# Patient Record
Sex: FEMALE | Race: WHITE | Employment: OTHER | URBAN - METROPOLITAN AREA
[De-identification: names, ages, dates, MRNs, and addresses within clinical notes are randomized per-mention and may not be internally consistent; named-entity substitution may affect disease eponyms.]

---

## 2017-01-25 ENCOUNTER — OFFICE VISIT (OUTPATIENT)
Dept: NEUROLOGY | Age: 82
End: 2017-01-25

## 2017-01-25 DIAGNOSIS — E11.42 DIABETIC PERIPHERAL NEUROPATHY ASSOCIATED WITH TYPE 2 DIABETES MELLITUS (HCC): ICD-10-CM

## 2017-01-25 DIAGNOSIS — M54.16 LUMBAR BACK PAIN WITH RADICULOPATHY AFFECTING LEFT LOWER EXTREMITY: Primary | ICD-10-CM

## 2017-01-25 DIAGNOSIS — M54.16 LUMBAR BACK PAIN WITH RADICULOPATHY AFFECTING RIGHT LOWER EXTREMITY: ICD-10-CM

## 2017-01-25 DIAGNOSIS — R55 SYNCOPE AND COLLAPSE: ICD-10-CM

## 2017-01-25 DIAGNOSIS — M48.062 SPINAL STENOSIS OF LUMBAR REGION WITH NEUROGENIC CLAUDICATION: ICD-10-CM

## 2017-01-25 DIAGNOSIS — G60.8 IDIOPATHIC SMALL AND LARGE FIBER SENSORY NEUROPATHY: ICD-10-CM

## 2017-01-25 DIAGNOSIS — M96.1 LUMBAR POST-LAMINECTOMY SYNDROME: ICD-10-CM

## 2017-01-25 NOTE — LETTER
1/25/2017 12:11 PM 
 
Patient:  Richar Starr YOB: 1935 Date of Visit: 1/25/2017 Dear No Recipients: Thank you for referring Ms. Antonio Mcgrath to me for evaluation/treatment. Below are the relevant portions of my assessment and plan of care. EMG dictated in procedure note If you have questions, please do not hesitate to call me. I look forward to following Ms. Teri Johnston along with you. Sincerely, Calvin Siddiqui MD

## 2017-01-25 NOTE — PROCEDURES
[address] [facility] at [address] [facility] [address] [facility], [address] [facility]  Tel [phone]     Fax [phone]  Electrodiagnostic Study Report  Test Date:  2017    Patient: Juarez Barba : 1935 Physician: Lc Gutierrez MD   Sex: Female  < Ref Phys: [id number]     Clinical Indication: Patient is a 81-year-old female status post lumbar laminectomy with recurrent bilateral leg and hip pain, worse on the left side, for EMG study to rule out recurrent radiculopathy, or other causes of her back pain. Impression: This study shows electrophysiologic evidence of a chronic L5 motor radiculopathy in the left lower extremity, without acute denervation changes seen in fairly good recruitment and interference patterns still seen, most consistent with a chronic L5 motor radiculopathy. No clear evidence of denervation potential seen in either the right or left lower extremity to indicate acute lumbar radiculopathy. The borderline low amplitudes of the compound muscle action potentials and compound sensory action potentials seen in several muscles, could be normal for her age, we will could represent changes from previous radiculopathy, or less likely in view of her normal conductions be associated with a small fiber chronic axonal neuropathy, again which is much less likely in view of her normal sensory and motor conduction velocities. Clinical correlation recommended, and correlation with imaging modalities would be of value in following this patient if clinically indicated. EMG & NCV Findings:  Evaluation of the left Fibular motor, the right Fibular motor, the right tibial motor, and the right sural sensory nerves showed reduced amplitude (L1.8, R1.8, R2.8, R2.7 µV). All remaining nerves (as indicated in the following tables) were within normal limits.   Left vs. Right side comparison data for the tibial motor nerve indicates abnormal L-R amplitude difference (56.3 %). All remaining left vs. right side differences were within normal limits.         Nerve Conduction Studies  Anti Sensory Summary Table     Stim Site NR Peak (ms) P-T Amp (µV) Site1 Site2 Delta-P (ms) Dist (cm) Doc (m/s)   Left Sural Anti Sensory (Lat Mall)  30.9°C   Calf    3.3 8.4 Calf Lat Mall 3.3 14.0 42   Site 2    3.5 2.0        Site 3    3.4 4.5        Right Sural Anti Sensory (Lat Mall)  30.9°C   Calf    3.9 2.7 Calf Lat Mall 3.9 14.0 36   Site 2    3.7 4.5        Site 3    3.2 8.3        Site 4    3.3 4.4          Motor Summary Table     Stim Site NR Onset (ms) O-P Amp (mV) Site1 Site2 Delta-0 (ms) Dist (cm) Doc (m/s)   Left Fibular Motor (Ext Dig Brev)  30.3°C   Ankle    3.2 1.8 B Fib Ankle 8.4 32.0 38   B Fib    11.6 1.6 Poplt B Fib 1.8 10.0 56   Poplt    13.4 1.4            2.9 1.6        Right Fibular Motor (Ext Dig Brev)   Ankle    3.6 1.8 B Fib Ankle 7.3 32.0 44   B Fib    10.9 1.3 Poplt B Fib 2.0 10.0 50   Poplt    12.9 1.1        Left Tibial Motor (Abd Del Valle Brev)  30°C   Ankle    3.8 6.4 Knee Ankle 8.9 38.0 43   Knee    12.7 4.6        Right Tibial Motor (Abd Del Valle Brev)   Ankle    4.0 2.8 Knee Ankle 9.4 38.0 40   Knee    13.4 1.2          EMG     Side Muscle Nerve Root Ins Act Fibs Psw Amp Dur Poly Recrt Comment   Left AntTibialis Dp Br Fibular L4-5 Nml Nml Nml Incr >12ms 1+ Nml    Left Gastroc Tibial S1-2 Nml Nml Nml Nml Nml 0 Nml    Left VastusLat Femoral L2-4 Nml Nml Nml Nml Nml 0 Nml    Left Fibularis Long Sup Br Fibular L5-S1 Nml Nml Nml Incr >12ms 1+ Nml    Left Lumbo Parasp Low Rami L5-S1 Nml Nml Nml        Left BicepsFemS Sciatic L5-S1 Nml Nml Nml Nml Nml 0 Nml    Right AntTibialis Dp Br Fibular L4-5 Nml Nml Nml Nml Nml 0 Nml    Right Gastroc Tibial S1-2 Nml Nml Nml Nml Nml 0 Nml    Right VastusLat Femoral L2-4 Nml Nml Nml Nml Nml 0 Nml    Right BicepsFemS Sciatic L5-S1 Nml Nml Nml Nml Nml 0 Nml    Right Lumbo Parasp Low Rami L5-S1 Nml Nml Nml        Right Fibularis Long Sup Br Hood L5-S1 Nml Nml Nml Nml Nml 0 Nml        Waveforms:                    __________________  Mara Bell M.D.

## 2017-02-10 ENCOUNTER — HOSPITAL ENCOUNTER (OUTPATIENT)
Dept: CT IMAGING | Age: 82
Discharge: HOME | End: 2017-02-10
Attending: ORTHOPAEDIC SURGERY
Payer: MEDICARE

## 2017-02-10 ENCOUNTER — HOSPITAL ENCOUNTER (OUTPATIENT)
Dept: GENERAL RADIOLOGY | Age: 82
Discharge: HOME | End: 2017-02-10
Attending: ORTHOPAEDIC SURGERY
Payer: MEDICARE

## 2017-02-10 VITALS
OXYGEN SATURATION: 99 % | SYSTOLIC BLOOD PRESSURE: 99 MMHG | HEART RATE: 66 BPM | DIASTOLIC BLOOD PRESSURE: 55 MMHG | RESPIRATION RATE: 16 BRPM | TEMPERATURE: 98.1 F

## 2017-02-10 DIAGNOSIS — M48.061 LUMBAR STENOSIS WITHOUT NEUROGENIC CLAUDICATION: ICD-10-CM

## 2017-02-10 DIAGNOSIS — M43.16 SPONDYLOLISTHESIS OF LUMBAR REGION: ICD-10-CM

## 2017-02-10 DIAGNOSIS — M54.30 SCIATICA: ICD-10-CM

## 2017-02-10 DIAGNOSIS — M54.50 LOW BACK PAIN: ICD-10-CM

## 2017-02-10 PROCEDURE — 62304 MYELOGRAPHY LUMBAR INJECTION: CPT

## 2017-02-10 PROCEDURE — 72132 CT LUMBAR SPINE W/DYE: CPT

## 2017-02-10 PROCEDURE — 74011636320 HC RX REV CODE- 636/320: Performed by: RADIOLOGY

## 2017-02-10 PROCEDURE — 74011000250 HC RX REV CODE- 250: Performed by: RADIOLOGY

## 2017-02-10 RX ADMIN — IOHEXOL 20 ML: 180 INJECTION INTRAVENOUS at 08:40

## 2017-02-10 RX ADMIN — SODIUM BICARBONATE 2 ML: 0.2 INJECTION, SOLUTION INTRAVENOUS at 08:34

## 2017-02-10 NOTE — IP AVS SNAPSHOT
Current Discharge Medication List  
  
ASK your doctor about these medications Dose & Instructions Dispensing Information Comments Morning Noon Evening Bedtime  
 anastrozole 1 mg tablet Commonly known as:  ARIMIDEX Your next dose is: Today, Tomorrow Other:  ____________ Dose:  1 mg Take 1 Tab by mouth daily. Quantity:  90 Tab Refills:  2  
     
   
   
   
  
 aspirin 325 mg tablet Commonly known as:  ASPIRIN Your next dose is: Today, Tomorrow Other:  ____________ Dose:  325 mg Take 325 mg by mouth daily as needed for Pain. Refills:  0  
     
   
   
   
  
 ergocalciferol 50,000 unit capsule Commonly known as:  ERGOCALCIFEROL Your next dose is: Today, Tomorrow Other:  ____________  
   
   
 take 1 capsule by mouth every week Quantity:  12 Cap Refills:  3 GENTEAL OP Your next dose is: Today, Tomorrow Other:  ____________ Apply  to eye as needed. Refills:  0 GENTEAL TEARS OP Your next dose is: Today, Tomorrow Other:  ____________ Apply  to eye daily. Refills:  0 MAALOX PO Your next dose is: Today, Tomorrow Other:  ____________ Take  by mouth as needed. Refills:  0 PRESERVISION PO Your next dose is: Today, Tomorrow Other:  ____________ Take  by mouth daily. Refills:  0  
     
   
   
   
  
 RESTASIS OP Your next dose is: Today, Tomorrow Other:  ____________ Apply  to eye two (2) times a day. Refills:  0

## 2017-02-10 NOTE — ROUTINE PROCESS
0945- Pt in post myelogram.  Request for CD post procedure. Discharge instructions reviewed with pt. Pt verbalized understanding. 1010- CD with pt. Pt verbalized understanding. Discharged to home via W/C.

## 2017-02-10 NOTE — DISCHARGE INSTRUCTIONS
[facility]  Special Procedures/Radiology Department    Radiologist: Jose J Velasquez    Date:  2/10/16      Myelogram Discharge Instructions    Restrict your activity for the next 48 hours. You may get up and sit in the chair or get up and go to the bathroom with slow movements. You must keep your head above your chest until 8 a.m. tomorrow. Rest as much as possible tonight and tomorrow. Resume your previous diet and follow the medication reconciliation form. Drink plenty of water. Avoid products with caffeine. You may take Tylenol, as directed on the label, for pain or discomfort. Avoid ibuprofen (Advil, Motrin) and aspirin as they may cause bleeding. Avoid lifting anything heavier than 5 pounds. Avoid excessive bending and lifting as this may increase the chance of having a headache. Be sure to follow up with your physician. Take your CD disk with you. If you have severe pain, or if you have any questions or concerns, please call [phone] and ask to speak to the nurse on-call.         Patient Signature:  ______________________________________________

## 2017-02-10 NOTE — IP AVS SNAPSHOT
[address] [facility] 
[phone] Patient: Dulce Rodriguez MRN: [id number] DPJ:[id number] You are allergic to the following Allergen Reactions Hydrocodone Nausea Only Vertigo Gabapentin Diarrhea Nausea Only Other (comments)  
 weakness Oxycodone Nausea and Vomiting Vertigo Recent Documentation OB Status Smoking Status Hysterectomy Former Smoker Emergency Contacts Name Discharge Info Relation Home Work Mobile [facility] DISCHARGE CAREGIVER [3] Spouse [3] [phone] About your hospitalization You were admitted on:  February 10, 2017 You last received care in the:  [facility] RAD [facility] You were discharged on:  February 10, 2017 Unit phone number:  [phone] Why you were hospitalized Your primary diagnosis was:  Not on File Providers Seen During Your Hospitalizations Provider Role Specialty Primary office phone Jaylan Barajas MD Attending Provider Orthopedic Surgery [phone] Your Primary Care Physician (PCP) Primary Care Physician Office Phone Office Fax [facility] [phone] Follow-up Information None Current Discharge Medication List  
  
ASK your doctor about these medications Dose & Instructions Dispensing Information Comments Morning Noon Evening Bedtime  
 anastrozole 1 mg tablet Commonly known as:  ARIMIDEX Your next dose is: Today, Tomorrow Other:  _________ Dose:  1 mg Take 1 Tab by mouth daily. Quantity:  90 Tab Refills:  2  
     
   
   
   
  
 aspirin 325 mg tablet Commonly known as:  ASPIRIN Your next dose is: Today, Tomorrow Other:  _________ Dose:  325 mg Take 325 mg by mouth daily as needed for Pain. Refills:  0 ergocalciferol 50,000 unit capsule Commonly known as:  ERGOCALCIFEROL Your next dose is: Today, Tomorrow Other:  _________  
   
   
 take 1 capsule by mouth every week Quantity:  12 Cap Refills:  3 GENTEAL OP Your next dose is: Today, Tomorrow Other:  _________ Apply  to eye as needed. Refills:  0 GENTEAL TEARS OP Your next dose is: Today, Tomorrow Other:  _________ Apply  to eye daily. Refills:  0 MAALOX PO Your next dose is: Today, Tomorrow Other:  _________ Take  by mouth as needed. Refills:  0 PRESERVISION PO Your next dose is: Today, Tomorrow Other:  _________ Take  by mouth daily. Refills:  0  
     
   
   
   
  
 RESTASIS OP Your next dose is: Today, Tomorrow Other:  _________ Apply  to eye two (2) times a day. Refills:  0 Discharge Instructions [facility] Special Procedures/Radiology Department Radiologist: Sherrill Lopez Date:  2/10/16 Myelogram Discharge Instructions Restrict your activity for the next 48 hours. You may get up and sit in the chair or get up and go to the bathroom with slow movements. You must keep your head above your chest until 8 a.m. tomorrow. Rest as much as possible tonight and tomorrow. Resume your previous diet and follow the medication reconciliation form. Drink plenty of water. Avoid products with caffeine. You may take Tylenol, as directed on the label, for pain or discomfort. Avoid ibuprofen (Advil, Motrin) and aspirin as they may cause bleeding. Avoid lifting anything heavier than 5 pounds. Avoid excessive bending and lifting as this may increase the chance of having a headache. Be sure to follow up with your physician. Take your CD disk with you. If you have severe pain, or if you have any questions or concerns, please call [phone] and ask to speak to the nurse on-call. Patient Signature:  ______________________________________________ Discharge Orders None Introducing [facility]! Dear Penny Guy: Thank you for requesting a [facility] account. Our records indicate that you have previously registered for a [facility] account but its currently inactive. Please call our [facility] support line at [phone]. Additional Information If you have questions, please visit the Frequently Asked Questions section of the [facility] website at [email or web address] Remember, MyChart is NOT to be used for urgent needs. For medical emergencies, dial 911. Now available from your iPhone and Android! General Information Please provide this summary of care documentation to your next provider. Patient Signature:  ____________________________________________________________ Date:  ____________________________________________________________  
  
[facility] Provider Signature:  ____________________________________________________________ Date:  ____________________________________________________________

## 2017-05-07 ENCOUNTER — HOSPITAL ENCOUNTER (OUTPATIENT)
Dept: MRI IMAGING | Age: 82
Discharge: HOME | End: 2017-05-07
Attending: ORTHOPAEDIC SURGERY
Payer: MEDICARE

## 2017-05-07 DIAGNOSIS — M25.552 LEFT HIP PAIN: ICD-10-CM

## 2017-05-07 DIAGNOSIS — M43.16 SPONDYLOLISTHESIS OF LUMBAR REGION: ICD-10-CM

## 2017-05-07 DIAGNOSIS — M46.1 SACROILIITIS, NOT ELSEWHERE CLASSIFIED (HCC): ICD-10-CM

## 2017-05-07 DIAGNOSIS — M54.30 SCIATICA: ICD-10-CM

## 2017-05-07 PROCEDURE — 73721 MRI JNT OF LWR EXTRE W/O DYE: CPT

## 2017-05-10 DIAGNOSIS — E55.9 VITAMIN D DEFICIENCY: ICD-10-CM

## 2017-05-10 RX ORDER — ERGOCALCIFEROL 1.25 MG/1
CAPSULE ORAL
Qty: 12 CAP | Refills: 3 | Status: SHIPPED | OUTPATIENT
Start: 2017-05-10 | End: 2018-08-08 | Stop reason: SDUPTHER

## 2017-08-25 ENCOUNTER — OFFICE VISIT (OUTPATIENT)
Dept: SURGERY | Age: 82
End: 2017-08-25

## 2017-08-25 VITALS
SYSTOLIC BLOOD PRESSURE: 140 MMHG | HEIGHT: 65 IN | HEART RATE: 81 BPM | DIASTOLIC BLOOD PRESSURE: 61 MMHG | WEIGHT: 188 LBS | BODY MASS INDEX: 31.32 KG/M2

## 2017-08-25 DIAGNOSIS — Z98.890 S/P LUMPECTOMY, LEFT BREAST: ICD-10-CM

## 2017-08-25 DIAGNOSIS — M94.0 COSTOCHONDRITIS: ICD-10-CM

## 2017-08-25 DIAGNOSIS — C50.912 MALIGNANT NEOPLASM OF LEFT FEMALE BREAST, UNSPECIFIED SITE OF BREAST: Primary | ICD-10-CM

## 2017-08-25 NOTE — PATIENT INSTRUCTIONS
Breast Pain: Care Instructions  Your Care Instructions  Breast tenderness and pain may come and go with your monthly periods (cyclic), or it may not follow any pattern (noncyclic). Breast pain is rarely caused by a serious health problem. You may need tests to find the cause. Follow-up care is a key part of your treatment and safety. Be sure to make and go to all appointments, and call your doctor if you are having problems. Its also a good idea to know your test results and keep a list of the medicines you take. How can you care for yourself at home? · If your doctor gave you medicine, take it exactly as prescribed. Call your doctor if you think you are having a problem with your medicine. · Take an over-the-counter pain medicine, such as acetaminophen (Tylenol), ibuprofen (Advil, Motrin), or naproxen (Aleve), to relieve pain and swelling. Read and follow all instructions on the label. · Do not take two or more pain medicines at the same time unless the doctor told you to. Many pain medicines have acetaminophen, which is Tylenol. Too much acetaminophen (Tylenol) can be harmful. · Wear a supportive bra, such as a sports bra or a jog bra. · Cut down on the amount of fat in your diet. If you need help planning healthy meals, see a dietitian. · Get at least 30 minutes of exercise on most days of the week. Walking is a good choice. You also may want to do other activities, such as running, swimming, cycling, or playing tennis or team sports. · Keep a healthy sleep pattern. Go to bed at the same time every night, and get up at the same time every day. When should you call for help? Call your doctor now or seek immediate medical care if:  · You have new changes in a breast, such as:  ¨ A lump or thickening in your breast or armpit. ¨ A change in the breast's size or shape. ¨ Skin changes, such as dimples or puckers. ¨ Nipple discharge.   ¨ A change in the color or feel of the skin of your breast or the darker area around the nipple (areola). ¨ A change in the shape of the nipple (it may look like it's being pulled into the breast). · You have symptoms of a breast infection, such as:  ¨ Increased pain, swelling, redness, or warmth around a breast.  ¨ Red streaks extending from the breast.  ¨ Pus draining from a breast.  ¨ A fever. Watch closely for changes in your health, and be sure to contact your doctor if:  · Your breast pain does not get better after 1 week. · You have a lump or thickening in your breast or armpit. Where can you learn more? Go to http://sumit-arpit.info/. Enter S516 in the search box to learn more about \"Breast Pain: Care Instructions. \"  Current as of: March 20, 2017  Content Version: 11.3  © 5317-1046 [employer]. Care instructions adapted under license by [employer] (which disclaims liability or warranty for this information). If you have questions about a medical condition or this instruction, always ask your healthcare professional. [address] any warranty or liability for your use of this information.

## 2017-08-25 NOTE — PROGRESS NOTES
HISTORY OF PRESENT ILLNESS  Ina Quintanilla is a 80 y.o. female. HPI ESTABLISHED patient here for LEFT breast pain x 1-2 weeks. Describes pain as dull and achy. She is s/p LEFT breast lumpectomy 11/2013. Completed radiation therapy. Currently taking Arimidex.      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 Review of Systems   All other systems reviewed and are negative. Physical Exam   Pulmonary/Chest: Right breast exhibits no inverted nipple, no mass, no nipple discharge, no skin change and no tenderness. Left breast exhibits skin change (skin thickening from xrt) and tenderness (tender to palpation along rib along inframammary fold. ). Left breast exhibits no inverted nipple, no mass and no nipple discharge. Breasts are symmetrical.       Lymphadenopathy:     She has no cervical adenopathy. She has no axillary adenopathy. Nursing note and vitals reviewed. ASSESSMENT and PLAN    ICD-10-CM ICD-9-CM    1. Malignant neoplasm of left female breast, unspecified site of breast (HCC) C50.912 174.9    2. S/P lumpectomy, left breast Z90.12 V45.89    3. Costochondritis M94.0 733.6      79 yo female s/p LEFT lumpectomy 11/2013 for T1a N0 Mx (Stage IA) infiltrating ductal carcinoma , Tumor size 1 cm, LN -ve, grade 2, ki 67 17%, %, IA 90%, Her 2 unamplified     - costochondritis- aleve, heat on rib. If no improvement in 2 weeks will do early mammo/us.

## 2017-08-29 RX ORDER — ANASTROZOLE 1 MG/1
TABLET ORAL
Qty: 90 TAB | Refills: 2 | Status: SHIPPED | OUTPATIENT
Start: 2017-08-29 | End: 2018-08-22 | Stop reason: SDUPTHER

## 2017-10-30 ENCOUNTER — TELEPHONE (OUTPATIENT)
Dept: SURGERY | Age: 82
End: 2017-10-30

## 2017-10-30 DIAGNOSIS — Z85.3 HISTORY OF LEFT BREAST CANCER: Primary | ICD-10-CM

## 2017-10-30 NOTE — TELEPHONE ENCOUNTER
Returned Hello message to patient. She is requesting mammogram order. Placed order for 3D bilateral dx darcie. Patient is going to call central scheduling to get this scheduled at [address]

## 2017-11-22 ENCOUNTER — HOSPITAL ENCOUNTER (OUTPATIENT)
Dept: MAMMOGRAPHY | Age: 82
Discharge: HOME | End: 2017-11-22
Attending: SURGERY
Payer: MEDICARE

## 2017-11-22 DIAGNOSIS — Z85.3 HISTORY OF LEFT BREAST CANCER: ICD-10-CM

## 2017-11-22 PROCEDURE — 77062 BREAST TOMOSYNTHESIS BI: CPT

## 2017-12-18 ENCOUNTER — OFFICE VISIT (OUTPATIENT)
Dept: INTERNAL MEDICINE CLINIC | Age: 82
End: 2017-12-18

## 2017-12-18 VITALS
HEIGHT: 65 IN | WEIGHT: 189.6 LBS | SYSTOLIC BLOOD PRESSURE: 128 MMHG | TEMPERATURE: 98.9 F | BODY MASS INDEX: 31.59 KG/M2 | DIASTOLIC BLOOD PRESSURE: 80 MMHG

## 2017-12-18 DIAGNOSIS — J20.9 ACUTE BRONCHITIS, UNSPECIFIED ORGANISM: Primary | ICD-10-CM

## 2017-12-18 RX ORDER — CEFUROXIME AXETIL 250 MG/1
250 TABLET ORAL 2 TIMES DAILY
Qty: 20 TAB | Refills: 0 | Status: SHIPPED | OUTPATIENT
Start: 2017-12-18 | End: 2018-03-01

## 2017-12-18 NOTE — PROGRESS NOTES
This note will not be viewable in [address] Angelina Bell is a [de-identified] y.o. female and presents with Cough  . Subjective:  Mrs. Taj Yost presents to the office today with complaints of an upper respiratory infection which is been ongoing over the last week. She has had mild rhinorrhea and sore throat and is now developed a congested cough. Cough has been productive of purulent phlegm. She denies high fever or chills. She has had no wheezing shortness of breath or pleuritic pain. She has been taking an over-the-counter cough expectorant without relief.     Past Medical History:   Diagnosis Date    Arthritis     Breast cancer ([facility] [address] 2013    Lt Breast    Bunion of right foot 8/20/2015    Cancer ([facility] [address]     radiation---no chemo    Chronic pain     back--uses tens unit    GERD (gastroesophageal reflux disease)     Ill-defined condition     blood transfusion hx    Ill-defined condition     diverticulitis    Muscle ache     Neurological disorder     Other ill-defined conditions(799.89)     rosacea    PUD (peptic ulcer disease)     Radiation therapy complication 7301    Lt breast-No Chemo    Trouble in sleeping      Past Surgical History:   Procedure Laterality Date    BREAST SURGERY PROCEDURE UNLISTED  11/21/13    Left breast lumpectomy with sentinel lymph node biopsy    HX APPENDECTOMY      HX BREAST LUMPECTOMY  11/21/2013    LEFT BREAST LUMPECTOMY W/LEFT BREAST SENTINEL NODE BIOPSY,AND NEEDLE LOCALIZATION performed by Vicky Truong MD at [facility] MAIN OR    HX CATARACT REMOVAL      bilateral    HX HYSTERECTOMY      ovarian cyst    HX MOHS PROCEDURES      right    HX ORTHOPAEDIC      back surgery    HX OTHER SURGICAL      colonoscopy    HX TONSILLECTOMY      TOTAL KNEE ARTHROPLASTY      left    TOTAL KNEE ARTHROPLASTY      right    US GUIDED CORE BREAST BIOPSY Left 2013    Breast Ca     Allergies   Allergen Reactions    Hydrocodone Nausea Only and Vertigo    Gabapentin Diarrhea, Nausea Only and Other (comments)     weakness    Oxycodone Nausea and Vomiting and Vertigo     Current Outpatient Prescriptions   Medication Sig Dispense Refill    cefUROXime (CEFTIN) 250 mg tablet Take 1 Tab by mouth two (2) times a day. 20 Tab 0    anastrozole (ARIMIDEX) 1 mg tablet take 1 tablet by mouth once daily 90 Tab 2    VITAMIN D2 50,000 unit capsule take 1 capsule by mouth every week 12 Cap 3    CYCLOSPORINE (RESTASIS OP) Apply  to eye two (2) times a day.  HYPROMELLOSE (GENTEAL OP) Apply  to eye as needed. Social History     Social History    Marital status:      Spouse name: N/A    Number of children: N/A    Years of education: N/A     Social History Main Topics    Smoking status: Former Smoker     Packs/day: 0.50     Years: 50.00     Quit date: 2/13/2012    Smokeless tobacco: Never Used    Alcohol use 1.2 oz/week     2 Glasses of wine per week      Comment: occasionally 3-4 off and on a week    Drug use: No    Sexual activity: Not Asked     Other Topics Concern    None     Social History Narrative     Family History   Problem Relation Age of Onset    Cancer Mother      bladder    Cancer Father     Breast Cancer Paternal Grandmother        Review of Systems  Constitutional: negative for fevers, chills, anorexia and weight loss  Eyes:   negative for visual disturbance and irritation  ENT:   Positive for some sinus congestion and post nasal drainage.    Respiratory:  Positive for cough and chest congestion without wheezing  CV:   negative for chest pain, palpitations, lower extremity edema  GI:   negative for nausea, vomiting, diarrhea, abdominal pain,melena  Integumentary: negative for rash and pruritus  Neurological:  negative for headaches, dizziness, vertigo, memory problems and gait       Objective:  Visit Vitals    /80 (BP 1 Location: Right arm, BP Patient Position: Sitting)    Temp 98.9 °F (37.2 °C) (Oral)    Ht 5' 5\" (1.651 m)    Wt 189 lb 9.6 oz (86 kg)    BMI 31.55 kg/m2 Body mass index is 31.55 kg/(m^2). Physical Exam:   General appearance - alert, ill appearing, and in no distress  Mental status - alert, oriented to person, place, and time  EYE-HOWARD, EOMI, conjuctiva clear. No lid swelling or purulent drainage  ENT- TM's clear without A/F level. Pharynx slightly erythematous with drainage noted  Nose - normal and patent, no erythema,  Neck - supple, no significant adenopathy   Chest - Coarse upper airway rhonchi present without wheezing   Heart - normal rate, regular rhythm, normal S1, S2, no murmurs, rubs, clicks or gallops   Skin-No rash appreciated  Neuro -alert, oriented, normal speech, no focal findings. Assessment/Plan:  Diagnoses and all orders for this visit:    Acute bronchitis, unspecified organism  -     cefUROXime (CEFTIN) 250 mg tablet; Take 1 Tab by mouth two (2) times a day., Normal, Disp-20 Tab, R-0        Other Instructions:  Mucinex as directed    Increase po fluids    Body mass index is 31.55. Patient education and a printed diet education sheet is given to the patient    Follow-up Disposition:  Return if symptoms worsen or fail to improve. I have reviewed with the patient details of the assessment and plan and all questions were answered. Relevent patient education was performed. An After Visit Summary was printed and given to the patient.     Alexx Bueno MD

## 2017-12-18 NOTE — PATIENT INSTRUCTIONS

## 2017-12-18 NOTE — MR AVS SNAPSHOT
Visit Information Date & Time Provider Department Dept. Phone Encounter #  
 12/18/2017 10:50 AM Radha Nunez Anju Nascimento [id number] Follow-up Instructions Return if symptoms worsen or fail to improve. Follow-up and Disposition History Your Appointments 3/1/2018  8:30 AM  
Follow Up with RENATO Arriazavenelson 22 ([facility]) Appt Note: 6 month follow up / cp $0 ct 8-25-17  
 [address] [facility] [address] [address]  
[address] Upcoming Health Maintenance Date Due  
 LIPID PANEL Q1 1935 FOOT EXAM Q1 9/22/1945 MICROALBUMIN Q1 9/22/1945 EYE EXAM RETINAL OR DILATED Q1 9/22/1947 ZOSTER VACCINE AGE 60> 7/22/1997 GLAUCOMA SCREENING Q2Y 9/22/2002 MEDICARE YEARLY EXAM 9/22/2002 Pneumococcal 65+ High/Highest Risk (2 of 2 - PPSV23) 1/1/2013 HEMOGLOBIN A1C Q6M 2/15/2017 DTaP/Tdap/Td series (2 - Td) 11/13/2026 Allergies as of 12/18/2017  Review Complete On: 12/18/2017 By: Radha Nunez MD  
  
 Severity Noted Reaction Type Reactions Hydrocodone Medium 10/28/2011   Intolerance Nausea Only, Vertigo Gabapentin  10/16/2015    Diarrhea, Nausea Only, Other (comments)  
 weakness Oxycodone  10/21/2011    Nausea and Vomiting, Vertigo Current Immunizations  Reviewed on 8/28/2015 Name Date Influenza Vaccine 9/14/2017, 1/1/2013 Pneumococcal Polysaccharide (PPSV-23) 1/1/2008 Tdap 11/13/2016 12:09 PM  
  
 Not reviewed this visit You Were Diagnosed With   
  
 Codes Comments Acute bronchitis, unspecified organism    -  Primary ICD-10-CM: J20.9 ICD-9-CM: 466.0 Vitals BP Temp Height(growth percentile) Weight(growth percentile) BMI OB Status  128/80 (BP 1 Location: Right arm, BP Patient Position: Sitting) 98.9 °F (37.2 °C) (Oral) 5' 5\" (1.651 m) 189 lb 9.6 oz (86 kg) 31.55 kg/m2 Hysterectomy Smoking Status Former Smoker BMI and BSA Data Body Mass Index Body Surface Area 31.55 kg/m 2 1.99 m 2 Preferred Pharmacy Pharmacy Name Phone [id number] [address] [place] [address] [phone] Your Updated Medication List  
  
   
This list is accurate as of: 12/18/17 11:30 AM.  Always use your most recent med list.  
  
  
  
  
 anastrozole 1 mg tablet Commonly known as:  ARIMIDEX  
take 1 tablet by mouth once daily  
  
 cefUROXime 250 mg tablet Commonly known as:  CEFTIN Take 1 Tab by mouth two (2) times a day. GENTEAL OP Apply  to eye as needed. RESTASIS OP Apply  to eye two (2) times a day. VITAMIN D2 50,000 unit capsule Generic drug:  ergocalciferol  
take 1 capsule by mouth every week Prescriptions Sent to Pharmacy Refills  
 cefUROXime (CEFTIN) 250 mg tablet 0 Sig: Take 1 Tab by mouth two (2) times a day. Class: Normal  
 Pharmacy: [id number] [address] [place] [address] #: [phone] Route: Oral  
  
Follow-up Instructions Return if symptoms worsen or fail to improve. Patient Instructions Learning About Cutting Calories How do calories affect your weight? Food gives your body energy. Energy from the food you eat is measured in calories. This energy keeps your heart beating, your brain active, and your muscles working. Your body needs a certain number of calories each day. After your body uses the calories it needs, it stores extra calories as fat. To lose weight safely, you have to eat fewer calories while eating in a healthy way. How many calories do you need each day? The more active you are, the more calories you need.  When you are less active, you need fewer calories. How many calories you need each day also depends on several things, including your age and whether you are male or female. Here are some general guidelines for adults: 
· Less active women and older adults need 1,600 to 2,000 calories each day. · Active women and less active men need 2,000 to 2,400 calories each day. · Active men need 2,400 to 3,000 calories each day. How can you cut calories and eat healthy meals? Whole grains, vegetables and fruits, and dried beans are good lower-calorie foods. They give you lots of nutrients and fiber. And they fill you up. Sweets, energy drinks, and soda pop are high in calories. They give you few nutrients and no fiber. Try to limit soda pop, fruit juice, and energy drinks. Drink water instead. Some fats can be part of a healthy diet. But cutting back on fats from highly processed foods like fast foods and many snack foods is a good way to lower the calories in your diet. Also, use smaller amounts of fats like butter, margarine, salad dressing, and mayonnaise. Add fresh garlic, lemon, or herbs to your meals to add flavor without adding fat. Meats and dairy products can be a big source of hidden fats. Try to choose lean or low-fat versions of these products. Fat-free cookies, candies, chips, and frozen treats can still be high in sugar and calories. Some fat-free foods have more calories than regular ones. Eat fat-free treats in moderation, as you would other foods. If your favorite foods are high in fat, salt, sugar, or calories, limit how often you eat them. Eat smaller servings, or look for healthy substitutes. Fill up on fruits, vegetables, and whole grains. Eating at home · Use meat as a side dish instead of as the main part of your meal. 
· Try main dishes that use whole wheat pasta, brown rice, dried beans, or vegetables. · Find ways to cook with little or no fat, such as broiling, steaming, or grilling. · Use cooking spray instead of oil. If you use oil, use a monounsaturated oil, such as canola or olive oil. · Trim fat from meats before you cook them. · Drain off fat after you brown the meat or while you roast it. · Chill soups and stews after you cook them. Then skim the fat off the top after it hardens. Eating out · Order foods that are broiled or poached rather than fried or breaded. · Cut back on the amount of butter or margarine that you use on bread. · Order sauces, gravies, and salad dressings on the side, and use only a little. · When you order pasta, choose tomato sauce rather than cream sauce. · Ask for salsa with your baked potato instead of sour cream, butter, cheese, or marcial. · Order meals in a small size instead of upgrading to a large. · Share an entree, or take part of your food home to eat as another meal. 
· Share appetizers and desserts. Where can you learn more? Go to http://sumit-arpit.info/. Enter 99 550902 in the search box to learn more about \"Learning About Cutting Calories. \" Current as of: May 12, 2017 Content Version: 11.4 © 4639-2256 Healthwise, [facility]. Care instructions adapted under license by [employer] (which disclaims liability or warranty for this information). If you have questions about a medical condition or this instruction, always ask your healthcare professional. [place] [address] any warranty or liability for your use of this information. Patient Instructions History Introducing [facility] & HE[facility] SERVICES! Dear Kailash Patten: Thank you for requesting a Blot[employer] account. Our records indicate that you have previously registered for a Blottr account but its currently inactive. Please call our Blottr support line at [phone]. Additional Information If you have questions, please visit the Frequently Asked Questions section of the Blottr website at [email or web address] Remember, [employer]hart is NOT to be used for urgent needs. For medical emergencies, dial 911. Now available from your iPhone and Android! Please provide this summary of care documentation to your next provider. Your primary care clinician is listed as JARROD Grey. If you have any questions after today's visit, please call [phone].

## 2017-12-19 ENCOUNTER — TELEPHONE (OUTPATIENT)
Dept: INTERNAL MEDICINE CLINIC | Age: 82
End: 2017-12-19

## 2018-03-01 ENCOUNTER — OFFICE VISIT (OUTPATIENT)
Dept: SURGERY | Age: 83
End: 2018-03-01

## 2018-03-01 VITALS
HEIGHT: 65 IN | DIASTOLIC BLOOD PRESSURE: 63 MMHG | WEIGHT: 191 LBS | HEART RATE: 78 BPM | SYSTOLIC BLOOD PRESSURE: 125 MMHG | BODY MASS INDEX: 31.82 KG/M2

## 2018-03-01 DIAGNOSIS — N60.11 FIBROCYSTIC BREAST CHANGES OF BOTH BREASTS: Primary | ICD-10-CM

## 2018-03-01 DIAGNOSIS — Z85.3 HISTORY OF BREAST CANCER IN FEMALE: ICD-10-CM

## 2018-03-01 DIAGNOSIS — Z98.890 S/P LUMPECTOMY, LEFT BREAST: ICD-10-CM

## 2018-03-01 DIAGNOSIS — N60.12 FIBROCYSTIC BREAST CHANGES OF BOTH BREASTS: Primary | ICD-10-CM

## 2018-03-01 RX ORDER — BISMUTH SUBSALICYLATE 262 MG
1 TABLET,CHEWABLE ORAL
COMMUNITY
End: 2020-01-30 | Stop reason: ALTCHOICE

## 2018-03-01 RX ORDER — ASPIRIN 325 MG
325 TABLET ORAL DAILY
COMMUNITY
End: 2018-10-03

## 2018-03-01 NOTE — PATIENT INSTRUCTIONS
Breast Self-Exam: Care Instructions  Your Care Instructions    A breast self-exam is when you check your breasts for lumps or changes. This regular exam helps you learn how your breasts normally look and feel. Most breast problems or changes are not because of cancer. Breast self-exam is not a substitute for a mammogram. Having regular breast exams by your doctor and regular mammograms improve your chances of finding any problems with your breasts. Some women set a time each month to do a step-by-step breast self-exam. Other women like a less formal system. They might look at their breasts as they brush their teeth, or feel their breasts once in a while in the shower. If you notice a change in your breast, tell your doctor. Follow-up care is a key part of your treatment and safety. Be sure to make and go to all appointments, and call your doctor if you are having problems. It's also a good idea to know your test results and keep a list of the medicines you take. How do you do a breast self-exam?  · The best time to examine your breasts is usually one week after your menstrual period begins. Your breasts should not be tender then. If you do not have periods, you might do your exam on a day of the month that is easy to remember. · To examine your breasts:  ¨ Remove all your clothes above the waist and lie down. When you are lying down, your breast tissue spreads evenly over your chest wall, which makes it easier to feel all your breast tissue. ¨ Use the pads-not the fingertips-of the 3 middle fingers of your left hand to check your right breast. Move your fingers slowly in small coin-sized circles that overlap. ¨ Use three levels of pressure to feel of all your breast tissue. Use light pressure to feel the tissue close to the skin surface. Use medium pressure to feel a little deeper. Use firm pressure to feel your tissue close to your breastbone and ribs.  Use each pressure level to feel your breast tissue HPI:    Patient ID: Beckie Powers is a 34year old female.     HPI  Seen by Dr Reema Rowan in [place]    Can have good several days and bad days    Started when lifting her daughter  , felt a pop in the L upper chest area    More in the clavicle area    No issues pr before moving on to the next spot. ¨ Check your entire breast, moving up and down as if following a strip from the collarbone to the bra line, and from the armpit to the ribs. Repeat until you have covered the entire breast.  ¨ Repeat this procedure for your left breast, using the pads of the 3 middle fingers of your right hand. · To examine your breasts while in the shower:  ¨ Place one arm over your head and lightly soap your breast on that side. ¨ Using the pads of your fingers, gently move your hand over your breast (in the strip pattern described above), feeling carefully for any lumps or changes. ¨ Repeat for the other breast.  · Have your doctor inspect anything you notice to see if you need further testing. Where can you learn more? Go to http://sumit-arpit.info/. Enter P148 in the search box to learn more about \"Breast Self-Exam: Care Instructions. \"  Current as of: May 12, 2017  Content Version: 11.4  © 9324-5917 Healthwise, Incorporated. Care instructions adapted under license by [employer] (which disclaims liability or warranty for this information). If you have questions about a medical condition or this instruction, always ask your healthcare professional. [place] [address] any warranty or liability for your use of this information.

## 2018-03-01 NOTE — MR AVS SNAPSHOT
[address] [place] 
[phone] Patient: Sixto Truong MRN: [id number] WXY:[id number] Visit Information Date & Time Provider Department Dept. Phone Encounter #  
 3/1/2018  8:30 AM Long Russo  [address] [id number] Your Appointments 3/5/2018  9:00 AM  
ESTABLISHED PATIENT with Magdalena Chowdhury MD  
[address]) Appt Note: f/u  
 [address] [address]  
[address] [place] [address] [address] Natan Tavera Upcoming Health Maintenance Date Due  
 LIPID PANEL Q1 1935 FOOT EXAM Q1 9/22/1945 MICROALBUMIN Q1 9[birth year]22/1945 EYE EXAM RETINAL OR DILATED Q1 9/22/1947 ZOSTER VACCINE AGE 60> 7/22/1997 GLAUCOMA SCREENING Q2Y 9/22/2002 MEDICARE YEARLY EXAM 9/22/2002 Pneumococcal 65+ High/Highest Risk (2 of 2 - PCV13) 1/1/2009 HEMOGLOBIN A1C Q6M 2/15/2017 DTaP/Tdap/Td series (2 - Td) 11/13/2026 Allergies as of 3/1/2018  Review Complete On: 3/1/2018 By: Axel Palafox RN Severity Noted Reaction Type Reactions Hydrocodone Medium 10/28/2011   Intolerance Nausea Only, Vertigo Gabapentin  10/16/2015    Diarrhea, Nausea Only, Other (comments)  
 weakness Oxycodone  10/21/2011    Nausea and Vomiting, Vertigo Current Immunizations  Reviewed on 8/28/2015 Name Date Influenza Vaccine 9/14/2017, 1/1/2013 Pneumococcal Polysaccharide (PPSV-23) 1/1/2008 Tdap 11/13/2016 12:09 PM  
  
 Not reviewed this visit Vitals BP Pulse Height(growth percentile) Weight(growth percentile) BMI OB Status 125/63 78 5' 5\" (1.651 m) 191 lb (86.6 kg) 31.78 kg/m2 Hysterectomy Smoking Status Former Smoker BMI and BSA Data Body Mass Index Body Surface Area 31.78 kg/m 2 1.99 m 2 Preferred Pharmacy Pharmacy Name Phone [id number] [address] [phone] Your Updated Medication List  
  
   
This list is accurate as of 3/1/18  8:44 AM.  Always use your most recent med list.  
  
  
  
  
 anastrozole 1 mg tablet Commonly known as:  ARIMIDEX  
take 1 tablet by mouth once daily  
  
 aspirin 325 mg tablet Commonly known as:  ASPIRIN Take 325 mg by mouth daily. multivitamin tablet Commonly known as:  ONE A DAY Take 1 Tab by mouth daily. VITAMIN D2 50,000 unit capsule Generic drug:  ergocalciferol  
take 1 capsule by mouth every week Patient Instructions Breast Self-Exam: Care Instructions Your Care Instructions A breast self-exam is when you check your breasts for lumps or changes. This regular exam helps you learn how your breasts normally look and feel. Most breast problems or changes are not because of cancer. Breast self-exam is not a substitute for a mammogram. Having regular breast exams by your doctor and regular mammograms improve your chances of finding any problems with your breasts. Some women set a time each month to do a step-by-step breast self-exam. Other women like a less formal system. They might look at their breasts as they brush their teeth, or feel their breasts once in a while in the shower. If you notice a change in your breast, tell your doctor. Follow-up care is a key part of your treatment and safety. Be sure to make and go to all appointments, and call your doctor if you are having problems. It's also a good idea to know your test results and keep a list of the medicines you take. How do you do a breast self-exam? 
· The best time to examine your breasts is usually one week after your menstrual period begins. Your breasts should not be tender then.  If you do not have periods, you might do your exam on a day of the month that is easy to remember. · To examine your breasts: ¨ Remove all your clothes above the waist and lie down. When you are lying down, your breast tissue spreads evenly over your chest wall, which makes it easier to feel all your breast tissue. ¨ Use the pads-not the fingertips-of the 3 middle fingers of your left hand to check your right breast. Move your fingers slowly in small coin-sized circles that overlap. ¨ Use three levels of pressure to feel of all your breast tissue. Use light pressure to feel the tissue close to the skin surface. Use medium pressure to feel a little deeper. Use firm pressure to feel your tissue close to your breastbone and ribs. Use each pressure level to feel your breast tissue before moving on to the next spot. ¨ Check your entire breast, moving up and down as if following a strip from the collarbone to the bra line, and from the armpit to the ribs. Repeat until you have covered the entire breast. 
¨ Repeat this procedure for your left breast, using the pads of the 3 middle fingers of your right hand. · To examine your breasts while in the shower: 
¨ Place one arm over your head and lightly soap your breast on that side. ¨ Using the pads of your fingers, gently move your hand over your breast (in the strip pattern described above), feeling carefully for any lumps or changes. ¨ Repeat for the other breast. 
· Have your doctor inspect anything you notice to see if you need further testing. Where can you learn more? Go to http://sumit-arpit.info/. Enter P148 in the search box to learn more about \"Breast Self-Exam: Care Instructions. \" Current as of: May 12, 2017 Content Version: 11.4 © 4767-7596 [employer]. Care instructions adapted under license by [employer] (which disclaims liability or warranty for this information).  If you have questions about a medical condition or this instruction, always ask your healthcare professional. [address] any warranty or liability for your use of this information. Introducing [facility] SERVICES! Dear Neno Alberts: Thank you for requesting a [employer] account. Our records indicate that you have previously registered for a [employer] account but its currently inactive. Please call our [employer] support line at [phone]. Additional Information If you have questions, please visit the Frequently Asked Questions section of the [employer] website at [email or web address] Remember, [employer] is NOT to be used for urgent needs. For medical emergencies, dial 911. Now available from your iPhone and Android! Please provide this summary of care documentation to your next provider. Your primary care clinician is listed as JARROD Grey. If you have any questions after today's visit, please call [phone].

## 2018-03-01 NOTE — PROGRESS NOTES
HISTORY OF PRESENT ILLNESS  Delmar Rios is a [de-identified] y.o. female. HPI   ESTABLISHED patient here today for follow up LEFT breast cancer. Denies any breast problems at this time. Currently taking Arimidex and tolerating well. 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 Followed by Dr. Melvin Casas  Taking Arimidex. Followed by Dr. Nehal Anaya    No FH of breast or ovarian cancer. Recent imaging-  3D bilateral diagnostic mammogram on 11/22/17- BIRADS 2  IMPRESSION:  1. Lumpectomy changes in the left breast. No mammographic evidence for  malignancy in the right breast.  2. Annual bilateral diagnostic mammography is recommended. 3. The patient has been notified of these results and recommendations.    4. BI-RADS Category 2, benign    ROS    Physical Exam    ASSESSMENT and PLAN  {ASSESSMENT/PLAN:02809}

## 2018-03-01 NOTE — PROGRESS NOTES
HISTORY OF PRESENT ILLNESS  Royal Andrews is a [de-identified] y.o. female. Breast Cancer     ESTABLISHED patient here today for follow up LEFT breast cancer. Denies any breast problems at this time. Currently taking Arimidex and tolerating well. 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 Followed by Dr. Drew Dominguez  Taking Arimidex. Followed by Dr. Bree Blanton    OB History     Obstetric Comments    Menarche:  17. LMP: 1978. # of Children:  3. Age at Delivery of First Child:  27.   Hysterectomy/oophorectomy:  YES/YES. Breast Bx:  no.  Hx of Breast Feeding:  no. BCP:  no. Hormone therapy:  no.        Past Surgical History:   Procedure Laterality Date    BREAST SURGERY PROCEDURE UNLISTED  11/21/13    Left breast lumpectomy with sentinel lymph node biopsy    HX APPENDECTOMY      HX BREAST LUMPECTOMY  11/21/2013    LEFT BREAST LUMPECTOMY W/LEFT BREAST SENTINEL NODE BIOPSY,AND NEEDLE LOCALIZATION performed [facility] Delvis [facility] at [facility] MAIN OR    HX CATARACT REMOVAL      bilateral    HX HYSTERECTOMY      ovarian cyst    HX MOHS PROCEDURES      right    HX ORTHOPAEDIC      back surgery    HX OTHER SURGICAL      colonoscopy    HX TONSILLECTOMY      TOTAL KNEE ARTHROPLASTY      left    TOTAL KNEE ARTHROPLASTY      right    US GUIDED CORE BREAST BIOPSY Left 2013    Breast Ca     No FH of breast or ovarian cancer. Recent imaging-  3D bilateral diagnostic mammogram on 11/22/17- BIRADS 2  IMPRESSION:  1. Lumpectomy changes in the left breast. No mammographic evidence for  malignancy in the right breast.  2. Annual bilateral diagnostic mammography is recommended. 3. The patient has been notified of these results and recommendations.    4. BI-RADS Category 2, benign    ROS    Physical Exam   Constitutional: She appears well-developed and well-nourished. Pulmonary/Chest: Right breast exhibits no inverted nipple, no mass, no nipple discharge, no skin change and no tenderness. Left breast exhibits tenderness. Left breast exhibits no inverted nipple, no mass, no nipple discharge and no skin change. Musculoskeletal: Normal range of motion. UE x 2  arthritis and first rib fixation  costochondritis has resolved   Lymphadenopathy:     She has no cervical adenopathy. She has no axillary adenopathy. Right: No supraclavicular adenopathy present. Left: No supraclavicular adenopathy present. Skin: Skin is warm, dry and intact. Chest and breasts examined   Psychiatric: She has a normal mood and affect. Her speech is normal and behavior is normal.     Visit Vitals    /63    Pulse 78    Ht 5' 5\" (1.651 m)    Wt 191 lb (86.6 kg)    BMI 31.78 kg/m2     ASSESSMENT and PLAN  Encounter Diagnoses   Name Primary?  Fibrocystic breast changes of both breasts Yes    History of breast cancer in female     S/P lumpectomy, left breast      Normal exam and imaging with no evidence of local recurrence. She will plan to have a BDmammogram 3D in 11/2018 and RTC here in 1 year or sooner PRN. She can likely follow-up PRN after her next visit as she will be 5 years out from diagnosis/treatment. She continues to see Dr. Briseida Rosa. She is comfortable with this plan. All questions answered and she stated understanding.

## 2018-03-05 ENCOUNTER — OFFICE VISIT (OUTPATIENT)
Dept: ONCOLOGY | Age: 83
End: 2018-03-05

## 2018-03-05 VITALS
TEMPERATURE: 97.8 F | HEIGHT: 65 IN | BODY MASS INDEX: 31.79 KG/M2 | WEIGHT: 190.8 LBS | OXYGEN SATURATION: 98 % | DIASTOLIC BLOOD PRESSURE: 77 MMHG | SYSTOLIC BLOOD PRESSURE: 122 MMHG | RESPIRATION RATE: 18 BRPM | HEART RATE: 78 BPM

## 2018-03-05 DIAGNOSIS — C50.912 MALIGNANT NEOPLASM OF LEFT BREAST IN FEMALE, ESTROGEN RECEPTOR POSITIVE, UNSPECIFIED SITE OF BREAST (HCC): Primary | ICD-10-CM

## 2018-03-05 DIAGNOSIS — Z17.0 MALIGNANT NEOPLASM OF LEFT BREAST IN FEMALE, ESTROGEN RECEPTOR POSITIVE, UNSPECIFIED SITE OF BREAST (HCC): Primary | ICD-10-CM

## 2018-03-05 DIAGNOSIS — M53.3 SACROILIAC JOINT PAIN: ICD-10-CM

## 2018-03-05 DIAGNOSIS — E55.9 VITAMIN D DEFICIENCY: ICD-10-CM

## 2018-03-05 NOTE — PROGRESS NOTES
Angely Menendez is a [de-identified] y.o. female here today for left side breast cancer f/u. Completed rads tx to breast. S/P left breast lumpectomy + SLN; 11/2013. Last mammo; 11/2017; WNL. Patient on Arimidex. Patient noted ambulating with a cane. VS stable. Patient states she had b/l leg pain. Patient states she has numbness and tingling in her fingers. Patient also states she has swelling in her ankles and feet at times; none noted today. Patient has fatigue. Patient denies falls.     Visit Vitals    /77 (BP 1 Location: Left arm, BP Patient Position: Sitting)    Pulse 78    Temp 97.8 °F (36.6 °C) (Oral)    Resp 18    Ht 5' 5\" (1.651 m)    Wt 190 lb 12.8 oz (86.5 kg)    SpO2 98%    BMI 31.75 kg/m2

## 2018-03-05 NOTE — PROGRESS NOTES
Follow-up Note        Patient: Sixto Truong MRN: [id number]  SSN: [id number]    YOB: 1935  Age: [de-identified] y.o. Sex: female          Diagnosis:        1. Left breast carcinoma:   T1a N0 Mx (Stage IA) infiltrating ductal carcinoma , Tumor size 1 cm, LN -ve, grade 2, ki 67 17%, %, NH 90%, Her 2 unamplified. Treatment:        1. On Arimidex  2. Completed radiation treatment to the breast   3. S/P left breast lumpectomy and sentinel LN excision on 11/21/2013      Subjective:      Doretha Leon is a 70 y.o.  female with stage I invasive ductal carcinoma. She underwent a left breast lumpectomy and sentinel LN excision on 11/21/2013. Ms. Eliezer Carranza then received radiation to the left breast. She has been taking adjuvant Arimidex and denies any major side effects. She notes some chronic numbness in the bottoms of both feet but overall feels well.       Review of Systems:     Constitutional: negative   Eyes: negative   Ears, Nose, Mouth, Throat, and Face: negative   Respiratory: negative   Cardiovascular: negative   Gastrointestinal: negative   Integument/Breast: negative  Hematologic/Lymphatic: negative   Musculoskeletal:negative   Neurological: numbness bottoms of both feet        Past Medical History:   Diagnosis Date    Arthritis     Breast cancer ([facility] [address] 2013    Lt Breast    Bunion of right foot 8/20/2015    Cancer ([facility] [address]     radiation---no chemo    Chronic pain     back--uses tens unit    GERD (gastroesophageal reflux disease)     Ill-defined condition     blood transfusion hx    Ill-defined condition     diverticulitis    Muscle ache     Neurological disorder     Other ill-defined conditions(799.89)     rosacea    PUD (peptic ulcer disease)     Radiation therapy complication 3218    Lt breast-No Chemo    Trouble in sleeping      Past Surgical History:   Procedure Laterality Date    BREAST SURGERY PROCEDURE UNLISTED  11/21/13    Left breast lumpectomy with sentinel lymph node biopsy    HX APPENDECTOMY      HX BREAST LUMPECTOMY  11/21/2013    LEFT BREAST LUMPECTOMY W/LEFT BREAST SENTINEL NODE BIOPSY,AND NEEDLE LOCALIZATION performed by Susie Olivarez MD at [facility] MAIN OR    HX CATARACT REMOVAL      bilateral    HX HYSTERECTOMY      ovarian cyst    HX MOHS PROCEDURES      right    HX ORTHOPAEDIC      back surgery    HX OTHER SURGICAL      colonoscopy    HX TONSILLECTOMY      TOTAL KNEE ARTHROPLASTY      left    TOTAL KNEE ARTHROPLASTY      right    US GUIDED CORE BREAST BIOPSY Left 2013    Breast Ca      Family History   Problem Relation Age of Onset    Cancer Mother      bladder    Cancer Father     Breast Cancer Paternal Grandmother      Social History   Substance Use Topics    Smoking status: Former Smoker     Packs/day: 0.50     Years: 50.00     Quit date: 2/13/2012    Smokeless tobacco: Never Used    Alcohol use 1.2 oz/week     2 Glasses of wine per week      Comment: occasionally 3-4 off and on a week      Prior to Admission medications    Medication Sig Start Date End Date Taking? Authorizing Provider   multivitamin (ONE A DAY) tablet Take 1 Tab by mouth daily. Yes Historical Provider   aspirin (ASPIRIN) 325 mg tablet Take 325 mg by mouth daily.    Yes Historical Provider   anastrozole (ARIMIDEX) 1 mg tablet take 1 tablet by mouth once daily 8/29/17  Yes Alan Conti NP   VITAMIN D2 50,000 unit capsule take 1 capsule by mouth every week 5/10/17  Yes Fredrick Lutz NP              Allergies   Allergen Reactions    Hydrocodone Nausea Only and Vertigo    Gabapentin Diarrhea, Nausea Only and Other (comments)     weakness    Oxycodone Nausea and Vomiting and Vertigo           Objective:     Vitals:    03/05/18 0912   BP: 122/77   Pulse: 78   Resp: 18   Temp: 97.8 °F (36.6 °C)   TempSrc: Oral   SpO2: 98%   Weight: 190 lb 12.8 oz (86.5 kg)   Height: 5' 5\" (1.651 m)            Physical Exam:    GENERAL: alert, cooperative   EYE: conjunctivae/corneas clear   LYMPHATIC: Cervical, supraclavicular, and axillary nodes normal.   THROAT & NECK: normal and no erythema or exudates noted. LUNG: clear to auscultation bilaterally   HEART: regular rate and rhythm   ABDOMEN: soft, non-tender   EXTREMITIES: extremities normal   SKIN: Normal.   NEUROLOGIC: negative         Assessment:      1. Left breast carcinoma:     T1a N0 Mx (Stage IA) infiltrating ductal carcinoma , Tumor size 1 cm, LN -ve, grade 2, ki 67 17%, %, HI 90%, Her 2 unamplified. Dx: 10/28/2013    > S/P left breast lumpectomy and sentinel LN excision on 11/21/2013  > Completed radiation to the breast  > On Arimidex. > Tolerating therapy well.   > In remission    Mammogram (11/2017): normal  DEXA (10/2013) - normal    Symptom management form reviewed with patient. 2. Vitamin D deficiency    > On Vitamin D 50,000 units weekly  > Recheck Vit D      3. Sacroilitis     Managed by Dr. Justin Jewell. Plan:        > Continue Arimidex - stop in January 2019  > Repeat DEXA scan at Dr. Renata Mon office  > Recheck Vitamin D  > Follow-up in 1 year        Signed by: Teresita Durant MD                     March 5, 2018          CC. Hawa Lopez MD  CC.  Osito Rodríguez MD

## 2018-03-09 ENCOUNTER — HOSPITAL ENCOUNTER (OUTPATIENT)
Dept: LAB | Age: 83
Discharge: HOME | End: 2018-03-09
Payer: MEDICARE

## 2018-03-09 PROCEDURE — 82306 VITAMIN D 25 HYDROXY: CPT

## 2018-03-09 PROCEDURE — 36415 COLL VENOUS BLD VENIPUNCTURE: CPT

## 2018-03-10 LAB — 25(OH)D3+25(OH)D2 SERPL-MCNC: 34.9 NG/ML (ref 30–100)

## 2018-04-20 ENCOUNTER — HOSPITAL ENCOUNTER (OUTPATIENT)
Dept: MRI IMAGING | Age: 83
Discharge: HOME | End: 2018-04-20
Attending: ORTHOPAEDIC SURGERY
Payer: MEDICARE

## 2018-04-20 DIAGNOSIS — M54.2 CERVICALGIA: ICD-10-CM

## 2018-04-20 PROCEDURE — 72141 MRI NECK SPINE W/O DYE: CPT

## 2018-06-29 PROBLEM — K21.9 GERD WITHOUT ESOPHAGITIS: Status: ACTIVE | Noted: 2018-06-29

## 2018-06-29 PROBLEM — R73.03 PREDIABETES: Status: ACTIVE | Noted: 2018-06-29

## 2018-06-29 PROBLEM — L71.9 ROSACEA: Status: ACTIVE | Noted: 2018-06-29

## 2018-06-29 PROBLEM — M81.0 OSTEOPOROSIS: Status: ACTIVE | Noted: 2018-06-29

## 2018-06-29 PROBLEM — K57.92 DIVERTICULITIS: Status: ACTIVE | Noted: 2018-06-29

## 2018-06-29 PROBLEM — M79.7 FIBROMYALGIA: Status: ACTIVE | Noted: 2018-06-29

## 2018-06-29 PROBLEM — H04.129 DRY EYE SYNDROME: Status: ACTIVE | Noted: 2018-06-29

## 2018-06-29 PROBLEM — G62.9 PERIPHERAL NEUROPATHY: Status: ACTIVE | Noted: 2018-06-29

## 2018-06-29 PROBLEM — E78.00 HYPERCHOLESTEROLEMIA: Status: ACTIVE | Noted: 2018-06-29

## 2018-07-02 ENCOUNTER — OFFICE VISIT (OUTPATIENT)
Dept: INTERNAL MEDICINE CLINIC | Age: 83
End: 2018-07-02

## 2018-07-02 VITALS
HEART RATE: 87 BPM | HEIGHT: 65 IN | OXYGEN SATURATION: 99 % | DIASTOLIC BLOOD PRESSURE: 74 MMHG | SYSTOLIC BLOOD PRESSURE: 124 MMHG | WEIGHT: 190.6 LBS | BODY MASS INDEX: 31.75 KG/M2

## 2018-07-02 DIAGNOSIS — N39.0 URINARY TRACT INFECTION WITHOUT HEMATURIA, SITE UNSPECIFIED: ICD-10-CM

## 2018-07-02 DIAGNOSIS — K21.9 GERD WITHOUT ESOPHAGITIS: Chronic | ICD-10-CM

## 2018-07-02 DIAGNOSIS — M96.1 LUMBAR POST-LAMINECTOMY SYNDROME: Chronic | ICD-10-CM

## 2018-07-02 DIAGNOSIS — Z78.0 POSTMENOPAUSAL: ICD-10-CM

## 2018-07-02 DIAGNOSIS — M48.062 SPINAL STENOSIS OF LUMBAR REGION WITH NEUROGENIC CLAUDICATION: Chronic | ICD-10-CM

## 2018-07-02 DIAGNOSIS — R73.03 PREDIABETES: Chronic | ICD-10-CM

## 2018-07-02 DIAGNOSIS — C50.912 MALIGNANT NEOPLASM OF LEFT BREAST IN FEMALE, ESTROGEN RECEPTOR POSITIVE, UNSPECIFIED SITE OF BREAST (HCC): Chronic | ICD-10-CM

## 2018-07-02 DIAGNOSIS — Z00.00 INITIAL MEDICARE ANNUAL WELLNESS VISIT: Primary | ICD-10-CM

## 2018-07-02 DIAGNOSIS — Z17.0 MALIGNANT NEOPLASM OF LEFT BREAST IN FEMALE, ESTROGEN RECEPTOR POSITIVE, UNSPECIFIED SITE OF BREAST (HCC): Chronic | ICD-10-CM

## 2018-07-02 DIAGNOSIS — E55.9 VITAMIN D DEFICIENCY: Chronic | ICD-10-CM

## 2018-07-02 DIAGNOSIS — E78.00 HYPERCHOLESTEROLEMIA: Chronic | ICD-10-CM

## 2018-07-02 PROBLEM — G62.9 PERIPHERAL NEUROPATHY: Chronic | Status: ACTIVE | Noted: 2018-06-29

## 2018-07-02 LAB
BACTERIA UA POCT, BACTPOCT: ABNORMAL
BILIRUB UR QL STRIP: NEGATIVE
CASTS UA POCT: ABNORMAL
CLUE CELLS, CLUEPOCT: NEGATIVE
CRYSTALS UA POCT, CRYSPOCT: NEGATIVE
EPITHELIAL CELLS POCT: ABNORMAL
GLUCOSE UR-MCNC: NEGATIVE MG/DL
GRAN# POC: 9.6 K/UL (ref 2–7.8)
GRAN% POC: 67.3 % (ref 37–92)
HCT VFR BLD CALC: 30.1 % (ref 37–51)
HGB BLD-MCNC: 10.2 G/DL (ref 12–18)
KETONES P FAST UR STRIP-MCNC: NEGATIVE MG/DL
LY# POC: 2.6 K/UL (ref 0.6–4.1)
LY% POC: 25.8 % (ref 10–58.5)
MCH RBC QN: 34.6 PG (ref 26–32)
MCHC RBC-ENTMCNC: 34 G/DL (ref 30–36)
MCV RBC: 102 FL (ref 80–97)
MID #, POC: 0.9 K/UL (ref 0–1.8)
MID% POC: 6.9 % (ref 0.1–24)
MUCUS UA POCT, MUCPOCT: ABNORMAL
PH UR STRIP: 6 [PH] (ref 5–7)
PLATELET # BLD: 217 K/UL (ref 140–440)
PROT UR QL STRIP: ABNORMAL
RBC # BLD: 2.96 M/UL (ref 4.2–6.3)
RBC UA POCT, RBCPOCT: ABNORMAL
SP GR UR STRIP: 1.01 (ref 1.01–1.02)
TRICH UA POCT, TRICHPOC: NEGATIVE
UA UROBILINOGEN AMB POC: NORMAL (ref 0.2–1)
URINALYSIS CLARITY POC: CLEAR
URINALYSIS COLOR POC: YELLOW
URINE BLOOD POC: NEGATIVE
URINE CULT COMMENT, POCT: ABNORMAL
URINE LEUKOCYTES POC: ABNORMAL
URINE NITRITES POC: NEGATIVE
WBC # BLD: 14.1 K/UL (ref 4.1–10.9)
WBC UA POCT, WBCPOCT: ABNORMAL
YEAST UA POCT, YEASTPOC: NEGATIVE

## 2018-07-02 NOTE — PROGRESS NOTES
Chief Complaint   Patient presents with    Annual Wellness Visit       Depression Risk Factor Screening:     PHQ over the last two weeks 7/2/2018   Little interest or pleasure in doing things Not at all   Feeling down, depressed or hopeless Not at all   Total Score PHQ 2 0       Functional Ability and Level of Safety:     Activities of Daily Living  ADL Assessment 7/2/2018   Feeding yourself No Help Needed   Getting from bed to chair No Help Needed   Getting dressed No Help Needed   Bathing or showering No Help Needed   Walk across the room (includes cane/walker) No Help Needed   Using the telphone No Help Needed   Taking your medications No Help Needed   Preparing meals No Help Needed   Managing money (expenses/bills) No Help Needed   Moderately strenuous housework (laundry) No Help Needed   Shopping for personal items (toiletries/medicines) No Help Needed   Shopping for groceries No Help Needed   Driving No Help Needed   Climbing a flight of stairs No Help Needed   Getting to places beyond walking distances No Help Needed       Fall Risk  Fall Risk Assessment, last 12 mths 7/2/2018   Able to walk? Yes   Fall in past 12 months? No   Fall with injury? -   Number of falls in past 12 months -   Fall Risk Score -       Abuse Screen  Abuse Screening Questionnaire 7/2/2018   Do you ever feel afraid of your partner? N   Are you in a relationship with someone who physically or mentally threatens you? N   Is it safe for you to go home?  Y         Patient Care Team   Patient Care Team:  Angel Luis Rothman MD as PCP - General (Internal Medicine)  Malick Crowder MD as Physician (Hematology and Oncology)  Sherrill Tovar MD (Breast Surgery)  Saige Mccollum MD (Orthopedic Surgery)

## 2018-07-02 NOTE — PATIENT INSTRUCTIONS
The best way to stay healthy is to live a healthy lifestyle. A healthy lifestyle includes regular exercise, eating a well-balanced diet, keeping a healthy weight and not smoking. Regular physical exams and screening tests are another important way to take care of yourself. Preventive exams provided by health care providers can find health problems early when treatment works best and can keep you from getting certain diseases or illnesses. Preventive services include exams, lab tests, screenings, shots, monitoring and information to help you take care of your own health. All people over 65 should have a pneumonia shot. Pneumonia shots are usually only needed once in a lifetime unless your doctor decides differently. In addition to your physical exam, some screening tests are recommended:    All people over 65 should have a yearly flu shot. People over 65 are at medium to high risk for Hepatitis B. Three shots are needed for complete protection. Bone mass measurement (dexa scan) is recommended every two years. Diabetes Mellitus screening is recommended every year. Glaucoma is an eye disease caused by high pressure in the eye. An eye exam is recommended every year. Cardiovascular screening tests that check your cholesterol and other blood fat (lipid) levels are recommended every five years. Colorectal Cancer screening tests help to find pre-cancerous polyps (growths in the colon) so they can be removed before they turn into cancer. Tests ordered for screening depend on your personal and family history risk factors. Prostate Cancer Screening (annually up to age 76)    Screening for breast cancer is recommended yearly with a Mammogram.    Screening for cervical and vaginal cancer is recommended with a pelvic and Pap test every two years.  However if you have had an abnormal pap in the past  three years or at high risk for cervical or vaginal cancer Medicare will cover a pap test and a pelvic exam every year. Here is a list of your current Health Maintenance items with a due date:  Health Maintenance Due   Topic Date Due    Shingles Vaccine  07/22/1997    Glaucoma Screening   09/22/2002    Bone Mineral Density   09/22/2002    Annual Well Visit  03/14/2018          Learning About Cutting Calories  How do calories affect your weight? Food gives your body energy. Energy from the food you eat is measured in calories. This energy keeps your heart beating, your brain active, and your muscles working. Your body needs a certain number of calories each day. After your body uses the calories it needs, it stores extra calories as fat. To lose weight safely, you have to eat fewer calories while eating in a healthy way. How many calories do you need each day? The more active you are, the more calories you need. When you are less active, you need fewer calories. How many calories you need each day also depends on several things, including your age and whether you are male or female. Here are some general guidelines for adults:  · Less active women and older adults need 1,600 to 2,000 calories each day. · Active women and less active men need 2,000 to 2,400 calories each day. · Active men need 2,400 to 3,000 calories each day. How can you cut calories and eat healthy meals? Whole grains, vegetables and fruits, and dried beans are good lower-calorie foods. They give you lots of nutrients and fiber. And they fill you up. Sweets, energy drinks, and soda pop are high in calories. They give you few nutrients and no fiber. Try to limit soda pop, fruit juice, and energy drinks. Drink water instead. Some fats can be part of a healthy diet. But cutting back on fats from highly processed foods like fast foods and many snack foods is a good way to lower the calories in your diet. Also, use smaller amounts of fats like butter, margarine, salad dressing, and mayonnaise.  Add fresh garlic, lemon, or herbs to your meals to add flavor without adding fat. Meats and dairy products can be a big source of hidden fats. Try to choose lean or low-fat versions of these products. Fat-free cookies, candies, chips, and frozen treats can still be high in sugar and calories. Some fat-free foods have more calories than regular ones. Eat fat-free treats in moderation, as you would other foods. If your favorite foods are high in fat, salt, sugar, or calories, limit how often you eat them. Eat smaller servings, or look for healthy substitutes. Fill up on fruits, vegetables, and whole grains. Eating at home  · Use meat as a side dish instead of as the main part of your meal.  · Try main dishes that use whole wheat pasta, brown rice, dried beans, or vegetables. · Find ways to cook with little or no fat, such as broiling, steaming, or grilling. · Use cooking spray instead of oil. If you use oil, use a monounsaturated oil, such as canola or olive oil. · Trim fat from meats before you cook them. · Drain off fat after you brown the meat or while you roast it. · Chill soups and stews after you cook them. Then skim the fat off the top after it hardens. Eating out  · Order foods that are broiled or poached rather than fried or breaded. · Cut back on the amount of butter or margarine that you use on bread. · Order sauces, gravies, and salad dressings on the side, and use only a little. · When you order pasta, choose tomato sauce rather than cream sauce. · Ask for salsa with your baked potato instead of sour cream, butter, cheese, or marcial. · Order meals in a small size instead of upgrading to a large. · Share an entree, or take part of your food home to eat as another meal.  · Share appetizers and desserts. Where can you learn more? Go to http://sumit-arpit.info/. Enter 99 [id number] in the search box to learn more about \"Learning About Cutting Calories. \"  Current as of:  May 12, 2017  Content Version: 11.4  © 4345-0048 [employer]. Care instructions adapted under license by [employer] (which disclaims liability or warranty for this information). If you have questions about a medical condition or this instruction, always ask your healthcare professional. [address] any warranty or liability for your use of this information. Advance Directives: Care Instructions  Your Care Instructions  An advance directive is a legal way to state your wishes at the end of your life. It tells your family and your doctor what to do if you can no longer say what you want. There are two main types of advance directives. You can change them any time that your wishes change. · A living will tells your family and your doctor your wishes about life support and other treatment. · A durable power of  for health care lets you name a person to make treatment decisions for you when you can't speak for yourself. This person is called a health care agent. If you do not have an advance directive, decisions about your medical care may be made by a doctor or a  who doesn't know you. It may help to think of an advance directive as a gift to the people who care for you. If you have one, they won't have to make tough decisions by themselves. Follow-up care is a key part of your treatment and safety. Be sure to make and go to all appointments, and call your doctor if you are having problems. It's also a good idea to know your test results and keep a list of the medicines you take. How can you care for yourself at home? · Discuss your wishes with your loved ones and your doctor. This way, there are no surprises. · Many states have a unique form. Or you might use a universal form that has been approved by many states. This kind of form can sometimes be completed and stored online. Your electronic copy will then be available wherever you have a connection to the Internet.  In most cases, doctors will respect your wishes even if you have a form from a different state. · You don't need a  to do an advance directive. But you may want to get legal advice. · Think about these questions when you prepare an advance directive:  ¨ Who do you want to make decisions about your medical care if you are not able to? Many people choose a family member or close friend. ¨ Do you know enough about life support methods that might be used? If not, talk to your doctor so you understand. ¨ What are you most afraid of that might happen? You might be afraid of having pain, losing your independence, or being kept alive by machines. ¨ Where would you prefer to die? Choices include your home, a hospital, or a nursing home. ¨ Would you like to have information about hospice care to support you and your family? ¨ Do you want to donate organs when you die? ¨ Do you want certain Hinduism practices performed before you die? If so, put your wishes in the advance directive. · Read your advance directive every year, and make changes as needed. When should you call for help? Be sure to contact your doctor if you have any questions. Where can you learn more? Go to http://sumit-arpit.info/. Enter R264 in the search box to learn more about \"Advance Directives: Care Instructions. \"  Current as of: September 24, 2016  Content Version: 11.4  © 0225-5305 [employer]. Care instructions adapted under license by [employer] (which disclaims liability or warranty for this information). If you have questions about a medical condition or this instruction, always ask your healthcare professional. [place] [address] any warranty or liability for your use of this information.

## 2018-07-02 NOTE — MR AVS SNAPSHOT
[facility] 
 
 
 [address] [address] [phone] Patient: Stephane Ingram MRN: [id number] [id number] Visit Information Date & Time Provider Department Dept. Phone Encounter #  
 7/2/2018  9:00 AM Lbiby Casas MD [facility] [id number] Follow-up Instructions Return in about 6 months (around 1/2/2019). Your Appointments 1/7/2019  9:00 AM  
FOLLOW UP 10 with MD Jayro Marcum 26 [facility] Appt Note: 6 mo fu  
 [address] [id number] [address] [address]  
  
    
 3/8/2019  9:30 AM  
ESTABLISHED PATIENT with Darek Saini MD  
[address] [facility]cology at [facility]) Appt Note: heme 1 yr f/u  
 [address] [facility] [address] [id number]  
[address] [facility] [address] Upcoming Health Maintenance Date Due ZOSTER VACCINE AGE 60> 7/22/1997 GLAUCOMA SCREENING Q2Y 9/22/2002 Bone Densitometry (Dexa) Screening 9/22/2002 MEDICARE YEARLY EXAM 3/14/2018 Influenza Age 5 to Adult 8/1/2018 DTaP/Tdap/Td series (2 - Td) 11/13/2026 Allergies as of 7/2/2018  Review Complete On: 7/2/2018 By: Libby Casas MD  
  
 Severity Noted Reaction Type Reactions Hydrocodone Medium 10/28/2011   Intolerance Nausea Only, Vertigo Gabapentin  10/16/2015    Diarrhea, Nausea Only, Other (comments)  
 weakness Oxycodone  10/21/2011    Nausea and Vomiting, Vertigo Current Immunizations  Reviewed on 8/28/2015 Name Date Influenza Vaccine 9/14/2017, 1/1/2013 Pneumococcal Conjugate (PCV-13) 9/19/2015 Pneumococcal Polysaccharide (PPSV-23) 11/17/2008 Tdap 11/13/2016 12:09 PM  
  
 Not reviewed this visit You Were Diagnosed With   
  
 Codes Comments Initial Medicare annual wellness visit    -  Primary ICD-10-CM: Z00.00 ICD-9-CM: V70.0 Hypercholesterolemia     ICD-10-CM: E78.00 ICD-9-CM: 272.0 Prediabetes     ICD-10-CM: R73.03 
ICD-9-CM: 790.29 Vitamin D deficiency     ICD-10-CM: E55.9 ICD-9-CM: 268.9 GERD without esophagitis     ICD-10-CM: K21.9 ICD-9-CM: 530.81 Malignant neoplasm of left breast in female, estrogen receptor positive, unspecified site of breast ([facility] [address]     ICD-10-CM: C50.912, Z17.0 ICD-9-CM: 174.9, V86.0 Spinal stenosis of lumbar region with neurogenic claudication     ICD-10-CM: M48.062 
ICD-9-CM: 724.03 Lumbar post-laminectomy syndrome     ICD-10-CM: M96.1 ICD-9-CM: 722.83 Postmenopausal     ICD-10-CM: Z78.0 ICD-9-CM: V49.81 Vitals BP Pulse Height(growth percentile) Weight(growth percentile) SpO2 BMI  
 124/74 87 5' 5\" (1.651 m) 190 lb 9.6 oz (86.5 kg) 99% 31.72 kg/m2 OB Status Smoking Status Hysterectomy Former Smoker Vitals History BMI and BSA Data Body Mass Index Body Surface Area 31.72 kg/m 2 1.99 m 2 Preferred Pharmacy Pharmacy Name Phone [phone] [phone] Your Updated Medication List  
  
   
This list is accurate as of 7/2/18  9:23 AM.  Always use your most recent med list.  
  
  
  
  
 anastrozole 1 mg tablet Commonly known as:  ARIMIDEX  
take 1 tablet by mouth once daily  
  
 aspirin 325 mg tablet Commonly known as:  ASPIRIN Take 325 mg by mouth daily. multivitamin tablet Commonly known as:  ONE A DAY Take 1 Tab by mouth daily. PRESERVISION AREDS PO Take  by mouth. VITAMIN D2 50,000 unit capsule Generic drug:  ergocalciferol  
take 1 capsule by mouth every week We Performed the Following AMB POC COMPLETE CBC,AUTOMATED ENTER R8449497 CPT(R)] AMB POC URINALYSIS DIP STICK AUTO W/ MICRO  [18559 CPT(R)] COLLECTION VENOUS BLOOD,VENIPUNCTURE [id number] CPT(R)] HEMOGLOBIN A1C W/O EAG [11746 CPT(R)] LIPID PANEL [63518 CPT(R)] METABOLIC PANEL, COMPREHENSIVE [52534 CPT(R)] TSH 3RD GENERATION [74983 CPT(R)] VITAMIN D, 25 HYDROXY [id number] CPT(R)] Follow-up Instructions Return in about 6 months (around 1/2/2019). To-Do List   
 07/02/2018 Imaging:  DEXA BONE DENSITY STUDY AXIAL Patient Instructions The best way to stay healthy is to live a healthy lifestyle. A healthy lifestyle includes regular exercise, eating a well-balanced diet, keeping a healthy weight and not smoking. Regular physical exams and screening tests are another important way to take care of yourself. Preventive exams provided by health care providers can find health problems early when treatment works best and can keep you from getting certain diseases or illnesses. Preventive services include exams, lab tests, screenings, shots, monitoring and information to help you take care of your own health. All people over 65 should have a pneumonia shot. Pneumonia shots are usually only needed once in a lifetime unless your doctor decides differently. In addition to your physical exam, some screening tests are recommended: 
 
All people over 65 should have a yearly flu shot. People over 65 are at medium to high risk for Hepatitis B. Three shots are needed for complete protection. Bone mass measurement (dexa scan) is recommended every two years. Diabetes Mellitus screening is recommended every year. Glaucoma is an eye disease caused by high pressure in the eye. An eye exam is recommended every year. Cardiovascular screening tests that check your cholesterol and other blood fat (lipid) levels are recommended every five years.   
 
Colorectal Cancer screening tests help to find pre-cancerous polyps (growths in the colon) so they can be removed before they turn into cancer. Tests ordered for screening depend on your personal and family history risk factors. Prostate Cancer Screening (annually up to age 76) Screening for breast cancer is recommended yearly with a Mammogram. 
 
Screening for cervical and vaginal cancer is recommended with a pelvic and Pap test every two years. However if you have had an abnormal pap in the past  three years or at high risk for cervical or vaginal cancer Medicare will cover a pap test and a pelvic exam every year. Here is a list of your current Health Maintenance items with a due date: 
Health Maintenance Due Topic Date Due  Shingles Vaccine  07/22/1997  Glaucoma Screening   09/22/2002  Bone Mineral Density   09/22/2002 [facility] Annual Well Visit  03/14/2018 Learning About Cutting Calories How do calories affect your weight? Food gives your body energy. Energy from the food you eat is measured in calories. This energy keeps your heart beating, your brain active, and your muscles working. Your body needs a certain number of calories each day. After your body uses the calories it needs, it stores extra calories as fat. To lose weight safely, you have to eat fewer calories while eating in a healthy way. How many calories do you need each day? The more active you are, the more calories you need. When you are less active, you need fewer calories. How many calories you need each day also depends on several things, including your age and whether you are male or female. Here are some general guidelines for adults: 
· Less active women and older adults need 1,600 to 2,000 calories each day. · Active women and less active men need 2,000 to 2,400 calories each day. · Active men need 2,400 to 3,000 calories each day. How can you cut calories and eat healthy meals?  
Whole grains, vegetables and fruits, and dried beans are good lower-calorie foods. They give you lots of nutrients and fiber. And they fill you up. Sweets, energy drinks, and soda pop are high in calories. They give you few nutrients and no fiber. Try to limit soda pop, fruit juice, and energy drinks. Drink water instead. Some fats can be part of a healthy diet. But cutting back on fats from highly processed foods like fast foods and many snack foods is a good way to lower the calories in your diet. Also, use smaller amounts of fats like butter, margarine, salad dressing, and mayonnaise. Add fresh garlic, lemon, or herbs to your meals to add flavor without adding fat. Meats and dairy products can be a big source of hidden fats. Try to choose lean or low-fat versions of these products. Fat-free cookies, candies, chips, and frozen treats can still be high in sugar and calories. Some fat-free foods have more calories than regular ones. Eat fat-free treats in moderation, as you would other foods. If your favorite foods are high in fat, salt, sugar, or calories, limit how often you eat them. Eat smaller servings, or look for healthy substitutes. Fill up on fruits, vegetables, and whole grains. Eating at home · Use meat as a side dish instead of as the main part of your meal. 
· Try main dishes that use whole wheat pasta, brown rice, dried beans, or vegetables. · Find ways to cook with little or no fat, such as broiling, steaming, or grilling. · Use cooking spray instead of oil. If you use oil, use a monounsaturated oil, such as canola or olive oil. · Trim fat from meats before you cook them. · Drain off fat after you brown the meat or while you roast it. · Chill soups and stews after you cook them. Then skim the fat off the top after it hardens. Eating out · Order foods that are broiled or poached rather than fried or breaded. · Cut back on the amount of butter or margarine that you use on bread. · Order sauces, gravies, and salad dressings on the side, and use only a little. · When you order pasta, choose tomato sauce rather than cream sauce. · Ask for salsa with your baked potato instead of sour cream, butter, cheese, or marcial. · Order meals in a small size instead of upgrading to a large. · Share an entree, or take part of your food home to eat as another meal. 
· Share appetizers and desserts. Where can you learn more? Go to http://sumit-arpit.info/. Enter 99 [id number] in the search box to learn more about \"Learning About Cutting Calories. \" Current as of: May 12, 2017 Content Version: 11.4 © 3885-3738 [employer]. Care instructions adapted under license by [employer] (which disclaims liability or warranty for this information). If you have questions about a medical condition or this instruction, always ask your healthcare professional. [place] [address] any warranty or liability for your use of this information. Advance Directives: Care Instructions Your Care Instructions An advance directive is a legal way to state your wishes at the end of your life. It tells your family and your doctor what to do if you can no longer say what you want. There are two main types of advance directives. You can change them any time that your wishes change. · A living will tells your family and your doctor your wishes about life support and other treatment. · A durable power of  for health care lets you name a person to make treatment decisions for you when you can't speak for yourself. This person is called a health care agent. If you do not have an advance directive, decisions about your medical care may be made by a doctor or a  who doesn't know you. It may help to think of an advance directive as a gift to the people who care for you. If you have one, they won't have to make tough decisions by themselves. Follow-up care is a key part of your treatment and safety. Be sure to make and go to all appointments, and call your doctor if you are having problems. It's also a good idea to know your test results and keep a list of the medicines you take. How can you care for yourself at home? · Discuss your wishes with your loved ones and your doctor. This way, there are no surprises. · Many states have a unique form. Or you might use a universal form that has been approved by many states. This kind of form can sometimes be completed and stored online. Your electronic copy will then be available wherever you have a connection to the Internet. In most cases, doctors will respect your wishes even if you have a form from a different state. · You don't need a  to do an advance directive. But you may want to get legal advice. · Think about these questions when you prepare an advance directive: ¨ Who do you want to make decisions about your medical care if you are not able to? Many people choose a family member or close friend. ¨ Do you know enough about life support methods that might be used? If not, talk to your doctor so you understand. ¨ What are you most afraid of that might happen? You might be afraid of having pain, losing your independence, or being kept alive by machines. ¨ Where would you prefer to die? Choices include your home, a hospital, or a nursing home. ¨ Would you like to have information about hospice care to support you and your family? ¨ Do you want to donate organs when you die? ¨ Do you want certain Restorationist practices performed before you die? If so, put your wishes in the advance directive. · Read your advance directive every year, and make changes as needed. When should you call for help? Be sure to contact your doctor if you have any questions. Where can you learn more? Go to http://sumit-arpit.info/. Enter R264 in the search box to learn more about \"Advance Directives: Care Instructions. \" Current as of: September 24, 2016 Content Version: 11.4 © 2942-5122 [employer]. Care instructions adapted under license by [employer] (which disclaims liability or warranty for this information). If you have questions about a medical condition or this instruction, always ask your healthcare professional. [address] any warranty or liability for your use of this information. Introducing [facility] & HEALTH SERVICES! Dear Mathieu Sosa: Thank you for requesting a [employer] account. Our records indicate that you already have an active [employer] account. You can access your account anytime at [email or web address] Did you know that you can access your hospital and ER discharge instructions at any time in [employer]? You can also review all of your test results from your hospital stay or ER visit. Additional Information If you have questions, please visit the Frequently Asked Questions section of the [employer] website at [email or web address] Remember, [employer] is NOT to be used for urgent needs. For medical emergencies, dial 911. Now available from your iPhone and Android! Please provide this summary of care documentation to your next provider. Your primary care clinician is listed as JARROD Grey. If you have any questions after today's visit, please call [phone].

## 2018-07-02 NOTE — PROGRESS NOTES
This note will not be viewable in [address] Izzy Mendoza is a [de-identified] y.o. female who presents for an Initial Medicare Annual Wellness Exam (AWV) and follow up of chronic medical conditions. I have reviewed the patient's medical history in detail and updated the computerized patient record. History     Subjective:  Mrs. Bess Escobar presents to the office today for Medicare wellness exam and follow-up of multiple medical problems. She states that she has never had a Medicare wellness examination done in the past.    The patient has hypercholesterolemia. She currently is on no medication for this. She has no history of coronary artery disease and denies exertional chest pains or claudication. She is on vitamin D supplementation due to vitamin D deficiency. She takes his as directed and is had no GI upset. She is due for follow-up levels. Acid reflux is been well-controlled without PPI therapy. She denies dysphasia, early satiety or unexplained weight loss. She has a history of glucose intolerance/prediabetes. She is controlling this with diet. Patient denies polyuria, polydipsia or blurred vision. She has breast cancer and remains on Arimidex therapy. She has had no reoccurrence of her disease. She is followed by Dr. Ayana Pereyra. The patient has spinal stenosis of the lumbar region and is had a history of neurogenic claudication and peripheral neuropathy related to this. She does have chronic back pain related to post laminectomy syndrome. She does use a cane for ambulation. She denies any bowel or bladder incontinence. She has had no falls.     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    Lumbar post-laminectomy syndrome M96.1    Spinal stenosis of lumbar region with neurogenic claudication M48.062    Syncope R55    Stenosis of both internal carotid arteries I65.23    Syncope and collapse R55    S/P lumpectomy, left breast Z98.890    Costochondritis M94.0    Diverticulitis K57.92    Dry eye syndrome H04.129    Fibromyalgia M79.7    GERD without esophagitis K21.9    Hypercholesterolemia E78.00    Osteoporosis M81.0    Peripheral neuropathy G62.9    Prediabetes R73.03    Rosacea L71.9       Patient Care Team:  Susie Hardy MD as PCP - General (Internal Medicine)  Ori Fuentes MD as Physician (Hematology and Oncology)  Evgeny Westbrook MD (Breast Surgery)  Romel Edge MD (Orthopedic Surgery)    Past Medical History:   Diagnosis Date    Arthritis     Bunion of right foot 8/20/2015    Chronic pain     back--uses tens unit    Diverticulitis 6/29/2018    Recurrent    Dry eye syndrome 6/29/2018    Fibromyalgia 6/29/2018    GERD without esophagitis 6/29/2018    Hypercholesterolemia 6/29/2018    Ill-defined condition     blood transfusion hx    Osteoporosis 6/29/2018    Peripheral neuropathy 6/29/2018    Prediabetes 6/29/2018    Radiation therapy complication 9256    Lt breast-No Chemo    Rosacea 6/29/2018    Trouble in sleeping      Past Surgical History:   Procedure Laterality Date    BREAST SURGERY PROCEDURE UNLISTED  11/21/13    Left breast lumpectomy with sentinel lymph node biopsy    HX APPENDECTOMY      HX BREAST LUMPECTOMY  11/21/2013    LEFT BREAST LUMPECTOMY W/LEFT BREAST SENTINEL NODE BIOPSY,AND NEEDLE LOCALIZATION performed [facility]ip [facility]aufman [facility] at [facility] OR    HX CATARACT REMOVAL      bilateral    HX HYSTERECTOMY      ovarian cyst    HX MOHS PROCEDURES      right    HX ORTHOPAEDIC      back surgery    HX OTHER SURGICAL      colonoscopy    HX TONSILLECTOMY      TOTAL KNEE ARTHROPLASTY      left    TOTAL KNEE ARTHROPLASTY      right    US GUIDED CORE BREAST BIOPSY Left 2013    Breast Ca     Allergies   Allergen Reactions    Hydrocodone Nausea Only and Vertigo    Gabapentin Diarrhea, Nausea Only and Other (comments)     weakness    Oxycodone Nausea and Vomiting and Vertigo     Current Outpatient Prescriptions   Medication Sig Dispense Refill    vit A/vit C/vit E/zinc/copper (PRESERVISION AREDS PO) Take  by mouth.  multivitamin (ONE A DAY) tablet Take 1 Tab by mouth daily.  aspirin (ASPIRIN) 325 mg tablet Take 325 mg by mouth daily.       anastrozole (ARIMIDEX) 1 mg tablet take 1 tablet by mouth once daily 90 Tab 2    VITAMIN D2 50,000 unit capsule take 1 capsule by mouth every week 12 Cap 3     Social History     Social History    Marital status:      Spouse name: N/A    Number of children: N/A    Years of education: N/A     Social History Main Topics    Smoking status: Former Smoker     Packs/day: 0.50     Years: 50.00     Quit date: 2/13/2012    Smokeless tobacco: Never Used    Alcohol use 1.2 oz/week     2 Glasses of wine per week      Comment: occasionally 3-4 off and on a week    Drug use: No    Sexual activity: Not Asked     Other Topics Concern    None     Social History Narrative     Family History   Problem Relation Age of Onset    Cancer Mother      bladder    Cancer Father     Breast Cancer Paternal Grandmother      Health Maintenance   Topic Date Due    ZOSTER VACCINE AGE 60>  07/22/1997    GLAUCOMA SCREENING Q2Y  09/22/2002    Bone Densitometry (Dexa) Screening  09/22/2002    MEDICARE YEARLY EXAM  03/14/2018    Influenza Age 9 to Adult  08/01/2018    DTaP/Tdap/Td series (2 - Td) 11/13/2026    Pneumococcal 65+ High/Highest Risk  Completed          Review of Systems  Constitutional: negative for fevers, chills, anorexia and weight loss  Eyes:   negative for visual disturbance and irritation  ENT:   negative for tinnitus,sore throat,nasal congestion,ear pain,hoarseness  Respiratory:  negative for cough, hemoptysis, dyspnea,wheezing  CV:   negative for chest pain, palpitations, lower extremity edema  GI:   negative for nausea, vomiting, diarrhea, abdominal pain,melena  Endo:               negative for polyuria,polydipsia,polyphagia,heat intolerance  Genitourinary: negative for frequency, dysuria and hematuria  Integumentary: negative for rash and pruritus  Hematologic:  negative for easy bruising and gum/nose bleeding  Musculoskel: negative for myalgias, arthralgias, back pain, muscle weakness, joint pain  Neurological:  negative for headaches, dizziness, vertigo, memory problems and gait   Behavl/Psych: negative for feelings of anxiety, depression, mood changes  ROS otherwise negative      Depression Risk Factor Screening:     PHQ over the last two weeks 7/2/2018   Little interest or pleasure in doing things Not at all   Feeling down, depressed or hopeless Not at all   Total Score PHQ 2 0       Alcohol Risk Factor Screening: You do not drink alcohol or very rarely. Functional Ability and Level of Safety:   Hearing Loss  The patient wears hearing aids. Activities of Daily Living  ADL Assessment 7/2/2018   Feeding yourself No Help Needed   Getting from bed to chair No Help Needed   Getting dressed No Help Needed   Bathing or showering No Help Needed   Walk across the room (includes cane/walker) No Help Needed   Using the telphone No Help Needed   Taking your medications No Help Needed   Preparing meals No Help Needed   Managing money (expenses/bills) No Help Needed   Moderately strenuous housework (laundry) No Help Needed   Shopping for personal items (toiletries/medicines) No Help Needed   Shopping for groceries No Help Needed   Driving No Help Needed   Climbing a flight of stairs No Help Needed   Getting to places beyond walking distances No Help Needed       Fall Risk  Fall Risk Assessment, last 12 mths 7/2/2018   Able to walk? Yes   Fall in past 12 months?  No   Fall with injury? -   Number of falls in past 12 months -   Fall Risk Score -       Abuse Screen  Abuse Screening Questionnaire 7/2/2018   Do you ever feel afraid of your partner? N   Are you in a relationship with someone who physically or mentally threatens you? N   Is it safe for you to go home? Y       Cognitive Screening   Evaluation of Cognitive Function:  Has your family/caregiver stated any concerns about your memory: no    Physical Exam     Visit Vitals    /74    Pulse 87    Ht 5' 5\" (1.651 m)    Wt 190 lb 9.6 oz (86.5 kg)    SpO2 99%    BMI 31.72 kg/m2     Body mass index is 31.72 kg/(m^2). General appearance - alert, well appearing, and in no distress  Mental status - alert, oriented to person, place, and time  EYE-HOWARD, EOMI,conjunctiva normal bilaterally, lids normal  ENT-ENT exam normal, no neck nodes or sinus tenderness  Nose - normal and patent, no erythema,  Or discharge   Mouth - mucous membranes moist, pharynx normal without lesions  Neck - supple, no significant adenopathy or bruit  Chest - clear to auscultation, no wheezes, rales or rhonchi. Heart - normal rate, regular rhythm, normal S1, S2, no murmurs, rubs, clicks or gallops   Abdomen - soft, nontender, nondistended, no masses or organomegaly  Lymph- no adenopathy palpable  Ext-peripheral pulses normal, no pedal edema, no clubbing or cyanosis  Skin-Warm and dry. no hyperpigmentation, vitiligo, or suspicious lesions  Neuro -alert, oriented, normal speech, no focal findings or movement disorder noted    Assessment/Plan   [place] education and counseling provided:  Age appropriate evidence-based preventive care recommendations based on today's review and evaluation; including relevant cancer screening guidelines, and vaccination recommendations. An After Visit Summary was printed and given to the patient which information about these guidelines, and a personalized schedule for health maintenance items.  Whe appropriate and with patient agreement, orders noted below were placed to complete missing health maintenance items. Additional Plan for follow up chronic medical conditions includes:  Diagnoses and all orders for this visit:    Initial Medicare annual wellness visit    Hypercholesterolemia  -     AMB POC COMPLETE CBC,AUTOMATED ENTER  -     COLLECTION VENOUS BLOOD,VENIPUNCTURE  -     METABOLIC PANEL, COMPREHENSIVE  -     LIPID PANEL  -     TSH 3RD GENERATION    Prediabetes  -     AMB POC URINALYSIS DIP STICK AUTO W/ MICRO   -     HEMOGLOBIN A1C W/O EAG    Vitamin D deficiency  -     VITAMIN D, 25 HYDROXY    GERD without esophagitis    Malignant neoplasm of left breast in female, estrogen receptor positive, unspecified site of breast ([facility] [address]    Spinal stenosis of lumbar region with neurogenic claudication    Lumbar post-laminectomy syndrome          Other instructions: The patient's medications are reviewed and reconciled. No change in her current medical regimen is made. Body mass index is 31.72 and dietary counseling along with printed patient education is given    Advanced care planning discussion occurred today. Bone density testing will be scheduled. She is due for shingles vaccination and will obtain this at her pharmacist.    She had a glaucoma screening done by her eye specialist 1 week ago and will ask for a copy of this note to be forwarded to us. Await results of multiple labs. Follow-up 6 month    Follow-up Disposition: Not on File    I have reviewed with the patient details of the assessment and plan and all questions were answered. Relevent patient education was performed. The most recent lab findings were reviewed with the patient.     Sandoval Bynum MD

## 2018-07-03 LAB
25(OH)D3+25(OH)D2 SERPL-MCNC: 31.7 NG/ML (ref 30–100)
ALBUMIN SERPL-MCNC: 4.6 G/DL (ref 3.5–4.7)
ALBUMIN/GLOB SERPL: 1.6 {RATIO} (ref 1.2–2.2)
ALP SERPL-CCNC: 79 IU/L (ref 39–117)
ALT SERPL-CCNC: 19 IU/L (ref 0–32)
AST SERPL-CCNC: 21 IU/L (ref 0–40)
BILIRUB SERPL-MCNC: 0.4 MG/DL (ref 0–1.2)
BUN SERPL-MCNC: 11 MG/DL (ref 8–27)
BUN/CREAT SERPL: 18 (ref 12–28)
CALCIUM SERPL-MCNC: 8.8 MG/DL (ref 8.7–10.3)
CHLORIDE SERPL-SCNC: 106 MMOL/L (ref 96–106)
CHOLEST SERPL-MCNC: 152 MG/DL (ref 100–199)
CO2 SERPL-SCNC: 20 MMOL/L (ref 20–29)
CREAT SERPL-MCNC: 0.6 MG/DL (ref 0.57–1)
GLOBULIN SER CALC-MCNC: 2.8 G/DL (ref 1.5–4.5)
GLUCOSE SERPL-MCNC: 98 MG/DL (ref 65–99)
HBA1C MFR BLD: 5.3 % (ref 4.8–5.6)
HDLC SERPL-MCNC: 43 MG/DL
LDLC SERPL CALC-MCNC: 85 MG/DL (ref 0–99)
POTASSIUM SERPL-SCNC: 4 MMOL/L (ref 3.5–5.2)
PROT SERPL-MCNC: 7.4 G/DL (ref 6–8.5)
SODIUM SERPL-SCNC: 144 MMOL/L (ref 134–144)
TRIGL SERPL-MCNC: 121 MG/DL (ref 0–149)
TSH SERPL DL<=0.005 MIU/L-ACNC: 2.32 UIU/ML (ref 0.45–4.5)
VLDLC SERPL CALC-MCNC: 24 MG/DL (ref 5–40)

## 2018-07-04 LAB — BACTERIA UR CULT: NORMAL

## 2018-08-08 DIAGNOSIS — E55.9 VITAMIN D DEFICIENCY: ICD-10-CM

## 2018-08-08 RX ORDER — ERGOCALCIFEROL 1.25 MG/1
50000 CAPSULE ORAL
Qty: 12 CAP | Refills: 3 | Status: SHIPPED | OUTPATIENT
Start: 2018-08-08 | End: 2019-10-29 | Stop reason: DRUGHIGH

## 2018-08-08 NOTE — TELEPHONE ENCOUNTER
PER SOPHIE from Dr. Shankar Wilson ERGOCALCIFEROL 50,000IU ONE CAP BY MOUTH MONTHLY, QUANTITY 12 REFILL 3.

## 2018-08-24 RX ORDER — ANASTROZOLE 1 MG/1
TABLET ORAL
Qty: 90 TAB | Refills: 2 | Status: SHIPPED | OUTPATIENT
Start: 2018-08-24 | End: 2019-01-07 | Stop reason: ALTCHOICE

## 2018-09-24 ENCOUNTER — OFFICE VISIT (OUTPATIENT)
Dept: INTERNAL MEDICINE CLINIC | Age: 83
End: 2018-09-24

## 2018-09-24 VITALS
WEIGHT: 190 LBS | SYSTOLIC BLOOD PRESSURE: 122 MMHG | TEMPERATURE: 98 F | BODY MASS INDEX: 31.65 KG/M2 | HEIGHT: 65 IN | DIASTOLIC BLOOD PRESSURE: 80 MMHG

## 2018-09-24 DIAGNOSIS — J20.9 ACUTE BRONCHITIS, UNSPECIFIED ORGANISM: Primary | ICD-10-CM

## 2018-09-24 RX ORDER — CEFUROXIME AXETIL 500 MG/1
500 TABLET ORAL 2 TIMES DAILY
Qty: 14 TAB | Refills: 0 | Status: SHIPPED | OUTPATIENT
Start: 2018-09-24 | End: 2018-10-03

## 2018-09-24 RX ORDER — HYDROCODONE POLISTIREX AND CHLORPHENIRAMINE POLISTIREX 10; 8 MG/5ML; MG/5ML
5 SUSPENSION, EXTENDED RELEASE ORAL
Qty: 60 ML | Refills: 0 | Status: SHIPPED | OUTPATIENT
Start: 2018-09-24 | End: 2018-11-01 | Stop reason: ALTCHOICE

## 2018-09-24 NOTE — PROGRESS NOTES
This note will not be viewable in [address] Juanita Grullon is a 82 y.o. female and presents with Cough Williamsry Carrington Subjective: 
Mrs. Dayana Pimentel presents to the office today with complaints of an upper respiratory infection ongoing for greater than a week with the development of a deeply congested cough. The cough has been productive of yellowish green phlegm in the last 2-3 days and associated with feverishness without chills. She denies wheezing, shortness of breath or pleuritic pain. She has been taking over-the-counter medication without relief. Past Medical History:  
Diagnosis Date  Arthritis  Bunion of right foot 8/20/2015  Chronic pain   
 back--uses tens unit  Diverticulitis 6/29/2018 Recurrent  Dry eye syndrome 6/29/2018  Fibromyalgia 6/29/2018  GERD without esophagitis 6/29/2018  Hypercholesterolemia 6/29/2018  Ill-defined condition   
 blood transfusion hx  Osteoporosis 6/29/2018  Peripheral neuropathy 6/29/2018  Prediabetes 6/29/2018  Radiation therapy complication 9836 Lt breast-No Chemo  Rosacea 6/29/2018  Trouble in sleeping Past Surgical History:  
Procedure Laterality Date  BREAST SURGERY PROCEDURE UNLISTED  11/21/13 Left breast lumpectomy with sentinel lymph node biopsy  HX APPENDECTOMY  HX BREAST LUMPECTOMY  11/21/2013 LEFT BREAST LUMPECTOMY W/LEFT BREAST SENTINEL NODE BIOPSY,AND NEEDLE LOCALIZATION performed by Sarabjit Mckinney MD at [facility] MAIN OR  
 HX CATARACT REMOVAL    
 bilateral  
 HX HYSTERECTOMY    
 ovarian cyst  
 HX MOHS PROCEDURES    
 right  HX ORTHOPAEDIC    
 back surgery  HX OTHER SURGICAL    
 colonoscopy  HX TONSILLECTOMY  TOTAL KNEE ARTHROPLASTY    
 left  TOTAL KNEE ARTHROPLASTY    
 right [facility] BREAST BIOPSY Left 2013 Breast Ca Allergies Allergen Reactions  Hydrocodone Nausea Only and Vertigo  Gabapentin Diarrhea, Nausea Only and Other (comments) weakness  Oxycodone Nausea and Vomiting and Vertigo Current Outpatient Prescriptions Medication Sig Dispense Refill  cefUROXime (CEFTIN) 500 mg tablet Take 1 Tab by mouth two (2) times a day for 7 days. 14 Tab 0  chlorpheniramine-HYDROcodone (TUSSIONEX) 10-8 mg/5 mL suspension Take 5 mL by mouth every twelve (12) hours as needed for Cough. Max Daily Amount: 10 mL. 60 mL 0  
 anastrozole (ARIMIDEX) 1 mg tablet take 1 tablet by mouth once daily 90 Tab 2  
 ergocalciferol (VITAMIN D2) 50,000 unit capsule Take 1 Cap by mouth every seven (7) days. 12 Cap 3  
 vit A/vit C/vit E/zinc/copper (PRESERVISION AREDS PO) Take  by mouth.  multivitamin (ONE A DAY) tablet Take 1 Tab by mouth daily.  aspirin (ASPIRIN) 325 mg tablet Take 325 mg by mouth daily. Social History Social History  Marital status:  Spouse name: N/A  
 Number of children: N/A  
 Years of education: N/A Social History Main Topics  Smoking status: Former Smoker Packs/day: 0.50 Years: 50.00 Quit date: 2/13/2012  Smokeless tobacco: Never Used  Alcohol use 1.2 oz/week 2 Glasses of wine per week Comment: occasionally 3-4 off and on a week  Drug use: No  
 Sexual activity: Not Asked Other Topics Concern  None Social History Narrative Family History Problem Relation Age of Onset  Cancer Mother   
  bladder  Cancer Father  Breast Cancer Paternal Grandmother Review of Systems Constitutional: negative for fevers, chills, anorexia and weight loss Eyes:   negative for visual disturbance and irritation ENT:   Positive for some sinus congestion and post nasal drainage. Respiratory:  Positive for cough and chest congestion without wheezing CV:   negative for chest pain, palpitations, lower extremity edema GI:   negative for nausea, vomiting, diarrhea, abdominal pain,melena Integumentary: negative for rash and pruritus Neurological:  negative for headaches, dizziness, vertigo, memory problems and gait Objective: 
Visit Vitals  /80 (BP 1 Location: Left arm, BP Patient Position: Sitting)  Temp 98 °F (36.7 °C) (Oral)  Ht 5' 5\" (1.651 m)  Wt 190 lb (86.2 kg)  BMI 31.62 kg/m2 Body mass index is 31.62 kg/(m^2). Physical Exam:  
General appearance - alert, ill appearing, and in no distress Mental status - alert, oriented to person, place, and time EYE-HOWARD, EOMI, conjuctiva clear. No lid swelling or purulent drainage ENT- TM's clear without A/F level. Pharynx slightly erythematous with drainage noted Nose - normal and patent, no erythema, Neck - supple, no significant adenopathy Chest - Coarse upper airway rhonchi present without wheezing Heart - normal rate, regular rhythm, normal S1, S2, no murmurs, rubs, clicks or gallops Skin-No rash appreciated Neuro -alert, oriented, normal speech, no focal findings. Assessment/Plan: 
Diagnoses and all orders for this visit: 
 
Acute bronchitis, unspecified organism 
-     cefUROXime (CEFTIN) 500 mg tablet; Take 1 Tab by mouth two (2) times a day for 7 days. , Normal, Disp-14 Tab, R-0 
-     chlorpheniramine-HYDROcodone (TUSSIONEX) 10-8 mg/5 mL suspension; Take 5 mL by mouth every twelve (12) hours as needed for Cough. Max Daily Amount: 10 mL., Print, Disp-60 mL, R-0 Other Instructions: Mucinex as directed Increase po fluids Follow-up Disposition: 
Return if symptoms worsen or fail to improve. I have reviewed with the patient details of the assessment and plan and all questions were answered. Relevent patient education was performed. An After Visit Summary was printed and given to the patient.  
 
Prema Paula MD

## 2018-09-24 NOTE — MR AVS SNAPSHOT
[address] [facility] 
 
 
 [address] [id number] [phone] Patient: Richar Starr MRN: [id number] Visit Information Date & Time Provider Department Dept. Phone Encounter #  
 9/24/2018  2:00 PM Fran Avery, Xenia Esparza [id number] Your Appointments 1/7/2019  9:00 AM  
FOLLOW UP 10 with MD Jayro Brady 26 ([address]) Appt Note: 6 mo fu  
 [address] [phone] [address] [address]  
  
    
 3/8/2019  9:30 AM  
ESTABLISHED PATIENT with Erik Childers MD  
[address] [facility]) Appt Note: heme 1 yr f/u  
 [address] [address]  
[phone]  
  
   
 [address]  
  
    
 7/8/2019  9:00 AM  
Complete Physical with MD Jayro Brady 26 ([address]) Appt Note: annual wellness exam  
 [address] [phone] [address] [facility] [address] Upcoming Health Maintenance Date Due Shingrix Vaccine Age 50> (1 of 2) 9/22/1987 MEDICARE YEARLY EXAM 7/3/2019 GLAUCOMA SCREENING Q2Y 2/15/2020 DTaP/Tdap/Td series (2 - Td) 11/13/2026 Allergies as of 9/24/2018  Review Complete On: 9/24/2018 By: Fran Avery MD  
  
 Severity Noted Reaction Type Reactions Hydrocodone Medium 10/28/2011   Intolerance Nausea Only, Vertigo Gabapentin  10/16/2015    Diarrhea, Nausea Only, Other (comments)  
 weakness Oxycodone  10/21/2011    Nausea and Vomiting, Vertigo Current Immunizations  Reviewed on 8/28/2015 Name Date Influenza High Dose Vaccine PF 9/13/2018 12:00 AM  
 Influenza Vaccine 9/14/2017, 1/1/2013 Pneumococcal Conjugate (PCV-13) 9/19/2015 Pneumococcal Polysaccharide (PPSV-23) 11/17/2008 Tdap 11/13/2016 12:09 PM  
 Zoster Recombinant 8/6/2018 12:00 AM  
  
 Not reviewed this visit You Were Diagnosed With   
  
 Codes Comments Acute bronchitis, unspecified organism    -  Primary ICD-10-CM: J20.9 ICD-9-CM: 466.0 Vitals BP Temp Height(growth percentile) Weight(growth percentile) BMI OB Status 122/80 (BP 1 Location: Left arm, BP Patient Position: Sitting) 98 °F (36.7 °C) (Oral) 5' 5\" (1.651 m) 190 lb (86.2 kg) 31.62 kg/m2 Hysterectomy Smoking Status Former Smoker BMI and BSA Data Body Mass Index Body Surface Area  
 31.62 kg/m 2 1.99 m 2 Preferred Pharmacy Pharmacy Name Phone [id number] [address] [place] [address] [phone] Your Updated Medication List  
  
   
This list is accurate as of 9/24/18  2:34 PM.  Always use your most recent med list.  
  
  
  
  
 anastrozole 1 mg tablet Commonly known as:  ARIMIDEX  
take 1 tablet by mouth once daily  
  
 aspirin 325 mg tablet Commonly known as:  ASPIRIN Take 325 mg by mouth daily. cefUROXime 500 mg tablet Commonly known as:  CEFTIN Take 1 Tab by mouth two (2) times a day for 7 days. chlorpheniramine-HYDROcodone 10-8 mg/5 mL suspension Commonly known as:  Rangel Martinoeiffer Take 5 mL by mouth every twelve (12) hours as needed for Cough. Max Daily Amount: 10 mL. ergocalciferol 50,000 unit capsule Commonly known as:  VITAMIN D2 Take 1 Cap by mouth every seven (7) days. multivitamin tablet Commonly known as:  ONE A DAY Take 1 Tab by mouth daily. PRESERVISION AREDS PO Take  by mouth. Prescriptions Printed  Refills  
 chlorpheniramine-HYDROcodone (TUSSIONEX) 10-8 mg/5 mL suspension 0  
 Sig: Take 5 mL by mouth every twelve (12) hours as needed for Cough. Max Daily Amount: 10 mL. Class: Print Route: Oral  
  
Prescriptions Sent to Pharmacy Refills  
 cefUROXime (CEFTIN) 500 mg tablet 0 Sig: Take 1 Tab by mouth two (2) times a day for 7 days. Class: Normal  
 Pharmacy: [id number] [address] #: [phone] Route: Oral  
  
Introducing [facility] SERVICES! Dear Zulma Hartman: Thank you for requesting a [employer] account. Our records indicate that you already have an active [employer] account. You can access your account anytime at [email or web address] Did you know that you can access your hospital and ER discharge instructions at any time in [employer]? You can also review all of your test results from your hospital stay or ER visit. Additional Information If you have questions, please visit the Frequently Asked Questions section of the [employer] website at [email or web address] Remember, [employer]Pump is NOT to be used for urgent needs. For medical emergencies, dial 911. Now available from your iPhone and Android! Please provide this summary of care documentation to your next provider. Your primary care clinician is listed as JARROD Grey. If you have any questions after today's visit, please call [phone].

## 2018-09-24 NOTE — PATIENT INSTRUCTIONS
Bronchitis: Care Instructions Your Care Instructions Bronchitis is inflammation of the bronchial tubes, which carry air to the lungs. The tubes swell and produce mucus, or phlegm. The mucus and inflamed bronchial tubes make you cough. You may have trouble breathing. Most cases of bronchitis are caused by viruses like those that cause colds. Antibiotics usually do not help and they may be harmful. Bronchitis usually develops rapidly and lasts about 2 to 3 weeks in otherwise healthy people. Follow-up care is a key part of your treatment and safety. Be sure to make and go to all appointments, and call your doctor if you are having problems. It's also a good idea to know your test results and keep a list of the medicines you take. How can you care for yourself at home? · Take all medicines exactly as prescribed. Call your doctor if you think you are having a problem with your medicine. · Get some extra rest. 
· Take an over-the-counter pain medicine, such as acetaminophen (Tylenol), ibuprofen (Advil, Motrin), or naproxen (Aleve) to reduce fever and relieve body aches. Read and follow all instructions on the label. · Do not take two or more pain medicines at the same time unless the doctor told you to. Many pain medicines have acetaminophen, which is Tylenol. Too much acetaminophen (Tylenol) can be harmful. · Take an over-the-counter cough medicine that contains dextromethorphan to help quiet a dry, hacking cough so that you can sleep. Avoid cough medicines that have more than one active ingredient. Read and follow all instructions on the label. · Breathe moist air from a humidifier, hot shower, or sink filled with hot water. The heat and moisture will thin mucus so you can cough it out. · Do not smoke. Smoking can make bronchitis worse. If you need help quitting, talk to your doctor about stop-smoking programs and medicines. These can increase your chances of quitting for good. When should you call for help? Call 911 anytime you think you may need emergency care. For example, call if: 
  · You have severe trouble breathing.  
 Call your doctor now or seek immediate medical care if: 
  · You have new or worse trouble breathing.  
  · You cough up dark brown or bloody mucus (sputum).  
  · You have a new or higher fever.  
  · You have a new rash.  
 Watch closely for changes in your health, and be sure to contact your doctor if: 
  · You cough more deeply or more often, especially if you notice more mucus or a change in the color of your mucus.  
  · You are not getting better as expected. Where can you learn more? Go to http://sumit-arpit.info/. Enter H333 in the search box to learn more about \"Bronchitis: Care Instructions. \" Current as of: December 6, 2017 Content Version: 11.7 © 5347-2736 [employer], [employer]. Care instructions adapted under license by [employer] (which disclaims liability or warranty for this information). If you have questions about a medical condition or this instruction, always ask your healthcare professional. [address] any warranty or liability for your use of this information.

## 2018-09-24 NOTE — PROGRESS NOTES
Sarah Rodriguez is a 82 y.o. female presenting for Cough Iliana Skyler 1. Have you been to the ER, urgent care clinic since your last visit? Hospitalized since your last visit? No 
 
2. Have you seen or consulted any other health care providers outside of the [facility] since your last visit? Include any pap smears or colon screening. No 
 
Fall Risk Assessment, last 12 mths 7/2/2018 Able to walk? Yes Fall in past 12 months? No  
Fall with injury? -  
Number of falls in past 12 months - Fall Risk Score -  
 
 
 
Abuse Screening Questionnaire 7/2/2018 Do you ever feel afraid of your partner? Arthur Greene Are you in a relationship with someone who physically or mentally threatens you? Arthur Greene Is it safe for you to go home? Y  
 
 
PHQ over the last two weeks 7/2/2018 Little interest or pleasure in doing things Not at all Feeling down, depressed, irritable, or hopeless Not at all Total Score PHQ 2 0 There are no discontinued medications.

## 2018-09-27 ENCOUNTER — TELEPHONE (OUTPATIENT)
Dept: INTERNAL MEDICINE CLINIC | Age: 83
End: 2018-09-27

## 2018-09-27 NOTE — TELEPHONE ENCOUNTER
Patient notified and Rx phoned in to [facility] per Dr Goldy Ramirez

## 2018-09-27 NOTE — TELEPHONE ENCOUNTER
Patient is calling, she states that she was seen Monday and diagnosed with Bronchitis. She was given Tussionex and Ceftin and has found no relief from either, the mucus is so bad that she has to sleep sitting up. She is requesting a call back on what she can do for relief.     Best call back # for patient: [phone]

## 2018-09-27 NOTE — TELEPHONE ENCOUNTER
Please find out if the patient is running a fever, wheezing or if any of the sputum that she is able to cough up is purulent

## 2018-09-29 ENCOUNTER — APPOINTMENT (OUTPATIENT)
Dept: GENERAL RADIOLOGY | Age: 83
DRG: 189 | End: 2018-09-29
Attending: EMERGENCY MEDICINE
Payer: MEDICARE

## 2018-09-29 ENCOUNTER — HOSPITAL ENCOUNTER (INPATIENT)
Age: 83
LOS: 4 days | Discharge: HOME | DRG: 189 | End: 2018-10-03
Attending: EMERGENCY MEDICINE | Admitting: INTERNAL MEDICINE
Payer: MEDICARE

## 2018-09-29 ENCOUNTER — APPOINTMENT (OUTPATIENT)
Dept: CT IMAGING | Age: 83
DRG: 189 | End: 2018-09-29
Attending: INTERNAL MEDICINE
Payer: MEDICARE

## 2018-09-29 DIAGNOSIS — J20.8 ACUTE BRONCHITIS DUE TO OTHER SPECIFIED ORGANISMS: ICD-10-CM

## 2018-09-29 DIAGNOSIS — C50.912 MALIGNANT NEOPLASM OF LEFT BREAST IN FEMALE, ESTROGEN RECEPTOR POSITIVE, UNSPECIFIED SITE OF BREAST (HCC): ICD-10-CM

## 2018-09-29 DIAGNOSIS — Z17.0 MALIGNANT NEOPLASM OF LEFT BREAST IN FEMALE, ESTROGEN RECEPTOR POSITIVE, UNSPECIFIED SITE OF BREAST (HCC): ICD-10-CM

## 2018-09-29 DIAGNOSIS — J96.00 ACUTE RESPIRATORY FAILURE, UNSPECIFIED WHETHER WITH HYPOXIA OR HYPERCAPNIA (HCC): ICD-10-CM

## 2018-09-29 DIAGNOSIS — J18.9 COMMUNITY ACQUIRED PNEUMONIA, UNSPECIFIED LATERALITY: Primary | ICD-10-CM

## 2018-09-29 LAB
ALBUMIN SERPL-MCNC: 4 G/DL (ref 3.5–5)
ALBUMIN/GLOB SERPL: 1 {RATIO} (ref 1.1–2.2)
ALP SERPL-CCNC: 67 U/L (ref 45–117)
ALT SERPL-CCNC: 25 U/L (ref 12–78)
ANION GAP SERPL CALC-SCNC: 6 MMOL/L (ref 5–15)
AST SERPL-CCNC: 18 U/L (ref 15–37)
BASOPHILS # BLD: 0 K/UL (ref 0–0.1)
BASOPHILS NFR BLD: 0 % (ref 0–1)
BILIRUB SERPL-MCNC: 0.5 MG/DL (ref 0.2–1)
BNP SERPL-MCNC: 319 PG/ML (ref 0–450)
BUN SERPL-MCNC: 16 MG/DL (ref 6–20)
BUN/CREAT SERPL: 27 (ref 12–20)
CALCIUM SERPL-MCNC: 8.7 MG/DL (ref 8.5–10.1)
CHLORIDE SERPL-SCNC: 109 MMOL/L (ref 97–108)
CO2 SERPL-SCNC: 26 MMOL/L (ref 21–32)
CREAT SERPL-MCNC: 0.6 MG/DL (ref 0.55–1.02)
DIFFERENTIAL METHOD BLD: ABNORMAL
EOSINOPHIL # BLD: 0 K/UL (ref 0–0.4)
EOSINOPHIL NFR BLD: 0 % (ref 0–7)
ERYTHROCYTE [DISTWIDTH] IN BLOOD BY AUTOMATED COUNT: 16 % (ref 11.5–14.5)
GLOBULIN SER CALC-MCNC: 4 G/DL (ref 2–4)
GLUCOSE SERPL-MCNC: 106 MG/DL (ref 65–100)
HCT VFR BLD AUTO: 30.9 % (ref 35–47)
HGB BLD-MCNC: 9.8 G/DL (ref 11.5–16)
IMM GRANULOCYTES # BLD: 0 K/UL (ref 0–0.04)
IMM GRANULOCYTES NFR BLD AUTO: 0 % (ref 0–0.5)
LACTATE SERPL-SCNC: 1.4 MMOL/L (ref 0.4–2)
LYMPHOCYTES # BLD: 3.7 K/UL (ref 0.8–3.5)
LYMPHOCYTES NFR BLD: 20 % (ref 12–49)
MCH RBC QN AUTO: 33.9 PG (ref 26–34)
MCHC RBC AUTO-ENTMCNC: 31.7 G/DL (ref 30–36.5)
MCV RBC AUTO: 106.9 FL (ref 80–99)
METAMYELOCYTES NFR BLD MANUAL: 7 %
MONOCYTES # BLD: 1.7 K/UL (ref 0–1)
MONOCYTES NFR BLD: 9 % (ref 5–13)
MYELOCYTES NFR BLD MANUAL: 4 %
NEUTS BAND NFR BLD MANUAL: 4 %
NEUTS SEG # BLD: 11 K/UL (ref 1.8–8)
NEUTS SEG NFR BLD: 56 % (ref 32–75)
NRBC # BLD: 0.02 K/UL (ref 0–0.01)
NRBC BLD-RTO: 0.1 PER 100 WBC
PLATELET # BLD AUTO: 142 K/UL (ref 150–400)
PLATELET COMMENTS,PCOM: ABNORMAL
PMV BLD AUTO: 10.4 FL (ref 8.9–12.9)
POTASSIUM SERPL-SCNC: 4.1 MMOL/L (ref 3.5–5.1)
PROT SERPL-MCNC: 8 G/DL (ref 6.4–8.2)
RBC # BLD AUTO: 2.89 M/UL (ref 3.8–5.2)
RBC MORPH BLD: ABNORMAL
SODIUM SERPL-SCNC: 141 MMOL/L (ref 136–145)
TROPONIN I SERPL-MCNC: <0.05 NG/ML
WBC # BLD AUTO: 18.4 K/UL (ref 3.6–11)
WBC MORPH BLD: ABNORMAL

## 2018-09-29 PROCEDURE — 74011250637 HC RX REV CODE- 250/637: Performed by: INTERNAL MEDICINE

## 2018-09-29 PROCEDURE — 83605 ASSAY OF LACTIC ACID: CPT | Performed by: EMERGENCY MEDICINE

## 2018-09-29 PROCEDURE — 77030029684 HC NEB SM VOL KT MONA -A

## 2018-09-29 PROCEDURE — 94640 AIRWAY INHALATION TREATMENT: CPT

## 2018-09-29 PROCEDURE — 65660000000 HC RM CCU STEPDOWN

## 2018-09-29 PROCEDURE — 80053 COMPREHEN METABOLIC PANEL: CPT | Performed by: EMERGENCY MEDICINE

## 2018-09-29 PROCEDURE — 93005 ELECTROCARDIOGRAM TRACING: CPT

## 2018-09-29 PROCEDURE — 96375 TX/PRO/DX INJ NEW DRUG ADDON: CPT

## 2018-09-29 PROCEDURE — 87040 BLOOD CULTURE FOR BACTERIA: CPT | Performed by: EMERGENCY MEDICINE

## 2018-09-29 PROCEDURE — 36415 COLL VENOUS BLD VENIPUNCTURE: CPT | Performed by: EMERGENCY MEDICINE

## 2018-09-29 PROCEDURE — 74011250636 HC RX REV CODE- 250/636: Performed by: EMERGENCY MEDICINE

## 2018-09-29 PROCEDURE — 74011000250 HC RX REV CODE- 250: Performed by: INTERNAL MEDICINE

## 2018-09-29 PROCEDURE — 96365 THER/PROPH/DIAG IV INF INIT: CPT

## 2018-09-29 PROCEDURE — 83880 ASSAY OF NATRIURETIC PEPTIDE: CPT | Performed by: EMERGENCY MEDICINE

## 2018-09-29 PROCEDURE — 84484 ASSAY OF TROPONIN QUANT: CPT | Performed by: EMERGENCY MEDICINE

## 2018-09-29 PROCEDURE — 74011636637 HC RX REV CODE- 636/637: Performed by: INTERNAL MEDICINE

## 2018-09-29 PROCEDURE — 85025 COMPLETE CBC W/AUTO DIFF WBC: CPT | Performed by: EMERGENCY MEDICINE

## 2018-09-29 PROCEDURE — 99284 EMERGENCY DEPT VISIT MOD MDM: CPT

## 2018-09-29 PROCEDURE — 74011000250 HC RX REV CODE- 250: Performed by: EMERGENCY MEDICINE

## 2018-09-29 PROCEDURE — 71250 CT THORAX DX C-: CPT

## 2018-09-29 PROCEDURE — 71046 X-RAY EXAM CHEST 2 VIEWS: CPT

## 2018-09-29 RX ORDER — ALBUTEROL SULFATE 0.83 MG/ML
2.5 SOLUTION RESPIRATORY (INHALATION)
Status: DISCONTINUED | OUTPATIENT
Start: 2018-09-29 | End: 2018-09-29

## 2018-09-29 RX ORDER — ACETAMINOPHEN 325 MG/1
650 TABLET ORAL
Status: DISCONTINUED | OUTPATIENT
Start: 2018-09-29 | End: 2018-10-03 | Stop reason: HOSPADM

## 2018-09-29 RX ORDER — GUAIFENESIN 600 MG/1
600 TABLET, EXTENDED RELEASE ORAL 2 TIMES DAILY
Status: DISCONTINUED | OUTPATIENT
Start: 2018-09-29 | End: 2018-10-03 | Stop reason: HOSPADM

## 2018-09-29 RX ORDER — AZITHROMYCIN 250 MG/1
250 TABLET, FILM COATED ORAL
Status: DISCONTINUED | OUTPATIENT
Start: 2018-09-30 | End: 2018-10-03 | Stop reason: HOSPADM

## 2018-09-29 RX ORDER — ENOXAPARIN SODIUM 100 MG/ML
40 INJECTION SUBCUTANEOUS DAILY
Status: DISCONTINUED | OUTPATIENT
Start: 2018-09-30 | End: 2018-10-03 | Stop reason: HOSPADM

## 2018-09-29 RX ORDER — IPRATROPIUM BROMIDE AND ALBUTEROL SULFATE 2.5; .5 MG/3ML; MG/3ML
3 SOLUTION RESPIRATORY (INHALATION) ONCE
Status: COMPLETED | OUTPATIENT
Start: 2018-09-29 | End: 2018-09-29

## 2018-09-29 RX ORDER — LEVOFLOXACIN 500 MG/1
500 TABLET, FILM COATED ORAL
Status: DISCONTINUED | OUTPATIENT
Start: 2018-09-29 | End: 2018-09-29

## 2018-09-29 RX ORDER — CODEINE PHOSPHATE AND GUAIFENESIN 10; 100 MG/5ML; MG/5ML
5 SOLUTION ORAL
Status: DISCONTINUED | OUTPATIENT
Start: 2018-09-29 | End: 2018-10-03 | Stop reason: HOSPADM

## 2018-09-29 RX ORDER — THERA TABS 400 MCG
1 TAB ORAL DAILY
Status: DISCONTINUED | OUTPATIENT
Start: 2018-09-30 | End: 2018-10-03 | Stop reason: HOSPADM

## 2018-09-29 RX ORDER — PREDNISONE 20 MG/1
40 TABLET ORAL
Status: COMPLETED | OUTPATIENT
Start: 2018-09-29 | End: 2018-10-03

## 2018-09-29 RX ORDER — SODIUM CHLORIDE 0.9 % (FLUSH) 0.9 %
5-10 SYRINGE (ML) INJECTION AS NEEDED
Status: DISCONTINUED | OUTPATIENT
Start: 2018-09-29 | End: 2018-10-03 | Stop reason: HOSPADM

## 2018-09-29 RX ORDER — IPRATROPIUM BROMIDE AND ALBUTEROL SULFATE 2.5; .5 MG/3ML; MG/3ML
3 SOLUTION RESPIRATORY (INHALATION)
Status: DISCONTINUED | OUTPATIENT
Start: 2018-09-29 | End: 2018-09-29

## 2018-09-29 RX ORDER — LEVALBUTEROL INHALATION SOLUTION 0.63 MG/3ML
0.63 SOLUTION RESPIRATORY (INHALATION)
Status: DISCONTINUED | OUTPATIENT
Start: 2018-09-29 | End: 2018-10-03 | Stop reason: HOSPADM

## 2018-09-29 RX ORDER — ALBUTEROL SULFATE 90 UG/1
2 AEROSOL, METERED RESPIRATORY (INHALATION)
COMMUNITY
End: 2018-11-01 | Stop reason: ALTCHOICE

## 2018-09-29 RX ORDER — ANASTROZOLE 1 MG/1
1 TABLET ORAL DAILY
Status: DISCONTINUED | OUTPATIENT
Start: 2018-09-30 | End: 2018-10-03 | Stop reason: HOSPADM

## 2018-09-29 RX ORDER — ASPIRIN 325 MG
325 TABLET ORAL DAILY
Status: DISCONTINUED | OUTPATIENT
Start: 2018-09-30 | End: 2018-10-03 | Stop reason: HOSPADM

## 2018-09-29 RX ORDER — BISACODYL 5 MG
5 TABLET, DELAYED RELEASE (ENTERIC COATED) ORAL DAILY PRN
Status: DISCONTINUED | OUTPATIENT
Start: 2018-09-29 | End: 2018-10-03 | Stop reason: HOSPADM

## 2018-09-29 RX ORDER — ONDANSETRON 2 MG/ML
4 INJECTION INTRAMUSCULAR; INTRAVENOUS
Status: DISCONTINUED | OUTPATIENT
Start: 2018-09-29 | End: 2018-10-03 | Stop reason: HOSPADM

## 2018-09-29 RX ORDER — ACETAMINOPHEN 325 MG/1
650 TABLET ORAL
COMMUNITY
End: 2019-12-31

## 2018-09-29 RX ORDER — SODIUM CHLORIDE 0.9 % (FLUSH) 0.9 %
5-10 SYRINGE (ML) INJECTION EVERY 8 HOURS
Status: DISCONTINUED | OUTPATIENT
Start: 2018-09-29 | End: 2018-10-03 | Stop reason: HOSPADM

## 2018-09-29 RX ADMIN — LEVALBUTEROL HYDROCHLORIDE 0.63 MG: 0.63 SOLUTION RESPIRATORY (INHALATION) at 21:09

## 2018-09-29 RX ADMIN — IPRATROPIUM BROMIDE AND ALBUTEROL SULFATE 3 ML: .5; 3 SOLUTION RESPIRATORY (INHALATION) at 12:20

## 2018-09-29 RX ADMIN — METHYLPREDNISOLONE SODIUM SUCCINATE 125 MG: 125 INJECTION, POWDER, FOR SOLUTION INTRAMUSCULAR; INTRAVENOUS at 13:17

## 2018-09-29 RX ADMIN — PREDNISONE 40 MG: 20 TABLET ORAL at 17:10

## 2018-09-29 RX ADMIN — GUAIFENESIN 600 MG: 600 TABLET, EXTENDED RELEASE ORAL at 17:10

## 2018-09-29 RX ADMIN — IPRATROPIUM BROMIDE AND ALBUTEROL SULFATE 3 ML: .5; 3 SOLUTION RESPIRATORY (INHALATION) at 17:14

## 2018-09-29 RX ADMIN — AZITHROMYCIN 500 MG: 500 INJECTION, POWDER, LYOPHILIZED, FOR SOLUTION INTRAVENOUS at 14:57

## 2018-09-29 RX ADMIN — Medication 10 ML: at 17:10

## 2018-09-29 RX ADMIN — Medication 10 ML: at 22:00

## 2018-09-29 NOTE — PROGRESS NOTES
TRANSFER - IN REPORT: 
 
Verbal report received from [address], RN (name) on Wes Kapadia  being received from ED (unit) for routine progression of care Report consisted of patients Situation, Background, Assessment and  
Recommendations(SBAR). Information from the following report(s) SBAR was reviewed with the receiving nurse. Opportunity for questions and clarification was provided. Bedside shift change report given to Diana Frank RN (oncoming nurse). Report included the following information SBAR. SHIFT SUMMARY: 
 
 
 
 
 
[address] NURSING NOTE Admission Date 9/29/2018 Admission Diagnosis Acute respiratory failure ([address] Consults IP CONSULT TO PRIMARY CARE PROVIDER Cardiac Monitoring [x] Yes [] No  
  
Purposeful Hourly Rounding [x] Yes   
Chio Score Total Score: 2 Chio score 3 or > [x] Bed Alarm [] Avasys [] 1:1 sitter [] Patient refused (Signed refusal form in chart) Nikolas Score Nikolas Score: 22 Nikolas score 14 or < [] PMT consult [] Wound Care consult  
 []  Specialty bed  [] Nutrition consult Influenza Vaccine Received Flu Vaccine for Current Season (usually Sept-March): Yes Oxygen needs? [x] Room air Oxygen @  []1L    []2L    []3L   []4L    []5L   []6L via NC Chronic home O2 use? [] Yes [] No 
Perform O2 challenge test and document in progress note using smartphrase (.Homeoxygen) Last bowel movement Last Bowel Movement Date: 09/29/18 Urinary Catheter LDAs Peripheral IV 09/29/18 Right Antecubital (Active) Site Assessment Clean, dry, & intact 9/29/2018  4:53 PM  
Phlebitis Assessment 0 9/29/2018  4:53 PM  
Infiltration Assessment 0 9/29/2018  4:53 PM  
Dressing Status Clean, dry, & intact 9/29/2018  4:53 PM  
Dressing Type Transparent;Tape 9/29/2018  4:53 PM  
Hub Color/Line Status Blue; Infusing 9/29/2018  4:53 PM  
                  
  
Readmission Risk Assessment Tool Score Low Risk 12 Total Score 2 . Living with Significant Other. Assisted Living. LTAC. SNF. or  
Rehab  
 5 Pt. Coverage (Medicare=5 , Medicaid, or Self-Pay=4) 5 Charlson Comorbidity Score (Age + Comorbid Conditions) Criteria that do not apply:  
 Has Seen PCP in Last 6 Months (Yes=3, No=0) Patient Length of Stay (>5 days = 3) IP Visits Last 12 Months (1-3=4, 4=9, >4=11) Expected Length of Stay - - - Actual Length of Stay 0

## 2018-09-29 NOTE — ED NOTES
Ambulated pt in hallway, pt 02 dropped to low 80's, once stopped 02 up to 94% however pt feels lightheaded. MD made aware.

## 2018-09-29 NOTE — IP AVS SNAPSHOT
[address] 
[phone] Patient: Ilsa Ocasio MRN: [id number] About your hospitalization You were admitted on:  2018 You last received care in the:  [facility] CARDIOPULMONARY CARE You were discharged on:  October 3, 2018 Why you were hospitalized Your primary diagnosis was:  Not on File Your diagnoses also included:  Acute Respiratory Failure (Hcc), Acute Bronchitis Due To Other Specified Organisms Follow-up Information Follow up With Details Comments Contact Info Sagar Fuentes MD Go on 10/4/2018 For hospital follow up appointment at 10:30AM  [address] [facility] [address] 
[phone] Your Scheduled Appointments  10:30 AM EDT TRANSITIONAL CARE MANAGEMENT with Sagar Fuentes MD  
[place] [address] ([facility]) [address] [address] [phone] Discharge Orders None A check isis indicates which time of day the medication should be taken. My Medications START taking these medications Instructions Each Dose to Equal  
 Morning Noon Evening Bedtime  
 azithromycin 250 mg tablet Commonly known as:  Reyna Pitch Your next dose is:  today Take 1 Tab by mouth daily for 3 days. 250 mg  
    
   
  
   
   
  
 benzonatate 200 mg capsule Commonly known as:  TESSALON Your next dose is:  today Take 1 Cap by mouth three (3) times daily for 10 days. 200 mg  
    
   
  
   
   
  
 predniSONE 10 mg tablet Commonly known as:  Jacob Zavaleta Your next dose is:  tomorrow Take 3 tabs daily for 3 days, 2 tabs daily for 3 days, then 1 tab daily for 3 days CONTINUE taking these medications  Instructions Each Dose to Equal  
 Morning Noon Evening Bedtime  
 anastrozole 1 mg tablet Commonly known as:  ARIMIDEX Your next dose is:  tomorrow  
   
 take 1 tablet by mouth once daily  
     
  
   
   
   
  
 chlorpheniramine-HYDROcodone 10-8 mg/5 mL suspension Commonly known as:  Olga Cory Take 5 mL by mouth every twelve (12) hours as needed for Cough. Max Daily Amount: 10 mL. 5 mL  
    
   
   
   
  
 ergocalciferol 50,000 unit capsule Commonly known as:  VITAMIN D2 Take 1 Cap by mouth every seven (7) days. [address] Units  
    
   
   
   
  
 multivitamin tablet Commonly known as:  ONE A DAY Your next dose is:  Tomorrow Take 1 Tab by mouth daily. 1 Tab PRESERVISION AREDS PO Take  by mouth. TYLENOL 325 mg tablet Generic drug:  acetaminophen Take 325 mg by mouth every four (4) hours as needed for Pain. 325 mg VENTOLIN HFA 90 mcg/actuation inhaler Generic drug:  albuterol Take 2 Puffs by inhalation every four (4) hours as needed for Wheezing. 2 Puff STOP taking these medications   
 aspirin 325 mg tablet Commonly known as:  ASPIRIN  
   
  
 cefUROXime 500 mg tablet Commonly known as:  CEFTIN Where to Get Your Medications These medications were sent to [address] [place] [address], [address] [facility] [place], [address] [place] [id number] Phone:  [phone]  
  azithromycin 250 mg tablet  
 benzonatate 200 mg capsule  
 predniSONE 10 mg tablet Opioid Education Prescription Opioids: What You Need to Know: 
 
Prescription opioids can be used to help relieve moderate-to-severe pain and are often prescribed following a surgery or injury, or for certain health conditions. These medications can be an important part of treatment but also come with serious risks.   Opioids are strong pain medicines. Examples include hydrocodone, oxycodone, fentanyl, and morphine. Heroin is an example of an illegal opioid. It is important to work with your health care provider to make sure you are getting the safest, most effective care. WHAT ARE THE RISKS AND SIDE EFFECTS OF OPIOID USE? Prescription opioids carry serious risks of addiction and overdose, especially with prolonged use. An opioid overdose, often marked by slow breathing, can cause sudden death. The use of prescription opioids can have a number of side effects as well, even when taken as directed. · Tolerance-meaning you might need to take more of a medication for the same pain relief · Physical dependence-meaning you have symptoms of withdrawal when the medication is stopped. Withdrawal symptoms can include nausea, sweating, chills, diarrhea, stomach cramps, and muscle aches. Withdrawal can last up to several weeks, depending on which drug you took and how long you took it. · Increased sensitivity to pain · Constipation · Nausea, vomiting, and dry mouth · Sleepiness and dizziness · Confusion · Depression · Low levels of testosterone that can result in lower sex drive, energy, and strength · Itching and sweating RISKS ARE GREATER WITH:      
· History of drug misuse, substance use disorder, or overdose · Mental health conditions (such as depression or anxiety) · Sleep apnea · Older age (72 years or older) · Pregnancy Avoid alcohol while taking prescription opioids. Also, unless specifically advised by your health care provider, medications to avoid include: · Benzodiazepines (such as Xanax or Valium) · Muscle relaxants (such as Soma or Flexeril) · Hypnotics (such as Ambien or Lunesta) · Other prescription opioids KNOW YOUR OPTIONS Talk to your health care provider about ways to manage your pain that don't involve prescription opioids.   Some of these options may actually work better and have fewer risks and side effects. Consult your physician before adding or stopping any medications, treatments, or physical activity. Options may include: 
· Pain relievers such as acetaminophen, ibuprofen, and naproxen · Some medications that are also used for depression or seizures · Physical therapy and exercise · Counseling to help patients learn how to cope better with triggers of pain and stress. · Application of heat or cold compress · Massage therapy · Relaxation techniques Be Informed Make sure you know the name of your medication, how much and how often to take it, and its potential risks & side effects. IF YOU ARE PRESCRIBED OPIOIDS FOR PAIN: 
· Never take opioids in greater amounts or more often than prescribed. Remember the goal is not to be pain-free but to manage your pain at a tolerable level. · Follow up with your primary care provider to: · Work together to create a plan on how to manage your pain. · Talk about ways to help manage your pain that don't involve prescription opioids. · Talk about any and all concerns and side effects. · Help prevent misuse and abuse. · Never sell or share prescription opioids · Help prevent misuse and abuse. · Store prescription opioids in a secure place and out of reach of others (this may include visitors, children, friends, and family). · Safely dispose of unused/unwanted prescription opioids: Find your community drug take-back program or your pharmacy mail-back program, or flush them down the toilet, following guidance from the Food and Drug Administration (www.fda.gov/Drugs/ResourcesForYou). · Visit www.cdc.gov/drugoverdose to learn about the risks of opioid abuse and overdose. · If you believe you may be struggling with addiction, tell your health care provider and ask for guidance or call [address] [place], [place] [address]BMdr at [phone]. Discharge Instructions [address] 
[phone] Patient Discharge Instructions Audrey Huggins / [id number] : 1935 Admitted 2018 Discharged: 10/3/2018 Take Home Medications · It is important that you take the medication exactly as they are prescribed. · Keep your medication in the bottles provided by the pharmacist and keep a list of the medication names, dosages, and times to be taken in your wallet. · Do not take other medications without consulting your doctor. What to do at [facility] Recommended diet: Regular Diet, Recommended activity: Activity as tolerated, Follow-up with Dr Jarod Guerra in 1 week Information obtained by : 
I understand that if any problems occur once I am at home I am to contact my physician. I understand and acknowledge receipt of the instructions indicated above. Physician's or R.N.'s Signature                                                                  Date/Time Patient or Representative Signature                                                          Date/Time 
 
ACO Transitions of Care Introducing [employer] [address] offers a voluntary care coordination program to provide high quality service and care to [facility] fee-for-service beneficiaries. Lucrecia Kennedy was designed to help you enhance your health and well-being through the following services: ? Transitions of Care  support for individuals who are transitioning from one care setting to another (example: Hospital to home). ? Chronic and Complex Care Coordination  support for individuals and caregivers of those with serious or chronic illnesses or with more than one chronic (ongoing) condition and those who take a number of different medications. If you meet specific medical criteria, a [address] [place] [address] Rd may call you directly to coordinate your care with your primary care physician and your other care providers. For questions about the [facility] programs, please, contact your physicians office. For general questions or additional information about Accountable Care Organizations: 
Please visit www.medicare.gov/acos. html or call 1-800-MEDICARE (6-689.875.9386) TTY users should call 8-638.656.1892. RaisedDigital Announcement We are excited to announce that we are making your provider's discharge notes available to you in RaisedDigital. You will see these notes when they are completed and signed by the physician that discharged you from your recent hospital stay. If you have any questions or concerns about any information you see in RaisedDigital, please call the Health Information Department where you were seen or reach out to your Primary Care Provider for more information about your plan of care. Introducing [facility] HE[facility] SERVICES! Dear Renee Cobb: Thank you for requesting a RaisedDigital account. Our records indicate that you already have an active RaisedDigital account. You can access your account anytime at [email or web address] Did you know that you can access your hospital and ER discharge instructions at any time in RaisedDigital? You can also review all of your test results from your hospital stay or ER visit. Additional Information If you have questions, please visit the Frequently Asked Questions section of the RaisedDigital website at [email or web address] Remember, RaisedDigital is NOT to be used for urgent needs. For medical emergencies, dial 911. Now available from your iPhone and Android! Introducing Jay Browne As a [facility] patient, I wanted to make you aware of our electronic visit tool called Jay Browne. Transpond allows you to connect within minutes with a medical provider 24 hours a day, seven days a week via a mobile device or tablet or logging into a secure website from your computer. You can access Jay Browne from anywhere in the [place]. A virtual visit might be right for you when you have a simple condition and feel like you just dont want to get out of bed, or cant get away from work for an appointment, when your regular [facility] provider is not available (evenings, weekends or holidays), or when youre out of town and need minor care. Electronic visits cost only $49 and if the Transpond provider determines a prescription is needed to treat your condition, one can be electronically transmitted to a nearby pharmacy*. Please take a moment to enroll today if you have not already done so. The enrollment process is free and takes just a few minutes. To enroll, please download the Transpond kiel to your tablet or phone, or visit [email or web address] to enroll on your computer. And, as an [address] [place] [address] patient with a [employer] account, the results of your visits will be scanned into your electronic medical record and your primary care provider will be able to view the scanned results. We urge you to continue to see your regular [facility] provider for your ongoing medical care. And while your primary care provider may not be the one available when you seek a Jay Browne virtual visit, the peace of mind you get from getting a real diagnosis real time can be priceless. For more information on Jay Browne, view our Frequently Asked Questions (FAQs) at [email or web address]. Sincerely, 
 
Preston Lipscomb MD 
Chief Medical Officer [address] *:  certain medications cannot be prescribed via Jay Browne Unresulted Labs-Please follow up with your PCP about these lab tests Order Current Status CULTURE, BLOOD, PAIRED Preliminary result Providers Seen During Your Hospitalization Provider Specialty Primary office phone Jaylene Crain MD Emergency Medicine [phone] Cinthya Hunt MD Internal Medicine [phone] Your Primary Care Physician (PCP) Primary Care Physician Office Phone Office Fax Shirin Frank [phone] You are allergic to the following Allergen Reactions Hydrocodone Nausea Only Vertigo Gabapentin Diarrhea Nausea Only Other (comments)  
 weakness Oxycodone Nausea and Vomiting Vertigo Recent Documentation Weight BMI OB Status Smoking Status 84.8 kg 31.11 kg/m2 Hysterectomy Former Smoker Emergency Contacts Name Discharge Info Relation Home Work Mobile [employer] [place] DISCHARGE CAREGIVER [3] Spouse [3] [phone] Patient Belongings The following personal items are in your possession at time of discharge: 
  Dental Appliances: None  Visual Aid: Glasses   Hearing Aids/Status: At home  Home Medications: Kept at bedside   Jewelry: Ring, Necklace  Clothing: Footwear, Pants, Shirt, Undergarments    Other Valuables: Merry [place] Please provide this summary of care documentation to your next provider. Signatures-by signing, you are acknowledging that this After Visit Summary has been reviewed with you and you have received a copy. Patient Signature:  ____________________________________________________________ Date:  ____________________________________________________________  
  
Fani Ledbetter Provider Signature:  ____________________________________________________________ Date:  ____________________________________________________________

## 2018-09-29 NOTE — H&P
Hospitalist Admission Note NAME: Andrew Fu :  1935 MRN:  [id number] Date/Time:  2018 3:11 PM 
 
Patient PCP: Rocio Tinoco MD 
______________________________________________________________________ Given the patient's current clinical presentation, I have a high level of concern for decompensation if discharged from the emergency department. Complex decision making was performed, which includes reviewing the patient's available past medical records, laboratory results, and x-ray films. My assessment of this patient's clinical condition and my plan of care is as follows. Assessment / Plan: 
Acute hypoxic respiratory failuire and SIRS due to acute bronchitis/possible early RML PNA, present on admission: per ER nurse note \"ambulated pt in [facility], pt 02 dropped to low 80's, once stopped 02 up to 94% however pt feels lightheaded. \"  Pt treated with - CXR with minimal right middle lobe atelectasis - will get CT chest with smoking history (none in our record) - blood cultures sent, will follow 
- emperic rocephin, azithromycin for now 
- add prednisone and mucinex 
- duonebs scheduled today, may be able to change to prn tomorrow Breast cancer: followed by Dr. Renetta Mcqueen 
- con't home arimidex Lumbar spinal stenosis with peripheral neuropathy Code Status: Full Surrogate Decision Maker:  Kate Mathias [phone] DVT Prophylaxis: lovenox Baseline: independent Subjective: CHIEF COMPLAINT:  Shortness of breath, dizziness, cough HISTORY OF PRESENT ILLNESS:    
Serena Rosario is a 82 y.o.  female who presents with above. Pt in her usual state of health until this week. She developed a severe cough with mucus production and L sided pleuritic chest pain. She saw her PCP on Thursday and was started on cefuroxime and tussionex.   She felt nauseated with the tussionex and did not feel she was improving, so she called back her PCP to request another appointment. However, she decided to come back to the ER instead today. She says that she feels much better since arrival here. She denies fevers at home. Cough has been less productive today. She says that her L sided chest pain as improved. No nausea. No stool changes. No new edema or focal weakness. We were asked to admit for work up and evaluation of the above problems. Past Medical History:  
Diagnosis Date  Arthritis  Bunion of right foot 8/20/2015  Chronic pain   
 back--uses tens unit  Diverticulitis 6/29/2018 Recurrent  Dry eye syndrome 6/29/2018  Fibromyalgia 6/29/2018  GERD without esophagitis 6/29/2018  Hypercholesterolemia 6/29/2018  Ill-defined condition   
 blood transfusion hx  Osteoporosis 6/29/2018  Peripheral neuropathy 6/29/2018  Prediabetes 6/29/2018  Radiation therapy complication 6527 Lt breast-No Chemo  Rosacea 6/29/2018  Trouble in sleeping Past Surgical History:  
Procedure Laterality Date  BREAST SURGERY PROCEDURE UNLISTED  11/21/13 Left breast lumpectomy with sentinel lymph node biopsy  HX APPENDECTOMY  HX BREAST LUMPECTOMY  11/21/2013 LEFT BREAST LUMPECTOMY W/LEFT BREAST SENTINEL NODE BIOPSY,AND NEEDLE LOCALIZATION performed by Vernon Hauser MD at [facility] MA[facility]  
 HX CATARACT REMOVAL    
 bilateral  
 HX HYSTERECTOMY    
 ovarian cyst  
 HX MOHS PROCEDURES    
 right  HX ORTHOPAEDIC    
 back surgery  HX OTHER SURGICAL    
 colonoscopy  HX TONSILLECTOMY  TOTAL KNEE ARTHROPLASTY    
 left  TOTAL KNEE ARTHROPLASTY    
 right [address] [facility] BREAST BIOPSY Left 2013 Breast Ca Social History Substance Use Topics  Smoking status: Former Smoker Packs/day: 0.50 Years: 50.00 Quit date: 2/13/2012  Smokeless tobacco: Never Used  Alcohol use 1.2 oz/week 2 Glasses of wine per week Comment: occasionally 3-4 off and on a week Family History Problem Relation Age of Onset  Cancer Mother   
  bladder  Cancer Father  Breast Cancer Paternal Grandmother Allergies Allergen Reactions  Hydrocodone Nausea Only and Vertigo  Gabapentin Diarrhea, Nausea Only and Other (comments)  
  weakness  Oxycodone Nausea and Vomiting and Vertigo Prior to Admission medications Medication Sig Start Date End Date Taking? Authorizing Provider  
acetaminophen (TYLENOL) 325 mg tablet Take 325 mg by mouth every four (4) hours as needed for Pain. Yes Libby Berry MD  
albuterol (VENTOLIN HFA) 90 mcg/actuation inhaler Take 2 Puffs by inhalation every four (4) hours as needed for Wheezing. Yes Libby Berry MD  
cefUROXime (CEFTIN) 500 mg tablet Take 1 Tab by mouth two (2) times a day for 7 days. 9/24/18 10/1/18 Yes Ashley Jorge MD  
anastrozole (ARIMIDEX) 1 mg tablet take 1 tablet by mouth once daily 8/24/18  Yes Analy Garner NP  
ergocalciferol (VITAMIN D2) 50,000 unit capsule Take 1 Cap by mouth every seven (7) days. 8/8/18  Yes Leatha Magana NP  
vit A/vit C/vit E/zinc/copper (PRESERVISION AREDS PO) Take  by mouth. Yes Historical Provider  
multivitamin (ONE A DAY) tablet Take 1 Tab by mouth daily. Yes Historical Provider  
aspirin (ASPIRIN) 325 mg tablet Take 325 mg by mouth daily. Yes Historical Provider  
chlorpheniramine-HYDROcodone (TUSSIONEX) 10-8 mg/5 mL suspension Take 5 mL by mouth every twelve (12) hours as needed for Cough. Max Daily Amount: 10 mL. 9/24/18   Ashley Jorge MD  
 
 
REVIEW OF SYSTEMS:    
I am not able to complete the review of systems because: The patient is intubated and sedated The patient has altered mental status due to his acute medical problems The patient has baseline aphasia from prior stroke(s) The patient has baseline dementia and is not reliable historian The patient is in acute medical distress and unable to provide information Total of 12 systems reviewed as follows:   
   POSITIVE= underlined text  Negative = text not underlined General:  fever, chills, sweats, generalized weakness, weight loss/gain,  
   loss of appetite Eyes:    blurred vision, eye pain, loss of vision, double vision ENT:    rhinorrhea, pharyngitis Respiratory:   cough, sputum production, SOB, AL, wheezing, pleuritic pain  
Cardiology:   chest pain, palpitations, orthopnea, PND, edema, syncope Gastrointestinal:  abdominal pain, N/V, diarrhea, dysphagia, constipation, bleeding Genitourinary:  frequency, urgency, dysuria, hematuria, incontinence Muskuloskeletal :  arthralgia, myalgia, back pain Hematology:  easy bruising, nose or gum bleeding, lymphadenopathy Dermatological: rash, ulceration, pruritis, color change / jaundice Endocrine:   hot flashes or polydipsia Neurological:  headache, dizziness, confusion, focal weakness, paresthesia, Speech difficulties, memory loss, gait difficulty Psychological: Feelings of anxiety, depression, agitation Objective: VITALS:   
Visit Vitals  /42  Pulse 96  Temp 98.2 °F (36.8 °C)  Resp 20  Wt 84.8 kg (186 lb 15.2 oz)  SpO2 95%  BMI 31.11 kg/m2 PHYSICAL EXAM: 
 
General:    Obese, alert, cooperative, no distress, appears stated age. HEENT: Atraumatic, anicteric sclerae, pink conjunctivae No oral ulcers, mucosa moist, throat clear, dentition fair Neck:  Supple, symmetrical,  thyroid: non tender Lungs:   Mildly course bilaterally. No wheezing. Good air movement. No rales. Chest wall:  No tenderness  No Accessory muscle use. Heart:   Regular rhythm,  No  murmur   No edema Abdomen:   Soft, non-tender. Not distended. Bowel sounds normal 
Extremities: No cyanosis. No clubbing,   
  Skin turgor normal, Capillary refill normal, Radial dial pulse 2+ Skin:     Not pale. Not Jaundiced  No rashes Psych:  Good insight. Not depressed. Not anxious or agitated. Neurologic: EOMs intact. No facial asymmetry. No aphasia or slurred speech. Symmetrical strength, Sensation grossly intact. Alert and oriented X 4.  
 
_______________________________________________________________________ Care Plan discussed with: 
  Comments Patient x Family  x  at bedside RN x Care Manager Consultant:     
_______________________________________________________________________ Expected  Disposition:  
Home with Family x HH/PT/OT/RN   
SNF/LTC   
ESTEE   
________________________________________________________________________ TOTAL TIME:  50 Minutes Critical Care Provided     Minutes non procedure based Comments  
 x Reviewed previous records  
>50% of visit spent in counseling and coordination of care x Discussion with patient and/or family and questions answered 
  
 
________________________________________________________________________ Signed: Julissa Kothari MD 
 
Procedures: see electronic medical records for all procedures/Xrays and details which were not copied into this note but were reviewed prior to creation of Plan. LAB DATA REVIEWED:   
Recent Results (from the past 24 hour(s)) EKG, 12 LEAD, INITIAL Collection Time: 09/29/18 11:30 AM  
Result Value Ref Range Ventricular Rate 80 BPM  
 Atrial Rate 80 BPM  
 P-R Interval 216 ms  
 QRS Duration 84 ms Q-T Interval 390 ms QTC Calculation (Bezet) 449 ms Calculated P Axis 1 degrees Calculated R Axis -16 degrees Calculated T Axis 57 degrees Diagnosis Sinus rhythm with 1st degree AV block with premature atrial complexes Moderate voltage criteria for LVH, may be normal variant When compared with ECG of 13-NOV-2016 11:38, 
premature atrial complexes are now present CBC WITH AUTOMATED DIFF  Collection Time: 09/29/18 12:22 PM  
 Result Value Ref Range WBC 18.4 (H) 3.6 - 11.0 K/uL  
 RBC 2.89 (L) 3.80 - 5.20 M/uL HGB 9.8 (L) 11.5 - 16.0 g/dL HCT 30.9 (L) 35.0 - 47.0 % .9 (H) 80.0 - 99.0 FL  
 MCH 33.9 26.0 - 34.0 PG  
 MCHC 31.7 30.0 - 36.5 g/dL  
 RDW 16.0 (H) 11.5 - 14.5 % PLATELET 323 (L) 590 - 400 K/uL MPV 10.4 8.9 - 12.9 FL  
 NRBC 0.1 (H) 0  WBC ABSOLUTE NRBC 0.02 (H) 0.00 - 0.01 K/uL NEUTROPHILS 56 32 - 75 % BAND NEUTROPHILS 4 % LYMPHOCYTES 20 12 - 49 % MONOCYTES 9 5 - 13 % EOSINOPHILS 0 0 - 7 % BASOPHILS 0 0 - 1 % METAMYELOCYTES 7 % MYELOCYTES 4 % IMMATURE GRANULOCYTES 0 0.0 - 0.5 % ABS. NEUTROPHILS 11.0 (H) 1.8 - 8.0 K/UL  
 ABS. LYMPHOCYTES 3.7 (H) 0.8 - 3.5 K/UL  
 ABS. MONOCYTES 1.7 (H) 0.0 - 1.0 K/UL  
 ABS. EOSINOPHILS 0.0 0.0 - 0.4 K/UL  
 ABS. BASOPHILS 0.0 0.0 - 0.1 K/UL  
 ABS. IMM. GRANS. 0.0 0.00 - 0.04 K/UL  
 DF MANUAL PLATELET COMMENTS VACUOLATED POLYS    
 RBC COMMENTS ANISOCYTOSIS 
1+ WBC COMMENTS TOXIC GRANULATION    
METABOLIC PANEL, COMPREHENSIVE Collection Time: 09/29/18 12:22 PM  
Result Value Ref Range Sodium 141 136 - 145 mmol/L Potassium 4.1 3.5 - 5.1 mmol/L Chloride 109 (H) 97 - 108 mmol/L  
 CO2 26 21 - 32 mmol/L Anion gap 6 5 - 15 mmol/L Glucose 106 (H) 65 - 100 mg/dL BUN 16 6 - 20 MG/DL Creatinine 0.60 0.55 - 1.02 MG/DL  
 BUN/Creatinine ratio 27 (H) 12 - 20 GFR est AA >60 >60 ml/min/1.73m2 GFR est non-AA >60 >60 ml/min/1.73m2 Calcium 8.7 8.5 - 10.1 MG/DL Bilirubin, total 0.5 0.2 - 1.0 MG/DL  
 ALT (SGPT) 25 12 - 78 U/L  
 AST (SGOT) 18 15 - 37 U/L Alk. phosphatase 67 45 - 117 U/L Protein, total 8.0 6.4 - 8.2 g/dL Albumin 4.0 3.5 - 5.0 g/dL Globulin 4.0 2.0 - 4.0 g/dL A-G Ratio 1.0 (L) 1.1 - 2.2 NT-PRO BNP Collection Time: 09/29/18 12:22 PM  
Result Value Ref Range NT pro- 0 - 450 PG/ML  
TROPONIN I  Collection Time: 09/29/18 12:22 PM  
 Result Value Ref Range Troponin-I, Qt. <0.05 <0.05 ng/mL

## 2018-09-29 NOTE — ED PROVIDER NOTES
EMERGENCY DEPARTMENT HISTORY AND PHYSICAL EXAM 
 
 
Date: 9/29/2018 Patient Name: Timmy Hercules History of Presenting Illness Chief Complaint Patient presents with  Cough  
  diagnosed with bronchitis by PCP 1 week ago. s/s continued History Provided By: Patient HPI: Timmy Hercules, 82 y.o. female, presents ambulatory to the ED with cc of worsening productive coughing with associated wheezing for one week. Pt states that she had seen her PCP who diagnosed her with bronchitis and gave her an albuterol inhaler, Cefuroxime, and Hydrocodone with no alleviation. She has no hx of heart or lung problems and no abdominal surgeries. She does smoke cigarettes occasionally. Pt denies any vomiting, chills, SOB or CP. There are no other complaints, changes, or physical findings at this time. PCP: Ton Guevara MD 
 
Current Outpatient Prescriptions Medication Sig Dispense Refill  acetaminophen (TYLENOL) 325 mg tablet Take 325 mg by mouth every four (4) hours as needed for Pain.  albuterol (VENTOLIN HFA) 90 mcg/actuation inhaler Take 2 Puffs by inhalation every four (4) hours as needed for Wheezing.  cefUROXime (CEFTIN) 500 mg tablet Take 1 Tab by mouth two (2) times a day for 7 days. 14 Tab 0  
 anastrozole (ARIMIDEX) 1 mg tablet take 1 tablet by mouth once daily 90 Tab 2  
 ergocalciferol (VITAMIN D2) 50,000 unit capsule Take 1 Cap by mouth every seven (7) days. 12 Cap 3  
 vit A/vit C/vit E/zinc/copper (PRESERVISION AREDS PO) Take  by mouth.  multivitamin (ONE A DAY) tablet Take 1 Tab by mouth daily.  aspirin (ASPIRIN) 325 mg tablet Take 325 mg by mouth daily.  chlorpheniramine-HYDROcodone (TUSSIONEX) 10-8 mg/5 mL suspension Take 5 mL by mouth every twelve (12) hours as needed for Cough. Max Daily Amount: 10 mL. 60 mL 0 Past History Past Medical History: 
Past Medical History:  
Diagnosis Date  Arthritis  Bunion of right foot 8/20/2015  Chronic pain   
 back--uses tens unit  Diverticulitis 6/29/2018 Recurrent  Dry eye syndrome 6/29/2018  Fibromyalgia 6/29/2018  GERD without esophagitis 6/29/2018  Hypercholesterolemia 6/29/2018  Ill-defined condition   
 blood transfusion hx  Osteoporosis 6/29/2018  Peripheral neuropathy 6/29/2018  Prediabetes 6/29/2018  Radiation therapy complication 6195 Lt breast-No Chemo  Rosacea 6/29/2018  Trouble in sleeping Past Surgical History: 
Past Surgical History:  
Procedure Laterality Date  BREAST SURGERY PROCEDURE UNLISTED  11/21/13 Left breast lumpectomy with sentinel lymph node biopsy  HX APPENDECTOMY  HX BREAST LUMPECTOMY  11/21/2013 LEFT BREAST LUMPECTOMY W/LEFT BREAST SENTINEL NODE BIOPSY,AND NEEDLE LOCALIZATION performed by Hipolito Alberts MD at [facility] MAIN OR  
 HX CATARACT REMOVAL    
 bilateral  
 HX HYSTERECTOMY    
 ovarian cyst  
 HX MOHS PROCEDURES    
 right  HX ORTHOPAEDIC    
 back surgery  HX OTHER SURGICAL    
 colonoscopy  HX TONSILLECTOMY  TOTAL KNEE ARTHROPLASTY    
 left  TOTAL KNEE ARTHROPLASTY    
 right [address] [facility] BREAST BIOPSY Left 2013 Breast Ca Family History: 
Family History Problem Relation Age of Onset  Cancer Mother   
  bladder  Cancer Father  Breast Cancer Paternal Grandmother Social History: 
Social History Substance Use Topics  Smoking status: Former Smoker Packs/day: 0.50 Years: 50.00 Quit date: 2/13/2012  Smokeless tobacco: Never Used  Alcohol use 1.2 oz/week 2 Glasses of wine per week Comment: occasionally 3-4 off and on a week Allergies: Allergies Allergen Reactions  Hydrocodone Nausea Only and Vertigo  Gabapentin Diarrhea, Nausea Only and Other (comments)  
  weakness  Oxycodone Nausea and Vomiting and Vertigo Review of Systems Review of Systems Constitutional: Negative. Negative for chills and fever. HENT: Negative. Negative for congestion and rhinorrhea. Respiratory: Positive for cough and wheezing. Negative for chest tightness. Cardiovascular: Negative. Negative for chest pain and palpitations. Gastrointestinal: Negative. Negative for abdominal pain, constipation, nausea and vomiting. Endocrine: Negative. Genitourinary: Negative. Negative for decreased urine volume, flank pain, hematuria and pelvic pain. Musculoskeletal: Negative. Negative for back pain and neck pain. Skin: Negative. Negative for color change, pallor and rash. Neurological: Negative. Negative for dizziness, seizures, weakness, numbness and headaches. Hematological: Negative. Negative for adenopathy. Psychiatric/Behavioral: Negative. All other systems reviewed and are negative. Physical Exam  
Physical Exam  
Constitutional: She is oriented to person, place, and time. She appears well-developed and well-nourished. No distress. HENT:  
Head: Normocephalic and atraumatic. Mouth/Throat: No oropharyngeal exudate. Eyes: Conjunctivae are normal. Pupils are equal, round, and reactive to light. Right eye exhibits no discharge. Left eye exhibits no discharge. No scleral icterus. Neck: Normal range of motion. Neck supple. No JVD present. Cardiovascular: Normal rate, regular rhythm, normal heart sounds and intact distal pulses. Exam reveals no gallop and no friction rub. No murmur heard. Pulmonary/Chest: Effort normal. No stridor. No respiratory distress. She has wheezes. She has rhonchi. She has no rales. She exhibits no tenderness. Abdominal: Soft. Bowel sounds are normal. She exhibits no distension and no mass. There is no tenderness. There is no rebound and no guarding. Neurological: She is alert and oriented to person, place, and time. She displays normal reflexes. No cranial nerve deficit.  She exhibits normal muscle tone. Coordination normal.  
Skin: Skin is warm. No rash noted. She is not diaphoretic. No pallor. Vitals reviewed. Diagnostic Study Results Labs - Recent Results (from the past 12 hour(s)) EKG, 12 LEAD, INITIAL Collection Time: 09/29/18 11:30 AM  
Result Value Ref Range Ventricular Rate 80 BPM  
 Atrial Rate 80 BPM  
 P-R Interval 216 ms  
 QRS Duration 84 ms Q-T Interval 390 ms QTC Calculation (Bezet) 449 ms Calculated P Axis 1 degrees Calculated R Axis -16 degrees Calculated T Axis 57 degrees Diagnosis Sinus rhythm with 1st degree AV block with premature atrial complexes Moderate voltage criteria for LVH, may be normal variant When compared with ECG of 13-NOV-2016 11:38, 
premature atrial complexes are now present CBC WITH AUTOMATED DIFF Collection Time: 09/29/18 12:22 PM  
Result Value Ref Range WBC 18.4 (H) 3.6 - 11.0 K/uL  
 RBC 2.89 (L) 3.80 - 5.20 M/uL HGB 9.8 (L) 11.5 - 16.0 g/dL HCT 30.9 (L) 35.0 - 47.0 % .9 (H) 80.0 - 99.0 FL  
 MCH 33.9 26.0 - 34.0 PG  
 MCHC 31.7 30.0 - 36.5 g/dL  
 RDW 16.0 (H) 11.5 - 14.5 % PLATELET 378 (L) 799 - 400 K/uL MPV 10.4 8.9 - 12.9 FL  
 NRBC 0.1 (H) 0  WBC ABSOLUTE NRBC 0.02 (H) 0.00 - 0.01 K/uL NEUTROPHILS 56 32 - 75 % BAND NEUTROPHILS 4 % LYMPHOCYTES 20 12 - 49 % MONOCYTES 9 5 - 13 % EOSINOPHILS 0 0 - 7 % BASOPHILS 0 0 - 1 % METAMYELOCYTES 7 % MYELOCYTES 4 % IMMATURE GRANULOCYTES 0 0.0 - 0.5 % ABS. NEUTROPHILS 11.0 (H) 1.8 - 8.0 K/UL  
 ABS. LYMPHOCYTES 3.7 (H) 0.8 - 3.5 K/UL  
 ABS. MONOCYTES 1.7 (H) 0.0 - 1.0 K/UL  
 ABS. EOSINOPHILS 0.0 0.0 - 0.4 K/UL  
 ABS. BASOPHILS 0.0 0.0 - 0.1 K/UL  
 ABS. IMM. GRANS. 0.0 0.00 - 0.04 K/UL  
 DF MANUAL PLATELET COMMENTS VACUOLATED POLYS    
 RBC COMMENTS ANISOCYTOSIS 
1+ WBC COMMENTS TOXIC GRANULATION    
METABOLIC PANEL, COMPREHENSIVE  Collection Time: 09/29/18 12:22 PM  
 Result Value Ref Range Sodium 141 136 - 145 mmol/L Potassium 4.1 3.5 - 5.1 mmol/L Chloride 109 (H) 97 - 108 mmol/L  
 CO2 26 21 - 32 mmol/L Anion gap 6 5 - 15 mmol/L Glucose 106 (H) 65 - 100 mg/dL BUN 16 6 - 20 MG/DL Creatinine 0.60 0.55 - 1.02 MG/DL  
 BUN/Creatinine ratio 27 (H) 12 - 20 GFR est AA >60 >60 ml/min/1.73m2 GFR est non-AA >60 >60 ml/min/1.73m2 Calcium 8.7 8.5 - 10.1 MG/DL Bilirubin, total 0.5 0.2 - 1.0 MG/DL  
 ALT (SGPT) 25 12 - 78 U/L  
 AST (SGOT) 18 15 - 37 U/L Alk. phosphatase 67 45 - 117 U/L Protein, total 8.0 6.4 - 8.2 g/dL Albumin 4.0 3.5 - 5.0 g/dL Globulin 4.0 2.0 - 4.0 g/dL A-G Ratio 1.0 (L) 1.1 - 2.2 NT-PRO BNP Collection Time: 09/29/18 12:22 PM  
Result Value Ref Range NT pro- 0 - 450 PG/ML  
TROPONIN I Collection Time: 09/29/18 12:22 PM  
Result Value Ref Range Troponin-I, Qt. <0.05 <0.05 ng/mL Radiologic Studies - CXR Results  (Last 48 hours) 09/29/18 1322  XR CHEST PA LAT Final result Impression:  IMPRESSION:   
Minimal right middle lobe atelectasis. Narrative:  PA AND LATERAL CHEST RADIOGRAPHS: 9/29/2018 1:22 PM  
   
INDICATION: Cough, bronchitis, ongoing signs and symptoms for a week. COMPARISON: 8/15/2016, 10/14/2015. TECHNIQUE: Frontal and lateral radiographs of the chest.  
   
FINDINGS:  
There is subsegmental atelectasis in the right middle lobe, best seen on the  
lateral radiograph. The lungs are otherwise clear. The central airways are  
patent. The heart size is normal. No pneumothorax or pleural effusion. Medical Decision Making I am the first provider for this patient. I reviewed the vital signs, available nursing notes, past medical history, past surgical history, family history and social history. Vital Signs-Reviewed the patient's vital signs. Patient Vitals for the past 12 hrs: 
 Temp Pulse Resp BP SpO2 09/29/18 1436 - - - - 95 % 09/29/18 1430 - - - 115/42 91 %  
09/29/18 1420 - - - - 96 %  
09/29/18 1400 - - - 104/58 95 % 09/29/18 1349 - - - - 99 % 09/29/18 1345 - - - (!) 111/37 97 % 09/29/18 1340 - - - 110/41 99 % 09/29/18 1115 98.2 °F (36.8 °C) 96 20 140/59 93 % Pulse Oximetry Analysis - 93% on room air Cardiac Monitor:  
Rate: 96 bpm 
Rhythm: Normal Sinus Rhythm 140/59 EKG interpretation: (Preliminary) 11:30 Rhythm: sinus rhythm with 1st degree block and PAC's; and regular . Rate (approx.): 80; Axis: normal; VT interval: normal; QRS interval: normal ; ST/T wave: normal; Other findings: moderate criteria for left ventricular hypertrophy. Written by Karo Moreau, ED Scribe, as dictated by Rush Calvin MD. Records Reviewed: Nursing Notes and Old Medical Records Provider Notes (Medical Decision Making): DDx: bronchitis, PNA, CHF, PE Impression plan: Pt presents with persistant productive cough despite being on abx for one week. Denies any associated fever, chills or CP. Review of blood work and XR reveals elevated WBC and possible PNA. Pt does not appear clinically toxic and would prefer to go home. Will ambulate pt to see if respiratory stressed and if not will treat outpatient for community acquired PNA. ED Course:  
Initial assessment performed. The patients presenting problems have been discussed, and they are in agreement with the care plan formulated and outlined with them. I have encouraged them to ask questions as they arise throughout their visit. 2:39 PM 
Per RN, pt ambulated, became dizzy, SOB, and oxygen sats dropped to low 80s. Will page hospitalist. 
 
CONSULT NOTE:  
2:55 PM 
Rush Calvin MD spoke with Fang Dolan MD  
Specialty: Hospitalist 
Discussed pt's hx, disposition, and available diagnostic and imaging results. Reviewed care plans. Consultant will evaluate pt for admission. Written by Delisa Marte ED Scribe, as dictated by Rima Robb MD. Disposition: 
ADMIT NOTE: 
2:56 PM 
Patient is being admitted to the hospital by Dr. Dunia Landa. The results of their tests and reasons for their admission have been discussed with them and/or available family. They convey agreement and understanding for the need to be admitted and for their admission diagnosis. Consultation has been made with the inpatient physician specialist for hospitalization. PLAN: 
Admit to Hospitalist 
 
Diagnosis Clinical Impression: 1. Community acquired pneumonia, unspecified laterality Attestations: This note is prepared by Delisa Marte, acting as Scribe for Rima Robb MD. Rima Robb MD: The scribe's documentation has been prepared under my direction and personally reviewed by me in its entirety. I confirm that the note above accurately reflects all work, treatment, procedures, and medical decision making performed by me.

## 2018-09-30 PROBLEM — J20.8 ACUTE BRONCHITIS DUE TO OTHER SPECIFIED ORGANISMS: Status: ACTIVE | Noted: 2018-09-30

## 2018-09-30 LAB
ANION GAP SERPL CALC-SCNC: 9 MMOL/L (ref 5–15)
ATRIAL RATE: 80 BPM
BUN SERPL-MCNC: 17 MG/DL (ref 6–20)
BUN/CREAT SERPL: 23 (ref 12–20)
CALCIUM SERPL-MCNC: 8.6 MG/DL (ref 8.5–10.1)
CALCULATED P AXIS, ECG09: 1 DEGREES
CALCULATED R AXIS, ECG10: -16 DEGREES
CALCULATED T AXIS, ECG11: 57 DEGREES
CHLORIDE SERPL-SCNC: 108 MMOL/L (ref 97–108)
CO2 SERPL-SCNC: 24 MMOL/L (ref 21–32)
CREAT SERPL-MCNC: 0.75 MG/DL (ref 0.55–1.02)
DIAGNOSIS, 93000: NORMAL
ERYTHROCYTE [DISTWIDTH] IN BLOOD BY AUTOMATED COUNT: 16.2 % (ref 11.5–14.5)
GLUCOSE SERPL-MCNC: 148 MG/DL (ref 65–100)
HCT VFR BLD AUTO: 28.9 % (ref 35–47)
HGB BLD-MCNC: 9.2 G/DL (ref 11.5–16)
MAGNESIUM SERPL-MCNC: 1.9 MG/DL (ref 1.6–2.4)
MCH RBC QN AUTO: 34.5 PG (ref 26–34)
MCHC RBC AUTO-ENTMCNC: 31.8 G/DL (ref 30–36.5)
MCV RBC AUTO: 108.2 FL (ref 80–99)
NRBC # BLD: 0 K/UL (ref 0–0.01)
NRBC BLD-RTO: 0 PER 100 WBC
P-R INTERVAL, ECG05: 216 MS
PLATELET # BLD AUTO: 172 K/UL (ref 150–400)
PMV BLD AUTO: 10 FL (ref 8.9–12.9)
POTASSIUM SERPL-SCNC: 3.9 MMOL/L (ref 3.5–5.1)
Q-T INTERVAL, ECG07: 390 MS
QRS DURATION, ECG06: 84 MS
QTC CALCULATION (BEZET), ECG08: 449 MS
RBC # BLD AUTO: 2.67 M/UL (ref 3.8–5.2)
SODIUM SERPL-SCNC: 141 MMOL/L (ref 136–145)
VENTRICULAR RATE, ECG03: 80 BPM
WBC # BLD AUTO: 27.7 K/UL (ref 3.6–11)

## 2018-09-30 PROCEDURE — 74011250637 HC RX REV CODE- 250/637: Performed by: INTERNAL MEDICINE

## 2018-09-30 PROCEDURE — 94640 AIRWAY INHALATION TREATMENT: CPT

## 2018-09-30 PROCEDURE — 80048 BASIC METABOLIC PNL TOTAL CA: CPT | Performed by: INTERNAL MEDICINE

## 2018-09-30 PROCEDURE — 85027 COMPLETE CBC AUTOMATED: CPT | Performed by: INTERNAL MEDICINE

## 2018-09-30 PROCEDURE — 74011000258 HC RX REV CODE- 258: Performed by: INTERNAL MEDICINE

## 2018-09-30 PROCEDURE — 65660000000 HC RM CCU STEPDOWN

## 2018-09-30 PROCEDURE — 74011250636 HC RX REV CODE- 250/636: Performed by: INTERNAL MEDICINE

## 2018-09-30 PROCEDURE — 74011000250 HC RX REV CODE- 250: Performed by: INTERNAL MEDICINE

## 2018-09-30 PROCEDURE — 83735 ASSAY OF MAGNESIUM: CPT | Performed by: INTERNAL MEDICINE

## 2018-09-30 PROCEDURE — 36415 COLL VENOUS BLD VENIPUNCTURE: CPT | Performed by: INTERNAL MEDICINE

## 2018-09-30 PROCEDURE — 74011636637 HC RX REV CODE- 636/637: Performed by: INTERNAL MEDICINE

## 2018-09-30 RX ADMIN — Medication 10 ML: at 09:03

## 2018-09-30 RX ADMIN — ANASTROZOLE 1 MG: 1 TABLET, COATED ORAL at 09:47

## 2018-09-30 RX ADMIN — Medication 1 LOZENGE: at 19:14

## 2018-09-30 RX ADMIN — Medication 10 ML: at 21:00

## 2018-09-30 RX ADMIN — Medication 1 LOZENGE: at 16:18

## 2018-09-30 RX ADMIN — Medication 10 ML: at 14:10

## 2018-09-30 RX ADMIN — ENOXAPARIN SODIUM 40 MG: 40 INJECTION, SOLUTION INTRAVENOUS; SUBCUTANEOUS at 09:03

## 2018-09-30 RX ADMIN — AZITHROMYCIN 250 MG: 250 TABLET, FILM COATED ORAL at 11:03

## 2018-09-30 RX ADMIN — Medication 1 LOZENGE: at 07:45

## 2018-09-30 RX ADMIN — LEVALBUTEROL HYDROCHLORIDE 0.63 MG: 0.63 SOLUTION RESPIRATORY (INHALATION) at 21:04

## 2018-09-30 RX ADMIN — LEVALBUTEROL HYDROCHLORIDE 0.63 MG: 0.63 SOLUTION RESPIRATORY (INHALATION) at 00:52

## 2018-09-30 RX ADMIN — GUAIFENESIN AND CODEINE PHOSPHATE 5 ML: 100; 10 SOLUTION ORAL at 04:23

## 2018-09-30 RX ADMIN — THERA TABS 1 TABLET: TAB at 09:02

## 2018-09-30 RX ADMIN — ASPIRIN 325 MG: 325 TABLET ORAL at 09:02

## 2018-09-30 RX ADMIN — MULTIPLE VITAMINS W/ MINERALS TAB 1 TABLET: TAB at 09:47

## 2018-09-30 RX ADMIN — Medication 1 LOZENGE: at 10:57

## 2018-09-30 RX ADMIN — GUAIFENESIN 600 MG: 600 TABLET, EXTENDED RELEASE ORAL at 17:24

## 2018-09-30 RX ADMIN — CEFTRIAXONE 1 G: 1 INJECTION, POWDER, FOR SOLUTION INTRAMUSCULAR; INTRAVENOUS at 16:22

## 2018-09-30 RX ADMIN — GUAIFENESIN 600 MG: 600 TABLET, EXTENDED RELEASE ORAL at 09:02

## 2018-09-30 RX ADMIN — LEVALBUTEROL HYDROCHLORIDE 0.63 MG: 0.63 SOLUTION RESPIRATORY (INHALATION) at 07:27

## 2018-09-30 RX ADMIN — Medication 1 LOZENGE: at 22:50

## 2018-09-30 RX ADMIN — LEVALBUTEROL HYDROCHLORIDE 0.63 MG: 0.63 SOLUTION RESPIRATORY (INHALATION) at 11:30

## 2018-09-30 RX ADMIN — PREDNISONE 40 MG: 20 TABLET ORAL at 09:03

## 2018-09-30 RX ADMIN — Medication 1 LOZENGE: at 14:09

## 2018-09-30 RX ADMIN — GUAIFENESIN AND CODEINE PHOSPHATE 5 ML: 100; 10 SOLUTION ORAL at 21:08

## 2018-09-30 RX ADMIN — GUAIFENESIN AND CODEINE PHOSPHATE 5 ML: 100; 10 SOLUTION ORAL at 11:04

## 2018-09-30 RX ADMIN — LEVALBUTEROL HYDROCHLORIDE 0.63 MG: 0.63 SOLUTION RESPIRATORY (INHALATION) at 15:03

## 2018-09-30 NOTE — PROGRESS NOTES
ADULT PROTOCOL: JET AEROSOL ASSESSMENT Patient  Damon Serrato     82 y.o.   female     9/30/2018  7:48 AM 
 
Breath Sounds Pre Procedure: Right Breath Sounds: Clear, Diminished Left Breath Sounds: Clear, Diminished Breath Sounds Post Procedure: Right Breath Sounds: Clear, Diminished Left Breath Sounds: Clear, Diminished Breathing pattern: Pre procedure Breathing Pattern: Regular Post procedure Breathing Pattern: Regular Heart Rate: Pre procedure Pulse: 103 Post procedure Pulse: 103 Resp Rate: Pre procedure Respirations: 20 
         Post procedure Respirations: 22 
 
   
 
Cough: Pre procedure Cough: Congested, Non-productive Post procedure Cough: Congested, Non-productive Oxygen:Room [employer] Changed: NO SpO2: Pre procedure SpO2: 98 % Post procedure SpO2: 96 % Nebulizer Therapy: Current medications Aerosolized Medications: Xopenex Changed: NO 
 
 
Problem List:  
Patient Active Problem List  
Diagnosis Code  [facility] C50.912  Vitamin D deficiency E55.9  Lumbar back pain with radiculopathy affecting left lower extremity M54.17  
 Lumbar back pain with radiculopathy affecting right lower extremity M54.17  
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  
 S/P lumpectomy, left breast Z98.890  
 Costochondritis M94.0  Diverticulitis K57.92  
 Dry eye syndrome H04.129  
 Fibromyalgia M79.7  GERD without esophagitis K21.9  Hypercholesterolemia E78.00  
 Osteoporosis M81.0  Peripheral neuropathy G62.9  Prediabetes R73.03  
 Rosacea L71.9  Acute respiratory failure (HCC) J96.00  
 
 
 Respiratory Therapist: Olga Menendez, RT

## 2018-09-30 NOTE — PROGRESS NOTES
[address] 
[phone] Medical Progress Note NAME: Mauricio Minaya :  1935 MRM:  [id number] Date/Time: 2018  9:44 AM 
 
 
Subjective:  
 
Paroxysmal cough persists. Oxygenation adequate. Cough not productive. Is SOB especially with cough. Has significant leukocytosis probably contributed to by steroids. CXR raises question RML pneumonia and will repeat in am.  
  
Past Medical History reviewed and unchanged from Admission History and Physical and/or prior progress note/electronic medical record Medications reviewed. ROS:   
 
General: negative for fever, chills, sweats, weakness Ear Nose and Throat: negative for rhinorrhea, pharyngitis, otalgia, tinnitus, speech or swallowing difficulties Respiratory:  positive for paroxysmal cough Cardiology:  negative for chest pain, palpitations, orthopnea, PND, edema, syncope Gastrointestinal: negative for abdominal pain, N/V, dysphagia, change in bowel habits, bleeding Genitourinary: negative for frequency, urgency, dysuria, hematuria, incontinence Muskuloskeletal : negative for arthralgia, myalgia Neurological: negative for headache, dizziness, confusion, focal weakness, paresthesia, memory loss, gait disturbance Objective:  
 
Last 24hrs VS reviewed since prior progress note. Most recent are: 
 
Visit Vitals  /59 (BP 1 Location: Right arm, BP Patient Position: Sitting)  Pulse 98  Temp 97.9 °F (36.6 °C)  Resp 23  Wt 186 lb 15.2 oz (84.8 kg)  SpO2 96%  BMI 31.11 kg/m2 SpO2 Readings from Last 6 Encounters:  
18 96% 18 99% 18 98% 02/10/17 99% 16 96% 16 100% Intake/Output Summary (Last 24 hours) at 18 3259 Last data filed at 18 5014 Gross per 24 hour Intake              480 ml Output                0 ml Net              480 ml Physical Exam: General appearance: alert, cooperative, no distress, appears stated age Eyes: negative Neck: supple, symmetrical, trachea midline, no adenopathy, thyroid: not enlarged, symmetric, no tenderness/mass/nodules, no carotid bruit and no JVD Lungs: scattered wheezes and rhonchi Heart: regular rate and rhythm, S1, S2 normal, no murmur, click, rub or gallop Abdomen: soft, non-tender. Bowel sounds normal. No masses,  no organomegaly Extremities: extremities normal, atraumatic, no cyanosis or edema Skin: Skin color, texture, turgor normal. No rashes or lesions Neurologic: Grossly normal 
 
Data Review: 
 
Lab: 
 
BMP:  
Lab Results Component Value Date/Time  09/30/2018 03:17 AM  
 K 3.9 09/30/2018 03:17 AM  
  09/30/2018 03:17 AM  
 CO2 24 09/30/2018 03:17 AM  
 AGAP 9 09/30/2018 03:17 AM  
  (H) 09/30/2018 03:17 AM  
 BUN 17 09/30/2018 03:17 AM  
 CREA 0.75 09/30/2018 03:17 AM  
 GFRAA >60 09/30/2018 03:17 AM  
 GFRNA >60 09/30/2018 03:17 AM  
 
CBC:  
Lab Results Component Value Date/Time WBC 27.7 (H) 09/30/2018 03:17 AM  
 HGB 9.2 (L) 09/30/2018 03:17 AM  
 HCT 28.9 (L) 09/30/2018 03:17 AM  
  09/30/2018 03:17 AM  
 
All labs reviewed in past 24 hours Other pertinent lab: none Imaging: 
 
Reviewed. CXR Assessment / Plan: Active Problems: 
  Acute respiratory failure ([address] (9/29/2018) Acute bronchitis due to other specified organisms (9/30/2018) 1. Continue abx, steroids, nebs, cough med 2. Add Mucinex 3. Repeat CXR in am 
  
 
 
 
 
Care Plan discussed with: Patient Discussed:  Care Plan Prophylaxis:  Lovenox Disposition:  Home w/Family 
___________________________________________________ Attending Physician: Catia Javier MD

## 2018-09-30 NOTE — PROGRESS NOTES
Problem: Falls - Risk of 
Goal: *Absence of Falls Document Selin Pacheco Fall Risk and appropriate interventions in the flowsheet. Outcome: Progressing Towards Goal 
Fall Risk Interventions: 
Mobility Interventions: Bed/chair exit alarm Mentation Interventions: Adequate sleep, hydration, pain control, Eyeglasses and hearing aids Medication Interventions: Evaluate medications/consider consulting pharmacy Problem: Breathing Pattern - Ineffective Goal: *Absence of hypoxia Outcome: Progressing Towards Goal 
Pt continues to have dyspnea with exertion but hasn't required supplemental O2 overnight. Pt received Guaifenisen with Codeine for cough and has been receiving nebulizer treatments; with switch to Xopenex, HR better controlled.

## 2018-09-30 NOTE — PROGRESS NOTES
Bedside shift change report given to Monica Oreilly RN (oncoming nurse) by Sandra Calix RN (offgoing nurse). Report included the following information SBAR. Bedside shift change report given to Janna Philip RN (oncoming nurse). Report included the following information SBAR. SHIFT SUMMARY: 
 
 
 
 
 
[address] NURSING NOTE Admission Date 9/29/2018 Admission Diagnosis Acute respiratory failure ([address] Consults IP CONSULT TO PRIMARY CARE PROVIDER Cardiac Monitoring [x] Yes [] No  
  
Purposeful Hourly Rounding [x] Yes   
Chio Score Total Score: 2 Chio score 3 or > [x] Bed Alarm [] Avasys [] 1:1 sitter [] Patient refused (Signed refusal form in chart) Nikolas Score Nikolas Score: 21 Nikolas score 14 or < [] PMT consult [] Wound Care consult  
 []  Specialty bed  [] Nutrition consult Influenza Vaccine Received Flu Vaccine for Current Season (usually Sept-March): Yes Oxygen needs? [x] Room air Oxygen @  []1L    []2L    []3L   []4L    []5L   []6L via NC Chronic home O2 use? [] Yes [] No 
Perform O2 challenge test and document in progress note using smartphrase (.Homeoxygen) Last bowel movement Last Bowel Movement Date: 09/29/18 Urinary Catheter LDAs Peripheral IV 09/29/18 Right Antecubital (Active) Site Assessment Clean, dry, & intact 9/30/2018  2:18 PM  
Phlebitis Assessment 0 9/30/2018  2:18 PM  
Infiltration Assessment 0 9/30/2018  2:18 PM  
Dressing Status Clean, dry, & intact 9/30/2018  2:18 PM  
Dressing Type Transparent;Tape 9/30/2018  2:18 PM  
Hub Color/Line Status Pink;Flushed 9/30/2018  2:18 PM  
                  
  
Readmission Risk Assessment Tool Score Low Risk 12 Total Score 2 . Living with Significant Other. Assisted Living. LTAC. SNF. or  
Rehab  
 5 Pt. Coverage (Medicare=5 , Medicaid, or Self-Pay=4) 5 Charlson Comorbidity Score (Age + Comorbid Conditions) Criteria that do not apply: Has Seen PCP in Last 6 Months (Yes=3, No=0) Patient Length of Stay (>5 days = 3) IP Visits Last 12 Months (1-3=4, 4=9, >4=11) Expected Length of Stay - - - Actual Length of Stay 1

## 2018-10-01 PROCEDURE — 65660000000 HC RM CCU STEPDOWN

## 2018-10-01 PROCEDURE — 94640 AIRWAY INHALATION TREATMENT: CPT

## 2018-10-01 PROCEDURE — 74011250637 HC RX REV CODE- 250/637: Performed by: INTERNAL MEDICINE

## 2018-10-01 PROCEDURE — 74011000250 HC RX REV CODE- 250: Performed by: INTERNAL MEDICINE

## 2018-10-01 PROCEDURE — 74011000258 HC RX REV CODE- 258: Performed by: INTERNAL MEDICINE

## 2018-10-01 PROCEDURE — 74011636637 HC RX REV CODE- 636/637: Performed by: INTERNAL MEDICINE

## 2018-10-01 PROCEDURE — 94760 N-INVAS EAR/PLS OXIMETRY 1: CPT

## 2018-10-01 PROCEDURE — 74011250636 HC RX REV CODE- 250/636: Performed by: INTERNAL MEDICINE

## 2018-10-01 RX ORDER — BENZONATATE 100 MG/1
100 CAPSULE ORAL
Status: DISCONTINUED | OUTPATIENT
Start: 2018-10-01 | End: 2018-10-02

## 2018-10-01 RX ADMIN — ANASTROZOLE 1 MG: 1 TABLET, COATED ORAL at 09:35

## 2018-10-01 RX ADMIN — CEFTRIAXONE 1 G: 1 INJECTION, POWDER, FOR SOLUTION INTRAMUSCULAR; INTRAVENOUS at 15:27

## 2018-10-01 RX ADMIN — GUAIFENESIN 600 MG: 600 TABLET, EXTENDED RELEASE ORAL at 17:04

## 2018-10-01 RX ADMIN — Medication 10 ML: at 21:05

## 2018-10-01 RX ADMIN — MULTIPLE VITAMINS W/ MINERALS TAB 1 TABLET: TAB at 09:35

## 2018-10-01 RX ADMIN — GUAIFENESIN AND CODEINE PHOSPHATE 5 ML: 100; 10 SOLUTION ORAL at 07:39

## 2018-10-01 RX ADMIN — LEVALBUTEROL HYDROCHLORIDE 0.63 MG: 0.63 SOLUTION RESPIRATORY (INHALATION) at 21:39

## 2018-10-01 RX ADMIN — Medication 1 LOZENGE: at 04:38

## 2018-10-01 RX ADMIN — ASPIRIN 325 MG: 325 TABLET ORAL at 08:51

## 2018-10-01 RX ADMIN — PREDNISONE 40 MG: 20 TABLET ORAL at 08:51

## 2018-10-01 RX ADMIN — Medication 10 ML: at 15:20

## 2018-10-01 RX ADMIN — BENZONATATE 100 MG: 100 CAPSULE ORAL at 10:20

## 2018-10-01 RX ADMIN — AZITHROMYCIN 250 MG: 250 TABLET, FILM COATED ORAL at 11:11

## 2018-10-01 RX ADMIN — LEVALBUTEROL HYDROCHLORIDE 0.63 MG: 0.63 SOLUTION RESPIRATORY (INHALATION) at 11:21

## 2018-10-01 RX ADMIN — LEVALBUTEROL HYDROCHLORIDE 0.63 MG: 0.63 SOLUTION RESPIRATORY (INHALATION) at 16:10

## 2018-10-01 RX ADMIN — Medication 1 LOZENGE: at 15:23

## 2018-10-01 RX ADMIN — Medication 10 ML: at 05:18

## 2018-10-01 RX ADMIN — GUAIFENESIN 600 MG: 600 TABLET, EXTENDED RELEASE ORAL at 08:51

## 2018-10-01 RX ADMIN — Medication 1 LOZENGE: at 10:17

## 2018-10-01 RX ADMIN — LEVALBUTEROL HYDROCHLORIDE 0.63 MG: 0.63 SOLUTION RESPIRATORY (INHALATION) at 07:47

## 2018-10-01 RX ADMIN — Medication 1 LOZENGE: at 21:05

## 2018-10-01 RX ADMIN — THERA TABS 1 TABLET: TAB at 08:51

## 2018-10-01 RX ADMIN — ENOXAPARIN SODIUM 40 MG: 40 INJECTION, SOLUTION INTRAVENOUS; SUBCUTANEOUS at 08:52

## 2018-10-01 NOTE — PROGRESS NOTES
0700: Bedside shift change report given to Lianne Robertson, RN and Selvin Amin RN (oncoming nurse) by Ric Palma RN (offgoing nurse).  Report included the following information SBAR, Kardex, ED Summary, Procedure Summary, Intake/Output and MAR. 
 
0815: Telephone orders received From Dr. Olga Dominguez for NIKITA Arechiga and PT/ OT

## 2018-10-01 NOTE — PROGRESS NOTES
Bedside and Verbal shift change report given to Kemar Humphries RN (oncoming nurse). Report included the following information SBAR. SHIFT SUMMARY: 
 
 
[address] NURSING NOTE Admission Date 9/29/2018 Admission Diagnosis Acute respiratory failure ([address] Consults IP CONSULT TO PRIMARY CARE PROVIDER Cardiac Monitoring [x] Yes [] No  
  
Purposeful Hourly Rounding [x] Yes   
Chio Score Total Score: 2 Chio score 3 or > [x] Bed Alarm [] Avasys [] 1:1 sitter [] Patient refused (Signed refusal form in chart) Nikolas Score Nikolas Score: 21 Nikolas score 14 or < [] PMT consult [] Wound Care consult  
 []  Specialty bed  [] Nutrition consult Influenza Vaccine Received Flu Vaccine for Current Season (usually Sept-March): Yes Oxygen needs? [x] Room air Oxygen @  []1L    []2L    []3L   []4L    []5L   []6L via NC Chronic home O2 use? [] Yes [x] No 
Perform O2 challenge test and document in progress note using [email or web address] (.Homeoxygen) Last bowel movement Last Bowel Movement Date: 09/29/18 Urinary Catheter LDAs Peripheral IV 09/29/18 Right Antecubital (Active) Site Assessment Clean, dry, & intact 10/1/2018  3:53 AM  
Phlebitis Assessment 0 10/1/2018  3:53 AM  
Infiltration Assessment 0 10/1/2018  3:53 AM  
Dressing Status Clean, dry, & intact 10/1/2018  3:53 AM  
Dressing Type Transparent;Tape 10/1/2018  3:53 AM  
Hub Color/Line Status Pink;Flushed 10/1/2018  3:53 AM  
                  
  
Readmission Risk Assessment Tool Score Low Risk 12 Total Score 2 . Living with Significant Other. Assisted Living. LTAC. SNF. or  
Rehab  
 5 Pt. Coverage (Medicare=5 , Medicaid, or Self-Pay=4) 5 Charlson Comorbidity Score (Age + Comorbid Conditions) Criteria that do not apply:  
 Has Seen PCP in Last 6 Months (Yes=3, No=0) Patient Length of Stay (>5 days = 3) IP Visits Last 12 Months (1-3=4, 4=9, >4=11) Expected Length of Stay - - - Actual Length of Stay 2

## 2018-10-01 NOTE — PROGRESS NOTES
ADULT PROTOCOL: JET AEROSOL  REASSESSMENT Patient  Jenna Powers     82 y.o.   female     10/1/2018  11:04 AM 
 
Breath Sounds Pre Procedure: Right Breath Sounds: Lower, Middle, Crackles Left Breath Sounds: Clear Breath Sounds Post Procedure: Right Breath Sounds: Diminished, Lower, Middle, Crackles Left Breath Sounds: Clear, Diminished Breathing pattern: Pre procedure Breathing Pattern: Regular Post procedure Breathing Pattern: Regular Heart Rate: Pre procedure Pulse: 70 Post procedure Pulse: 78 Resp Rate: Pre procedure Respirations: 16 Post procedure Respirations: 16 
 
     
 
Cough: Pre procedure Cough: Moist, Non-productive Post procedure Cough: Moist, Non-productive Oxygen: O2 Device: Room air   room air Changed: NO SpO2: Pre procedure SpO2: 96 %   without oxygen Post procedure SpO2: 96 %  without oxygen Nebulizer Therapy: Current medications Aerosolized Medications: Xopenex Changed: NO Smoking History: former smoker Problem List:  
Patient Active Problem List  
Diagnosis Code  [facility] C50.912  Vitamin D deficiency E55.9  Lumbar back pain with radiculopathy affecting left lower extremity M54.16  
 Lumbar back pain with radiculopathy affecting right lower extremity M54.16  
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  
 S/P lumpectomy, left breast Z98.890  
 Costochondritis M94.0  Diverticulitis K57.92  
 Dry eye syndrome H04.129  
 Fibromyalgia M79.7  GERD without esophagitis K21.9  Hypercholesterolemia E78.00  
 Osteoporosis M81.0  Peripheral neuropathy G62.9  Prediabetes R73.03  
 Rosacea L71.9  Acute respiratory failure (HCC) J96.00  
 Acute bronchitis due to other specified organisms J20.8 Respiratory Therapist: Zachery Betancur RT

## 2018-10-01 NOTE — PROGRESS NOTES
Bedside shift change report given to Joseph Combs RN (oncoming nurse). Report included the following information SBAR. SHIFT SUMMARY: 
 
 
 
 
 
[address] NURSING NOTE Admission Date 9/29/2018 Admission Diagnosis Acute respiratory failure ([address] Consults IP CONSULT TO PRIMARY CARE PROVIDER Cardiac Monitoring [x] Yes [] No  
  
Purposeful Hourly Rounding [x] Yes   
Chio Score Total Score: 2 Chio score 3 or > [x] Bed Alarm [] Avasys [] 1:1 sitter [] Patient refused (Signed refusal form in chart) Nikolas Score Nikolas Score: 21 Nikolas score 14 or < [] PMT consult [] Wound Care consult  
 []  Specialty bed  [] Nutrition consult Influenza Vaccine Received Flu Vaccine for Current Season (usually Sept-March): Yes Oxygen needs? [x] Room air Oxygen @  []1L    []2L    []3L   []4L    []5L   []6L via NC Chronic home O2 use? [] Yes [] No 
Perform O2 challenge test and document in progress note using [employer] (.Homeoxygen) Last bowel movement Last Bowel Movement Date: 09/29/18 Urinary Catheter LDAs Peripheral IV 09/29/18 Right Antecubital (Active) Site Assessment Clean, dry, & intact 10/1/2018  7:50 AM  
Phlebitis Assessment 0 10/1/2018  7:50 AM  
Infiltration Assessment 0 10/1/2018  7:50 AM  
Dressing Status Clean, dry, & intact 10/1/2018  7:50 AM  
Dressing Type Transparent;Tape 10/1/2018  7:50 AM  
Hub Color/Line Status Pink;Flushed 10/1/2018  7:50 AM  
                  
  
Readmission Risk Assessment Tool Score Low Risk 12 Total Score 2 . Living with Significant Other. Assisted Living. LTAC. SNF. or  
Rehab  
 5 Pt. Coverage (Medicare=5 , Medicaid, or Self-Pay=4) 5 Charlson Comorbidity Score (Age + Comorbid Conditions) Criteria that do not apply:  
 Has Seen PCP in Last 6 Months (Yes=3, No=0) Patient Length of Stay (>5 days = 3) IP Visits Last 12 Months (1-3=4, 4=9, >4=11) Expected Length of Stay 3d 16h Actual Length of Stay 2

## 2018-10-01 NOTE — PROGRESS NOTES
[address] [place] [address] 
[phone] Medical Progress Note NAME: Marleny Garcia :  1935 MRM:  [id number] Date/Time: 10/1/2018 6:04 AM 
 
 
Subjective:  
 
Paroxysmal cough persists. Oxygenation adequate. Cough not productive. Is SOB especially with cough. Has significant leukocytosis probably contributed to by steroids. Chest xray and CT scan shows atelectasis. Past Medical History reviewed and unchanged from Admission History and Physical and/or prior progress note/electronic medical record Medications reviewed. ROS:   
 
General: negative for fever, chills, sweats, weakness Ear Nose and Throat: negative for rhinorrhea, pharyngitis, otalgia, tinnitus, speech or swallowing difficulties Respiratory:  positive for paroxysmal cough Cardiology:  negative for chest pain, palpitations, orthopnea, PND, edema, syncope Gastrointestinal: negative for abdominal pain, N/V, dysphagia, change in bowel habits, bleeding Genitourinary: negative for frequency, urgency, dysuria, hematuria, incontinence Muskuloskeletal : negative for arthralgia, myalgia Neurological: negative for headache, dizziness, confusion, focal weakness, paresthesia, memory loss, gait disturbance Objective:  
 
Last 24hrs VS reviewed since prior progress note. Most recent are: 
 
Visit Vitals  /47 (BP 1 Location: Right arm, BP Patient Position: At rest)  Pulse 81  Temp 98.4 °F (36.9 °C)  Resp 18  Wt 186 lb 15.2 oz (84.8 kg)  SpO2 94%  BMI 31.11 kg/m2 SpO2 Readings from Last 6 Encounters:  
10/01/18 94% 18 99% 18 98% 02/10/17 99% 16 96% 16 100% Intake/Output Summary (Last 24 hours) at 10/01/18 2644 Last data filed at 10/01/18 7620 Gross per 24 hour Intake             1080 ml Output                0 ml Net             1080 ml Physical Exam: General appearance: alert, cooperative, no distress, appears stated age Eyes: negative Neck: supple, symmetrical, trachea midline, no adenopathy, thyroid: not enlarged, symmetric, no tenderness/mass/nodules, no carotid bruit and no JVD Lungs: scattered wheezes and rhonchi Heart: regular rate and rhythm, S1, S2 normal, no murmur, click, rub or gallop Abdomen: soft, non-tender. Bowel sounds normal. No masses,  no organomegaly Extremities: extremities normal, atraumatic, no cyanosis or edema Skin: Skin color, texture, turgor normal. No rashes or lesions Neurologic: Grossly normal 
 
Data Review: 
 
Lab: 
 
BMP:  
No results found for: NA, K, CL, CO2, AGAP, GLU, BUN, CREA, GFRAA, GFRNA 
CBC:  
No results found for: WBC, HGB, HGBEXT, HCT, HCTEXT, PLT, PLTEXT, HGBEXT, HCTEXT, PLTEXT All labs reviewed in past 24 hours Other pertinent lab: none Imaging: 
 
Reviewed. CXR - Minimal right middle lobe atelectasis CT chest - TRACHEA/BRONCHI: The airway is occluded at the level of the cricoid cartilage 
and vocal cords. Otherwise patent. ESOPHAGUS: No wall thickening or dilatation. HEART: Normal in size without pericardial effusion. Coronary artery 
calcifications noted. PLEURA: No effusion or pneumothorax. LUNGS: Minimal linear atelectasis right middle lobe. Otherwise clear. Assessment / Plan: Active Problems: 
  Acute respiratory failure ([address] (9/29/2018) Acute bronchitis due to other specified organisms (9/30/2018) 1. Continue abx, steroids, nebs, cough med 2. Add Mucinex 3. Repeat CXR today 4. Follow labs Care Plan discussed with: Patient Discussed:  Care Plan Prophylaxis:  Lovenox Disposition:  Home w/Family 
___________________________________________________ Attending Physician: Ximena Hayward MD

## 2018-10-02 ENCOUNTER — APPOINTMENT (OUTPATIENT)
Dept: GENERAL RADIOLOGY | Age: 83
DRG: 189 | End: 2018-10-02
Attending: INTERNAL MEDICINE
Payer: MEDICARE

## 2018-10-02 LAB
ANION GAP SERPL CALC-SCNC: 5 MMOL/L (ref 5–15)
BUN SERPL-MCNC: 18 MG/DL (ref 6–20)
BUN/CREAT SERPL: 30 (ref 12–20)
CALCIUM SERPL-MCNC: 8.7 MG/DL (ref 8.5–10.1)
CHLORIDE SERPL-SCNC: 108 MMOL/L (ref 97–108)
CO2 SERPL-SCNC: 26 MMOL/L (ref 21–32)
CREAT SERPL-MCNC: 0.6 MG/DL (ref 0.55–1.02)
ERYTHROCYTE [DISTWIDTH] IN BLOOD BY AUTOMATED COUNT: 15.8 % (ref 11.5–14.5)
GLUCOSE SERPL-MCNC: 100 MG/DL (ref 65–100)
HCT VFR BLD AUTO: 29.3 % (ref 35–47)
HGB BLD-MCNC: 8.9 G/DL (ref 11.5–16)
MCH RBC QN AUTO: 33.7 PG (ref 26–34)
MCHC RBC AUTO-ENTMCNC: 30.4 G/DL (ref 30–36.5)
MCV RBC AUTO: 111 FL (ref 80–99)
NRBC # BLD: 0.03 K/UL (ref 0–0.01)
NRBC BLD-RTO: 0.1 PER 100 WBC
PLATELET # BLD AUTO: 148 K/UL (ref 150–400)
PMV BLD AUTO: 10.1 FL (ref 8.9–12.9)
POTASSIUM SERPL-SCNC: 4.2 MMOL/L (ref 3.5–5.1)
RBC # BLD AUTO: 2.64 M/UL (ref 3.8–5.2)
SODIUM SERPL-SCNC: 139 MMOL/L (ref 136–145)
WBC # BLD AUTO: 23 K/UL (ref 3.6–11)

## 2018-10-02 PROCEDURE — 36415 COLL VENOUS BLD VENIPUNCTURE: CPT | Performed by: INTERNAL MEDICINE

## 2018-10-02 PROCEDURE — 80048 BASIC METABOLIC PNL TOTAL CA: CPT | Performed by: INTERNAL MEDICINE

## 2018-10-02 PROCEDURE — 74011000258 HC RX REV CODE- 258: Performed by: INTERNAL MEDICINE

## 2018-10-02 PROCEDURE — 85027 COMPLETE CBC AUTOMATED: CPT | Performed by: INTERNAL MEDICINE

## 2018-10-02 PROCEDURE — 97161 PT EVAL LOW COMPLEX 20 MIN: CPT

## 2018-10-02 PROCEDURE — 74011250636 HC RX REV CODE- 250/636: Performed by: INTERNAL MEDICINE

## 2018-10-02 PROCEDURE — 74011250637 HC RX REV CODE- 250/637: Performed by: INTERNAL MEDICINE

## 2018-10-02 PROCEDURE — 94760 N-INVAS EAR/PLS OXIMETRY 1: CPT

## 2018-10-02 PROCEDURE — G8979 MOBILITY GOAL STATUS: HCPCS

## 2018-10-02 PROCEDURE — 74011000250 HC RX REV CODE- 250: Performed by: INTERNAL MEDICINE

## 2018-10-02 PROCEDURE — G8980 MOBILITY D/C STATUS: HCPCS

## 2018-10-02 PROCEDURE — 94640 AIRWAY INHALATION TREATMENT: CPT

## 2018-10-02 PROCEDURE — 71046 X-RAY EXAM CHEST 2 VIEWS: CPT

## 2018-10-02 PROCEDURE — 97165 OT EVAL LOW COMPLEX 30 MIN: CPT | Performed by: OCCUPATIONAL THERAPIST

## 2018-10-02 PROCEDURE — 97530 THERAPEUTIC ACTIVITIES: CPT | Performed by: OCCUPATIONAL THERAPIST

## 2018-10-02 PROCEDURE — G8978 MOBILITY CURRENT STATUS: HCPCS

## 2018-10-02 PROCEDURE — 97116 GAIT TRAINING THERAPY: CPT

## 2018-10-02 PROCEDURE — 74011636637 HC RX REV CODE- 636/637: Performed by: INTERNAL MEDICINE

## 2018-10-02 PROCEDURE — 65660000000 HC RM CCU STEPDOWN

## 2018-10-02 RX ORDER — BENZONATATE 100 MG/1
200 CAPSULE ORAL 3 TIMES DAILY
Status: DISCONTINUED | OUTPATIENT
Start: 2018-10-02 | End: 2018-10-03 | Stop reason: HOSPADM

## 2018-10-02 RX ADMIN — LEVALBUTEROL HYDROCHLORIDE 0.63 MG: 0.63 SOLUTION RESPIRATORY (INHALATION) at 19:13

## 2018-10-02 RX ADMIN — Medication 1 LOZENGE: at 06:08

## 2018-10-02 RX ADMIN — Medication 1 LOZENGE: at 13:52

## 2018-10-02 RX ADMIN — LEVALBUTEROL HYDROCHLORIDE 0.63 MG: 0.63 SOLUTION RESPIRATORY (INHALATION) at 03:36

## 2018-10-02 RX ADMIN — THERA TABS 1 TABLET: TAB at 08:50

## 2018-10-02 RX ADMIN — MULTIPLE VITAMINS W/ MINERALS TAB 1 TABLET: TAB at 08:49

## 2018-10-02 RX ADMIN — BENZONATATE 200 MG: 100 CAPSULE ORAL at 08:50

## 2018-10-02 RX ADMIN — GUAIFENESIN 600 MG: 600 TABLET, EXTENDED RELEASE ORAL at 08:50

## 2018-10-02 RX ADMIN — ASPIRIN 325 MG: 325 TABLET ORAL at 08:51

## 2018-10-02 RX ADMIN — ANASTROZOLE 1 MG: 1 TABLET, COATED ORAL at 08:52

## 2018-10-02 RX ADMIN — Medication 1 LOZENGE: at 18:16

## 2018-10-02 RX ADMIN — CEFTRIAXONE 1 G: 1 INJECTION, POWDER, FOR SOLUTION INTRAMUSCULAR; INTRAVENOUS at 16:18

## 2018-10-02 RX ADMIN — LEVALBUTEROL HYDROCHLORIDE 0.63 MG: 0.63 SOLUTION RESPIRATORY (INHALATION) at 09:03

## 2018-10-02 RX ADMIN — BENZONATATE 200 MG: 100 CAPSULE ORAL at 16:18

## 2018-10-02 RX ADMIN — Medication 10 ML: at 21:12

## 2018-10-02 RX ADMIN — ENOXAPARIN SODIUM 40 MG: 40 INJECTION, SOLUTION INTRAVENOUS; SUBCUTANEOUS at 08:51

## 2018-10-02 RX ADMIN — GUAIFENESIN 600 MG: 600 TABLET, EXTENDED RELEASE ORAL at 17:27

## 2018-10-02 RX ADMIN — PREDNISONE 40 MG: 20 TABLET ORAL at 08:50

## 2018-10-02 RX ADMIN — Medication 1 LOZENGE: at 00:35

## 2018-10-02 RX ADMIN — Medication 10 ML: at 06:05

## 2018-10-02 RX ADMIN — GUAIFENESIN AND CODEINE PHOSPHATE 5 ML: 100; 10 SOLUTION ORAL at 00:12

## 2018-10-02 RX ADMIN — BENZONATATE 200 MG: 100 CAPSULE ORAL at 21:12

## 2018-10-02 RX ADMIN — LEVALBUTEROL HYDROCHLORIDE 0.63 MG: 0.63 SOLUTION RESPIRATORY (INHALATION) at 16:19

## 2018-10-02 RX ADMIN — AZITHROMYCIN 250 MG: 250 TABLET, FILM COATED ORAL at 11:30

## 2018-10-02 RX ADMIN — LEVALBUTEROL HYDROCHLORIDE 0.63 MG: 0.63 SOLUTION RESPIRATORY (INHALATION) at 12:32

## 2018-10-02 RX ADMIN — Medication 10 ML: at 13:53

## 2018-10-02 NOTE — PROGRESS NOTES
ADULT PROTOCOL: JET AEROSOL  REASSESSMENT Patient  Ray Alegre     82 y.o.   female     10/2/2018  11:44 AM 
 
Breath Sounds Pre Procedure: Right Breath Sounds: Diminished Left Breath Sounds: Diminished Breath Sounds Post Procedure: Right Breath Sounds: Diminished Left Breath Sounds: Diminished Breathing pattern: Pre procedure Breathing Pattern: Regular Post procedure Breathing Pattern: Regular Heart Rate: Pre procedure Pulse: 85 
         Post procedure Pulse: 82 Resp Rate: Pre procedure Respirations: 16 Post procedure Respirations: 16 
 
     
 
Cough: Pre procedure Cough: Congested, Productive Post procedure Cough: Non-productive Sputum: Pre procedure Sputum amount: Small Sputum color/odor: Clear, Yellow Sputum consistency: Thick Oxygen: O2 Device: Room air   room air Changed: NO SpO2: Pre procedure SpO2: 96 %   without oxygen Post procedure SpO2: 96 %  without oxygen Nebulizer Therapy: Current medications Aerosolized Medications: Xopenex Changed: NO Smoking History: former smoker Problem List:  
Patient Active Problem List  
Diagnosis Code  [facility] C50.912  Vitamin D deficiency E55.9  Lumbar back pain with radiculopathy affecting left lower extremity M54.16  
 Lumbar back pain with radiculopathy affecting right lower extremity M54.16  
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  
 S/P lumpectomy, left breast Z98.890  
 Costochondritis M94.0  Diverticulitis K57.92  
 Dry eye syndrome H04.129  
 Fibromyalgia M79.7  GERD without esophagitis K21.9  Hypercholesterolemia E78.00  
 Osteoporosis M81.0  Peripheral neuropathy G62.9  Prediabetes R73.03  
 Rosacea L71.9  Acute respiratory failure (HCC) J96.00  
 Acute bronchitis due to other specified organisms J20.8 Respiratory Therapist: Zenon Au, RT

## 2018-10-02 NOTE — PROGRESS NOTES
1900:  
Bedside shift change report given to Joseph Combs RN (oncoming nurse). Report included the following information SBAR, Kardex and ED Summary. SHIFT SUMMARY: 
 
 
 
 
 
[address] NURSING NOTE Admission Date 9/29/2018 Admission Diagnosis Acute respiratory failure ([address] Consults IP CONSULT TO PRIMARY CARE PROVIDER Cardiac Monitoring [x] Yes [] No  
  
Purposeful Hourly Rounding [x] Yes   
Chio Score Total Score: 2 Chio score 3 or > [x] Bed Alarm [] Avasys [] 1:1 sitter [] Patient refused (Signed refusal form in chart) Nikolas Score Nikolas Score: 21 Nikolas score 14 or < [] PMT consult [] Wound Care consult  
 []  Specialty bed  [] Nutrition consult Influenza Vaccine Received Flu Vaccine for Current Season (usually Sept-March): Yes Oxygen needs? [] Room air Oxygen @  []1L    [x]2L    []3L   []4L    []5L   []6L via NC Chronic home O2 use? [] Yes [x] No 
Perform O2 challenge test and document in progress note using smartphrase (.Homeoxygen) Last bowel movement Last Bowel Movement Date: 09/29/18 Urinary Catheter LDAs Peripheral IV 09/29/18 Right Antecubital (Active) Site Assessment Clean, dry, & intact 10/2/2018  2:00 PM  
Phlebitis Assessment 0 10/2/2018  2:00 PM  
Infiltration Assessment 0 10/2/2018  2:00 PM  
Dressing Status Clean, dry, & intact 10/2/2018  2:00 PM  
Dressing Type Transparent 10/2/2018  2:00 PM  
Hub Color/Line Status Pink;Flushed 10/2/2018  2:00 PM  
Action Taken Open ports on tubing capped 10/2/2018  2:00 PM  
                  
  
Readmission Risk Assessment Tool Score Low Risk 12 Total Score 2 . Living with Significant Other. Assisted Living. LTAC. SNF. or  
Rehab  
 5 Pt. Coverage (Medicare=5 , Medicaid, or Self-Pay=4) 5 Charlson Comorbidity Score (Age + Comorbid Conditions) Criteria that do not apply:  
 Has Seen PCP in Last 6 Months (Yes=3, No=0) Patient Length of Stay (>5 days = 3) IP Visits Last 12 Months (1-3=4, 4=9, >4=11) Expected Length of Stay 3d 16h Actual Length of Stay 3

## 2018-10-02 NOTE — PROGRESS NOTES
0700: Bedside shift change report given to [place], [address] [place] [address] (oncoming nurse) by Luz Marina Subramanian RN (offgoing nurse). Report included the following information SBAR and Kardex. 1442: Patient going RAMO to x-ray.

## 2018-10-02 NOTE — PROGRESS NOTES
Reason for Admission:   Acute respiratory Failure RRAT Score:    12 Plan for utilizing home health:      Not indicated. Does not have qualifying dx for [facility] Likelihood of Readmission:    Low to moderate Transition of Care Plan:        Follow up appointments with PCP 
 
CM met with patient to discuss discharge planning. Pt name and  confirmed as pt identifiers. Demographics confirmed. Patient lives in a single story home with . 3 steps entry. ADL's/IADL's - patient independent to include steps and driving prior to [facility] -  \"I have it all\". Patient uses a cane for community settings. Preferred RX - [address] [facility]

## 2018-10-02 NOTE — PROGRESS NOTES
Eval complete and note to follow. Recommend return to home without services. O2 sat on room air with activity was 92%.

## 2018-10-02 NOTE — PROGRESS NOTES
Bedside and Verbal shift change report given to Angela James RN (oncoming nurse). Report included the following information SBAR. SHIFT SUMMARY: 
 
 
 
 
 
[address] NURSING NOTE Admission Date 9/29/2018 Admission Diagnosis Acute respiratory failure ([address] Consults IP CONSULT TO PRIMARY CARE PROVIDER Cardiac Monitoring [x] Yes [] No  
  
Purposeful Hourly Rounding [x] Yes   
Chio Score Total Score: 2 Chio score 3 or > [x] Bed Alarm [] Avasys [] 1:1 sitter [] Patient refused (Signed refusal form in chart) Nikolas Score Nikolas Score: 21 Nikolas score 14 or < [] PMT consult [] Wound Care consult  
 []  Specialty bed  [] Nutrition consult Influenza Vaccine Received Flu Vaccine for Current Season (usually Sept-March): Yes Oxygen needs? [x] Room air Oxygen @  []1L    []2L    []3L   []4L    []5L   []6L via NC Chronic home O2 use? [] Yes [x] No 
Perform O2 challenge test and document in progress note using smartiClinicale (.Homeoxygen) Last bowel movement Last Bowel Movement Date: 09/29/18 Urinary Catheter LDAs Peripheral IV 09/29/18 Right Antecubital (Active) Site Assessment Clean, dry, & intact 10/2/2018  3:14 AM  
Phlebitis Assessment 0 10/2/2018  3:14 AM  
Infiltration Assessment 0 10/2/2018  3:14 AM  
Dressing Status Clean, dry, & intact 10/2/2018  3:14 AM  
Dressing Type Transparent;Tape 10/2/2018  3:14 AM  
Hub Color/Line Status Pink;Flushed 10/2/2018  3:14 AM  
                  
  
Readmission Risk Assessment Tool Score Low Risk 12 Total Score 2 . Living with Significant Other. Assisted Living. LTAC. SNF. or  
Rehab  
 5 Pt. Coverage (Medicare=5 , Medicaid, or Self-Pay=4) 5 Charlson Comorbidity Score (Age + Comorbid Conditions) Criteria that do not apply:  
 Has Seen PCP in Last 6 Months (Yes=3, No=0) Patient Length of Stay (>5 days = 3) IP Visits Last 12 Months (1-3=4, 4=9, >4=11) Expected Length of Stay 3d 16h Actual Length of Stay 3

## 2018-10-02 NOTE — PROGRESS NOTES
physical Therapy EVALUATION/DISCHARGE Patient: [facility] (21 y.o. female) Date: 10/2/2018 Primary Diagnosis: Acute respiratory failure ([address] Precautions:    (no BP on sticks in left UE) ASSESSMENT : 
Based on the objective data described below, the patient presents with good strength, good functional mobility, and steady gait with [facility] following admission for acute respiratory failure. PTA patient lives with her . She is independent with all aspects of functional mobility and ADLs, using a SPC for community ambulation. Patient denies any falls and only stumbles. Currently, patient received resting in bed, agreeable to PT. Patient received on RA with O2 sats stable throughout PT session. Patient is independent with all mobility. Patient ambulated 150 feet with mod I and SPC. Patient with safe and steady gait with use of SPC and feels at her baseline. Patient left sitting EOB at the conclusion of PT evaluation. Encouraged patient to be up in the chair for all meals and ambulate with nursing staff to maintain strength during hospital stay. Patient is at her baseline and PT services are not indicated. Patient is cleared for up ad telma mobility in the room with [facility] and de-activated bed alarm as patient is at her baseline. RN aware and in agreement. Pulse Oximetry Assessment 98% at rest on room air 93% while ambulating on room air Skilled physical therapy is not indicated at this time. PLAN : 
Discharge Recommendations: None Further Equipment Recommendations for Discharge: none SUBJECTIVE:  
Patient stated Ghanshyam Ghotra have me on lock down with this bed alarm.  OBJECTIVE DATA SUMMARY:  
HISTORY:   
Past Medical History:  
Diagnosis Date  Arthritis  Bunion of right foot 8/20/2015  Chronic pain   
 back--uses tens unit  Diverticulitis 6/29/2018 Recurrent  Dry eye syndrome 6/29/2018  Fibromyalgia 6/29/2018  GERD without esophagitis 6/29/2018  Hypercholesterolemia 6/29/2018  Ill-defined condition   
 blood transfusion hx  Osteoporosis 6/29/2018  Peripheral neuropathy 6/29/2018  Prediabetes 6/29/2018  Radiation therapy complication [address] breast-No Chemo  Rosacea 6/29/2018  Trouble in sleeping Past Surgical History:  
Procedure Laterality Date  BREAST SURGERY PROCEDURE UNLISTED  11/21/13 Left breast lumpectomy with sentinel lymph node biopsy  HX APPENDECTOMY  HX BREAST LUMPECTOMY  11/21/2013 LEFT BREAST LUMPECTOMY W/LEFT BREAST SENTINEL NODE BIOPSY,AND NEEDLE LOCALIZATION performed by Serafin Alvarado MD at [facility] MAIN OR  
 HX CATARACT REMOVAL    
 bilateral  
 HX HYSTERECTOMY    
 ovarian cyst  
 HX MOHS PROCEDURES    
 right  HX ORTHOPAEDIC    
 back surgery  HX OTHER SURGICAL    
 colonoscopy  HX TONSILLECTOMY  TOTAL KNEE ARTHROPLASTY    
 left  TOTAL KNEE ARTHROPLASTY    
 right [facility] [facility] BREAST BIOPSY Left 2013 Breast Ca Prior Level of Function/Home Situation: patient lives with her . She is independent with all aspects of functional mobility and ADLs, using a SPC for community ambulation. Patient denies any falls and only stumbles. Personal factors and/or comorbidities impacting plan of care: SOB Home Situation Home Environment: Private residence # Steps to Enter: 3 Rails to Enter: Yes Hand Rails : Bilateral 
One/Two Story Residence: One story (1 step into den with rail) Living Alone: No 
Support Systems: Spouse/Significant Other/Partner Patient Expects to be Discharged to[de-identified] Private residence Current DME Used/Available at Home: [employer], straight, Walker, rolling, Shower chair, Commode, bedside, Grab bars Tub or Shower Type: Shower EXAMINATION/PRESENTATION/DECISION MAKING:  
Critical Behavior: 
Neurologic State: Alert Orientation Level: Oriented X4 Cognition: Appropriate decision making, Appropriate for age attention/concentration, Appropriate safety awareness Safety/Judgement: Awareness of environment, Fall prevention, Home safety Hearing: Auditory Auditory Impairment: Hard of hearing, bilateral, Hearing aid(s) (hearing aids at home) Hearing Aids/Status: At home Skin:   
Edema:  
Range Of Motion: 
AROM: Generally decreased, functional 
  
  
  
PROM: Within functional limits Strength:   
Strength: Within functional limits Tone & Sensation:  
Tone: Normal 
  
  
  
  
  
  
  
  
   
Coordination: 
Coordination: Within functional limits Vision:  
Tracking: Able to track stimulus in all quadrants w/o difficulty Corrective Lenses: Glasses Functional Mobility: 
Bed Mobility: 
Rolling: Independent Supine to Sit: Independent Sit to Supine: Independent Scooting: Independent Transfers: 
Sit to Stand: Independent Stand to Sit: Independent Bed to Chair: Modified independent Balance:  
Sitting: Intact Standing: Intact; With support Ambulation/Gait Training: 
Distance (ft): 150 Feet (ft) Assistive Device: Gait belt;Cane, straight Ambulation - Level of Assistance: Modified independent Gait Description (WDL): Exceptions to [facility] Base of Support: Widened Speed/Emmanuelle: Pace decreased (<100 feet/min) Step Length: Right shortened;Left shortened Stairs: Therapeutic Exercises:  
 
 
Functional Measure: 
Barthel Index: 
 
Bathin Bladder: 10 Bowels: 10 
Groomin Dressing: 10 Feeding: 10 Mobility: 10 Stairs: 5 Toilet Use: 10 Transfer (Bed to Chair and Back): 10 Total: 85 Barthel and G-code impairment scale: 
Percentage of impairment CH 
0% CI 
1-19% CJ 
20-39% CK 
40-59% CL 
60-79% CM 
80-99% CN 
100% Barthel Score 0-100 100 99-80 79-60 59-40 20-39 1-19 
 0 Barthel Score 0-20 20 17-19 13-16 9-12 5-8 1-4 0 The Barthel ADL Index: Guidelines 1. The index should be used as a record of what a patient does, not as a record of what a patient could do. 2. The main aim is to establish degree of independence from any help, physical or verbal, however minor and for whatever reason. 3. The need for supervision renders the patient not independent. 4. A patient's performance should be established using the best available evidence. Asking the patient, friends/relatives and nurses are the usual sources, but direct observation and common sense are also important. However direct testing is not needed. 5. Usually the patient's performance over the preceding 24-48 hours is important, but occasionally longer periods will be relevant. 6. Middle categories imply that the patient supplies over 50 per cent of the effort. 7. Use of aids to be independent is allowed. Denisse Montaño., Barthel, DShawneeW. ([id number]). Functional evaluation: the Barthel Index. [address] [facility] (14)2. Marisol Persaud maxim KELLY Louie, Tiffany Quan., Halle Elder., [place], [address] [place] [address] (1999). Measuring the change indisability after inpatient rehabilitation; comparison of the responsiveness of the Barthel Index and Functional [place] Measure. Journal of Neurology, Neurosurgery, and Psychiatry, 66(4), 946-288. Teddy Gallardo, N.J.A, Jey Rosario,  W.J.M, & Emelia Jennings, M.A. (2004.) Assessment of post-stroke quality of life in cost-effectiveness studies: The usefulness of the Barthel Index and the EuroQoL-5D. [facility], 13, 163-18 G codes: In compliance with CMSs Claims Based Outcome Reporting, the following G-code set was chosen for this patient based on their primary functional limitation being treated: The outcome measure chosen to determine the severity of the functional limitation was the Barthel with a score of 85/100 which was correlated with the impairment scale. ? Mobility - Walking and Moving Around:  
  - CURRENT STATUS: CI - 1%-19% impaired, limited or restricted  - GOAL STATUS: CI - 1%-19% impaired, limited or restricted  - D/C STATUS:  CI - 1%-19% impaired, limited or restricted Physical Therapy Evaluation Charge Determination History Examination Presentation Decision-Making MEDIUM  Complexity : 1-2 comorbidities / personal factors will impact the outcome/ POC  MEDIUM Complexity : 3 Standardized tests and measures addressing body structure, function, activity limitation and / or participation in recreation  LOW Complexity : Stable, uncomplicated  Other outcome measures Barthel   LOW Based on the above components, the patient evaluation is determined to be of the following complexity level: LOW Pain: 
Pain Scale 1: Numeric (0 - 10) Pain Intensity 1: 0 Activity Tolerance:  
Good, at baseline. Please refer to the flowsheet for vital signs taken during this treatment. After treatment:  
[]   Patient left in no apparent distress sitting up in chair 
[x]   Patient left in no apparent distress in bed 
[x]   Call bell left within reach [x]   Nursing notified 
[]   Caregiver present 
[]   Bed alarm activated COMMUNICATION/EDUCATION:  
Communication/Collaboration: 
[x]   Fall prevention education was provided and the patient/caregiver indicated understanding. [x]   Patient/family have participated as able and agree with findings and recommendations. []   Patient is unable to participate in plan of care at this time. Findings and recommendations were discussed with: Registered Nurse and  Thank you for this referral. 
Linda Vasquez, PT, DPT Time Calculation: 20 mins

## 2018-10-02 NOTE — PROGRESS NOTES
Occupational Therapy EVALUATION/discharge Patient: Mercedes Rodriguez (36 y.o. female) Date: 10/2/2018 Primary Diagnosis: Acute respiratory failure ([address] Precautions:    (no BP on sticks in left UE) ASSESSMENT:  
Based on the objective data described below, the patient presents with overall modified independent mobility with SPC (baseline) and performing ADLS a modified independent to independent level. Pt reports feeling at baseline. She was inquiring about returning to exercising in the pool. Pt was asked to follow up with her doctor as a heated pool may exacerbate her SOB. She stated she will follow up with doctor. Pt was able to obtain object from floor in standing with increased time with supervision. O2 sat on room air was 92% with activity. Further skilled acute occupational therapy is not indicated at this time. Discharge Recommendations: home without services Further Equipment Recommendations for Discharge: none SUBJECTIVE:  
Patient stated I feel fine. I don't need you.  OBJECTIVE DATA SUMMARY:  
HISTORY:  
Past Medical History:  
Diagnosis Date  Arthritis  Bunion of right foot 8/20/2015  Chronic pain   
 back--uses tens unit  Diverticulitis 6/29/2018 Recurrent  Dry eye syndrome 6/29/2018  Fibromyalgia 6/29/2018  GERD without esophagitis 6/29/2018  Hypercholesterolemia 6/29/2018  Ill-defined condition   
 blood transfusion hx  Osteoporosis 6/29/2018  Peripheral neuropathy 6/29/2018  Prediabetes 6/29/2018  Radiation therapy complication [address] breast-No Chemo  Rosacea 6/29/2018  Trouble in sleeping Past Surgical History:  
Procedure Laterality Date  BREAST SURGERY PROCEDURE UNLISTED  11/21/13 Left breast lumpectomy with sentinel lymph node biopsy  HX APPENDECTOMY  HX BREAST LUMPECTOMY  11/21/2013  LEFT BREAST LUMPECTOMY W/LEFT BREAST SENTINEL NODE BIOPSY,AND NEEDLE LOCALIZATION performed by Saad Gómez MD at [facility] MAIN OR  
 HX CATARACT REMOVAL    
 bilateral  
 HX HYSTERECTOMY    
 ovarian cyst  
 HX MOHS PROCEDURES    
 right  HX ORTHOPAEDIC    
 back surgery  HX OTHER SURGICAL    
 colonoscopy  HX TONSILLECTOMY  TOTAL KNEE ARTHROPLASTY    
 left  TOTAL KNEE ARTHROPLASTY    
 right [address] [facility] [address] BREAST BIOPSY Left 2013 Breast Ca Prior Level of Function/Environment/Context: ambulated with SPC (outside of home) or furniture walks in home; going to exercise in heated pool; performed all ADLs and IADLs without assist; has a reacher and occasionally uses it to obtain objects from floor Expanded or extensive additional review of patient history:  
 
Home Situation Home Environment: Private residence # Steps to Enter: 3 Rails to Enter: Yes Hand Rails : Bilateral 
One/Two Story Residence: One story (1 step into den with rail) Living Alone: No 
Support Systems: Spouse/Significant Other/Partner Patient Expects to be Discharged to[de-identified] Private residence Current DME Used/Available at Home: Collie Kale, straight, Walker, rolling, Shower chair, Commode, bedside, Grab bars Tub or Shower Type: Shower Hand dominance: Right EXAMINATION OF PERFORMANCE DEFICITS: 
Cognitive/Behavioral Status: 
Neurologic State: Alert Orientation Level: Oriented X4 Cognition: Appropriate decision making; Appropriate for age attention/concentration; Appropriate safety awareness Perception: Appears intact Perseveration: No perseveration noted Safety/Judgement: Awareness of environment; Fall prevention;Home safety Hearing: Auditory Auditory Impairment: Hard of hearing, bilateral, Hearing aid(s) (hearing aids at home) Hearing Aids/Status: At home Vision/Perceptual:   
Tracking: Able to track stimulus in all quadrants w/o difficulty Corrective Lenses: Glasses Range of Motion: 
 
AROM: Generally decreased, functional 
 PROM: Within functional limits Strength: 
 
Strength: Within functional limits Coordination: 
Coordination: Within functional limits Fine Motor Skills-Upper: Left Intact; Right Intact Gross Motor Skills-Upper: Left Intact; Right Intact Tone & Sensation: 
 
Tone: Normal 
  
  
  
  
  
  
  
 
Balance: 
Sitting: Intact Standing: Intact; With support Functional Mobility and Transfers for ADLs: 
Bed Mobility: 
Rolling: Independent Supine to Sit: Independent Sit to Supine: Independent Scooting: Independent Transfers: 
Sit to Stand: Independent Stand to Sit: Independent Bed to Chair: Modified independent Bathroom Mobility: Modified independent Toilet Transfer : Modified independent ADL Assessment: 
Feeding: Independent Oral Facial Hygiene/Grooming: Independent Bathing: Modified independent Upper Body Dressing: Independent Lower Body Dressing: Independent Toileting: Independent ADL Intervention and task modifications: 
  
See assessment Cognitive Retraining Safety/Judgement: Awareness of environment; Fall prevention;Home safety Functional Measure: 
Barthel Index: 
 
Bathin Bladder: 10 Bowels: 10 
Groomin Dressing: 10 Feeding: 10 Mobility: 10 Stairs: 5 Toilet Use: 10 Transfer (Bed to Chair and Back): 10 Total: 85 Barthel and G-code impairment scale: 
Percentage of impairment CH 
0% CI 
1-19% CJ 
20-39% CK 
40-59% CL 
60-79% CM 
80-99% CN 
100% Barthel Score 0-100 100 99-80 79-60 59-40 20-39 1-19 
 0 Barthel Score 0-20 20 17-19 13-16 9-12 5-8 1-4 0 The Barthel ADL Index: Guidelines 1. The index should be used as a record of what a patient does, not as a record of what a patient could do. 2. The main aim is to establish degree of independence from any help, physical or verbal, however minor and for whatever reason. 3. The need for supervision renders the patient not independent. 4. A patient's performance should be established using the best available evidence. Asking the patient, friends/relatives and nurses are the usual sources, but direct observation and common sense are also important. However direct testing is not needed. 5. Usually the patient's performance over the preceding 24-48 hours is important, but occasionally longer periods will be relevant. 6. Middle categories imply that the patient supplies over 50 per cent of the effort. 7. Use of aids to be independent is allowed. Ludy Mock., Barthel, DShawneeW. ([id number]). Functional evaluation: the Barthel Index. [address] [facility] (14)2. Fiona Du maxim JAYME LouieF, Vi Ellis., Angela Bello., [place], [address] [place] [address] Ave (1999). Measuring the change indisability after inpatient rehabilitation; comparison of the responsiveness of the Barthel Index and Functional [place] Measure. Journal of Neurology, Neurosurgery, and Psychiatry, 66(4), 791-613. Kyle Coto, N.J.A, MANDY Garcia.SADAF, & Russel Brizuela, M.A. (2004.) Assessment of post-stroke quality of life in cost-effectiveness studies: The usefulness of the Barthel Index and the EuroQoL-5D. [facility], 13, 487-41 G codes: In compliance with CMSs Claims Based Outcome Reporting, the following G-code set was chosen for this patient based on their primary functional limitation being treated: The outcome measure chosen to determine the severity of the functional limitation was the barthe with a score of 85/100 which was correlated with the impairment scale. ? Self Care:  
  - CURRENT STATUS: CI - 1%-19% impaired, limited or restricted  - GOAL STATUS: CI - 1%-19% impaired, limited or restricted  - D/C STATUS:  CI - 1%-19% impaired, limited or restricted Occupational Therapy Evaluation Charge Determination History Examination Decision-Making LOW Complexity : Brief history review  LOW Complexity : 1-3 performance deficits relating to physical, cognitive , or psychosocial skils that result in activity limitations and / or participation restrictions  MEDIUM Complexity : Patient may present with comorbidities that affect occupational performnce. Miniml to moderate modification of tasks or assistance (eg, physical or verbal ) with assesment(s) is necessary to enable patient to complete evaluation Based on the above components, the patient evaluation is determined to be of the following complexity level: LOW Pain: 
Pain Scale 1: Numeric (0 - 10) Pain Intensity 1: 0 Activity Tolerance:  
 
Please refer to the flowsheet for vital signs taken during this treatment. After treatment:  
[]  Patient left in no apparent distress sitting up in chair 
[x]  Patient left in no apparent distress in bed 
[x]  Call bell left within reach [x]  Nursing notified 
[]  Caregiver present [x]  Bed alarm activated COMMUNICATION/EDUCATION:  
Communication/Collaboration: 
[x]      Home safety education was provided and the patient/caregiver indicated understanding. [x]      Patient have participated as able and agree with findings and recommendations. []      Patient is unable to participate in plan of care at this time. Findings and recommendations were discussed with: Physical Therapist, Registered Nurse and patient Demetria Aguilar OTR/ALPA

## 2018-10-02 NOTE — PROGRESS NOTES
[address] 
[phone] Medical Progress Note NAME: Sudhir Ferris :  1935 [facility]:  [id number] Date/Time: 10/2/2018 5:52 AM 
 
 
Subjective:  
 
Paroxysmal cough persists but improving. Especially worse with talking. Oxygenation adequate. Cough not productive. Has significant leukocytosis probably contributed to by steroids but less today. Chest xray and CT scan shows atelectasis. Past Medical History reviewed and unchanged from Admission History and Physical and/or prior progress note/electronic medical record Medications reviewed. ROS:   
 
General: negative for fever, chills, sweats, weakness Ear Nose and Throat: negative for rhinorrhea, pharyngitis, otalgia, tinnitus, speech or swallowing difficulties Respiratory:  positive for less paroxysmal coughing. Worse with talking Cardiology:  negative for chest pain, palpitations, orthopnea, PND, edema, syncope Gastrointestinal: negative for abdominal pain, N/V, dysphagia, change in bowel habits, bleeding Genitourinary: negative for frequency, urgency, dysuria, hematuria, incontinence Muskuloskeletal : negative for arthralgia, myalgia Neurological: negative for headache, dizziness, confusion, focal weakness, paresthesia, memory loss, gait disturbance Objective:  
 
Last 24hrs VS reviewed since prior progress note. Most recent are: 
 
Visit Vitals  /46 (BP 1 Location: Right arm, BP Patient Position: Lying left side; Head of bed elevated (Comment degrees))  Pulse 67  Temp 98 °F (36.7 °C)  Resp 18  Wt 186 lb 15.2 oz (84.8 kg)  SpO2 96%  BMI 31.11 kg/m2 SpO2 Readings from Last 6 Encounters:  
10/02/18 96% 18 99% 18 98% 02/10/17 99% 16 96% 16 100% Intake/Output Summary (Last 24 hours) at 10/02/18 1130 Last data filed at 10/02/18 8153 Gross per 24 hour Intake              770 ml  
 Output              700 ml Net               70 ml Physical Exam: 
 
General appearance: alert, cooperative, no distress, appears stated age Eyes: negative Neck: supple, symmetrical, trachea midline, no adenopathy, thyroid: not enlarged, symmetric, no tenderness/mass/nodules, no carotid bruit and no JVD Lungs: Clearer today with few rhonchi in left posterior base Heart: regular rate and rhythm, S1, S2 normal, no murmur, click, rub or gallop Abdomen: soft, non-tender. Bowel sounds normal. No masses,  no organomegaly Extremities: extremities normal, atraumatic, no cyanosis or edema Skin: Skin color, texture, turgor normal. No rashes or lesions Neurologic: Grossly normal 
 
Data Review: 
 
Lab: 
 
BMP:  
Lab Results Component Value Date/Time  10/02/2018 12:13 AM  
 K 4.2 10/02/2018 12:13 AM  
  10/02/2018 12:13 AM  
 CO2 26 10/02/2018 12:13 AM  
 AGAP 5 10/02/2018 12:13 AM  
  10/02/2018 12:13 AM  
 BUN 18 10/02/2018 12:13 AM  
 CREA 0.60 10/02/2018 12:13 AM  
 GFRAA >60 10/02/2018 12:13 AM  
 GFRNA >60 10/02/2018 12:13 AM  
 
CBC:  
Lab Results Component Value Date/Time WBC 23.0 (H) 10/02/2018 01:01 AM  
 HGB 8.9 (L) 10/02/2018 01:01 AM  
 HCT 29.3 (L) 10/02/2018 01:01 AM  
  (L) 10/02/2018 01:01 AM  
 
All labs reviewed in past 24 hours Other pertinent lab: none Imaging: 
 
Reviewed. CXR - Minimal right middle lobe atelectasis CT chest - TRACHEA/BRONCHI: The airway is occluded at the level of the cricoid cartilage 
and vocal cords. Otherwise patent. ESOPHAGUS: No wall thickening or dilatation. HEART: Normal in size without pericardial effusion. Coronary artery 
calcifications noted. PLEURA: No effusion or pneumothorax. LUNGS: Minimal linear atelectasis right middle lobe. Otherwise clear. Assessment / Plan: Active Problems: 
  Acute respiratory failure ([address] (9/29/2018) Acute bronchitis due to other specified organisms (9/30/2018) 1. Continue abx, nebs, cough med (tessalon, codeine) 2. Continue prednisone 40 mg daily 3. Continue  Mucinex 4. Recheck chest xray 5. Follow labs 6. ? Home Wednesday if improving Care Plan discussed with: Patient Discussed:  Care Plan Prophylaxis:  Lovenox Disposition:  Home w/Family 
___________________________________________________ Attending Physician: Ion Valverde MD

## 2018-10-03 ENCOUNTER — DOCUMENTATION ONLY (OUTPATIENT)
Dept: CASE MANAGEMENT | Age: 83
End: 2018-10-03

## 2018-10-03 VITALS
DIASTOLIC BLOOD PRESSURE: 67 MMHG | RESPIRATION RATE: 16 BRPM | HEART RATE: 75 BPM | WEIGHT: 186.95 LBS | SYSTOLIC BLOOD PRESSURE: 153 MMHG | TEMPERATURE: 98.3 F | OXYGEN SATURATION: 93 % | BODY MASS INDEX: 31.11 KG/M2

## 2018-10-03 LAB
ANION GAP SERPL CALC-SCNC: 7 MMOL/L (ref 5–15)
BUN SERPL-MCNC: 18 MG/DL (ref 6–20)
BUN/CREAT SERPL: 32 (ref 12–20)
CALCIUM SERPL-MCNC: 9 MG/DL (ref 8.5–10.1)
CHLORIDE SERPL-SCNC: 108 MMOL/L (ref 97–108)
CO2 SERPL-SCNC: 26 MMOL/L (ref 21–32)
CREAT SERPL-MCNC: 0.57 MG/DL (ref 0.55–1.02)
ERYTHROCYTE [DISTWIDTH] IN BLOOD BY AUTOMATED COUNT: 15.8 % (ref 11.5–14.5)
GLUCOSE SERPL-MCNC: 91 MG/DL (ref 65–100)
HCT VFR BLD AUTO: 33.6 % (ref 35–47)
HGB BLD-MCNC: 10.4 G/DL (ref 11.5–16)
MCH RBC QN AUTO: 33.4 PG (ref 26–34)
MCHC RBC AUTO-ENTMCNC: 31 G/DL (ref 30–36.5)
MCV RBC AUTO: 108 FL (ref 80–99)
NRBC # BLD: 0.03 K/UL (ref 0–0.01)
NRBC BLD-RTO: 0.1 PER 100 WBC
PLATELET # BLD AUTO: 174 K/UL (ref 150–400)
PMV BLD AUTO: 9.9 FL (ref 8.9–12.9)
POTASSIUM SERPL-SCNC: 4 MMOL/L (ref 3.5–5.1)
RBC # BLD AUTO: 3.11 M/UL (ref 3.8–5.2)
SODIUM SERPL-SCNC: 141 MMOL/L (ref 136–145)
WBC # BLD AUTO: 22.9 K/UL (ref 3.6–11)

## 2018-10-03 PROCEDURE — 74011250637 HC RX REV CODE- 250/637: Performed by: INTERNAL MEDICINE

## 2018-10-03 PROCEDURE — 36415 COLL VENOUS BLD VENIPUNCTURE: CPT | Performed by: INTERNAL MEDICINE

## 2018-10-03 PROCEDURE — 85027 COMPLETE CBC AUTOMATED: CPT | Performed by: INTERNAL MEDICINE

## 2018-10-03 PROCEDURE — 94640 AIRWAY INHALATION TREATMENT: CPT

## 2018-10-03 PROCEDURE — 74011000250 HC RX REV CODE- 250: Performed by: INTERNAL MEDICINE

## 2018-10-03 PROCEDURE — 74011636637 HC RX REV CODE- 636/637: Performed by: INTERNAL MEDICINE

## 2018-10-03 PROCEDURE — 80048 BASIC METABOLIC PNL TOTAL CA: CPT | Performed by: INTERNAL MEDICINE

## 2018-10-03 RX ORDER — BENZONATATE 200 MG/1
200 CAPSULE ORAL 3 TIMES DAILY
Qty: 30 CAP | Refills: 0 | Status: SHIPPED | OUTPATIENT
Start: 2018-10-03 | End: 2018-10-13

## 2018-10-03 RX ORDER — PREDNISONE 10 MG/1
TABLET ORAL
Qty: 18 TAB | Refills: 0 | Status: SHIPPED | OUTPATIENT
Start: 2018-10-03 | End: 2018-11-01 | Stop reason: ALTCHOICE

## 2018-10-03 RX ORDER — AZITHROMYCIN 250 MG/1
250 TABLET, FILM COATED ORAL DAILY
Qty: 3 TAB | Refills: 0 | Status: SHIPPED | OUTPATIENT
Start: 2018-10-03 | End: 2018-10-06

## 2018-10-03 RX ADMIN — LEVALBUTEROL HYDROCHLORIDE 0.63 MG: 0.63 SOLUTION RESPIRATORY (INHALATION) at 07:53

## 2018-10-03 RX ADMIN — PREDNISONE 40 MG: 20 TABLET ORAL at 08:37

## 2018-10-03 RX ADMIN — Medication 10 ML: at 05:49

## 2018-10-03 RX ADMIN — GUAIFENESIN 600 MG: 600 TABLET, EXTENDED RELEASE ORAL at 08:37

## 2018-10-03 RX ADMIN — ANASTROZOLE 1 MG: 1 TABLET, COATED ORAL at 08:39

## 2018-10-03 RX ADMIN — LEVALBUTEROL HYDROCHLORIDE 0.63 MG: 0.63 SOLUTION RESPIRATORY (INHALATION) at 05:16

## 2018-10-03 RX ADMIN — ASPIRIN 325 MG: 325 TABLET ORAL at 08:38

## 2018-10-03 RX ADMIN — MULTIPLE VITAMINS W/ MINERALS TAB 1 TABLET: TAB at 08:38

## 2018-10-03 RX ADMIN — BENZONATATE 200 MG: 100 CAPSULE ORAL at 08:38

## 2018-10-03 RX ADMIN — THERA TABS 1 TABLET: TAB at 08:38

## 2018-10-03 NOTE — DISCHARGE INSTRUCTIONS
[address]  [address]  [phone]      Patient Discharge Instructions    Megan Murphy / [id number] : 1935    Admitted 2018 Discharged: 10/3/2018     Take Home Medications        · It is important that you take the medication exactly as they are prescribed. · Keep your medication in the bottles provided by the pharmacist and keep a list of the medication names, dosages, and times to be taken in your wallet. · Do not take other medications without consulting your doctor. What to do at Home    Recommended diet: Regular Diet,     Recommended activity: Activity as tolerated,     Follow-up with Dr Littlejohn Come in 1 week        Information obtained by :  I understand that if any problems occur once I am at home I am to contact my physician. I understand and acknowledge receipt of the instructions indicated above.                                                                                                                                            Physician's or R.N.'s Signature                                                                  Date/Time                                                                                                                                              Patient or Representative Signature                                                          Date/Time

## 2018-10-03 NOTE — PROGRESS NOTES
Bedside and Verbal shift change report given to Kymberly Dickinson RN (oncoming nurse). Report included the following information SBAR, Intake/Output, MAR and Recent Results. SHIFT SUMMARY: 
 
 
[address] NURSING NOTE Admission Date 9/29/2018 Admission Diagnosis Acute respiratory failure ([address] Consults IP CONSULT TO PRIMARY CARE PROVIDER Cardiac Monitoring [x] Yes [] No  
  
Purposeful Hourly Rounding [x] Yes   
Chio Score Total Score: 2 Chio score 3 or > [] Bed Alarm [] Avasys [] 1:1 sitter [] Patient refused (Signed refusal form in chart) Nikolas Score Nikolas Score: 23 Nikolas score 14 or < [] PMT consult [] Wound Care consult  
 []  Specialty bed  [] Nutrition consult Influenza Vaccine Received Flu Vaccine for Current Season (usually Sept-March): Yes Oxygen needs? [x] Room air Oxygen @  []1L    []2L    []3L   []4L    []5L   []6L via NC Chronic home O2 use? [] Yes [x] No 
Perform O2 challenge test and document in progress note using smartphrase (.Homeoxygen) Last bowel movement Last Bowel Movement Date: 10/02/18 Urinary Catheter LDAs Peripheral IV 09/29/18 Right Antecubital (Active) Site Assessment Clean, dry, & intact 10/3/2018  5:43 AM  
Phlebitis Assessment 0 10/3/2018  5:43 AM  
Infiltration Assessment 0 10/3/2018  5:43 AM  
Dressing Status Clean, dry, & intact 10/3/2018  5:43 AM  
Dressing Type Transparent 10/3/2018  5:43 AM  
Hub Color/Line Status Pink;Flushed 10/3/2018  5:43 AM  
Action Taken Open ports on tubing capped 10/2/2018  2:00 PM  
                  
  
Readmission Risk Assessment Tool Score Low Risk 12 Total Score 2 . Living with Significant Other. Assisted Living. LTAC. SNF. or  
Rehab  
 5 Pt. Coverage (Medicare=5 , Medicaid, or Self-Pay=4) 5 Charlson Comorbidity Score (Age + Comorbid Conditions) Criteria that do not apply:  
 Has Seen PCP in Last 6 Months (Yes=3, No=0) Patient Length of Stay (>5 days = 3) IP Visits Last 12 Months (1-3=4, 4=9, >4=11) Expected Length of Stay 3d 16h Actual Length of Stay 4

## 2018-10-03 NOTE — PROGRESS NOTES
[address] 
[phone] Medical Progress Note NAME: Riana Monroy :  1935 MRM:  [id number] Date/Time: 10/3/2018 5:55 AM 
 
 
Subjective:  
 
Paroxysmal cough improving. Less cough with talking. Oxygenation adequate. Cough not productive. Has significant leukocytosis probably contributed to by steroids but less today. Chest xray and CT scan shows atelectasis. FU chest xray yesterday normal. 
 
 
  
Past Medical History reviewed and unchanged from Admission History and Physical and/or prior progress note/electronic medical record Medications reviewed. ROS:   
 
General: negative for fever, chills, sweats, weakness Ear Nose and Throat: negative for rhinorrhea, pharyngitis, otalgia, tinnitus, speech or swallowing difficulties Respiratory:  positive for less paroxysmal coughing. Less cough with talking Cardiology:  negative for chest pain, palpitations, orthopnea, PND, edema, syncope Gastrointestinal: negative for abdominal pain, N/V, dysphagia, change in bowel habits, bleeding Genitourinary: negative for frequency, urgency, dysuria, hematuria, incontinence Muskuloskeletal : negative for arthralgia, myalgia Neurological: negative for headache, dizziness, confusion, focal weakness, paresthesia, memory loss, gait disturbance Objective:  
 
Last 24hrs VS reviewed since prior progress note. Most recent are: 
 
Visit Vitals  /59 (BP 1 Location: Right arm, BP Patient Position: At rest;Lying left side)  Pulse 69  Temp 98 °F (36.7 °C)  Resp 16  Wt 186 lb 15.2 oz (84.8 kg)  SpO2 96%  BMI 31.11 kg/m2 SpO2 Readings from Last 6 Encounters:  
10/03/18 96% 18 99% 18 98% 02/10/17 99% 16 96% 16 100% Intake/Output Summary (Last 24 hours) at 10/03/18 0520 Last data filed at 10/02/18 6703 Gross per 24 hour Intake              460 ml  
 Output                0 ml Net              460 ml Physical Exam: 
 
General appearance: alert, cooperative, no distress, appears stated age Eyes: negative Neck: supple, symmetrical, trachea midline, no adenopathy, thyroid: not enlarged, symmetric, no tenderness/mass/nodules, no carotid bruit and no JVD Lungs: Clearer today without wheezing Heart: regular rate and rhythm, S1, S2 normal, no murmur, click, rub or gallop Abdomen: soft, non-tender. Bowel sounds normal. No masses,  no organomegaly Extremities: extremities normal, atraumatic, no cyanosis or edema Skin: Skin color, texture, turgor normal. No rashes or lesions Neurologic: Grossly normal 
 
Data Review: 
 
Lab: 
 
BMP:  
Lab Results Component Value Date/Time  10/03/2018 03:31 AM  
 K 4.0 10/03/2018 03:31 AM  
  10/03/2018 03:31 AM  
 CO2 26 10/03/2018 03:31 AM  
 AGAP 7 10/03/2018 03:31 AM  
 GLU 91 10/03/2018 03:31 AM  
 BUN 18 10/03/2018 03:31 AM  
 CREA 0.57 10/03/2018 03:31 AM  
 GFRAA >60 10/03/2018 03:31 AM  
 GFRNA >60 10/03/2018 03:31 AM  
 
CBC:  
Lab Results Component Value Date/Time WBC 22.9 (H) 10/03/2018 03:31 AM  
 HGB 10.4 (L) 10/03/2018 03:31 AM  
 HCT 33.6 (L) 10/03/2018 03:31 AM  
  10/03/2018 03:31 AM  
 
All labs reviewed in past 24 hours Other pertinent lab: none Imaging: 
 
Reviewed. CXR - Minimal right middle lobe atelectasis CT chest - TRACHEA/BRONCHI: The airway is occluded at the level of the cricoid cartilage 
and vocal cords. Otherwise patent. ESOPHAGUS: No wall thickening or dilatation. HEART: Normal in size without pericardial effusion. Coronary artery 
calcifications noted. PLEURA: No effusion or pneumothorax. LUNGS: Minimal linear atelectasis right middle lobe. Otherwise clear. Assessment / Plan: Active Problems: 
  Acute respiratory failure ([address] (9/29/2018) Acute bronchitis due to other specified organisms (9/30/2018) 1. Continue abx, nebs, cough med (tessalon, codeine) 2. Taper prednisone 3. Continue  Mucinex 4. Home today Care Plan discussed with: Patient Discussed:  Care Plan Prophylaxis:  Lovenox Disposition:  Home w/Family 
___________________________________________________ Attending Physician: Danita Jackman MD

## 2018-10-03 NOTE — PROGRESS NOTES
0700: Bedside shift change report given to Wilber Parrish, [facility] (oncoming nurse) by Alma Delia Singh RN (offgoing nurse). Report included the following information SBAR and Kardex. 2287: I have reviewed discharge instructions with the patient and spouse. The patient and spouse verbalized understanding. Discharge medications reviewed with patient and appropriate educational materials and side effects teaching were provided. Prescriptions sent to the pharmacy. Patient discharged by POV home with spouse. Has a f/u appointment made with PCP tomorrow. Volunteer request made to escort patient to vehicle via wheelchair. PIV and cardiac monitor removed.

## 2018-10-03 NOTE — DISCHARGE SUMMARY
[address]  [phone]    Hospital Discharge Summary        Patient ID:  Tomi Portillo  [id number]  9 y.o.  1935    Admit date: 9/29/2018  Discharge date and time: 10/03/2018    Admission Diagnoses: Acute respiratory failure [facility])     Discharge Diagnoses: Active Problems:    Acute respiratory failure (HCC) (9/29/2018)      Acute bronchitis due to other specified organisms (9/30/2018)         Past Medical History:   Diagnosis Date    Arthritis     Bunion of right foot 8/20/2015    Chronic pain     back--uses tens unit    Diverticulitis 6/29/2018    Recurrent    Dry eye syndrome 6/29/2018    Fibromyalgia 6/29/2018    GERD without esophagitis 6/29/2018    Hypercholesterolemia 6/29/2018    Ill-defined condition     blood transfusion hx    Osteoporosis 6/29/2018    Peripheral neuropathy 6/29/2018    Prediabetes 6/29/2018    Radiation therapy complication 5037    Lt breast-No Chemo    Rosacea 6/29/2018    Trouble in sleeping          PCP: Jayde Sosa MD    Consults: None      History of Present Illness: Per hospitalist -   [place] is a 82 y.o.  female who presents with above. Pt in her usual state of health until this week. She developed a severe cough with mucus production and L sided pleuritic chest pain. She saw her PCP on Thursday and was started on cefuroxime and tussionex. She felt nauseated with the tussionex and did not feel she was improving, so she called back her PCP to request another appointment. However, she decided to come back to the ER instead today. She says that she feels much better since arrival here. She denies fevers at home. Cough has been less productive today. She says that her L sided chest pain as improved. No nausea. No stool changes. No new edema or focal weakness.     We were asked to admit for work up and evaluation of the above problems.     Admission Physical Exam: Per hospitalist -   VITALS:         Visit Vitals    /42    Pulse 96    Temp 98.2 °F (36.8 °C)    Resp 20    Wt 84.8 kg (186 lb 15.2 oz)    SpO2 95%    BMI 31.11 kg/m2         PHYSICAL EXAM:     General:                    Obese, alert, cooperative, no distress, appears stated age. HEENT:                     Atraumatic, anicteric sclerae, pink conjunctivae                                              No oral ulcers, mucosa moist, throat clear, dentition fair  Neck:                                   Supple, symmetrical,  thyroid: non tender  Lungs:                       Mildly course bilaterally. No wheezing. Good air movement. No rales. Chest wall:                No tenderness  No Accessory muscle use. Heart:                                  Regular rhythm,  No  murmur   No edema  Abdomen:                  Soft, non-tender. Not distended. Bowel sounds normal  Extremities:               No cyanosis. No clubbing,                                                Skin turgor normal, Capillary refill normal, Radial dial pulse 2+  Skin:                                    Not pale. Not Jaundiced  No rashes   Psych:                                 Good insight. Not depressed. Not anxious or agitated. Neurologic:                EOMs intact. No facial asymmetry. No aphasia or slurred speech. Symmetrical strength, Sensation grossly intact. Alert and oriented X 4.          Admission Labs:  CBC: 9/29/2018: HCT 30.9 %* (Ref range: 35.0 - 47.0 %); HGB 9.8 g/dL* (Ref range: 11.5 - 16.0 g/dL); PLATELET 727 K/uL* (Ref range: 150 - 400 K/uL); RBC 2.89 M/uL* (Ref range: 3.80 - 5.20 M/uL); WBC 18.4 K/uL* (Ref range: 3.6 - 11.0 K/uL)  9/30/2018: HCT 28.9 %* (Ref range: 35.0 - 47.0 %);  HGB 9.2 g/dL* (Ref range: 11.5 - 16.0 g/dL); PLATELET 033 K/uL (Ref range: 150 - 400 K/uL); RBC 2.67 M/uL* (Ref range: 3.80 - 5.20 M/uL); WBC 27.7 K/uL* (Ref range: 3.6 - 11.0 K/uL)   BMP: 9/29/2018: BUN 16 MG/DL (Ref range: 6 - 20 MG/DL); Calcium 8.7 MG/DL (Ref range: 8.5 - 10.1 MG/DL); Chloride 109 mmol/L* (Ref range: 97 - 108 mmol/L); CO2 26 mmol/L (Ref range: 21 - 32 mmol/L); Creatinine 0.60 MG/DL (Ref range: 0.55 - 1.02 MG/DL); Glucose 106 mg/dL* (Ref range: 65 - 100 mg/dL); Potassium 4.1 mmol/L (Ref range: 3.5 - 5.1 mmol/L); Sodium 141 mmol/L (Ref range: 136 - 145 mmol/L)  9/30/2018: BUN 17 MG/DL (Ref range: 6 - 20 MG/DL); Calcium 8.6 MG/DL (Ref range: 8.5 - 10.1 MG/DL); Chloride 108 mmol/L (Ref range: 97 - 108 mmol/L); CO2 24 mmol/L (Ref range: 21 - 32 mmol/L); Creatinine 0.75 MG/DL (Ref range: 0.55 - 1.02 MG/DL); Glucose 148 mg/dL* (Ref range: 65 - 100 mg/dL); Potassium 3.9 mmol/L (Ref range: 3.5 - 5.1 mmol/L); Sodium 141 mmol/L (Ref range: 136 - 145 mmol/L)  Coagulation: No results found for requested labs within first 48 hours of the last admission day. Cardiac markers: No results found for requested labs within first 48 hours of the last admission day. ABG: No results found for requested labs within first 48 hours of the last admission day. Liver: 9/29/2018: Albumin 4.0 g/dL (Ref range: 3.5 - 5.0 g/dL); Alk. phosphatase 67 U/L (Ref range: 45 - 117 U/L); AST (SGOT) 18 U/L (Ref range: 15 - 37 U/L);  Protein, total 8.0 g/dL (Ref range: 6.4 - 8.2 g/dL)    Discharge Labs:  Recent Results (from the past 24 hour(s))   CBC W/O DIFF    Collection Time: 10/03/18  3:31 AM   Result Value Ref Range    WBC 22.9 (H) 3.6 - 11.0 K/uL    RBC 3.11 (L) 3.80 - 5.20 M/uL    HGB 10.4 (L) 11.5 - 16.0 g/dL    HCT 33.6 (L) 35.0 - 47.0 %    .0 (H) 80.0 - 99.0 FL    MCH 33.4 26.0 - 34.0 PG    MCHC 31.0 30.0 - 36.5 g/dL    RDW 15.8 (H) 11.5 - 14.5 %    PLATELET 310 648 - 130 K/uL    MPV 9.9 8.9 - 12.9 FL    NRBC 0.1 (H) 0  WBC    ABSOLUTE NRBC 0.03 (H) 0.00 - 9.67 K/uL   METABOLIC PANEL, BASIC    Collection Time: 10/03/18  3:31 AM   Result Value Ref Range    Sodium 141 136 - 145 mmol/L    Potassium 4.0 3.5 - 5.1 mmol/L    Chloride 108 97 - 108 mmol/L    CO2 26 21 - 32 mmol/L    Anion gap 7 5 - 15 mmol/L    Glucose 91 65 - 100 mg/dL    BUN 18 6 - 20 MG/DL    Creatinine 0.57 0.55 - 1.02 MG/DL    BUN/Creatinine ratio 32 (H) 12 - 20      GFR est AA >60 >60 ml/min/1.73m2    GFR est non-AA >60 >60 ml/min/1.73m2    Calcium 9.0 8.5 - 10.1 MG/DL       Significant Diagnostic Studies:   CXR - Minimal right middle lobe atelectasis     CT chest - TRACHEA/BRONCHI: The airway is occluded at the level of the cricoid cartilage  and vocal cords. Otherwise patent. ESOPHAGUS: No wall thickening or dilatation. HEART: Normal in size without pericardial effusion. Coronary artery  calcifications noted. PLEURA: No effusion or pneumothorax. LUNGS: Minimal linear atelectasis right middle lobe. Otherwise clear.       Hospital Course: She was admitted, cultured, started on steroids, rocephin/azithromycin, jet neb treatments and cough suppression. She gradually improved with this therapy after several days. Chest xray remained normal and ran no fever. Seen by PT and felt to be at baseline functionality. After paroxysms of cough improved and she was less SOB, she was discharged home for continued outpatient treatment. Disposition: home    Activity: as tolerated     Diet: regular    Follow up appointment: Dr Miesha Wolf in 1 week     Patient Instructions:   Current Discharge Medication List      START taking these medications    Details   azithromycin (ZITHROMAX) 250 mg tablet Take 1 Tab by mouth daily for 3 days. Qty: 3 Tab, Refills: 0      benzonatate (TESSALON) 200 mg capsule Take 1 Cap by mouth three (3) times daily for 10 days.   Qty: 30 Cap, Refills: 0      predniSONE (DELTASONE) 10 mg tablet Take 3 tabs daily for 3 days, 2 tabs daily for 3 days, then 1 tab daily for 3 days  Qty: 18 Tab, Refills: 0         CONTINUE these medications which have NOT CHANGED    Details   acetaminophen (TYLENOL) 325 mg tablet Take 325 mg by mouth every four (4) hours as needed for Pain.      albuterol (VENTOLIN HFA) 90 mcg/actuation inhaler Take 2 Puffs by inhalation every four (4) hours as needed for Wheezing. anastrozole (ARIMIDEX) 1 mg tablet take 1 tablet by mouth once daily  Qty: 90 Tab, Refills: 2      ergocalciferol (VITAMIN D2) 50,000 unit capsule Take 1 Cap by mouth every seven (7) days. Qty: 12 Cap, Refills: 3    Associated Diagnoses: Vitamin D deficiency      vit A/vit C/vit E/zinc/copper (PRESERVISION AREDS PO) Take  by mouth.      multivitamin (ONE A DAY) tablet Take 1 Tab by mouth daily. chlorpheniramine-HYDROcodone (TUSSIONEX) 10-8 mg/5 mL suspension Take 5 mL by mouth every twelve (12) hours as needed for Cough. Max Daily Amount: 10 mL.   Qty: 60 mL, Refills: 0    Associated Diagnoses: Acute bronchitis, unspecified organism         STOP taking these medications       cefUROXime (CEFTIN) 500 mg tablet Comments:   Reason for Stopping:         aspirin (ASPIRIN) 325 mg tablet Comments:   Reason for Stopping:               Wound Care: None needed    Signed:  Danita Jackman MD  10/3/2018  6:24 AM

## 2018-10-03 NOTE — PROGRESS NOTES
The objective of todays visit was to review the transitional care plan with the patient. We discussed the importance of transitional care as it relates to reducing the likelihood of readmission. We reviewed the goals for the first 30 days following hospital discharge. The patient and I discussed wrap around services including nurse navigation, care coordination, home health, transitional care appointments with their primary care provider and specialist team and the role of dispatch health. Patient education focused on readmission zones as described as  The Red Zone: High risk for readmission, days 1-21  The Yellow Zone: Moderate risk for readmission, days 22-29  The Green Zone: Lower risk for readmission, days 30 and after    Medication reconciliation was performed. In addition, the patient was instructed on worrisome symptoms for worsening of their medical conditions and sepsis. Learning was assessed using teach back in the form of role playing. The patient identified appropriate wrap around services during this interaction. A written individual care plan was reviewed with the patient as well today. The patient expressed the following specific concerns regarding their discharge  None  Pt states does not have COPD- but that her diagnosis is bronchitis. Aware needs to schedule follow up with PCP.

## 2018-10-03 NOTE — ACP (ADVANCE CARE PLANNING)
Pt has existing ACP on file. Dated 2004, but pt states still has correct information. Encouraged pt to review periodically and update as needed as her proxy choice may change or her health care wishes may change as her medical condition changes.  16 minutes spent in discussion

## 2018-10-03 NOTE — PROGRESS NOTES
8:56AM 
CM left VM at Dr. Mendoza Moment office in attempt to schedule PCP f/u appt, requesting they contact pt to schedule. Pt to d/c home with family. No additional needs. Pt ready for d/c from CM perspective. CM available for any additional needs. Joe Sawyer MSW Care Manager

## 2018-10-04 ENCOUNTER — PATIENT OUTREACH (OUTPATIENT)
Dept: INTERNAL MEDICINE CLINIC | Age: 83
End: 2018-10-04

## 2018-10-04 ENCOUNTER — OFFICE VISIT (OUTPATIENT)
Dept: INTERNAL MEDICINE CLINIC | Age: 83
End: 2018-10-04

## 2018-10-04 VITALS
HEIGHT: 65 IN | OXYGEN SATURATION: 97 % | SYSTOLIC BLOOD PRESSURE: 148 MMHG | DIASTOLIC BLOOD PRESSURE: 62 MMHG | WEIGHT: 184 LBS | BODY MASS INDEX: 30.66 KG/M2 | HEART RATE: 90 BPM

## 2018-10-04 DIAGNOSIS — J96.00 ACUTE RESPIRATORY FAILURE, UNSPECIFIED WHETHER WITH HYPOXIA OR HYPERCAPNIA (HCC): Primary | ICD-10-CM

## 2018-10-04 DIAGNOSIS — J20.8 ACUTE BRONCHITIS DUE TO OTHER SPECIFIED ORGANISMS: ICD-10-CM

## 2018-10-04 LAB
BACTERIA SPEC CULT: NORMAL
SERVICE CMNT-IMP: NORMAL

## 2018-10-04 NOTE — PATIENT INSTRUCTIONS
Bronchitis: Care Instructions Your Care Instructions Bronchitis is inflammation of the bronchial tubes, which carry air to the lungs. The tubes swell and produce mucus, or phlegm. The mucus and inflamed bronchial tubes make you cough. You may have trouble breathing. Most cases of bronchitis are caused by viruses like those that cause colds. Antibiotics usually do not help and they may be harmful. Bronchitis usually develops rapidly and lasts about 2 to 3 weeks in otherwise healthy people. Follow-up care is a key part of your treatment and safety. Be sure to make and go to all appointments, and call your doctor if you are having problems. It's also a good idea to know your test results and keep a list of the medicines you take. How can you care for yourself at home? · Take all medicines exactly as prescribed. Call your doctor if you think you are having a problem with your medicine. · Get some extra rest. 
· Take an over-the-counter pain medicine, such as acetaminophen (Tylenol), ibuprofen (Advil, Motrin), or naproxen (Aleve) to reduce fever and relieve body aches. Read and follow all instructions on the label. · Do not take two or more pain medicines at the same time unless the doctor told you to. Many pain medicines have acetaminophen, which is Tylenol. Too much acetaminophen (Tylenol) can be harmful. · Take an over-the-counter cough medicine that contains dextromethorphan to help quiet a dry, hacking cough so that you can sleep. Avoid cough medicines that have more than one active ingredient. Read and follow all instructions on the label. · Breathe moist air from a humidifier, hot shower, or sink filled with hot water. The heat and moisture will thin mucus so you can cough it out. · Do not smoke. Smoking can make bronchitis worse. If you need help quitting, talk to your doctor about stop-smoking programs and medicines. These can increase your chances of quitting for good. When should you call for help? Call 911 anytime you think you may need emergency care. For example, call if: 
  · You have severe trouble breathing.  
 Call your doctor now or seek immediate medical care if: 
  · You have new or worse trouble breathing.  
  · You cough up dark brown or bloody mucus (sputum).  
  · You have a new or higher fever.  
  · You have a new rash.  
 Watch closely for changes in your health, and be sure to contact your doctor if: 
  · You cough more deeply or more often, especially if you notice more mucus or a change in the color of your mucus.  
  · You are not getting better as expected. Where can you learn more? Go to http://sumit-arpit.info/. Enter H333 in the search box to learn more about \"Bronchitis: Care Instructions. \" Current as of: December 6, 2017 Content Version: 11.8 © 4670-1583 [employer], [employer]. Care instructions adapted under license by [employer] (which disclaims liability or warranty for this information). If you have questions about a medical condition or this instruction, always ask your healthcare professional. [address] any warranty or liability for your use of this information.

## 2018-10-04 NOTE — MR AVS SNAPSHOT
[facility] 
 
 
 [place] [address] [address] [phone] Patient: Lorin Bynum MRN: [id number] CIE:[id number] Visit Information Date & Time Provider Department Dept. Phone Encounter #  
 10/4/2018 10:30 AM Haydee Arciniega [phone] [id number] Follow-up Instructions Return for As previously scheduled. Your Appointments 1/7/2019  9:00 AM  
FOLLOW UP 10 with MD Haydee Arciniega ([facility]) Appt Note: 6 mo fu  
 [place] [address] [address] [address] [facility] [id number]  
  
    
 3/8/2019  9:30 AM  
ESTABLISHED PATIENT with Jeffery Patel MD  
[address] [facility]) Appt Note: heme 1 yr f/u  
 [address] [place] [address] [address] [address]  
[phone]  
  
   
 [address]  
  
    
 7/8/2019  9:00 AM  
Complete Physical with MD Haydee Arciniega ([facility]) Appt Note: annual wellness exam  
 [place] [address] [address] [address] [facility] [id number] Upcoming Health Maintenance Date Due Shingrix Vaccine Age 50> (2 of 2) 10/1/2018 MEDICARE YEARLY EXAM 7/3/2019 GLAUCOMA SCREENING Q2Y 2/15/2020 DTaP/Tdap/Td series (2 - Td) 11/13/2026 Allergies as of 10/4/2018  Review Complete On: 10/4/2018 By: Shanon Panda MD  
  
 Severity Noted Reaction Type Reactions Hydrocodone Medium 10/28/2011   Intolerance Nausea Only, Vertigo Gabapentin  10/16/2015    Diarrhea, Nausea Only, Other (comments)  
 weakness Oxycodone  10/21/2011    Nausea and Vomiting, Vertigo Current Immunizations  Reviewed on 8/28/2015 Name Date Influenza High Dose Vaccine PF 9/13/2018 12:00 AM  
 Influenza Vaccine 9/14/2017, 1/1/2013 Pneumococcal Conjugate (PCV-13) 9/19/2015 Pneumococcal Polysaccharide (PPSV-23) 11/17/2008 Tdap 11/13/2016 12:09 PM  
 Zoster Recombinant 8/6/2018 12:00 AM  
  
 Not reviewed this visit You Were Diagnosed With   
  
 Codes Comments Acute respiratory failure, unspecified whether with hypoxia or hypercapnia ([facility]    -  Primary ICD-10-CM: J96.00 
ICD-9-CM: 518.81 Acute bronchitis due to other specified organisms     ICD-10-CM: J20.8 ICD-9-CM: 466.0 Vitals BP Pulse Height(growth percentile) Weight(growth percentile) SpO2 BMI  
 148/62 (BP 1 Location: Right arm, BP Patient Position: Sitting) 90 5' 5\" (1.651 m) 184 lb (83.5 kg) 97% 30.62 kg/m2 OB Status Smoking Status Hysterectomy Former Smoker BMI and BSA Data Body Mass Index Body Surface Area  
 30.62 kg/m 2 1.96 m 2 Preferred Pharmacy Pharmacy Name Phone [id number] [address] [place] [address] [phone] Your Updated Medication List  
  
   
This list is accurate as of 10/4/18 11:04 AM.  Always use your most recent med list.  
  
  
  
  
 anastrozole 1 mg tablet Commonly known as:  ARIMIDEX  
take 1 tablet by mouth once daily  
  
 azithromycin 250 mg tablet Commonly known as:  Sergio Priscilay Take 1 Tab by mouth daily for 3 days. benzonatate 200 mg capsule Commonly known as:  TESSALON Take 1 Cap by mouth three (3) times daily for 10 days. chlorpheniramine-HYDROcodone 10-8 mg/5 mL suspension Commonly known as:  Derotha Hove Take 5 mL by mouth every twelve (12) hours as needed for Cough. Max Daily Amount: 10 mL. ergocalciferol 50,000 unit capsule Commonly known as:  VITAMIN D2 Take 1 Cap by mouth every seven (7) days. multivitamin tablet Commonly known as:  ONE A DAY Take 1 Tab by mouth daily. predniSONE 10 mg tablet Commonly known as:  Dianne Eureka Take 3 tabs daily for 3 days, 2 tabs daily for 3 days, then 1 tab daily for 3 days PRESERVISION AREDS PO Take  by mouth. TYLENOL 325 mg tablet Generic drug:  acetaminophen Take 325 mg by mouth every four (4) hours as needed for Pain. VENTOLIN HFA 90 mcg/actuation inhaler Generic drug:  albuterol Take 2 Puffs by inhalation every four (4) hours as needed for Wheezing. Follow-up Instructions Return for As previously scheduled. Patient Instructions Bronchitis: Care Instructions Your Care Instructions Bronchitis is inflammation of the bronchial tubes, which carry air to the lungs. The tubes swell and produce mucus, or phlegm. The mucus and inflamed bronchial tubes make you cough. You may have trouble breathing. Most cases of bronchitis are caused by viruses like those that cause colds. Antibiotics usually do not help and they may be harmful. Bronchitis usually develops rapidly and lasts about 2 to 3 weeks in otherwise healthy people. Follow-up care is a key part of your treatment and safety. Be sure to make and go to all appointments, and call your doctor if you are having problems. It's also a good idea to know your test results and keep a list of the medicines you take. How can you care for yourself at home? · Take all medicines exactly as prescribed. Call your doctor if you think you are having a problem with your medicine. · Get some extra rest. 
· Take an over-the-counter pain medicine, such as acetaminophen (Tylenol), ibuprofen (Advil, Motrin), or naproxen (Aleve) to reduce fever and relieve body aches. Read and follow all instructions on the label. · Do not take two or more pain medicines at the same time unless the doctor told you to.  Many pain medicines have acetaminophen, which is Tylenol. Too much acetaminophen (Tylenol) can be harmful. · Take an over-the-counter cough medicine that contains dextromethorphan to help quiet a dry, hacking cough so that you can sleep. Avoid cough medicines that have more than one active ingredient. Read and follow all instructions on the label. · Breathe moist air from a humidifier, hot shower, or sink filled with hot water. The heat and moisture will thin mucus so you can cough it out. · Do not smoke. Smoking can make bronchitis worse. If you need help quitting, talk to your doctor about stop-smoking programs and medicines. These can increase your chances of quitting for good. When should you call for help? Call 911 anytime you think you may need emergency care. For example, call if: 
  · You have severe trouble breathing.  
 Call your doctor now or seek immediate medical care if: 
  · You have new or worse trouble breathing.  
  · You cough up dark brown or bloody mucus (sputum).  
  · You have a new or higher fever.  
  · You have a new rash.  
 Watch closely for changes in your health, and be sure to contact your doctor if: 
  · You cough more deeply or more often, especially if you notice more mucus or a change in the color of your mucus.  
  · You are not getting better as expected. Where can you learn more? Go to http://sumit-arpit.info/. Enter H333 in the search box to learn more about \"Bronchitis: Care Instructions. \" Current as of: December 6, 2017 Content Version: 11.8 © 6260-7860 Healthwise, [employer]. Care instructions adapted under license by [employer] (which disclaims liability or warranty for this information). If you have questions about a medical condition or this instruction, always ask your healthcare professional. [place] [address] any warranty or liability for your use of this information. Introducing [facility] & HEALTH SERVICES! Dear Elfego Dark: Thank you for requesting a [employer] account. Our records indicate that you already have an active [employer] account. You can access your account anytime at [email or web address] Did you know that you can access your hospital and ER discharge instructions at any time in [employer]? You can also review all of your test results from your hospital stay or ER visit. Additional Information If you have questions, please visit the Frequently Asked Questions section of the [employer] website at [email or web address] Remember, [employer] is NOT to be used for urgent needs. For medical emergencies, dial 911. Now available from your iPhone and Android! Please provide this summary of care documentation to your next provider. Your primary care clinician is listed as JARROD Grey. If you have any questions after today's visit, please call [phone].

## 2018-10-04 NOTE — PROGRESS NOTES
Hospital Discharge Follow-Up Date/Time:  10/4/2018 11:03 AM 
 
Patient was admitted to [facility] on 18 and discharged on 10/3/18 for acute respiratory failure/acute bronchitis. The physician discharge summary was available at the time of outreach. Patient was contacted within two business days of discharge. Top Challenges reviewed with the provider Method of communication with provider :face to face Inpatient RRAT score: 12 Was this a readmission? no  
Patient stated reason for the readmission: n/a Nurse Navigator (NN) contacted the patient by telephone to perform post hospital discharge assessment. Verified name and  with patient as identifiers. Provided introduction to self, and explanation of the Nurse Navigator role. Reviewed discharge instructions and red flags with patient who verbalized understanding. Patient given an opportunity to ask questions and does not have any further questions or concerns at this time. The patient agrees to contact the PCP office for questions related to their healthcare. NN provided contact information for future reference. Disease Specific:   N/A Summary of patient's top problems: 
1. S/P hospital stay for acute respiratory failure/bronchits - now resolving. Home Health orders at discharge: none [address]: n/a Date of initial visit: n/a Durable Medical Equipment ordered/company: n/a Durable Medical Equipment received: n/a Barriers to care? none Advance Care Planning:  
Does patient have an Advance Directive:  reviewed and current Medication(s):  
New Medications at Discharge: zithromax, tessalon, prednisone Changed Medications at Discharge: n/a Discontinued Medications at Discharge: asa, ceftin Medication reconciliation was performed with patient, who verbalizes understanding of administration of home medications.   There were no barriers to obtaining medications identified at this time. Referral to Pharm D needed: no  
 
Current Outpatient Prescriptions Medication Sig  
 azithromycin (ZITHROMAX) 250 mg tablet Take 1 Tab by mouth daily for 3 days.  benzonatate (TESSALON) 200 mg capsule Take 1 Cap by mouth three (3) times daily for 10 days.  predniSONE (DELTASONE) 10 mg tablet Take 3 tabs daily for 3 days, 2 tabs daily for 3 days, then 1 tab daily for 3 days  acetaminophen (TYLENOL) 325 mg tablet Take 325 mg by mouth every four (4) hours as needed for Pain.  albuterol (VENTOLIN HFA) 90 mcg/actuation inhaler Take 2 Puffs by inhalation every four (4) hours as needed for Wheezing.  chlorpheniramine-HYDROcodone (TUSSIONEX) 10-8 mg/5 mL suspension Take 5 mL by mouth every twelve (12) hours as needed for Cough. Max Daily Amount: 10 mL.  anastrozole (ARIMIDEX) 1 mg tablet take 1 tablet by mouth once daily  ergocalciferol (VITAMIN D2) 50,000 unit capsule Take 1 Cap by mouth every seven (7) days.  vit A/vit C/vit E/zinc/copper (PRESERVISION AREDS PO) Take  by mouth.  multivitamin (ONE A DAY) tablet Take 1 Tab by mouth daily. No current facility-administered medications for this visit. There are no discontinued medications. [facility] follow up appointment(s): Future Appointments Date Time Provider Anju Yessi 1/7/2019 9:00 AM Dwana Habermann, MD [address] [place] [address], [phone]  
3/8/2019 9:30 AM Melvin Ortega MD [facility].   
7/8/2019 9:00 AM Dwana Habermann, MD [address] [place] [address], [phone] Non-[facility] follow up appointment(s): n/a [facility]:  n/a  
 
 
Goals  Understands red flags post discharge. 10/4/18-NN met with patient at her [facility] visit with PCP today. Patient reports cough much better, and taking medications as prescribed.   Reviewed signs and symptoms for patient to be alert to for exacerbation of bronchitis and possible infection in the future, such as increased congestion, fever, malaise, increased cough. Patient instructed to contact MD if any symptoms recur. Patient voiced understanding and will contact MD immediately should they recur.   NN will check with patient in 2 weeks to see how she is progressing. / vs

## 2018-10-04 NOTE — PROGRESS NOTES
Jonathon De Jesus is a 82 y.o. female presenting for Transitions Of Care [place] 1. Have you been to the ER, urgent care clinic since your last visit? Hospitalized since your last visit? Yes When: 10/3/2018 Where: [facility] Reason for visit: respiratory failure 2. Have you seen or consulted any other health care providers outside of the [address] [place] [address] since your last visit? Include any pap smears or colon screening. No 
 
Fall Risk Assessment, last 12 mths 7/2/2018 Able to walk? Yes Fall in past 12 months? No  
Fall with injury? -  
Number of falls in past 12 months - Fall Risk Score -  
 
 
 
Abuse Screening Questionnaire 7/2/2018 Do you ever feel afraid of your partner? Jessie Hodges Are you in a relationship with someone who physically or mentally threatens you? Jessie Hodges Is it safe for you to go home? Y  
 
 
PHQ over the last two weeks 7/2/2018 Little interest or pleasure in doing things Not at all Feeling down, depressed, irritable, or hopeless Not at all Total Score PHQ 2 0 There are no discontinued medications.

## 2018-10-04 NOTE — PROGRESS NOTES
This note will not be viewable in [address] Sia Azar is a 82 y.o. female and presents with Transitions Of Care Melinda Copper Subjective: 
Mrs. Chetan Khanna returns to our office today and transition of care subsequent to a hospitalization at [facility] from September 29th until October 3rd when she was hospitalized with acute respiratory failure and acute bronchitis with paroxysmal coughing. The patient had failed outpatient management and subsequently was admitted and was started on IV antibiotics, steroids and jet nebulizer treatments along with cough suppressants. Her paroxysms of cough gradually improved. A chest x-ray and CT scan on admission revealed minimal atelectasis of her right middle lobe which was clear on a follow-up chest x-ray prior to discharge. The patient was sent home on p.o. azithromycin, tapering prednisone and Tessalon Perles. The patient reports that she is doing remarkably well and she has had less paroxysms  of cough since she has been home. She denies any shortness of breath or sputum production and is had no fever. Past Medical History:  
Diagnosis Date  Arthritis  Bunion of right foot 8/20/2015  Chronic pain   
 back--uses tens unit  Diverticulitis 6/29/2018 Recurrent  Dry eye syndrome 6/29/2018  Fibromyalgia 6/29/2018  GERD without esophagitis 6/29/2018  Hypercholesterolemia 6/29/2018  Ill-defined condition   
 blood transfusion hx  Osteoporosis 6/29/2018  Peripheral neuropathy 6/29/2018  Prediabetes 6/29/2018  Radiation therapy complication 2[address] breast-No Chemo  Rosacea 6/29/2018  Trouble in sleeping Past Surgical History:  
Procedure Laterality Date  BREAST SURGERY PROCEDURE UNLISTED  11/21/13 Left breast lumpectomy with sentinel lymph node biopsy  HX APPENDECTOMY  HX BREAST LUMPECTOMY  11/21/2013  LEFT BREAST LUMPECTOMY W/LEFT BREAST SENTINEL NODE BIOPSY,AND NEEDLE LOCALIZATION performed by Serafin Alvarado MD at [facility] MAIN OR  
 HX CATARACT REMOVAL    
 bilateral  
 HX HYSTERECTOMY    
 ovarian cyst  
 HX MOHS PROCEDURES    
 right  HX ORTHOPAEDIC    
 back surgery  HX OTHER SURGICAL    
 colonoscopy  HX TONSILLECTOMY  TOTAL KNEE ARTHROPLASTY    
 left  TOTAL KNEE ARTHROPLASTY    
 right [address] BREAST BIOPSY Left 2013 Breast Ca Allergies Allergen Reactions  Hydrocodone Nausea Only and Vertigo  Gabapentin Diarrhea, Nausea Only and Other (comments)  
  weakness  Oxycodone Nausea and Vomiting and Vertigo Current Outpatient Prescriptions Medication Sig Dispense Refill  azithromycin (ZITHROMAX) 250 mg tablet Take 1 Tab by mouth daily for 3 days. 3 Tab 0  
 benzonatate (TESSALON) 200 mg capsule Take 1 Cap by mouth three (3) times daily for 10 days. 30 Cap 0  predniSONE (DELTASONE) 10 mg tablet Take 3 tabs daily for 3 days, 2 tabs daily for 3 days, then 1 tab daily for 3 days 18 Tab 0  
 acetaminophen (TYLENOL) 325 mg tablet Take 325 mg by mouth every four (4) hours as needed for Pain.  albuterol (VENTOLIN HFA) 90 mcg/actuation inhaler Take 2 Puffs by inhalation every four (4) hours as needed for Wheezing.  chlorpheniramine-HYDROcodone (TUSSIONEX) 10-8 mg/5 mL suspension Take 5 mL by mouth every twelve (12) hours as needed for Cough. Max Daily Amount: 10 mL. 60 mL 0  
 anastrozole (ARIMIDEX) 1 mg tablet take 1 tablet by mouth once daily 90 Tab 2  
 ergocalciferol (VITAMIN D2) 50,000 unit capsule Take 1 Cap by mouth every seven (7) days. 12 Cap 3  
 vit A/vit C/vit E/zinc/copper (PRESERVISION AREDS PO) Take  by mouth.  multivitamin (ONE A DAY) tablet Take 1 Tab by mouth daily. Social History Social History  Marital status:  Spouse name: N/A  
 Number of children: N/A  
 Years of education: N/A Social History Main Topics  Smoking status: Former Smoker Packs/day: 0.50 Years: 50.00 Quit date: 2/13/2012  Smokeless tobacco: Never Used  Alcohol use 1.2 oz/week 2 Glasses of wine per week Comment: occasionally 3-4 off and on a week  Drug use: No  
 Sexual activity: Not Asked Other Topics Concern  None Social History Narrative Family History Problem Relation Age of Onset  Cancer Mother   
  bladder  Cancer Father  Breast Cancer Paternal Grandmother Health Maintenance Topic Date Due  Shingrix Vaccine Age 50> (2 of 2) 10/01/2018  MEDICARE YEARLY EXAM  07/03/2019  GLAUCOMA SCREENING Q2Y  02/15/2020  
 DTaP/Tdap/Td series (2 - Td) 11/13/2026  Bone Densitometry (Dexa) Screening  Completed  Pneumococcal 65+ High/Highest Risk  Completed  Influenza Age 5 to Adult  Completed Review of Systems Constitutional: negative for fevers, chills, anorexia and weight loss Eyes:   negative for visual disturbance and irritation ENT:   negative for tinnitus,sore throat,nasal congestion,ear pain,hoarseness Respiratory:  Positive for occasional cough but negative for shortness of breath or wheezing CV:   negative for chest pain, palpitations, lower extremity edema GI:   negative for nausea, vomiting, diarrhea, abdominal pain,melena Endo:               negative for polyuria,polydipsia,polyphagia,heat intolerance Genitourinary: negative for frequency, dysuria and hematuria Integumentary: negative for rash and pruritus Hematologic:  negative for easy bruising and gum/nose bleeding Musculoskel: negative for myalgias, arthralgias, back pain, muscle weakness, joint pain Neurological:  negative for headaches, dizziness, vertigo, memory problems and gait Behavl/Psych: negative for feelings of anxiety, depression, mood changes ROS otherwise negative Objective: 
Visit Vitals  /62 (BP 1 Location: Right arm, BP Patient Position: Sitting)  Pulse 90  
 Ht 5' 5\" (1.651 m)  Wt 184 lb (83.5 kg)  SpO2 97%  BMI 30.62 kg/m2 Body mass index is 30.62 kg/(m^2). Physical Exam:  
General appearance - alert, well appearing, and in no distress Mental status - alert, oriented to person, place, and time EYE-HOWARD, EOMI,conjunctiva normal bilaterally, lids normal 
ENT-ENT exam normal, no neck nodes or sinus tenderness Nose - normal and patent, no erythema,  Or discharge Mouth - mucous membranes moist, pharynx normal without lesions Neck - supple, no significant adenopathy or bruit Chest - clear to auscultation, no wheezes, rales or rhonchi. Heart - normal rate, regular rhythm, normal S1, S2, no murmurs, rubs, clicks or gallops Abdomen - soft, nontender, nondistended, no masses or organomegaly Lymph- no adenopathy palpable Ext-peripheral pulses normal, no pedal edema, no clubbing or cyanosis Skin-Warm and dry. no hyperpigmentation, vitiligo, or suspicious lesions Neuro -alert, oriented, normal speech, no focal findings or movement disorder noted Assessment/Plan: 
Diagnoses and all orders for this visit: 
 
Acute respiratory failure, unspecified whether with hypoxia or hypercapnia ([address] Acute bronchitis due to other specified organisms Other instructions: The patient's medications are reviewed and reconciled. No change in her current medical regimen is made. The patient is continuing to improve and she will continue on her current medical regimen until completed. I have asked her to slowly increase her activity level over the next 2 weeks. She does swim for exercise and she will stay out of the pool until that time. Follow-up here otherwise as previously scheduled Follow-up Disposition: 
Return for As previously scheduled. I have reviewed with the patient details of the assessment and plan and all questions were answered. Relevent patient education was performed. The most recent lab findings were reviewed with the patient. An After Visit Summary was printed and given to the patient.  
 
Mariposa Barroso MD

## 2018-10-18 ENCOUNTER — PATIENT OUTREACH (OUTPATIENT)
Dept: INTERNAL MEDICINE CLINIC | Age: 83
End: 2018-10-18

## 2018-10-18 NOTE — PROGRESS NOTES
Goals  Understands red flags post discharge. 10/18/18-NN spoke with patient. She states her cough has gone and she has finished her prednisone. She has had no fever or shortness of breath. She does report some dizziness and neuropathy and blames that on coming off of Prednisone. Advised patient to stay well hydrated, and rest, but to call back if symptoms do not resolve so that she can come in for evaluation. Will f/u with patient next week. / vs 
 
10/4/18-NN met with patient at her [facility] visit with PCP today. Patient reports cough much better, and taking medications as prescribed. Reviewed signs and symptoms for patient to be alert to for exacerbation of bronchitis and possible infection in the future, such as increased congestion, fever, malaise, increased cough. Patient instructed to contact MD if any symptoms recur. Patient voiced understanding and will contact MD immediately should they recur.   NN will check with patient in 2 weeks to see how she is progressing. / vs

## 2018-10-24 ENCOUNTER — PATIENT OUTREACH (OUTPATIENT)
Dept: INTERNAL MEDICINE CLINIC | Age: 83
End: 2018-10-24

## 2018-10-24 NOTE — PROGRESS NOTES
Goals  Understands red flags post discharge. 10/24/18-Message left for patient to call me back to check on her progress. / vs 
 
 
10/18/18-NN spoke with patient. She states her cough has gone and she has finished her prednisone. She has had no fever or shortness of breath. She does report some dizziness and neuropathy and blames that on coming off of Prednisone. Advised patient to stay well hydrated, and rest, but to call back if symptoms do not resolve so that she can come in for evaluation. Will f/u with patient next week. / vs 
 
10/4/18-NN met with patient at her [facility] visit with PCP today. Patient reports cough much better, and taking medications as prescribed. Reviewed signs and symptoms for patient to be alert to for exacerbation of bronchitis and possible infection in the future, such as increased congestion, fever, malaise, increased cough. Patient instructed to contact MD if any symptoms recur. Patient voiced understanding and will contact MD immediately should they recur.   NN will check with patient in 2 weeks to see how she is progressing. / vs

## 2018-10-29 ENCOUNTER — PATIENT OUTREACH (OUTPATIENT)
Dept: INTERNAL MEDICINE CLINIC | Age: 83
End: 2018-10-29

## 2018-10-29 NOTE — PROGRESS NOTES
Goals  Understands red flags post discharge. 10/29/18-NN spoke with patient. She feels her bronchitis has resolved, and she is having no difficulty at this time with sob. She states she is having trouble with neuropathy in her legs and would like to see PCP about this. Appointment given to patient for 11/1/18/  Will f/u with patient next week / vs 
 
10/24/18-Message left for patient to call me back to check on her progress. / vs 
 
 
10/18/18-NN spoke with patient. She states her cough has gone and she has finished her prednisone. She has had no fever or shortness of breath. She does report some dizziness and neuropathy and blames that on coming off of Prednisone. Advised patient to stay well hydrated, and rest, but to call back if symptoms do not resolve so that she can come in for evaluation. Will f/u with patient next week. / vs 
 
10/4/18-NN met with patient at her [facility] visit with PCP today. Patient reports cough much better, and taking medications as prescribed. Reviewed signs and symptoms for patient to be alert to for exacerbation of bronchitis and possible infection in the future, such as increased congestion, fever, malaise, increased cough. Patient instructed to contact MD if any symptoms recur. Patient voiced understanding and will contact MD immediately should they recur.   NN will check with patient in 2 weeks to see how she is progressing. / vs

## 2018-11-01 ENCOUNTER — OFFICE VISIT (OUTPATIENT)
Dept: INTERNAL MEDICINE CLINIC | Age: 83
End: 2018-11-01

## 2018-11-01 VITALS
BODY MASS INDEX: 31.49 KG/M2 | TEMPERATURE: 98 F | HEIGHT: 65 IN | HEART RATE: 90 BPM | DIASTOLIC BLOOD PRESSURE: 82 MMHG | OXYGEN SATURATION: 99 % | WEIGHT: 189 LBS | SYSTOLIC BLOOD PRESSURE: 136 MMHG

## 2018-11-01 DIAGNOSIS — G60.9 IDIOPATHIC PERIPHERAL NEUROPATHY: Primary | Chronic | ICD-10-CM

## 2018-11-01 RX ORDER — PREGABALIN 50 MG/1
50 CAPSULE ORAL DAILY
Qty: 30 CAP | Refills: 0 | Status: SHIPPED | OUTPATIENT
Start: 2018-11-01 | End: 2018-12-03 | Stop reason: SDUPTHER

## 2018-11-01 NOTE — PROGRESS NOTES
Megan Murphy presents with Chief Complaint Patient presents with  Foot Pain  
  bilateral numbness & tingling Patient here with complaint of bilateral lower extremity (mainly feet) numbness & tingling for several months that has gottern worse. 1. Have you been to the ER, urgent care clinic since your last visit? Hospitalized since your last visit? No 
 
2. Have you seen or consulted any other health care providers outside of the [address] [place] [address] since your last visit? Include any pap smears or colon screening.  No

## 2018-11-01 NOTE — PATIENT INSTRUCTIONS
Neuropathic Pain: Care Instructions Your Care Instructions Neuropathic pain is caused by pressure on or damage to your nerves. It's often simply called nerve pain. Some people feel this type of pain all the time. For others, it comes and goes. Diabetes, shingles, or an injury can cause nerve pain. Many people say the pain feels sharp, burning, or stabbing. But some people feel it as a dull ache. In some cases, it makes your skin very sensitive. So touch, pressure, and other sensations that did not hurt before may now cause pain. It's important to know that this kind of pain is real and can affect your quality of life. It's also important to know that treatment can help. Treatment includes pain medicines, exercise, and physical therapy. Medicines can help reduce the number of pain signals that travel over the nerves. This can make the painful areas less sensitive. It can also help you sleep better and improve your mood. But medicines are only one part of successful treatment. Most people do best with more than one kind of treatment. Your doctor may recommend that you try cognitive-behavioral therapy and stress management. Or, if needed, you may decide to try to quit smoking, lower your blood pressure, or better control blood sugar. These kinds of healthy changes can also make a difference. If you feel that your treatment is not working, talk to your doctor. And be sure to tell your doctor if you think you might be depressed or anxious. These are common problems that can also be treated. Follow-up care is a key part of your treatment and safety. Be sure to make and go to all appointments, and call your doctor if you are having problems. It's also a good idea to know your test results and keep a list of the medicines you take. How can you care for yourself at home? · Be safe with medicines. Read and follow all instructions on the label. ? If the doctor gave you a prescription medicine for pain, take it as prescribed. ? If you are not taking a prescription pain medicine, ask your doctor if you can take an over-the-counter medicine. · Save hard tasks for days when you have less pain. Follow a hard task with an easy task. And remember to take breaks. · Relax, and reduce stress. You may want to try deep breathing or meditation. These can help. · Keep moving. Gentle, daily exercise can help reduce pain. Your doctor or physical therapist can tell you what type of exercise is best for you. This may include walking, swimming, and stationary biking. It may also include stretches and range-of-motion exercises. · Try heat, cold packs, and massage. · Get enough sleep. Constant pain can make you more tired. If the pain makes it hard to sleep, talk with your doctor. · Think positively. Your thoughts can affect your pain. Do fun things to distract yourself from the pain. See a movie, read a book, listen to music, or spend time with a friend. · Keep a pain diary. Try to write down how strong your pain is and what it feels like. Also try to notice and write down how your moods, thoughts, sleep, activities, and medicine affect your pain. These notes can help you and your doctor find the best ways to treat your pain. Reducing constipation caused by pain medicine Pain medicines often cause constipation. To reduce constipation: 
· Include fruits, vegetables, beans, and whole grains in your diet each day. These foods are high in fiber. · Drink plenty of fluids, enough so that your urine is light yellow or clear like water. If you have kidney, heart, or liver disease and have to limit fluids, talk with your doctor before you increase the amount of fluids you drink. · Get some exercise every day. Build up slowly to 30 to 60 minutes a day on 5 or more days of the week.  
· Take a fiber supplement, such as Citrucel or Metamucil, every day if needed. Read and follow all instructions on the label. · Schedule time each day for a bowel movement. Having a daily routine may help. Take your time and do not strain when having a bowel movement. · Ask your doctor about a laxative. The goal is to have one easy bowel movement every 1 to 2 days. Do not let constipation go untreated for more than 3 days. When should you call for help? Call your doctor now or seek immediate medical care if: 
  · You feel sad, anxious, or hopeless for more than a few days. This could mean you are depressed. Depression is common in people who have a lot of pain. But it can be treated.  
  · You have trouble with bowel movements, such as: 
? No bowel movement in 3 days. ? Blood in the anal area, in your stool, or on the toilet paper. ? Diarrhea for more than 24 hours.  
 Watch closely for changes in your health, and be sure to contact your doctor if: 
  · Your pain is getting worse.  
  · You can't sleep because of pain.  
  · You are very worried or anxious about your pain.  
  · You have trouble taking your pain medicine.  
  · You have any concerns about your pain medicine or its side effects.  
  · You have vomiting or cramps for more than 2 hours. Where can you learn more? Go to http://sumit-arpit.info/. Enter V906 in the search box to learn more about \"Neuropathic Pain: Care Instructions. \" Current as of: June 4, 2018 Content Version: 11.8 © 9829-3544 [employer]. Care instructions adapted under license by [employer] (which disclaims liability or warranty for this information). If you have questions about a medical condition or this instruction, always ask your healthcare professional. [place] [address] any warranty or liability for your use of this information.

## 2018-11-01 NOTE — PROGRESS NOTES
This note will not be viewable in [address] Subjective:  
 
Mrs. Antonio Gandhi presents to the office today with complaints of pain and discomfort in her feet related to a known peripheral neuropathy. Patient has been seen and evaluated by Dr. Ilya Oakley in the past.  In addition to a peripheral neuropathy she also has lumbar spinal stenosis for which she has had neurogenic claudication. The patient does take a multivitamin during the course of the day. She previously had been treated with gabapentin but did not tolerate the medication. She notes that the neuropathy is affecting her balance and her ability to walk. She believes that it has increased over time. It does not involve her hands. The patient does have a history of prediabetes however has never had a full-blown diabetes. Past Medical History:  
Diagnosis Date  Arthritis  Bunion of right foot 8/20/2015  Chronic pain   
 back--uses tens unit  Diverticulitis 6/29/2018 Recurrent  Dry eye syndrome 6/29/2018  Fibromyalgia 6/29/2018  GERD without esophagitis 6/29/2018  Hypercholesterolemia 6/29/2018  Ill-defined condition   
 blood transfusion hx  Osteoporosis 6/29/2018  Peripheral neuropathy 6/29/2018  Prediabetes 6/29/2018  Radiation therapy complication 0632 Lt breast-No Chemo  Rosacea 6/29/2018  Trouble in sleeping Past Surgical History:  
Procedure Laterality Date  BREAST SURGERY PROCEDURE UNLISTED  11/21/13 Left breast lumpectomy with sentinel lymph node biopsy  HX APPENDECTOMY  HX CATARACT REMOVAL    
 bilateral  
 HX HYSTERECTOMY    
 ovarian cyst  
 HX MOHS PROCEDURES    
 right  HX ORTHOPAEDIC    
 back surgery  HX OTHER SURGICAL    
 colonoscopy  HX TONSILLECTOMY  TOTAL KNEE ARTHROPLASTY    
 left  TOTAL KNEE ARTHROPLASTY    
 right [facility] BREAST BIOPSY Left 2013 Breast Ca Allergies Allergen Reactions  Hydrocodone Nausea Only and Vertigo  Gabapentin Diarrhea, Nausea Only and Other (comments)  
  weakness  Oxycodone Nausea and Vomiting and Vertigo Current Outpatient Medications Medication Sig Dispense Refill  pregabalin (LYRICA) 50 mg capsule Take 1 Cap by mouth daily. Max Daily Amount: 50 mg. 30 Cap 0  
 acetaminophen (TYLENOL) 325 mg tablet Take 325 mg by mouth every four (4) hours as needed for Pain.  anastrozole (ARIMIDEX) 1 mg tablet take 1 tablet by mouth once daily 90 Tab 2  
 ergocalciferol (VITAMIN D2) 50,000 unit capsule Take 1 Cap by mouth every seven (7) days. 12 Cap 3  
 vit A/vit C/vit E/zinc/copper (PRESERVISION AREDS PO) Take  by mouth.  multivitamin (ONE A DAY) tablet Take 1 Tab by mouth daily. Social History Socioeconomic History  Marital status:  Spouse name: Not on file  Number of children: Not on file  Years of education: Not on file  Highest education level: Not on file Social Needs  Financial resource strain: Not on file  Food insecurity - worry: Not on file  Food insecurity - inability: Not on file  Transportation needs - medical: Not on file  Transportation needs - non-medical: Not on file Occupational History  Not on file Tobacco Use  Smoking status: Former Smoker Packs/day: 0.50 Years: 50.00 Pack years: 25.00 Last attempt to quit: 2012 Years since quittin.7  Smokeless tobacco: Never Used Substance and Sexual Activity  Alcohol use: Yes Alcohol/week: 1.2 oz Types: 2 Glasses of wine per week Comment: occasionally 3-4 off and on a week  Drug use: No  
 Sexual activity: Not on file Other Topics Concern  Not on file Social History Narrative  Not on file Family History Problem Relation Age of Onset  Cancer Mother   
     bladder  Cancer Father  Breast Cancer Paternal Grandmother Review of Systems: GEN: no weight loss, weight gain, fatigue or night sweats CV: no PND, orthopnea, or palpitations Resp: no dyspnea on exertion, no cough Abd: no nausea, vomiting or diarrhea EXT: denies edema, claudication. Complains of pain and paresthesias of her feet and ankles. Endocrine: no hair loss, excessive thirst or polyuria Neurological ROS: no TIA or stroke symptoms ROS otherwise negative Objective:  
 
Visit Vitals /82 Pulse 90 Temp 98 °F (36.7 °C) (Oral) Ht 5' 5\" (1.651 m) Wt 189 lb (85.7 kg) SpO2 99% BMI 31.45 kg/m² Body mass index is 31.45 kg/m². General:   alert, cooperative and no distress Eyes: conjunctivae/sclerae clear. PERRL, EOM's intact Mouth:  No oral lesions, no pharyngeal erythema, no exudates Neck: Trachea midline, no thyromegaly, no bruits Heart: S1 and S2 normal,no murmurs noted Lungs: Clear to auscultation bilaterally, no increased work of breathing Abdomen: Soft, nontender. Normal bowel sounds Extremities: No edema or cyanosis Neuro: ..alert, oriented x3,speech normal in context and clarity, cranial nerves II-XII intact,motor strength: full proximally and distally,gait: normal 
reflexes: full and symmetric Diabetic foot exam:  
 
Left Foot: 
 Visual Exam: normal  
 Pulse DP: 2+ (normal) Filament test: reduced sensation Vibratory sensation: absent Right Foot: 
 Visual Exam: normal  
 Pulse DP: 2+ (normal) Filament test: reduced sensation Vibratory sensation: absent Physical exam otherwise negative Assessment/Plan:  
 
Diagnoses and all orders for this visit: 
 
Idiopathic peripheral neuropathy 
-     VITAMIN B12 
-     pregabalin (LYRICA) 50 mg capsule; Take 1 Cap by mouth daily. Max Daily Amount: 50 mg., Print, Disp-30 Cap, R-0 Other instructions: The patient's medications are reviewed and reconciled. No change in her current medical regimen is made. A vitamin B12 level will be obtained as she is concerned as there was a family history of this. We will start on low-dose Lyrica at 50 mg nightly and move this dose up monthly if there is no improvement and she tolerates the medication. Follow-up here pending response to the above Follow-up Disposition: 
Return for As previously scheduled.  
 
Tiffanie Brown MD

## 2018-11-02 ENCOUNTER — TELEPHONE (OUTPATIENT)
Dept: INTERNAL MEDICINE CLINIC | Age: 83
End: 2018-11-02

## 2018-11-02 LAB — VIT B12 SERPL-MCNC: 1394 PG/ML (ref 232–1245)

## 2018-11-02 NOTE — TELEPHONE ENCOUNTER
Patient is calling in regards to a missed call from [facility] for her labs. She is requesting a call back as soon as possible.     Best call back # for patient: [phone]

## 2018-11-06 ENCOUNTER — PATIENT OUTREACH (OUTPATIENT)
Dept: INTERNAL MEDICINE CLINIC | Age: 83
End: 2018-11-06

## 2018-11-06 NOTE — PROGRESS NOTES
Patient has graduated from the Transitions of Care Coordination  program on 11/6/18 Vinh Dela Cruz Patient's symptoms are stable at this time. Patient/family has the ability to self-manage. Care management goals have been completed at this time. No further nurse navigator follow up scheduled. Goals Addressed This Visit's Progress  COMPLETED: Understands red flags post discharge. On track 10/29/18-NN spoke with patient. She feels her bronchitis has resolved, and she is having no difficulty at this time with sob. She states she is having trouble with neuropathy in her legs and would like to see PCP about this. Appointment given to patient for 11/1/18/  Will f/u with patient next week / vs 
 
10/24/18-Message left for patient to call me back to check on her progress. / vs 
 
 
10/18/18-NN spoke with patient. She states her cough has gone and she has finished her prednisone. She has had no fever or shortness of breath. She does report some dizziness and neuropathy and blames that on coming off of Prednisone. Advised patient to stay well hydrated, and rest, but to call back if symptoms do not resolve so that she can come in for evaluation. Will f/u with patient next week. / vs 
 
10/4/18-NN met with patient at her [facility] visit with PCP today. Patient reports cough much better, and taking medications as prescribed. Reviewed signs and symptoms for patient to be alert to for exacerbation of bronchitis and possible infection in the future, such as increased congestion, fever, malaise, increased cough. Patient instructed to contact MD if any symptoms recur. Patient voiced understanding and will contact MD immediately should they recur. NN will check with patient in 2 weeks to see how she is progressing. / vs 
  
  
 
 
Pt has nurse navigator's contact information for any further questions, concerns, or needs. Patients upcoming visits:   
Future Appointments Date Time Provider Anju Dickson 11/29/2018  8:30 AM [facility] KURT 2 \[facility]\""[facility]  
1/7/2019  9:00 AM Meghan Jesus MD [facility] DUNIA SCHED  
3/8/2019  9:30 AM [facility]  
7/8/2019  9:00 AM Sahil Vásquez MD [address] [place] [address],[facility], [id number]

## 2018-11-29 ENCOUNTER — HOSPITAL ENCOUNTER (OUTPATIENT)
Dept: MAMMOGRAPHY | Age: 83
Discharge: HOME | End: 2018-11-29
Attending: NURSE PRACTITIONER
Payer: MEDICARE

## 2018-11-29 DIAGNOSIS — Z98.890 S/P LUMPECTOMY, LEFT BREAST: ICD-10-CM

## 2018-11-29 DIAGNOSIS — N60.11 FIBROCYSTIC BREAST CHANGES OF BOTH BREASTS: ICD-10-CM

## 2018-11-29 DIAGNOSIS — Z85.3 HISTORY OF BREAST CANCER IN FEMALE: ICD-10-CM

## 2018-11-29 DIAGNOSIS — N60.12 FIBROCYSTIC BREAST CHANGES OF BOTH BREASTS: ICD-10-CM

## 2018-11-29 PROCEDURE — 77062 BREAST TOMOSYNTHESIS BI: CPT

## 2018-12-03 ENCOUNTER — TELEPHONE (OUTPATIENT)
Dept: ONCOLOGY | Age: 83
End: 2018-12-03

## 2018-12-03 ENCOUNTER — TELEPHONE (OUTPATIENT)
Dept: INTERNAL MEDICINE CLINIC | Age: 83
End: 2018-12-03

## 2018-12-03 ENCOUNTER — TELEPHONE (OUTPATIENT)
Dept: SURGERY | Age: 83
End: 2018-12-03

## 2018-12-03 DIAGNOSIS — G60.9 IDIOPATHIC PERIPHERAL NEUROPATHY: Chronic | ICD-10-CM

## 2018-12-03 RX ORDER — PREGABALIN 75 MG/1
75 CAPSULE ORAL 2 TIMES DAILY
Qty: 60 CAP | Refills: 0 | Status: SHIPPED | OUTPATIENT
Start: 2018-12-03 | End: 2019-01-07 | Stop reason: ALTCHOICE

## 2018-12-03 NOTE — TELEPHONE ENCOUNTER
Patient wondering if she can discontinue Arimidex since she has taken it for 5 years. Per Giovanni Curry NP, defer to med onc Dr. Bowman . Patient understands and will call his office.

## 2018-12-03 NOTE — TELEPHONE ENCOUNTER
Patient called requesting a return call regarding medication anastrozole (ARIMIDEX) 1 mg tablet. Patient recently had her 5 year mammo and it came back clear. Patient and Dr. Ajit Mccarthy would like to know if she can stop the medication or continue taking it. Please return call to discuss [phone].   selvin

## 2018-12-03 NOTE — TELEPHONE ENCOUNTER
Patient states she would be willing to try this dose    Requested Prescriptions     Pending Prescriptions Disp Refills    pregabalin (LYRICA) 75 mg capsule 60 Cap 0     Sig: Take 1 Cap by mouth two (2) times a day. Max Daily Amount: 150 mg.

## 2018-12-03 NOTE — TELEPHONE ENCOUNTER
She has taken a very small dose of Lyrica and we probably need to titrate up the medication dosage.   Would increase to 75 mg twice daily if she would like

## 2018-12-03 NOTE — TELEPHONE ENCOUNTER
Patient is calling, she states that since taking the medication prescribed for her Neuropathy (Lyrica) it feels it has gotten worse. She would like to know since she is due for a refill if she should refill the medication to see if it gets better, taper off of the medication, or prescribe something else to help.     Best call back # for patient: [phone]  Pharmacy on file verified

## 2018-12-26 ENCOUNTER — OFFICE VISIT (OUTPATIENT)
Dept: INTERNAL MEDICINE CLINIC | Age: 83
End: 2018-12-26

## 2018-12-26 VITALS
BODY MASS INDEX: 32.15 KG/M2 | TEMPERATURE: 98.6 F | DIASTOLIC BLOOD PRESSURE: 86 MMHG | OXYGEN SATURATION: 95 % | HEIGHT: 65 IN | WEIGHT: 193 LBS | HEART RATE: 93 BPM | SYSTOLIC BLOOD PRESSURE: 138 MMHG | RESPIRATION RATE: 16 BRPM

## 2018-12-26 DIAGNOSIS — J20.9 ACUTE BRONCHITIS, UNSPECIFIED ORGANISM: Primary | ICD-10-CM

## 2018-12-26 RX ORDER — AZITHROMYCIN 250 MG/1
TABLET, FILM COATED ORAL
Qty: 6 TAB | Refills: 0 | Status: SHIPPED | OUTPATIENT
Start: 2018-12-26 | End: 2019-01-07 | Stop reason: ALTCHOICE

## 2018-12-26 NOTE — PROGRESS NOTES
Subjective:  Ms. Michel Nagel is a pleasant 82year old lady, patient of Dr. Michel Nagel, who comes in today for evaluation of a five day history of what started out as some nasal congestion and now appears to have settled in her chest.  She has had a productive cough of yellowish sputum. She denies shortness of breath, wheezing or hemoptysis. Denies chills or fever. She had some leftover Ceftin 250 mg from her  and she started to take it last Friday, although she tells me she does not think it has been helpful. She has been using some Mucinex. She denies any nausea or vomiting.     Past Medical History:   Diagnosis Date    Arthritis     Bunion of right foot 8/20/2015    Chronic pain     back--uses tens unit    Diverticulitis 6/29/2018    Recurrent    Dry eye syndrome 6/29/2018    Fibromyalgia 6/29/2018    GERD without esophagitis 6/29/2018    Hypercholesterolemia 6/29/2018    Ill-defined condition     blood transfusion hx    Osteoporosis 6/29/2018    Peripheral neuropathy 6/29/2018    Prediabetes 6/29/2018    Radiation therapy complication 4588    Lt breast-No Chemo    Rosacea 6/29/2018    Trouble in sleeping      Past Surgical History:   Procedure Laterality Date    BREAST SURGERY PROCEDURE UNLISTED  11/21/13    Left breast lumpectomy with sentinel lymph node biopsy    HX APPENDECTOMY      HX BREAST LUMPECTOMY  11/21/2013    LEFT BREAST LUMPECTOMY W/LEFT BREAST SENTINEL NODE BIOPSY,AND NEEDLE LOCALIZATION performed by Claudine Kowalski MD at [facility] MAIN OR    HX CATARACT REMOVAL      bilateral    HX HYSTERECTOMY      ovarian cyst    HX MOHS PROCEDURES      right    HX ORTHOPAEDIC      back surgery    HX OTHER SURGICAL      colonoscopy    HX TONSILLECTOMY      TOTAL KNEE ARTHROPLASTY      left    TOTAL KNEE ARTHROPLASTY      right    US GUIDED CORE BREAST BIOPSY Left 2013    Breast Ca       Current Outpatient Medications on File Prior to Visit   Medication Sig Dispense Refill    pregabalin (LYRICA) 75 mg capsule Take 1 Cap by mouth two (2) times a day. Max Daily Amount: 150 mg. 60 Cap 0    acetaminophen (TYLENOL) 325 mg tablet Take 325 mg by mouth every four (4) hours as needed for Pain.  ergocalciferol (VITAMIN D2) 50,000 unit capsule Take 1 Cap by mouth every seven (7) days. 12 Cap 3    vit A/vit C/vit E/zinc/copper (PRESERVISION AREDS PO) Take  by mouth.  multivitamin (ONE A DAY) tablet Take 1 Tab by mouth daily.  anastrozole (ARIMIDEX) 1 mg tablet take 1 tablet by mouth once daily 90 Tab 2     No current facility-administered medications on file prior to visit. Allergies   Allergen Reactions    Hydrocodone Nausea Only and Vertigo    Gabapentin Diarrhea, Nausea Only and Other (comments)     weakness    Oxycodone Nausea and Vomiting and Vertigo   Physical Examination:  GENERAL:  Pleasant, elderly lady in no acute distress. She is alert and oriented. VITALS:  BP: 138/86. P: 93.  R: 16.  T: 98.6. O2 sat: 95%. HEENT:  Normocephalic, atraumatic. TMs normal.  Mouth mucosa pink. Tongue midline. Pharynx minimally injected without presence of exudates. No sinus tenderness. NECK:  Supple without adenopathy. CHEST:  Lungs clear to auscultation, no rales or wheezes. Good chest excursion. CV:  Heart regular rhythm without murmur. EXTREMITIES:  No edema or calf tenderness. Distal pulses were present. Impression:  1. Acute bronchitis. Plan:  1. It was opted to start her on a Z-Saw.  2. She may continue with the Mucinex as needed. 3. She is to increase fluids and rest.  4. She may use Tylenol alternating with ibuprofen as needed for temperature or aches. 5. She certainly will contact us should she fail to improve or if her condition worsens.

## 2018-12-26 NOTE — PATIENT INSTRUCTIONS
Bronchitis: Care Instructions  Your Care Instructions    Bronchitis is inflammation of the bronchial tubes, which carry air to the lungs. The tubes swell and produce mucus, or phlegm. The mucus and inflamed bronchial tubes make you cough. You may have trouble breathing. Most cases of bronchitis are caused by viruses like those that cause colds. Antibiotics usually do not help and they may be harmful. Bronchitis usually develops rapidly and lasts about 2 to 3 weeks in otherwise healthy people. Follow-up care is a key part of your treatment and safety. Be sure to make and go to all appointments, and call your doctor if you are having problems. It's also a good idea to know your test results and keep a list of the medicines you take. How can you care for yourself at home? · Take all medicines exactly as prescribed. Call your doctor if you think you are having a problem with your medicine. · Get some extra rest.  · Take an over-the-counter pain medicine, such as acetaminophen (Tylenol), ibuprofen (Advil, Motrin), or naproxen (Aleve) to reduce fever and relieve body aches. Read and follow all instructions on the label. · Do not take two or more pain medicines at the same time unless the doctor told you to. Many pain medicines have acetaminophen, which is Tylenol. Too much acetaminophen (Tylenol) can be harmful. · Take an over-the-counter cough medicine that contains dextromethorphan to help quiet a dry, hacking cough so that you can sleep. Avoid cough medicines that have more than one active ingredient. Read and follow all instructions on the label. · Breathe moist air from a humidifier, hot shower, or sink filled with hot water. The heat and moisture will thin mucus so you can cough it out. · Do not smoke. Smoking can make bronchitis worse. If you need help quitting, talk to your doctor about stop-smoking programs and medicines. These can increase your chances of quitting for good.   When should you call for help? Call 911 anytime you think you may need emergency care. For example, call if:    · You have severe trouble breathing.    Call your doctor now or seek immediate medical care if:    · You have new or worse trouble breathing.     · You cough up dark brown or bloody mucus (sputum).     · You have a new or higher fever.     · You have a new rash.    Watch closely for changes in your health, and be sure to contact your doctor if:    · You cough more deeply or more often, especially if you notice more mucus or a change in the color of your mucus.     · You are not getting better as expected. Where can you learn more? Go to http://sumit-arpit.info/. Enter H333 in the search box to learn more about \"Bronchitis: Care Instructions. \"  Current as of: December 6, 2017  Content Version: 11.8  © 9974-1093 [employer]. Care instructions adapted under license by [employer] (which disclaims liability or warranty for this information). If you have questions about a medical condition or this instruction, always ask your healthcare professional. [address] any warranty or liability for your use of this information.

## 2018-12-26 NOTE — PROGRESS NOTES
Identified pt with two pt identifiers(name and ). Reviewed record in preparation for visit and have obtained necessary documentation. Chief Complaint   Patient presents with    Nasal Congestion     cough (Bronchitis)      Visit Vitals  /86 (BP 1 Location: Left arm, BP Patient Position: Sitting)   Pulse 93   Temp 98.6 °F (37 °C) (Oral)   Resp 16   Ht 5' 5\" (1.651 m)   Wt 193 lb (87.5 kg)   SpO2 95%   BMI 32.12 kg/m²       Health Maintenance Due   Topic    Shingrix Vaccine Age 49> (2 of 2)       Coordination of Care Questionnaire:  :   1) Have you been to an emergency room, urgent care, or hospitalized since your last visit?   no   If yes, where when, and reason for visit? 2. Have seen or consulted any other health care provider since your last visit? NO  If yes, where when, and reason for visit? 3) Do you have an Advanced Directive/ Living Will in place? YES  If yes, do we have a copy on file YES  If no, would you like information NO    Patient is accompanied by self I have received verbal consent from Wes Kapadia to discuss any/all medical information while they are present in the room.

## 2019-01-07 ENCOUNTER — OFFICE VISIT (OUTPATIENT)
Dept: INTERNAL MEDICINE CLINIC | Age: 84
End: 2019-01-07

## 2019-01-07 VITALS
SYSTOLIC BLOOD PRESSURE: 140 MMHG | HEIGHT: 65 IN | WEIGHT: 190.8 LBS | BODY MASS INDEX: 31.79 KG/M2 | OXYGEN SATURATION: 99 % | HEART RATE: 83 BPM | DIASTOLIC BLOOD PRESSURE: 78 MMHG

## 2019-01-07 DIAGNOSIS — M48.062 SPINAL STENOSIS OF LUMBAR REGION WITH NEUROGENIC CLAUDICATION: Chronic | ICD-10-CM

## 2019-01-07 DIAGNOSIS — K21.9 GERD WITHOUT ESOPHAGITIS: Chronic | ICD-10-CM

## 2019-01-07 DIAGNOSIS — E78.00 HYPERCHOLESTEROLEMIA: Chronic | ICD-10-CM

## 2019-01-07 DIAGNOSIS — M96.1 LUMBAR POST-LAMINECTOMY SYNDROME: Chronic | ICD-10-CM

## 2019-01-07 DIAGNOSIS — N39.0 URINARY TRACT INFECTION WITHOUT HEMATURIA, SITE UNSPECIFIED: ICD-10-CM

## 2019-01-07 DIAGNOSIS — R73.03 PREDIABETES: Primary | Chronic | ICD-10-CM

## 2019-01-07 LAB
GRAN# POC: 8.6 K/UL (ref 2–7.8)
GRAN% POC: 64.7 % (ref 37–92)
HCT VFR BLD CALC: 32 % (ref 37–51)
HGB BLD-MCNC: 10.7 G/DL (ref 12–18)
LY# POC: 3.4 K/UL (ref 0.6–4.1)
LY% POC: 26.2 % (ref 10–58.5)
MCH RBC QN: 34.6 PG (ref 26–32)
MCHC RBC-ENTMCNC: 33.4 G/DL (ref 30–36)
MCV RBC: 104 FL (ref 80–97)
MID #, POC: 1.1 K/UL (ref 0–1.8)
MID% POC: 9.1 % (ref 0.1–24)
PLATELET # BLD: 154 K/UL (ref 140–440)
RBC # BLD: 3.09 M/UL (ref 4.2–6.3)
WBC # BLD: 13.1 K/UL (ref 4.1–10.9)

## 2019-01-07 NOTE — PATIENT INSTRUCTIONS

## 2019-01-07 NOTE — PROGRESS NOTES
This note will not be viewable in [address] Otra Son is a 82 y.o. female and presents with Follow-up (6 mo fu) and Abdominal Pain Ismael Kingsley Subjective: 
Mrs. Chente Richards returns to the office today in general medical follow-up. The patient has a history of prediabetes for which we have been following her blood sugars. The patient checks her blood sugars occasionally with average values between 90 and 95. She does have a peripheral neuropathy and has been on Lyrica but had to get off of it as she felt that she was having side effects from it and tapered herself off of it with the last dose around December 28. She was noting some lower extremity edema which she thought was related to the Lyrica as well as some other issues such as abdominal pain. She has not noted any improvement in any of her symptoms since she got off of the medication except that she has been having more back pain related to previously known spinal stenosis with neurogenic claudication and a history of lumbar postlaminectomy syndrome. She has hypercholesterolemia currently not on any medication. She has no history of coronary artery disease and denies any exertional chest pains or claudication. Past Medical History:  
Diagnosis Date  Arthritis  Bunion of right foot 8/20/2015  Chronic pain   
 back--uses tens unit  Diverticulitis 6/29/2018 Recurrent  Dry eye syndrome 6/29/2018  Fibromyalgia 6/29/2018  GERD without esophagitis 6/29/2018  Hypercholesterolemia 6/29/2018  Ill-defined condition   
 blood transfusion hx  Osteoporosis 6/29/2018  Peripheral neuropathy 6/29/2018  Prediabetes 6/29/2018  Radiation therapy complication [address] breast-No Chemo  Rosacea 6/29/2018  Trouble in sleeping Past Surgical History:  
Procedure Laterality Date  BREAST SURGERY PROCEDURE UNLISTED  11/21/13 Left breast lumpectomy with sentinel lymph node biopsy  HX APPENDECTOMY  HX BREAST LUMPECTOMY  2013 LEFT BREAST LUMPECTOMY W/LEFT BREAST SENTINEL NODE BIOPSY,AND NEEDLE LOCALIZATION performed by Kelechi Bowser MD at \[facility]\"" MAIN OR  
 HX CATARACT REMOVAL    
 bilateral  
 HX HYSTERECTOMY    
 ovarian cyst  
 HX MOHS PROCEDURES    
 right  HX ORTHOPAEDIC    
 back surgery  HX OTHER SURGICAL    
 colonoscopy  HX TONSILLECTOMY  TOTAL KNEE ARTHROPLASTY    
 left  TOTAL KNEE ARTHROPLASTY    
 right [facility] BREAST BIOPSY Left 2013 Breast Ca Allergies Allergen Reactions  Hydrocodone Nausea Only and Vertigo  Gabapentin Diarrhea, Nausea Only and Other (comments)  
  weakness  Oxycodone Nausea and Vomiting and Vertigo Current Outpatient Medications Medication Sig Dispense Refill  acetaminophen (TYLENOL) 325 mg tablet Take 325 mg by mouth every four (4) hours as needed for Pain.  ergocalciferol (VITAMIN D2) 50,000 unit capsule Take 1 Cap by mouth every seven (7) days. 12 Cap 3  
 vit A/vit C/vit E/zinc/copper (PRESERVISION AREDS PO) Take  by mouth.  multivitamin (ONE A DAY) tablet Take 1 Tab by mouth daily. Social History Socioeconomic History  Marital status:  Spouse name: Not on file  Number of children: Not on file  Years of education: Not on file  Highest education level: Not on file Tobacco Use  Smoking status: Former Smoker Packs/day: 0.50 Years: 50.00 Pack years: 25.00 Last attempt to quit: 2012 Years since quittin.9  Smokeless tobacco: Never Used Substance and Sexual Activity  Alcohol use: Yes Alcohol/week: 1.2 oz Types: 2 Glasses of wine per week Comment: occasionally 3-4 off and on a week  Drug use: No  
 
Family History Problem Relation Age of Onset  Cancer Mother   
     bladder  Cancer Father  Breast Cancer Paternal Grandmother Health Maintenance Topic Date Due  
  Shingrix Vaccine Age 50> (2 of 2) 10/01/2018  MEDICARE YEARLY EXAM  07/03/2019  GLAUCOMA SCREENING Q2Y  02/15/2020  
 DTaP/Tdap/Td series (2 - Td) 11/13/2026  Bone Densitometry (Dexa) Screening  Completed  Pneumococcal 65+ Low/Medium Risk  Completed  Influenza Age 5 to Adult  Completed Review of Systems Constitutional: negative for fevers, chills, anorexia and weight loss Eyes:   negative for visual disturbance and irritation ENT:   negative for tinnitus,sore throat,nasal congestion,ear pain,hoarseness Respiratory:  negative for cough, hemoptysis, dyspnea,wheezing CV:   negative for chest pain, palpitations, lower extremity edema GI:   negative for nausea, vomiting, diarrhea, abdominal pain,melena Endo:               negative for polyuria,polydipsia,polyphagia,heat intolerance Genitourinary: negative for frequency, dysuria and hematuria Integumentary: negative for rash and pruritus Hematologic:  negative for easy bruising and gum/nose bleeding Musculoskel: Complains of back pain Neurological:  Complains of paresthesias of feet Behavl/Psych: negative for feelings of anxiety, depression, mood changes ROS otherwise negative Objective: 
Visit Vitals /78 (BP 1 Location: Left arm, BP Patient Position: Sitting) Pulse 83 Ht 5' 5\" (1.651 m) Wt 190 lb 12.8 oz (86.5 kg) SpO2 99% BMI 31.75 kg/m² Body mass index is 31.75 kg/m². Physical Exam:  
General appearance - alert, well appearing, and in no distress Mental status - alert, oriented to person, place, and time EYE-HOWARD, EOMI,conjunctiva normal bilaterally, lids normal 
ENT-ENT exam normal, no neck nodes or sinus tenderness Nose - normal and patent, no erythema,  Or discharge Mouth - mucous membranes moist, pharynx normal without lesions Neck - supple, no significant adenopathy or bruit Chest - clear to auscultation, no wheezes, rales or rhonchi. Heart - normal rate, regular rhythm, normal S1, S2, no murmurs, rubs, clicks or gallops Abdomen - soft, nontender, nondistended, no masses or organomegaly Lymph- no adenopathy palpable Ext-peripheral pulses normal,  no clubbing or cyanosis. There is 1+ edema present Skin-Warm and dry. no hyperpigmentation, vitiligo, or suspicious lesions Neuro -alert, oriented, normal speech, no focal findings or movement disorder noted Assessment/Plan: 
Diagnoses and all orders for this visit: 
 
Prediabetes 
-     HEMOGLOBIN A1C W/O EAG 
-     CBC WITH AUTOMATED DIFF 
-     METABOLIC PANEL, COMPREHENSIVE 
-     URINALYSIS W/ RFLX MICROSCOPIC Hypercholesterolemia -     LIPID PANEL 
-     TSH 3RD GENERATION 
 
GERD without esophagitis Spinal stenosis of lumbar region with neurogenic claudication Lumbar post-laminectomy syndrome Other instructions: The patient's medications are reviewed and reconciled. No change in her current medical regimen is made. We will continue her off of the Lyrica due to perceived side effects related to the medication. Body mass index is 31.75 and dietary counseling along with printed patient education is given Recheck labs. Of note her white blood cell count has been trending up and she may need hematology evaluation of this. Follow-up otherwise in 6 months Follow-up Disposition: 
Return in about 6 months (around 7/7/2019). I have reviewed with the patient details of the assessment and plan and all questions were answered. Relevent patient education was performed. The most recent lab findings were reviewed with the patient. An After Visit Summary was printed and given to the patient.  
 
Rekha Bazzi MD

## 2019-01-07 NOTE — PROGRESS NOTES
Francia Kehr is a 82 y.o. female presenting for Follow-up (6 mo fu) and Abdominal Pain Michaelyn Daily 1. Have you been to the ER, urgent care clinic since your last visit? Hospitalized since your last visit? No 
 
2. Have you seen or consulted any other health care providers outside of the [address] [place] [address] since your last visit? Include any pap smears or colon screening. No 
 
Fall Risk Assessment, last 12 mths 1/7/2019 Able to walk? Yes Fall in past 12 months? No  
Fall with injury? -  
Number of falls in past 12 months - Fall Risk Score -  
 
 
 
Abuse Screening Questionnaire 1/7/2019 Do you ever feel afraid of your partner? Dualberto Valdez Are you in a relationship with someone who physically or mentally threatens you? Wero Valdez Is it safe for you to go home? Y  
 
 
PHQ over the last two weeks 1/7/2019 Little interest or pleasure in doing things Not at all Feeling down, depressed, irritable, or hopeless Not at all Total Score PHQ 2 0 There are no discontinued medications.

## 2019-01-08 LAB
ALBUMIN SERPL-MCNC: 4.7 G/DL (ref 3.5–4.7)
ALBUMIN/GLOB SERPL: 1.6 {RATIO} (ref 1.2–2.2)
ALP SERPL-CCNC: 68 IU/L (ref 39–117)
ALT SERPL-CCNC: 21 IU/L (ref 0–32)
AST SERPL-CCNC: 18 IU/L (ref 0–40)
BACTERIA UA POCT, BACTPOCT: ABNORMAL
BILIRUB SERPL-MCNC: 0.7 MG/DL (ref 0–1.2)
BILIRUB UR QL STRIP: NEGATIVE
BUN SERPL-MCNC: 11 MG/DL (ref 8–27)
BUN/CREAT SERPL: 18 (ref 12–28)
CALCIUM SERPL-MCNC: 8.8 MG/DL (ref 8.7–10.3)
CASTS UA POCT: ABNORMAL
CHLORIDE SERPL-SCNC: 105 MMOL/L (ref 96–106)
CHOLEST SERPL-MCNC: 163 MG/DL (ref 100–199)
CLUE CELLS, CLUEPOCT: NEGATIVE
CO2 SERPL-SCNC: 22 MMOL/L (ref 20–29)
CREAT SERPL-MCNC: 0.61 MG/DL (ref 0.57–1)
CRYSTALS UA POCT, CRYSPOCT: NEGATIVE
EPITHELIAL CELLS POCT: ABNORMAL
GLOBULIN SER CALC-MCNC: 3 G/DL (ref 1.5–4.5)
GLUCOSE SERPL-MCNC: 100 MG/DL (ref 65–99)
GLUCOSE UR-MCNC: NEGATIVE MG/DL
HBA1C MFR BLD: 4.9 % (ref 4.8–5.6)
HDLC SERPL-MCNC: 43 MG/DL
KETONES P FAST UR STRIP-MCNC: NEGATIVE MG/DL
LDLC SERPL CALC-MCNC: 90 MG/DL (ref 0–99)
MUCUS UA POCT, MUCPOCT: ABNORMAL
PH UR STRIP: 6 [PH] (ref 5–7)
POTASSIUM SERPL-SCNC: 4.3 MMOL/L (ref 3.5–5.2)
PROT SERPL-MCNC: 7.7 G/DL (ref 6–8.5)
PROT UR QL STRIP: ABNORMAL
RBC UA POCT, RBCPOCT: ABNORMAL
SODIUM SERPL-SCNC: 145 MMOL/L (ref 134–144)
SP GR UR STRIP: 1.02 (ref 1.01–1.02)
TRICH UA POCT, TRICHPOC: NEGATIVE
TRIGL SERPL-MCNC: 148 MG/DL (ref 0–149)
TSH SERPL DL<=0.005 MIU/L-ACNC: 2.72 UIU/ML (ref 0.45–4.5)
UA UROBILINOGEN AMB POC: NORMAL (ref 0.2–1)
URINALYSIS CLARITY POC: CLEAR
URINALYSIS COLOR POC: ABNORMAL
URINE BLOOD POC: NEGATIVE
URINE CULT COMMENT, POCT: ABNORMAL
URINE LEUKOCYTES POC: ABNORMAL
URINE NITRITES POC: NEGATIVE
VLDLC SERPL CALC-MCNC: 30 MG/DL (ref 5–40)
WBC UA POCT, WBCPOCT: ABNORMAL
YEAST UA POCT, YEASTPOC: NEGATIVE

## 2019-01-10 LAB — BACTERIA UR CULT: NORMAL

## 2019-01-10 NOTE — PROGRESS NOTES
Labs were stable except white blood cell count is still slightly elevated and she is slightly anemic. Would like her to return for iron, iron binding capacity, ferritin, vitamin B12, LDH level.   If these labs are normal, we will need to arrange for her to see a hematologist

## 2019-01-15 ENCOUNTER — LAB ONLY (OUTPATIENT)
Dept: INTERNAL MEDICINE CLINIC | Age: 84
End: 2019-01-15

## 2019-01-15 DIAGNOSIS — D64.9 ANEMIA, UNSPECIFIED TYPE: Primary | ICD-10-CM

## 2019-01-15 DIAGNOSIS — D72.829 LEUKOCYTOSIS, UNSPECIFIED TYPE: ICD-10-CM

## 2019-01-16 ENCOUNTER — TELEPHONE (OUTPATIENT)
Dept: INTERNAL MEDICINE CLINIC | Age: 84
End: 2019-01-16

## 2019-01-16 DIAGNOSIS — D64.9 ANEMIA, UNSPECIFIED TYPE: Primary | ICD-10-CM

## 2019-01-16 DIAGNOSIS — D72.829 LEUKOCYTOSIS, UNSPECIFIED TYPE: ICD-10-CM

## 2019-01-16 LAB
FERRITIN SERPL-MCNC: 37 NG/ML (ref 15–150)
IRON SATN MFR SERPL: 12 % (ref 15–55)
IRON SERPL-MCNC: 55 UG/DL (ref 27–139)
LDH SERPL-CCNC: 243 IU/L (ref 119–226)
TIBC SERPL-MCNC: 449 UG/DL (ref 250–450)
UIBC SERPL-MCNC: 394 UG/DL (ref 118–369)
VIT B12 SERPL-MCNC: 1121 PG/ML (ref 232–1245)

## 2019-01-16 NOTE — PROGRESS NOTES
Iron and B12 studies were normal.  Would recommend a hematology consultation regarding her anemia and slightly elevated white blood cell count

## 2019-01-16 NOTE — TELEPHONE ENCOUNTER
----- Message from Margaret Armstrong MD sent at 1/16/2019  7:58 AM EST -----  Iron and B12 studies were normal.  Would recommend a hematology consultation regarding her anemia and slightly elevated white blood cell count

## 2019-01-21 ENCOUNTER — OFFICE VISIT (OUTPATIENT)
Dept: ONCOLOGY | Age: 84
End: 2019-01-21

## 2019-01-21 VITALS
WEIGHT: 186.8 LBS | HEART RATE: 84 BPM | SYSTOLIC BLOOD PRESSURE: 116 MMHG | HEIGHT: 65 IN | DIASTOLIC BLOOD PRESSURE: 61 MMHG | RESPIRATION RATE: 18 BRPM | TEMPERATURE: 98 F | BODY MASS INDEX: 31.12 KG/M2 | OXYGEN SATURATION: 98 %

## 2019-01-21 DIAGNOSIS — Z85.3 HISTORY OF BREAST CANCER: ICD-10-CM

## 2019-01-21 DIAGNOSIS — D53.9 ANEMIA, MACROCYTIC: ICD-10-CM

## 2019-01-21 DIAGNOSIS — D72.829 LEUKOCYTOSIS, UNSPECIFIED TYPE: ICD-10-CM

## 2019-01-21 DIAGNOSIS — D61.82: Primary | ICD-10-CM

## 2019-01-21 NOTE — PROGRESS NOTES
Lata Mansfield is a [de-identified] y.o. female here today for left side breast cancer f/u and new elevated WBC diagnosis. Completed rads tx to breast. S/P left breast lumpectomy + SLN; 11/2013. Last mammo; 11/2017; WNL. Patient stopped taking Arimidex 12/2018. VS stable. Patient reports joints pain. Good appetite. Patient denies N/V/D and constipation. Patient reports numbness and tingling in her ankles and feet. Patient denies mouth ulcers. Patient denies cough. Patient denies SOB. Visit Vitals /61 (BP 1 Location: Right arm, BP Patient Position: Sitting) Pulse 84 Temp 98 °F (36.7 °C) (Oral) Resp 18 Ht 5' 5\" (1.651 m) Wt 186 lb 12.8 oz (84.7 kg) SpO2 98% BMI 31.09 kg/m² 1. Have you been to the ER, urgent care clinic since your last visit? Hospitalized since your last visit? yes- ER for Bronchitis 2. Have you seen or consulted any other health care providers outside of the [facility] since your last visit? Include any pap smears or colon screening. Yes- Eye doctor and Dentist  
 
Health Maintenance Review: Patient reminded of \"due or due soon\" health maintenance. I have asked the patient to contact his/her primary care provider (PCP) for follow-up on his/her health maintenance.

## 2019-01-21 NOTE — H&P (VIEW-ONLY)
[address] [facility] [place] 
at [facility], [facility] [address] [facility] [address], [address] 
[phone] Follow-up Note Patient: Martin Son MRN: [id number]  SSN: [id number] YOB: 1935  Age: 82 y.o. Sex: female Diagnosis: 1. Left breast carcinoma:  
T1a N0 Mx (Stage IA) infiltrating ductal carcinoma , Tumor size 1 cm, LN -ve, grade 2, ki 67 17%, %, [place] 90%, Her 2 unamplified. Treatment: 1. Discontinued Arimidex 1/2019 after 5 years of treatment 2. Completed radiation treatment to the breast  
3. S/P left breast lumpectomy and sentinel LN excision on 11/21/2013 Subjective:   
 
Karissa Zuniga is a 70 y.o.  female with stage I invasive ductal carcinoma. She underwent a left breast lumpectomy and sentinel LN excision on 11/21/2013. Ms. Liseth Argueta then received radiation to the left breast. She stopped arimidex this month after 5 years. She reports joint pain and chronic numbness and tingling in her feet. She comes in for anemia and leukocytosis. Review of Systems:  
 
Constitutional: negative Eyes: negative Ears, Nose, Mouth, Throat, and Face: negative Respiratory: negative Cardiovascular: negative Gastrointestinal: negative Integument/Breast: negative Hematologic/Lymphatic: negative Musculoskeletal: joint pain Neurological: numbness bottoms of both feet Past Medical History:  
Diagnosis Date  Arthritis  Bunion of right foot 8/20/2015  Chronic pain   
 back--uses tens unit  Diverticulitis 6/29/2018 Recurrent  Dry eye syndrome 6/29/2018  Fibromyalgia 6/29/2018  GERD without esophagitis 6/29/2018  Hypercholesterolemia 6/29/2018  Ill-defined condition   
 blood transfusion hx  Osteoporosis 6/29/2018  Peripheral neuropathy 6/29/2018  Prediabetes 6/29/2018  Radiation therapy complication 1406 Lt breast-No Chemo  Rosacea 6/29/2018  Trouble in sleeping Past Surgical History:  
Procedure Laterality Date  BREAST SURGERY PROCEDURE UNLISTED  13 Left breast lumpectomy with sentinel lymph node biopsy  HX APPENDECTOMY  HX BREAST LUMPECTOMY  2013 LEFT BREAST LUMPECTOMY W/LEFT BREAST SENTINEL NODE BIOPSY,AND NEEDLE LOCALIZATION performed by Joshua Marie MD at [facility] MAIN OR  
 HX CATARACT REMOVAL    
 bilateral  
 HX HYSTERECTOMY    
 ovarian cyst  
 HX MOHS PROCEDURES    
 right  HX ORTHOPAEDIC    
 back surgery  HX OTHER SURGICAL    
 colonoscopy  HX TONSILLECTOMY  TOTAL KNEE ARTHROPLASTY    
 left  TOTAL KNEE ARTHROPLASTY    
 right [address] [facility] BREAST BIOPSY Left 2013 Breast Ca Family History Problem Relation Age of Onset  Cancer Mother   
     bladder  Cancer Father  Breast Cancer Paternal Grandmother Social History Tobacco Use  Smoking status: Former Smoker Packs/day: 0.50 Years: 50.00 Pack years: 25.00 Last attempt to quit: 2012 Years since quittin.9  Smokeless tobacco: Never Used Substance Use Topics  Alcohol use: Yes Alcohol/week: 1.2 oz Types: 2 Glasses of wine per week Comment: occasionally 3-4 off and on a week Prior to Admission medications Medication Sig Start Date End Date Taking? Authorizing Provider  
acetaminophen (TYLENOL) 325 mg tablet Take 325 mg by mouth every four (4) hours as needed for Pain. Yes Other, MD Libby  
ergocalciferol (VITAMIN D2) 50,000 unit capsule Take 1 Cap by mouth every seven (7) days. 18  Yes Marcia Irene NP  
vit A/vit C/vit E/zinc/copper (PRESERVISION AREDS PO) Take  by mouth. Yes Provider, Historical  
multivitamin (ONE A DAY) tablet Take 1 Tab by mouth daily. Yes Provider, Historical  
  
 
 
 
 
Allergies Allergen Reactions  Hydrocodone Nausea Only and Vertigo  Gabapentin Diarrhea, Nausea Only and Other (comments)  
  weakness  Oxycodone Nausea and Vomiting and Vertigo Objective:  
 
Vitals:  
 01/21/19 0933 BP: 116/61 Pulse: 84 Resp: 18 Temp: 98 °F (36.7 °C) TempSrc: Oral  
SpO2: 98% Weight: 186 lb 12.8 oz (84.7 kg) Height: 5' 5\" (1.651 m) Physical Exam: 
 
GENERAL: alert, cooperative EYE: conjunctivae/corneas clear LYMPHATIC: Cervical, supraclavicular, and axillary nodes normal.  
THROAT & NECK: normal and no erythema or exudates noted. LUNG: clear to auscultation bilaterally HEART: regular rate and rhythm ABDOMEN: soft, non-tender EXTREMITIES: extremities normal  
SKIN: Normal.  
NEUROLOGIC: negative Lab Results Component Value Date/Time WBC 22.9 (H) 10/03/2018 03:31 AM  
 HGB (POC) 10.7 (A) 01/07/2019 09:52 AM  
 HGB 10.4 (L) 10/03/2018 03:31 AM  
 HCT (POC) 32.0 (A) 01/07/2019 09:52 AM  
 HCT 33.6 (L) 10/03/2018 03:31 AM  
 PLATELET 121 97/59/1470 03:31 AM  
 .0 (H) 10/03/2018 03:31 AM  
 
 
Lab Results Component Value Date/Time Iron 55 01/15/2019 11:48 AM  
 TIBC 449 01/15/2019 11:48 AM  
 Iron % saturation 12 (L) 01/15/2019 11:48 AM  
 Ferritin 37 01/15/2019 11:48 AM  
 
 
 
 
Assessment: 1. 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 I will schedule it in 2 weeks 2. Left breast carcinoma:  
 
T1a N0 Mx (Stage IA) infiltrating ductal carcinoma , Tumor size 1 cm, LN -ve, grade 2, ki 67 17%, %, [place] 90%, Her 2 unamplified. Dx: 10/28/2013 
 
> S/P left breast lumpectomy and sentinel LN excision on 11/21/2013 
> Completed radiation to the breast 
> Discontinued Arimidex 1/2019 after 5 years 
> In remission Mammogram (11/2017): normal 
DEXA (10/2013) - normal 
 
Symptom management form reviewed with patient. 3. Vitamin D deficiency 
 
> Vitamin D 50,000 units weekly 4. Iron Deficiency anemia from chronic GI losses Ferritin - 37 
IV iron infusion in the [facility] I discussed with her various ways to replace/supplement iron which includes giving oral iron preparation such as iron sulfate or similar products or utilizing intravenous access to administer the total dose of iron.  The patient was given the option to choose from various oral and intravenous iron preparation and after a prolonged discussion we have agreed to proceed with IV Injectafer to be administered as two short infusions one week apart.    
 
I counseled the patient regarding the side effects of IV injectafer which includes rare instances of anaphylactic reactions, infusion reactions etc.  After weighing the benefit and risks, the patient agreed to proceed with the treatment. 5. Osteopenia 
 
> Management per Dr. Jens Mnadujano Plan:   
 
 
> Bone marrow biopsy  
> IV iron in the future 
> Follow up in 1 month Signed by: Thompson Duron MD 
                   January 21, 2019 
 
 
CC. Lindsay De Jesus MD 
 
 
 
Bone Marrow Aspiration and Biopsy: Before Your Procedure What is bone marrow aspiration and biopsy? Bone marrow aspiration is a procedure that takes out a small amount of bone marrow fluid through a needle. Bone marrow biopsy uses a needle to take out a small amount of bone with the marrow inside it. These samples are then checked under a microscope. The hip bone is the most commonly used area for these procedures. Aspiration and biopsy are often done to find a blood problem or an infection. They also may be used to find out if a cancer has spread to the bone marrow. You may get medicine to help you relax before the procedure. The doctor will inject numbing medicine in the skin over your bone. He or she will put a needle through your skin and into your bone to reach the bone marrow. You may feel pressure or some dull pain during the procedure. After the doctor takes the sample, he or she will remove the needle. The doctor may need to take more than one sample. This can come from the same spot or from a different area on your body. When the procedure is done, the doctor or a nurse will put pressure on the area to stop any bleeding. Follow-up care is a key part of your treatment and safety. Be sure to make and go to all appointments, and call your doctor if you are having problems. It's also a good idea to know your test results and keep a list of the medicines you take. What happens on the day of the procedure? At the hospital or doctor's office · A doctor or nurse will give you medicine to numb the area where the needle will go. You may feel pain and hear a crunching sound when the needle enters your bone. This usually lasts only a few seconds. But you may have some discomfort during the procedure. · The procedure will take about 20 to 30 minutes. · You will have a bandage over the area where the doctor put the needle. Going home · You may need someone to drive you home. · You will be given more specific instructions about recovering from your procedure, including activity and when you may return to work. · Your doctor will call you with the results of your test. 
When should you call your doctor? · You have questions or concerns. · You dont understand how to prepare for your procedure. · You become ill before the procedure (such as fever, flu, or a cold). · You need to reschedule or have changed your mind about having the procedure. Where can you learn more? Go to trgt.us.be Enter S356 in the search box to learn more about \"Bone Marrow Aspiration and Biopsy: Before Your Procedure. \"  
© 6570-5421 Healthwise, Incorporated. Care instructions adapted under license by [employer] (which disclaims liability or warranty for this information).  This care instruction is for use with your licensed healthcare professional. If you have questions about a medical condition or this instruction, always ask your healthcare professional. [place] [address] any warranty or liability for your use of this information. Content Version: 4.1.191877; Last Revised: October 11, 2010

## 2019-01-21 NOTE — PROGRESS NOTES
[address] [facility] [place] 
at [place] [address], [facility], [address] 
[phone] Follow-up Note Patient: Lizbeth Tran MRN: [id number]  SSN: [id number] YOB: 1935  Age: 82 y.o. Sex: female Diagnosis: 1. Left breast carcinoma:  
T1a N0 Mx (Stage IA) infiltrating ductal carcinoma , Tumor size 1 cm, LN -ve, grade 2, ki 67 17%, %, [place] 90%, Her 2 unamplified. Treatment: 1. Discontinued Arimidex 1/2019 after 5 years of treatment 2. Completed radiation treatment to the breast  
3. S/P left breast lumpectomy and sentinel LN excision on 11/21/2013 Subjective:   
 
Venkata Waggoner is a 70 y.o.  female with stage I invasive ductal carcinoma. She underwent a left breast lumpectomy and sentinel LN excision on 11/21/2013. Ms. Jan Vance then received radiation to the left breast. She stopped arimidex this month after 5 years. She reports joint pain and chronic numbness and tingling in her feet. She comes in for anemia and leukocytosis. Review of Systems:  
 
Constitutional: negative Eyes: negative Ears, Nose, Mouth, Throat, and Face: negative Respiratory: negative Cardiovascular: negative Gastrointestinal: negative Integument/Breast: negative Hematologic/Lymphatic: negative Musculoskeletal: joint pain Neurological: numbness bottoms of both feet Past Medical History:  
Diagnosis Date  Arthritis  Bunion of right foot 8/20/2015  Chronic pain   
 back--uses tens unit  Diverticulitis 6/29/2018 Recurrent  Dry eye syndrome 6/29/2018  Fibromyalgia 6/29/2018  GERD without esophagitis 6/29/2018  Hypercholesterolemia 6/29/2018  Ill-defined condition   
 blood transfusion hx  Osteoporosis 6/29/2018  Peripheral neuropathy 6/29/2018  Prediabetes 6/29/2018  Radiation therapy complication 5276 Lt breast-No Chemo  Rosacea 6/29/2018  Trouble in sleeping Past Surgical History:  
Procedure Laterality Date  BREAST SURGERY PROCEDURE UNLISTED  13 Left breast lumpectomy with sentinel lymph node biopsy  HX APPENDECTOMY  HX BREAST LUMPECTOMY  2013 LEFT BREAST LUMPECTOMY W/LEFT BREAST SENTINEL NODE BIOPSY,AND NEEDLE LOCALIZATION performed by Litzy Hensley MD at [facility] MAIN OR  
 HX CATARACT REMOVAL    
 bilateral  
 HX HYSTERECTOMY    
 ovarian cyst  
 HX MOHS PROCEDURES    
 right  HX ORTHOPAEDIC    
 back surgery  HX OTHER SURGICAL    
 colonoscopy  HX TONSILLECTOMY  TOTAL KNEE ARTHROPLASTY    
 left  TOTAL KNEE ARTHROPLASTY    
 right [address] [facility] BREAST BIOPSY Left 2013 Breast Ca Family History Problem Relation Age of Onset  Cancer Mother   
     bladder  Cancer Father  Breast Cancer Paternal Grandmother Social History Tobacco Use  Smoking status: Former Smoker Packs/day: 0.50 Years: 50.00 Pack years: 25.00 Last attempt to quit: 2012 Years since quittin.9  Smokeless tobacco: Never Used Substance Use Topics  Alcohol use: Yes Alcohol/week: 1.2 oz Types: 2 Glasses of wine per week Comment: occasionally 3-4 off and on a week Prior to Admission medications Medication Sig Start Date End Date Taking? Authorizing Provider  
acetaminophen (TYLENOL) 325 mg tablet Take 325 mg by mouth every four (4) hours as needed for Pain. Yes Other, MD Libby  
ergocalciferol (VITAMIN D2) 50,000 unit capsule Take 1 Cap by mouth every seven (7) days. 18  Yes Dominic Irene NP  
vit A/vit C/vit E/zinc/copper (PRESERVISION AREDS PO) Take  by mouth. Yes Provider, Historical  
multivitamin (ONE A DAY) tablet Take 1 Tab by mouth daily. Yes Provider, Historical  
  
 
 
 
 
Allergies Allergen Reactions  Hydrocodone Nausea Only and Vertigo  Gabapentin Diarrhea, Nausea Only and Other (comments)  
  weakness  Oxycodone Nausea and Vomiting and Vertigo Objective:  
 
Vitals:  
 01/21/19 0933 BP: 116/61 Pulse: 84 Resp: 18 Temp: 98 °F (36.7 °C) TempSrc: Oral  
SpO2: 98% Weight: 186 lb 12.8 oz (84.7 kg) Height: 5' 5\" (1.651 m) Physical Exam: 
 
GENERAL: alert, cooperative EYE: conjunctivae/corneas clear LYMPHATIC: Cervical, supraclavicular, and axillary nodes normal.  
THROAT & NECK: normal and no erythema or exudates noted. LUNG: clear to auscultation bilaterally HEART: regular rate and rhythm ABDOMEN: soft, non-tender EXTREMITIES: extremities normal  
SKIN: Normal.  
NEUROLOGIC: negative Lab Results Component Value Date/Time WBC 22.9 (H) 10/03/2018 03:31 AM  
 HGB (POC) 10.7 (A) 01/07/2019 09:52 AM  
 HGB 10.4 (L) 10/03/2018 03:31 AM  
 HCT (POC) 32.0 (A) 01/07/2019 09:52 AM  
 HCT 33.6 (L) 10/03/2018 03:31 AM  
 PLATELET 464 08/87/0181 03:31 AM  
 .0 (H) 10/03/2018 03:31 AM  
 
 
Lab Results Component Value Date/Time Iron 55 01/15/2019 11:48 AM  
 TIBC 449 01/15/2019 11:48 AM  
 Iron % saturation 12 (L) 01/15/2019 11:48 AM  
 Ferritin 37 01/15/2019 11:48 AM  
 
 
 
 
Assessment: 1. 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 I will schedule it in 2 weeks 2. Left breast carcinoma:  
 
T1a N0 Mx (Stage IA) infiltrating ductal carcinoma , Tumor size 1 cm, LN -ve, grade 2, ki 67 17%, %, [place] 90%, Her 2 unamplified. Dx: 10/28/2013 
 
> S/P left breast lumpectomy and sentinel LN excision on 11/21/2013 
> Completed radiation to the breast 
> Discontinued Arimidex 1/2019 after 5 years 
> In remission Mammogram (11/2017): normal 
DEXA (10/2013) - normal 
 
Symptom management form reviewed with patient. 3. Vitamin D deficiency 
 
> Vitamin D 50,000 units weekly 4. Iron Deficiency anemia from chronic GI losses Ferritin - 37 
IV iron infusion in the [facility] I discussed with her various ways to replace/supplement iron which includes giving oral iron preparation such as iron sulfate or similar products or utilizing intravenous access to administer the total dose of iron.  The patient was given the option to choose from various oral and intravenous iron preparation and after a prolonged discussion we have agreed to proceed with IV Injectafer to be administered as two short infusions one week apart.    
 
I counseled the patient regarding the side effects of IV injectafer which includes rare instances of anaphylactic reactions, infusion reactions etc.  After weighing the benefit and risks, the patient agreed to proceed with the treatment. 5. Osteopenia 
 
> Management per Dr. Galina Quiros Plan:   
 
 
> Bone marrow biopsy  
> IV iron in the future 
> Follow up in 1 month Signed by: Manohar Cole MD 
                   January 21, 2019 
 
 
CC. Michael Nielsen MD 
 
 
 
Bone Marrow Aspiration and Biopsy: Before Your Procedure What is bone marrow aspiration and biopsy? Bone marrow aspiration is a procedure that takes out a small amount of bone marrow fluid through a needle. Bone marrow biopsy uses a needle to take out a small amount of bone with the marrow inside it. These samples are then checked under a microscope. The hip bone is the most commonly used area for these procedures. Aspiration and biopsy are often done to find a blood problem or an infection. They also may be used to find out if a cancer has spread to the bone marrow. You may get medicine to help you relax before the procedure. The doctor will inject numbing medicine in the skin over your bone. He or she will put a needle through your skin and into your bone to reach the bone marrow. You may feel pressure or some dull pain during the procedure. After the doctor takes the sample, he or she will remove the needle. The doctor may need to take more than one sample. This can come from the same spot or from a different area on your body. When the procedure is done, the doctor or a nurse will put pressure on the area to stop any bleeding. Follow-up care is a key part of your treatment and safety. Be sure to make and go to all appointments, and call your doctor if you are having problems. It's also a good idea to know your test results and keep a list of the medicines you take. What happens on the day of the procedure? At the hospital or doctor's office · A doctor or nurse will give you medicine to numb the area where the needle will go. You may feel pain and hear a crunching sound when the needle enters your bone. This usually lasts only a few seconds. But you may have some discomfort during the procedure. · The procedure will take about 20 to 30 minutes. · You will have a bandage over the area where the doctor put the needle. Going home · You may need someone to drive you home. · You will be given more specific instructions about recovering from your procedure, including activity and when you may return to work. · Your doctor will call you with the results of your test. 
When should you call your doctor? · You have questions or concerns. · You dont understand how to prepare for your procedure. · You become ill before the procedure (such as fever, flu, or a cold). · You need to reschedule or have changed your mind about having the procedure. Where can you learn more? Go to Zavedenia.com.be Enter E243 in the search box to learn more about \"Bone Marrow Aspiration and Biopsy: Before Your Procedure. \"  
© 4864-5970 Healthwise, Incorporated. Care instructions adapted under license by [employer] (which disclaims liability or warranty for this information).  This care instruction is for use with your licensed healthcare professional. If you have questions about a medical condition or this instruction, always ask your healthcare professional. [place] [address] any warranty or liability for your use of this information. Content Version: 9.4.779459; Last Revised: October 11, 2010

## 2019-01-24 NOTE — PERIOP NOTES
[facility] Ambulatory Surgery Unit Pre-operative Instructions Surgery/Procedure Date  1/29            Tentative Arrival Time 2212 1. On the day of your surgery/procedure, please report to the Ambulatory Surgery Unit Registration Desk and sign in at your designated time. The Ambulatory Surgery Unit is located in [facility] on the [facility] side of the [facility] across from the [address] [place] [address]. Please have all of your health insurance cards and a photo ID. 2. You must have someone with you to drive you home, as you should not drive a car for 24 hours following anesthesia. Please make arrangements for a responsible adult friend or family member to stay with you for at least the first 24 hours after your surgery. 3. Do not have anything to eat or drink (including water, gum, mints, coffee, juice) after 11:59 PM  1/28. This may not apply to medications prescribed by your physician. (Please note below the special instructions with medications to take the morning of surgery, if applicable.) 4. We recommend you do not drink any alcoholic beverages for 24 hours before and after your surgery. 5. Contact your surgeons office for instructions on the following medications: non-steroidal anti-inflammatory drugs (i.e. Advil, Aleve), vitamins, and supplements. (Some surgeons will want you to stop these medications prior to surgery and others may allow you to take them) **If you are currently taking Plavix, Coumadin, Aspirin and/or other blood-thinning agents, contact your surgeon for instructions. ** Your surgeon will partner with the physician prescribing these medications to determine if it is safe to stop or if you need to continue taking. Please do not stop taking these medications without instructions from your surgeon.  
 
6. In an effort to help prevent surgical site infection, we ask that you shower with an anti-bacterial soap (i.e. Dial/Safeguard, or the soap provided to you at your preadmission testing appointment) for 3 days prior to and on the morning of surgery, using a fresh towel after each shower. (Please begin this process with fresh bed linens.) Do not apply any lotions, powders, or deodorants after the shower on the day of your procedure. If applicable, please do not shave the operative site for 48 hours prior to surgery. 7. Wear comfortable clothes. Wear glasses instead of contacts. Do not bring any jewelry or money (other than copays or fees as instructed). Do not wear make-up, particularly mascara, the morning of your surgery. Do not wear nail polish, particularly if you are having foot /hand surgery. Wear your hair loose or down, no ponytails, buns, hallie pins or clips. All body piercings must be removed. 8. You should understand that if you do not follow these instructions your surgery may be cancelled. If your physical condition changes (i.e. fever, cold or flu) please contact your surgeon as soon as possible. 9. It is important that you be on time. If a situation occurs where you may be late, or if you have any questions or problems, please call [phone]. 
 
10. Your surgery time may be subject to change. You will receive a phone call the day prior to surgery to confirm your arrival time. Special Instructions: Take all medications and inhalers, as prescribed, on the morning of surgery with a sip of water EXCEPT: none I understand a pre-operative phone call will be made to verify my surgery time. In the event that I am not available, I give permission for a message to be left on my answering service and/or with another person? yes Reviewed by phone with pt, verbalized understanding. 
 
 ___________________      ___________________      ________________ 
(Signature of Patient)          (Witness)                   (Date and Time)

## 2019-01-28 ENCOUNTER — ANESTHESIA EVENT (OUTPATIENT)
Dept: SURGERY | Age: 84
End: 2019-01-28
Payer: MEDICARE

## 2019-01-29 ENCOUNTER — HOSPITAL ENCOUNTER (OUTPATIENT)
Age: 84
Setting detail: OUTPATIENT SURGERY
Discharge: HOME | End: 2019-01-29
Attending: INTERNAL MEDICINE | Admitting: INTERNAL MEDICINE
Payer: MEDICARE

## 2019-01-29 ENCOUNTER — ANESTHESIA (OUTPATIENT)
Dept: SURGERY | Age: 84
End: 2019-01-29
Payer: MEDICARE

## 2019-01-29 VITALS
SYSTOLIC BLOOD PRESSURE: 115 MMHG | DIASTOLIC BLOOD PRESSURE: 50 MMHG | BODY MASS INDEX: 31.65 KG/M2 | HEIGHT: 65 IN | HEART RATE: 64 BPM | WEIGHT: 190 LBS | RESPIRATION RATE: 21 BRPM | TEMPERATURE: 97.7 F | OXYGEN SATURATION: 96 %

## 2019-01-29 DIAGNOSIS — D69.6 THROMBOCYTOPENIA (HCC): ICD-10-CM

## 2019-01-29 DIAGNOSIS — D64.9 ANEMIA, UNSPECIFIED TYPE: ICD-10-CM

## 2019-01-29 LAB
BASOPHILS # BLD: 0 K/UL (ref 0–0.1)
BASOPHILS NFR BLD: 0 % (ref 0–1)
DIFFERENTIAL METHOD BLD: ABNORMAL
EOSINOPHIL # BLD: 0.4 K/UL (ref 0–0.4)
EOSINOPHIL NFR BLD: 2 % (ref 0–7)
ERYTHROCYTE [DISTWIDTH] IN BLOOD BY AUTOMATED COUNT: 15 % (ref 11.5–14.5)
HCT VFR BLD AUTO: 34.7 % (ref 35–47)
HGB BLD-MCNC: 11.1 G/DL (ref 11.5–16)
IMM GRANULOCYTES # BLD AUTO: 0 K/UL (ref 0–0.04)
IMM GRANULOCYTES NFR BLD AUTO: 0 % (ref 0–0.5)
LYMPHOCYTES # BLD: 5.5 K/UL (ref 0.8–3.5)
LYMPHOCYTES NFR BLD: 30 % (ref 12–49)
MCH RBC QN AUTO: 35.1 PG (ref 26–34)
MCHC RBC AUTO-ENTMCNC: 32 G/DL (ref 30–36.5)
MCV RBC AUTO: 109.8 FL (ref 80–99)
MONOCYTES # BLD: 2 K/UL (ref 0–1)
MONOCYTES NFR BLD: 11 % (ref 5–13)
NEUTS SEG # BLD: 10.4 K/UL (ref 1.8–8)
NEUTS SEG NFR BLD: 57 % (ref 32–75)
NRBC # BLD: 0 K/UL (ref 0–0.01)
NRBC BLD-RTO: 0 PER 100 WBC
PLATELET # BLD AUTO: 149 K/UL (ref 150–400)
PMV BLD AUTO: 10 FL (ref 8.9–12.9)
RBC # BLD AUTO: 3.16 M/UL (ref 3.8–5.2)
RBC MORPH BLD: ABNORMAL
WBC # BLD AUTO: 18.3 K/UL (ref 3.6–11)

## 2019-01-29 PROCEDURE — 88185 FLOWCYTOMETRY/TC ADD-ON: CPT

## 2019-01-29 PROCEDURE — 74011250636 HC RX REV CODE- 250/636: Performed by: INTERNAL MEDICINE

## 2019-01-29 PROCEDURE — 74011250636 HC RX REV CODE- 250/636

## 2019-01-29 PROCEDURE — 88184 FLOWCYTOMETRY/ TC 1 MARKER: CPT

## 2019-01-29 PROCEDURE — 77030028872 HC BN BIOP NDL ON CNTRL TY TELE -C: Performed by: INTERNAL MEDICINE

## 2019-01-29 PROCEDURE — 88342 IMHCHEM/IMCYTCHM 1ST ANTB: CPT

## 2019-01-29 PROCEDURE — 85025 COMPLETE CBC W/AUTO DIFF WBC: CPT

## 2019-01-29 PROCEDURE — 88374 M/PHMTRC ALYS ISHQUANT/SEMIQ: CPT

## 2019-01-29 PROCEDURE — 77030039266 HC ADH SKN EXOFIN S2SG -A: Performed by: INTERNAL MEDICINE

## 2019-01-29 PROCEDURE — 88280 CHROMOSOME KARYOTYPE STUDY: CPT

## 2019-01-29 PROCEDURE — 76030000002 HC AMB SURG OR TIME FIRST 0.: Performed by: INTERNAL MEDICINE

## 2019-01-29 PROCEDURE — 88311 DECALCIFY TISSUE: CPT

## 2019-01-29 PROCEDURE — 76210000050 HC AMBSU PH II REC 0.5 TO 1 HR: Performed by: INTERNAL MEDICINE

## 2019-01-29 PROCEDURE — 88341 IMHCHEM/IMCYTCHM EA ADD ANTB: CPT

## 2019-01-29 PROCEDURE — 76210000040 HC AMBSU PH I REC FIRST 0.5 HR: Performed by: INTERNAL MEDICINE

## 2019-01-29 PROCEDURE — 77030020255 HC SOL INJ LR 1000ML BG: Performed by: INTERNAL MEDICINE

## 2019-01-29 PROCEDURE — 88237 TISSUE CULTURE BONE MARROW: CPT

## 2019-01-29 PROCEDURE — 88313 SPECIAL STAINS GROUP 2: CPT

## 2019-01-29 PROCEDURE — 77030003666 HC NDL SPINAL BD -A: Performed by: INTERNAL MEDICINE

## 2019-01-29 PROCEDURE — 76060000073 HC AMB SURG ANES FIRST 0.5 HR: Performed by: INTERNAL MEDICINE

## 2019-01-29 PROCEDURE — 88305 TISSUE EXAM BY PATHOLOGIST: CPT

## 2019-01-29 PROCEDURE — 36415 COLL VENOUS BLD VENIPUNCTURE: CPT

## 2019-01-29 PROCEDURE — 77030021352 HC CBL LD SYS DISP COVD -B: Performed by: INTERNAL MEDICINE

## 2019-01-29 PROCEDURE — 88264 CHROMOSOME ANALYSIS 20-25: CPT

## 2019-01-29 PROCEDURE — 77030003445 HC NDL BIOP BN BD -B: Performed by: INTERNAL MEDICINE

## 2019-01-29 PROCEDURE — 74011250636 HC RX REV CODE- 250/636: Performed by: ANESTHESIOLOGY

## 2019-01-29 RX ORDER — HYDROMORPHONE HYDROCHLORIDE 1 MG/ML
.2-.5 INJECTION, SOLUTION INTRAMUSCULAR; INTRAVENOUS; SUBCUTANEOUS ONCE
Status: DISCONTINUED | OUTPATIENT
Start: 2019-01-29 | End: 2019-01-29 | Stop reason: HOSPADM

## 2019-01-29 RX ORDER — SODIUM CHLORIDE 0.9 % (FLUSH) 0.9 %
5-40 SYRINGE (ML) INJECTION EVERY 8 HOURS
Status: DISCONTINUED | OUTPATIENT
Start: 2019-01-29 | End: 2019-01-29 | Stop reason: HOSPADM

## 2019-01-29 RX ORDER — MIDAZOLAM HYDROCHLORIDE 1 MG/ML
5 INJECTION, SOLUTION INTRAMUSCULAR; INTRAVENOUS AS NEEDED
Status: DISCONTINUED | OUTPATIENT
Start: 2019-01-29 | End: 2019-01-29 | Stop reason: HOSPADM

## 2019-01-29 RX ORDER — FENTANYL CITRATE 50 UG/ML
25 INJECTION, SOLUTION INTRAMUSCULAR; INTRAVENOUS
Status: DISCONTINUED | OUTPATIENT
Start: 2019-01-29 | End: 2019-01-29 | Stop reason: HOSPADM

## 2019-01-29 RX ORDER — PROPOFOL 10 MG/ML
INJECTION, EMULSION INTRAVENOUS AS NEEDED
Status: DISCONTINUED | OUTPATIENT
Start: 2019-01-29 | End: 2019-01-29 | Stop reason: HOSPADM

## 2019-01-29 RX ORDER — LIDOCAINE HYDROCHLORIDE 10 MG/ML
0.1 INJECTION, SOLUTION EPIDURAL; INFILTRATION; INTRACAUDAL; PERINEURAL AS NEEDED
Status: DISCONTINUED | OUTPATIENT
Start: 2019-01-29 | End: 2019-01-29 | Stop reason: HOSPADM

## 2019-01-29 RX ORDER — OXYCODONE AND ACETAMINOPHEN 5; 325 MG/1; MG/1
1 TABLET ORAL
Status: DISCONTINUED | OUTPATIENT
Start: 2019-01-29 | End: 2019-01-29 | Stop reason: HOSPADM

## 2019-01-29 RX ORDER — LIDOCAINE HYDROCHLORIDE 20 MG/ML
INJECTION, SOLUTION EPIDURAL; INFILTRATION; INTRACAUDAL; PERINEURAL AS NEEDED
Status: DISCONTINUED | OUTPATIENT
Start: 2019-01-29 | End: 2019-01-29 | Stop reason: HOSPADM

## 2019-01-29 RX ORDER — SODIUM CHLORIDE 0.9 % (FLUSH) 0.9 %
5-40 SYRINGE (ML) INJECTION AS NEEDED
Status: DISCONTINUED | OUTPATIENT
Start: 2019-01-29 | End: 2019-01-29 | Stop reason: HOSPADM

## 2019-01-29 RX ORDER — MORPHINE SULFATE 10 MG/ML
2 INJECTION, SOLUTION INTRAMUSCULAR; INTRAVENOUS
Status: DISCONTINUED | OUTPATIENT
Start: 2019-01-29 | End: 2019-01-29 | Stop reason: HOSPADM

## 2019-01-29 RX ORDER — FENTANYL CITRATE 50 UG/ML
50 INJECTION, SOLUTION INTRAMUSCULAR; INTRAVENOUS AS NEEDED
Status: DISCONTINUED | OUTPATIENT
Start: 2019-01-29 | End: 2019-01-29 | Stop reason: HOSPADM

## 2019-01-29 RX ORDER — SODIUM CHLORIDE, SODIUM LACTATE, POTASSIUM CHLORIDE, CALCIUM CHLORIDE 600; 310; 30; 20 MG/100ML; MG/100ML; MG/100ML; MG/100ML
25 INJECTION, SOLUTION INTRAVENOUS CONTINUOUS
Status: DISCONTINUED | OUTPATIENT
Start: 2019-01-29 | End: 2019-01-29 | Stop reason: HOSPADM

## 2019-01-29 RX ORDER — DIPHENHYDRAMINE HYDROCHLORIDE 50 MG/ML
12.5 INJECTION, SOLUTION INTRAMUSCULAR; INTRAVENOUS AS NEEDED
Status: DISCONTINUED | OUTPATIENT
Start: 2019-01-29 | End: 2019-01-29 | Stop reason: HOSPADM

## 2019-01-29 RX ORDER — ACETAMINOPHEN 500 MG
500 TABLET ORAL
Status: DISCONTINUED | OUTPATIENT
Start: 2019-01-29 | End: 2019-01-29 | Stop reason: HOSPADM

## 2019-01-29 RX ORDER — LIDOCAINE HYDROCHLORIDE 10 MG/ML
INJECTION, SOLUTION EPIDURAL; INFILTRATION; INTRACAUDAL; PERINEURAL AS NEEDED
Status: DISCONTINUED | OUTPATIENT
Start: 2019-01-29 | End: 2019-01-29 | Stop reason: HOSPADM

## 2019-01-29 RX ORDER — MIDAZOLAM HYDROCHLORIDE 1 MG/ML
INJECTION, SOLUTION INTRAMUSCULAR; INTRAVENOUS AS NEEDED
Status: DISCONTINUED | OUTPATIENT
Start: 2019-01-29 | End: 2019-01-29 | Stop reason: HOSPADM

## 2019-01-29 RX ADMIN — PROPOFOL 20 MG: 10 INJECTION, EMULSION INTRAVENOUS at 09:18

## 2019-01-29 RX ADMIN — LIDOCAINE HYDROCHLORIDE 40 MG: 20 INJECTION, SOLUTION EPIDURAL; INFILTRATION; INTRACAUDAL; PERINEURAL at 09:12

## 2019-01-29 RX ADMIN — PROPOFOL 20 MG: 10 INJECTION, EMULSION INTRAVENOUS at 09:14

## 2019-01-29 RX ADMIN — MIDAZOLAM HYDROCHLORIDE 2 MG: 1 INJECTION, SOLUTION INTRAMUSCULAR; INTRAVENOUS at 09:05

## 2019-01-29 RX ADMIN — SODIUM CHLORIDE, SODIUM LACTATE, POTASSIUM CHLORIDE, AND CALCIUM CHLORIDE 25 ML/HR: 600; 310; 30; 20 INJECTION, SOLUTION INTRAVENOUS at 08:22

## 2019-01-29 RX ADMIN — PROPOFOL 20 MG: 10 INJECTION, EMULSION INTRAVENOUS at 09:16

## 2019-01-29 RX ADMIN — PROPOFOL 20 MG: 10 INJECTION, EMULSION INTRAVENOUS at 09:12

## 2019-01-29 NOTE — PERIOP NOTES
8583: pt tolerating PO fluids without c/o N/V or difficulty swallowing. 3140: pt spouse/[place] at bedside, updated about pt condition. Pt is A&Ox4, resp even/unlabored, VSS, pt denies pain at this time, tolerating PO fluids. 1006: Reviewed pt DC instructions with pt and spouse/[place], both verbalized understanding with no further questions at this time. 1010: Patient meets discharge criteria and agrees she is ready to go home, [place]/spouse  also agrees. 1021: pt dressed in own clothes with minimal asst from nurse, pt eyeglasses and cane returned to pt, pt transported via [facility] by nurse to [facility] to be DC to home with spouse.

## 2019-01-29 NOTE — ANESTHESIA POSTPROCEDURE EVALUATION
Procedure(s): BONE MARROW BIOPSY AND ASPIRATION. Anesthesia Post Evaluation Patient location during evaluation: PACU Note status: Adequate. Level of consciousness: responsive to verbal stimuli and sleepy but conscious Pain management: satisfactory to patient Airway patency: patent Anesthetic complications: no 
Cardiovascular status: acceptable Respiratory status: acceptable Hydration status: acceptable Comments: +Post-Anesthesia Evaluation and Assessment Patient: Lora Santiago MRN: [id number]  SSN: [id number] YOB: 1935  Age: 82 y.o. Sex: female Cardiovascular Function/Vital Signs /53   Pulse 68   Temp 36.7 °C (98 °F)   Resp 18   Ht 5' 5\" (1.651 m)   Wt 86.2 kg (190 lb)   SpO2 95%   BMI 31.62 kg/m² Patient is status post Procedure(s): BONE MARROW BIOPSY AND ASPIRATION. Nausea/Vomiting: Controlled. Postoperative hydration reviewed and adequate. Pain: 
Pain Scale 1: Numeric (0 - 10) (01/29/19 0935) Pain Intensity 1: 0 (01/29/19 0935) Managed. Neurological Status:  
Neuro (WDL): Within Defined Limits (01/29/19 0935) At baseline. Mental Status and Level of Consciousness: Arousable. Pulmonary Status:  
O2 Device: Room air (01/29/19 0935) Adequate oxygenation and airway patent. Complications related to anesthesia: None Post-anesthesia assessment completed. No concerns. Signed By: Pio Santoro MD  
 1/29/2019 Post anesthesia nausea and vomiting:  controlled Visit Vitals /53 Pulse 68 Temp 36.7 °C (98 °F) Resp 18 Ht 5' 5\" (1.651 m) Wt 86.2 kg (190 lb) SpO2 95% BMI 31.62 kg/m²

## 2019-01-29 NOTE — ANESTHESIA PREPROCEDURE EVALUATION
Anesthetic History No history of anesthetic complications Review of Systems / Medical History Patient summary reviewed, nursing notes reviewed and pertinent labs reviewed Pulmonary Within defined limits Comments: Former smoker - Quit 2012 - 25 pack years H/O Acute Respiratory Failure Neuro/Psych Comments: Chronic Back Pain/Spinal Stenosis with Radiculopathy s/p lumbar surgery Peripheral Neuropathy Cardiovascular Hyperlipidemia Exercise tolerance: >4 METS 
  
GI/Hepatic/Renal 
  
GERD (occasional only): well controlled PUD Comments: H/O Diverticulosis/Diverticulitis Endo/Other Obesity, arthritis, cancer (left breast) and anemia Comments: H/O Left Breast Cancer s/p Lumpectomy + XRT Prediabetes Other Findings Comments: Leukocytosis Erythroblastosis Fibromyalgia Rosacea Osteoporosis Physical Exam 
 
Airway Mallampati: II 
TM Distance: 4 - 6 cm Neck ROM: normal range of motion Mouth opening: Normal 
 
 Cardiovascular Rhythm: regular Rate: normal 
 
 
Pertinent negatives: No murmur Dental 
 
Dentition: Caps/crowns and Implants Comments: Across upper front Poor lower dentition with multiple fillings Pulmonary Breath sounds clear to auscultation Abdominal 
GI exam deferred Other Findings Anesthetic Plan ASA: 3 Anesthesia type: MAC and total IV anesthesia Monitoring Plan: BIS Induction: Intravenous Anesthetic plan and risks discussed with: Patient MAC

## 2019-01-29 NOTE — PERIOP NOTES
Ute Sean 1935 
[id number] Situation: 
Verbal report given from: Kit [employer] and [employer] Procedure: Procedure(s): BONE MARROW BIOPSY AND ASPIRATION Background: 
 
Preoperative diagnosis: LEUKOCYTOSIS, ANEMIA, ERTHROBLASTOSIS Postoperative diagnosis: LEUKOCYTOSIS, ANEMIA, ERTHROBLASTOSIS :  Dr. Duke Dover Assistant(s): Circ-1: Ac Lazo RN Scrub Tech-1: Ny Youssef Specimens: * No specimens in log * Assessment: 
Intra-procedure medications Anesthesia gave intra-procedure sedation and medications, see anesthesia flow sheet Intravenous fluids: Brigid Dirk Vital signs stable Recommendation: 
 
Permission to share finding with [place]/spouse : yes

## 2019-01-29 NOTE — DISCHARGE INSTRUCTIONS
Bone Marrow Aspiration and Biopsy: What to Expect at Home    Your Recovery    The biopsy site may feel sore for several days. Walking, taking pain medicine, and putting ice packs on the biopsy site can help. You will probably be able to return to work and your usual activities the day after the procedure. Your doctor or nurse will call you with the results of your test.  This care sheet gives you a general idea about how long it will take for you to recover. But each person recovers at a different pace. Follow the steps below to get better as quickly as possible. How can you care for yourself at home? Activity  · Rest when you feel tired. Getting enough sleep will help you recover. · You may drive when you are no longer taking pain pills and can quickly move your foot from the gas pedal to the brake. You must also be able to sit comfortably for a long period of time, even if you do not plan to go far. You might get caught in traffic. · Most people are able to return to work the day after the procedure. Medicines  · Take pain medicines exactly as directed. ¨ If the doctor gave you a prescription medicine for pain, take it as prescribed. ¨ If you are not taking a prescription pain medicine, take an over-the-counter medicine such as acetaminophen (Tylenol), ibuprofen (Advil, Motrin), or naproxen (Aleve). Read and follow all instructions on the label. ¨ Do not take two or more pain medicines at the same time unless the doctor told you to. Many pain medicines have acetaminophen, which is Tylenol. Too much acetaminophen (Tylenol) can be harmful. · If you think your pain medicine is making you sick to your stomach:  ¨ Take your medicine after meals (unless your doctor has told you not to). ¨ Ask your doctor for a different pain medicine. · If your doctor prescribed antibiotics, take them as directed. Do not stop taking them just because you feel better.  You need to take the full course of antibiotics. Ice  · Put ice or a cold pack on the biopsy site for 10 to 20 minutes at a time. Put a thin cloth between the ice and your skin. Follow-up care is a key part of your treatment and safety. Be sure to make and go to all appointments, and call your doctor if you are having problems. Its also a good idea to know your test results and keep a list of the medicines you take. When should you call for help? Call 911 anytime you think you may need emergency care. For example, call if:  · You passed out (lost consciousness). Call your doctor now or seek immediate medical care if:  · You have signs of infection, such as:  ¨ Increased pain, swelling, warmth, or redness. ¨ Red streaks leading from the biopsy site. ¨ Pus draining from the biopsy site. ¨ Swollen lymph nodes in your neck, armpits, or groin. ¨ A fever. Watch closely for any changes in your health, and be sure to contact your doctor if:  · You are not getting better as expected. Where can you learn more? Go to [employer]  Enter E148 in the search box to learn more about \"Bone Marrow Aspiration and Biopsy: What to Expect at Home. \"   © 9891-7234 Healthwise, [employer]. Care instructions adapted under license by [employer] (which disclaims liability or warranty for this information). This care instruction is for use with your licensed healthcare professional. If you have questions about a medical condition or this instruction, always ask your healthcare professional. [place] [address] any warranty or liability for your use of this information. Content Version: 0.3.037672; Last Revised: October 11, 2010        Dermabond Instructions    How to Care for Your Wound after Its Treated with DERMABOND* topical skin Adhesive  DERMABOND* Topical skin adhesive (2-octyl cyanoacrylate) is a sterile, liquid skin adhesive that holds wound edges together.   The film will usually remain in place for 5 to 10 days, then naturally fall off your skin. The following will answer some of your questions and provide instructions for proper care for your wound while it is healing:  CHECK WOUND APPEARANCE   Some swelling, redness, and pain are common with all wounds and normally will go away as the wound heals. If swelling, redness, or pain increases or if the wound feels warm to the tough, contact a doctor. Also contact a doctor if the wound edges reopen or separate. REPLACE BANDAGES   If your wound is bandaged, keep the bandage dry.  Replace the dressing daily until the adhesive film has fallen off or if the bandage should become wet, unless otherwise instructed by your physician.  When changing the dressing, do not place tape directly over the DERMABOND* adhesive film, because removing the tape later may also remove the film. AVOID TOPICAL MEDICATIONS   Do not apply liquid or ointment medications or any other product to your wound while the DERMABOND* adhesive film is in place. These may loosen the film before your wound is healed. KEEP WOUND DRY AND PROTECTED   You may occasionally and briefly wet your wound in the shower or bath. Do not soak or scrub your wound, do not swim, and avoid periods of heavy perspiration until the DERMABOND* adhesive has naturally fallen off. After showering or bathing, gently blot your wound dry with a soft towel. If a protective dressing is being used, apply a fresh, dry bandage, being sure to keep the tape off the DERMABOND* adhesive film.  Apply a clean, dry bandage over the wound if necessary to protect it.  Protect your wound from injury until the skin has had sufficient time to heal.   Do not scratch, rub, or pick at the DERMABOND* adhesive film. This may loosen the film before your wound is healed.  Protect the wound from prolonged exposure to sunlight or tanning lamps while the film is in place.   If you have any questions or concerns about this product, please consult your doctor. *[employer]           TO PREVENT AN INFECTION      1. [address] YOUR HANDS     To prevent infection, good handwashing is the most important thing you or your caregiver can do.  Wash your hands with soap and water or use the hand  we gave you before you touch any wounds. 2. SHOWER     Use the antibacterial soap we gave you when you take a shower.  Shower with this soap until your wounds are healed.  To reach all areas of your body, you may need someone to help you.  Dont forget to clean your belly button with every shower. 3.  USE CLEAN SHEETS     Use freshly cleaned sheets on your bed after surgery.  To keep the surgery site clean, do not allow pets to sleep with you while your wound is still healing. 4. STOP SMOKING     Stop smoking, or at least cut back on smoking     Smoking slows your healing. 5.  CONTROL YOUR BLOOD SUGAR     High blood sugars slow wound healing.  If you are diabetic, control your blood sugar levels before and after your surgery. DO NOT TAKE TYLENOL/ACETAMINOPHEN WITH PERCOCET, LORTAB, [address] TAKE NARCOTIC PAIN MEDICATIONS WITH FOOD     Narcotics tend to be constipating, we suggest taking a stool softener such as Colace or Miralax (follow package instructions). DO NOT DRIVE WHILE TAKING NARCOTIC PAIN MEDICATIONS. DO NOT TAKE SLEEPING MEDICATIONS OR ANTIANXIETY MEDICATIONS WHILE TAKING NARCOTIC PAIN MEDICATIONS,  ESPECIALLY THE NIGHT OF ANESTHESIA! CPAP PATIENTS BE SURE TO WEAR MACHINE WHENEVER NAPPING OR SLEEPING! DISCHARGE SUMMARY from Nurse    The following personal items collected during your admission are returned to you:   Dental Appliance: Dental Appliances: (upper implants)  Vision: Visual Aid: Glasses  Hearing Aid:    Jewelry: Jewelry: None  Clothing: Clothing: With patient  Other Valuables:  Other Valuables: Cane  Valuables sent to safe:        PATIENT INSTRUCTIONS:    After General Anesthesia or Intravenous Sedation, for 24 hours or while taking prescription Narcotics:        Someone should be with you for the next 24 hours. For your own safety, a responsible adult must drive you home. · Limit your activities  · Recommended activity: Rest today, up with assistance today. Do not climb stairs or shower unattended for the next 24 hours. · Please start with a soft bland diet and advance as tolerated (no nausea) to regular diet. · If you have a sore throat you should try the following: fluids, warm salt water gargles, or throat lozenges. If it does not improve after several days please follow up with your primary physician. · Do not drive and operate hazardous machinery  · Do not make important personal or business decisions  · Do  not drink alcoholic beverages  · If you have not urinated within 8 hours after discharge, please contact your surgeon on call. Report the following to your surgeon:  · Excessive pain, swelling, redness or odor of or around the surgical area  · Temperature over 100.5  · Nausea and vomiting lasting longer than 4 hours or if unable to take medications  · Any signs of decreased circulation or nerve impairment to extremity: change in color, persistent  numbness, tingling, coldness or increase pain      · You will receive a Post Operative Call from one of the Recovery Room Nurses on the day after your surgery to check on you. It is very important for us to know how you are recovering after your surgery. If you have an issue or need to speak with someone, please call your surgeon, do not wait for the post operative call. · You may receive an e-mail or letter in the mail from [employer] regarding your experience with us in the Ambulatory Surgery Unit. Your feedback is valuable to us and we appreciate your participation in the survey.        · If the above instructions are not adequate or you are having problems after your surgery, call the physician at their office number. · We wish you a speedy recovery ? What to do at Home:      *  Please give a list of your current medications to your Primary Care Provider. *  Please update this list whenever your medications are discontinued, doses are      changed, or new medications (including over-the-counter products) are added. *  Please carry medication information at all times in case of emergency situations. These are general instructions for a healthy lifestyle:    No smoking/ No tobacco products/ Avoid exposure to second hand smoke    Surgeon General's Warning:  Quitting smoking now greatly reduces serious risk to your health. Obesity, smoking, and sedentary lifestyle greatly increases your risk for illness    A healthy diet, regular physical exercise & weight monitoring are important for maintaining a healthy lifestyle    You may be retaining fluid if you have a history of heart failure or if you experience any of the following symptoms:  Weight gain of 3 pounds or more overnight or 5 pounds in a week, increased swelling in our hands or feet or shortness of breath while lying flat in bed. Please call your doctor as soon as you notice any of these symptoms; do not wait until your next office visit. Recognize signs and symptoms of STROKE:    B - Balance  E - Eyes    F-  Face looks uneven  A-  Arms unable to move or move even  S-  Speech slurred or non-existent  T-  Time-call 911 as soon as signs and symptoms begin-DO NOT go       Back to bed or wait to see if you get better-TIME IS BRAIN. If you have not received your influenza and/or pneumococcal vaccine, please follow up with your primary care physician. The discharge information has been reviewed with the patient and caregiver. The patient and caregiver verbalized understanding.

## 2019-01-29 NOTE — INTERVAL H&P NOTE
H&P Update: 
Joaquín Michele was seen and examined. History and physical has been reviewed. The patient has been examined.  There have been no significant clinical changes since the completion of the originally dated History and Physical. 
 
Signed By: Haydee Molina MD   
 January 29, 2019 9:05 AM

## 2019-01-29 NOTE — OP NOTES
[address], [address] [place] [address] Ronald Hercules, [facility]  [phone]      Bone Marrow Biopsy and Aspiration Procedure Note      Physician: Patrick Bella MD      Pre-procedure diagnosis: Anemia, thrombocytopenia  Post procedure diagnosis:  Anemia, thrombocytopenia  Procedure start time: 9:15 AM  Procedure end time: 9:25 AM  Total blood loss: 20 cc    Bone marrow biopsy and aspiration procedure and potential risks including bleeding, infection and pain were reviewed with the patient. Informed consent obtained. Patient assisted to prone position. left iliac crest prepped with Betadine and sterile drapes applied. left hip and iliac crest area infiltrated with 1 % Xylocaine to achieve adequate anesthesia. Bone marrow aspiration and biopsy needle was inserted into left iliac crest with the help of On Control device and bone marrow aspirated for slides/clot specimen and special tests. The needle was then withdrawn and bone Marrow biopsy needle was subsequently reinserted at a different but adjacent site on the left iliac crest with the help of the On Control device and then core biopsy was collected without difficulty. Two cores were obtained with two different sticks on the iliac crest.  At the end of the procedure, pressure was held for about 5 minutes and area procedure site was cleaned and dressed. The procedure was tolerated well. No active bleeding or hematoma formation. No complications.     Specimens sent for: routine histopathologic stains and sectioning, flow cytometry, cytogenetics, molecular analysis and fluorescence in situ hybridization (FISH) assay      Signed By: Patrick Bella MD

## 2019-01-30 NOTE — PERIOP NOTES
1/30/[id number]-Tuyn making post op calls, pt. Stated when she took her sweatshirt off last night after being discharged she realized her iv was left in. She stated she took it out and held pressure. Pt. Stated the catheter tip was intact. Denies redness, soreness or swelling. Instructed pt. To apply warm compress if becomes red or swollen and to follow up w/pcp.

## 2019-02-11 ENCOUNTER — OFFICE VISIT (OUTPATIENT)
Dept: INTERNAL MEDICINE CLINIC | Age: 84
End: 2019-02-11

## 2019-02-11 VITALS
WEIGHT: 192.6 LBS | RESPIRATION RATE: 24 BRPM | OXYGEN SATURATION: 98 % | SYSTOLIC BLOOD PRESSURE: 140 MMHG | HEIGHT: 65 IN | BODY MASS INDEX: 32.09 KG/M2 | TEMPERATURE: 97.6 F | HEART RATE: 81 BPM | DIASTOLIC BLOOD PRESSURE: 78 MMHG

## 2019-02-11 DIAGNOSIS — S67.10XA CRUSHING INJURY OF FINGER, INITIAL ENCOUNTER: Primary | ICD-10-CM

## 2019-02-11 NOTE — PROGRESS NOTES
This note will not be viewable in [address] Subjective:  
 
Mrs. Coleman Hubbard presents to the office today with a crush injury to the tip of the right fourth finger which occurred 3 days ago. Her  was closing a door and she grabbed the door near the door frame. She was pulling her finger out as it was closing but still got it caught. She notes that the tip of her finger is black and blue. The nail has not become displaced. She notes of no severe pain or throbbing in the finger unless it is touched. Past Medical History:  
Diagnosis Date  Arthritis  Bunion of right foot 8/20/2015  Chronic pain   
 back--uses tens unit  Diverticulitis 6/29/2018 Recurrent  Dry eye syndrome 6/29/2018  Fibromyalgia 6/29/2018  GERD (gastroesophageal reflux disease)  History of left breast cancer 2013  
 lumpectomy radiation  Hypercholesterolemia 6/29/2018  Ill-defined condition   
 blood transfusion hx  Osteoporosis 6/29/2018  Peripheral neuropathy 6/29/2018  Prediabetes 6/29/2018  PUD (peptic ulcer disease)  Radiation therapy complication [address] breast-No Chemo  Rosacea 6/29/2018  Trouble in sleeping Past Surgical History:  
Procedure Laterality Date  HX APPENDECTOMY  HX BREAST LUMPECTOMY  11/21/2013 LEFT BREAST LUMPECTOMY W/LEFT BREAST SENTINEL NODE BIOPSY,AND NEEDLE LOCALIZATION performed by Meliza Estrella MD at [facility] MAIN OR  
 HX CATARACT REMOVAL    
 bilateral  
 HX HYSTERECTOMY    
 ovarian cyst  
 HX MOHS PROCEDURES    
 right  HX ORTHOPAEDIC    
 back surgery x2  HX OTHER SURGICAL    
 colonoscopy  HX TONSILLECTOMY  TOTAL KNEE ARTHROPLASTY    
 left  TOTAL KNEE ARTHROPLASTY    
 right [facility] BREAST BIOPSY Left 2013 Breast Ca Allergies Allergen Reactions  Hydrocodone Nausea Only and Vertigo  Gabapentin Diarrhea, Nausea Only and Other (comments)  
  weakness  Lyrica [Pregabalin] Nausea Only  Oxycodone Nausea and Vomiting and Vertigo Current Outpatient Medications Medication Sig Dispense Refill  inulin (FIBER GUMMIES PO) Take 1 Tab by mouth daily.  acetaminophen (TYLENOL) 325 mg tablet Take 650 mg by mouth every four (4) hours as needed for Pain.  ergocalciferol (VITAMIN D2) 50,000 unit capsule Take 1 Cap by mouth every seven (7) days. 12 Cap 3  
 vit A/vit C/vit E/zinc/copper (PRESERVISION AREDS PO) Take 1 Tab by mouth daily.  multivitamin (ONE A DAY) tablet Take 1 Tab by mouth daily. Social History Socioeconomic History  Marital status:  Spouse name: Not on file  Number of children: Not on file  Years of education: Not on file  Highest education level: Not on file Tobacco Use  Smoking status: Former Smoker Packs/day: 0.50 Years: 50.00 Pack years: 25.00 Last attempt to quit: 2012 Years since quittin.0  Smokeless tobacco: Never Used Substance and Sexual Activity  Alcohol use: Yes Alcohol/week: 1.2 oz Types: 2 Glasses of wine per week  Drug use: No  
 
Family History Problem Relation Age of Onset  Cancer Mother   
     bladder  Cancer Father  Breast Cancer Paternal Grandmother Review of Systems: 
GEN: no weight loss, weight gain, fatigue or night sweats CV: no PND, orthopnea, or palpitations Resp: no dyspnea on exertion, no cough Abd: no nausea, vomiting or diarrhea EXT: Positive for right fourth finger pain Endocrine: no hair loss, excessive thirst or polyuria Neurological ROS: no TIA or stroke symptoms ROS otherwise negative Objective:  
 
Visit Vitals /78 (BP 1 Location: Right arm, BP Patient Position: Sitting) Pulse 81 Temp 97.6 °F (36.4 °C) (Oral) Resp 24 Ht 5' 5\" (1.651 m) Wt 192 lb 9.6 oz (87.4 kg) SpO2 98% BMI 32.05 kg/m² Body mass index is 32.05 kg/m². General:   alert, cooperative and no distress Extremities:  Examination of the right fourth finger reveals the distal phalanx to be bruised. She is able to flex the PIP and DIP joints. There is no angular deformity. No other fingers are involved. Neuro: ..alert, oriented x3,speech normal in context and clarity, cranial nerves II-XII intact,motor strength: full proximally and distally,gait: normal 
reflexes: full and symmetric Physical exam otherwise negative Assessment/Plan:  
 
Diagnoses and all orders for this visit: 
 
Crushing injury of finger, initial encounter -     XR 4TH FINGER RT MIN 2 V; Future Other instructions:  
X-ray of the right fourth finger is performed and no fracture is seen. She will place a protective covering over the finger to avoid any further contact and use Tylenol for pain. Follow-up if there is any other problems Follow-up Disposition: 
Return if symptoms worsen or fail to improve.  
 
Camilo Ta MD

## 2019-02-11 NOTE — PROGRESS NOTES
Richa Estrada is a 82 y.o. female presenting for Finger Pain [place] 1. Have you been to the ER, urgent care clinic since your last visit? Hospitalized since your last visit? No 
 
2. Have you seen or consulted any other health care providers outside of the [address] [place] [address] since your last visit? Include any pap smears or colon screening. Yes When: 2 weeks ago Where: Dr Olga Grimaldo Reason for visit: hematologist. 
 
Fall Risk Assessment, last 12 mths 1/21/2019 Able to walk? Yes Fall in past 12 months? No  
Fall with injury? -  
Number of falls in past 12 months - Fall Risk Score -  
 
 
 
Abuse Screening Questionnaire 1/7/2019 Do you ever feel afraid of your partner? Camacho Nance Are you in a relationship with someone who physically or mentally threatens you? Camacho Nance Is it safe for you to go home? Y  
 
 
PHQ over the last two weeks 1/21/2019 Little interest or pleasure in doing things Not at all Feeling down, depressed, irritable, or hopeless Not at all Total Score PHQ 2 0 There are no discontinued medications.

## 2019-02-15 PROBLEM — D50.9 IRON DEFICIENCY ANEMIA: Status: ACTIVE | Noted: 2019-02-15

## 2019-02-15 RX ORDER — ALBUTEROL SULFATE 0.83 MG/ML
2.5 SOLUTION RESPIRATORY (INHALATION) AS NEEDED
Status: CANCELLED
Start: 2019-03-04

## 2019-02-15 RX ORDER — ONDANSETRON 2 MG/ML
8 INJECTION INTRAMUSCULAR; INTRAVENOUS AS NEEDED
Status: CANCELLED | OUTPATIENT
Start: 2019-03-04

## 2019-02-15 RX ORDER — ACETAMINOPHEN 325 MG/1
650 TABLET ORAL AS NEEDED
Status: CANCELLED
Start: 2019-03-04

## 2019-02-15 RX ORDER — DIPHENHYDRAMINE HYDROCHLORIDE 50 MG/ML
50 INJECTION, SOLUTION INTRAMUSCULAR; INTRAVENOUS AS NEEDED
Status: CANCELLED
Start: 2019-03-04

## 2019-02-15 RX ORDER — ALBUTEROL SULFATE 0.83 MG/ML
2.5 SOLUTION RESPIRATORY (INHALATION) AS NEEDED
Status: CANCELLED
Start: 2019-02-25

## 2019-02-15 RX ORDER — HEPARIN 100 UNIT/ML
300-500 SYRINGE INTRAVENOUS AS NEEDED
Status: CANCELLED
Start: 2019-03-04

## 2019-02-15 RX ORDER — HYDROCORTISONE SODIUM SUCCINATE 100 MG/2ML
100 INJECTION, POWDER, FOR SOLUTION INTRAMUSCULAR; INTRAVENOUS AS NEEDED
Status: CANCELLED | OUTPATIENT
Start: 2019-03-04

## 2019-02-15 RX ORDER — ONDANSETRON 2 MG/ML
8 INJECTION INTRAMUSCULAR; INTRAVENOUS AS NEEDED
Status: CANCELLED | OUTPATIENT
Start: 2019-02-25

## 2019-02-15 RX ORDER — HYDROCORTISONE SODIUM SUCCINATE 100 MG/2ML
100 INJECTION, POWDER, FOR SOLUTION INTRAMUSCULAR; INTRAVENOUS AS NEEDED
Status: CANCELLED | OUTPATIENT
Start: 2019-02-25

## 2019-02-15 RX ORDER — SODIUM CHLORIDE 0.9 % (FLUSH) 0.9 %
10 SYRINGE (ML) INJECTION AS NEEDED
Status: CANCELLED
Start: 2019-03-04

## 2019-02-15 RX ORDER — SODIUM CHLORIDE 9 MG/ML
10 INJECTION INTRAMUSCULAR; INTRAVENOUS; SUBCUTANEOUS AS NEEDED
Status: CANCELLED | OUTPATIENT
Start: 2019-02-25

## 2019-02-15 RX ORDER — SODIUM CHLORIDE 9 MG/ML
10 INJECTION INTRAMUSCULAR; INTRAVENOUS; SUBCUTANEOUS AS NEEDED
Status: CANCELLED | OUTPATIENT
Start: 2019-03-04

## 2019-02-15 RX ORDER — SODIUM CHLORIDE 0.9 % (FLUSH) 0.9 %
10 SYRINGE (ML) INJECTION AS NEEDED
Status: CANCELLED
Start: 2019-02-25

## 2019-02-15 RX ORDER — EPINEPHRINE 1 MG/ML
0.3 INJECTION, SOLUTION, CONCENTRATE INTRAVENOUS AS NEEDED
Status: CANCELLED | OUTPATIENT
Start: 2019-03-04

## 2019-02-15 RX ORDER — DIPHENHYDRAMINE HYDROCHLORIDE 50 MG/ML
50 INJECTION, SOLUTION INTRAMUSCULAR; INTRAVENOUS AS NEEDED
Status: CANCELLED
Start: 2019-02-25

## 2019-02-15 RX ORDER — ACETAMINOPHEN 325 MG/1
650 TABLET ORAL AS NEEDED
Status: CANCELLED
Start: 2019-02-25

## 2019-02-15 RX ORDER — EPINEPHRINE 1 MG/ML
0.3 INJECTION, SOLUTION, CONCENTRATE INTRAVENOUS AS NEEDED
Status: CANCELLED | OUTPATIENT
Start: 2019-02-25

## 2019-02-15 RX ORDER — HEPARIN 100 UNIT/ML
300-500 SYRINGE INTRAVENOUS AS NEEDED
Status: CANCELLED
Start: 2019-02-25

## 2019-02-18 ENCOUNTER — OFFICE VISIT (OUTPATIENT)
Dept: ONCOLOGY | Age: 84
End: 2019-02-18

## 2019-02-18 VITALS
HEIGHT: 65 IN | TEMPERATURE: 98.4 F | OXYGEN SATURATION: 98 % | HEART RATE: 82 BPM | RESPIRATION RATE: 18 BRPM | SYSTOLIC BLOOD PRESSURE: 123 MMHG | BODY MASS INDEX: 31.37 KG/M2 | DIASTOLIC BLOOD PRESSURE: 72 MMHG | WEIGHT: 188.3 LBS

## 2019-02-18 DIAGNOSIS — D46.9 MDS (MYELODYSPLASTIC SYNDROME) (HCC): Primary | ICD-10-CM

## 2019-02-18 DIAGNOSIS — Z85.3 HISTORY OF BREAST CANCER: ICD-10-CM

## 2019-02-18 NOTE — PROGRESS NOTES
Coleen Costello a 82 y.o. female here today for left side breast cancer f/u and new elevated WBC diagnosis. Completed rads tx to breast. S/P left breast lumpectomy + SLN; 11/2013. Last mammo; 11/2017; WNL.  Patient reports she stopped taking Arimidex 12/2018 and stopped taking ASA. VS stable. Patient denies pain. Good appetite. Patient denies N/V/D and constipation. Patient denies numbness and tingling. Patient denies mouth ulcers. Patient denies cough. Patient denies SOB. Patient denies falls. Visit Vitals  /72 (BP 1 Location: Left arm, BP Patient Position: Sitting)   Pulse 82   Temp 98.4 °F (36.9 °C) (Oral)   Resp 18   Ht 5' 5\" (1.651 m)   Wt 188 lb 4.8 oz (85.4 kg)   SpO2 98%   BMI 31.33 kg/m²       Pain Scale: 0 - No pain/10  Pain Location:     1. Have you been to the ER, urgent care clinic since your last visit? Hospitalized since your last visit? No    2. Have you seen or consulted any other health care providers outside of the [facility] since your last visit? Include any pap smears or colon screening. No     Health Maintenance Review: Patient reminded of \"due or due soon\" health maintenance. I have asked the patient to contact his/her primary care provider (PCP) for follow-up on his/her health maintenance.

## 2019-02-18 NOTE — PROGRESS NOTES
[address] [facility]  at [address] [place] [address], [address] [facility]  [phone]      Follow-up Note        Patient: Leslie Davis MRN: [id number]  SSN: [id number]    YOB: 1935  Age: 82 y.o. Sex: female          Diagnosis:      1. Myelodysplastic Syndrome   IPSS-R Low risk    Del(7q) and trisomy 8    2. Left breast carcinoma:   T1a N0 Mx (Stage IA) infiltrating ductal carcinoma , Tumor size 1 cm, LN -ve, grade 2, ki 67 17%, %, AR 90%, Her 2 unamplified. Treatment:      1. Discontinued Arimidex 1/2019 after 5 years of treatment  2. Completed radiation treatment to the breast   3. S/P left breast lumpectomy and sentinel LN excision on 11/21/2013    Subjective:      Ondina Huston is a 70 y.o.  female with stage I invasive ductal carcinoma. She underwent a left breast lumpectomy and sentinel LN excision on 11/21/2013. Ms. Jenna Jackson then received radiation to the left breast. She stopped arimidex this month after 5 years. She reports joint pain and chronic numbness and tingling in her feet. Ms. Jenna Jackson returns today to discuss her recent bone marrow biopsy results. She has some fatigue.       Review of Systems:     Constitutional: negative   Eyes: negative   Ears, Nose, Mouth, Throat, and Face: negative   Respiratory: negative   Cardiovascular: negative   Gastrointestinal: negative   Integument/Breast: negative  Hematologic/Lymphatic: negative   Musculoskeletal: joint pain  Neurological: numbness bottoms of both feet      Past Medical History:   Diagnosis Date    Arthritis     Bunion of right foot 8/20/2015    Chronic pain     back--uses tens unit    Diverticulitis 6/29/2018    Recurrent    Dry eye syndrome 6/29/2018    Fibromyalgia 6/29/2018    GERD (gastroesophageal reflux disease)     History of left breast cancer 2013    lumpectomy radiation    Hypercholesterolemia 6/29/2018    Ill-defined condition blood transfusion hx    Osteoporosis 2018    Peripheral neuropathy 2018    Prediabetes 2018    PUD (peptic ulcer disease)     Radiation therapy complication 8616    Lt breast-No Chemo    Rosacea 2018    Trouble in sleeping      Past Surgical History:   Procedure Laterality Date    HX APPENDECTOMY      HX BREAST LUMPECTOMY  2013    LEFT BREAST LUMPECTOMY W/LEFT BREAST SENTINEL NODE BIOPSY,AND NEEDLE LOCALIZATION performed by Jyothi Vega MD at [facility] MAIN OR    HX CATARACT REMOVAL      bilateral    HX HYSTERECTOMY      ovarian cyst    HX MOHS PROCEDURES      right    HX ORTHOPAEDIC      back surgery x2    HX OTHER SURGICAL      colonoscopy    HX TONSILLECTOMY      TOTAL KNEE ARTHROPLASTY      left    TOTAL KNEE ARTHROPLASTY      right    US GUIDED CORE BREAST BIOPSY Left     Breast Ca      Family History   Problem Relation Age of Onset    Cancer Mother         bladder    Cancer Father     Breast Cancer Paternal Grandmother      Social History     Tobacco Use    Smoking status: Former Smoker     Packs/day: 0.50     Years: 50.00     Pack years: 25.00     Last attempt to quit: 2012     Years since quittin.0    Smokeless tobacco: Never Used   Substance Use Topics    Alcohol use: Yes     Alcohol/week: 1.2 oz     Types: 2 Glasses of wine per week      Prior to Admission medications    Medication Sig Start Date End Date Taking? Authorizing Provider   inulin (FIBER GUMMIES PO) Take 1 Tab by mouth daily. Yes Provider, Historical   acetaminophen (TYLENOL) 325 mg tablet Take 650 mg by mouth every four (4) hours as needed for Pain. Yes Other, MD Libby   ergocalciferol (VITAMIN D2) 50,000 unit capsule Take 1 Cap by mouth every seven (7) days. 18  Yes Timmy Irene NP   vit A/vit C/vit E/zinc/copper (PRESERVISION AREDS PO) Take 1 Tab by mouth daily. Yes Provider, Historical   multivitamin (ONE A DAY) tablet Take 1 Tab by mouth daily.    Yes Provider, Historical              Allergies   Allergen Reactions    Hydrocodone Nausea Only and Vertigo    Gabapentin Diarrhea, Nausea Only and Other (comments)     weakness    Lyrica [Pregabalin] Nausea Only    Oxycodone Nausea and Vomiting and Vertigo           Objective:     Vitals:    02/18/19 0902   BP: 123/72   Pulse: 82   Resp: 18   Temp: 98.4 °F (36.9 °C)   TempSrc: Oral   SpO2: 98%   Weight: 188 lb 4.8 oz (85.4 kg)   Height: 5' 5\" (1.651 m)            Physical Exam:    GENERAL: alert, cooperative   EYE: conjunctivae/corneas clear   LYMPHATIC: Cervical, supraclavicular, and axillary nodes normal.   THROAT & NECK: normal and no erythema or exudates noted. LUNG: clear to auscultation bilaterally   HEART: regular rate and rhythm   ABDOMEN: soft, non-tender   EXTREMITIES: extremities normal   SKIN: Normal.   NEUROLOGIC: negative       Lab Results   Component Value Date/Time    WBC 18.3 (H) 01/29/2019 08:12 AM    HGB (POC) 10.7 (A) 01/07/2019 09:52 AM    HGB 11.1 (L) 01/29/2019 08:12 AM    HCT (POC) 32.0 (A) 01/07/2019 09:52 AM    HCT 34.7 (L) 01/29/2019 08:12 AM    PLATELET 102 (L) 67/64/2981 08:12 AM    .8 (H) 01/29/2019 08:12 AM       Lab Results   Component Value Date/Time    Iron 55 01/15/2019 11:48 AM    TIBC 449 01/15/2019 11:48 AM    Iron % saturation 12 (L) 01/15/2019 11:48 AM    Ferritin 37 01/15/2019 11:48 AM           Assessment:      1. Myelodysplastic Syndrome   IPSS-R Low risk      Del(7q) and trisomy 8    Blood counts are acceptable as of now  Recommend observation    I spent 65 minute with the patient in a face-to-face encounter. I explained her the stage of the disease, pathophysiology of the disease and the treatment approaches. I answered all her questions. More than 50% of the time was utilized in education, counseling and co-ordination of care. Implication of the diagnosis - development of cytopenias and evolution of AML    I discussed the triggers and reasons for treatment.    When Hgb goes below 10 and or platelet count goes below 100,000, I will treat her with 5'- Azacytidine. ESAs would not be a good choice because all 3 blood counts are abnormal.       2. Left breast carcinoma:     T1a N0 Mx (Stage IA) infiltrating ductal carcinoma , Tumor size 1 cm, LN -ve, grade 2, ki 67 17%, %, IL 90%, Her 2 unamplified. Dx: 10/28/2013    > S/P left breast lumpectomy and sentinel LN excision on 11/21/2013  > Completed radiation to the breast  > Discontinued Arimidex 1/2019 after 5 years  > In remission    Mammogram (11/2017): normal  DEXA (10/2013) - normal    Symptom management form reviewed with patient. 3. Vitamin D deficiency    > Vitamin D 50,000 units weekly      4. Iron Deficiency anemia from chronic GI losses    Ferritin - 37  IV iron infusion in the [facility] - currently not scheduled    I discussed with her various ways to replace/supplement iron which includes giving oral iron preparation such as iron sulfate or similar products or utilizing intravenous access to administer the total dose of iron.  The patient was given the option to choose from various oral and intravenous iron preparation and after a prolonged discussion we have agreed to proceed with IV Injectafer to be administered as two short infusions one week apart.       I counseled the patient regarding the side effects of IV injectafer which includes rare instances of anaphylactic reactions, infusion reactions etc.  After weighing the benefit and risks, the patient agreed to proceed with the treatment. 5. Osteopenia    > Management per Dr. Duncan Walnut:        > IV iron ordered in [facility]  > Continue observation  > repeat CBC in [facility]  > Follow up in 2 month        Signed by: Juana Judge MD                     February 18, 2019          CC.  Tricia Humphrey MD

## 2019-02-25 ENCOUNTER — HOSPITAL ENCOUNTER (OUTPATIENT)
Dept: INFUSION THERAPY | Age: 84
Discharge: HOME | End: 2019-02-25
Payer: MEDICARE

## 2019-02-25 VITALS
RESPIRATION RATE: 18 BRPM | SYSTOLIC BLOOD PRESSURE: 107 MMHG | TEMPERATURE: 97.5 F | DIASTOLIC BLOOD PRESSURE: 60 MMHG | HEART RATE: 72 BPM

## 2019-02-25 DIAGNOSIS — D50.8 OTHER IRON DEFICIENCY ANEMIA: Primary | ICD-10-CM

## 2019-02-25 LAB
FERRITIN SERPL-MCNC: 31 NG/ML (ref 8–252)
HCT VFR BLD AUTO: 36.2 % (ref 35–47)
HGB BLD-MCNC: 11 G/DL (ref 11.5–16)
IRON SATN MFR SERPL: 14 % (ref 20–50)
IRON SERPL-MCNC: 66 UG/DL (ref 35–150)
TIBC SERPL-MCNC: 469 UG/DL (ref 250–450)

## 2019-02-25 PROCEDURE — 96374 THER/PROPH/DIAG INJ IV PUSH: CPT

## 2019-02-25 PROCEDURE — 74011250636 HC RX REV CODE- 250/636: Performed by: NURSE PRACTITIONER

## 2019-02-25 PROCEDURE — 83540 ASSAY OF IRON: CPT

## 2019-02-25 PROCEDURE — 84466 ASSAY OF TRANSFERRIN: CPT

## 2019-02-25 PROCEDURE — 85018 HEMOGLOBIN: CPT

## 2019-02-25 PROCEDURE — 36415 COLL VENOUS BLD VENIPUNCTURE: CPT

## 2019-02-25 PROCEDURE — 74011000258 HC RX REV CODE- 258: Performed by: NURSE PRACTITIONER

## 2019-02-25 PROCEDURE — 82728 ASSAY OF FERRITIN: CPT

## 2019-02-25 PROCEDURE — 96361 HYDRATE IV INFUSION ADD-ON: CPT

## 2019-02-25 PROCEDURE — 96360 HYDRATION IV INFUSION INIT: CPT

## 2019-02-25 PROCEDURE — 96365 THER/PROPH/DIAG IV INF INIT: CPT

## 2019-02-25 RX ORDER — SODIUM CHLORIDE 0.9 % (FLUSH) 0.9 %
10 SYRINGE (ML) INJECTION AS NEEDED
Status: ACTIVE | OUTPATIENT
Start: 2019-02-25 | End: 2019-02-25

## 2019-02-25 RX ORDER — HEPARIN 100 UNIT/ML
300-500 SYRINGE INTRAVENOUS AS NEEDED
Status: ACTIVE | OUTPATIENT
Start: 2019-02-25 | End: 2019-02-25

## 2019-02-25 RX ORDER — SODIUM CHLORIDE 9 MG/ML
10 INJECTION INTRAMUSCULAR; INTRAVENOUS; SUBCUTANEOUS AS NEEDED
Status: ACTIVE | OUTPATIENT
Start: 2019-02-25 | End: 2019-02-25

## 2019-02-25 RX ADMIN — SODIUM CHLORIDE 500 ML: 900 INJECTION, SOLUTION INTRAVENOUS at 11:26

## 2019-02-25 RX ADMIN — FERUMOXYTOL 510 MG: 510 INJECTION INTRAVENOUS at 12:03

## 2019-02-27 LAB — TRANSFERRIN SERPL-MCNC: 361 MG/DL (ref 200–370)

## 2019-03-04 ENCOUNTER — HOSPITAL ENCOUNTER (OUTPATIENT)
Dept: INFUSION THERAPY | Age: 84
Discharge: HOME | End: 2019-03-04
Payer: MEDICARE

## 2019-03-04 VITALS
RESPIRATION RATE: 18 BRPM | SYSTOLIC BLOOD PRESSURE: 138 MMHG | HEART RATE: 77 BPM | TEMPERATURE: 98.1 F | DIASTOLIC BLOOD PRESSURE: 70 MMHG | OXYGEN SATURATION: 97 %

## 2019-03-04 DIAGNOSIS — D50.8 OTHER IRON DEFICIENCY ANEMIA: Primary | ICD-10-CM

## 2019-03-04 PROCEDURE — 96374 THER/PROPH/DIAG INJ IV PUSH: CPT

## 2019-03-04 PROCEDURE — 74011250636 HC RX REV CODE- 250/636: Performed by: NURSE PRACTITIONER

## 2019-03-04 PROCEDURE — 74011000258 HC RX REV CODE- 258: Performed by: NURSE PRACTITIONER

## 2019-03-04 RX ORDER — HEPARIN 100 UNIT/ML
300-500 SYRINGE INTRAVENOUS AS NEEDED
Status: ACTIVE | OUTPATIENT
Start: 2019-03-04 | End: 2019-03-05

## 2019-03-04 RX ORDER — SODIUM CHLORIDE 9 MG/ML
10 INJECTION INTRAMUSCULAR; INTRAVENOUS; SUBCUTANEOUS AS NEEDED
Status: ACTIVE | OUTPATIENT
Start: 2019-03-04 | End: 2019-03-05

## 2019-03-04 RX ORDER — SODIUM CHLORIDE 0.9 % (FLUSH) 0.9 %
10 SYRINGE (ML) INJECTION AS NEEDED
Status: ACTIVE | OUTPATIENT
Start: 2019-03-04 | End: 2019-03-05

## 2019-03-04 RX ADMIN — Medication 10 ML: at 14:55

## 2019-03-04 RX ADMIN — FERUMOXYTOL 510 MG: 510 INJECTION INTRAVENOUS at 14:40

## 2019-03-04 NOTE — PROGRESS NOTES
Pt arrived to [facility] ambulatory in no acute distress at 1400 for Feraheme 2/2.  Assessment unremarkable. IV established in R hand without issue and positive blood return noted. Patient Vitals for the past 12 hrs:   Temp Pulse Resp BP SpO2   03/04/19 1533 -- 77 18 138/70 --   03/04/19 1401 98.1 °F (36.7 °C) 87 18 141/70 97 %     The following medications administered:  Feraheme 510mg IV over 15 minutes    Pt tolerated treatment well. Pt observed for 30 minutes post infusion. Discharge instructions reviewed. IV flushed per policy and removed, 2x2 and coban placed.  Pt discharged ambulatory in no acute distress at 1535, accompanied by self. No further appointments.

## 2019-04-03 ENCOUNTER — HOSPITAL ENCOUNTER (EMERGENCY)
Age: 84
Discharge: HOME | End: 2019-04-03
Attending: EMERGENCY MEDICINE
Payer: MEDICARE

## 2019-04-03 ENCOUNTER — APPOINTMENT (OUTPATIENT)
Dept: CT IMAGING | Age: 84
End: 2019-04-03
Attending: EMERGENCY MEDICINE
Payer: MEDICARE

## 2019-04-03 VITALS
HEART RATE: 69 BPM | SYSTOLIC BLOOD PRESSURE: 171 MMHG | OXYGEN SATURATION: 93 % | RESPIRATION RATE: 16 BRPM | DIASTOLIC BLOOD PRESSURE: 88 MMHG | TEMPERATURE: 98.4 F | HEIGHT: 65 IN | WEIGHT: 185 LBS | BODY MASS INDEX: 30.82 KG/M2

## 2019-04-03 DIAGNOSIS — Z23 NEED FOR TETANUS BOOSTER: ICD-10-CM

## 2019-04-03 DIAGNOSIS — S00.11XA: Primary | ICD-10-CM

## 2019-04-03 DIAGNOSIS — S01.81XA LACERATION OF FOREHEAD, INITIAL ENCOUNTER: ICD-10-CM

## 2019-04-03 DIAGNOSIS — S09.90XA CLOSED HEAD INJURY, INITIAL ENCOUNTER: ICD-10-CM

## 2019-04-03 DIAGNOSIS — S02.85XA CLOSED FRACTURE OF RIGHT ORBIT, INITIAL ENCOUNTER (HCC): ICD-10-CM

## 2019-04-03 PROCEDURE — 90715 TDAP VACCINE 7 YRS/> IM: CPT | Performed by: EMERGENCY MEDICINE

## 2019-04-03 PROCEDURE — 77030018836 HC SOL IRR NACL ICUM -A

## 2019-04-03 PROCEDURE — 90471 IMMUNIZATION ADMIN: CPT

## 2019-04-03 PROCEDURE — 74011250637 HC RX REV CODE- 250/637: Performed by: EMERGENCY MEDICINE

## 2019-04-03 PROCEDURE — 70486 CT MAXILLOFACIAL W/O DYE: CPT

## 2019-04-03 PROCEDURE — 74011000250 HC RX REV CODE- 250: Performed by: EMERGENCY MEDICINE

## 2019-04-03 PROCEDURE — 77030018846 HC SOL IRR STRL H20 ICUM -A

## 2019-04-03 PROCEDURE — 74011250636 HC RX REV CODE- 250/636: Performed by: EMERGENCY MEDICINE

## 2019-04-03 PROCEDURE — 74011250637 HC RX REV CODE- 250/637: Performed by: PHYSICIAN ASSISTANT

## 2019-04-03 PROCEDURE — 75810000293 HC SIMP/SUPERF WND  RPR

## 2019-04-03 PROCEDURE — 77030031132 HC SUT NYL COVD -A

## 2019-04-03 PROCEDURE — 74011000250 HC RX REV CODE- 250: Performed by: PHYSICIAN ASSISTANT

## 2019-04-03 PROCEDURE — 99284 EMERGENCY DEPT VISIT MOD MDM: CPT

## 2019-04-03 PROCEDURE — 70450 CT HEAD/BRAIN W/O DYE: CPT

## 2019-04-03 RX ORDER — AMOXICILLIN AND CLAVULANATE POTASSIUM 875; 125 MG/1; MG/1
1 TABLET, FILM COATED ORAL 2 TIMES DAILY
Qty: 14 TAB | Refills: 0 | Status: SHIPPED | OUTPATIENT
Start: 2019-04-03 | End: 2019-04-24 | Stop reason: ALTCHOICE

## 2019-04-03 RX ORDER — LIDOCAINE HYDROCHLORIDE AND EPINEPHRINE 20; 10 MG/ML; UG/ML
1.5 INJECTION, SOLUTION INFILTRATION; PERINEURAL
Status: DISCONTINUED | OUTPATIENT
Start: 2019-04-03 | End: 2019-04-03

## 2019-04-03 RX ORDER — ONDANSETRON 4 MG/1
4 TABLET, ORALLY DISINTEGRATING ORAL
Status: COMPLETED | OUTPATIENT
Start: 2019-04-03 | End: 2019-04-03

## 2019-04-03 RX ORDER — TRAMADOL HYDROCHLORIDE 50 MG/1
50 TABLET ORAL
Qty: 20 TAB | Refills: 0 | Status: SHIPPED | OUTPATIENT
Start: 2019-04-03 | End: 2019-04-10

## 2019-04-03 RX ORDER — TRAMADOL HYDROCHLORIDE 50 MG/1
50 TABLET ORAL
Status: DISCONTINUED | OUTPATIENT
Start: 2019-04-03 | End: 2019-04-04 | Stop reason: HOSPADM

## 2019-04-03 RX ORDER — ONDANSETRON 4 MG/1
4 TABLET, ORALLY DISINTEGRATING ORAL
Qty: 10 TAB | Refills: 0 | Status: SHIPPED | OUTPATIENT
Start: 2019-04-03 | End: 2019-06-24 | Stop reason: ALTCHOICE

## 2019-04-03 RX ORDER — TRAMADOL HYDROCHLORIDE 50 MG/1
50 TABLET ORAL
Status: COMPLETED | OUTPATIENT
Start: 2019-04-03 | End: 2019-04-03

## 2019-04-03 RX ORDER — TRAMADOL HYDROCHLORIDE 50 MG/1
50 TABLET ORAL
Status: DISCONTINUED | OUTPATIENT
Start: 2019-04-03 | End: 2019-04-03

## 2019-04-03 RX ORDER — AMOXICILLIN AND CLAVULANATE POTASSIUM 875; 125 MG/1; MG/1
1 TABLET, FILM COATED ORAL
Status: COMPLETED | OUTPATIENT
Start: 2019-04-03 | End: 2019-04-03

## 2019-04-03 RX ADMIN — BACITRACIN ZINC, NEOMYCIN SULFATE, POLYMYXIN B SULFATE 1 PACKET: 3.5; 5000; 4 OINTMENT TOPICAL at 19:42

## 2019-04-03 RX ADMIN — ONDANSETRON 4 MG: 4 TABLET, ORALLY DISINTEGRATING ORAL at 20:56

## 2019-04-03 RX ADMIN — AMOXICILLIN AND CLAVULANATE POTASSIUM 1 TABLET: 875; 125 TABLET, FILM COATED ORAL at 20:25

## 2019-04-03 RX ADMIN — TRAMADOL HYDROCHLORIDE 50 MG: 50 TABLET, FILM COATED ORAL at 20:25

## 2019-04-03 RX ADMIN — LIDOCAINE HYDROCHLORIDE 15 MG: 10; .005 INJECTION, SOLUTION EPIDURAL; INFILTRATION; INTRACAUDAL; PERINEURAL at 20:57

## 2019-04-03 RX ADMIN — TRAMADOL HYDROCHLORIDE 50 MG: 50 TABLET ORAL at 19:41

## 2019-04-03 RX ADMIN — TETANUS TOXOID, REDUCED DIPHTHERIA TOXOID AND ACELLULAR PERTUSSIS VACCINE, ADSORBED 0.5 ML: 5; 2.5; 8; 8; 2.5 SUSPENSION INTRAMUSCULAR at 19:40

## 2019-04-03 NOTE — ED PROVIDER NOTES
EMERGENCY DEPARTMENT HISTORY AND PHYSICAL EXAM 
     
 
Date: 4/3/2019 Patient Name: Jade Dunn History of Presenting Illness Chief Complaint Patient presents with  Fall  
  pt was walking to her car, tripped and fell. Pt his head on driveway. Denies LOC, no blood thinners, no loose teeth, no neck pain.  Epistaxis  
  pt has bleeding in nose and above R eye. R eye orbit is swollen and blue, bleeding controlled. Pt reported coughing up blood clot. History Provided By: Patient and Patient's  HPI: Jade Dunn is a 82 y.o. female, pmhx peripheral neuropathy, who presents to the ED c/o laceration to her head and swelling around her eye status post trip and fall just prior to arrival.  Patient was on her way to [facility] tripped on a board as she was going to get in the car. She denies chest pain, dizziness. Fall was witnessed by her . There was no LOC. She specifically denies any recent fevers, chills, nausea, vomiting, diarrhea, abd pain, CP, urinary sxs, changes in BM. PCP: Ajay Dunbar MD  
ENT: Dr. Lara Finn MD 
 
There are no other complaints, changes, or physical findings at this time. Current Facility-Administered Medications Medication Dose Route Frequency Provider Last Rate Last Dose  traMADol (ULTRAM) tablet 50 mg  50 mg Oral NOW Rosaura Sagastume DO      
 diphPertuss(AC),Tet Vac-PF (BOOSTRIX) suspension 0.5 mL  0.5 mL IntraMUSCular ONCE Taj Sher DO      
 neomycin-bacitracnZn-polymyxnB (NEOSPORIN) ointment 1 Packet  1 Packet Topical NOW Ximena Srinivasan, PA      
 lidocaine-EPINEPHrine (PF) (XYLOCAINE) 1 %-1:200,000 injection 15 mg  1.5 mL IntraDERMal NOW Rosaura Sagastume DO      
 
Current Outpatient Medications Medication Sig Dispense Refill  inulin (FIBER GUMMIES PO) Take 1 Tab by mouth daily.  acetaminophen (TYLENOL) 325 mg tablet Take 650 mg by mouth every four (4) hours as needed for Pain.  ergocalciferol (VITAMIN D2) 50,000 unit capsule Take 1 Cap by mouth every seven (7) days. 12 Cap 3  
 vit A/vit C/vit E/zinc/copper (PRESERVISION AREDS PO) Take 1 Tab by mouth daily.  multivitamin (ONE A DAY) tablet Take 1 Tab by mouth daily. Past History Past Medical History: 
Past Medical History:  
Diagnosis Date  Arthritis  Bunion of right foot 2015  Chronic pain   
 back--uses tens unit  Diverticulitis 2018 Recurrent  Dry eye syndrome 2018  Fibromyalgia 2018  GERD (gastroesophageal reflux disease)  History of left breast cancer 2013  
 lumpectomy radiation  Hypercholesterolemia 2018  Ill-defined condition   
 blood transfusion hx  Osteoporosis 2018  Peripheral neuropathy 2018  Prediabetes 2018  PUD (peptic ulcer disease)  Radiation therapy complication 7535 Lt breast-No Chemo  Rosacea 2018  Trouble in sleeping Past Surgical History: 
Past Surgical History:  
Procedure Laterality Date  HX APPENDECTOMY  HX BREAST LUMPECTOMY  2013 LEFT BREAST LUMPECTOMY W/LEFT BREAST SENTINEL NODE BIOPSY,AND NEEDLE LOCALIZATION performed by Damien Drew MD at [facility] MAIN OR  
 HX CATARACT REMOVAL    
 bilateral  
 HX HYSTERECTOMY    
 ovarian cyst  
 HX MOHS PROCEDURES    
 right  HX ORTHOPAEDIC    
 back surgery x2  HX OTHER SURGICAL    
 colonoscopy  HX TONSILLECTOMY  TOTAL KNEE ARTHROPLASTY    
 left  TOTAL KNEE ARTHROPLASTY    
 right [address] [facility] BREAST BIOPSY Left 2013 Breast Ca Family History: 
Family History Problem Relation Age of Onset  Cancer Mother   
     bladder  Cancer Father  Breast Cancer Paternal Grandmother Social History: 
Social History Tobacco Use  Smoking status: Former Smoker Packs/day: 0.50 Years: 50.00 Pack years: 25.00 Last attempt to quit: 2012 Years since quittin.1  Smokeless tobacco: Never Used Substance Use Topics  Alcohol use: Yes Alcohol/week: 1.2 oz Types: 2 Glasses of wine per week  Drug use: No  
 
 
Allergies: Allergies Allergen Reactions  Hydrocodone Nausea Only and Vertigo  Gabapentin Diarrhea, Nausea Only and Other (comments)  
  weakness  Lyrica [Pregabalin] Nausea Only  Oxycodone Nausea and Vomiting and Vertigo Review of Systems Review of Systems Constitutional: Negative for activity change, appetite change, fatigue and fever. HENT: Negative for congestion, dental problem, ear pain, rhinorrhea and sinus pressure. Eyes: Negative for photophobia, pain, discharge, redness, itching and visual disturbance. Respiratory: Negative for cough, chest tightness, shortness of breath, wheezing and stridor. Cardiovascular: Negative for chest pain and leg swelling. Gastrointestinal: Negative for abdominal distention, abdominal pain and blood in stool. Genitourinary: Negative for difficulty urinating, dysuria, flank pain, frequency, vaginal bleeding, vaginal discharge and vaginal pain. Musculoskeletal: Negative for arthralgias, back pain, gait problem, joint swelling and neck pain. Skin: Positive for wound. Negative for rash. Neurological: Negative for dizziness, syncope, weakness, numbness and headaches. Psychiatric/Behavioral: Negative for behavioral problems. The patient is not nervous/anxious. Physical Exam  
Physical Exam  
Constitutional: She is oriented to person, place, and time. Vital signs are normal. She appears well-developed and well-nourished. She is cooperative. No distress. HENT:  
Head: Normocephalic. Head is with contusion and with laceration. Right Ear: External ear normal.  
Left Ear: External ear normal.  
Nose: No nasal septal hematoma. Right sinus exhibits maxillary sinus tenderness and frontal sinus tenderness. Mouth/Throat: Oropharynx is clear and moist. No oropharyngeal exudate. Large hematoma to the right forehead upper eyelid and lower eyelid. 7 mm laceration to the forehead. Superficial linear abrasion abrasion to right forehead. Dried blood bilateral nostrils no active bleeding. Tenderness to palpation of the right face Eyes: Pupils are equal, round, and reactive to light. Conjunctivae and EOM are normal. Right eye exhibits no discharge. Left eye exhibits no discharge. No scleral icterus. Neck: Normal range of motion. Neck supple. No tracheal deviation present. Cardiovascular: Normal rate, regular rhythm, normal heart sounds and intact distal pulses. Exam reveals no gallop and no friction rub. No murmur heard. Pulmonary/Chest: Effort normal and breath sounds normal. No respiratory distress. She has no wheezes. She has no rales. She exhibits no tenderness. Abdominal: Soft. Bowel sounds are normal. She exhibits no distension and no mass. There is no tenderness. There is no rebound and no guarding. Musculoskeletal: She exhibits no edema or tenderness. Right knee: Normal.  
     Left knee: Normal.  
     Right ankle: Normal.  
     Left ankle: Normal.  
     Cervical back: Normal.  
     Right hand: Normal.  
     Left hand: Normal.  
     Right foot: Normal.  
     Left foot: Normal.  
Lymphadenopathy:  
  She has no cervical adenopathy. Neurological: She is alert and oriented to person, place, and time. No cranial nerve deficit. GCS eye subscore is 4. GCS verbal subscore is 5. GCS motor subscore is 6. Skin: Skin is warm and dry. Bruising, ecchymosis and laceration noted. No rash noted. No erythema. Psychiatric: She has a normal mood and affect. Her behavior is normal.  
Nursing note and vitals reviewed. Diagnostic Study Results Labs - No results found for this or any previous visit (from the past 12 hour(s)). Radiologic Studies -  
CT HEAD WO CONT    (Results Pending) CT MAXILLOFACIAL WO CONT    (Results Pending) CT Results  (Last 48 hours) None CXR Results  (Last 48 hours) None Medical Decision Making I am the first provider for this patient. I reviewed the vital signs, available nursing notes, past medical history, past surgical history, family history and social history. Vital Signs-Reviewed the patient's vital signs. Patient Vitals for the past 12 hrs: 
 Temp Pulse Resp BP SpO2  
04/03/19 1801 98.4 °F (36.9 °C) 93 20 151/80 99 % Records Reviewed: Nursing Notes Provider Notes (Medical Decision Making): DDx: Intracranial bleed, fracture, sprain, strain, traumatic black eye, abrasion ED Course:  
Initial assessment performed. The patients presenting problems have been discussed, and they are in agreement with the care plan formulated and outlined with them. I have encouraged them to ask questions as they arise throughout their visit. PROGRESS NOTE: 
ED Course as of Apr 03 2009 Wed Apr 03, 2019 2003 Discussed CT scan results with Dr. Ewa Montenegro. Commands I did reevaluate the patient in consult at least consult with [place] [facility] to ensure follow-up. [TC] ED Course User Index Dionicia Dakins, Binnie Greener., PA Procedure Note - Laceration Repair: 
9:00 PM 
Procedure by Oumar Alexis PA-C. Complexity: simple 0.7cm linear laceration to face  was irrigated copiously with NS under jet lavage, prepped with Chlorprep and draped in a sterile fashion. The area was anesthetized with 1 mLs of  Lidocaine 2% with epinephrine via local infiltration. The wound was explored with the following results: Foreign bodies (bits of gravel) found and removed. Also removed foreign bodies from nearby abrasion. The wound was repaired with One layer suture closure: Skin Layer:  2 sutures placed, stitch type:simple interrupted, suture: 6-0 nylon. .  The wound was closed with good hemostasis and approximation. Sterile dressing applied. Estimated blood loss: 5mL The procedure took 1-15 minutes, and pt tolerated well. Pt noted to be feeling better , ready for discharge. Updated pt and/or family on available findings. Will follow up as instructed. All questions have been answered, pt voiced understanding and agreement with plan. Narcotics were prescribed, pt was advised not to drive or operate heavy machinery. Abx were prescribed, pt advised that diarrhea and rash are possible side effects of the medications. Specific return precautions provided as well as instructions to return to the ED should sx worsen at any time. Vital signs stable for discharge. SAKSHI Valverde Disposition: 
 
DISCHARGE NOTE: 
9:15 PM 
The patient's results have been reviewed with patient and her . They verbally convey their understanding and agreement of the patient's signs, symptoms, diagnosis, treatment, and prognosis. They additionally agree to follow up as recommended in the discharge instructions or to return to the Emergency Room should the patient's condition change prior to their follow-up appointment. The patient verbally agrees with the care-plan and all of their questions have been answered. The discharge instructions have also been provided to the them along with educational information regarding the patient's diagnosis and a list of reasons why the patient would want to return to the ER prior to their follow-up appointment should their condition change. SAKSHI Valverde PLAN: 
1. Current Discharge Medication List  
  
START taking these medications Details  
traMADol (ULTRAM) 50 mg tablet Take 1 Tab by mouth every six (6) hours as needed for Pain for up to 7 days. Max Daily Amount: 200 mg. Qty: 20 Tab, Refills: 0 Associated Diagnoses: Closed fracture of right orbit, initial encounter ([facility] [address] Traumatic black eye of right side, initial encounter amoxicillin-clavulanate (AUGMENTIN) 875-125 mg per tablet Take 1 Tab by mouth two (2) times a day. Qty: 14 Tab, Refills: 0  
  
ondansetron (ZOFRAN ODT) 4 mg disintegrating tablet Take 1 Tab by mouth every eight (8) hours as needed for Nausea. Qty: 10 Tab, Refills: 0  
  
  
 
2. Follow-up Information Follow up With Specialties Details Why Contact Info Santiago Kirkland MD Otolaryngology   [address] [facility] 
[phone] Souleymane Duff MD Otolaryngology  If Dr. Bouchra Ray cannot see you [address] [place] [address] 34 33 96 [address] 
[phone] [facility] EMERGENCY DEPT Emergency Medicine  If symptoms worsen [address] [place] [address] [address] [facility] 
[phone] Return to ED if worse Diagnosis Clinical Impression: 1. Traumatic black eye of right side, initial encounter 2. Closed head injury, initial encounter 3. Closed fracture of right orbit, initial encounter ([facility] [address] 4. Need for tetanus booster 5. Laceration of forehead, initial encounter This note will not be viewable in [address]

## 2019-04-04 NOTE — DISCHARGE INSTRUCTIONS
Patient Education        Black Eye: Care Instructions  Your Care Instructions    A black eye is bruising and swelling around the eye or the eyelids. The swelling from your black eye may get worse over the next couple of days. After that, the swelling should steadily improve until it is gone. The bruise around your eye will change colors as it heals. The skin may turn from black and blue to green, yellow, and brown before it returns to its normal color. It may take 1 to 3 weeks to return to normal.  Follow-up care is a key part of your treatment and safety. Be sure to make and go to all appointments, and call your doctor if you are having problems. It's also a good idea to know your test results and keep a list of the medicines you take. How can you care for yourself at home? · Put ice or a cold pack on the area for 10 to 20 minutes at a time. Put a thin cloth between the ice pack and your skin. · If your doctor prescribed antibiotics, take them as directed. Do not stop taking them just because you feel better. You need to take the full course of antibiotics. · Take pain medicines exactly as directed. ? If the doctor gave you a prescription medicine for pain, take it as prescribed. ? If you are not taking a prescription pain medicine, ask your doctor if you can take an over-the-counter medicine. When should you call for help? Call your doctor now or seek immediate medical care if:    · You have any new changes in vision, such as double vision or blurring.     · You have new or increased pain in or around your eye.    Watch closely for changes in your health, and be sure to contact your doctor if:    · You do not get better as expected. Where can you learn more? Go to http://sumit-arpit.info/. Enter O548 in the search box to learn more about \"Black Eye: Care Instructions. \"  Current as of: September 23, 2018  Content Version: 11.9  © 9269-7423 [employer], [facility].  Care instructions adapted under license by [employer] (which disclaims liability or warranty for this information). If you have questions about a medical condition or this instruction, always ask your healthcare professional. [place] [address] any warranty or liability for your use of this information. Patient Education        DTaP (Diphtheria, Tetanus, Pertussis) Vaccine: What You Need to Know  Why get vaccinated? DTaP vaccine can help protect your child from diphtheria, tetanus, and pertussis. DIPHTHERIA (D) can cause breathing problems, paralysis, and heart failure. Before vaccines, diphtheria killed tens of thousands of children every year in the [place]. TETANUS (T) causes painful tightening of the muscles. It can cause \"locking\" of the jaw so you cannot open your mouth or swallow. About 1 person out of 5 who get tetanus dies. PERTUSSIS (aP), also known as Whooping Cough, causes coughing spells so bad that it is hard for infants and children to eat, drink, or breathe. It can cause pneumonia, seizures, brain damage, or death. Most children who are vaccinated with DTaP will be protected throughout childhood. Many more children would get these diseases if we stopped vaccinating. DTaP vaccine  Children should usually get 5 doses of DTaP vaccine, one dose at each of the following ages:  · 2 months  · 4 months  · 6 months  · 15-18 months  · 4-6 years  DTaP may be given at the same time as other vaccines. Also, sometimes a child can receive DTaP together with one or more other vaccines in a single shot. Some children should not get DTaP vaccine or should wait. DTaP is only for children younger than 9years old. DTaP vaccine is not appropriate for everyone - a small number of children should receive a different vaccine that contains only diphtheria and tetanus instead of DTaP.   Tell your health care provider if your child:  · Has had an allergic reaction after a previous dose of DTaP, or has any severe, life-threatening allergies. · Has had a coma or long repeated seizures within 7 days after a dose of DTaP. · Has seizures or another nervous system problem. · Has had a condition called Guillain-Barré Syndrome (GBS). · Has had severe pain or swelling after a previous dose of DTaP or DT vaccine. In some cases, your health care provider may decide to postpone your child's DTaP vaccination to a future visit. Children with minor illnesses, such as a cold, may be vaccinated. Children who are moderately or severely ill should usually wait until they recover before getting DTaP vaccine. Your health care provider can give you more information. Risks of a vaccine reaction  · Redness, soreness, swelling, and tenderness where the shot is given are common after DTaP. · Fever, fussiness, tiredness, poor appetite, and vomiting sometimes happen 1 to 3 days after DTaP vaccination. · More serious reactions, such as seizures, non-stop crying for 3 hours or more, or high fever (over 105°F) after DTaP vaccination happen much less often. Rarely, the vaccine is followed by swelling of the entire arm or leg, especially in older children when they receive their fourth or fifth dose. · Long-term seizures, coma, lowered consciousness, or permanent brain damage happen extremely rarely after DTaP vaccination. As with any medicine, there is a very remote chance of a vaccine causing a severe allergic reaction, other serious injury, or death. What if there is a serious problem? An allergic reaction could occur after the child leaves the clinic. If you see signs of a severe allergic reaction (hives, swelling of the face and throat, difficulty breathing, a fast heartbeat, dizziness, or weakness), call 9-1-1 and get the child to the nearest hospital.  For other signs that concern you, call your child's health care provider.   Serious reactions should be reported to the Vaccine Adverse Event Reporting System ([employer]). Your doctor will usually file this report, or you can do it yourself. Visit www.Mode[employer]. hhs.gov or call 6-946.987.7163. VAERS is only for reporting reactions, it does not give medical advice. The National Vaccine Injury Compensation Program  The National Vaccine Injury Compensation Program is a federal program that was created to compensate people who may have been injured by certain vaccines. Visit www.hrsa.gov/vaccinecompensation or call 7-738.339.3408 to learn about the program and about filing a claim. There is a time limit to file a claim for compensation. How can I learn more? · Ask your health care provider. · Call your local or state health department. · Contact the Centers for Disease Control and Prevention (CDC):  ? Call 8-415.771.3985 (9-818-EXP-INFO) or  ? Visit CDC's website at www.cdc.gov/vaccines  Vaccine Information Statement  DTaP (Diphtheria, Tetanus, Pertussis) Vaccine  (8/24/2018)  [address] 114BL-57  Department of Health and Human Services  Centers for Disease Control and Prevention  Many Vaccine Information Statements are available in [place] and other languages. See www.immunize.org/vis. Muchas hojas de información sobre vacunas están disponibles en español y en otros idiomas. Visite www.immunize.org/vis. Care instructions adapted under license by [employer] (which disclaims liability or warranty for this information). If you have questions about a medical condition or this instruction, always ask your healthcare professional. [place] [address] any warranty or liability for your use of this information. Patient Education        Facial Fracture: Care Instructions  Your Care Instructions  You have broken (fractured) one or more bones in your face. Swelling and bruising from the injury are likely to get worse over the first couple of days. After that, the swelling should steadily improve until it is gone.  If you have bruises on your face, they may change as they heal. The skin may turn from black and blue to green to yellow or brown before it returns to its normal color. It may take several weeks for your injury to heal.  It is very important that you get follow-up care as directed so that the injury heals properly and does not lead to problems. The kind of care and treatment you will need depends on the specific type of break (or breaks) you have. You heal best when you take good care of yourself. Eat a variety of healthy foods, and don't smoke. Follow-up care is a key part of your treatment and safety. Be sure to make and go to all appointments, and call your doctor if you are having problems. It's also a good idea to know your test results and keep a list of the medicines you take. How can you care for yourself at home? · Put ice or a cold pack on your injury for 10 to 20 minutes at a time. Try to do this every 1 to 2 hours for the next 3 days (when you are awake) or until the swelling goes down. Put a thin cloth between the ice pack and your skin. · Go to all follow-up appointments with your doctor. Your doctor will determine whether you need further treatment, including surgery. · Take your medicines exactly as prescribed. Call your doctor if you think you are having a problem with your medicine. You will get more details on the specific medicines your doctor prescribes. · If your doctor prescribed antibiotics, take them exactly as directed. Do not stop taking them just because you feel better. You need to take the full course of antibiotics. · Be safe with medicines. Read and follow all instructions on the label. ? If the doctor gave you a prescription medicine for pain, take it as prescribed. ? If you are not taking a prescription pain medicine, ask your doctor if you can take an over-the-counter medicine. · Keep your head elevated when you sleep. · Eat soft food to decrease jaw pain. · Do not blow your nose.  Dab it with a tissue if you need to. When should you call for help? Call 911 anytime you think you may need emergency care. For example, call if:    · You have a seizure.     · You passed out (lost consciousness).     · You have tingling, weakness, or numbness on one side of your body.    Call your doctor now or seek immediate medical care if:    · You have a severe headache.     · You develop double vision.     · You have a fever and stiff neck.     · Clear, watery fluid drains from your nose.     · You feel dizzy or lightheaded.     · You have new eye pain or changes in your vision, such as blurring.     · You have new ear pain, ringing in your ears, or trouble hearing.     · You are confused, irritable, or not acting normally.     · You have a hard time standing, walking, or talking.     · You have new mouth or tooth pain, or you have trouble chewing.     · You have increasing pain even after you have taken your pain medicine.    Watch closely for changes in your health, and be sure to contact your doctor if:    · You develop a cough, cold, or sinus infection.     · The symptoms from your injury are not steadily improving. Where can you learn more? Go to http://sumit-arpit.info/. Enter 0664 [id number] [id number] in the search box to learn more about \"Facial Fracture: Care Instructions. \"  Current as of: Juany 3, 2018  Content Version: 11.9  © 2684-6085 [employer]. Care instructions adapted under license by [employer] (which disclaims liability or warranty for this information). If you have questions about a medical condition or this instruction, always ask your healthcare professional. [place] [address] any warranty or liability for your use of this information. Patient Education        Learning About a Closed Head Injury  What is a closed head injury? A closed head injury happens when your head gets hit hard.  The strong force of the blow causes your brain to shake in your skull. This movement can cause the brain to bruise, swell, or tear. Sometimes nerves or blood vessels also get damaged. This can cause bleeding in or around the brain. A concussion is a type of closed head injury. What are the symptoms? If you have a mild concussion, you may have a mild headache or feel \"not quite right. \" These symptoms are common. They usually go away over a few days to 4 weeks. But sometimes after a concussion, you feel like you can't function as well as before the injury. And you have new symptoms. This is called postconcussive syndrome. You may:  · Find it harder to solve problems, think, concentrate, or remember. · Have headaches. · Have changes in your sleep patterns, such as not being able to sleep or sleeping all the time. · Have changes in your personality. · Not be interested in your usual activities. · Feel angry or anxious without a clear reason. · Lose your sense of taste or smell. · Be dizzy, lightheaded, or unsteady. It may be hard to stand or walk. How is a closed head injury treated? Any person who may have a concussion needs to see a doctor. Some people have to stay in the hospital to be watched. Others can go home safely. If you go home, follow your doctor's instructions. He or she will tell you if you need someone to watch you closely for the next 24 hours or longer. Rest is the best treatment. Get plenty of sleep at night. And try to rest during the day. · Avoid activities that are physically or mentally demanding. These include housework, exercise, and schoolwork. And don't play video games, send text messages, or use the computer. You may need to change your school or work schedule to be able to avoid these activities. · Ask your doctor when it's okay to drive, ride a bike, or operate machinery. · Take an over-the-counter pain medicine, such as acetaminophen (Tylenol), ibuprofen (Advil, Motrin), or naproxen (Aleve). Be safe with medicines.  Read and follow all instructions on the label. · Check with your doctor before you use any other medicines for pain. · Do not drink alcohol or use illegal drugs. They can slow recovery. They can also increase your risk of getting a second head injury. Follow-up care is a key part of your treatment and safety. Be sure to make and go to all appointments, and call your doctor if you are having problems. It's also a good idea to know your test results and keep a list of the medicines you take. Where can you learn more? Go to http://sumit-arpit.info/. Enter E235 in the search box to learn more about \"Learning About a Closed Head Injury. \"  Current as of: Juany 3, 2018  Content Version: 11.9  © 3691-7063 [employer]. Care instructions adapted under license by [employer] (which disclaims liability or warranty for this information). If you have questions about a medical condition or this instruction, always ask your healthcare professional. [place] [address] any warranty or liability for your use of this information.

## 2019-04-24 ENCOUNTER — OFFICE VISIT (OUTPATIENT)
Dept: ONCOLOGY | Age: 84
End: 2019-04-24

## 2019-04-24 VITALS
WEIGHT: 187 LBS | HEART RATE: 79 BPM | BODY MASS INDEX: 31.16 KG/M2 | HEIGHT: 65 IN | OXYGEN SATURATION: 94 % | RESPIRATION RATE: 18 BRPM | TEMPERATURE: 98.2 F | SYSTOLIC BLOOD PRESSURE: 126 MMHG | DIASTOLIC BLOOD PRESSURE: 74 MMHG

## 2019-04-24 DIAGNOSIS — D69.6 THROMBOCYTOPENIA (HCC): ICD-10-CM

## 2019-04-24 DIAGNOSIS — E55.9 VITAMIN D DEFICIENCY: Chronic | ICD-10-CM

## 2019-04-24 DIAGNOSIS — D46.9 MDS (MYELODYSPLASTIC SYNDROME) (HCC): Primary | ICD-10-CM

## 2019-04-24 DIAGNOSIS — D50.8 OTHER IRON DEFICIENCY ANEMIA: ICD-10-CM

## 2019-04-24 DIAGNOSIS — Z85.3 HISTORY OF BREAST CANCER: ICD-10-CM

## 2019-04-24 NOTE — PROGRESS NOTES
Identified pt with two pt identifiers(name and ). Reviewed record in preparation for visit and have obtained necessary documentation. Chief Complaint   Patient presents with    Breast Cancer      Visit Vitals  /74 (BP 1 Location: Left arm, BP Patient Position: Sitting)   Pulse 79   Temp 98.2 °F (36.8 °C) (Oral)   Resp 18   Ht 5' 5\" (1.651 m)   Wt 187 lb (84.8 kg)   SpO2 94%   BMI 31.12 kg/m²       There are no preventive care reminders to display for this patient. Coordination of Care Questionnaire:  :   1) Have you been to an emergency room, urgent care, or hospitalized since your last visit? If yes, where when, and reason for visit? no       2. Have seen or consulted any other health care provider since your last visit? If yes, where when, and reason for visit? NO      3) Do you have an Advanced Directive/ Living Will in place? YES  If yes, do we have a copy on file YES  If no, would you like information YES    Patient is accompanied by  I have received verbal consent from Erica Arauz to discuss any/all medical information while they are present in the room.

## 2019-04-24 NOTE — PROGRESS NOTES
[address] [facility]  at [facility] [address], [address] [facility]  [phone]      Follow-up Note        Patient: Maxx Joe MRN: [id number]  SSN: [id number]    YOB: 1935  Age: 82 y.o. Sex: female          Diagnosis:      1. Myelodysplastic Syndrome   IPSS-R Low risk    Del(7q) and trisomy 8    2. Left breast carcinoma:   T1a N0 Mx (Stage IA) infiltrating ductal carcinoma , Tumor size 1 cm, LN -ve, grade 2, ki 67 17%, %, AK 90%, Her 2 unamplified. Treatment:      1. Discontinued Arimidex 1/2019 after 5 years of treatment  2. Completed radiation treatment to the breast   3. S/P left breast lumpectomy and sentinel LN excision on 11/21/2013    Subjective:      Jamal Miller is a 70 y.o.  female with stage I invasive ductal carcinoma. She underwent a left breast lumpectomy and sentinel LN excision on 11/21/2013. Ms. Beatriz Moss then received radiation to the left breast. She stopped arimidex this month after 5 years. She reports joint pain and chronic numbness and tingling in her feet. I am now seeing her for low grade MDS. She is asymptomatic and laboratory studies reveal acceptable blood counts. Ms. Beatriz Moss fell on 4/3 and sustained an orbital fracture with an laceration. She had a fracture repair by Dr. Kwan Shukla. She still has a bruise to her right orbital area.        Review of Systems:     Constitutional: negative   Eyes: negative   Ears, Nose, Mouth, Throat, and Face: negative   Respiratory: negative   Cardiovascular: negative   Gastrointestinal: negative   Integument/Breast: negative  Hematologic/Lymphatic: negative   Musculoskeletal: joint pain  Neurological: numbness bottoms of both feet      Past Medical History:   Diagnosis Date    Arthritis     Bunion of right foot 8/20/2015    Chronic pain     back--uses tens unit    Diverticulitis 6/29/2018    Recurrent    Dry eye syndrome 6/29/2018    Fibromyalgia 2018    GERD (gastroesophageal reflux disease)     History of left breast cancer     lumpectomy radiation    Hypercholesterolemia 2018    Ill-defined condition     blood transfusion hx    Osteoporosis 2018    Peripheral neuropathy 2018    Prediabetes 2018    PUD (peptic ulcer disease)     Radiation therapy complication 6275    Lt breast-No Chemo    Rosacea 2018    Trouble in sleeping      Past Surgical History:   Procedure Laterality Date    HX APPENDECTOMY      HX BREAST LUMPECTOMY  2013    LEFT BREAST LUMPECTOMY W/LEFT BREAST SENTINEL NODE BIOPSY,AND NEEDLE LOCALIZATION performed by Jeannine Rene MD at [facility] MAIN OR    HX CATARACT REMOVAL      bilateral    HX HYSTERECTOMY      ovarian cyst    HX MOHS PROCEDURES      right    HX ORTHOPAEDIC      back surgery x2    HX OTHER SURGICAL      colonoscopy    HX TONSILLECTOMY      TOTAL KNEE ARTHROPLASTY      left    TOTAL KNEE ARTHROPLASTY      right    US GUIDED CORE BREAST BIOPSY Left 2013    Breast Ca      Family History   Problem Relation Age of Onset    Cancer Mother         bladder    Cancer Father     Breast Cancer Paternal Grandmother      Social History     Tobacco Use    Smoking status: Former Smoker     Packs/day: 0.50     Years: 50.00     Pack years: 25.00     Last attempt to quit: 2012     Years since quittin.1    Smokeless tobacco: Never Used   Substance Use Topics    Alcohol use: Yes     Alcohol/week: 1.2 oz     Types: 2 Glasses of wine per week      Prior to Admission medications    Medication Sig Start Date End Date Taking? Authorizing Provider   ondansetron (ZOFRAN ODT) 4 mg disintegrating tablet Take 1 Tab by mouth every eight (8) hours as needed for Nausea. 4/3/19  Yes Mehran Tineo., PA   inulin (FIBER GUMMIES PO) Take 1 Tab by mouth daily.    Yes Provider, Historical   acetaminophen (TYLENOL) 325 mg tablet Take 650 mg by mouth every four (4) hours as needed for Pain. Yes Other, MD Libby   ergocalciferol (VITAMIN D2) 50,000 unit capsule Take 1 Cap by mouth every seven (7) days. 8/8/18  Yes Gino Irene NP   vit A/vit C/vit E/zinc/copper (PRESERVISION AREDS PO) Take 1 Tab by mouth daily. Yes Provider, Historical   multivitamin (ONE A DAY) tablet Take 1 Tab by mouth daily. Yes Provider, Historical   amoxicillin-clavulanate (AUGMENTIN) 875-125 mg per tablet Take 1 Tab by mouth two (2) times a day. 4/3/19   Andrew Brooks, PA            Allergies   Allergen Reactions    Hydrocodone Nausea Only and Vertigo    Gabapentin Diarrhea, Nausea Only and Other (comments)     weakness    Lyrica [Pregabalin] Nausea Only    Oxycodone Nausea and Vomiting and Vertigo         Objective:     Vitals:    04/24/19 0915   BP: 126/74   Pulse: 79   Resp: 18   Temp: 98.2 °F (36.8 °C)   TempSrc: Oral   SpO2: 94%   Weight: 187 lb (84.8 kg)   Height: 5' 5\" (1.651 m)      Pain Scale: 0 - No pain/10      Physical Exam:    GENERAL: alert, cooperative   EYE: inferior rectus weakness with upward gaze, bruise around the eye  LYMPHATIC: Cervical, supraclavicular, and axillary nodes normal.   THROAT & NECK: normal and no erythema or exudates noted. LUNG: clear to auscultation bilaterally   HEART: regular rate and rhythm   ABDOMEN: soft, non-tender   EXTREMITIES: extremities normal   SKIN: Normal.   NEUROLOGIC: negative       Lab Results   Component Value Date/Time    WBC 18.3 (H) 01/29/2019 08:12 AM    HGB (POC) 10.7 (A) 01/07/2019 09:52 AM    HGB 11.0 (L) 02/25/2019 11:18 AM    HCT (POC) 32.0 (A) 01/07/2019 09:52 AM    HCT 36.2 02/25/2019 11:18 AM    PLATELET 673 (L) 10/51/8169 08:12 AM    .8 (H) 01/29/2019 08:12 AM       Lab Results   Component Value Date/Time    Iron 66 02/25/2019 11:18 AM    TIBC 469 (H) 02/25/2019 11:18 AM    Iron % saturation 14 (L) 02/25/2019 11:18 AM    Ferritin 31 02/25/2019 11:18 AM           Assessment:      1.  Myelodysplastic Syndrome   IPSS-R Low risk Del(7q) and trisomy 8    Blood counts are acceptable as of now  Recommend observation     Implication of the diagnosis - development of cytopenias and evolution of AML    I discussed the triggers and reasons for treatment. When Hgb goes below 10 and or platelet count goes below 100,000, I will treat her with 5'- Azacytidine. ESAs would not be a good choice because all 3 blood counts are abnormal.       2. Left breast carcinoma:     T1a N0 Mx (Stage IA) infiltrating ductal carcinoma , Tumor size 1 cm, LN -ve, grade 2, ki 67 17%, %, ND 90%, Her 2 unamplified. Dx: 10/28/2013    > S/P left breast lumpectomy and sentinel LN excision on 11/21/2013  > Completed radiation to the breast  > Discontinued Arimidex 1/2019 after 5 years  > In remission    Mammogram (11/2017): normal  DEXA (10/2013) - normal    Symptom management form reviewed with patient. 3. Vitamin D deficiency    > Vitamin D 50,000 units weekly      4. Iron Deficiency anemia from chronic GI losses    Ferritin - 37  IV iron infusion in the [facility] - currently not scheduled      5. Osteopenia    > Management per Dr. Surinder Ferreira:        > Continue observation  > repeat labs  > Follow up in 3 months        Signed by: Erik Childers MD                     April 24, 2019        CC.  Teri Johnston MD

## 2019-04-29 ENCOUNTER — HOSPITAL ENCOUNTER (OUTPATIENT)
Dept: LAB | Age: 84
Discharge: HOME | End: 2019-04-29
Payer: MEDICARE

## 2019-04-29 PROCEDURE — 83550 IRON BINDING TEST: CPT

## 2019-04-29 PROCEDURE — 82306 VITAMIN D 25 HYDROXY: CPT

## 2019-04-29 PROCEDURE — 85025 COMPLETE CBC W/AUTO DIFF WBC: CPT

## 2019-04-29 PROCEDURE — 82728 ASSAY OF FERRITIN: CPT

## 2019-04-29 PROCEDURE — 36415 COLL VENOUS BLD VENIPUNCTURE: CPT

## 2019-04-30 LAB
25(OH)D3+25(OH)D2 SERPL-MCNC: 28 NG/ML (ref 30–100)
BASOPHILS # BLD AUTO: 0.2 X10E3/UL (ref 0–0.2)
BASOPHILS NFR BLD AUTO: 1 %
EOSINOPHIL # BLD AUTO: 0.6 X10E3/UL (ref 0–0.4)
EOSINOPHIL NFR BLD AUTO: 4 %
ERYTHROCYTE [DISTWIDTH] IN BLOOD BY AUTOMATED COUNT: 17.2 % (ref 12.3–15.4)
FERRITIN SERPL-MCNC: 170 NG/ML (ref 15–150)
HCT VFR BLD AUTO: 35.1 % (ref 34–46.6)
HGB BLD-MCNC: 11.3 G/DL (ref 11.1–15.9)
IMMATURE CELLS, 115398: ABNORMAL
IRON SATN MFR SERPL: 19 % (ref 15–55)
IRON SERPL-MCNC: 66 UG/DL (ref 27–139)
LYMPHOCYTES # BLD AUTO: 3.3 X10E3/UL (ref 0.7–3.1)
LYMPHOCYTES NFR BLD AUTO: 21 %
MCH RBC QN AUTO: 35.3 PG (ref 26.6–33)
MCHC RBC AUTO-ENTMCNC: 32.2 G/DL (ref 31.5–35.7)
MCV RBC AUTO: 110 FL (ref 79–97)
METAMYELOCYTES NFR BLD: 6 % (ref 0–0)
MONOCYTES # BLD AUTO: 3.3 X10E3/UL (ref 0.1–0.9)
MONOCYTES NFR BLD AUTO: 21 %
MORPHOLOGY BLD-IMP: ABNORMAL
MYELOCYTES NFR BLD: 2 % (ref 0–0)
NEUTROPHILS # BLD AUTO: 7 X10E3/UL (ref 1.4–7)
NEUTROPHILS NFR BLD AUTO: 45 %
PLATELET # BLD AUTO: 134 X10E3/UL (ref 150–379)
RBC # BLD AUTO: 3.2 X10E6/UL (ref 3.77–5.28)
TIBC SERPL-MCNC: 354 UG/DL (ref 250–450)
UIBC SERPL-MCNC: 288 UG/DL (ref 118–369)
WBC # BLD AUTO: 15.6 X10E3/UL (ref 3.4–10.8)

## 2019-06-24 ENCOUNTER — OFFICE VISIT (OUTPATIENT)
Dept: INTERNAL MEDICINE CLINIC | Age: 84
End: 2019-06-24

## 2019-06-24 VITALS
DIASTOLIC BLOOD PRESSURE: 72 MMHG | SYSTOLIC BLOOD PRESSURE: 122 MMHG | HEART RATE: 84 BPM | HEIGHT: 65 IN | BODY MASS INDEX: 31.72 KG/M2 | OXYGEN SATURATION: 98 % | RESPIRATION RATE: 20 BRPM | TEMPERATURE: 97.6 F | WEIGHT: 190.4 LBS

## 2019-06-24 DIAGNOSIS — S90.211A CONTUSION OF RIGHT GREAT TOE WITH DAMAGE TO NAIL, INITIAL ENCOUNTER: Primary | ICD-10-CM

## 2019-06-24 NOTE — PATIENT INSTRUCTIONS
Subungual Hematoma: Care Instructions Your Care Instructions A subungual hematoma is blood under a fingernail or toenail. It's caused by hitting the nail with an object such as a hammer. Or it can happen if you pinch it in a door or drawer. The hematoma can cause throbbing pain in the hurt finger or toe. Your doctor may have relieved the pain by making a small hole in the nail. This lets the blood drain out. You may have had a shot to prevent a tetanus infection. Follow-up care is a key part of your treatment and safety. Be sure to make and go to all appointments, and call your doctor if you are having problems. It's also a good idea to know your test results and keep a list of the medicines you take. How can you care for yourself at home? If your doctor told you how to care for your wound, follow your doctor's instructions. If you did not get instructions, follow this general advice: · Wash the wound with clean water 2 times a day. Keep it clean and dry the rest of the time. Don't use hydrogen peroxide or alcohol, which can slow healing. · You may cover the wound with a nonstick bandage. When should you call for help? Call your doctor now or seek immediate medical care if: 
  · You have symptoms of infection, such as: 
? Increased pain, swelling, warmth, or redness. ? Red streaks leading from the area. ? Pus draining from the area. ? A fever.  
 Watch closely for changes in your health, and be sure to contact your doctor if: 
  · You do not get better as expected. Where can you learn more? Go to http://sumit-arpit.info/. Enter 850-530-7658 in the search box to learn more about \"Subungual Hematoma: Care Instructions. \" Current as of: April 17, 2018 Content Version: 11.9 © 1522-5979 [employer].  Care instructions adapted under license by [employer] (which disclaims liability or warranty for this information). If you have questions about a medical condition or this instruction, always ask your healthcare professional. [place] [address] any warranty or liability for your use of this information.

## 2019-06-24 NOTE — PROGRESS NOTES
This note will not be viewable in [address] Subjective:     Mrs. Hannah Flynn presents to the office today with an injury to her right great toe which occurred 1 week ago. The patient was in her kitchen and stubbed her toe on the corner of a cupboard. She feels that the nail was pushed back and up. She has had pain in the great toe since then especially with weightbearing. She denies any foot pain.     Past Medical History:   Diagnosis Date    Arthritis     Bunion of right foot 8/20/2015    Chronic pain     back--uses tens unit    Diverticulitis 6/29/2018    Recurrent    Dry eye syndrome 6/29/2018    Fibromyalgia 6/29/2018    GERD (gastroesophageal reflux disease)     History of left breast cancer 2013    lumpectomy radiation    Hypercholesterolemia 6/29/2018    Ill-defined condition     blood transfusion hx    Osteoporosis 6/29/2018    Peripheral neuropathy 6/29/2018    Prediabetes 6/29/2018    PUD (peptic ulcer disease)     Radiation therapy complication 3127    Lt breast-No Chemo    Rosacea 6/29/2018    Trouble in sleeping      Past Surgical History:   Procedure Laterality Date    HX APPENDECTOMY      HX BREAST LUMPECTOMY  11/21/2013    LEFT BREAST LUMPECTOMY W/LEFT BREAST SENTINEL NODE BIOPSY,AND NEEDLE LOCALIZATION performed by Comfort Harris MD at [facility] MAIN OR    HX CATARACT REMOVAL      bilateral    HX HYSTERECTOMY      ovarian cyst    HX MOHS PROCEDURES      right    HX ORTHOPAEDIC      back surgery x2    HX OTHER SURGICAL      colonoscopy    HX TONSILLECTOMY      TOTAL KNEE ARTHROPLASTY      left    TOTAL KNEE ARTHROPLASTY      right    US GUIDED CORE BREAST BIOPSY Left 2013    Breast Ca     Allergies   Allergen Reactions    Hydrocodone Nausea Only and Vertigo    Gabapentin Diarrhea, Nausea Only and Other (comments)     weakness    Lyrica [Pregabalin] Nausea Only    Oxycodone Nausea and Vomiting and Vertigo     Current Outpatient Medications   Medication Sig Dispense Refill  inulin (FIBER GUMMIES PO) Take 1 Tab by mouth daily.  acetaminophen (TYLENOL) 325 mg tablet Take 650 mg by mouth every four (4) hours as needed for Pain.  ergocalciferol (VITAMIN D2) 50,000 unit capsule Take 1 Cap by mouth every seven (7) days. 12 Cap 3    vit A/vit C/vit E/zinc/copper (PRESERVISION AREDS PO) Take 1 Tab by mouth daily.  multivitamin (ONE A DAY) tablet Take 1 Tab by mouth daily. Social History     Socioeconomic History    Marital status:      Spouse name: Not on file    Number of children: Not on file    Years of education: Not on file    Highest education level: Not on file   Tobacco Use    Smoking status: Former Smoker     Packs/day: 0.50     Years: 50.00     Pack years: 25.00     Last attempt to quit: 2012     Years since quittin.3    Smokeless tobacco: Never Used   Substance and Sexual Activity    Alcohol use: Yes     Alcohol/week: 1.2 oz     Types: 2 Glasses of wine per week    Drug use: No     Family History   Problem Relation Age of Onset    Cancer Mother         bladder    Cancer Father     Breast Cancer Paternal Grandmother        Review of Systems:  GEN: no weight loss, weight gain, fatigue or night sweats  CV: no PND, orthopnea, or palpitations  Resp: no dyspnea on exertion, no cough  Abd: no nausea, vomiting or diarrhea  EXT: Positive for right great toe pain with weightbearing  Endocrine: no hair loss, excessive thirst or polyuria  Neurological ROS: no TIA or stroke symptoms  ROS otherwise negative      Objective:     Visit Vitals  /72 (BP 1 Location: Right arm, BP Patient Position: Sitting)   Pulse 84   Temp 97.6 °F (36.4 °C) (Oral)   Resp 20   Ht 5' 5\" (1.651 m)   Wt 190 lb 6.4 oz (86.4 kg)   SpO2 98%   BMI 31.68 kg/m²     Body mass index is 31.68 kg/m². General:   alert, cooperative and no distress   Extremities: No edema or cyanosis.   Examination of the right foot revealed no pain with palpation in the foot however reproducible pain with palpation of the right great toe was noted. She was able to flex the interphalangeal joint. The area around the great toenail was slightly ecchymotic. PT and DP pulses were intact   Neuro: ..alert, oriented x3,speech normal in context and clarity, cranial nerves II-XII intact,motor strength: full proximally and distally,gait: normal  reflexes: full and symmetric     Physical exam otherwise negative         Assessment/Plan:     Diagnoses and all orders for this visit:    Contusion of right great toe with damage to nail, initial encounter  -     XR GREAT TOE RT MIN 2 V; Future        Other instructions:   Review of the x-rays of her right great toe fails to reveal any fracture.     Weightbearing as tolerated    Open toe sandal or shoe to be worn    Tylenol for pain and discomfort    Follow-up if there is worsening        Tico Kaur MD

## 2019-06-24 NOTE — PROGRESS NOTES
Izaiah Montgomery is a 82 y.o. female presenting for Toe Pain (R big toe)  . 1. Have you been to the ER, urgent care clinic since your last visit? Hospitalized since your last visit? Yes When: 4-3-19 Where: ED [facility] Reason for visit: fall. 2. Have you seen or consulted any other health care providers outside of the [address] [place] [address] since your last visit? Include any pap smears or colon screening. Dr Kayce Mcbride, Dr Otilia Aragon, Dr Boy Howard, last 12 mths 1/21/2019   Able to walk? Yes   Fall in past 12 months? No   Fall with injury? -   Number of falls in past 12 months -   Fall Risk Score -         Abuse Screening Questionnaire 1/7/2019   Do you ever feel afraid of your partner? N   Are you in a relationship with someone who physically or mentally threatens you? N   Is it safe for you to go home? Y       3 most recent PHQ Screens 1/21/2019   Little interest or pleasure in doing things Not at all   Feeling down, depressed, irritable, or hopeless Not at all   Total Score PHQ 2 0       There are no discontinued medications.

## 2019-07-08 ENCOUNTER — OFFICE VISIT (OUTPATIENT)
Dept: INTERNAL MEDICINE CLINIC | Age: 84
End: 2019-07-08

## 2019-07-08 VITALS
RESPIRATION RATE: 20 BRPM | OXYGEN SATURATION: 98 % | SYSTOLIC BLOOD PRESSURE: 122 MMHG | BODY MASS INDEX: 30.99 KG/M2 | WEIGHT: 186 LBS | HEART RATE: 83 BPM | TEMPERATURE: 97.8 F | DIASTOLIC BLOOD PRESSURE: 66 MMHG | HEIGHT: 65 IN

## 2019-07-08 DIAGNOSIS — N39.0 URINARY TRACT INFECTION WITHOUT HEMATURIA, SITE UNSPECIFIED: ICD-10-CM

## 2019-07-08 DIAGNOSIS — K21.9 GERD WITHOUT ESOPHAGITIS: Chronic | ICD-10-CM

## 2019-07-08 DIAGNOSIS — D64.9 ANEMIA, UNSPECIFIED TYPE: ICD-10-CM

## 2019-07-08 DIAGNOSIS — M48.062 SPINAL STENOSIS OF LUMBAR REGION WITH NEUROGENIC CLAUDICATION: Chronic | ICD-10-CM

## 2019-07-08 DIAGNOSIS — E78.00 HYPERCHOLESTEROLEMIA: Chronic | ICD-10-CM

## 2019-07-08 DIAGNOSIS — G60.9 IDIOPATHIC PERIPHERAL NEUROPATHY: Chronic | ICD-10-CM

## 2019-07-08 DIAGNOSIS — E55.9 VITAMIN D DEFICIENCY: Chronic | ICD-10-CM

## 2019-07-08 DIAGNOSIS — R73.03 PREDIABETES: Chronic | ICD-10-CM

## 2019-07-08 DIAGNOSIS — Z00.00 MEDICARE ANNUAL WELLNESS VISIT, SUBSEQUENT: Primary | ICD-10-CM

## 2019-07-08 PROBLEM — J96.00 ACUTE RESPIRATORY FAILURE (HCC): Status: RESOLVED | Noted: 2018-09-29 | Resolved: 2019-07-08

## 2019-07-08 LAB
A-G RATIO,AGRAT: 1.6 RATIO
ALBUMIN SERPL-MCNC: 4.9 G/DL (ref 3.9–5.4)
ALP SERPL-CCNC: 78 U/L (ref 38–126)
ALT SERPL-CCNC: 28 U/L (ref 9–52)
ANION GAP SERPL CALC-SCNC: 11 MMOL/L
AST SERPL W P-5'-P-CCNC: 20 U/L (ref 14–36)
BACTERIA,BACTU: ABNORMAL
BILIRUB SERPL-MCNC: 0.5 MG/DL (ref 0.2–1.3)
BILIRUB UR QL: NEGATIVE
BUN SERPL-MCNC: 15 MG/DL (ref 7–17)
BUN/CREATININE RATIO,BUCR: 25 RATIO
CALCIUM SERPL-MCNC: 9.5 MG/DL (ref 8.4–10.2)
CHLORIDE SERPL-SCNC: 106 MMOL/L (ref 98–107)
CHOL/HDL RATIO,CHHD: 4 RATIO (ref 0–4)
CHOLEST SERPL-MCNC: 164 MG/DL (ref 0–200)
CLARITY: CLEAR
CO2 SERPL-SCNC: 26 MMOL/L (ref 22–32)
COLOR UR: ABNORMAL
CREAT SERPL-MCNC: 0.6 MG/DL (ref 0.7–1.2)
GLOBULIN,GLOB: 3
GLUCOSE 24H UR-MRATE: NEGATIVE G/(24.H)
GLUCOSE SERPL-MCNC: 104 MG/DL (ref 65–105)
HDLC SERPL-MCNC: 46 MG/DL (ref 35–130)
HGB UR QL STRIP: NEGATIVE
KETONES UR QL STRIP.AUTO: NEGATIVE
LDL/HDL RATIO,LDHD: 2 RATIO
LDLC SERPL CALC-MCNC: 87 MG/DL (ref 0–130)
LEUKOCYTE ESTERASE: ABNORMAL
NITRITE UR QL STRIP.AUTO: NEGATIVE
PH UR STRIP: 6 [PH] (ref 5–7)
POTASSIUM SERPL-SCNC: 4.8 MMOL/L (ref 3.6–5)
PROT SERPL-MCNC: 7.9 G/DL (ref 6.3–8.2)
PROT UR STRIP-MCNC: NEGATIVE MG/DL
RBC #/AREA URNS HPF: 0 #/HPF
SODIUM SERPL-SCNC: 143 MMOL/L (ref 137–145)
SP GR UR REFRACTOMETRY: 1.01 (ref 1–1.03)
TRIGL SERPL-MCNC: 156 MG/DL (ref 0–200)
UROBILINOGEN UR QL STRIP.AUTO: NEGATIVE
VLDLC SERPL CALC-MCNC: 31 MG/DL
WBC URNS QL MICRO: ABNORMAL #/HPF

## 2019-07-08 NOTE — PATIENT INSTRUCTIONS
The best way to stay healthy is to live a healthy lifestyle. A healthy lifestyle includes regular exercise, eating a well-balanced diet, keeping a healthy weight and not smoking. Regular physical exams and screening tests are another important way to take care of yourself. Preventive exams provided by health care providers can find health problems early when treatment works best and can keep you from getting certain diseases or illnesses. Preventive services include exams, lab tests, screenings, shots, monitoring and information to help you take care of your own health. All people over 65 should have a pneumonia shot. Pneumonia shots are usually only needed once in a lifetime unless your doctor decides differently. In addition to your physical exam, some screening tests are recommended: 
 
All people over 65 should have a yearly flu shot. People over 65 are at medium to high risk for Hepatitis B. Three shots are needed for complete protection. Bone mass measurement (dexa scan) is recommended every two years. Diabetes Mellitus screening is recommended every year. Glaucoma is an eye disease caused by high pressure in the eye. An eye exam is recommended every year. Cardiovascular screening tests that check your cholesterol and other blood fat (lipid) levels are recommended every five years. Colorectal Cancer screening tests help to find pre-cancerous polyps (growths in the colon) so they can be removed before they turn into cancer. Tests ordered for screening depend on your personal and family history risk factors. Prostate Cancer Screening (annually up to age 76) Screening for breast cancer is recommended yearly with a Mammogram. 
 
Screening for cervical and vaginal cancer is recommended with a pelvic and Pap test every two years.  However if you have had an abnormal pap in the past  three years or at high risk for cervical or vaginal cancer Medicare will cover a pap test and a pelvic exam every year. Here is a list of your current Health Maintenance items with a due date: 
Health Maintenance Due Topic Date Due  
 Annual Well Visit  07/03/2019 Learning About Cutting Calories How do calories affect your weight? Food gives your body energy. Energy from the food you eat is measured in calories. This energy keeps your heart beating, your brain active, and your muscles working. Your body needs a certain number of calories each day. After your body uses the calories it needs, it stores extra calories as fat. To lose weight safely, you have to eat fewer calories while eating in a healthy way. How many calories do you need each day? The more active you are, the more calories you need. When you are less active, you need fewer calories. How many calories you need each day also depends on several things, including your age and whether you are male or female. Here are some general guidelines for adults: 
· Less active women and older adults need 1,600 to 2,000 calories each day. · Active women and less active men need 2,000 to 2,400 calories each day. · Active men need 2,400 to 3,000 calories each day. How can you cut calories and eat healthy meals? Whole grains, vegetables and fruits, and dried beans are good lower-calorie foods. They give you lots of nutrients and fiber. And they fill you up. Sweets, energy drinks, and soda pop are high in calories. They give you few nutrients and no fiber. Try to limit soda pop, fruit juice, and energy drinks. Drink water instead. Some fats can be part of a healthy diet. But cutting back on fats from highly processed foods like fast foods and many snack foods is a good way to lower the calories in your diet. Also, use smaller amounts of fats like butter, margarine, salad dressing, and mayonnaise. Add fresh garlic, lemon, or herbs to your meals to add flavor without adding fat. Meats and dairy products can be a big source of hidden fats. Try to choose lean or low-fat versions of these products. Fat-free cookies, candies, chips, and frozen treats can still be high in sugar and calories. Some fat-free foods have more calories than regular ones. Eat fat-free treats in moderation, as you would other foods. If your favorite foods are high in fat, salt, sugar, or calories, limit how often you eat them. Eat smaller servings, or look for healthy substitutes. Fill up on fruits, vegetables, and whole grains. Eating at home · Use meat as a side dish instead of as the main part of your meal. 
· Try main dishes that use whole wheat pasta, brown rice, dried beans, or vegetables. · Find ways to cook with little or no fat, such as broiling, steaming, or grilling. · Use cooking spray instead of oil. If you use oil, use a monounsaturated oil, such as canola or olive oil. · Trim fat from meats before you cook them. · Drain off fat after you brown the meat or while you roast it. · Chill soups and stews after you cook them. Then skim the fat off the top after it hardens. Eating out · Order foods that are broiled or poached rather than fried or breaded. · Cut back on the amount of butter or margarine that you use on bread. · Order sauces, gravies, and salad dressings on the side, and use only a little. · When you order pasta, choose tomato sauce rather than cream sauce. · Ask for salsa with your baked potato instead of sour cream, butter, cheese, or marcial. · Order meals in a small size instead of upgrading to a large. · Share an entree, or take part of your food home to eat as another meal. 
· Share appetizers and desserts. Where can you learn more? Go to http://sumit-arpit.info/. Enter 99 [id number] in the search box to learn more about \"Learning About Cutting Calories. \" Current as of: March 28, 2018 Content Version: 11.9 © 1364-3234 [employer]. Care instructions adapted under license by [employer] (which disclaims liability or warranty for this information). If you have questions about a medical condition or this instruction, always ask your healthcare professional. [address] any warranty or liability for your use of this information.

## 2019-07-08 NOTE — PROGRESS NOTES
This note will not be viewable in [address] Emily Mendez is a 82 y.o. female and presents with Annual Wellness Visit and Follow Up Chronic Condition  . Subjective:  Mrs. Yulisa Lin returns to the office today for a Medicare wellness exam and follow-up of multiple medical problems. The patient has hypercholesterolemia currently managing this with diet. She has no history of coronary artery disease and denies exertional chest pains or claudication. There is a history of prediabetes. She denies excessive thirst, urination or unexplained weight loss. She is a peripheral neuropathy which was previously evaluated by Dr. Richard Carlos but she has not seen him in 4 years. She notes that the neuropathy has worsened over time and she is having difficulty with proprioception and is fearful of falling. Her GERD is currently managed without PPI therapy. She denies breakthrough heartburn and has had no dysphasia, early satiety or unexplained weight loss. She is now being followed by Dr. Effie Denton for mild anemia, leukocytosis and thrombocytopenia. It is unclear if this is myelodysplastic syndrome or not. She denies any fevers, weight loss or night sweats. She has a history of spinal stenosis of the lumbar region along with postlaminectomy syndrome. She does have daily pain which is been controlled off of any type of prescription medication. She denies any loss of strength of her lower extremities.     Past Medical History:   Diagnosis Date    Arthritis     Bunion of right foot 8/20/2015    Chronic pain     back--uses tens unit    Diverticulitis 6/29/2018    Recurrent    Dry eye syndrome 6/29/2018    Fibromyalgia 6/29/2018    GERD (gastroesophageal reflux disease)     History of left breast cancer 2013    lumpectomy radiation    Hypercholesterolemia 6/29/2018    Ill-defined condition     blood transfusion hx    Osteoporosis 6/29/2018    Peripheral neuropathy 6/29/2018    Prediabetes 6/29/2018    PUD (peptic ulcer disease)     Radiation therapy complication 5521    Lt breast-No Chemo    Rosacea 2018    Trouble in sleeping      Past Surgical History:   Procedure Laterality Date    HX APPENDECTOMY      HX BREAST LUMPECTOMY  2013    LEFT BREAST LUMPECTOMY W/LEFT BREAST SENTINEL NODE BIOPSY,AND NEEDLE LOCALIZATION performed by Elva Martin MD at [facility] MAIN OR    HX CATARACT REMOVAL      bilateral    HX HYSTERECTOMY      ovarian cyst    HX MOHS PROCEDURES      right    HX ORTHOPAEDIC      back surgery x2    HX OTHER SURGICAL      colonoscopy    HX TONSILLECTOMY      TOTAL KNEE ARTHROPLASTY      left    TOTAL KNEE ARTHROPLASTY      right    US GUIDED CORE BREAST BIOPSY Left 2013    Breast Ca     Allergies   Allergen Reactions    Hydrocodone Nausea Only and Vertigo    Gabapentin Diarrhea, Nausea Only and Other (comments)     weakness    Lyrica [Pregabalin] Nausea Only    Oxycodone Nausea and Vomiting and Vertigo     Current Outpatient Medications   Medication Sig Dispense Refill    inulin (FIBER GUMMIES PO) Take 1 Tab by mouth daily.  acetaminophen (TYLENOL) 325 mg tablet Take 650 mg by mouth every four (4) hours as needed for Pain.  ergocalciferol (VITAMIN D2) 50,000 unit capsule Take 1 Cap by mouth every seven (7) days. 12 Cap 3    vit A/vit C/vit E/zinc/copper (PRESERVISION AREDS PO) Take 1 Tab by mouth daily.  multivitamin (ONE A DAY) tablet Take 1 Tab by mouth daily. Social History     Socioeconomic History    Marital status:      Spouse name: Not on file    Number of children: Not on file    Years of education: Not on file    Highest education level: Not on file   Tobacco Use    Smoking status: Former Smoker     Packs/day: 0.50     Years: 50.00     Pack years: 25.00     Last attempt to quit: 2012     Years since quittin.4    Smokeless tobacco: Never Used   Substance and Sexual Activity    Alcohol use:  Yes     Alcohol/week: 1.2 oz Types: 2 Glasses of wine per week    Drug use: No     Family History   Problem Relation Age of Onset    Cancer Mother         bladder    Cancer Father     Breast Cancer Paternal Grandmother        Health Maintenance   Topic Date Due    MEDICARE YEARLY EXAM  07/03/2019    Influenza Age 5 to Adult  08/01/2019    GLAUCOMA SCREENING Q2Y  02/15/2020    DTaP/Tdap/Td series (3 - Td) 04/03/2029    Bone Densitometry (Dexa) Screening  Completed    Shingrix Vaccine Age 50>  Completed    Pneumococcal 65+ years  Completed        Review of Systems  Constitutional: negative for fevers, chills, anorexia and weight loss  Eyes:   negative for visual disturbance and irritation  ENT:   negative for tinnitus,sore throat,nasal congestion,ear pain,hoarseness  Respiratory:  negative for cough, hemoptysis, dyspnea,wheezing  CV:   negative for chest pain, palpitations, lower extremity edema  GI:   negative for nausea, vomiting, diarrhea, abdominal pain,melena  Endo:               negative for polyuria,polydipsia,polyphagia,heat intolerance  Genitourinary: negative for frequency, dysuria and hematuria  Integumentary: negative for rash and pruritus  Hematologic:  negative for easy bruising and gum/nose bleeding  Musculoskel: negative for myalgias, arthralgias, back pain, muscle weakness, joint pain  Neurological:  negative for headaches, dizziness, vertigo, memory problems and gait   Behavl/Psych: negative for feelings of anxiety, depression, mood changes  ROS otherwise negative      Objective:  Visit Vitals  /66 (BP 1 Location: Right arm, BP Patient Position: Sitting)   Pulse 83   Temp 97.8 °F (36.6 °C) (Oral)   Resp 20   Ht 5' 5\" (1.651 m)   Wt 186 lb (84.4 kg)   SpO2 98%   BMI 30.95 kg/m²     Body mass index is 30.95 kg/m².     Physical Exam:   General appearance - alert, well appearing, and in no distress  Mental status - alert, oriented to person, place, and time  EYE-HOWARD, EOMI,conjunctiva normal bilaterally, lids normal  ENT-ENT exam normal, no neck nodes or sinus tenderness  Nose - normal and patent, no erythema,  Or discharge   Mouth - mucous membranes moist, pharynx normal without lesions  Neck - supple, no significant adenopathy or bruit  Chest - clear to auscultation, no wheezes, rales or rhonchi. Heart - normal rate, regular rhythm, normal S1, S2, no murmurs, rubs, clicks or gallops   Abdomen - soft, nontender, nondistended, no masses or organomegaly  Lymph- no adenopathy palpable  Ext-peripheral pulses normal, no pedal edema, no clubbing or cyanosis  Skin-Warm and dry. no hyperpigmentation, vitiligo, or suspicious lesions  Neuro -alert, oriented, normal speech, no focal findings or movement disorder noted    In addition this patient is seen for AWV  as detailed below: This is a Subsequent Medicare Annual Wellness Exam (AWV) (Performed 12 months after IPPE or effective date of Medicare Part B enrollment)    I have reviewed the patient's medical history in detail and updated the computerized patient record. Problem list reviewed with patient and risk factors discussed. PSH, SH, FH, Medications and HM issues also reviewed and discussed. Depression screen, fall risk assessment, functional abilities and ACP also reviewed and discussed as above and below. Depression Risk Factor Screening:     3 most recent PHQ Screens 1/21/2019   Little interest or pleasure in doing things Not at all   Feeling down, depressed, irritable, or hopeless Not at all   Total Score PHQ 2 0     Alcohol Risk Factor Screening: You do not drink alcohol or very rarely. Functional Ability and Level of Safety:   Hearing Loss  The patient wears hearing aids. Activities of Daily Living  The home contains: handrails and grab bars  Patient does total self care    Fall Risk  Fall Risk Assessment, last 12 mths 1/21/2019   Able to walk? Yes   Fall in past 12 months?  No   Fall with injury? -   Number of falls in past 12 months -   Fall Risk Score -       Abuse Screen  Patient is not abused    Cognitive Screening   Evaluation of Cognitive Function:  Has your family/caregiver stated any concerns about your memory: no  Normal    Patient Care Team   Patient Care Team:  Mo He MD as PCP - General (Internal Medicine)  Debby Jim MD as Physician (Hematology and Oncology)  Luis Rowell MD (Breast Surgery)  Fabby Leija MD (Orthopedic Surgery)    Assessment/Plan   Education and counseling provided:  Are appropriate based on today's review and evaluation    Assessment/Plan:   Impressions:      ICD-10-CM ICD-9-CM    1. Medicare annual wellness visit, subsequent Z00.00 V70.0    2. Prediabetes R73.03 790.29 HEMOGLOBIN A1C W/O EAG      URINALYSIS W/MICROSCOPIC   3. Hypercholesterolemia E78.00 272.0 COLLECTION VENOUS BLOOD,VENIPUNCTURE      LIPID PANEL      METABOLIC PANEL, COMPREHENSIVE      TSH 3RD GENERATION   4. Vitamin D deficiency E55.9 268.9    5. Idiopathic peripheral neuropathy G60.9 356.9    6. GERD without esophagitis K21.9 530.81    7. Spinal stenosis of lumbar region with neurogenic claudication M48.062 724.03    8. Anemia, unspecified type D64.9 285.9         Plan:  1. Continue present meds  2. Lifestyle modifications including Na restriction, low carb/fat diet, weight reduction and exercise (at least a walking program). Follow-up and Dispositions    · Return in about 6 months (around 1/8/2020).            Orders Placed This Encounter    HEMOGLOBIN A1C W/O EAG    LIPID PANEL ([facility])    METABOLIC PANEL, COMPREHENSIVE ([facility])    TSH 3RD GENERATION ([facility])    URINALYSIS W/MICROSCOPIC ([facility])    COLLECTION VENOUS BLOOD,VENIPUNCTURE       Louisa Morales MD   Assessment/Plan:  Diagnoses and all orders for this visit:    Medicare annual wellness visit, subsequent    Prediabetes  -     HEMOGLOBIN A1C W/O EAG  -     URINALYSIS W/MICROSCOPIC    Hypercholesterolemia  - COLLECTION VENOUS BLOOD,VENIPUNCTURE  -     LIPID PANEL  -     METABOLIC PANEL, COMPREHENSIVE  -     TSH 3RD GENERATION    Vitamin D deficiency    Idiopathic peripheral neuropathy    GERD without esophagitis    Spinal stenosis of lumbar region with neurogenic claudication    Anemia, unspecified type        Health Maintenance Due   Topic Date Due    MEDICARE YEARLY EXAM  07/03/2019       Other instructions: The patient's medications were reviewed and reconciled. No change in her current medical regimen is made. Body mass index is 30.95 and dietary counseling along with printed patient education is given    Continued follow-up with Dr. Alfred Truong in regards to her thrombocytopenia, anemia, etc.    Await results of multiple labs    Patient is up-to-date on all health maintenance and age-appropriate vaccinations    I have recommended follow-up with Dr. Chandler Fajardo in regards to her progressive peripheral neuropathy    Follow-up in 6 months    Follow-up and Dispositions    · Return in about 6 months (around 1/8/2020). I have reviewed with the patient details of the assessment and plan and all questions were answered. Relevent patient education was performed. The most recent lab findings were reviewed with the patient. An After Visit Summary was printed and given to the patient.     Leroy Tracy MD

## 2019-07-08 NOTE — PROGRESS NOTES
Chief Complaint   Patient presents with    Annual Wellness Visit       Depression Risk Factor Screening:     3 most recent PHQ Screens 1/21/2019   Little interest or pleasure in doing things Not at all   Feeling down, depressed, irritable, or hopeless Not at all   Total Score PHQ 2 0       Functional Ability and Level of Safety:     Activities of Daily Living  ADL Assessment 7/8/2019   Feeding yourself No Help Needed   Getting from bed to chair No Help Needed   Getting dressed No Help Needed   Bathing or showering No Help Needed   Walk across the room (includes cane/walker) No Help Needed   Using the telphone No Help Needed   Taking your medications No Help Needed   Preparing meals No Help Needed   Managing money (expenses/bills) No Help Needed   Moderately strenuous housework (laundry) No Help Needed   Shopping for personal items (toiletries/medicines) No Help Needed   Shopping for groceries No Help Needed   Driving No Help Needed   Climbing a flight of stairs No Help Needed   Getting to places beyond walking distances No Help Needed       Fall Risk  Fall Risk Assessment, last 12 mths 1/21/2019   Able to walk? Yes   Fall in past 12 months? No   Fall with injury? -   Number of falls in past 12 months -   Fall Risk Score -       Abuse Screen  Abuse Screening Questionnaire 1/7/2019   Do you ever feel afraid of your partner? N   Are you in a relationship with someone who physically or mentally threatens you? N   Is it safe for you to go home?  Y         Patient Care Team   Patient Care Team:  Hadley Mccabe MD as PCP - General (Internal Medicine)  Jeanie Louise MD as Physician (Hematology and Oncology)  Sundar Quintana MD (Breast Surgery)  Dung Hein MD (Orthopedic Surgery)

## 2019-07-09 LAB
HBA1C MFR BLD: 4.6 % (ref 4.8–5.6)
TSH SERPL DL<=0.05 MIU/L-ACNC: 1.57 UIU/ML (ref 0.34–5.6)

## 2019-07-10 LAB — BACTERIA UR CULT: NORMAL

## 2019-07-24 ENCOUNTER — DOCUMENTATION ONLY (OUTPATIENT)
Dept: ONCOLOGY | Age: 84
End: 2019-07-24

## 2019-07-24 ENCOUNTER — OFFICE VISIT (OUTPATIENT)
Dept: ONCOLOGY | Age: 84
End: 2019-07-24

## 2019-07-24 ENCOUNTER — HOSPITAL ENCOUNTER (OUTPATIENT)
Dept: LAB | Age: 84
Discharge: HOME | End: 2019-07-24
Payer: MEDICARE

## 2019-07-24 VITALS
RESPIRATION RATE: 18 BRPM | SYSTOLIC BLOOD PRESSURE: 133 MMHG | HEART RATE: 70 BPM | DIASTOLIC BLOOD PRESSURE: 79 MMHG | TEMPERATURE: 98.2 F | HEIGHT: 65 IN | OXYGEN SATURATION: 93 % | WEIGHT: 190.2 LBS | BODY MASS INDEX: 31.69 KG/M2

## 2019-07-24 DIAGNOSIS — D46.9 MDS (MYELODYSPLASTIC SYNDROME) (HCC): Primary | ICD-10-CM

## 2019-07-24 PROCEDURE — 36415 COLL VENOUS BLD VENIPUNCTURE: CPT

## 2019-07-24 PROCEDURE — 85025 COMPLETE CBC W/AUTO DIFF WBC: CPT

## 2019-07-24 NOTE — PROGRESS NOTES
[facility]  at [address], [address] [facility]  [phone]      Follow-up Note        Patient: Nan Huynh MRN: [id number]  SSN: [id number]    YOB: 1935  Age: 82 y.o. Sex: female        Diagnosis:      1. Myelodysplastic Syndrome   IPSS-R Low risk    Del(7q) and trisomy 8    2. Left breast carcinoma:   T1a N0 Mx (Stage IA) infiltrating ductal carcinoma , Tumor size 1 cm, LN -ve, grade 2, ki 67 17%, %, [place] 90%, Her 2 unamplified. Treatment:      1. Discontinued Arimidex 1/2019 after 5 years of treatment  2. Completed radiation treatment to the breast   3. S/P left breast lumpectomy and sentinel LN excision on 11/21/2013    Subjective:      Shelva Lesches is a 70 y.o.  female with stage I invasive ductal carcinoma. She underwent a left breast lumpectomy and sentinel LN excision on 11/21/2013. Ms. Miranda Rabago then received radiation to the left breast. She stopped arimidex this month after 5 years. She reports joint pain and chronic numbness and tingling in her feet. I am now seeing her for low grade MDS. She is asymptomatic and laboratory studies reveal acceptable blood counts. Ms. Miranda Rabago fell in April and sustained an orbital fracture. She had a fracture repair by Dr. Kacey Melendez and has been undergoing reconstructive surgery.        Review of Systems:     Constitutional: negative   Eyes: negative   Ears, Nose, Mouth, Throat, and Face: negative   Respiratory: negative   Cardiovascular: negative   Gastrointestinal: negative   Integument/Breast: negative  Hematologic/Lymphatic: negative   Musculoskeletal: joint pain  Neurological: numbness bottoms of both feet      Past Medical History:   Diagnosis Date    Arthritis     Bunion of right foot 8/20/2015    Chronic pain     back--uses tens unit    Diverticulitis 6/29/2018    Recurrent    Dry eye syndrome 6/29/2018    Fibromyalgia 6/29/2018  GERD (gastroesophageal reflux disease)     History of left breast cancer 2013    lumpectomy radiation    Hypercholesterolemia 2018    Ill-defined condition     blood transfusion hx    Osteoporosis 2018    Peripheral neuropathy 2018    Prediabetes 2018    PUD (peptic ulcer disease)     Radiation therapy complication 6385    Lt breast-No Chemo    Rosacea 2018    Trouble in sleeping      Past Surgical History:   Procedure Laterality Date    HX APPENDECTOMY      HX BREAST LUMPECTOMY  2013    LEFT BREAST LUMPECTOMY W/LEFT BREAST SENTINEL NODE BIOPSY,AND NEEDLE LOCALIZATION performed by Penelope Haile MD at [facility] MAIN OR    HX CATARACT REMOVAL      bilateral    HX HYSTERECTOMY      ovarian cyst    HX MOHS PROCEDURES      right    HX ORTHOPAEDIC      back surgery x2    HX OTHER SURGICAL      colonoscopy    HX TONSILLECTOMY      TOTAL KNEE ARTHROPLASTY      left    TOTAL KNEE ARTHROPLASTY      right    US GUIDED CORE BREAST BIOPSY Left 2013    Breast Ca      Family History   Problem Relation Age of Onset    Cancer Mother         bladder    Cancer Father     Breast Cancer Paternal Grandmother      Social History     Tobacco Use    Smoking status: Former Smoker     Packs/day: 0.50     Years: 50.00     Pack years: 25.00     Last attempt to quit: 2012     Years since quittin.4    Smokeless tobacco: Never Used   Substance Use Topics    Alcohol use: Yes     Alcohol/week: 2.0 standard drinks     Types: 2 Glasses of wine per week      Prior to Admission medications    Medication Sig Start Date End Date Taking? Authorizing Provider   inulin (FIBER GUMMIES PO) Take 1 Tab by mouth daily. Provider, Historical   acetaminophen (TYLENOL) 325 mg tablet Take 650 mg by mouth every four (4) hours as needed for Pain. Other, MD Libby   ergocalciferol (VITAMIN D2) 50,000 unit capsule Take 1 Cap by mouth every seven (7) days.  18   Kristina Ruano NP   vit A/vit C/vit E/zinc/copper (PRESERVISION AREDS PO) Take 1 Tab by mouth daily. Provider, Historical   multivitamin (ONE A DAY) tablet Take 1 Tab by mouth daily. Provider, Historical            Allergies   Allergen Reactions    Hydrocodone Nausea Only and Vertigo    Gabapentin Diarrhea, Nausea Only and Other (comments)     weakness    Lyrica [Pregabalin] Nausea Only    Oxycodone Nausea and Vomiting and Vertigo         Objective:     Visit Vitals  /79 (BP 1 Location: Left arm, BP Patient Position: Sitting)   Pulse 70   Temp 98.2 °F (36.8 °C) (Oral)   Resp 18   Ht 5' 5\" (1.651 m)   Wt 190 lb 3.2 oz (86.3 kg)   SpO2 93%   BMI 31.65 kg/m²       Pain Scale: 5/10  Pain Location: Neck      Physical Exam:    GENERAL: alert, cooperative   EYE: inferior rectus weakness with upward gaze, bruise around the eye  LYMPHATIC: Cervical, supraclavicular, and axillary nodes normal.   THROAT & NECK: normal and no erythema or exudates noted. LUNG: clear to auscultation bilaterally   HEART: regular rate and rhythm   ABDOMEN: soft, non-tender   EXTREMITIES: extremities normal   SKIN: Normal.   NEUROLOGIC: negative       Lab Results   Component Value Date/Time    WBC 15.6 (H) 04/29/2019 09:28 AM    HGB (POC) 10.7 (A) 01/07/2019 09:52 AM    HGB 11.3 04/29/2019 09:28 AM    HCT (POC) 32.0 (A) 01/07/2019 09:52 AM    HCT 35.1 04/29/2019 09:28 AM    PLATELET 853 (L) 86/34/5557 09:28 AM     (H) 04/29/2019 09:28 AM       Lab Results   Component Value Date/Time    Iron 66 04/29/2019 09:28 AM    TIBC 354 04/29/2019 09:28 AM    Iron % saturation 19 04/29/2019 09:28 AM    Ferritin 170 (H) 04/29/2019 09:28 AM           Assessment:      1. Myelodysplastic Syndrome   IPSS-R Low risk      Del(7q) and trisomy 8    Blood counts are acceptable as of now  Recommend observation     Implication of the diagnosis - development of cytopenias and evolution of AML    I discussed the triggers and reasons for treatment.    When Hgb goes below 10 and or platelet count goes below 100,000, I will treat her with 5'- Azacytidine. ESAs would not be a good choice because all 3 blood counts are abnormal.       2.History of Left breast carcinoma:     T1a N0 Mx (Stage IA) infiltrating ductal carcinoma , Tumor size 1 cm, LN -ve, grade 2, ki 67 17%, %, [place] 90%, Her 2 unamplified. Dx: 10/28/2013    > S/P left breast lumpectomy and sentinel LN excision on 11/21/2013  > Completed radiation to the breast  > Discontinued Arimidex 1/2019 after 5 years  > In remission    Mammogram (11/2017): normal  DEXA (10/2013) - normal    Symptom management form reviewed with patient. 3. Vitamin D deficiency    > Vitamin D 50,000 units weekly      4. Osteopenia    > Management per Dr. Yaz Mercado:        > Continue observation  > Repeat labs today and 3 months  > Follow-up in 3 months      Signed by: Usha Galicia MD                     July 24, 2019        CC.  Adelina Blunt MD

## 2019-07-24 NOTE — PROGRESS NOTES
Charles Holbrook is a 82 y.o. female here today for left side breast cancer & MDS f/u. VS stable. Patient denies pain. Good appetite. Patient denies N/V/D and constipation. Patient reports numbness, tingling in feet and swelling in ankles. Patient denies mouth ulcers. Patient denies cough. Patient denies SOB. Patient report 1 fall w/ injury 4/2019. Visit Vitals  /79 (BP 1 Location: Left arm, BP Patient Position: Sitting)   Pulse 70   Temp 98.2 °F (36.8 °C) (Oral)   Resp 18   Ht 5' 5\" (1.651 m)   Wt 190 lb 3.2 oz (86.3 kg)   SpO2 93%   BMI 31.65 kg/m²       Pain Scale: 5/10  Pain Location: Neck      1. Have you been to the ER, urgent care clinic since your last visit? Hospitalized since your last visit? No    2. Have you seen or consulted any other health care providers outside of the [address] [place] [address] since your last visit? Include any pap smears or colon screening.  Yes- reconstructive surgeon; pt reports she fell and busted her right eye socket

## 2019-07-24 NOTE — PATIENT INSTRUCTIONS

## 2019-07-24 NOTE — PROGRESS NOTES
Oncology Navigator  Psychosocial Assessment    Reason for Assessment:    []Depression  []Anxiety  []Caregiver Kent  []Maladaptive Coping with Serious Illness   [] Social Work Referral [x] Initial Assessment  [] Other     Sources of Information:    [x]Patient  []Family  []Staff  [x]Medical Record    Advance Care Planning:  Advance Care Planning 1/24/2019   Patient's Healthcare Decision Maker is: -   Primary Decision Maker Name -   Primary Decision Maker Phone Number -   Primary Decision Maker Relationship to Patient -   Secondary Decision Maker Name -   Confirm Advance Directive Yes, on file   Does the patient have other document types -       Mental Status:    [x]Alert  []Lethargic  []Unresponsive   [] Unable to assess   Oriented to:  [x]Person  [x]Place  [x]Time  [x]Situation      Barriers to Learning:    []Language  []Developmental  []Cognitive  []Altered Mental Status  []Visual/Hearing Impairment  []Unable to Read/Write  []Motivational   [x]No Barriers Identified  []Other:    Relationship Status:  []Single  [x]  []Significant Other/Life Partner  []  []  []      Living Circumstances:  []Lives Alone  [x]Family/Significant Other in Household  []Roommates  []Children in the Home  []Paid Caregivers  []Assisted Living Facility/Group Home  []Skilled [address]  []Homeless  []Incarcerated  []Environmental/Care Concerns  []other:    Employment Status:  []Employed Full-time []Employed Part-time []Homemaker [] Disabled  [x] Retired []Other:    Support System:    []Strong  []Fair  []Limited    Financial/Legal Concerns:    []Uninsured  [x]Limited Income/Resources  []Non-Citizen  []No Concerns Identified  []Financial POA:    []Other:    Yarsani/Spiritual/Existential:  [x]Strong Sense of Spirituality  [x]Involved in [employer]  []Request  Visit  []Expressing Spiritual/Existential Angst  []No Concerns Identified    Coping with Illness:         Patient: Family/Caregiver: Understanding and Acceptance of Illness/Prognosis  [x] []   Strong Sense of Resilience [x] []   Self Reflection [x] []   Engaged Support System [x] []   Does not Readily Discuss Illness [] []   Denial of Terminal Status [] []   Anger [] []   Depression [] []   Anxiety/Fear [] []   Bargaining [] []   Recent Diagnosis/Prognosis [] []   Difficulties with Body Image [] []   Loss of Identity [] []   Excessive Substance Use [] []   Mental Health History [] []   Enmeshed Relationships [] []   History of Loss [] []   Anticipatory Grief [] []   Concern for Complicated Grief [] []   Suicidal Ideation or Plan [] []   Unable to assess [] []            Narrative:   Met with patient  to introduce social work navigator role and supports. Pt has hit her 5 year survivor isis and inquired about the Survivors Day event. SW explained there is not an event here at [address] anymore and it has occurred at [address] for the last 2 years but smaller events are occurring for pt in active treatment at [facility]. PT has been  for 64 years and has 3 children, 1 Luis A Atkins, who lives here in the area who is being treated for recurrent esophageal cancer. Pt also has two other adult children, 1 in [place] and one in [place]. Pt states she supports her son who is fighting cancer - and she likes his facebook slogan [address]. Assessment/Action:  1. Introduce self and role of the  in the [address] [place] [address] [facility] 2. Informed the patient of the [facility] and available resources there. 3. Continue to meet with the patient when she returns to the clinic for ongoing assessment of the patient's adjustment to her diagnosis and treatment. 4. Ongoing psychosocial support as desired by patient.       Plan/Referral:   Other referral  Provided Care Card application for pt for her son - who she says has scans that are 4-5 figures in cost.     Supervisee in Social Work

## 2019-07-25 LAB
BASOPHILS # BLD AUTO: 0 X10E3/UL (ref 0–0.2)
BASOPHILS NFR BLD AUTO: 0 %
EOSINOPHIL # BLD AUTO: 0.8 X10E3/UL (ref 0–0.4)
EOSINOPHIL NFR BLD AUTO: 4 %
ERYTHROCYTE [DISTWIDTH] IN BLOOD BY AUTOMATED COUNT: 14.7 % (ref 12.3–15.4)
HCT VFR BLD AUTO: 33.4 % (ref 34–46.6)
HGB BLD-MCNC: 10.6 G/DL (ref 11.1–15.9)
IMMATURE CELLS, 115398: ABNORMAL
LYMPHOCYTES # BLD AUTO: 2.9 X10E3/UL (ref 0.7–3.1)
LYMPHOCYTES NFR BLD AUTO: 14 %
MCH RBC QN AUTO: 32.3 PG (ref 26.6–33)
MCHC RBC AUTO-ENTMCNC: 31.7 G/DL (ref 31.5–35.7)
MCV RBC AUTO: 102 FL (ref 79–97)
METAMYELOCYTES NFR BLD: 4 % (ref 0–0)
MONOCYTES # BLD AUTO: 3.1 X10E3/UL (ref 0.1–0.9)
MONOCYTES NFR BLD AUTO: 15 %
MORPHOLOGY BLD-IMP: ABNORMAL
MYELOCYTES NFR BLD: 1 % (ref 0–0)
NEUTROPHILS # BLD AUTO: 12.7 X10E3/UL (ref 1.4–7)
NEUTROPHILS NFR BLD AUTO: 58 %
NEUTS BAND NFR BLD AUTO: 4 %
NRBC BLD AUTO-RTO: 1 % (ref 0–0)
PLATELET # BLD AUTO: 124 X10E3/UL (ref 150–450)
RBC # BLD AUTO: 3.28 X10E6/UL (ref 3.77–5.28)
WBC # BLD AUTO: 20.5 X10E3/UL (ref 3.4–10.8)

## 2019-08-02 NOTE — PROGRESS NOTES
Problem: Falls - Risk of 
Goal: *Absence of Falls Document Smiley Soares Fall Risk and appropriate interventions in the flowsheet. Outcome: Progressing Towards Goal 
Fall Risk Interventions: 
Mobility Interventions: Bed/chair exit alarm Mentation Interventions: Adequate sleep, hydration, pain control, Bed/chair exit alarm Medication Interventions: Bed/chair exit alarm Pt verbalized understanding of using call bell and waiting for nursing staff to assist when transferring, she followed through on this throughout the night and morning. 01-Aug-2019 16:43

## 2019-09-04 ENCOUNTER — TELEPHONE (OUTPATIENT)
Dept: NEUROLOGY | Age: 84
End: 2019-09-04

## 2019-09-04 NOTE — TELEPHONE ENCOUNTER
----- Message from Jaison Sanches sent at 9/4/2019 11:37 AM EDT -----  Regarding: Dr. Shorty Hodges  Appointment not available    Caller's first and last name and relationship to patient (if not the patient): Anthony Sow (pt)      Best contact [phone]      Preferred date and time: asap      Scheduled appointment date and time:      Reason for appointment: f/up appt due to nerve in foot & legs. Electro motor nerve diagnostic study results was inconclusive per Dr. Rafaela Nguyen. Details to clarify the request: Health is getting worse.       Jaisonsteven Sanches

## 2019-09-06 ENCOUNTER — OFFICE VISIT (OUTPATIENT)
Dept: NEUROLOGY | Age: 84
End: 2019-09-06

## 2019-09-06 VITALS
OXYGEN SATURATION: 98 % | BODY MASS INDEX: 31.78 KG/M2 | HEART RATE: 96 BPM | WEIGHT: 191 LBS | SYSTOLIC BLOOD PRESSURE: 142 MMHG | DIASTOLIC BLOOD PRESSURE: 66 MMHG

## 2019-09-06 DIAGNOSIS — G62.9 NEUROPATHY: Primary | ICD-10-CM

## 2019-09-06 DIAGNOSIS — G60.3 IDIOPATHIC PROGRESSIVE NEUROPATHY: ICD-10-CM

## 2019-09-06 DIAGNOSIS — G60.9 HEREDITARY AND IDIOPATHIC NEUROPATHY: ICD-10-CM

## 2019-09-06 NOTE — PATIENT INSTRUCTIONS
1.  Will get labs today  2. Send out labs. 3, physical therapy as already prescribed  4. Use your cane  5.  Follow up in 3 months

## 2019-09-06 NOTE — PROGRESS NOTES
Neurology Note    Patient ID:  Dustin Tariq  [id number]  32 y.o.  1935    Subjective: I have balance problems. Date of Consultation:  September 6, 2019     Referring Physician: Dr. Ronald Yun    Reason for Consultation:   sensory disturbance in her lower extremities. History of Present Illness:   Dustin Tariq is a 82 y.o.  female who presents with who was referred for an evaluation of sensory disturbance in her lower extremities. The patient stated that the symptoms began a few years ago. She had some mild symptoms in 2017 when she had a previous electrodiagnostic study performed by Dr. Anthony Patel. At that time it did show that there was a recurrence of a lumbar radiculopathy and she subsequently underwent her second surgery to her lumbar spine. After that surgery some of her back pain had improved but she felt that the symptoms in her feet persisted and have continued to worsen since that time. She does have some mild sensory disturbance which includes tingling, pain, and burning. The symptom that bothers her the most however is her balance. She has needed to use a cane for at least the last 18 months. She does need to hold onto furniture in the house and she does have a positive shower sign. She feels that there are tight socks on her legs at all times. This progresses up her legs to approximately the level of her knees. At times the symptoms are worse than others but she is unclear of what causes the symptoms to be worse. She has not found anything that does help her symptoms. She has been able to minimize falls by using her cane. She is scheduled to begin balance therapy in the coming weeks. She also does already receive aquatic therapy. In her past medical history of note in regards to her neuropathy is that she did have prior breast cancer with radiation therapy and a lumpectomy but she did not have any chemotherapy.     She did try gabapentin to help with her symptoms but due to significant nausea and dizziness she needed to stop the medication. Past Medical History:   Diagnosis Date    Arthritis     Bunion of right foot 2015    Chronic pain     back--uses tens unit    Diverticulitis 2018    Recurrent    Dry eye syndrome 2018    Fibromyalgia 2018    GERD (gastroesophageal reflux disease)     History of left breast cancer 2013    lumpectomy radiation    Hypercholesterolemia 2018    Ill-defined condition     blood transfusion hx    Osteoporosis 2018    Peripheral neuropathy 2018    Prediabetes 2018    PUD (peptic ulcer disease)     Radiation therapy complication 6041    Lt breast-No Chemo    Rosacea 2018    Trouble in sleeping         Past Surgical History:   Procedure Laterality Date    HX APPENDECTOMY      HX BREAST LUMPECTOMY  2013    LEFT BREAST LUMPECTOMY W/LEFT BREAST SENTINEL NODE BIOPSY,AND NEEDLE LOCALIZATION performed by Yolanda Ordaz MD at [facility] MAIN OR    HX CATARACT REMOVAL      bilateral    HX HYSTERECTOMY      ovarian cyst    HX MOHS PROCEDURES      right    HX ORTHOPAEDIC      back surgery x2    HX OTHER SURGICAL      colonoscopy    HX TONSILLECTOMY      TOTAL KNEE ARTHROPLASTY      left    TOTAL KNEE ARTHROPLASTY      right    US GUIDED CORE BREAST BIOPSY Left 2013    Breast Ca        Family History   Problem Relation Age of Onset    Cancer Mother         bladder    Cancer Father     Breast Cancer Paternal Grandmother         Social History     Tobacco Use    Smoking status: Former Smoker     Packs/day: 0.50     Years: 50.00     Pack years: 25.00     Last attempt to quit: 2012     Years since quittin.5    Smokeless tobacco: Never Used   Substance Use Topics    Alcohol use:  Yes     Alcohol/week: 2.0 standard drinks     Types: 2 Glasses of wine per week        Allergies   Allergen Reactions    Hydrocodone Nausea Only and Vertigo    Gabapentin Diarrhea, Nausea Only and Other (comments)     weakness    Lyrica [Pregabalin] Nausea Only    Oxycodone Nausea and Vomiting and Vertigo        Prior to Admission medications    Medication Sig Start Date End Date Taking? Authorizing Provider   inulin (FIBER GUMMIES PO) Take 1 Tab by mouth daily. Provider, Historical   acetaminophen (TYLENOL) 325 mg tablet Take 650 mg by mouth every four (4) hours as needed for Pain. Other, MD Libby   ergocalciferol (VITAMIN D2) 50,000 unit capsule Take 1 Cap by mouth every seven (7) days. 8/8/18   Jennifer Irene [facility], NP   vit A/vit C/vit E/zinc/copper (PRESERVISION AREDS PO) Take 1 Tab by mouth daily. Provider, Historical   multivitamin (ONE A DAY) tablet Take 1 Tab by mouth daily. Provider, Historical       Review of Systems:    General, constitutional: negative, balance difficulty  Eyes, vision: negative  Ears, nose, throat: negative  Cardiovascular, heart: negative  Respiratory: negative  Gastrointestinal: negative  Genitourinary: negative  Musculoskeletal: negative, back pain  Skin and integumentary: negative  Psychiatric: negative  Endocrine: negative  Neurological: Numbness in her feet , the rest is negative except for HPI  Hematologic/lymphatic: negative  Allergy/immunology: negative      Objective:       Visit Vitals  /66   Pulse 96   Wt 191 lb (86.6 kg)   SpO2 98%   BMI 31.78 kg/m²       Physical Exam:      General:  appears well nourished in no acute distress  Neck: no carotid bruits  Lungs: clear to auscultation  Heart:  no murmurs, regular rate  Lower extremity: no edema  Skin: intact. She does have scars from her bilateral knee replacements    Awake, alert, oriented to person, place and time    Language was intact.   There was no aphasia or dysarthria    Cranial nerves:   II-XII were tested    Perrrla  Fundoscopic examination revealed venous pulsations  Visual fields were full  Eomi, no evidence of nystagmus  Facial sensation:  normal and symmetric  Facial motor: normal and symmetric  Hearing intact  SCM strength intact  Tongue: midline without fasciculations    Motor: Tone normal    No evidence of fasciculations    Strength testing:   deltoid triceps biceps Wrist ext. Wrist flex. intrinsics Hip flex. Hip ext. Knee ext. Knee flex Dorsi flex Plantar flex   Right 5 5 5 5 5 5 5 5 5 5 5 5   Left 5 5 5 5 5 5 5 5 5 5 5 5         Sensory:  Upper extremity: intact to pp, light touch, and vibration > 10 seconds  Lower extremity: Pinprick was decreased to just above the level of her ankles. Vibration was 2 seconds in her toes and 6 seconds at her ankles. Reflexes:    Right Left  Biceps  2 2  Triceps 2 2  Brachiorad. 2 2  Patella  1          1  Achilles - -    Plantar response:  flexor bilaterally      Cerebellar testing:  no tremor apparent, finger/nose and christy were intact    Romberg: Present    Gait: steady. Is wide-based and slow with her walking. She does need a cane to help with her balance    Labs:     I was able to review her laboratory results which were in the computer system. Of note she did have a mildly low vitamin D level. Her hemoglobin A1c was within the normal range. she had normal thyroid functioning. 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 I independently reviewed and interpreted the study    Assessment:       Patient is a pleasant 56-year-old female with a 2-year history of slowly progressive sensory disturbance, balance problems, and pain in her lower extremities. Her neurological examination is consistent with a distal length dependent neuropathy. Potential for this does include metabolic, infectious, inflammatory, autoimmune basis, and possibly a component of being idiopathic. I did describe to her that approximately 20-30% of all peripheral neuropathies are idiopathic in nature and this may surely be contributing to that.   I did tell her that her prior back surgeries did not cause  the peripheral neuropathy. but it can worsen the symptoms associated with a peripheral neuropathy. Plan:     I would like her to have serology today looking for possible causes of the peripheral neuropathy. This will include a vitamin B12 level, vitamin B6 level, and JENY level, a serum immunofixation, and I will check the serum paraneoplastic panel for neuropathy. The patient should return to clinic in 3 months    Renewed medication: none today. He did talk about possible medications to help with neuropathic pain. She has had difficulty in the past with both gabapentin and Lyrica due to side effects. I talked to her about how the medication would not help to make her balance better but could help with the positive symptoms of pain, tingling and burning. She would rather not take medication at this time. Neuropathy: We reviewed the possible causes and etiologies of neuropathy. We discussed the importance of activity and exercise associated with neuropathy. We discussed the importance of safety with ambulation and considering adaptive equipment as necessary to help her maintain safe ambulation. I spent  45   minutes with the patient  with over 50 % of the time counseling and coordinating the care plan in regards to the diagnosis, diagnostic testing, and treatment plan. The patient had the ability to ask questions and all questions were answered.        Signed By:  Kathy Ashton DO FAAN    September 6, 2019

## 2019-09-09 LAB
ALBUMIN SERPL ELPH-MCNC: 4.2 G/DL (ref 2.9–4.4)
ALBUMIN/GLOB SERPL: 1.4 {RATIO} (ref 0.7–1.7)
ALPHA1 GLOB SERPL ELPH-MCNC: 0.2 G/DL (ref 0–0.4)
ALPHA2 GLOB SERPL ELPH-MCNC: 0.7 G/DL (ref 0.4–1)
B-GLOBULIN SERPL ELPH-MCNC: 1.3 G/DL (ref 0.7–1.3)
CENTROMERE B AB SER-ACNC: <0.2 AI (ref 0–0.9)
CHROMATIN AB SERPL-ACNC: <0.2 AI (ref 0–0.9)
DSDNA AB SER-ACNC: <1 IU/ML (ref 0–9)
ENA JO1 AB SER-ACNC: <0.2 AI (ref 0–0.9)
ENA RNP AB SER-ACNC: <0.2 AI (ref 0–0.9)
ENA SCL70 AB SER-ACNC: <0.2 AI (ref 0–0.9)
ENA SM AB SER-ACNC: <0.2 AI (ref 0–0.9)
ENA SM+RNP AB SER-ACNC: <0.2 AI (ref 0–0.9)
ENA SS-A AB SER-ACNC: <0.2 AI (ref 0–0.9)
ENA SS-B AB SER-ACNC: <0.2 AI (ref 0–0.9)
GAMMA GLOB SERPL ELPH-MCNC: 1 G/DL (ref 0.4–1.8)
GLOBULIN SER-MCNC: 3.2 G/DL (ref 2.2–3.9)
IGA SERPL-MCNC: 336 MG/DL (ref 64–422)
IGG SERPL-MCNC: 1035 MG/DL (ref 700–1600)
IGM SERPL-MCNC: 100 MG/DL (ref 26–217)
INTERPRETATION SERPL IEP-IMP: NORMAL
M PROTEIN SERPL ELPH-MCNC: NORMAL G/DL
PLEASE NOTE:, 149534: NORMAL
PROT SERPL-MCNC: 7.4 G/DL (ref 6–8.5)
RHEUMATOID FACT SERPL-ACNC: <10 IU/ML (ref 0–13.9)
RIBOSOMAL P AB SER-ACNC: <0.2 AI (ref 0–0.9)
SEE BELOW:, 164879: NORMAL
VIT B12 SERPL-MCNC: 1126 PG/ML (ref 232–1245)
VIT B6 SERPL-MCNC: 6.8 UG/L (ref 2–32.8)

## 2019-09-12 NOTE — PROGRESS NOTES
Ms. Perlita Verdugo,    All of the labs we checked for causes of your neuropathy have came back normal.    Please continue with therapy and keep your appt. For December. Thanks.    Dr. Vega Mcfarlane

## 2019-09-18 ENCOUNTER — TELEPHONE (OUTPATIENT)
Dept: NEUROLOGY | Age: 84
End: 2019-09-18

## 2019-10-01 ENCOUNTER — TELEPHONE (OUTPATIENT)
Dept: ONCOLOGY | Age: 84
End: 2019-10-01

## 2019-10-01 DIAGNOSIS — Z85.3 HISTORY OF LEFT BREAST CANCER: ICD-10-CM

## 2019-10-01 DIAGNOSIS — Z12.31 BREAST CANCER SCREENING BY MAMMOGRAM: ICD-10-CM

## 2019-10-01 DIAGNOSIS — Z98.890 S/P LUMPECTOMY, LEFT BREAST: Primary | ICD-10-CM

## 2019-10-01 NOTE — TELEPHONE ENCOUNTER
Linwood Kamara called to say patient said it is time for her annual mammo. Could a order be put in for a 3D mammo w/Diagnostic.     10/01/19  CLARITA

## 2019-10-01 NOTE — TELEPHONE ENCOUNTER
Message sent to [facility], order is in from 10/8/19 and does not  until 10/9/19.

## 2019-10-01 NOTE — PROGRESS NOTES
I called patient to confirm the physical vs the AWV. I cancelled her physical with Dr. Britni Robb, and scheduled the AWV with [facility] for 6-10-19. The patient will be leaving town on 6-25-19 for her cruise. Previous encounter has the request for the motion sickness patch. SOPHIE FROM [facility] MAMMO 3D JOSE ALEJANDRO W MAMMO BI SCREENING INCL CAD.

## 2019-10-21 ENCOUNTER — HOSPITAL ENCOUNTER (OUTPATIENT)
Dept: LAB | Age: 84
Discharge: HOME | End: 2019-10-21
Payer: MEDICARE

## 2019-10-21 PROCEDURE — 36415 COLL VENOUS BLD VENIPUNCTURE: CPT

## 2019-10-21 PROCEDURE — 85025 COMPLETE CBC W/AUTO DIFF WBC: CPT

## 2019-10-22 LAB
BASOPHILS # BLD AUTO: 0 X10E3/UL (ref 0–0.2)
BASOPHILS NFR BLD AUTO: 0 %
EOSINOPHIL # BLD AUTO: 0.3 X10E3/UL (ref 0–0.4)
EOSINOPHIL NFR BLD AUTO: 1 %
ERYTHROCYTE [DISTWIDTH] IN BLOOD BY AUTOMATED COUNT: 15.5 % (ref 12.3–15.4)
HCT VFR BLD AUTO: 31.1 % (ref 34–46.6)
HGB BLD-MCNC: 9.8 G/DL (ref 11.1–15.9)
IMMATURE CELLS, 115398: ABNORMAL
LYMPHOCYTES # BLD AUTO: 5.5 X10E3/UL (ref 0.7–3.1)
LYMPHOCYTES NFR BLD AUTO: 21 %
MCH RBC QN AUTO: 30.8 PG (ref 26.6–33)
MCHC RBC AUTO-ENTMCNC: 31.5 G/DL (ref 31.5–35.7)
MCV RBC AUTO: 98 FL (ref 79–97)
METAMYELOCYTES NFR BLD: 8 % (ref 0–0)
MONOCYTES # BLD AUTO: 5.5 X10E3/UL (ref 0.1–0.9)
MONOCYTES NFR BLD AUTO: 21 %
MORPHOLOGY BLD-IMP: ABNORMAL
MYELOCYTES NFR BLD: 3 % (ref 0–0)
NEUTROPHILS # BLD AUTO: 12.1 X10E3/UL (ref 1.4–7)
NEUTROPHILS NFR BLD AUTO: 46 %
PLATELET # BLD AUTO: 138 X10E3/UL (ref 150–450)
RBC # BLD AUTO: 3.18 X10E6/UL (ref 3.77–5.28)
WBC # BLD AUTO: 26.3 X10E3/UL (ref 3.4–10.8)

## 2019-10-24 DIAGNOSIS — D46.9 MDS (MYELODYSPLASTIC SYNDROME) (HCC): ICD-10-CM

## 2019-10-29 ENCOUNTER — HOSPITAL ENCOUNTER (INPATIENT)
Age: 84
LOS: 7 days | Discharge: HOME HEALTH | DRG: 189 | End: 2019-11-05
Attending: EMERGENCY MEDICINE | Admitting: INTERNAL MEDICINE
Payer: MEDICARE

## 2019-10-29 ENCOUNTER — APPOINTMENT (OUTPATIENT)
Dept: GENERAL RADIOLOGY | Age: 84
DRG: 189 | End: 2019-10-29
Attending: EMERGENCY MEDICINE
Payer: MEDICARE

## 2019-10-29 ENCOUNTER — APPOINTMENT (OUTPATIENT)
Dept: CT IMAGING | Age: 84
DRG: 189 | End: 2019-10-29
Attending: EMERGENCY MEDICINE
Payer: MEDICARE

## 2019-10-29 DIAGNOSIS — D72.829 LEUKOCYTOSIS, UNSPECIFIED TYPE: ICD-10-CM

## 2019-10-29 DIAGNOSIS — C93.10 CHRONIC MYELOMONOCYTIC LEUKEMIA NOT HAVING ACHIEVED REMISSION (HCC): ICD-10-CM

## 2019-10-29 DIAGNOSIS — R65.10 SIRS (SYSTEMIC INFLAMMATORY RESPONSE SYNDROME) (HCC): ICD-10-CM

## 2019-10-29 DIAGNOSIS — D69.6 THROMBOCYTOPENIA (HCC): ICD-10-CM

## 2019-10-29 DIAGNOSIS — J18.9 COMMUNITY ACQUIRED PNEUMONIA OF RIGHT LUNG, UNSPECIFIED PART OF LUNG: ICD-10-CM

## 2019-10-29 DIAGNOSIS — J44.1 ACUTE EXACERBATION OF CHRONIC OBSTRUCTIVE PULMONARY DISEASE (COPD) (HCC): Primary | ICD-10-CM

## 2019-10-29 DIAGNOSIS — D61.82 ANEMIA ASSOCIATED WITH BONE MARROW INFILTRATION (HCC): ICD-10-CM

## 2019-10-29 PROBLEM — J44.9 COPD (CHRONIC OBSTRUCTIVE PULMONARY DISEASE) (HCC): Status: ACTIVE | Noted: 2019-10-29

## 2019-10-29 LAB
ALBUMIN SERPL-MCNC: 3.8 G/DL (ref 3.5–5)
ALBUMIN/GLOB SERPL: 0.9 {RATIO} (ref 1.1–2.2)
ALP SERPL-CCNC: 87 U/L (ref 45–117)
ALT SERPL-CCNC: 25 U/L (ref 12–78)
ANION GAP SERPL CALC-SCNC: 9 MMOL/L (ref 5–15)
APPEARANCE UR: CLEAR
ARTERIAL PATENCY WRIST A: YES
AST SERPL-CCNC: 26 U/L (ref 15–37)
ATRIAL RATE: 131 BPM
BACTERIA URNS QL MICRO: NEGATIVE /HPF
BASE DEFICIT BLD-SCNC: 6 MMOL/L
BASOPHILS # BLD: 0 K/UL (ref 0–0.1)
BASOPHILS NFR BLD: 0 % (ref 0–1)
BDY SITE: ABNORMAL
BILIRUB SERPL-MCNC: 0.6 MG/DL (ref 0.2–1)
BILIRUB UR QL: NEGATIVE
BNP SERPL-MCNC: 302 PG/ML
BUN SERPL-MCNC: 23 MG/DL (ref 6–20)
BUN/CREAT SERPL: 29 (ref 12–20)
CALCIUM SERPL-MCNC: 8.6 MG/DL (ref 8.5–10.1)
CALCULATED R AXIS, ECG10: -3 DEGREES
CALCULATED T AXIS, ECG11: 87 DEGREES
CHLORIDE SERPL-SCNC: 108 MMOL/L (ref 97–108)
CK MB CFR SERPL CALC: 0.5 % (ref 0–2.5)
CK MB SERPL-MCNC: 1 NG/ML (ref 5–25)
CK SERPL-CCNC: 215 U/L (ref 26–192)
CO2 SERPL-SCNC: 23 MMOL/L (ref 21–32)
COLOR UR: ABNORMAL
CREAT SERPL-MCNC: 0.8 MG/DL (ref 0.55–1.02)
DIAGNOSIS, 93000: NORMAL
DIFFERENTIAL METHOD BLD: ABNORMAL
EOSINOPHIL # BLD: 1.1 K/UL (ref 0–0.4)
EOSINOPHIL NFR BLD: 2 % (ref 0–7)
EPITH CASTS URNS QL MICRO: ABNORMAL /LPF
ERYTHROCYTE [DISTWIDTH] IN BLOOD BY AUTOMATED COUNT: 15.9 % (ref 11.5–14.5)
FLUAV AG NPH QL IA: NEGATIVE
FLUBV AG NOSE QL IA: NEGATIVE
GAS FLOW.O2 O2 DELIVERY SYS: ABNORMAL L/MIN
GAS FLOW.O2 SETTING OXYMISER: 6 L/M
GLOBULIN SER CALC-MCNC: 4.1 G/DL (ref 2–4)
GLUCOSE SERPL-MCNC: 143 MG/DL (ref 65–100)
GLUCOSE UR STRIP.AUTO-MCNC: NEGATIVE MG/DL
HCO3 BLD-SCNC: 19.6 MMOL/L (ref 22–26)
HCT VFR BLD AUTO: 32.4 % (ref 35–47)
HGB BLD-MCNC: 9.1 G/DL (ref 11.5–16)
HGB UR QL STRIP: NEGATIVE
HYALINE CASTS URNS QL MICRO: ABNORMAL /LPF (ref 0–5)
IMM GRANULOCYTES # BLD AUTO: 3.3 K/UL (ref 0–0.04)
IMM GRANULOCYTES NFR BLD AUTO: 6 % (ref 0–0.5)
KETONES UR QL STRIP.AUTO: NEGATIVE MG/DL
LACTATE BLD-SCNC: 1.67 MMOL/L (ref 0.4–2)
LEUKOCYTE ESTERASE UR QL STRIP.AUTO: NEGATIVE
LYMPHOCYTES # BLD: 4.4 K/UL (ref 0.8–3.5)
LYMPHOCYTES NFR BLD: 8 % (ref 12–49)
MCH RBC QN AUTO: 30.6 PG (ref 26–34)
MCHC RBC AUTO-ENTMCNC: 28.1 G/DL (ref 30–36.5)
MCV RBC AUTO: 109.1 FL (ref 80–99)
MONOCYTES # BLD: 20.7 K/UL (ref 0–1)
MONOCYTES NFR BLD: 38 % (ref 5–13)
NEUTS BAND NFR BLD MANUAL: 2 %
NEUTS SEG # BLD: 24.9 K/UL (ref 1.8–8)
NEUTS SEG NFR BLD: 44 % (ref 32–75)
NITRITE UR QL STRIP.AUTO: NEGATIVE
NRBC # BLD: 0.11 K/UL (ref 0–0.01)
NRBC BLD-RTO: 0.2 PER 100 WBC
PATH REV BLD -IMP: NORMAL
PCO2 BLD: 36.3 MMHG (ref 35–45)
PH BLD: 7.34 [PH] (ref 7.35–7.45)
PH UR STRIP: 5 [PH] (ref 5–8)
PLATELET # BLD AUTO: 106 K/UL (ref 150–400)
PMV BLD AUTO: 11.3 FL (ref 8.9–12.9)
PO2 BLD: 74 MMHG (ref 80–100)
POTASSIUM SERPL-SCNC: 3.6 MMOL/L (ref 3.5–5.1)
PROT SERPL-MCNC: 7.9 G/DL (ref 6.4–8.2)
PROT UR STRIP-MCNC: 30 MG/DL
Q-T INTERVAL, ECG07: 310 MS
QRS DURATION, ECG06: 74 MS
QTC CALCULATION (BEZET), ECG08: 457 MS
RBC # BLD AUTO: 2.97 M/UL (ref 3.8–5.2)
RBC #/AREA URNS HPF: ABNORMAL /HPF (ref 0–5)
RBC MORPH BLD: ABNORMAL
RBC MORPH BLD: ABNORMAL
SAO2 % BLD: 94 % (ref 92–97)
SODIUM SERPL-SCNC: 140 MMOL/L (ref 136–145)
SP GR UR REFRACTOMETRY: 1.01 (ref 1–1.03)
SPECIMEN TYPE: ABNORMAL
TOTAL RESP. RATE, ITRR: 28
TROPONIN I SERPL-MCNC: <0.05 NG/ML
TROPONIN I SERPL-MCNC: <0.05 NG/ML
UA: UC IF INDICATED,UAUC: ABNORMAL
UROBILINOGEN UR QL STRIP.AUTO: 0.2 EU/DL (ref 0.2–1)
VENTRICULAR RATE, ECG03: 131 BPM
WBC # BLD AUTO: 54.4 K/UL (ref 3.6–11)
WBC URNS QL MICRO: ABNORMAL /HPF (ref 0–4)

## 2019-10-29 PROCEDURE — 74011636320 HC RX REV CODE- 636/320: Performed by: EMERGENCY MEDICINE

## 2019-10-29 PROCEDURE — 81001 URINALYSIS AUTO W/SCOPE: CPT

## 2019-10-29 PROCEDURE — 80053 COMPREHEN METABOLIC PANEL: CPT

## 2019-10-29 PROCEDURE — 83605 ASSAY OF LACTIC ACID: CPT

## 2019-10-29 PROCEDURE — 83880 ASSAY OF NATRIURETIC PEPTIDE: CPT

## 2019-10-29 PROCEDURE — 94640 AIRWAY INHALATION TREATMENT: CPT

## 2019-10-29 PROCEDURE — 71275 CT ANGIOGRAPHY CHEST: CPT

## 2019-10-29 PROCEDURE — 71045 X-RAY EXAM CHEST 1 VIEW: CPT

## 2019-10-29 PROCEDURE — 87449 NOS EACH ORGANISM AG IA: CPT

## 2019-10-29 PROCEDURE — 77030038269 HC DRN EXT URIN PURWCK BARD -A

## 2019-10-29 PROCEDURE — 77030029684 HC NEB SM VOL KT MONA -A

## 2019-10-29 PROCEDURE — 74011250636 HC RX REV CODE- 250/636: Performed by: INTERNAL MEDICINE

## 2019-10-29 PROCEDURE — 82803 BLOOD GASES ANY COMBINATION: CPT

## 2019-10-29 PROCEDURE — 84484 ASSAY OF TROPONIN QUANT: CPT

## 2019-10-29 PROCEDURE — 36415 COLL VENOUS BLD VENIPUNCTURE: CPT

## 2019-10-29 PROCEDURE — 87899 AGENT NOS ASSAY W/OPTIC: CPT

## 2019-10-29 PROCEDURE — 87804 INFLUENZA ASSAY W/OPTIC: CPT

## 2019-10-29 PROCEDURE — 99285 EMERGENCY DEPT VISIT HI MDM: CPT

## 2019-10-29 PROCEDURE — 82550 ASSAY OF CK (CPK): CPT

## 2019-10-29 PROCEDURE — 96375 TX/PRO/DX INJ NEW DRUG ADDON: CPT

## 2019-10-29 PROCEDURE — 74011000250 HC RX REV CODE- 250: Performed by: INTERNAL MEDICINE

## 2019-10-29 PROCEDURE — 74011250636 HC RX REV CODE- 250/636: Performed by: EMERGENCY MEDICINE

## 2019-10-29 PROCEDURE — 96365 THER/PROPH/DIAG IV INF INIT: CPT

## 2019-10-29 PROCEDURE — 93005 ELECTROCARDIOGRAM TRACING: CPT

## 2019-10-29 PROCEDURE — 85025 COMPLETE CBC W/AUTO DIFF WBC: CPT

## 2019-10-29 PROCEDURE — 74011250637 HC RX REV CODE- 250/637: Performed by: INTERNAL MEDICINE

## 2019-10-29 PROCEDURE — 82553 CREATINE MB FRACTION: CPT

## 2019-10-29 PROCEDURE — 74011000250 HC RX REV CODE- 250: Performed by: EMERGENCY MEDICINE

## 2019-10-29 PROCEDURE — 65660000000 HC RM CCU STEPDOWN

## 2019-10-29 PROCEDURE — 74011000258 HC RX REV CODE- 258: Performed by: EMERGENCY MEDICINE

## 2019-10-29 PROCEDURE — 36600 WITHDRAWAL OF ARTERIAL BLOOD: CPT

## 2019-10-29 PROCEDURE — 74011250637 HC RX REV CODE- 250/637: Performed by: FAMILY MEDICINE

## 2019-10-29 PROCEDURE — 87040 BLOOD CULTURE FOR BACTERIA: CPT

## 2019-10-29 PROCEDURE — 74011250637 HC RX REV CODE- 250/637: Performed by: EMERGENCY MEDICINE

## 2019-10-29 RX ORDER — IPRATROPIUM BROMIDE AND ALBUTEROL SULFATE 2.5; .5 MG/3ML; MG/3ML
3 SOLUTION RESPIRATORY (INHALATION)
Status: DISCONTINUED | OUTPATIENT
Start: 2019-10-29 | End: 2019-10-31

## 2019-10-29 RX ORDER — IPRATROPIUM BROMIDE AND ALBUTEROL SULFATE 2.5; .5 MG/3ML; MG/3ML
3 SOLUTION RESPIRATORY (INHALATION) ONCE
Status: COMPLETED | OUTPATIENT
Start: 2019-10-29 | End: 2019-10-29

## 2019-10-29 RX ORDER — THERA TABS 400 MCG
1 TAB ORAL DAILY
Status: DISCONTINUED | OUTPATIENT
Start: 2019-10-29 | End: 2019-10-29 | Stop reason: SDUPTHER

## 2019-10-29 RX ORDER — ACETAMINOPHEN 325 MG/1
650 TABLET ORAL
Status: DISCONTINUED | OUTPATIENT
Start: 2019-10-29 | End: 2019-11-05 | Stop reason: HOSPADM

## 2019-10-29 RX ORDER — SODIUM CHLORIDE 9 MG/ML
75 INJECTION, SOLUTION INTRAVENOUS CONTINUOUS
Status: DISCONTINUED | OUTPATIENT
Start: 2019-10-29 | End: 2019-10-31

## 2019-10-29 RX ORDER — ACETAMINOPHEN 500 MG
1000 TABLET ORAL ONCE
Status: COMPLETED | OUTPATIENT
Start: 2019-10-29 | End: 2019-10-29

## 2019-10-29 RX ORDER — SODIUM CHLORIDE 0.9 % (FLUSH) 0.9 %
5-40 SYRINGE (ML) INJECTION EVERY 8 HOURS
Status: DISCONTINUED | OUTPATIENT
Start: 2019-10-29 | End: 2019-11-05 | Stop reason: HOSPADM

## 2019-10-29 RX ORDER — ENOXAPARIN SODIUM 100 MG/ML
40 INJECTION SUBCUTANEOUS EVERY 24 HOURS
Status: DISCONTINUED | OUTPATIENT
Start: 2019-10-29 | End: 2019-10-31

## 2019-10-29 RX ORDER — GUAIFENESIN AND DEXTROMETHORPHAN HYDROBROMIDE 1200; 60 MG/1; MG/1
600 TABLET, EXTENDED RELEASE ORAL 2 TIMES DAILY
COMMUNITY
End: 2019-11-05

## 2019-10-29 RX ORDER — ERGOCALCIFEROL 1.25 MG/1
50000 CAPSULE ORAL
COMMUNITY
End: 2020-01-23

## 2019-10-29 RX ORDER — SODIUM CHLORIDE 0.9 % (FLUSH) 0.9 %
5-40 SYRINGE (ML) INJECTION AS NEEDED
Status: DISCONTINUED | OUTPATIENT
Start: 2019-10-29 | End: 2019-11-05 | Stop reason: HOSPADM

## 2019-10-29 RX ORDER — ONDANSETRON 2 MG/ML
4 INJECTION INTRAMUSCULAR; INTRAVENOUS
Status: DISCONTINUED | OUTPATIENT
Start: 2019-10-29 | End: 2019-11-05 | Stop reason: HOSPADM

## 2019-10-29 RX ORDER — BENZONATATE 100 MG/1
100 CAPSULE ORAL
Status: DISCONTINUED | OUTPATIENT
Start: 2019-10-29 | End: 2019-11-02

## 2019-10-29 RX ORDER — LANOLIN ALCOHOL/MO/W.PET/CERES
3 CREAM (GRAM) TOPICAL
Status: DISCONTINUED | OUTPATIENT
Start: 2019-10-29 | End: 2019-11-05 | Stop reason: HOSPADM

## 2019-10-29 RX ORDER — SODIUM CHLORIDE 0.9 % (FLUSH) 0.9 %
10 SYRINGE (ML) INJECTION
Status: COMPLETED | OUTPATIENT
Start: 2019-10-29 | End: 2019-10-29

## 2019-10-29 RX ADMIN — Medication 10 ML: at 14:12

## 2019-10-29 RX ADMIN — METHYLPREDNISOLONE SODIUM SUCCINATE 40 MG: 40 INJECTION, POWDER, FOR SOLUTION INTRAMUSCULAR; INTRAVENOUS at 14:11

## 2019-10-29 RX ADMIN — SODIUM CHLORIDE 500 ML: 900 INJECTION, SOLUTION INTRAVENOUS at 02:56

## 2019-10-29 RX ADMIN — METHYLPREDNISOLONE SODIUM SUCCINATE 40 MG: 40 INJECTION, POWDER, FOR SOLUTION INTRAMUSCULAR; INTRAVENOUS at 08:47

## 2019-10-29 RX ADMIN — BENZONATATE 100 MG: 100 CAPSULE ORAL at 21:14

## 2019-10-29 RX ADMIN — IPRATROPIUM BROMIDE AND ALBUTEROL SULFATE 3 ML: .5; 3 SOLUTION RESPIRATORY (INHALATION) at 03:01

## 2019-10-29 RX ADMIN — Medication 10 ML: at 21:15

## 2019-10-29 RX ADMIN — MULTIPLE VITAMINS W/ MINERALS TAB 1 TABLET: TAB at 10:53

## 2019-10-29 RX ADMIN — IOPAMIDOL 80 ML: 755 INJECTION, SOLUTION INTRAVENOUS at 04:39

## 2019-10-29 RX ADMIN — CEFTRIAXONE 2 G: 2 INJECTION, POWDER, FOR SOLUTION INTRAMUSCULAR; INTRAVENOUS at 03:59

## 2019-10-29 RX ADMIN — ACETAMINOPHEN 1000 MG: 500 TABLET ORAL at 02:54

## 2019-10-29 RX ADMIN — IPRATROPIUM BROMIDE AND ALBUTEROL SULFATE 3 ML: .5; 3 SOLUTION RESPIRATORY (INHALATION) at 10:57

## 2019-10-29 RX ADMIN — IPRATROPIUM BROMIDE AND ALBUTEROL SULFATE 3 ML: .5; 3 SOLUTION RESPIRATORY (INHALATION) at 15:41

## 2019-10-29 RX ADMIN — SODIUM CHLORIDE, SODIUM LACTATE, POTASSIUM CHLORIDE, AND CALCIUM CHLORIDE 1000 ML: 600; 310; 30; 20 INJECTION, SOLUTION INTRAVENOUS at 08:12

## 2019-10-29 RX ADMIN — AZITHROMYCIN MONOHYDRATE 500 MG: 500 INJECTION, POWDER, LYOPHILIZED, FOR SOLUTION INTRAVENOUS at 04:05

## 2019-10-29 RX ADMIN — MELATONIN TAB 3 MG 3 MG: 3 TAB at 21:14

## 2019-10-29 RX ADMIN — METHYLPREDNISOLONE SODIUM SUCCINATE 40 MG: 40 INJECTION, POWDER, FOR SOLUTION INTRAMUSCULAR; INTRAVENOUS at 21:14

## 2019-10-29 RX ADMIN — SODIUM CHLORIDE 75 ML/HR: 900 INJECTION, SOLUTION INTRAVENOUS at 09:39

## 2019-10-29 RX ADMIN — Medication 10 ML: at 04:39

## 2019-10-29 RX ADMIN — METHYLPREDNISOLONE SODIUM SUCCINATE 125 MG: 125 INJECTION, POWDER, FOR SOLUTION INTRAMUSCULAR; INTRAVENOUS at 02:51

## 2019-10-29 RX ADMIN — Medication 10 ML: at 10:54

## 2019-10-29 RX ADMIN — GUAIFENESIN, DEXTROMETHORPHAN HBR 2 TABLET: 600; 30 TABLET ORAL at 19:51

## 2019-10-29 RX ADMIN — ENOXAPARIN SODIUM 40 MG: 40 INJECTION SUBCUTANEOUS at 10:53

## 2019-10-29 RX ADMIN — SODIUM CHLORIDE 75 ML/HR: 900 INJECTION, SOLUTION INTRAVENOUS at 21:14

## 2019-10-29 NOTE — ACP (ADVANCE CARE PLANNING)
Advance Care Planning Note      NAME: Hammad Buenrostro   :  1935   MRN:  [id number]     Date/Time:  10/29/2019 8:23 AM    Active Diagnoses:  Hospital Problems  Date Reviewed: 2019          Codes Class Noted POA    COPD (chronic obstructive pulmonary disease) ([facility] ICD-10-CM: J44.9  ICD-9-CM: 807  10/29/2019 Unknown        SIRS (systemic inflammatory response syndrome) ([facility] ICD-10-CM: R65.10  ICD-9-CM: 995.90  10/29/2019 Unknown              These active diagnoses are of sufficient risk that focused discussion on advance care planning is indicated in order to allow the patient to thoughtfully consider personal goals of care, and if situations arise that prevent the ability to personally give input, to ensure appropriate representation of their personal desires for different levels and aggressiveness of care. Discussion:   Code status addressed and wants to be a Full Code. Patient wants central line and vasopressors if needed. Patient would also want a feeding tube, if needed, for nutritional support. Patient  would like to assign    as the surrogate decision maker. Persons present and participating in discussion: Katherin Collins MD,       Time Spent:   Total time spent face-to-face in education and discussion:   20  minutes.          Katherin Bishop MD   Hospitalist

## 2019-10-29 NOTE — PROGRESS NOTES
Reason for Admission:   COPD exacerbation                   RRAT Score:          12 low risk           Plan for utilizing home health:      Has used home health in the past                    Current Advanced Directive/Advance Care Plan: On file 7/2018                         Transition of Care Plan:                      1.  Patient in ED bed waiting for inpatient admission  2. Patient will need 2nd IM letter at discharge  3. Patient prefers to discharge home with 's assistance and follow up appointments. Patient states she does not want to be discharged until she can breathe better      Patient is a 82year old female admitted 10/29 for COPD exacerbation. Patient alert and oriented, resting in bed, no visitors present in room. Demographic information verified and correct. Insurance information verified and correct. Patient lives with her , no home oxygen, uses a walker for ambulation outside the home and has used home health in the past.  Patient uses [address] [place] [address]. Patient states she is independent with ADLs and can drive. Patient's  can transport at discharge. Patient made aware of hospital preferred discharge pickup time of 9am      Care Management Interventions  PCP Verified by CM: Yes(Jesse Dudley MD)  Mode of Transport at Discharge:  Other (see comment)(pt's  can transport at [facility])  Transition of Care Consult (CM Consult): Discharge Planning  Discharge Durable Medical Equipment: No  Physical Therapy Consult: No  Occupational Therapy Consult: No  Speech Therapy Consult: No  Current Support Network: Lives with Spouse, Own Home(2 story house, 4 steps to enter)  Confirm Follow Up Transport: Family  Plan discussed with Pt/Family/Caregiver: Yes  Discharge Location  Discharge Placement: [address], RN, [place] [address] Care Management  [phone]

## 2019-10-29 NOTE — PROGRESS NOTES
Pharmacy Clarification of the Prior to Admission Medication Regimen Retrospective to the Admission Medication Reconciliation    The patient was interviewed regarding clarification of the prior to admission medication regimen.  was present in room and obtained permission from patient to discuss drug regimen with visitor(s) present. Patient was questioned regarding use of any other inhalers, topical products, over the counter medications, herbal medications, vitamin products or ophthalmic/nasal/otic medication use. Information Obtained From: Patient, RX Query    Recommendations/Findings: The following amendments were made to the patient's active medication list on file at [facility]:     1) Additions: NONE    2) Removals: NONE    3) Changes:  inulin (FIBER GUMMIES PO) (Old regimen: 1 tab daily /New regimen: 2 gummies QHS)    4) Pertinent Pharmacy Findings: NONE     PTA medication list was corrected to the following:     Prior to Admission Medications   Prescriptions Last Dose Informant Patient Reported? Taking?   acetaminophen (TYLENOL) 325 mg tablet 10/28/2019 at Unknown time Self Yes Yes   Sig: Take 650 mg by mouth every four (4) hours as needed for Pain. dextromethorphan-guaiFENesin [facility] DM) 60-1,200 mg Tb12 10/28/2019 at Unknown time Self Yes Yes   Sig: Take 600 mg by mouth two (2) times a day. Indications: cough   ergocalciferol (ERGOCALCIFEROL) 50,000 unit capsule 10/20/2019 at Unknown time Self Yes Yes   Sig: Take 50,000 Units by mouth every Sunday. inulin (FIBER GUMMIES PO) 10/24/2019 at Unknown time Self Yes Yes   Sig: Take 2 Gum by mouth nightly. multivitamin (ONE A DAY) tablet 10/24/2019 at Unknown time Self Yes Yes   Sig: Take 1 Tab by mouth nightly. vit A/vit C/vit E/zinc/copper (PRESERVISION AREDS PO) 10/24/2019 at Unknown time Self Yes Yes   Sig: Take 1 Tab by mouth nightly.       Facility-Administered Medications: None          Thank you,  Dillan Tian, [facility]  Medication History Pharmacy Technician

## 2019-10-29 NOTE — ED NOTES
Assumed care of pt, pt resting in position of comfort on stretcher, provided with water, no other needs expressed at this time

## 2019-10-29 NOTE — ED NOTES
TRANSFER - OUT REPORT:    Verbal report given to [address] [place] [address] on Bere Mendoza  being transferred to PCU for routine progression of care       Report consisted of patients Situation, Background, Assessment and   Recommendations(SBAR). Information from the following report(s) SBAR, Kardex, ED Summary and MAR was reviewed with the receiving nurse. Lines:   Peripheral IV 10/29/19 Right Antecubital (Active)   Site Assessment Clean, dry, & intact 10/29/2019  2:13 AM   Phlebitis Assessment 0 10/29/2019  2:13 AM   Infiltration Assessment 0 10/29/2019  2:13 AM   Dressing Status Clean, dry, & intact 10/29/2019  2:13 AM   Dressing Type Transparent 10/29/2019  2:13 AM   Hub Color/Line Status Patent; Flushed 10/29/2019  2:13 AM   Action Taken Blood drawn 10/29/2019  2:13 AM       Peripheral IV 10/29/19 Left Antecubital (Active)   Site Assessment Clean, dry, & intact 10/29/2019  2:13 AM   Phlebitis Assessment 0 10/29/2019  2:13 AM   Infiltration Assessment 0 10/29/2019  2:13 AM   Dressing Status Clean, dry, & intact 10/29/2019  2:13 AM   Dressing Type Transparent 10/29/2019  2:13 AM   Hub Color/Line Status Patent; Flushed 10/29/2019  2:13 AM        Opportunity for questions and clarification was provided.       Patient transported with:   Monitor  O2 @ 4 liters  Registered Nurse

## 2019-10-29 NOTE — ED PROVIDER NOTES
EMERGENCY DEPARTMENT HISTORY AND PHYSICAL EXAM      Date: 10/29/2019  Patient Name: Christine Curry    History of Presenting Illness     Chief Complaint   Patient presents with    Respiratory Distress     Patient started having shortness of breath for the last couple of days but it got worst today. Patient made an appointment with her PCP coming up but couldn't wait. Patient called EMS. She is satting at 78-80% on room air on arrival here. Patient satting at 92-93% with a duoneb that EMS started. Patient has a history of bronchitis but stated that this feels different. Denies oxygen use at home. No history of COPD, CHF or asthma. History Provided By: Patient    HPI: Christine Curry, 82 y.o. female with PMHx significant for myelodysplastic syndrome, breast cancer COPD,, and admission for pneumonia last year presents to the emergency room with chief complaint of cough and shortness of breath that started about 3 or 4 days ago and have gotten progressively worse. Patient has an appointment with Dr. Lala Sky tomorrow to discuss her symptom, but became more short of breath tonight and decided to be evaluated. She denies fevers, chills, nausea, vomiting, leg swelling. She denies any history of congestive heart failure. When asked, patient denies COPD history, however according to the chart she has a significant smoking history. She got the flu shot within the past 2 weeks. She reports some chest pain with coughing. Her cough is productive of clear sputum. PCP: Zelma Baumgarten, MD    No current facility-administered medications on file prior to encounter. Current Outpatient Medications on File Prior to Encounter   Medication Sig Dispense Refill    dextromethorphan-guaiFENesin (MUCINEX DM) 60-1,200 mg Tb12 Take 600 mg by mouth two (2) times a day. Takes PRN OTC   Indications: cough      inulin (FIBER GUMMIES PO) Take 1 Tab by mouth daily.       acetaminophen (TYLENOL) 325 mg tablet Take 650 mg by mouth every four (4) hours as needed for Pain.  ergocalciferol (VITAMIN D2) 50,000 unit capsule Take 1 Cap by mouth every seven (7) days. 12 Cap 3    vit A/vit C/vit E/zinc/copper (PRESERVISION AREDS PO) Take 1 Tab by mouth daily.  multivitamin (ONE A DAY) tablet Take 1 Tab by mouth daily.          Past History     Past Medical History:  Past Medical History:   Diagnosis Date    Arthritis     Bunion of right foot 8/20/2015    Chronic pain     back--uses tens unit    Diverticulitis 6/29/2018    Recurrent    Dry eye syndrome 6/29/2018    Fibromyalgia 6/29/2018    GERD (gastroesophageal reflux disease)     History of left breast cancer 2013    lumpectomy radiation    Hypercholesterolemia 6/29/2018    Ill-defined condition     blood transfusion hx    Leukemia ([facility] [address]     Osteoporosis 6/29/2018    Peripheral neuropathy 6/29/2018    Prediabetes 6/29/2018    PUD (peptic ulcer disease)     Radiation therapy complication 1789    Lt breast-No Chemo    Rosacea 6/29/2018    Trouble in sleeping        Past Surgical History:  Past Surgical History:   Procedure Laterality Date    HX APPENDECTOMY      HX BREAST LUMPECTOMY  11/21/2013    LEFT BREAST LUMPECTOMY W/LEFT BREAST SENTINEL NODE BIOPSY,AND NEEDLE LOCALIZATION performed by Claudetta Paradise, MD at [facility] MAIN OR    HX CATARACT REMOVAL      bilateral    HX HYSTERECTOMY      ovarian cyst    HX MOHS PROCEDURES      right    HX ORTHOPAEDIC      back surgery x2    HX OTHER SURGICAL      colonoscopy    HX TONSILLECTOMY      TOTAL KNEE ARTHROPLASTY      left    TOTAL KNEE ARTHROPLASTY      right    US GUIDED CORE BREAST BIOPSY Left 2013    Breast Ca       Family History:  Family History   Problem Relation Age of Onset    Cancer Mother         bladder    Cancer Father     Breast Cancer Paternal Grandmother        Social History:  Social History     Tobacco Use    Smoking status: Former Smoker     Packs/day: 0.50     Years: 50.00     Pack years: 25.00 Last attempt to quit: 2012     Years since quittin.7    Smokeless tobacco: Never Used   Substance Use Topics    Alcohol use: Yes     Alcohol/week: 2.0 standard drinks     Types: 2 Glasses of wine per week    Drug use: No       Allergies: Allergies   Allergen Reactions    Hydrocodone Nausea Only and Vertigo    Gabapentin Diarrhea, Nausea Only and Other (comments)     weakness    Lyrica [Pregabalin] Nausea Only    Oxycodone Nausea and Vomiting and Vertigo         Review of Systems   Review of Systems   Constitutional: Negative for chills and fever. HENT: Positive for congestion. Negative for ear pain, sinus pain and sore throat. Eyes: Negative. Respiratory: Positive for cough, chest tightness, shortness of breath and wheezing. Cardiovascular: Positive for palpitations. Negative for chest pain and leg swelling. Gastrointestinal: Negative for abdominal pain, diarrhea, nausea and vomiting. Genitourinary: Negative for dysuria, flank pain and hematuria. Musculoskeletal: Negative for back pain, myalgias and neck pain. Skin: Negative for rash and wound. Neurological: Negative for syncope, weakness, light-headedness and headaches. Psychiatric/Behavioral: Negative for confusion. The patient is not nervous/anxious.           Physical Exam   General appearance - well nourished, well appearing, she is, moderate respiratory distress   eyes - pupils equal and reactive, extraocular eye movements intact  ENT - mucous membranes moist, pharynx normal without lesions  Neck - supple, no significant adenopathy; non-tender to palpation  Chest -tachypneic, increased respiratory effort, expiratory wheezes throughout, rales bilateral bases; non-tender to palpation  Heart -tachycardic, regular rhythm, S1 and S2 normal, no murmurs noted  Abdomen - soft, nontender, nondistended, no masses or organomegaly  Musculoskeletal - no joint tenderness, deformity or swelling; normal ROM  Extremities - peripheral pulses normal, no pedal edema  Skin - normal coloration and turgor, no rashes  Neurological - alert, oriented x3, normal speech, no focal findings or movement disorder noted    Diagnostic Study Results     Labs -     Recent Results (from the past 12 hour(s))   EKG, 12 LEAD, INITIAL    Collection Time: 10/29/19  2:18 AM   Result Value Ref Range    Ventricular Rate 131 BPM    Atrial Rate 131 BPM    QRS Duration 74 ms    Q-T Interval 310 ms    QTC Calculation (Bezet) 457 ms    Calculated R Axis -3 degrees    Calculated T Axis 87 degrees    Diagnosis       Supraventricular tachycardia  Nonspecific ST and T wave abnormality  When compared with ECG of 29-SEP-2018 11:30,  premature atrial complexes are no longer present  LA interval has decreased  Vent. rate has increased BY  51 BPM     CBC WITH AUTOMATED DIFF    Collection Time: 10/29/19  2:24 AM   Result Value Ref Range    WBC 54.4 (HH) 3.6 - 11.0 K/uL    RBC 2.97 (L) 3.80 - 5.20 M/uL    HGB 9.1 (L) 11.5 - 16.0 g/dL    HCT 32.4 (L) 35.0 - 47.0 %    .1 (H) 80.0 - 99.0 FL    MCH 30.6 26.0 - 34.0 PG    MCHC 28.1 (L) 30.0 - 36.5 g/dL    RDW 15.9 (H) 11.5 - 14.5 %    PLATELET 649 (L) 291 - 400 K/uL    MPV 11.3 8.9 - 12.9 FL    NRBC 0.2 (H) 0  WBC    ABSOLUTE NRBC 0.11 (H) 0.00 - 0.01 K/uL    NEUTROPHILS 44 32 - 75 %    BAND NEUTROPHILS 2 %    LYMPHOCYTES 8 (L) 12 - 49 %    MONOCYTES 38 (H) 5 - 13 %    EOSINOPHILS 2 0 - 7 %    BASOPHILS 0 0 - 1 %    IMMATURE GRANULOCYTES 6 (H) 0.0 - 0.5 %    ABS. NEUTROPHILS 24.9 (H) 1.8 - 8.0 K/UL    ABS. LYMPHOCYTES 4.4 (H) 0.8 - 3.5 K/UL    ABS. MONOCYTES 20.7 (H) 0.0 - 1.0 K/UL    ABS. EOSINOPHILS 1.1 (H) 0.0 - 0.4 K/UL    ABS. BASOPHILS 0.0 0.0 - 0.1 K/UL    ABS. IMM. GRANS.  3.3 (H) 0.00 - 0.04 K/UL    DF MANUAL      RBC COMMENTS ANISOCYTOSIS  1+        RBC COMMENTS MACROCYTOSIS  1+       METABOLIC PANEL, COMPREHENSIVE    Collection Time: 10/29/19  2:24 AM   Result Value Ref Range    Sodium 140 136 - 145 mmol/L    Potassium 3.6 3.5 - 5.1 mmol/L    Chloride 108 97 - 108 mmol/L    CO2 23 21 - 32 mmol/L    Anion gap 9 5 - 15 mmol/L    Glucose 143 (H) 65 - 100 mg/dL    BUN 23 (H) 6 - 20 MG/DL    Creatinine 0.80 0.55 - 1.02 MG/DL    BUN/Creatinine ratio 29 (H) 12 - 20      GFR est AA >60 >60 ml/min/1.73m2    GFR est non-AA >60 >60 ml/min/1.73m2    Calcium 8.6 8.5 - 10.1 MG/DL    Bilirubin, total 0.6 0.2 - 1.0 MG/DL    ALT (SGPT) 25 12 - 78 U/L    AST (SGOT) 26 15 - 37 U/L    Alk. phosphatase 87 45 - 117 U/L    Protein, total 7.9 6.4 - 8.2 g/dL    Albumin 3.8 3.5 - 5.0 g/dL    Globulin 4.1 (H) 2.0 - 4.0 g/dL    A-G Ratio 0.9 (L) 1.1 - 2.2     CK W/ REFLX CKMB    Collection Time: 10/29/19  2:24 AM   Result Value Ref Range     (H) 26 - 192 U/L   TROPONIN I    Collection Time: 10/29/19  2:24 AM   Result Value Ref Range    Troponin-I, Qt. <0.05 <0.05 ng/mL   NT-PRO BNP    Collection Time: 10/29/19  2:24 AM   Result Value Ref Range    NT pro- <450 PG/ML   CK-MB,QUANT. Collection Time: 10/29/19  2:24 AM   Result Value Ref Range    CK - MB 1.0 <3.6 NG/ML    CK-MB Index 0.5 0.0 - 2.5     POC LACTIC ACID    Collection Time: 10/29/19  2:25 AM   Result Value Ref Range    Lactic Acid (POC) 1.67 0.40 - 2.00 mmol/L   INFLUENZA A & B AG (RAPID TEST)    Collection Time: 10/29/19  2:35 AM   Result Value Ref Range    Influenza A Antigen NEGATIVE  NEG      Influenza B Antigen NEGATIVE  NEG     POC G3 - PUL    Collection Time: 10/29/19  2:48 AM   Result Value Ref Range    pH (POC) 7.340 (L) 7.35 - 7.45      pCO2 (POC) 36.3 35.0 - 45.0 MMHG    pO2 (POC) 74 (L) 80 - 100 MMHG    HCO3 (POC) 19.6 (L) 22 - 26 MMOL/L    sO2 (POC) 94 92 - 97 %    Base deficit (POC) 6 mmol/L    Site RIGHT RADIAL      Device: NASAL CANNULA      Flow rate (POC) 6 L/M    Allens test (POC) YES      Specimen type (POC) ARTERIAL      Total resp. rate 28         Radiologic Studies -   CTA CHEST W OR W WO CONT   Final Result   IMPRESSION:       1. Negative for pulmonary embolus. 2. Numerous small pulmonary nodules and ground glass opacities in the left lung   and right upper lobe likely due to infectious or inflammatory disease. 3. Mildly enlarged lymph nodes in the mediastinum and bilateral aurora are likely   reactive. XR CHEST PORT   Final Result   Impression:   No significant interval change. CT Results  (Last 48 hours)               10/29/19 0439  CTA CHEST W OR W WO CONT Final result    Impression:  IMPRESSION:        1. Negative for pulmonary embolus. 2. Numerous small pulmonary nodules and ground glass opacities in the left lung   and right upper lobe likely due to infectious or inflammatory disease. 3. Mildly enlarged lymph nodes in the mediastinum and bilateral aurora are likely   reactive. Narrative:  EXAM: CTA CHEST W OR W WO CONT       INDICATION: Chest pain, acute, pulmonary embolism (PE) suspected       COMPARISON: Radiographs 10/29/2019. CT 9/20/2019. Beulah Shadow CONTRAST: 80 mL of Isovue-370. TECHNIQUE:    Precontrast  images were obtained to localize the volume for acquisition. Multislice helical CT arteriography was performed from the diaphragm to the   thoracic inlet during uneventful rapid bolus intravenous contrast   administration. Lung and soft tissue windows were generated. Coronal and   sagittal images were generated and 3D post processing consisting of coronal   maximum intensity images was performed. CT dose reduction was achieved through   use of a standardized protocol tailored for this examination and automatic   exposure control for dose modulation. FINDINGS:   THYROID: No nodule. MEDIASTINUM: There are multiple mildly enlarged lymph nodes in the mediastinum. AURORA: There are multiple mildly enlarged lymph nodes in the bilateral aurora. THORACIC AORTA: No dissection or aneurysm. PULMONARY ARTERIES: Normal in caliber. The pulmonary arteries are well enhanced. No evidence of pulmonary embolus.    TRACHEA/BRONCHI: Patent. ESOPHAGUS: No wall thickening or dilatation. HEART: Normal in size. PLEURA: No effusion or pneumothorax. LUNGS: Multiple small nodules and foci of groundglass opacity are seen   throughout the left mid and lower left lung. There is an area of partial   consolidation of the anterior left lower lobe. Small tree-in-bud nodules are   seen in the right upper lobe as well. INCIDENTALLY IMAGED UPPER ABDOMEN: No focal abnormality. BONES: No destructive bone lesion. CXR Results  (Last 48 hours)               10/29/19 0242  XR CHEST PORT Final result    Impression:  Impression:   No significant interval change. Narrative:  Chest portable AP       History: Chest pain       Comparison: 10/2/2018       Findings: The heart is on the upper limits of normal for size. There is   unchanged pulmonary vasculature engorgement. No focal consolidation, pleural   effusion, or pneumothorax. Medical Decision Making   I am the first provider for this patient. I reviewed the vital signs, available nursing notes, past medical history, past surgical history, family history and social history. Vital Signs-Reviewed the patient's vital signs. Patient Vitals for the past 12 hrs:   Temp Pulse Resp BP SpO2   10/29/19 0632 98.6 °F (37 °C) (!) 107 16 101/46 94 %   10/29/19 0345 99.4 °F (37.4 °C) (!) 129 (!) 36 143/62 92 %   10/29/19 0238 (!) 100.6 °F (38.1 °C)       10/29/19 0232     96 %   10/29/19 0223     93 %   10/29/19 0220 99 °F (37.2 °C) (!) 133 (!) 32 185/66 (!) 78 %       EKG: Sinus tachycardia, normal axis, 131 bpm normal MO, QRS, QTc intervals, nonspecific ST changes    Records Reviewed: Nursing Notes and Old Medical Records    Provider Notes (Medical Decision Making):   Differential diagnosis: Congestive heart failure, pneumonia, PE    ED Course:   Initial assessment performed.  The patients presenting problems have been discussed, and they are in agreement with the care plan formulated and outlined with them. I have encouraged them to ask questions as they arise throughout their visit. Progress Notes:  ED Course as of Oct 29 0722   Tue Oct 29, 2019   0600 Case discussed with Dr. Silke Burrell (hospitalist) who will see and admit the patient    [AO]      ED Course User Index  [AO] Vernell Chacon MD       Disposition:  Admit to hospitalist    CRITICAL CARE NOTE :    7:24 AM      IMPENDING DETERIORATION -Respiratory, Cardiovascular and Metabolic    ASSOCIATED RISK FACTORS - Hypoxia, Dysrhythmia and Metabolic changes    MANAGEMENT- Bedside Assessment and Supervision of Care    INTERPRETATION -  Xrays, CT Scan, Blood Gases, ECG and Blood Pressure    INTERVENTIONS - hemodynamic mngmt, vascular control and Metobolic interventions    CASE REVIEW - Hospitalist, Nursing and Family    TREATMENT RESPONSE -Improved    PERFORMED BY - Self        NOTES   :      I have spent 45 minutes of critical care time involved in lab review, consultations with specialist, family decision- making, bedside attention and documentation. During this entire length of time I was immediately available to the patient . Odalis Palomares MD            Diagnosis     Clinical Impression:   1. Acute exacerbation of chronic obstructive pulmonary disease (COPD) ([facility] [address]    2. Community acquired pneumonia of right lung, unspecified part of lung    3. Leukocytosis, unspecified type    4.  SIRS (systemic inflammatory response syndrome) (HCC)

## 2019-10-29 NOTE — ED NOTES
Report given to DAINA Helm. They were informed of patient chief complaint, current status, orders completed (to include IV access/medications/radiology testing), outstanding orders that still need to be completed, and the treatment plan. Ensured no questions or concerns regarding the patient prior to departure.

## 2019-10-29 NOTE — PROGRESS NOTES
1742 - TRANSFER - IN REPORT:    Verbal report received from Domitila(name) on Vladislav Matthews  being received from ED(unit) for routine progression of care      Report consisted of patients Situation, Background, Assessment and   Recommendations(SBAR). Information from the following report(s) SBAR, ED Summary, Intake/Output, MAR and Recent Results was reviewed with the receiving nurse. 1821 - Pt arrived to unit via stretcher.  Primary Nurse Ponce Ospina RN and Eugenio Hernández RN performed a dual skin assessment on this patient No impairment noted  Nikolas score is 21

## 2019-10-29 NOTE — H&P
Hospitalist Admission Note    NAME: Cynthia Mills   :  1935   MRN:  [id number]     Date/Time:  10/29/2019 8:56 AM    Patient PCP: Lianne Koyanagi, MD  ______________________________________________________________________  Given the patient's current clinical presentation, I have a high level of concern for decompensation if discharged from the emergency department. Complex decision making was performed, which includes reviewing the patient's available past medical records, laboratory results, and x-ray films. My assessment of this patient's clinical condition and my plan of care is as follows. Assessment / Plan:    Acute hypoxic respiratory failuire and SIRS due to acute bronchitis/possible early  PNA, present on admission: pt 02 dropped to low 70s, Pt treated with  Oxygen LA nl  Doing better now w oxygen   - CXR No significant interval change  - will get CT chest with smoking history (none in our record) R/O PE  - blood cultures ordered  - emperic rocephin, azithromycin for now  - add steroids and mucinex  - duonebs scheduled na dprn    Breast cancer: followed by Dr. Jimmy Jones  - get hem/onc to see pt    myelodysplastic syndrome/thombocytopenia , w increasing wbc- will ask hem to se ept and look at smear    Obesity  on diet     Lumbar spinal stenosis with peripheral neuropathy tylenol prn     Code Status: Full  Surrogate Decision Maker:  Ubaldo Mukherjee [phone]  DVT Prophylaxis: lovenox              Baseline: ambulatory       Subjective:   CHIEF COMPLAINT: sob/respiratory distress    HISTORY OF PRESENT ILLNESS:     Orlin Parmar is a 82 y.o.  female with PMHx significant for myelodysplastic syndrome, breast cancer COPD,former smoker   presents to the emergency room with chief complaint of cough and shortness of breath that started about 3 or 4 days ago and have gotten progressively worse. Sx  Worsen  more short of breath tonight and decided to be evaluated.   She denies fevers, chills, nausea, vomiting, leg swelling. She denies any history of congestive heart failure. She got the flu shot within the past 2 weeks. She reports some chest pain with coughing. Her cough is productive of clear sputum. No stool changes. No new edema or focal weakness. no travel/no sick [place].         We were asked to admit for work up and evaluation of the above problems. Past Medical History:   Diagnosis Date    Arthritis     Bunion of right foot 2015    Chronic pain     back--uses tens unit    Diverticulitis 2018    Recurrent    Dry eye syndrome 2018    Fibromyalgia 2018    GERD (gastroesophageal reflux disease)     History of left breast cancer 2013    lumpectomy radiation    Hypercholesterolemia 2018    Ill-defined condition     blood transfusion hx    Leukemia ([facility] [address]     Osteoporosis 2018    Peripheral neuropathy 2018    Prediabetes 2018    PUD (peptic ulcer disease)     Radiation therapy complication 8221    Lt breast-No Chemo    Rosacea 2018    Trouble in sleeping         Past Surgical History:   Procedure Laterality Date    HX APPENDECTOMY      HX BREAST LUMPECTOMY  2013    LEFT BREAST LUMPECTOMY W/LEFT BREAST SENTINEL NODE BIOPSY,AND NEEDLE LOCALIZATION performed by Brandi Mendez MD at [facility] MAIN OR    HX CATARACT REMOVAL      bilateral    HX HYSTERECTOMY      ovarian cyst    HX MOHS PROCEDURES      right    HX ORTHOPAEDIC      back surgery x2    HX OTHER SURGICAL      colonoscopy    HX TONSILLECTOMY      TOTAL KNEE ARTHROPLASTY      left    TOTAL KNEE ARTHROPLASTY      right    US GUIDED CORE BREAST BIOPSY Left 2013    Breast Ca       Social History     Tobacco Use    Smoking status: Former Smoker     Packs/day: 0.50     Years: 50.00     Pack years: 25.00     Last attempt to quit: 2012     Years since quittin.7    Smokeless tobacco: Never Used   Substance Use Topics    Alcohol use:  Yes Alcohol/week: 2.0 standard drinks     Types: 2 Glasses of wine per week        Family History   Problem Relation Age of Onset    Cancer Mother         bladder    Cancer Father     Breast Cancer Paternal Grandmother      Allergies   Allergen Reactions    Hydrocodone Nausea Only and Vertigo    Gabapentin Diarrhea, Nausea Only and Other (comments)     weakness    Lyrica [Pregabalin] Nausea Only    Oxycodone Nausea and Vomiting and Vertigo        Prior to Admission medications    Medication Sig Start Date End Date Taking? Authorizing Provider   dextromethorphan-guaiFENesin [facility] DM) 60-1,200 mg Tb12 Take 600 mg by mouth two (2) times a day. Takes PRN OTC   Indications: cough   Yes Other, MD Libby   inulin (FIBER GUMMIES PO) Take 1 Tab by mouth daily. Yes Provider, Historical   acetaminophen (TYLENOL) 325 mg tablet Take 650 mg by mouth every four (4) hours as needed for Pain. Yes Other, MD Libby   ergocalciferol (VITAMIN D2) 50,000 unit capsule Take 1 Cap by mouth every seven (7) days. 8/8/18  Yes Graciela Irene, NP   vit A/vit C/vit E/zinc/copper (PRESERVISION AREDS PO) Take 1 Tab by mouth daily. Yes Provider, Historical   multivitamin (ONE A DAY) tablet Take 1 Tab by mouth daily. Yes Provider, Historical       REVIEW OF SYSTEMS:     I am not able to complete the review of systems because:    The patient is intubated and sedated    The patient has altered mental status due to his acute medical problems    The patient has baseline aphasia from prior stroke(s)    The patient has baseline dementia and is not reliable historian    The patient is in acute medical distress and unable to provide information           Total of 12 systems reviewed as follows:       POSITIVE= underlined text  Negative = text not underlined  General:  fever, chills, sweats, generalized weakness, weight loss/gain,      loss of appetite   Eyes:    blurred vision, eye pain, loss of vision, double vision  ENT:    rhinorrhea, pharyngitis   Respiratory:   cough, sputum production, SOB, AL, wheezing, pleuritic pain   Cardiology:   chest pain, palpitations, orthopnea, PND, edema, syncope   Gastrointestinal:  abdominal pain , N/V, diarrhea, dysphagia, constipation, bleeding   Genitourinary:  frequency, urgency, dysuria, hematuria, incontinence   Muskuloskeletal :  arthralgia, myalgia, back pain  Hematology:  easy bruising, nose or gum bleeding, lymphadenopathy   Dermatological: rash, ulceration, pruritis, color change / jaundice  Endocrine:   hot flashes or polydipsia   Neurological:  headache, dizziness, confusion, focal weakness, paresthesia,     Speech difficulties, memory loss, gait difficulty  Psychological: Feelings of anxiety, depression, agitation    Objective:   VITALS:    Visit Vitals  /54   Pulse 97   Temp 98.8 °F (37.1 °C)   Resp 20   Ht 5' 5\" (1.651 m)   Wt 81.6 kg (180 lb)   SpO2 96%   BMI 29.95 kg/m²       PHYSICAL EXAM:    General:    Alert, obese cooperative, mild  resp distress, appears stated age. Able to talk in full sentences  HEENT: Atraumatic, anicteric sclerae, pink conjunctivae     No oral ulcers, mucosa moist, throat clear, dentition fair  Neck:  Supple, symmetrical,  thyroid: non tender  Lungs:   Decrease  bs bilaterally. ++ Wheezing no  Rhonchi. No rales. Chest wall:  No tenderness  No Accessory muscle use. Heart:   Tachy   rhythm,  No  murmur   No edema  Abdomen:   Soft, obese non-tender. Not distended. Bowel sounds normal  Extremities: No cyanosis. No clubbing,      Skin turgor normal, Capillary refill normal, Radial dial pulse 2+  Skin:     Not pale. Not Jaundiced  No rashes   Psych:  fair insight. Not depressed. Not anxious or agitated. Neurologic: EOMs intact. No facial asymmetry. No aphasia or slurred speech. Symmetrical strength, Sensation grossly intact.  Alert and oriented X 4.     _______________________________________________________________________  Care Plan discussed with: Comments   Patient x    Family      RN x    Care Manager                    Consultant:  x    _______________________________________________________________________  Expected  Disposition:   Home with Family x   HH/PT/OT/RN    SNF/LTC    ESTEE    ________________________________________________________________________  TOTAL TIME:  61  Minutes    Critical Care Provided     Minutes non procedure based      Comments    x Reviewed previous records   >50% of visit spent in counseling and coordination of care x Discussion with patient and/or family and questions answered       ________________________________________________________________________  Signed: Mony Woodward MD    Procedures: see electronic medical records for all procedures/Xrays and details which were not copied into this note but were reviewed prior to creation of Plan.     LAB DATA REVIEWED:    Recent Results (from the past 24 hour(s))   CULTURE, BLOOD, PERIPHERAL    Collection Time: 10/29/19  2:17 AM   Result Value Ref Range    Special Requests: NO SPECIAL REQUESTS      Culture result: NO GROWTH AFTER 2 HOURS     EKG, 12 LEAD, INITIAL    Collection Time: 10/29/19  2:18 AM   Result Value Ref Range    Ventricular Rate 131 BPM    Atrial Rate 131 BPM    QRS Duration 74 ms    Q-T Interval 310 ms    QTC Calculation (Bezet) 457 ms    Calculated R Axis -3 degrees    Calculated T Axis 87 degrees    Diagnosis       Supraventricular tachycardia  Nonspecific ST and T wave abnormality    Confirmed by Mumtaz Agee ([id number]) on 10/29/2019 8:24:15 AM     CULTURE, BLOOD, PERIPHERAL    Collection Time: 10/29/19  2:20 AM   Result Value Ref Range    Special Requests: NO SPECIAL REQUESTS      Culture result: NO GROWTH AFTER 2 HOURS     CBC WITH AUTOMATED DIFF    Collection Time: 10/29/19  2:24 AM   Result Value Ref Range    WBC 54.4 (HH) 3.6 - 11.0 K/uL    RBC 2.97 (L) 3.80 - 5.20 M/uL    HGB 9.1 (L) 11.5 - 16.0 g/dL    HCT 32.4 (L) 35.0 - 47.0 %    .1 (H) 80.0 - 99.0 FL    MCH 30.6 26.0 - 34.0 PG    MCHC 28.1 (L) 30.0 - 36.5 g/dL    RDW 15.9 (H) 11.5 - 14.5 %    PLATELET 803 (L) 898 - 400 K/uL    MPV 11.3 8.9 - 12.9 FL    NRBC 0.2 (H) 0  WBC    ABSOLUTE NRBC 0.11 (H) 0.00 - 0.01 K/uL    NEUTROPHILS 44 32 - 75 %    BAND NEUTROPHILS 2 %    LYMPHOCYTES 8 (L) 12 - 49 %    MONOCYTES 38 (H) 5 - 13 %    EOSINOPHILS 2 0 - 7 %    BASOPHILS 0 0 - 1 %    IMMATURE GRANULOCYTES 6 (H) 0.0 - 0.5 %    ABS. NEUTROPHILS 24.9 (H) 1.8 - 8.0 K/UL    ABS. LYMPHOCYTES 4.4 (H) 0.8 - 3.5 K/UL    ABS. MONOCYTES 20.7 (H) 0.0 - 1.0 K/UL    ABS. EOSINOPHILS 1.1 (H) 0.0 - 0.4 K/UL    ABS. BASOPHILS 0.0 0.0 - 0.1 K/UL    ABS. IMM. GRANS. 3.3 (H) 0.00 - 0.04 K/UL    DF MANUAL      RBC COMMENTS ANISOCYTOSIS  1+        RBC COMMENTS MACROCYTOSIS  1+       METABOLIC PANEL, COMPREHENSIVE    Collection Time: 10/29/19  2:24 AM   Result Value Ref Range    Sodium 140 136 - 145 mmol/L    Potassium 3.6 3.5 - 5.1 mmol/L    Chloride 108 97 - 108 mmol/L    CO2 23 21 - 32 mmol/L    Anion gap 9 5 - 15 mmol/L    Glucose 143 (H) 65 - 100 mg/dL    BUN 23 (H) 6 - 20 MG/DL    Creatinine 0.80 0.55 - 1.02 MG/DL    BUN/Creatinine ratio 29 (H) 12 - 20      GFR est AA >60 >60 ml/min/1.73m2    GFR est non-AA >60 >60 ml/min/1.73m2    Calcium 8.6 8.5 - 10.1 MG/DL    Bilirubin, total 0.6 0.2 - 1.0 MG/DL    ALT (SGPT) 25 12 - 78 U/L    AST (SGOT) 26 15 - 37 U/L    Alk.  phosphatase 87 45 - 117 U/L    Protein, total 7.9 6.4 - 8.2 g/dL    Albumin 3.8 3.5 - 5.0 g/dL    Globulin 4.1 (H) 2.0 - 4.0 g/dL    A-G Ratio 0.9 (L) 1.1 - 2.2     CK W/ REFLX CKMB    Collection Time: 10/29/19  2:24 AM   Result Value Ref Range     (H) 26 - 192 U/L   TROPONIN I    Collection Time: 10/29/19  2:24 AM   Result Value Ref Range    Troponin-I, Qt. <0.05 <0.05 ng/mL   NT-PRO BNP    Collection Time: 10/29/19  2:24 AM   Result Value Ref Range    NT pro- <450 PG/ML   PATHOLOGIST REVIEW    Collection Time: 10/29/19  2:24 AM   Result Value Ref Range    Pathologist review (NOTE)    CK-MB,QUANT. Collection Time: 10/29/19  2:24 AM   Result Value Ref Range    CK - MB 1.0 <3.6 NG/ML    CK-MB Index 0.5 0.0 - 2.5     POC LACTIC ACID    Collection Time: 10/29/19  2:25 AM   Result Value Ref Range    Lactic Acid (POC) 1.67 0.40 - 2.00 mmol/L   INFLUENZA A & B AG (RAPID TEST)    Collection Time: 10/29/19  2:35 AM   Result Value Ref Range    Influenza A Antigen NEGATIVE  NEG      Influenza B Antigen NEGATIVE  NEG     POC G3 - PUL    Collection Time: 10/29/19  2:48 AM   Result Value Ref Range    pH (POC) 7.340 (L) 7.35 - 7.45      pCO2 (POC) 36.3 35.0 - 45.0 MMHG    pO2 (POC) 74 (L) 80 - 100 MMHG    HCO3 (POC) 19.6 (L) 22 - 26 MMOL/L    sO2 (POC) 94 92 - 97 %    Base deficit (POC) 6 mmol/L    Site RIGHT RADIAL      Device: NASAL CANNULA      Flow rate (POC) 6 L/M    Allens test (POC) YES      Specimen type (POC) ARTERIAL      Total resp.  rate 28     URINALYSIS W/ REFLEX CULTURE    Collection Time: 10/29/19  6:50 AM   Result Value Ref Range    Color YELLOW/STRAW      Appearance CLEAR CLEAR      Specific gravity 1.015 1.003 - 1.030      pH (UA) 5.0 5.0 - 8.0      Protein 30 (A) NEG mg/dL    Glucose NEGATIVE  NEG mg/dL    Ketone NEGATIVE  NEG mg/dL    Bilirubin NEGATIVE  NEG      Blood NEGATIVE  NEG      Urobilinogen 0.2 0.2 - 1.0 EU/dL    Nitrites NEGATIVE  NEG      Leukocyte Esterase NEGATIVE  NEG      UA:UC IF INDICATED CULTURE NOT INDICATED BY UA RESULT CNI      WBC 0-4 0 - 4 /hpf    RBC 0-5 0 - 5 /hpf    Epithelial cells MODERATE (A) FEW /lpf    Bacteria NEGATIVE  NEG /hpf    Hyaline cast 2-5 0 - 5 /lpf

## 2019-10-29 NOTE — ED NOTES
Assisted pt to [facility] to void. Pt became very dyspnec. SPO2 remains 90% on 4LNC.

## 2019-10-29 NOTE — ED NOTES
Bedside shift change report given to  This nurse by Akil Siddiqui Rn. Report included the following information SBAR, Kardex, ED Summary and MAR.

## 2019-10-30 LAB
ALBUMIN SERPL-MCNC: 3.5 G/DL (ref 3.5–5)
ALBUMIN/GLOB SERPL: 0.9 {RATIO} (ref 1.1–2.2)
ALP SERPL-CCNC: 71 U/L (ref 45–117)
ALT SERPL-CCNC: 34 U/L (ref 12–78)
ANION GAP SERPL CALC-SCNC: 8 MMOL/L (ref 5–15)
AST SERPL-CCNC: 24 U/L (ref 15–37)
BASOPHILS # BLD: 0 K/UL (ref 0–0.1)
BASOPHILS NFR BLD: 0 % (ref 0–1)
BILIRUB SERPL-MCNC: 0.7 MG/DL (ref 0.2–1)
BUN SERPL-MCNC: 19 MG/DL (ref 6–20)
BUN/CREAT SERPL: 26 (ref 12–20)
CALCIUM SERPL-MCNC: 8.3 MG/DL (ref 8.5–10.1)
CHLORIDE SERPL-SCNC: 112 MMOL/L (ref 97–108)
CHOLEST SERPL-MCNC: 136 MG/DL
CO2 SERPL-SCNC: 22 MMOL/L (ref 21–32)
CREAT SERPL-MCNC: 0.74 MG/DL (ref 0.55–1.02)
DIFFERENTIAL METHOD BLD: ABNORMAL
EOSINOPHIL # BLD: 0 K/UL (ref 0–0.4)
EOSINOPHIL NFR BLD: 0 % (ref 0–7)
ERYTHROCYTE [DISTWIDTH] IN BLOOD BY AUTOMATED COUNT: 15.9 % (ref 11.5–14.5)
FLUID CULTURE, SPNG2: NORMAL
GLOBULIN SER CALC-MCNC: 3.8 G/DL (ref 2–4)
GLUCOSE SERPL-MCNC: 145 MG/DL (ref 65–100)
HCT VFR BLD AUTO: 28.4 % (ref 35–47)
HDLC SERPL-MCNC: 39 MG/DL
HDLC SERPL: 3.5 {RATIO} (ref 0–5)
HGB BLD-MCNC: 7.8 G/DL (ref 11.5–16)
IMM GRANULOCYTES # BLD AUTO: 0 K/UL (ref 0–0.04)
IMM GRANULOCYTES NFR BLD AUTO: 0 % (ref 0–0.5)
L PNEUMO1 AG UR QL IA: NEGATIVE
LDLC SERPL CALC-MCNC: 78.2 MG/DL (ref 0–100)
LIPID PROFILE,FLP: NORMAL
LYMPHOCYTES # BLD: 2.7 K/UL (ref 0.8–3.5)
LYMPHOCYTES NFR BLD: 5 % (ref 12–49)
MCH RBC QN AUTO: 30.5 PG (ref 26–34)
MCHC RBC AUTO-ENTMCNC: 27.5 G/DL (ref 30–36.5)
MCV RBC AUTO: 110.9 FL (ref 80–99)
METAMYELOCYTES NFR BLD MANUAL: 8 %
MONOCYTES # BLD: 9.8 K/UL (ref 0–1)
MONOCYTES NFR BLD: 18 % (ref 5–13)
MYELOCYTES NFR BLD MANUAL: 2 %
NEUTS BAND NFR BLD MANUAL: 2 %
NEUTS SEG # BLD: 36.6 K/UL (ref 1.8–8)
NEUTS SEG NFR BLD: 65 % (ref 32–75)
NRBC # BLD: 0.09 K/UL (ref 0–0.01)
NRBC BLD-RTO: 0.2 PER 100 WBC
ORGANISM ID, SPNG3: NORMAL
PLATELET # BLD AUTO: 90 K/UL (ref 150–400)
PLEASE NOTE, SPNG4: NORMAL
PMV BLD AUTO: 11 FL (ref 8.9–12.9)
POTASSIUM SERPL-SCNC: 4.1 MMOL/L (ref 3.5–5.1)
PROT SERPL-MCNC: 7.3 G/DL (ref 6.4–8.2)
RBC # BLD AUTO: 2.56 M/UL (ref 3.8–5.2)
RBC MORPH BLD: ABNORMAL
RBC MORPH BLD: ABNORMAL
S PNEUM AG SPEC QL LA: NEGATIVE
SODIUM SERPL-SCNC: 142 MMOL/L (ref 136–145)
SPECIMEN SOURCE: NORMAL
SPECIMEN SOURCE: NORMAL
SPECIMEN, SPNG1: NORMAL
TRIGL SERPL-MCNC: 94 MG/DL (ref ?–150)
TROPONIN I SERPL-MCNC: <0.05 NG/ML
TSH SERPL DL<=0.05 MIU/L-ACNC: 0.39 UIU/ML (ref 0.36–3.74)
VLDLC SERPL CALC-MCNC: 18.8 MG/DL
WBC # BLD AUTO: 54.6 K/UL (ref 3.6–11)

## 2019-10-30 PROCEDURE — 84443 ASSAY THYROID STIM HORMONE: CPT

## 2019-10-30 PROCEDURE — 74011250636 HC RX REV CODE- 250/636: Performed by: INTERNAL MEDICINE

## 2019-10-30 PROCEDURE — 65660000000 HC RM CCU STEPDOWN

## 2019-10-30 PROCEDURE — 36415 COLL VENOUS BLD VENIPUNCTURE: CPT

## 2019-10-30 PROCEDURE — 77010033678 HC OXYGEN DAILY

## 2019-10-30 PROCEDURE — 74011000258 HC RX REV CODE- 258: Performed by: INTERNAL MEDICINE

## 2019-10-30 PROCEDURE — 74011250637 HC RX REV CODE- 250/637: Performed by: INTERNAL MEDICINE

## 2019-10-30 PROCEDURE — 74011250637 HC RX REV CODE- 250/637: Performed by: FAMILY MEDICINE

## 2019-10-30 PROCEDURE — 94640 AIRWAY INHALATION TREATMENT: CPT

## 2019-10-30 PROCEDURE — 74011000250 HC RX REV CODE- 250: Performed by: INTERNAL MEDICINE

## 2019-10-30 PROCEDURE — 80061 LIPID PANEL: CPT

## 2019-10-30 PROCEDURE — 84484 ASSAY OF TROPONIN QUANT: CPT

## 2019-10-30 PROCEDURE — 85025 COMPLETE CBC W/AUTO DIFF WBC: CPT

## 2019-10-30 PROCEDURE — 80053 COMPREHEN METABOLIC PANEL: CPT

## 2019-10-30 RX ADMIN — BENZONATATE 100 MG: 100 CAPSULE ORAL at 16:05

## 2019-10-30 RX ADMIN — MELATONIN TAB 3 MG 3 MG: 3 TAB at 21:50

## 2019-10-30 RX ADMIN — BENZONATATE 100 MG: 100 CAPSULE ORAL at 08:22

## 2019-10-30 RX ADMIN — ENOXAPARIN SODIUM 40 MG: 40 INJECTION SUBCUTANEOUS at 10:03

## 2019-10-30 RX ADMIN — GUAIFENESIN, DEXTROMETHORPHAN HBR 2 TABLET: 600; 30 TABLET ORAL at 08:22

## 2019-10-30 RX ADMIN — IPRATROPIUM BROMIDE AND ALBUTEROL SULFATE 3 ML: .5; 3 SOLUTION RESPIRATORY (INHALATION) at 21:30

## 2019-10-30 RX ADMIN — MULTIPLE VITAMINS W/ MINERALS TAB 1 TABLET: TAB at 08:22

## 2019-10-30 RX ADMIN — Medication 10 ML: at 16:05

## 2019-10-30 RX ADMIN — GUAIFENESIN, DEXTROMETHORPHAN HBR 2 TABLET: 600; 30 TABLET ORAL at 21:50

## 2019-10-30 RX ADMIN — AZITHROMYCIN MONOHYDRATE 500 MG: 500 INJECTION, POWDER, LYOPHILIZED, FOR SOLUTION INTRAVENOUS at 03:35

## 2019-10-30 RX ADMIN — IPRATROPIUM BROMIDE AND ALBUTEROL SULFATE 3 ML: .5; 3 SOLUTION RESPIRATORY (INHALATION) at 00:00

## 2019-10-30 RX ADMIN — CEFTRIAXONE SODIUM 2 G: 2 INJECTION, POWDER, FOR SOLUTION INTRAMUSCULAR; INTRAVENOUS at 03:32

## 2019-10-30 RX ADMIN — ACETAMINOPHEN 650 MG: 325 TABLET ORAL at 16:05

## 2019-10-30 RX ADMIN — Medication 10 ML: at 05:23

## 2019-10-30 RX ADMIN — VANCOMYCIN HYDROCHLORIDE 1000 MG: 1 INJECTION, POWDER, LYOPHILIZED, FOR SOLUTION INTRAVENOUS at 17:17

## 2019-10-30 RX ADMIN — IPRATROPIUM BROMIDE AND ALBUTEROL SULFATE 3 ML: .5; 3 SOLUTION RESPIRATORY (INHALATION) at 05:35

## 2019-10-30 RX ADMIN — ACETAMINOPHEN 650 MG: 325 TABLET ORAL at 08:22

## 2019-10-30 RX ADMIN — METHYLPREDNISOLONE SODIUM SUCCINATE 40 MG: 40 INJECTION, POWDER, FOR SOLUTION INTRAMUSCULAR; INTRAVENOUS at 21:50

## 2019-10-30 RX ADMIN — METHYLPREDNISOLONE SODIUM SUCCINATE 40 MG: 40 INJECTION, POWDER, FOR SOLUTION INTRAMUSCULAR; INTRAVENOUS at 05:23

## 2019-10-30 RX ADMIN — Medication 10 ML: at 21:51

## 2019-10-30 RX ADMIN — METHYLPREDNISOLONE SODIUM SUCCINATE 40 MG: 40 INJECTION, POWDER, FOR SOLUTION INTRAMUSCULAR; INTRAVENOUS at 16:05

## 2019-10-30 RX ADMIN — BENZONATATE 100 MG: 100 CAPSULE ORAL at 21:50

## 2019-10-30 NOTE — CONSULTS
[facility]  [address], [address] [facility] [address], [address] [facility]  [phone]      Oncology/Hematology Inpatient Consult Note      Patient: Krista Ramires MRN: [id number]  SSN: [id number]    YOB: 1935  Age: 82 y.o. Sex: female        Reason for consultation:      Myelodysplastic syndrome and h/o breast cancer, now admitted with respiratory distress    Subjective:      Tiffanie Mccollum is a 82 y.o. female who we were asked to see by Dr. Rocio Shaffer for a history of breast cancer and myelodysplastic syndrome. She underwent a left breast lumpectomy and sentinel LN excision on 11/21/2013 for stage I invasive ductal carcinoma. Ms. Indiana Peterson then received radiation to the left breast. She completed 5 years of hormonal treatment with arimidex. She was recently diagnosed with low grade MDS and has been on observation. Ms. Indiana Peterson presented to the ED yesterday with complaints of progressively worsening cough and shortness of breath. She was admitted for further evaluation and management. She was noted to have worsening leukocytosis. She is resting with her son at the bedside. She notes cough and mild shortness of breath. Discussed need for starting treatment of MDS. Patient and son both in agreement and will follow-up outpatient to discuss treatment in further detail.        Review of Systems:     Constitutional: negative   Eyes: negative   Ears, Nose, Mouth, Throat, and Face: negative   Respiratory: cough, dyspnea   Cardiovascular: negative   Gastrointestinal: negative   Integument/Breast: negative  Hematologic/Lymphatic: negative   Musculoskeletal: joint pain  Neurological: numbness bottoms of both feet      Past Medical History:   Diagnosis Date    Arthritis     Bunion of right foot 8/20/2015    Chronic pain     back--uses tens unit    Diverticulitis 6/29/2018    Recurrent    Dry eye syndrome 6/29/2018    Fibromyalgia 6/29/2018    GERD (gastroesophageal reflux disease)     History of left breast cancer 2013    lumpectomy radiation    Hypercholesterolemia 2018    Ill-defined condition     blood transfusion hx    Leukemia ([address]     Osteoporosis 2018    Peripheral neuropathy 2018    Prediabetes 2018    PUD (peptic ulcer disease)     Radiation therapy complication 0546    Lt breast-No Chemo    Rosacea 2018    Trouble in sleeping      Past Surgical History:   Procedure Laterality Date    HX APPENDECTOMY      HX BREAST LUMPECTOMY  2013    LEFT BREAST LUMPECTOMY W/LEFT BREAST SENTINEL NODE BIOPSY,AND NEEDLE LOCALIZATION performed by Brandi Mendez MD at [facility] MAIN OR    HX CATARACT REMOVAL      bilateral    HX HYSTERECTOMY      ovarian cyst    HX MOHS PROCEDURES      right    HX ORTHOPAEDIC      back surgery x2    HX OTHER SURGICAL      colonoscopy    HX TONSILLECTOMY      TOTAL KNEE ARTHROPLASTY      left    TOTAL KNEE ARTHROPLASTY      right    US GUIDED CORE BREAST BIOPSY Left 2013    Breast Ca      Family History   Problem Relation Age of Onset    Cancer Mother         bladder    Cancer Father     Breast Cancer Paternal Grandmother      Social History     Tobacco Use    Smoking status: Former Smoker     Packs/day: 0.50     Years: 50.00     Pack years: 25.00     Last attempt to quit: 2012     Years since quittin.7    Smokeless tobacco: Never Used   Substance Use Topics    Alcohol use: Yes     Alcohol/week: 2.0 standard drinks     Types: 2 Glasses of wine per week      Prior to Admission medications    Medication Sig Start Date End Date Taking? Authorizing Provider   dextromethorphan-guaiFENesin [facility] DM) 60-1,200 mg Tb12 Take 600 mg by mouth two (2) times a day. Indications: cough   Yes Other, MD Libby   ergocalciferol (ERGOCALCIFEROL) 50,000 unit capsule Take 50,000 Units by mouth every .    Yes Provider, Historical   inulin (FIBER GUMMIES PO) Take 2 Gum by mouth nightly. Yes Provider, Historical   acetaminophen (TYLENOL) 325 mg tablet Take 650 mg by mouth every four (4) hours as needed for Pain. Yes Other, MD Libby   vit A/vit C/vit E/zinc/copper (PRESERVISION AREDS PO) Take 1 Tab by mouth nightly. Yes Provider, Historical   multivitamin (ONE A DAY) tablet Take 1 Tab by mouth nightly. Yes Provider, Historical            Allergies   Allergen Reactions    Hydrocodone Nausea Only and Vertigo    Gabapentin Diarrhea, Nausea Only and Other (comments)     weakness    Lyrica [Pregabalin] Nausea Only    Oxycodone Nausea and Vomiting and Vertigo         Objective:     Visit Vitals  /55 (BP 1 Location: Right arm, BP Patient Position: At rest)   Pulse 78   Temp 98.5 °F (36.9 °C)   Resp 22   Ht 5' 5\" (1.651 m)   Wt 180 lb (81.6 kg)   SpO2 95%   BMI 29.95 kg/m²       Physical Exam:    GENERAL: alert, cooperative   EYE: inferior rectus weakness with upward gaze, bruise around the eye  LYMPHATIC: Cervical, supraclavicular, and axillary nodes normal.   THROAT & NECK: normal and no erythema or exudates noted. LUNG: clear to auscultation bilaterally   HEART: regular rate and rhythm   ABDOMEN: soft, non-tender   EXTREMITIES: extremities normal   SKIN: Normal.   NEUROLOGIC: negative       Lab Results   Component Value Date/Time    WBC 54.6 (HH) 10/30/2019 12:14 AM    HGB (POC) 10.7 (A) 01/07/2019 09:52 AM    HGB 7.8 (L) 10/30/2019 12:14 AM    HCT (POC) 32.0 (A) 01/07/2019 09:52 AM    HCT 28.4 (L) 10/30/2019 12:14 AM    PLATELET 90 (L) 84/24/1309 12:14 AM    .9 (H) 10/30/2019 12:14 AM       Lab Results   Component Value Date/Time    Iron 66 04/29/2019 09:28 AM    TIBC 354 04/29/2019 09:28 AM    Iron % saturation 19 04/29/2019 09:28 AM    Ferritin 170 (H) 04/29/2019 09:28 AM           Assessment:      1.  Myelodysplastic Syndrome   IPSS-R Low risk      Del(7q) and trisomy 8    Blood counts are acceptable as of now  Recommend observation     Implication of the diagnosis - development of cytopenias and evolution of AML    I discussed the triggers and reasons for treatment. When Hgb goes below 10 and or platelet count goes below 100,000, I will treat her with 5'-Azacytidine. ESAs would not be a good choice because all 3 blood counts are abnormal.     Hospitalized with bronchitis/pneumonia. Blood counts have worsened. She is now at the point where we should begin treatment with hypomethylating agent. Discussed with patient and her son who are in agreement. Will follow-up outpatient to discuss treatment plan in detail. 2. History of left breast carcinoma:     T1a N0 Mx (Stage IA) infiltrating ductal carcinoma , Tumor size 1 cm, LN -ve, grade 2, ki 67 17%, %, DE 90%, Her 2 unamplified.    Dx: 10/28/2013    > S/P left breast lumpectomy and sentinel LN excision on 11/21/2013  > Completed radiation to the breast  > Discontinued Arimidex 1/2019 after 5 years  > In remission    Mammogram (11/2017): normal  DEXA (10/2013) - normal      Plan:      > Supportive care  > Follow-up outpatient for treatment discussion      Signed by: Sabrina Shi NP                     October 30, 2019

## 2019-10-30 NOTE — CDMP QUERY
Pt admitted with pneumonia. Pt noted to have SIRS. If possible, please document in the progress notes and d/c summary if you are evaluating and / or treating any of the following: 
 
? Severe Sepsis POA ? Sepsis POA ? Sepsis was ruled out (include corresponding diagnosis for patients clinical picture and treatment) ? Other, please specify ? Clinically unable to determine The medical record reflects the following: 
   Risk Factors: 82 F w/hx: myelodysplastic syndrome/thrombocytopenia, breast CA, COPD Clinical Indicators: Admitted with sepsis and acute hypoxic respiratory failure, admit WBC: 54.4, Plt: 106, T: 100.6, RR: 133, RR: 36, and per H&P: \"SIRS due to acute bronchitis/possible early PNA\", blood culture growing gram + cocci in clusters Treatment: 1000 ml LRS bolus IV, 500 ml NS bolus IV, Zithromax IV, Rocephin IV, NS @ 75 ml/hr Thank you, [email or web address] 
[phone]

## 2019-10-30 NOTE — PROGRESS NOTES
TELE-HOSPITALIST CROSS COVER NOTE:    Visit Vitals  BP (!) 133/106   Pulse (!) 112   Temp 98.9 °F (37.2 °C)   Resp 29   Ht 5' 5\" (1.651 m)   Wt 81.6 kg (180 lb)   SpO2 92%   BMI 29.95 kg/m²         D/W RN; medical records reviewed;     - Benzonatate as needed for symptomatic management  - Melatonin ordered for management of insomnia.       Natalie Pod

## 2019-10-30 NOTE — PROGRESS NOTES
Hospitalist Progress Note    NAME: Jimmy Cabrera   :  1935   MRN:  [id number]       Assessment / Plan:  Acute hypoxic respiratory failure likely related to underlying pneumonia. Chest x-ray  favoring infectious/inflammatory  process with some hilar lymph nodes. Continue empiric antibiotic treatment for community-acquired pneumonia with ceftriaxone and azithromycin. Continue to supplemental oxygen until oxygen saturation above 90%. Patient does not have home oxygen. The plan is to wean the patient off oxygen before discharge. Continue steroid IV added in the ER as well. Patient's not wheezing currently but will taper steroids down. Continue Mucinex. Continue all other breathing treatments. History of breast cancer, status post lobectomy and radiation as per patient. myelodysplastic syndrome/thombocytopenia followed by Dr. Gama Black  Hematology oncologist consulted for leukocytosis with a WBC count in the 50s. Awaiting recommendations. Obesity counseled on diet   Lumbar spinal stenosis with peripheral neuropathy tylenol prn  Code Status: Full  Surrogate Decision Maker:  [id number]  DVT Prophylaxis: lovenox  Baseline: ambulatory , patient would likely go back home on discharge. Body mass index is 29.95 kg/m².: 25.0 - 29.9 Overweight     Subjective:     Chief Complaint / Reason for Physician Visit  Patient's breathing got better after admission. No chest pain complaint. No any other complaints. No fever. \". Discussed with RN events overnight. Review of Systems:  Symptom Y/N Comments  Symptom Y/N Comments   Fever/Chills n   Chest Pain n    Poor Appetite    Edema     Cough y   Abdominal Pain n    Sputum y   Joint Pain     SOB/AL y   Pruritis/Rash     Nausea/vomit    Tolerating PT/OT     Diarrhea    Tolerating Diet     Constipation    Other       Could NOT obtain due to:      Objective:     VITALS:   Last 24hrs VS reviewed since prior progress note.  Most recent are:  Patient Vitals for the past 24 hrs:   Temp Pulse Resp BP SpO2   10/30/19 0800     96 %   10/30/19 0535     91 %   10/30/19 0334 98.6 °F (37 °C) 99 24 123/44 94 %   10/30/19 0001     92 %   10/29/19 2327 98.5 °F (36.9 °C) 96 22 134/56 94 %   10/29/19 2000     92 %   10/29/19 1938 98.6 °F (37 °C) (!) 119 28 154/63 94 %   10/29/19 1846  (!) 112   94 %   10/29/19 1842  (!) 118  (!) 133/106    10/29/19 1839  (!) 102   93 %   10/29/19 1835     94 %   10/29/19 1827     93 %   10/29/19 1700 98.9 °F (37.2 °C) (!) 108 29 139/41 93 %   10/29/19 1600 97.8 °F (36.6 °C) (!) 107 24 134/56 94 %   10/29/19 1500 98.8 °F (37.1 °C) (!) 104 26 151/57 91 %   10/29/19 1400 98.8 °F (37.1 °C) (!) 105 23 154/52 95 %   10/29/19 1300 98.6 °F (37 °C) 99 22 100/80 94 %   10/29/19 1200 98.7 °F (37.1 °C) (!) 107 28  94 %       Intake/Output Summary (Last 24 hours) at 10/30/2019 1047  Last data filed at 10/30/2019 0335  Gross per 24 hour   Intake 870 ml   Output 950 ml   Net -80 ml        PHYSICAL EXAM:  General: WD, WN. Alert, cooperative, no acute distress    EENT:  EOMI. Anicteric sclerae. MMM  Resp:  Decreased air entry in the lower lung fields no crackles or wheezing appreciated. CV:  Regular  rhythm,  No edema  GI:  Soft, Non distended, Non tender.  +Bowel sounds  Neurologic:  Alert and oriented X 3, normal speech,   No lower extremity swelling noted. Reviewed most current lab test results and cultures  YES  Reviewed most current radiology test results   YES  Review and summation of old records today    NO  Reviewed patient's current orders and MAR    YES  PMH/SH reviewed - no change compared to H&P  ________________________________________________________________________  Care Plan discussed with:    Comments   Patient y    Family      RN y    Care Manager     Consultant                        Multidiciplinary team rounds were held today with , nursing, pharmacist and clinical coordinator.   Patient's plan of care was discussed; medications were reviewed and discharge planning was addressed. ________________________________________________________________________  Total NON critical care TIME:  35 Minutes    Total CRITICAL CARE TIME Spent:   Minutes non procedure based      Comments   >50% of visit spent in counseling and coordination of care     ________________________________________________________________________  Walter Olmos MD     Procedures: see electronic medical records for all procedures/Xrays and details which were not copied into this note but were reviewed prior to creation of Plan. LABS:  I reviewed today's most current labs and imaging studies. Pertinent labs include:  Recent Labs     10/30/19  0014 10/29/19  0224   WBC 54.6* 54.4*   HGB 7.8* 9.1*   HCT 28.4* 32.4*   PLT 90* 106*     Recent Labs     10/30/19  0014 10/29/19  0224    140   K 4.1 3.6   * 108   CO2 22 23   * 143*   BUN 19 23*   CREA 0.74 0.80   CA 8.3* 8.6   ALB 3.5 3.8   TBILI 0.7 0.6   SGOT 24 26   ALT 34 25       Signed:  Walter Olmos MD

## 2019-10-30 NOTE — PROGRESS NOTES
SPEECH THERAPY SCREENING:  SERVICES MAY BE INDICATED AT THIS TIME    [facility] for [facility] therapy based on results obtained during the nursing admission assessment. The patients chart was reviewed and the patient may be appropriate for a skilled therapy evaluation at this time if she is not tolerating her diet. Please consult speech therapy if any therapy needs arise. Thank you.     Leopoldo Dunn, SLP

## 2019-10-30 NOTE — PROGRESS NOTES
1945: Pt experiencing coughing spell. Breathing labored. 's-150's. Pt anxious. support provided. Pt encouraged to breath in through nose and slowly out through mouth. Paged Respiratory for Neb tx. PRN Humibid DM given. 2040: Paged MD new orders placed. See Mar. Pt reported feeling better post breathing treatment. Will continue to monitor closely. 0700: Bedside report given to Dickson Ribeiro RN. Pt had a restless night due to coughing spells and insomnia. Pt treated with PRN breathing treatment around the clock which did provide relief.

## 2019-10-31 ENCOUNTER — DOCUMENTATION ONLY (OUTPATIENT)
Dept: CASE MANAGEMENT | Age: 84
End: 2019-10-31

## 2019-10-31 ENCOUNTER — APPOINTMENT (OUTPATIENT)
Dept: GENERAL RADIOLOGY | Age: 84
DRG: 189 | End: 2019-10-31
Attending: INTERNAL MEDICINE
Payer: MEDICARE

## 2019-10-31 LAB
ALBUMIN SERPL-MCNC: 3.4 G/DL (ref 3.5–5)
ALBUMIN/GLOB SERPL: 1 {RATIO} (ref 1.1–2.2)
ALP SERPL-CCNC: 60 U/L (ref 45–117)
ALT SERPL-CCNC: 44 U/L (ref 12–78)
ANION GAP SERPL CALC-SCNC: 4 MMOL/L (ref 5–15)
AST SERPL-CCNC: 26 U/L (ref 15–37)
BASOPHILS # BLD: 0 K/UL (ref 0–0.1)
BASOPHILS NFR BLD: 0 % (ref 0–1)
BILIRUB SERPL-MCNC: 0.3 MG/DL (ref 0.2–1)
BUN SERPL-MCNC: 20 MG/DL (ref 6–20)
BUN/CREAT SERPL: 34 (ref 12–20)
CALCIUM SERPL-MCNC: 7.8 MG/DL (ref 8.5–10.1)
CHLORIDE SERPL-SCNC: 113 MMOL/L (ref 97–108)
CO2 SERPL-SCNC: 25 MMOL/L (ref 21–32)
CREAT SERPL-MCNC: 0.59 MG/DL (ref 0.55–1.02)
DIFFERENTIAL METHOD BLD: ABNORMAL
EOSINOPHIL # BLD: 0.4 K/UL (ref 0–0.4)
EOSINOPHIL NFR BLD: 1 % (ref 0–7)
ERYTHROCYTE [DISTWIDTH] IN BLOOD BY AUTOMATED COUNT: 16 % (ref 11.5–14.5)
GLOBULIN SER CALC-MCNC: 3.5 G/DL (ref 2–4)
GLUCOSE SERPL-MCNC: 137 MG/DL (ref 65–100)
HCT VFR BLD AUTO: 26.5 % (ref 35–47)
HCT VFR BLD AUTO: 31.1 % (ref 35–47)
HEMOCCULT STL QL: POSITIVE
HGB BLD-MCNC: 7.3 G/DL (ref 11.5–16)
HGB BLD-MCNC: 8.9 G/DL (ref 11.5–16)
IMM GRANULOCYTES # BLD AUTO: 0 K/UL (ref 0–0.04)
IMM GRANULOCYTES NFR BLD AUTO: 0 % (ref 0–0.5)
LYMPHOCYTES # BLD: 2.6 K/UL (ref 0.8–3.5)
LYMPHOCYTES NFR BLD: 7 % (ref 12–49)
MCH RBC QN AUTO: 30.5 PG (ref 26–34)
MCHC RBC AUTO-ENTMCNC: 27.5 G/DL (ref 30–36.5)
MCV RBC AUTO: 110.9 FL (ref 80–99)
METAMYELOCYTES NFR BLD MANUAL: 4 %
MONOCYTES # BLD: 5.3 K/UL (ref 0–1)
MONOCYTES NFR BLD: 14 % (ref 5–13)
MYELOCYTES NFR BLD MANUAL: 5 %
NEUTS BAND NFR BLD MANUAL: 7 %
NEUTS SEG # BLD: 26.1 K/UL (ref 1.8–8)
NEUTS SEG NFR BLD: 62 % (ref 32–75)
NRBC # BLD: 0.16 K/UL (ref 0–0.01)
NRBC BLD-RTO: 0.4 PER 100 WBC
PLATELET # BLD AUTO: 93 K/UL (ref 150–400)
PMV BLD AUTO: 10.9 FL (ref 8.9–12.9)
POTASSIUM SERPL-SCNC: 4.5 MMOL/L (ref 3.5–5.1)
PROT SERPL-MCNC: 6.9 G/DL (ref 6.4–8.2)
RBC # BLD AUTO: 2.39 M/UL (ref 3.8–5.2)
RBC MORPH BLD: ABNORMAL
SODIUM SERPL-SCNC: 142 MMOL/L (ref 136–145)
WBC # BLD AUTO: 37.8 K/UL (ref 3.6–11)

## 2019-10-31 PROCEDURE — C9113 INJ PANTOPRAZOLE SODIUM, VIA: HCPCS | Performed by: INTERNAL MEDICINE

## 2019-10-31 PROCEDURE — 71046 X-RAY EXAM CHEST 2 VIEWS: CPT

## 2019-10-31 PROCEDURE — 74011000250 HC RX REV CODE- 250: Performed by: INTERNAL MEDICINE

## 2019-10-31 PROCEDURE — 74011250637 HC RX REV CODE- 250/637: Performed by: FAMILY MEDICINE

## 2019-10-31 PROCEDURE — 74011250636 HC RX REV CODE- 250/636: Performed by: INTERNAL MEDICINE

## 2019-10-31 PROCEDURE — 94640 AIRWAY INHALATION TREATMENT: CPT

## 2019-10-31 PROCEDURE — 74011250637 HC RX REV CODE- 250/637: Performed by: INTERNAL MEDICINE

## 2019-10-31 PROCEDURE — 77010033678 HC OXYGEN DAILY

## 2019-10-31 PROCEDURE — 85025 COMPLETE CBC W/AUTO DIFF WBC: CPT

## 2019-10-31 PROCEDURE — 85018 HEMOGLOBIN: CPT

## 2019-10-31 PROCEDURE — 74011000258 HC RX REV CODE- 258: Performed by: INTERNAL MEDICINE

## 2019-10-31 PROCEDURE — 36415 COLL VENOUS BLD VENIPUNCTURE: CPT

## 2019-10-31 PROCEDURE — 65660000000 HC RM CCU STEPDOWN

## 2019-10-31 PROCEDURE — 82272 OCCULT BLD FECES 1-3 TESTS: CPT

## 2019-10-31 PROCEDURE — 80053 COMPREHEN METABOLIC PANEL: CPT

## 2019-10-31 RX ORDER — IPRATROPIUM BROMIDE AND ALBUTEROL SULFATE 2.5; .5 MG/3ML; MG/3ML
3 SOLUTION RESPIRATORY (INHALATION)
Status: DISCONTINUED | OUTPATIENT
Start: 2019-11-01 | End: 2019-11-02

## 2019-10-31 RX ORDER — FUROSEMIDE 10 MG/ML
40 INJECTION INTRAMUSCULAR; INTRAVENOUS ONCE
Status: COMPLETED | OUTPATIENT
Start: 2019-10-31 | End: 2019-10-31

## 2019-10-31 RX ORDER — GUAIFENESIN 600 MG/1
600 TABLET, EXTENDED RELEASE ORAL 2 TIMES DAILY
Status: DISCONTINUED | OUTPATIENT
Start: 2019-10-31 | End: 2019-11-05 | Stop reason: HOSPADM

## 2019-10-31 RX ORDER — IPRATROPIUM BROMIDE AND ALBUTEROL SULFATE 2.5; .5 MG/3ML; MG/3ML
3 SOLUTION RESPIRATORY (INHALATION)
Status: DISCONTINUED | OUTPATIENT
Start: 2019-10-31 | End: 2019-10-31

## 2019-10-31 RX ORDER — IPRATROPIUM BROMIDE AND ALBUTEROL SULFATE 2.5; .5 MG/3ML; MG/3ML
3 SOLUTION RESPIRATORY (INHALATION)
Status: DISCONTINUED | OUTPATIENT
Start: 2019-10-31 | End: 2019-11-02 | Stop reason: SDUPTHER

## 2019-10-31 RX ADMIN — GUAIFENESIN, DEXTROMETHORPHAN HBR 2 TABLET: 600; 30 TABLET ORAL at 21:12

## 2019-10-31 RX ADMIN — IPRATROPIUM BROMIDE AND ALBUTEROL SULFATE 3 ML: .5; 3 SOLUTION RESPIRATORY (INHALATION) at 03:05

## 2019-10-31 RX ADMIN — IPRATROPIUM BROMIDE AND ALBUTEROL SULFATE 3 ML: .5; 3 SOLUTION RESPIRATORY (INHALATION) at 21:12

## 2019-10-31 RX ADMIN — METHYLPREDNISOLONE SODIUM SUCCINATE 40 MG: 40 INJECTION, POWDER, FOR SOLUTION INTRAMUSCULAR; INTRAVENOUS at 21:11

## 2019-10-31 RX ADMIN — SODIUM CHLORIDE 80 MG: 9 INJECTION, SOLUTION INTRAMUSCULAR; INTRAVENOUS; SUBCUTANEOUS at 05:00

## 2019-10-31 RX ADMIN — Medication 10 ML: at 21:11

## 2019-10-31 RX ADMIN — GUAIFENESIN 600 MG: 600 TABLET, EXTENDED RELEASE ORAL at 10:01

## 2019-10-31 RX ADMIN — CEFTRIAXONE SODIUM 2 G: 2 INJECTION, POWDER, FOR SOLUTION INTRAMUSCULAR; INTRAVENOUS at 03:05

## 2019-10-31 RX ADMIN — Medication 10 ML: at 05:21

## 2019-10-31 RX ADMIN — IPRATROPIUM BROMIDE AND ALBUTEROL SULFATE 3 ML: .5; 3 SOLUTION RESPIRATORY (INHALATION) at 11:27

## 2019-10-31 RX ADMIN — MULTIPLE VITAMINS W/ MINERALS TAB 1 TABLET: TAB at 09:31

## 2019-10-31 RX ADMIN — METHYLPREDNISOLONE SODIUM SUCCINATE 40 MG: 40 INJECTION, POWDER, FOR SOLUTION INTRAMUSCULAR; INTRAVENOUS at 13:45

## 2019-10-31 RX ADMIN — BENZONATATE 100 MG: 100 CAPSULE ORAL at 16:43

## 2019-10-31 RX ADMIN — GUAIFENESIN 600 MG: 600 TABLET, EXTENDED RELEASE ORAL at 16:43

## 2019-10-31 RX ADMIN — SODIUM CHLORIDE 75 ML/HR: 900 INJECTION, SOLUTION INTRAVENOUS at 09:32

## 2019-10-31 RX ADMIN — MELATONIN TAB 3 MG 3 MG: 3 TAB at 23:31

## 2019-10-31 RX ADMIN — IPRATROPIUM BROMIDE AND ALBUTEROL SULFATE 3 ML: .5; 3 SOLUTION RESPIRATORY (INHALATION) at 19:40

## 2019-10-31 RX ADMIN — BENZONATATE 100 MG: 100 CAPSULE ORAL at 21:12

## 2019-10-31 RX ADMIN — IPRATROPIUM BROMIDE AND ALBUTEROL SULFATE 3 ML: .5; 3 SOLUTION RESPIRATORY (INHALATION) at 15:18

## 2019-10-31 RX ADMIN — Medication 10 ML: at 13:45

## 2019-10-31 RX ADMIN — FUROSEMIDE 40 MG: 10 INJECTION, SOLUTION INTRAMUSCULAR; INTRAVENOUS at 10:01

## 2019-10-31 RX ADMIN — METHYLPREDNISOLONE SODIUM SUCCINATE 40 MG: 40 INJECTION, POWDER, FOR SOLUTION INTRAMUSCULAR; INTRAVENOUS at 06:00

## 2019-10-31 NOTE — PROGRESS NOTES
Problem: Chronic Obstructive Pulmonary Disease (COPD)  Goal: *Oxygen saturation during activity within specified parameters  Outcome: Progressing Towards Goal  Goal: *Able to remain out of bed as prescribed  Outcome: Progressing Towards Goal  Goal: *Absence of hypoxia  Outcome: Progressing Towards Goal  Goal: *Optimize nutritional status  Outcome: Progressing Towards Goal     Problem: Falls - Risk of  Goal: *Absence of Falls  Description  Document Mary Beth Rittman Fall Risk and appropriate interventions in the flowsheet.   Outcome: Progressing Towards Goal  Note:   Fall Risk Interventions:  Mobility Interventions: Bed/chair exit alarm, Patient to call before getting OOB, Strengthening exercises (ROM-active/passive)         Medication Interventions: Bed/chair exit alarm, Patient to call before getting OOB, Teach patient to arise slowly    Elimination Interventions: Call light in reach, Bed/chair exit alarm, Toileting schedule/hourly rounds, Patient to call for help with toileting needs    History of Falls Interventions: Bed/chair exit alarm         Problem: Pneumonia: Day 1  Goal: Activity/Safety  Outcome: Progressing Towards Goal     Problem: Pneumonia: Day 1  Goal: Consults, if ordered  Outcome: Progressing Towards Goal     Problem: Pneumonia: Day 1  Goal: Nutrition/Diet  Outcome: Progressing Towards Goal     Problem: Pneumonia: Day 1  Goal: Diagnostic Test/Procedures  Outcome: Progressing Towards Goal     Problem: Pneumonia: Day 1  Goal: Medications  Outcome: Progressing Towards Goal     Problem: Pneumonia: Day 1  Goal: Respiratory  Outcome: Progressing Towards Goal     Problem: Pneumonia: Day 1  Goal: Treatments/Interventions/Procedures  Outcome: Progressing Towards Goal     Problem: Pneumonia: Day 1  Goal: *Oxygen saturation within defined limits  Outcome: Progressing Towards Goal     Problem: Pneumonia: Day 1  Goal: Psychosocial  Outcome: Progressing Towards Goal     Problem: Pneumonia: Day 1  Goal: *Influenza vaccine administered (October-March)  Outcome: Progressing Towards Goal

## 2019-10-31 NOTE — PROGRESS NOTES
0400: Pt presents with a dark BM with red streaks noted. No clots or active  bleeding noted when assessing rectum. Pt denies abd pain only states feeling bloated, which was relieved post BM. CBC obtained. HgB trending down since admission now 7.3 and Platelets low at 93. On call MD paged to report current findings. VS stable no acute s/s of distress noted. 3738: Dr. Kelli Kenyon placed orders for Q6/hr H&H draws, Protonix IV, and Discontinued Lovenox injections. 0725:  Bedside report given to Jayson Currie.

## 2019-10-31 NOTE — PROGRESS NOTES
Hospitalist Progress Note    NAME: Sharyle Ruth   :  1935   MRN:  [id number]       Assessment / Plan:  Acute hypoxic respiratory failure likely related to underlying pneumonia. SIRS at admission ,sepsis ruled out. Chest x-ray  favoring infectious/inflammatory  process with some hilar lymph nodes. Continue empiric antibiotic treatment for community-acquired pneumonia with ceftriaxone and azithromycin. Continue to supplemental oxygen until oxygen saturation above 90%. Patient does not have home oxygen. The plan is to wean the patient off oxygen before discharge. Continue steroid IV added in the ER as well. Patient's not wheezing currently but will taper steroids down. Continue Mucinex. Continue all other breathing treatments. Ordered follow-up chest x-ray given 1 dose of Lasix for tachypnea earlier today. Discontinued IV fluid. History of breast cancer, status post lobectomy and radiation as per patient. myelodysplastic syndrome/thombocytopenia followed by Dr. Domenico Aragon  Hematology oncologist consulted for leukocytosis with a WBC count in the 50s-->30s  Hematology oncology input appreciated. We will do occult blood test for the drop in hemoglobin from 9-7. Transfuse patient if hemoglobin goes below 7. Obesity counseled on diet   Lumbar spinal stenosis with peripheral neuropathy tylenol prn  Code Status: Full  Surrogate Decision Maker:  DHSEFU [phone]  DVT Prophylaxis: lovenox  Baseline: ambulatory , patient would likely go back home on discharge. Body mass index is 29.95 kg/m².: 25.0 - 29.9 Overweight     Subjective:     Chief Complaint / Reason for Physician Visit  Patient said her breathing is not yet back to she still is requiring oxygen. No chest pain complaint. No any other complaints. No fever. \". Discussed with RN events overnight.      Review of Systems:  Symptom Y/N Comments  Symptom Y/N Comments   Fever/Chills n   Chest Pain n    Poor Appetite    Edema     Cough y Abdominal Pain n    Sputum y   Joint Pain     SOB/AL y   Pruritis/Rash     Nausea/vomit    Tolerating PT/OT     Diarrhea    Tolerating Diet     Constipation    Other       Could NOT obtain due to:      Objective:     VITALS:   Last 24hrs VS reviewed since prior progress note. Most recent are:  Patient Vitals for the past 24 hrs:   Temp Pulse Resp BP SpO2   10/31/19 0807 97.5 °F (36.4 °C) 100 20 151/64 99 %   10/31/19 0305 97.8 °F (36.6 °C) 74 21 147/62 96 %   10/30/19 2302 98.6 °F (37 °C) 76 20 126/55 95 %   10/30/19 1924 98.8 °F (37.1 °C) 83 20 165/72 96 %   10/30/19 1600    156/52    10/30/19 1559 98.6 °F (37 °C) 99 22 156/52 97 %   10/30/19 1140 98.5 °F (36.9 °C) 78 22 134/55 95 %       Intake/Output Summary (Last 24 hours) at 10/31/2019 0941  Last data filed at 10/31/2019 0305  Gross per 24 hour   Intake 1861.25 ml   Output 2400 ml   Net -538.75 ml        PHYSICAL EXAM:  General: WD, WN. Alert, cooperative, no acute distress    EENT:  EOMI. Anicteric sclerae. MMM  Resp:  Decreased air entry in the lower lung fields no crackles or wheezing appreciated. CV:  Regular  rhythm,  No edema  GI:  Soft, Non distended, Non tender.  +Bowel sounds  Neurologic:  Alert and oriented X 3, normal speech,   No lower extremity swelling noted. Reviewed most current lab test results and cultures  YES  Reviewed most current radiology test results   YES  Review and summation of old records today    NO  Reviewed patient's current orders and MAR    YES  PMH/SH reviewed - no change compared to H&P  ________________________________________________________________________  Care Plan discussed with:    Comments   Patient y    Family      RN y    Care Manager     Consultant                        Multidiciplinary team rounds were held today with , nursing, pharmacist and clinical coordinator. Patient's plan of care was discussed; medications were reviewed and discharge planning was addressed. ________________________________________________________________________  Total NON critical care TIME:  35 Minutes    Total CRITICAL CARE TIME Spent:   Minutes non procedure based      Comments   >50% of visit spent in counseling and coordination of care     ________________________________________________________________________  Radha Carver MD     Procedures: see electronic medical records for all procedures/Xrays and details which were not copied into this note but were reviewed prior to creation of Plan. LABS:  I reviewed today's most current labs and imaging studies. Pertinent labs include:  Recent Labs     10/31/19  0314 10/30/19  0014 10/29/19  0224   WBC 37.8* 54.6* 54.4*   HGB 7.3* 7.8* 9.1*   HCT 26.5* 28.4* 32.4*   PLT 93* 90* 106*     Recent Labs     10/31/19  0314 10/30/19  0014 10/29/19  0224    142 140   K 4.5 4.1 3.6   * 112* 108   CO2 25 22 23   * 145* 143*   BUN 20 19 23*   CREA 0.59 0.74 0.80   CA 7.8* 8.3* 8.6   ALB 3.4* 3.5 3.8   TBILI 0.3 0.7 0.6   SGOT 26 24 26   ALT 44 34 25       Signed:  Radha Carver MD

## 2019-10-31 NOTE — PROGRESS NOTES
RN staff notes a tarry stool this AM. Chart reviewed. Hgb trending down from admit 9.1 to 7.3. Also has underlying MDS so ?the main driving force of the patient's anemia. Her BUN is relatively normal although in the acute setting it won't change much. For now, will discontinue Lovenox. Trend hct. Will give a dose of Protonix IV.  AM MD to perform rectal exam

## 2019-10-31 NOTE — PROGRESS NOTES
Report received from CARLIE [facility]. Introduced myself as primary RN. Assumed care of patient. Instructed patient to call for assistance prior to getting up. Pt verbalized understanding. Call bell within reach. 9:45 AM Patient with increased shortness of breath. Dyspneic on exertion. Attending at bedside. Orders placed for IV Lasix, scheduled nebs, mucinex, occult stool, obtain repeat H/H at 5pm and d/c IVFs. Will continue to monitor. 1:20 PM Patient off fl for CXR. Patient back on fl.     6:05 PM H/H obtained and sent to lab. Will continue to monitor. Bedside and Verbal shift change report given to CARLIE (oncoming nurse) by Kizzy Edgar (offgoing nurse).  Report included the following information SBAR, Kardex, MAR, Recent Results, Med Rec Status and Cardiac Rhythm ST.

## 2019-10-31 NOTE — PROGRESS NOTES
Transitional Care Team: Initial HUG Note    Date of Assessment: 10/31/19  Time of Assessment:  11:50 AM    Soren Emerson is a 82 y.o. female inpatient at  [facility]. Assessment & Plan   Pt A+O. Occasional phlegmy cough. Continues on IV antibiotics. Anticipates next serial H+H blood draw about 2 PM. Concerned if it drops too low( possible cutoff of 7.0) with potential need to transfuse. Also questions if Dr Gabino Spivey will be able to get her started on the medication to treat her myelodysplastic syndrome. Deferred her to ask him as he will likely need to evaluate her response to the antibiotics and the HCT count. She is aware may be discharged to home in next day or so and need to complete antibiotic course at home. May benefit with [facility] services upon discharge         Primary Diagnosis: pneumonia  Advance Directive:  AMD with named health care proxy is in her medical record. Current Code Status:  full    Referral to Hospice/ Palliative Care Appropriate: not yet. Awareness of Medical Conditions: (Trajectory of illness and pts expectations). Unclear as pt anticipates to begin course of treatment for her myelodysplastic disease    Discharge Needs: (to include safety issues)HH    Barriers Identified: none    Patient is willing to go to SNF/Inpatient Rehab if recommended: yes    Medication Review:  Await AVS.    Can patient afford medications:  Current meds. Patient is Compliant with Medication regimen:yes    Who manages medications at home: self/ family    Best Patient Contact Number:    [phone]    HUG (Healthy Understanding of Goals) program introduced to patient/family. The Transitional Care Team bridges the gaps in care and education surrounding discharge from the acute care facility. The objective is to empower the patient and family in taking a proactive role in preventing readmission within the first thirty days after discharge.  The team is also involved in the efforts to reduce readmission to the acute care setting after stabilization and discharge from the acute care environment either to skilled nursing facilities or community. G RN/NP will return with [facility] Calendar/ follow up appointments/ Ambulatory Nurse Navigator name and contact information when the patient is ready for discharge. Future Appointments   Date Time Provider Anju Dickson   12/9/2019  8:30 AM [place] [facility]   12/12/2019 11:40 AM Jessee Mcneil  [facility]       Non-[facility] follow up appointment(s): none yet    [facility]: call information given to on discharge calendar      Patient education focused on readmission zones as described as: The Red Zone: High risk for readmission, days 1-21  The Yellow Zone: Moderate  risk for readmission, days 22-29   The Green Zone: Lower risk for readmission, days                30 and after    The [facility] Team will attempt to follow the patient from a distance while inpatient as well as be available for further transition/disposition needs. The [facility] team will continue to offer support during the 30- 90 day discharge from acute care setting. Will notify Ambulatory {Blank Single Select Template:[id number][de-identified] \"ANDREW   RN.     Past Medical History:   Diagnosis Date    Arthritis     Bunion of right foot 8/20/2015    Chronic pain     back--uses tens unit    Diverticulitis 6/29/2018    Recurrent    Dry eye syndrome 6/29/2018    Fibromyalgia 6/29/2018    GERD (gastroesophageal reflux disease)     History of left breast cancer 2013    lumpectomy radiation    Hypercholesterolemia 6/29/2018    Ill-defined condition     blood transfusion hx    Leukemia ([address]     Osteoporosis 6/29/2018    Peripheral neuropathy 6/29/2018    Prediabetes 6/29/2018    PUD (peptic ulcer disease)     Radiation therapy complication 7579    Lt breast-No Chemo    Rosacea 6/29/2018    Trouble in sleeping

## 2019-11-01 PROBLEM — D61.82 ANEMIA ASSOCIATED WITH BONE MARROW INFILTRATION (HCC): Status: ACTIVE | Noted: 2019-11-01

## 2019-11-01 PROBLEM — C93.10 CHRONIC MYELOMONOCYTIC LEUKEMIA (HCC): Status: ACTIVE | Noted: 2019-11-01

## 2019-11-01 LAB
ALBUMIN SERPL-MCNC: 3.6 G/DL (ref 3.5–5)
ALBUMIN/GLOB SERPL: 0.9 {RATIO} (ref 1.1–2.2)
ALP SERPL-CCNC: 62 U/L (ref 45–117)
ALT SERPL-CCNC: 44 U/L (ref 12–78)
ANION GAP SERPL CALC-SCNC: 8 MMOL/L (ref 5–15)
AST SERPL-CCNC: 29 U/L (ref 15–37)
BASOPHILS # BLD: 0 K/UL (ref 0–0.1)
BASOPHILS NFR BLD: 0 % (ref 0–1)
BILIRUB SERPL-MCNC: 0.5 MG/DL (ref 0.2–1)
BUN SERPL-MCNC: 22 MG/DL (ref 6–20)
BUN/CREAT SERPL: 31 (ref 12–20)
CALCIUM SERPL-MCNC: 8.4 MG/DL (ref 8.5–10.1)
CALCULATED R AXIS, ECG10: -7 DEGREES
CALCULATED T AXIS, ECG11: 94 DEGREES
CHLORIDE SERPL-SCNC: 109 MMOL/L (ref 97–108)
CO2 SERPL-SCNC: 25 MMOL/L (ref 21–32)
CREAT SERPL-MCNC: 0.71 MG/DL (ref 0.55–1.02)
DIAGNOSIS, 93000: NORMAL
DIFFERENTIAL METHOD BLD: ABNORMAL
EOSINOPHIL # BLD: 0 K/UL (ref 0–0.4)
EOSINOPHIL NFR BLD: 0 % (ref 0–7)
ERYTHROCYTE [DISTWIDTH] IN BLOOD BY AUTOMATED COUNT: 16.5 % (ref 11.5–14.5)
GLOBULIN SER CALC-MCNC: 3.8 G/DL (ref 2–4)
GLUCOSE SERPL-MCNC: 148 MG/DL (ref 65–100)
HCT VFR BLD AUTO: 27.8 % (ref 35–47)
HGB BLD-MCNC: 7.8 G/DL (ref 11.5–16)
IMM GRANULOCYTES # BLD AUTO: 0 K/UL (ref 0–0.04)
IMM GRANULOCYTES NFR BLD AUTO: 0 % (ref 0–0.5)
LYMPHOCYTES # BLD: 12.5 K/UL (ref 0.8–3.5)
LYMPHOCYTES NFR BLD: 35 % (ref 12–49)
MCH RBC QN AUTO: 31.1 PG (ref 26–34)
MCHC RBC AUTO-ENTMCNC: 28.1 G/DL (ref 30–36.5)
MCV RBC AUTO: 110.8 FL (ref 80–99)
METAMYELOCYTES NFR BLD MANUAL: 3 %
MONOCYTES # BLD: 0.7 K/UL (ref 0–1)
MONOCYTES NFR BLD: 2 % (ref 5–13)
MYELOCYTES NFR BLD MANUAL: 4 %
NEUTS BAND NFR BLD MANUAL: 6 %
NEUTS SEG # BLD: 20 K/UL (ref 1.8–8)
NEUTS SEG NFR BLD: 50 % (ref 32–75)
NRBC # BLD: 0.3 K/UL (ref 0–0.01)
NRBC BLD-RTO: 0.8 PER 100 WBC
PLATELET # BLD AUTO: 101 K/UL (ref 150–400)
PLATELET COMMENTS,PCOM: ABNORMAL
PMV BLD AUTO: 10.8 FL (ref 8.9–12.9)
POTASSIUM SERPL-SCNC: 4 MMOL/L (ref 3.5–5.1)
PROT SERPL-MCNC: 7.4 G/DL (ref 6.4–8.2)
Q-T INTERVAL, ECG07: 286 MS
QRS DURATION, ECG06: 88 MS
QTC CALCULATION (BEZET), ECG08: 404 MS
RBC # BLD AUTO: 2.51 M/UL (ref 3.8–5.2)
RBC MORPH BLD: ABNORMAL
SODIUM SERPL-SCNC: 142 MMOL/L (ref 136–145)
VENTRICULAR RATE, ECG03: 120 BPM
WBC # BLD AUTO: 35.8 K/UL (ref 3.6–11)

## 2019-11-01 PROCEDURE — C9113 INJ PANTOPRAZOLE SODIUM, VIA: HCPCS | Performed by: HOSPITALIST

## 2019-11-01 PROCEDURE — 74011000250 HC RX REV CODE- 250: Performed by: INTERNAL MEDICINE

## 2019-11-01 PROCEDURE — 77010033678 HC OXYGEN DAILY

## 2019-11-01 PROCEDURE — 85025 COMPLETE CBC W/AUTO DIFF WBC: CPT

## 2019-11-01 PROCEDURE — 74011250636 HC RX REV CODE- 250/636: Performed by: HOSPITALIST

## 2019-11-01 PROCEDURE — 36415 COLL VENOUS BLD VENIPUNCTURE: CPT

## 2019-11-01 PROCEDURE — 65660000000 HC RM CCU STEPDOWN

## 2019-11-01 PROCEDURE — 74011250637 HC RX REV CODE- 250/637: Performed by: FAMILY MEDICINE

## 2019-11-01 PROCEDURE — 74011250636 HC RX REV CODE- 250/636: Performed by: INTERNAL MEDICINE

## 2019-11-01 PROCEDURE — 74011250637 HC RX REV CODE- 250/637: Performed by: INTERNAL MEDICINE

## 2019-11-01 PROCEDURE — 93005 ELECTROCARDIOGRAM TRACING: CPT

## 2019-11-01 PROCEDURE — 94640 AIRWAY INHALATION TREATMENT: CPT

## 2019-11-01 PROCEDURE — 74011000250 HC RX REV CODE- 250: Performed by: HOSPITALIST

## 2019-11-01 PROCEDURE — 74011000258 HC RX REV CODE- 258: Performed by: INTERNAL MEDICINE

## 2019-11-01 PROCEDURE — 80053 COMPREHEN METABOLIC PANEL: CPT

## 2019-11-01 PROCEDURE — 74011250637 HC RX REV CODE- 250/637: Performed by: HOSPITALIST

## 2019-11-01 RX ORDER — DILTIAZEM HYDROCHLORIDE 120 MG/1
120 CAPSULE, COATED, EXTENDED RELEASE ORAL DAILY
Status: DISCONTINUED | OUTPATIENT
Start: 2019-11-02 | End: 2019-11-02

## 2019-11-01 RX ORDER — DILTIAZEM HYDROCHLORIDE 5 MG/ML
10 INJECTION INTRAVENOUS ONCE
Status: COMPLETED | OUTPATIENT
Start: 2019-11-01 | End: 2019-11-01

## 2019-11-01 RX ORDER — PANTOPRAZOLE SODIUM 40 MG/1
40 TABLET, DELAYED RELEASE ORAL
Status: DISCONTINUED | OUTPATIENT
Start: 2019-11-01 | End: 2019-11-05 | Stop reason: HOSPADM

## 2019-11-01 RX ORDER — PREDNISONE 20 MG/1
40 TABLET ORAL
Status: DISCONTINUED | OUTPATIENT
Start: 2019-11-02 | End: 2019-11-01

## 2019-11-01 RX ORDER — DILTIAZEM HYDROCHLORIDE 30 MG/1
30 TABLET, FILM COATED ORAL
Status: COMPLETED | OUTPATIENT
Start: 2019-11-01 | End: 2019-11-01

## 2019-11-01 RX ADMIN — PANTOPRAZOLE SODIUM 40 MG: 40 INJECTION, POWDER, FOR SOLUTION INTRAVENOUS at 09:44

## 2019-11-01 RX ADMIN — PANTOPRAZOLE SODIUM 40 MG: 40 TABLET, DELAYED RELEASE ORAL at 18:12

## 2019-11-01 RX ADMIN — Medication 10 ML: at 04:22

## 2019-11-01 RX ADMIN — MULTIPLE VITAMINS W/ MINERALS TAB 1 TABLET: TAB at 08:36

## 2019-11-01 RX ADMIN — IPRATROPIUM BROMIDE AND ALBUTEROL SULFATE 3 ML: .5; 3 SOLUTION RESPIRATORY (INHALATION) at 13:53

## 2019-11-01 RX ADMIN — Medication 5 ML: at 22:09

## 2019-11-01 RX ADMIN — DILTIAZEM HYDROCHLORIDE 30 MG: 30 TABLET, FILM COATED ORAL at 22:09

## 2019-11-01 RX ADMIN — BENZONATATE 100 MG: 100 CAPSULE ORAL at 22:12

## 2019-11-01 RX ADMIN — DILTIAZEM HYDROCHLORIDE 30 MG: 30 TABLET, FILM COATED ORAL at 18:12

## 2019-11-01 RX ADMIN — DILTIAZEM HYDROCHLORIDE 30 MG: 30 TABLET, FILM COATED ORAL at 08:36

## 2019-11-01 RX ADMIN — IPRATROPIUM BROMIDE AND ALBUTEROL SULFATE 3 ML: .5; 3 SOLUTION RESPIRATORY (INHALATION) at 01:15

## 2019-11-01 RX ADMIN — IPRATROPIUM BROMIDE AND ALBUTEROL SULFATE 3 ML: .5; 3 SOLUTION RESPIRATORY (INHALATION) at 20:14

## 2019-11-01 RX ADMIN — IPRATROPIUM BROMIDE AND ALBUTEROL SULFATE 3 ML: .5; 3 SOLUTION RESPIRATORY (INHALATION) at 07:19

## 2019-11-01 RX ADMIN — DILTIAZEM HYDROCHLORIDE 30 MG: 30 TABLET, FILM COATED ORAL at 11:47

## 2019-11-01 RX ADMIN — CEFTRIAXONE SODIUM 2 G: 2 INJECTION, POWDER, FOR SOLUTION INTRAMUSCULAR; INTRAVENOUS at 04:17

## 2019-11-01 RX ADMIN — DILTIAZEM HYDROCHLORIDE 10 MG: 5 INJECTION INTRAVENOUS at 08:36

## 2019-11-01 RX ADMIN — MELATONIN TAB 3 MG 3 MG: 3 TAB at 22:09

## 2019-11-01 RX ADMIN — GUAIFENESIN 600 MG: 600 TABLET, EXTENDED RELEASE ORAL at 18:12

## 2019-11-01 RX ADMIN — GUAIFENESIN 600 MG: 600 TABLET, EXTENDED RELEASE ORAL at 08:36

## 2019-11-01 RX ADMIN — Medication 10 ML: at 18:14

## 2019-11-01 RX ADMIN — METHYLPREDNISOLONE SODIUM SUCCINATE 40 MG: 40 INJECTION, POWDER, FOR SOLUTION INTRAMUSCULAR; INTRAVENOUS at 04:21

## 2019-11-01 NOTE — PROGRESS NOTES
0155: Pt presents with change in heart rhythm from NSR/ ST to A-Fib with HR 90's-110's. Pt denies chest pain. No previous history of Afib. MD paged to get EKG and further orders. Pt stable No acute s/s of distress noted. 12: MD re paged. Still awaiting further orders. 0335: EKG Done.    1998: Bedside report given to DAINA Clement.

## 2019-11-01 NOTE — PROGRESS NOTES
Hospitalist Progress Note    NAME: Zelalem Labs   :  1935   MRN:  [id number]       Assessment / Plan:  New Onset AFib with RVR  - s/p Cardizem 10mg IV and Started Cardizem 30mg q6h  - Cardiology eval  - not candidate for [address] due to FOBT+ and Anemia at this time     Anemia/FOBT + likely Upper GI bleed  - will get GI eval   - conservative management  - outpatient follow up with  for EGD as non-emergent basis   - PPI BID    Acute hypoxic respiratory failure likely related to underlying pneumonia. SIRS at admission ,sepsis ruled out. Chest x-ray  favoring infectious/inflammatory  process with some hilar lymph nodes. Continue empiric antibiotic treatment for community-acquired pneumonia with ceftriaxone and azithromycin. Continue to supplemental oxygen until oxygen saturation above 90%. Change steroids to PO. History of breast cancer, status post lobectomy and radiation as per patient. myelodysplastic syndrome/thombocytopenia followed by Dr. Alize Lagos  Hematology oncologist consulted for leukocytosis with a WBC count in the 50s-->30s  Hematology oncology input appreciated. We will do occult blood test for the drop in hemoglobin from 9-7. Transfuse patient if hemoglobin goes below 7. Obesity counseled on diet   Lumbar spinal stenosis with peripheral neuropathy tylenol prn  Code Status: Full  Surrogate Decision Maker:  VITALY [phone]  DVT Prophylaxis: lovenox  Baseline: ambulatory , patient would likely go back home on discharge.   Body mass index is 29.95 kg/m².: 25.0 - 29.9 Overweight     Subjective:     Chief Complaint / Reason for Physician Visit  Pt seen and examined  Having palpitations and sob last night, Tele showed AFib with RVR   This Am slightly better but still feels palpitations    Review of Systems:  Symptom Y/N Comments  Symptom Y/N Comments   Fever/Chills n   Chest Pain n    Poor Appetite    Edema     Cough y   Abdominal Pain n    Sputum y   Joint Pain     SOB/AL y Pruritis/Rash     Nausea/vomit    Tolerating PT/OT     Diarrhea    Tolerating Diet     Constipation    Other       Could NOT obtain due to:      Objective:     VITALS:   Last 24hrs VS reviewed since prior progress note. Most recent are:  Patient Vitals for the past 24 hrs:   Temp Pulse Resp BP SpO2   11/01/19 1051 97.2 °F (36.2 °C) 92 20 121/63 97 %   11/01/19 0813 97.8 °F (36.6 °C) (!) 131 20 (!) 141/121 96 %   11/01/19 0719     98 %   11/01/19 0401 98.4 °F (36.9 °C) (!) 117 20 140/69 95 %   10/31/19 2331 98.2 °F (36.8 °C) 97 22 166/70 94 %   10/31/19 1920 98.6 °F (37 °C) (!) 117 20 156/78 94 %   10/31/19 1532 97.7 °F (36.5 °C) (!) 110 20 151/71 95 %   10/31/19 1518     94 %       Intake/Output Summary (Last 24 hours) at 11/1/2019 1313  Last data filed at 11/1/2019 0417  Gross per 24 hour   Intake 550 ml   Output 1850 ml   Net -1300 ml        PHYSICAL EXAM:  General: WD, WN. Alert, cooperative, no acute distress    EENT:  EOMI. Anicteric sclerae. MMM  Resp:  Decreased air entry in the lower lung fields no crackles or wheezing appreciated. CV:  Irregularly irregular   GI:  Soft, Non distended, Non tender.  +Bowel sounds  Neurologic:  Alert and oriented X 3, normal speech,   No lower extremity swelling noted. Reviewed most current lab test results and cultures  YES  Reviewed most current radiology test results   YES  Review and summation of old records today    NO  Reviewed patient's current orders and MAR    YES  PMH/SH reviewed - no change compared to H&P  ________________________________________________________________________  Care Plan discussed with:    Comments   Patient y    Family      RN y    Care Manager     Consultant                        Multidiciplinary team rounds were held today with , nursing, pharmacist and clinical coordinator. Patient's plan of care was discussed; medications were reviewed and discharge planning was addressed. ________________________________________________________________________  Total NON critical care TIME:  35 Minutes    Total CRITICAL CARE TIME Spent:   Minutes non procedure based      Comments   >50% of visit spent in counseling and coordination of care     ________________________________________________________________________  Bruno Canas MD     Procedures: see electronic medical records for all procedures/Xrays and details which were not copied into this note but were reviewed prior to creation of Plan. LABS:  I reviewed today's most current labs and imaging studies.   Pertinent labs include:  Recent Labs     11/01/19  0349 10/31/19  1807 10/31/19  0314 10/30/19  0014   WBC 35.8*  --  37.8* 54.6*   HGB 7.8* 8.9* 7.3* 7.8*   HCT 27.8* 31.1* 26.5* 28.4*   *  --  93* 90*     Recent Labs     11/01/19  0349 10/31/19  0314 10/30/19  0014    142 142   K 4.0 4.5 4.1   * 113* 112*   CO2 25 25 22   * 137* 145*   BUN 22* 20 19   CREA 0.71 0.59 0.74   CA 8.4* 7.8* 8.3*   ALB 3.6 3.4* 3.5   TBILI 0.5 0.3 0.7   SGOT 29 26 24   ALT 44 44 34       Signed: Bruno Canas MD

## 2019-11-01 NOTE — PROGRESS NOTES
Problem: Chronic Obstructive Pulmonary Disease (COPD)  Goal: *Oxygen saturation during activity within specified parameters  Outcome: Progressing Towards Goal  Goal: *Able to remain out of bed as prescribed  Outcome: Progressing Towards Goal  Goal: *Absence of hypoxia  Outcome: Progressing Towards Goal  Goal: *Optimize nutritional status  Outcome: Progressing Towards Goal     Problem: Pneumonia: Day 3  Goal: Off Pathway (Use only if patient is Off Pathway)  Outcome: Progressing Towards Goal  Goal: Activity/Safety  Outcome: Progressing Towards Goal  Goal: Consults, if ordered  Outcome: Progressing Towards Goal  Goal: Diagnostic Test/Procedures  Outcome: Progressing Towards Goal  Goal: Nutrition/Diet  Outcome: Progressing Towards Goal  Goal: Discharge Planning  Outcome: Progressing Towards Goal  Goal: Medications  Outcome: Progressing Towards Goal  Goal: Respiratory  Outcome: Progressing Towards Goal  Goal: Treatments/Interventions/Procedures  Outcome: Progressing Towards Goal  Goal: Psychosocial  Outcome: Progressing Towards Goal  Goal: *Oxygen saturation within defined limits  Outcome: Progressing Towards Goal  Goal: *Hemodynamically stable  Outcome: Progressing Towards Goal  Goal: *Demonstrates progressive activity  Outcome: Progressing Towards Goal  Goal: *Tolerating diet  Outcome: Progressing Towards Goal  Goal: *Describes available resources and support systems  Outcome: Progressing Towards Goal  Goal: *Optimal pain control at patient's stated goal  Outcome: Progressing Towards Goal

## 2019-11-01 NOTE — PROGRESS NOTES
ADULT PROTOCOL: JET AEROSOL ASSESSMENT    Patient  Tobias Chu     82 y.o.   female     10/31/2019  8:28 PM    Breath Sounds Pre Procedure: Right Breath Sounds: Coarse, Diminished                               Left Breath Sounds: Scattered wheezing    Breath Sounds Post Procedure:                                        Breathing pattern: Pre procedure Breathing Pattern: Regular          Post procedure Breathing Pattern: Dyspnea at rest    Heart Rate: Pre procedure Pulse: 107           Post procedure Pulse: 126    Resp Rate: Pre procedure Respirations: 20           Post procedure Respirations: 22    Cough: Pre procedure Cough: Non-productive               Post procedure        Oxygen: O2 Device: Nasal cannula   Flow rate (L/min) 4     Changed: NO    SpO2: Pre procedure SpO2: 100 %   with oxygen              Post procedure SpO2: 95 %  with oxygen    Nebulizer Therapy: Current medications Aerosolized Medications: DuoNeb      Changed: YES, to Q6    Smoking History:    Smoking status: Former Smoker       Packs/day: 0.50       Years: 50.00       Pack years: 25.00       Last attempt to quit: 2012       Years since quittin.7         Problem List:   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    Lumbar post-laminectomy syndrome M96.1    Spinal stenosis of lumbar region with neurogenic claudication M48.062    Syncope R55    Stenosis of both internal carotid arteries I65.23    Syncope and collapse R55    S/P lumpectomy, left breast Z98.890    Costochondritis M94.0    Diverticulitis K57.92    Dry eye syndrome H04.129    Fibromyalgia M79.7    GERD without esophagitis K21.9    Hypercholesterolemia E78.00    Osteoporosis M81.0    Peripheral neuropathy G62.9  Prediabetes R73.03    Rosacea L71.9    Acute bronchitis due to other specified organisms J20.8    Anemia, unspecified D64.9    Thrombocytopenia (HCC) D69.6    Iron deficiency anemia D50.9    COPD (chronic obstructive pulmonary disease) ([facility]) J44.9    SIRS (systemic inflammatory response syndrome) ([facility] R65.10       Respiratory Therapist: Quan Yee, RT

## 2019-11-01 NOTE — PROGRESS NOTES
TRANSITION OF CARE PLAN:     Plan A: Home      CM completed chart review for pt. Pt may benefit from [facility]. CM will continue to follow patient for discharge planning needs and arrange for services as deemed necessary.     Adrian Lopez, Care Manager  [phone]

## 2019-11-01 NOTE — PROGRESS NOTES
TELE-HOSPITALIST CROSS COVER NOTE:    Visit Vitals  /70   Pulse 97   Temp 98.2 °F (36.8 °C)   Resp 22   Ht 5' 5\" (1.651 m)   Wt 81.6 kg (180 lb)   SpO2 94%   BMI 29.95 kg/m²         D/W RN; medical records reviewed; EKG ordered at RN request to assess rhythm; will follow up once available.       Yaya Silva

## 2019-11-01 NOTE — CONSULTS
Consult    NAME: Randall Mon   :  1935   MRN:  [id number]     Date/Time:  2019 12:35 PM    Patient PCP: Tammy Liao MD  ________________________________________________________________________     Assessment:     1. Pneumonia  2. Acute hypoxic respiratory failure   3. Paroxysmal atrial fibrillation  4. Gastrointestinal hemorrhage  5. Anemia  6. Thrombocytopenia  7. Breast CA s/p XRT and lobectomy  8. Myelodysplastic syndrome  9. Spinal stenosis w/ neuropathy         Plan:   HgB 11-->7s  Now in sinus rhythm    Echo  w/ EF 65%     1. Recommend conservative therapy  2. Anticoagulation is contraindicated  3. Change IR diltiazem to CD formulation to start tomorrow  4. If goes in and out of AFib again can consider starting amiodarone  5. An echocardiogram has been ordered by primary  6. Remainder of care per primary    Thank you for this consult and allowing me to take part in this patients care. Please call with questions. [x]        High complexity decision making was performed        Subjective:   CHIEF COMPLAINT: Cough, SOB    HISTORY OF PRESENT ILLNESS:     Leslye Suarez is a 82 y.o.  female who \"presents to the emergency room with chief complaint of cough and shortness of breath that started about 3 or 4 days ago and have gotten progressively worse. Sx  Worsen  more short of breath tonight and decided to be evaluated.  She denies fevers, chills, nausea, vomiting, leg swelling.  She denies any history of congestive heart failure.     She got the flu shot within the past 2 weeks.  She reports some chest pain with coughing.  Her cough is productive of clear sputum. No stool changes.  No new edema or focal weakness. no travel/no sick [place]. \"      We were asked to consult for work up and evaluation of the above problems.      Past Medical History:   Diagnosis Date    Arthritis     Bunion of right foot 2015    Chronic pain     back--uses tens unit    Diverticulitis 2018    Recurrent    Dry eye syndrome 2018    Fibromyalgia 2018    GERD (gastroesophageal reflux disease)     History of left breast cancer     lumpectomy radiation    Hypercholesterolemia 2018    Ill-defined condition     blood transfusion hx    [facility] ([facility]     Osteoporosis 2018    Peripheral neuropathy 2018    Prediabetes 2018    PUD (peptic ulcer disease)     Radiation therapy complication 8074    Lt breast-No Chemo    Rosacea 2018    Trouble in sleeping       Past Surgical History:   Procedure Laterality Date    HX APPENDECTOMY      HX BREAST LUMPECTOMY  2013    LEFT BREAST LUMPECTOMY W/LEFT BREAST SENTINEL NODE BIOPSY,AND NEEDLE LOCALIZATION performed by Sven Simon MD at [facility] MAIN OR    HX CATARACT REMOVAL      bilateral    HX HYSTERECTOMY      ovarian cyst    HX MOHS PROCEDURES      right    HX ORTHOPAEDIC      back surgery x2    HX OTHER SURGICAL      colonoscopy    HX TONSILLECTOMY      TOTAL KNEE ARTHROPLASTY      left    TOTAL KNEE ARTHROPLASTY      right    US GUIDED CORE BREAST BIOPSY Left 2013    Breast Ca     Allergies   Allergen Reactions    Hydrocodone Nausea Only and Vertigo    Gabapentin Diarrhea, Nausea Only and Other (comments)     weakness    Lyrica [Pregabalin] Nausea Only    Oxycodone Nausea and Vomiting and Vertigo      Meds:  See below  Social History     Tobacco Use    Smoking status: Former Smoker     Packs/day: 0.50     Years: 50.00     Pack years: 25.00     Last attempt to quit: 2012     Years since quittin.7    Smokeless tobacco: Never Used   Substance Use Topics    Alcohol use:  Yes     Alcohol/week: 2.0 standard drinks     Types: 2 Glasses of wine per week      Family History   Problem Relation Age of Onset    Cancer Mother         bladder    Cancer Father     Breast Cancer Paternal Grandmother        REVIEW OF SYSTEMS:     []         Unable to obtain  ROS due to ---   [x] Total of 12 systems reviewed as follows:    Constitutional: negative fever, negative chills, negative weight loss  Eyes:   negative visual changes  ENT:   negative sore throat, tongue or lip swelling  Respiratory:  negative cough, negative dyspnea  Cards:  negative for chest pain, palpitations, lower extremity edema  GI:   negative for nausea, vomiting, diarrhea, and abdominal pain  Genitourinary: negative for frequency, dysuria  Integument:  negative for rash   Hematologic:  negative for easy bruising and gum/nose bleeding  Musculoskel: negative for myalgias,  back pain  Neurological:  negative for headaches, dizziness, vertigo, weakness  Behavl/Psych: negative for feelings of anxiety, depression     Pertinent Positives include :    Objective:      Physical Exam:    Last 24hrs VS reviewed since prior progress note. Most recent are:    Visit Vitals  /63 (BP 1 Location: Left arm, BP Patient Position: At rest)   Pulse 92   Temp 97.2 °F (36.2 °C)   Resp 20   Ht 5' 5\" (1.651 m)   Wt 81.6 kg (180 lb)   SpO2 97%   BMI 29.95 kg/m²       Intake/Output Summary (Last 24 hours) at 11/1/2019 1235  Last data filed at 11/1/2019 0417  Gross per 24 hour   Intake 550 ml   Output 1850 ml   Net -1300 ml        General Appearance: Well developed, well nourished, alert & oriented x 3,    no acute distress. Ears/Nose/Mouth/Throat: Pupils equal and round, Hearing grossly normal.  Neck: Supple. JVP within normal limits. Carotids good upstrokes, with no bruit. Chest: Lungs w/ scattered basilar wheeze  Cardiovascular: Regular rate and rhythm, S1S2 normal, no murmur, rubs, gallops. Abdomen: Soft, non-tender, bowel sounds are active. No organomegaly. Extremities: Trivial edema bilaterally. Femoral pulses +2, Distal Pulses +1. Skin: Warm and dry. Neuro: CN II-XII grossly intact, Strength and sensation grossly intact. []         Post-cath site without hematoma, bruit, tenderness, or thrill. Distal pulses intact.     Data: Telemetry:  SR w/ 1st deg AVB    EKG:  AFib  []  No new EKG for review. Prior to Admission medications    Medication Sig Start Date End Date Taking? Authorizing Provider   dextromethorphan-guaiFENesin [facility] DM) 60-1,200 mg Tb12 Take 600 mg by mouth two (2) times a day. Indications: cough   Yes Other, MD Libby   ergocalciferol (ERGOCALCIFEROL) 50,000 unit capsule Take 50,000 Units by mouth every Sunday. Yes Provider, Historical   inulin (FIBER GUMMIES PO) Take 2 Gum by mouth nightly. Yes Provider, Historical   acetaminophen (TYLENOL) 325 mg tablet Take 650 mg by mouth every four (4) hours as needed for Pain. Yes Other, MD Libby   vit A/vit C/vit E/zinc/copper (PRESERVISION AREDS PO) Take 1 Tab by mouth nightly. Yes Provider, Historical   multivitamin (ONE A DAY) tablet Take 1 Tab by mouth nightly.    Yes Provider, Historical       Recent Results (from the past 24 hour(s))   HGB & HCT    Collection Time: 10/31/19  6:07 PM   Result Value Ref Range    HGB 8.9 (L) 11.5 - 16.0 g/dL    HCT 31.1 (L) 35.0 - 47.0 %   EKG, 12 LEAD, INITIAL    Collection Time: 11/01/19  3:36 AM   Result Value Ref Range    Ventricular Rate 120 BPM    QRS Duration 88 ms    Q-T Interval 286 ms    QTC Calculation (Bezet) 404 ms    Calculated R Axis -7 degrees    Calculated T Axis 94 degrees    Diagnosis       Atrial fibrillation with rapid ventricular response  Nonspecific T wave abnormality  Abnormal ECG  When compared with ECG of 29-OCT-2019 02:18,  Atrial fibrillation has replaced Sinus rhythm  Confirmed by Giuseppe Booker ([id number]) on 11/1/2019 8:19:04 AM     CBC WITH AUTOMATED DIFF    Collection Time: 11/01/19  3:49 AM   Result Value Ref Range    WBC 35.8 (H) 3.6 - 11.0 K/uL    RBC 2.51 (L) 3.80 - 5.20 M/uL    HGB 7.8 (L) 11.5 - 16.0 g/dL    HCT 27.8 (L) 35.0 - 47.0 %    .8 (H) 80.0 - 99.0 FL    MCH 31.1 26.0 - 34.0 PG    MCHC 28.1 (L) 30.0 - 36.5 g/dL    RDW 16.5 (H) 11.5 - 14.5 %    PLATELET 019 (L) 213 - 400 K/uL MPV 10.8 8.9 - 12.9 FL    NRBC 0.8 (H) 0  WBC    ABSOLUTE NRBC 0.30 (H) 0.00 - 0.01 K/uL    NEUTROPHILS 50 32 - 75 %    BAND NEUTROPHILS 6 %    LYMPHOCYTES 35 12 - 49 %    MONOCYTES 2 (L) 5 - 13 %    EOSINOPHILS 0 0 - 7 %    BASOPHILS 0 0 - 1 %    METAMYELOCYTES 3 %    MYELOCYTES 4 %    IMMATURE GRANULOCYTES 0 0.0 - 0.5 %    ABS. NEUTROPHILS 20.0 (H) 1.8 - 8.0 K/UL    ABS. LYMPHOCYTES 12.5 (H) 0.8 - 3.5 K/UL    ABS. MONOCYTES 0.7 0.0 - 1.0 K/UL    ABS. EOSINOPHILS 0.0 0.0 - 0.4 K/UL    ABS. BASOPHILS 0.0 0.0 - 0.1 K/UL    ABS. IMM. GRANS. 0.0 0.00 - 0.04 K/UL    DF MANUAL      PLATELET COMMENTS Giant platelets      RBC COMMENTS ANISOCYTOSIS  1+        RBC COMMENTS MACROCYTOSIS  1+        RBC COMMENTS MICROCYTOSIS  1+       METABOLIC PANEL, COMPREHENSIVE    Collection Time: 11/01/19  3:49 AM   Result Value Ref Range    Sodium 142 136 - 145 mmol/L    Potassium 4.0 3.5 - 5.1 mmol/L    Chloride 109 (H) 97 - 108 mmol/L    CO2 25 21 - 32 mmol/L    Anion gap 8 5 - 15 mmol/L    Glucose 148 (H) 65 - 100 mg/dL    BUN 22 (H) 6 - 20 MG/DL    Creatinine 0.71 0.55 - 1.02 MG/DL    BUN/Creatinine ratio 31 (H) 12 - 20      GFR est AA >60 >60 ml/min/1.73m2    GFR est non-AA >60 >60 ml/min/1.73m2    Calcium 8.4 (L) 8.5 - 10.1 MG/DL    Bilirubin, total 0.5 0.2 - 1.0 MG/DL    ALT (SGPT) 44 12 - 78 U/L    AST (SGOT) 29 15 - 37 U/L    Alk.  phosphatase 62 45 - 117 U/L    Protein, total 7.4 6.4 - 8.2 g/dL    Albumin 3.6 3.5 - 5.0 g/dL    Globulin 3.8 2.0 - 4.0 g/dL    A-G Ratio 0.9 (L) 1.1 - 2.2          Amor Downs III, DO

## 2019-11-01 NOTE — CONSULTS
Gastroenterology Attending Physician Lucero Almaguer) attestation statement and comments. This patient was seen and examined by me in a face-to-face visit today. I reviewed the medical record including lab work, imaging and other provider notes. I confirmed the history as described above. I spoke to the patient, reviewed the medical record including lab work, imaging and other provider notes. I discussed this case in detail with CHARLY Worthy NP. I formulated an  assessment of this patient and developed a treatment plan. I agree with the above consultation note. I agree with the history, exam and assessment and plan as outlined in the note. I would like to add the followinyo F with CML/MDS admitted 10/29 bronchitis and pneumonia, not on any blood thinners, with persistent macrocytic anemia at baseline found to have guaiac positive stool and reported, but not witness dark stool with red streaks. No significant elevation of BUN:Cr. Respiratory status is improving but continued significant supplemental O2 requirements noted. Abd exam bening. Irr IRR, few b/l rhonchi. Labs and meds reviewed. Blood in stool could be related to hemorrhoids, polyps, gastritis, or esophagitis. Her low platelet count may also predispose her to bleeding. She ate today prior to our evaluation  Plan:  1) Would defer any endoscopic or colonoscopic evaluation for 2 wks until pneumonia/broncitis resolved as significant potential for respiratory compromise with sedation for procedures. 2) Recurrence of atrial fibrillation noted needs to be treated as well prior to any GI investigation  3) Provide PPIvas BID for 1 wk, then every day  4) Avoid NSAIDs  5) Daily H/H with plan to transfuse PRN for goal Hgb> 7  6) Continue conservative management  7) If brisk bleeding noted, please contact us  Will sign off for now. She can be seen for outpt evaluation if deemed medically appropriate in 1 month.   Peewee Nesbitt MD            GI CONSULTATION NOTE Delilah Hoover, RENATO  [phone] NP in-hospital cell phone M-F until 4:30  After 5pm or on weekends, please call  for physician on call    NAME: Aylin Mendoza   :  1935 MRN: [id number]   Attending: Dr. Jack Gruber GI: Dr. Suad Cruz PCP: Dr. Roberto Traylor  Date/Time:  2019 9:09 AM  Assessment:   -Upper GI bleed, melena  · Heme positive stool  · Multiple episodes of dark stool starting on Wednesday. Per nursing charting last stool yesterday noted to be black with red streaks  -Anemia, with drop in hemoglobin overnight  · 7.8 & 27.8, .8  · plt 101, BUN 22, creat 0.71  -Acute hypoxic respiratory failure, thought to be r/t pneumonia. Managed by primary team  -Afib, had episode of afib last night, currently being worked up by primary team and medically optimized  -H/o breast cancer, s/p lobectomy and radiation  -Myelodysplastic syndrome/thrombocytopenia, followed by Dr. Evens Charles:   -Recommend conservative management at this time. Patient with potential pneumonia, had flip into afib last night. Pending cardiac workup. Needs medical optimization. Ideally since patient not on blood thinners will have spontaneous resolution of potential bleed with medical management. No signs of active brisk bleed. Patient hemodynamically stable. Had banana and oatmeal today for breakfast, not ideal for non-emergent EGD. Will reconsider if develops S&S of brisk GI bleed or continues to have signs of bleed. Will adjust plan as clinically indicated. -Supportive measures as clinically indicated. Monitor for further S&S of GI bleed  -Serial CBC as clinically indicated. Goal hemoglobin >7.  -Diet as tolerated  -PPI 40mg BID for gastric protection in setting of GI bleed. Can add Carafate QID if clinically indicated. -Avoid NSAIDs   -Can follow up with GI as an outpatient for further evaluation in next 1-2 months for further non-emergent evaluation  -Will sign off for now as GI has nothing to add to plan at this time. Please call GI with any further questions or concerns. Plan discussed with Dr. Fisher Section  Subjective:     HISTORY OF PRESENT ILLNESS:     Tobias Chu is an 82 y.o.  female who we are asked to see for complaint of dark stool, FOBT +, Anemia. Patient reports she came to the hospital 10/29 with SOB and cough. Reports since being in the hospital has has black stool and episode of afib. Denies any abdominal pain. No nausea or vomiting. Has had bloating and distention for about the last week. No trouble or pain with swallowing. No change in appetite or weight. Rare NSAID use. No changes in bowel habits as far as diarrhea or constipation. Has regular bowel movements daily, sometimes second bowel movement at night, soft formed and easy to pass. No hematochezia. Having melena which started Wednesday, had black stool. Thursday had small stool which she thinks was dark as well. Nothing so far yet today. Has had EGD and CLN many years ago. Unsure when she is due next. No h/o of polyps ever. Has family history of son with esophageal cancer. Has other family history of breast cancer. Thinks colonoscopy was completed with Dr. Emelia Byers, unsure when. Reports this am did eat some banana and oatmeal about two hours ago at 0700. Per nursing charting patient had black stool with red streaks yesterday. [place] Procedural History  -2008 EGD with Dr. Phillip Clarke, noted to have gastritis and esophagitis.  H. Pylori negative, Elizabeth's negative, benign tissue      Past Medical History:   Diagnosis Date    Arthritis     Bunion of right foot 8/20/2015    Chronic pain     back--uses tens unit    Diverticulitis 6/29/2018    Recurrent    Dry eye syndrome 6/29/2018    Fibromyalgia 6/29/2018    GERD (gastroesophageal reflux disease)     History of left breast cancer 2013    lumpectomy radiation    Hypercholesterolemia 6/29/2018    Ill-defined condition     blood transfusion hx    Leukemia ([address]     Osteoporosis 6/29/2018    Peripheral neuropathy 2018    Prediabetes 2018    PUD (peptic ulcer disease)     Radiation therapy complication 9941    Lt breast-No Chemo    Rosacea 2018    Trouble in sleeping       Past Surgical History:   Procedure Laterality Date    HX APPENDECTOMY      HX BREAST LUMPECTOMY  2013    LEFT BREAST LUMPECTOMY W/LEFT BREAST SENTINEL NODE BIOPSY,AND NEEDLE LOCALIZATION performed by Meli Go MD at [facility] MAIN OR    HX CATARACT REMOVAL      bilateral    HX HYSTERECTOMY      ovarian cyst    HX MOHS PROCEDURES      right    HX ORTHOPAEDIC      back surgery x2    HX OTHER SURGICAL      colonoscopy    HX TONSILLECTOMY      TOTAL KNEE ARTHROPLASTY      left    TOTAL KNEE ARTHROPLASTY      right    US GUIDED CORE BREAST BIOPSY Left 2013    Breast Ca     Social History     Tobacco Use    Smoking status: Former Smoker     Packs/day: 0.50     Years: 50.00     Pack years: 25.00     Last attempt to quit: 2012     Years since quittin.7    Smokeless tobacco: Never Used   Substance Use Topics    Alcohol use: Yes     Alcohol/week: 2.0 standard drinks     Types: 2 Glasses of wine per week      Family History   Problem Relation Age of Onset    Cancer Mother         bladder    Cancer Father     Breast Cancer Paternal Grandmother       Allergies   Allergen Reactions    Hydrocodone Nausea Only and Vertigo    Gabapentin Diarrhea, Nausea Only and Other (comments)     weakness    Lyrica [Pregabalin] Nausea Only    Oxycodone Nausea and Vomiting and Vertigo      Prior to Admission medications    Medication Sig Start Date End Date Taking? Authorizing Provider   dextromethorphan-guaiFENesin [facility] \[facility] [place] [facility]\"" DM) 60-1,200 mg Tb12 Take 600 mg by mouth two (2) times a day. Indications: cough   Yes Other, MD Libby   ergocalciferol (ERGOCALCIFEROL) 50,000 unit capsule Take 50,000 Units by mouth every . Yes Provider, Historical   inulin (FIBER GUMMIES PO) Take 2 Gum by mouth nightly.    Yes Provider, Historical   acetaminophen (TYLENOL) 325 mg tablet Take 650 mg by mouth every four (4) hours as needed for Pain. Yes Other, MD Libby   vit A/vit C/vit E/zinc/copper (PRESERVISION AREDS PO) Take 1 Tab by mouth nightly. Yes Provider, Historical   multivitamin (ONE A DAY) tablet Take 1 Tab by mouth nightly.    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    Lumbar post-laminectomy syndrome M96.1    Spinal stenosis of lumbar region with neurogenic claudication M48.062    Syncope R55    Stenosis of both internal carotid arteries I65.23    Syncope and collapse R55    S/P lumpectomy, left breast Z98.890    Costochondritis M94.0    Diverticulitis K57.92    Dry eye syndrome H04.129    Fibromyalgia M79.7    GERD without esophagitis K21.9    Hypercholesterolemia E78.00    Osteoporosis M81.0    Peripheral neuropathy G62.9    Prediabetes R73.03    Rosacea L71.9    Acute bronchitis due to other specified organisms J20.8    Anemia, unspecified D64.9    Thrombocytopenia ([facility]) D69.6    Iron deficiency anemia D50.9    COPD (chronic obstructive pulmonary disease) ([facility]) J44.9    SIRS (systemic inflammatory response syndrome) ([facility]) R65.10       REVIEW OF SYSTEMS:    Constitutional: negative fever, negative chills, negative weight loss  Eyes:   negative visual changes  ENT:   negative sore throat, tongue or lip swelling   Respiratory:  + cough, + dyspnea  Cards:  negative for chest pain, palpitations, lower extremity edema  GI:   See HPI  :  negative for frequency, dysuria  Integument:  negative for rash and pruritus  Heme:  negative for easy bruising and gum/nose bleeding  Musculoskel: negative for myalgias,  back pain and muscle weakness  Neuro: negative for headaches, dizziness, vertigo  Psych: negative for feelings of anxiety, depression     Pertinent Positives: melena      Objective:   VITALS:    Visit Vitals  BP (!) (P) 141/121 (BP 1 Location: Left arm)   Pulse (!) 131   Temp (P) 97.8 °F (36.6 °C)   Resp (P) 20   Ht 5' 5\" (1.651 m)   Wt 81.6 kg (180 lb)   SpO2 96%   BMI 29.95 kg/m²       PHYSICAL EXAM:   General:          Alert, WD, WN, cooperative, no distress, appears stated age. Head:               Normocephalic, without obvious abnormality, atraumatic. Eyes:               Conjunctivae clear and pale, anicteric sclerae. Pupils are equal  Nose:               Nares normal. No drainage or sinus tenderness. Throat:             Lips, mucosa, and tongue normal.  No Thrush  Neck:               Supple, symmetrical,  no adenopathy, thyroid: non tender  Back:               Symmetric,  No CVA tenderness. Lungs:             CTA bilaterally. No wheezing/rhonchi/rales. Chest wall:      No tenderness or deformity. No Accessory muscle use. Heart:              Regular rate and rhythm,  no murmur, rub or gallop. Abdomen:        Soft, non-tender. Not distended. Bowel sounds normal. No masses  Extremities:     Atraumatic, No cyanosis. No edema. No clubbing  Skin:                Texture, turgor normal. No rashes/lesions/jaundice  Lymph:            Cervical, supraclavicular normal.  Psych:             Good insight. Not depressed. Not anxious or agitated. Neurologic:      EOMs intact. No facial asymmetry. No aphasia or slurred speech. Normal                        strength, A/O X 3.      LAB DATA REVIEWED:    Recent Results (from the past 24 hour(s))   OCCULT BLOOD, STOOL    Collection Time: 10/31/19 10:07 AM   Result Value Ref Range    Occult blood, stool POSITIVE (A) NEG     HGB & HCT    Collection Time: 10/31/19  6:07 PM   Result Value Ref Range    HGB 8.9 (L) 11.5 - 16.0 g/dL    HCT 31.1 (L) 35.0 - 47.0 %   EKG, 12 LEAD, INITIAL Collection Time: 11/01/19  3:36 AM   Result Value Ref Range    Ventricular Rate 120 BPM    QRS Duration 88 ms    Q-T Interval 286 ms    QTC Calculation (Bezet) 404 ms    Calculated R Axis -7 degrees    Calculated T Axis 94 degrees    Diagnosis       Atrial fibrillation with rapid ventricular response  Nonspecific T wave abnormality  Abnormal ECG  When compared with ECG of 29-OCT-2019 02:18,  Atrial fibrillation has replaced Sinus rhythm  Confirmed by Debbi Martinez ([id number]) on 11/1/2019 8:19:04 AM     CBC WITH AUTOMATED DIFF    Collection Time: 11/01/19  3:49 AM   Result Value Ref Range    WBC 35.8 (H) 3.6 - 11.0 K/uL    RBC 2.51 (L) 3.80 - 5.20 M/uL    HGB 7.8 (L) 11.5 - 16.0 g/dL    HCT 27.8 (L) 35.0 - 47.0 %    .8 (H) 80.0 - 99.0 FL    MCH 31.1 26.0 - 34.0 PG    MCHC 28.1 (L) 30.0 - 36.5 g/dL    RDW 16.5 (H) 11.5 - 14.5 %    PLATELET 939 (L) 017 - 400 K/uL    MPV 10.8 8.9 - 12.9 FL    NRBC 0.8 (H) 0  WBC    ABSOLUTE NRBC 0.30 (H) 0.00 - 0.01 K/uL    NEUTROPHILS 50 32 - 75 %    BAND NEUTROPHILS 6 %    LYMPHOCYTES 35 12 - 49 %    MONOCYTES 2 (L) 5 - 13 %    EOSINOPHILS 0 0 - 7 %    BASOPHILS 0 0 - 1 %    METAMYELOCYTES 3 %    MYELOCYTES 4 %    IMMATURE GRANULOCYTES 0 0.0 - 0.5 %    ABS. NEUTROPHILS 20.0 (H) 1.8 - 8.0 K/UL    ABS. LYMPHOCYTES 12.5 (H) 0.8 - 3.5 K/UL    ABS. MONOCYTES 0.7 0.0 - 1.0 K/UL    ABS. EOSINOPHILS 0.0 0.0 - 0.4 K/UL    ABS. BASOPHILS 0.0 0.0 - 0.1 K/UL    ABS. IMM.  GRANS. 0.0 0.00 - 0.04 K/UL    DF MANUAL      PLATELET COMMENTS Giant platelets      RBC COMMENTS ANISOCYTOSIS  1+        RBC COMMENTS MACROCYTOSIS  1+        RBC COMMENTS MICROCYTOSIS  1+       METABOLIC PANEL, COMPREHENSIVE    Collection Time: 11/01/19  3:49 AM   Result Value Ref Range    Sodium 142 136 - 145 mmol/L    Potassium 4.0 3.5 - 5.1 mmol/L    Chloride 109 (H) 97 - 108 mmol/L    CO2 25 21 - 32 mmol/L    Anion gap 8 5 - 15 mmol/L    Glucose 148 (H) 65 - 100 mg/dL    BUN 22 (H) 6 - 20 MG/DL    Creatinine 0. 71 0.55 - 1.02 MG/DL    BUN/Creatinine ratio 31 (H) 12 - 20      GFR est AA >60 >60 ml/min/1.73m2    GFR est non-AA >60 >60 ml/min/1.73m2    Calcium 8.4 (L) 8.5 - 10.1 MG/DL    Bilirubin, total 0.5 0.2 - 1.0 MG/DL    ALT (SGPT) 44 12 - 78 U/L    AST (SGOT) 29 15 - 37 U/L    Alk.  phosphatase 62 45 - 117 U/L    Protein, total 7.4 6.4 - 8.2 g/dL    Albumin 3.6 3.5 - 5.0 g/dL    Globulin 3.8 2.0 - 4.0 g/dL    A-G Ratio 0.9 (L) 1.1 - 2.2         IMAGING RESULTS:  I have personally reviewed the imaging reports      Total time spent with patient: 50 minutes ________________________________________________________________________  Care Plan discussed with:  Patient Y   Family     RN Y-Racquel               Consultant:       CT  11/1/2019:  ________________________________________________________________________    ___________________________________________________  Consulting Provider: Elba Acosta NP      11/1/2019  9:09 AM

## 2019-11-02 ENCOUNTER — APPOINTMENT (OUTPATIENT)
Dept: NON INVASIVE DIAGNOSTICS | Age: 84
DRG: 189 | End: 2019-11-02
Attending: HOSPITALIST
Payer: MEDICARE

## 2019-11-02 LAB
ANION GAP SERPL CALC-SCNC: 6 MMOL/L (ref 5–15)
BASOPHILS # BLD: 0.4 K/UL (ref 0–0.1)
BASOPHILS NFR BLD: 1 % (ref 0–1)
BUN SERPL-MCNC: 30 MG/DL (ref 6–20)
BUN/CREAT SERPL: 40 (ref 12–20)
CALCIUM SERPL-MCNC: 8.3 MG/DL (ref 8.5–10.1)
CHLORIDE SERPL-SCNC: 108 MMOL/L (ref 97–108)
CO2 SERPL-SCNC: 26 MMOL/L (ref 21–32)
CREAT SERPL-MCNC: 0.75 MG/DL (ref 0.55–1.02)
DIFFERENTIAL METHOD BLD: ABNORMAL
EOSINOPHIL # BLD: 0 K/UL (ref 0–0.4)
EOSINOPHIL NFR BLD: 0 % (ref 0–7)
ERYTHROCYTE [DISTWIDTH] IN BLOOD BY AUTOMATED COUNT: 17.2 % (ref 11.5–14.5)
GLUCOSE SERPL-MCNC: 113 MG/DL (ref 65–100)
HCT VFR BLD AUTO: 28.1 % (ref 35–47)
HGB BLD-MCNC: 7.9 G/DL (ref 11.5–16)
IMM GRANULOCYTES # BLD AUTO: 0 K/UL (ref 0–0.04)
IMM GRANULOCYTES NFR BLD AUTO: 0 % (ref 0–0.5)
LYMPHOCYTES # BLD: 6.3 K/UL (ref 0.8–3.5)
LYMPHOCYTES NFR BLD: 15 % (ref 12–49)
MCH RBC QN AUTO: 30.5 PG (ref 26–34)
MCHC RBC AUTO-ENTMCNC: 28.1 G/DL (ref 30–36.5)
MCV RBC AUTO: 108.5 FL (ref 80–99)
METAMYELOCYTES NFR BLD MANUAL: 7 %
MONOCYTES # BLD: 6.7 K/UL (ref 0–1)
MONOCYTES NFR BLD: 16 % (ref 5–13)
MYELOCYTES NFR BLD MANUAL: 10 %
NEUTS BAND NFR BLD MANUAL: 2 %
NEUTS SEG # BLD: 21.5 K/UL (ref 1.8–8)
NEUTS SEG NFR BLD: 49 % (ref 32–75)
NRBC # BLD: 0.31 K/UL (ref 0–0.01)
NRBC BLD-RTO: 0.7 PER 100 WBC
PLATELET # BLD AUTO: 124 K/UL (ref 150–400)
PLATELET COMMENTS,PCOM: ABNORMAL
PMV BLD AUTO: 10.5 FL (ref 8.9–12.9)
POTASSIUM SERPL-SCNC: 3.9 MMOL/L (ref 3.5–5.1)
RBC # BLD AUTO: 2.59 M/UL (ref 3.8–5.2)
RBC MORPH BLD: ABNORMAL
SODIUM SERPL-SCNC: 140 MMOL/L (ref 136–145)
WBC # BLD AUTO: 42.1 K/UL (ref 3.6–11)
WBC MORPH BLD: ABNORMAL

## 2019-11-02 PROCEDURE — 74011250637 HC RX REV CODE- 250/637: Performed by: INTERNAL MEDICINE

## 2019-11-02 PROCEDURE — 94640 AIRWAY INHALATION TREATMENT: CPT

## 2019-11-02 PROCEDURE — 85025 COMPLETE CBC W/AUTO DIFF WBC: CPT

## 2019-11-02 PROCEDURE — 74011000258 HC RX REV CODE- 258: Performed by: INTERNAL MEDICINE

## 2019-11-02 PROCEDURE — 74011000250 HC RX REV CODE- 250: Performed by: INTERNAL MEDICINE

## 2019-11-02 PROCEDURE — 74011250636 HC RX REV CODE- 250/636: Performed by: INTERNAL MEDICINE

## 2019-11-02 PROCEDURE — 36415 COLL VENOUS BLD VENIPUNCTURE: CPT

## 2019-11-02 PROCEDURE — 74011250637 HC RX REV CODE- 250/637: Performed by: HOSPITALIST

## 2019-11-02 PROCEDURE — 65660000000 HC RM CCU STEPDOWN

## 2019-11-02 PROCEDURE — 74011000250 HC RX REV CODE- 250: Performed by: HOSPITALIST

## 2019-11-02 PROCEDURE — 80048 BASIC METABOLIC PNL TOTAL CA: CPT

## 2019-11-02 PROCEDURE — 74011250637 HC RX REV CODE- 250/637: Performed by: FAMILY MEDICINE

## 2019-11-02 PROCEDURE — 77010033678 HC OXYGEN DAILY

## 2019-11-02 PROCEDURE — C8929 TTE W OR WO FOL WCON,DOPPLER: HCPCS

## 2019-11-02 RX ORDER — DILTIAZEM HYDROCHLORIDE 180 MG/1
180 CAPSULE, COATED, EXTENDED RELEASE ORAL DAILY
Status: DISCONTINUED | OUTPATIENT
Start: 2019-11-03 | End: 2019-11-05 | Stop reason: HOSPADM

## 2019-11-02 RX ORDER — DILTIAZEM HYDROCHLORIDE 30 MG/1
30 TABLET, FILM COATED ORAL ONCE
Status: COMPLETED | OUTPATIENT
Start: 2019-11-02 | End: 2019-11-02

## 2019-11-02 RX ORDER — DILTIAZEM HYDROCHLORIDE 5 MG/ML
10 INJECTION INTRAVENOUS ONCE
Status: COMPLETED | OUTPATIENT
Start: 2019-11-02 | End: 2019-11-02

## 2019-11-02 RX ORDER — IPRATROPIUM BROMIDE AND ALBUTEROL SULFATE 2.5; .5 MG/3ML; MG/3ML
3 SOLUTION RESPIRATORY (INHALATION)
Status: DISCONTINUED | OUTPATIENT
Start: 2019-11-02 | End: 2019-11-05 | Stop reason: HOSPADM

## 2019-11-02 RX ORDER — BENZONATATE 100 MG/1
200 CAPSULE ORAL 3 TIMES DAILY
Status: DISCONTINUED | OUTPATIENT
Start: 2019-11-02 | End: 2019-11-05 | Stop reason: HOSPADM

## 2019-11-02 RX ADMIN — GUAIFENESIN 600 MG: 600 TABLET, EXTENDED RELEASE ORAL at 08:26

## 2019-11-02 RX ADMIN — DILTIAZEM HYDROCHLORIDE 120 MG: 120 CAPSULE, COATED, EXTENDED RELEASE ORAL at 08:27

## 2019-11-02 RX ADMIN — Medication 10 ML: at 21:02

## 2019-11-02 RX ADMIN — CEFTRIAXONE SODIUM 2 G: 2 INJECTION, POWDER, FOR SOLUTION INTRAMUSCULAR; INTRAVENOUS at 05:46

## 2019-11-02 RX ADMIN — PANTOPRAZOLE SODIUM 40 MG: 40 TABLET, DELAYED RELEASE ORAL at 08:26

## 2019-11-02 RX ADMIN — BENZONATATE 100 MG: 100 CAPSULE ORAL at 05:46

## 2019-11-02 RX ADMIN — DILTIAZEM HYDROCHLORIDE 30 MG: 30 TABLET, FILM COATED ORAL at 10:52

## 2019-11-02 RX ADMIN — MELATONIN TAB 3 MG 3 MG: 3 TAB at 21:03

## 2019-11-02 RX ADMIN — GUAIFENESIN 600 MG: 600 TABLET, EXTENDED RELEASE ORAL at 18:31

## 2019-11-02 RX ADMIN — MULTIPLE VITAMINS W/ MINERALS TAB 1 TABLET: TAB at 08:26

## 2019-11-02 RX ADMIN — Medication 10 ML: at 16:55

## 2019-11-02 RX ADMIN — Medication 10 ML: at 05:47

## 2019-11-02 RX ADMIN — IPRATROPIUM BROMIDE AND ALBUTEROL SULFATE 3 ML: .5; 3 SOLUTION RESPIRATORY (INHALATION) at 01:00

## 2019-11-02 RX ADMIN — DILTIAZEM HYDROCHLORIDE 10 MG: 5 INJECTION INTRAVENOUS at 10:52

## 2019-11-02 RX ADMIN — BENZONATATE 200 MG: 100 CAPSULE ORAL at 21:03

## 2019-11-02 RX ADMIN — PANTOPRAZOLE SODIUM 40 MG: 40 TABLET, DELAYED RELEASE ORAL at 16:53

## 2019-11-02 RX ADMIN — GUAIFENESIN, DEXTROMETHORPHAN HBR 2 TABLET: 600; 30 TABLET ORAL at 21:03

## 2019-11-02 RX ADMIN — BENZONATATE 200 MG: 100 CAPSULE ORAL at 16:53

## 2019-11-02 NOTE — PROGRESS NOTES
ADULT PROTOCOL: JET AEROSOL ASSESSMENT    Patient  Zee Duty     82 y.o.   female     11/2/2019  1:34 AM    Breath Sounds Pre Procedure: Right Breath Sounds: Diminished                               Left Breath Sounds: Diminished    Breath Sounds Post Procedure: Right Breath Sounds: Scattered wheezing                                 Left Breath Sounds: Scattered wheezing    Breathing pattern: Pre procedure Breathing Pattern: Regular          Post procedure Breathing Pattern: Regular    Heart Rate: Pre procedure Pulse: 80           Post procedure Pulse: 82    Resp Rate: Pre procedure Respirations: 18           Post procedure Respirations: 18    Peak Flow: Pre bronchodilator             Post bronchodilator       FVC/FEV1:      Incentive Spirometry:             Cough: Pre procedure Cough: Non-productive               Post procedure Cough: Non-productive    Suctioned: NO    Sputum: Pre procedure                   Post procedure      Oxygen: O2 Device: Nasal cannula   Flow rate (L/min) 3     Changed: NO    SpO2: Pre procedure SpO2: 98 %   with oxygen              Post procedure SpO2: 96 %  with oxygen    Nebulizer Therapy: Current medications Aerosolized Medications: DuoNeb      Changed: NO    Smoking History: former smoker    Problem List:   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    Lumbar post-laminectomy syndrome M96.1    Spinal stenosis of lumbar region with neurogenic claudication M48.062    Syncope R55    Stenosis of both internal carotid arteries I65.23    Syncope and collapse R55    S/P lumpectomy, left breast Z98.890    Costochondritis M94.0    Diverticulitis K57.92    Dry eye syndrome H04.129    Fibromyalgia M79.7    GERD without esophagitis K21.9    Hypercholesterolemia E78.00    Osteoporosis M81.0    Peripheral neuropathy G62.9    Prediabetes R73.03    Rosacea L71.9    Acute bronchitis due to other specified organisms J20.8    Anemia, unspecified D64.9    Thrombocytopenia ([facility]) D69.6    Iron deficiency anemia D50.9    COPD (chronic obstructive pulmonary disease) ([facility]) J44.9    SIRS (systemic inflammatory response syndrome) ([facility]) R65.10    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

## 2019-11-02 NOTE — PROGRESS NOTES
Hospitalist Progress Note    NAME: Christine Briggs   :  1935   MRN:  [id number]       Assessment / Plan:  New Onset AFib with RVR  - s/p Cardizem 10mg IV  - increased Cardizem to 180mg daily   - Cardiology eval  - not candidate for [address] due to FOBT+ and Anemia at this time     Anemia/FOBT + likely Upper GI bleed  - GI following   - conservative management  - outpatient follow up with  for EGD as non-emergent basis   - PPI BID    Acute hypoxic respiratory failure likely related to underlying pneumonia. SIRS at admission ,sepsis ruled out. Chest x-ray  favoring infectious/inflammatory  process with some hilar lymph nodes. Continue empiric antibiotic treatment for community-acquired pneumonia with ceftriaxone and azithromycin. Continue to supplemental oxygen until oxygen saturation above 90%. Change steroids to PO. Wean O2 as able     History of breast cancer, status post lobectomy and radiation as per patient. myelodysplastic syndrome/thombocytopenia followed by Dr. Thomas Person  Hematology oncologist consulted for leukocytosis with a WBC count in the 50s-->30s  Hematology oncology input appreciated. We will do occult blood test for the drop in hemoglobin from 9-7. Transfuse patient if hemoglobin goes below 7. Obesity counseled on diet   Lumbar spinal stenosis with peripheral neuropathy tylenol prn  Code Status: Full  Surrogate Decision Maker:  TGVMVR [phone]  DVT Prophylaxis: lovenox  Baseline: ambulatory , patient would likely go back home on discharge.   Body mass index is 29.95 kg/m².: 25.0 - 29.9 Overweight     Subjective:     Chief Complaint / Reason for Physician Visit  Pt seen and examined  In and out of afib  Coughing a lot   Still sob    Review of Systems:  Symptom Y/N Comments  Symptom Y/N Comments   Fever/Chills n   Chest Pain n    Poor Appetite    Edema     Cough y   Abdominal Pain n    Sputum y   Joint Pain     SOB/AL y   Pruritis/Rash     Nausea/vomit    Tolerating PT/OT Diarrhea    Tolerating Diet     Constipation    Other       Could NOT obtain due to:      Objective:     VITALS:   Last 24hrs VS reviewed since prior progress note. Most recent are:  Patient Vitals for the past 24 hrs:   Temp Pulse Resp BP SpO2   11/02/19 0851 98.2 °F (36.8 °C) 90 18 139/54 98 %   11/02/19 0849 98.2 °F (36.8 °C) 90 18 (!) 123/30 98 %   11/02/19 0422 97.6 °F (36.4 °C) 100 18 124/47 94 %   11/02/19 0100     98 %   11/01/19 2317 98.1 °F (36.7 °C) 73 20 130/59 97 %   11/01/19 2043 98.3 °F (36.8 °C) 91 22 136/57 95 %   11/01/19 2014     97 %   11/01/19 1933  83   95 %   11/01/19 1913  (!) 117   95 %   11/01/19 1903  86   95 %   11/01/19 1853  89   96 %   11/01/19 1843  (!) 111   98 %   11/01/19 1833  (!) 102   94 %   11/01/19 1823  78   97 %   11/01/19 1813  (!) 106   100 %   11/01/19 1803  80   97 %   11/01/19 1753  79   95 %   11/01/19 1743  97   96 %   11/01/19 1723  80   98 %   11/01/19 1713  87   96 %   11/01/19 1703  90   95 %   11/01/19 1653  (!) 107   97 %   11/01/19 1643  84   98 %   11/01/19 1633  (!) 121   94 %   11/01/19 1613  87   96 %   11/01/19 1554 98.2 °F (36.8 °C) 89 20 123/89 94 %   11/01/19 1553  (!) 109  123/89 96 %   11/01/19 1533  84   93 %   11/01/19 1523  88   94 %   11/01/19 1513  84   92 %   11/01/19 1503  (!) 121   97 %   11/01/19 1453  (!) 129   93 %   11/01/19 1443  90   96 %   11/01/19 1433  94   93 %   11/01/19 1423  (!) 125   97 %   11/01/19 1403  92      11/01/19 1353  (!) 114   97 %   11/01/19 1051 97.2 °F (36.2 °C) 92 20 121/63 97 %       Intake/Output Summary (Last 24 hours) at 11/2/2019 1041  Last data filed at 11/2/2019 0546  Gross per 24 hour   Intake    Output 800 ml   Net -800 ml        PHYSICAL EXAM:  General: WD, WN. Alert, cooperative, no acute distress    EENT:  EOMI. Anicteric sclerae.  MMM  Resp:  Decreased air entry in the lower lung fields no crackles or wheezing appreciated. CV:  Irregularly irregular   GI:  Soft, Non distended, Non tender.  +Bowel sounds  Neurologic:  Alert and oriented X 3, normal speech,   No lower extremity swelling noted. Reviewed most current lab test results and cultures  YES  Reviewed most current radiology test results   YES  Review and summation of old records today    NO  Reviewed patient's current orders and MAR    YES  PMH/SH reviewed - no change compared to H&P  ________________________________________________________________________  Care Plan discussed with:    Comments   Patient y    Family      RN y    Care Manager     Consultant                        Multidiciplinary team rounds were held today with , nursing, pharmacist and clinical coordinator. Patient's plan of care was discussed; medications were reviewed and discharge planning was addressed. ________________________________________________________________________  Total NON critical care TIME:  35 Minutes    Total CRITICAL CARE TIME Spent:   Minutes non procedure based      Comments   >50% of visit spent in counseling and coordination of care     ________________________________________________________________________  Mague Greene MD     Procedures: see electronic medical records for all procedures/Xrays and details which were not copied into this note but were reviewed prior to creation of Plan. LABS:  I reviewed today's most current labs and imaging studies.   Pertinent labs include:  Recent Labs     11/02/19  0427 11/01/19  0349 10/31/19  1807 10/31/19  0314   WBC 42.1* 35.8*  --  37.8*   HGB 7.9* 7.8* 8.9* 7.3*   HCT 28.1* 27.8* 31.1* 26.5*   * 101*  --  93*     Recent Labs     11/02/19  0427 11/01/19  0349 10/31/19  0314    142 142   K 3.9 4.0 4.5    109* 113*   CO2 26 25 25   * 148* 137*   BUN 30* 22* 20   CREA 0.75 0.71 0.59   CA 8.3* 8.4* 7.8*   ALB  --  3.6 3.4*   TBILI  --  0.5 0.3   SGOT  --  29 26   ALT --  44 14       Signed: Mague Greene MD

## 2019-11-03 LAB
AV VELOCITY RATIO: 0.76
AV VTI RATIO: 0.7
BACTERIA SPEC CULT: ABNORMAL
BACTERIA SPEC CULT: ABNORMAL
ECHO AV AREA PEAK VELOCITY: 2.3 CM2
ECHO AV AREA VTI: 2.1 CM2
ECHO AV AREA/BSA PEAK VELOCITY: 1.2 CM2/M2
ECHO AV AREA/BSA VTI: 1.1 CM2/M2
ECHO AV MEAN GRADIENT: 4.5 MMHG
ECHO AV MEAN VELOCITY: 1.01 M/S
ECHO AV PEAK GRADIENT: 8.2 MMHG
ECHO AV PEAK VELOCITY: 143.54 CM/S
ECHO AV VTI: 29.31 CM
ECHO LA AREA 4C: 19.1 CM2
ECHO LA MAJOR AXIS: 2.88 CM
ECHO LA VOL 4C: 43.39 ML (ref 22–52)
ECHO LA VOLUME INDEX A4C: 22.94 ML/M2 (ref 16–28)
ECHO LV EDV A4C: 63.9 ML
ECHO LV EDV INDEX A4C: 33.8 ML/M2
ECHO LV EDV TEICHHOLZ: 0.41 ML
ECHO LV EJECTION FRACTION A4C: 39 %
ECHO LV ESV A4C: 39 ML
ECHO LV ESV INDEX A4C: 20.6 ML/M2
ECHO LV ESV TEICHHOLZ: 0.16 ML
ECHO LV INTERNAL DIMENSION DIASTOLIC: 3.93 CM (ref 3.9–5.3)
ECHO LV INTERNAL DIMENSION SYSTOLIC: 2.7 CM
ECHO LV IVSD: 1.2 CM (ref 0.6–0.9)
ECHO LV MASS 2D: 259.2 G (ref 67–162)
ECHO LV MASS INDEX 2D: 137 G/M2 (ref 43–95)
ECHO LV POSTERIOR WALL DIASTOLIC: 1.2 CM (ref 0.6–0.9)
ECHO LVOT CARDIAC OUTPUT: 6.1 L/MIN
ECHO LVOT DIAM: 1.96 CM
ECHO LVOT PEAK GRADIENT: 4.8 MMHG
ECHO LVOT PEAK VELOCITY: 109.17 CM/S
ECHO LVOT SV: 60.4 ML
ECHO LVOT VTI: 20.09 CM
ECHO MV A VELOCITY: 101.01 CM/S
ECHO MV AREA PHT: 3.5 CM2
ECHO MV AREA VTI: 2.4 CM2
ECHO MV E DECELERATION TIME (DT): 239.2 MS
ECHO MV E VELOCITY: 83.64 CM/S
ECHO MV E/A RATIO: 0.83
ECHO MV MAX VELOCITY: 105.79 CM/S
ECHO MV MEAN GRADIENT: 1.8 MMHG
ECHO MV MEAN INFLOW VELOCITY: 0.63 M/S
ECHO MV PEAK GRADIENT: 4.5 MMHG
ECHO MV PRESSURE HALF TIME (PHT): 62.8 MS
ECHO MV VTI: 25.07 CM
ECHO RA AREA 4C: 17.41 CM2
LVFS 2D: 31.31 %
LVOT MG: 2.25 MMHG
LVOT MV: 0.67 CM/S
LVSV (MOD SINGLE 4C): 12.9 ML
LVSV (TEICH): 20.69 ML
MV DEC SLOPE: 3.5
SERVICE CMNT-IMP: ABNORMAL

## 2019-11-03 PROCEDURE — 74011250637 HC RX REV CODE- 250/637: Performed by: INTERNAL MEDICINE

## 2019-11-03 PROCEDURE — 74011250637 HC RX REV CODE- 250/637: Performed by: FAMILY MEDICINE

## 2019-11-03 PROCEDURE — 77010033678 HC OXYGEN DAILY

## 2019-11-03 PROCEDURE — 65660000000 HC RM CCU STEPDOWN

## 2019-11-03 PROCEDURE — 74011250637 HC RX REV CODE- 250/637: Performed by: HOSPITALIST

## 2019-11-03 RX ADMIN — BENZONATATE 200 MG: 100 CAPSULE ORAL at 08:51

## 2019-11-03 RX ADMIN — PANTOPRAZOLE SODIUM 40 MG: 40 TABLET, DELAYED RELEASE ORAL at 08:51

## 2019-11-03 RX ADMIN — DILTIAZEM HYDROCHLORIDE 180 MG: 180 CAPSULE, COATED, EXTENDED RELEASE ORAL at 08:52

## 2019-11-03 RX ADMIN — GUAIFENESIN 600 MG: 600 TABLET, EXTENDED RELEASE ORAL at 17:05

## 2019-11-03 RX ADMIN — BENZONATATE 200 MG: 100 CAPSULE ORAL at 21:19

## 2019-11-03 RX ADMIN — MULTIPLE VITAMINS W/ MINERALS TAB 1 TABLET: TAB at 08:52

## 2019-11-03 RX ADMIN — Medication 10 ML: at 21:19

## 2019-11-03 RX ADMIN — Medication 10 ML: at 06:00

## 2019-11-03 RX ADMIN — BENZONATATE 200 MG: 100 CAPSULE ORAL at 16:54

## 2019-11-03 RX ADMIN — GUAIFENESIN 600 MG: 600 TABLET, EXTENDED RELEASE ORAL at 08:51

## 2019-11-03 RX ADMIN — MELATONIN TAB 3 MG 3 MG: 3 TAB at 22:10

## 2019-11-03 RX ADMIN — PANTOPRAZOLE SODIUM 40 MG: 40 TABLET, DELAYED RELEASE ORAL at 16:54

## 2019-11-03 RX ADMIN — Medication 10 ML: at 16:54

## 2019-11-03 NOTE — PROGRESS NOTES
6min walk test attempted. Sats while pt is resting are at 96% on RA. Pt sats drop to 89% upon reaching the bedroom door. Pt walks 20ft out of room and sats drop to 85%, with pt symptomatic (tachypnea, SOB, pale). Pt returned to bed and placed on 2L NC. Pt sats rise to 97% within a minute or two, and pt states she feels much better. Will wean oxygen back off while resting, and place back on while pt ambulating outside room.

## 2019-11-03 NOTE — PROGRESS NOTES
Progress Note      11/3/2019 11:05 AM  NAME: Lionel Mckeon   MRN:  [id number]   Admit Diagnosis: COPD (chronic obstructive pulmonary disease) ([facility] [address] [J44.9];SIRS (systemic inflammatory response syndrome) ([facility]) [R65.10]     Assessment:     1. Pneumonia  2. Acute hypoxic respiratory failure   3. Paroxysmal atrial fibrillation  4. Gastrointestinal hemorrhage  5. Anemia  6. Thrombocytopenia  7. Breast CA s/p XRT and lobectomy  8. Myelodysplastic syndrome  9. Spinal stenosis w/ neuropathy       Plan:     HgB 11-->7s  Now in sinus rhythm     Echo 11/16 w/ EF 65%     10. Recommend conservative therapy  11. Anticoagulation is contraindicated  12. Change IR diltiazem to CD formulation to start tomorrow  13. If goes in and out of AFib again can consider starting amiodarone  14. Remainder of care per primary    Echo - normal LV function. [x]        High complexity decision making was performed    11/3 Maintaining NSR   Cardiac status stable. No changes. Subjective:     Lionel  denies chest pain, dyspnea. Discussed with RN events overnight.      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    Lumbar post-laminectomy syndrome M96.1    Spinal stenosis of lumbar region with neurogenic claudication M48.062    Syncope R55    Stenosis of both internal carotid arteries I65.23    Syncope and collapse R55    S/P lumpectomy, left breast Z98.890    Costochondritis M94.0    Diverticulitis K57.92    Dry eye syndrome H04.129    Fibromyalgia M79.7    GERD without esophagitis K21.9    Hypercholesterolemia E78.00    Osteoporosis M81.0    Peripheral neuropathy G62.9    Prediabetes R73.03    Rosacea L71.9    Acute bronchitis due to other specified organisms J20.8    Anemia, unspecified D64.9    Thrombocytopenia (HCC) D69.6    Iron deficiency anemia D50.9    COPD (chronic obstructive pulmonary disease) (HCC) J44.9    SIRS (systemic inflammatory response syndrome) (HCC) R65.10    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 Most recent are:    Visit Vitals  /49 (BP 1 Location: Right arm, BP Patient Position: At rest)   Pulse 84   Temp 97.7 °F (36.5 °C)   Resp 18   Ht 5' 5\" (1.651 m)   Wt 81.6 kg (180 lb)   SpO2 98%   BMI 29.95 kg/m²     No intake or output data in the 24 hours ending 11/03/19 1105     General Appearance: Well developed, well nourished, alert & oriented x 3,    no acute distress. Ears/Nose/Mouth/Throat: Hearing grossly normal.  Neck: Supple. Chest: Lungs clear to auscultation bilaterally. Cardiovascular: Regular rate and rhythm, S1S2 normal, no murmur. Abdomen: Soft, non-tender, bowel sounds are active. Extremities: No edema bilaterally. Skin: Warm and dry.     PMH/SH reviewed - no change compared to H&P    Data Review    Telemetry: normal sinus rhythm     Lab Data Personally Reviewed:    Recent Labs     11/02/19 0427 11/01/19 0349   WBC 42.1* 35.8*   HGB 7.9* 7.8*   HCT 28.1* 27.8*   * 101*   LABRCNT(INR:3,PTP:3,APTT:3,)  Recent Labs     11/02/19 0427 11/01/19  0349    142   K 3.9 4.0    109*   CO2 26 25   BUN 30* 22*   CREA 0.75 0.71   * 148*   CA 8.3* 8.4*   LABRCNT(CPK:3,CpKMB:3,ckndx:3,troiq:3)  Lab Results Component Value Date/Time    Cholesterol, total 136 10/30/2019 12:14 AM    HDL Cholesterol 39 10/30/2019 12:14 AM    LDL, calculated 78.2 10/30/2019 12:14 AM    Triglyceride 94 10/30/2019 12:14 AM    CHOL/HDL Ratio 3.5 10/30/2019 12:14 AM   LABRCNT(sgot:3,gpt:3,ap:3,tbiL:3,TP:3,ALB:3,GLOB:3,ggt:3,aml:3,amyp:3,lpse:3,hlpse:3)No results for input(s): PH, PCO2, PO2 in the last 72 hours. Lab Results   Component Value Date/Time    Cholesterol, total 136 10/30/2019 12:14 AM    HDL Cholesterol 39 10/30/2019 12:14 AM    LDL, calculated 78.2 10/30/2019 12:14 AM    Triglyceride 94 10/30/2019 12:14 AM    CHOL/HDL Ratio 3.5 10/30/2019 12:14 AM   MEDTABLETimmelissa Thorpe MD  No results for input(s): PH, PCO2, PO2 in the last 72 hours.     Medications Personally Reviewed:    Current Facility-Administered Medications   Medication Dose Route Frequency    dilTIAZem CD (CARDIZEM CD) capsule 180 mg  180 mg Oral DAILY    benzonatate (TESSALON) capsule 200 mg  200 mg Oral TID    albuterol-ipratropium (DUO-NEB) 2.5 MG-0.5 MG/3 ML  3 mL Nebulization Q6H PRN    pantoprazole (PROTONIX) tablet 40 mg  40 mg Oral ACB&D    guaiFENesin ER (MUCINEX) tablet 600 mg  600 mg Oral BID    sodium chloride (NS) flush 5-40 mL  5-40 mL IntraVENous Q8H    sodium chloride (NS) flush 5-40 mL  5-40 mL IntraVENous PRN    acetaminophen (TYLENOL) tablet 650 mg  650 mg Oral Q4H PRN    ondansetron (ZOFRAN) injection 4 mg  4 mg IntraVENous Q4H PRN    guaiFENesin-dextromethorphan SR (HUMIBID DM) 600-30 mg tablet 2 Tab  2 Tab Oral BID PRN    multivitamin, tx-iron-ca-min (THERA-M w/ IRON) tablet 1 Tab  1 Tab Oral DAILY    melatonin tablet 3 mg  3 mg Oral QHS PRN         Yan Rob MD

## 2019-11-03 NOTE — PROGRESS NOTES
Hospitalist Progress Note    NAME: Ashley Pisano   :  1935   MRN:  [id number]       Assessment / Plan:  New Onset AFib with RVR  -Continue Cardizem to 180mg daily    - Cardiology eval  - not candidate for [address] due to FOBT+ and Anemia at this time     Anemia/FOBT + likely Upper GI bleed  - GI following   - conservative management  - outpatient follow up with  for EGD as non-emergent basis   - PPI BID for 1 week then once daily    Acute hypoxic respiratory failure likely related to underlying pneumonia. SIRS at admission ,sepsis ruled out. Chest x-ray  favoring infectious/inflammatory  process with some hilar lymph nodes. Continue empiric antibiotic treatment for community-acquired pneumonia with ceftriaxone and azithromycin. Continue to supplemental oxygen until oxygen saturation above 90%. Change steroids to PO. Wean O2 as able. Discussed with RN to ambulate patient in monitor O2 sat on and off oxygen. History of breast cancer, status post lobectomy and radiation as per patient. myelodysplastic syndrome/thombocytopenia followed by Dr. Zhane Davis  Hematology oncologist consulted for leukocytosis with a WBC count in the 50s-->30s  Hematology oncology input appreciated. We will do occult blood test for the drop in hemoglobin from 9-7. Transfuse patient if hemoglobin goes below 7. Obesity counseled on diet   Lumbar spinal stenosis with peripheral neuropathy tylenol prn  Code Status: Full  Surrogate Decision Maker:  EUOQKS [phone]  DVT Prophylaxis: lovenox  Baseline: ambulatory , patient would likely go back home on discharge. Body mass index is 29.95 kg/m².: 25.0 - 29.9 Overweight   Disposition: Hopefully home with home health in a.m. Subjective:     Chief Complaint / Reason for Physician Visit  Pt seen and examined  Remains in normal sinus rhythm  Off oxygen during my interview and oxygen is remaining 90-94 range.   Instruct patient to keep the oxygen off  Patient has semi-productive cough  Review of Systems:  Symptom Y/N Comments  Symptom Y/N Comments   Fever/Chills n   Chest Pain n    Poor Appetite    Edema     Cough y   Abdominal Pain n    Sputum y   Joint Pain     SOB/AL y   Pruritis/Rash     Nausea/vomit    Tolerating PT/OT     Diarrhea    Tolerating Diet     Constipation    Other       Could NOT obtain due to:      Objective:     VITALS:   Last 24hrs VS reviewed since prior progress note. Most recent are:  Patient Vitals for the past 24 hrs:   Temp Pulse Resp BP SpO2   11/03/19 1118 98.2 °F (36.8 °C) 77 18 123/53 98 %   11/03/19 0726 97.7 °F (36.5 °C) 84 18 114/49 98 %   11/03/19 0350 97.8 °F (36.6 °C) 77 15 129/40 93 %   11/02/19 1930 97.9 °F (36.6 °C) 100 20 133/56 95 %   11/02/19 1630 98.2 °F (36.8 °C) 92 18 139/60 98 %   11/02/19 1554    139/54      No intake or output data in the 24 hours ending 11/03/19 1239     PHYSICAL EXAM:  General: WD, WN. Alert, cooperative, no acute distress    EENT:  EOMI. Anicteric sclerae. MMM  Resp:  Improved breath sounds bilaterally  CV:  Regular rate and rhythm  GI:  Soft, Non distended, Non tender.  +Bowel sounds  Neurologic:  Alert and oriented X 3, normal speech,   No lower extremity swelling noted. Reviewed most current lab test results and cultures  YES  Reviewed most current radiology test results   YES  Review and summation of old records today    NO  Reviewed patient's current orders and MAR    YES  PMH/ reviewed - no change compared to H&P  ________________________________________________________________________  Care Plan discussed with:    Comments   Patient y    Family      RN y    Care Manager     Consultant                        Multidiciplinary team rounds were held today with , nursing, pharmacist and clinical coordinator. Patient's plan of care was discussed; medications were reviewed and discharge planning was addressed. ________________________________________________________________________  Total NON critical care TIME:  35 Minutes    Total CRITICAL CARE TIME Spent:   Minutes non procedure based      Comments   >50% of visit spent in counseling and coordination of care     ________________________________________________________________________  Jose Boss MD     Procedures: see electronic medical records for all procedures/Xrays and details which were not copied into this note but were reviewed prior to creation of Plan. LABS:  I reviewed today's most current labs and imaging studies.   Pertinent labs include:  Recent Labs     11/02/19  0427 11/01/19  0349 10/31/19  1807   WBC 42.1* 35.8*  --    HGB 7.9* 7.8* 8.9*   HCT 28.1* 27.8* 31.1*   * 101*  --      Recent Labs     11/02/19 0427 11/01/19 0349    142   K 3.9 4.0    109*   CO2 26 25   * 148*   BUN 30* 22*   CREA 0.75 0.71   CA 8.3* 8.4*   ALB  --  3.6   TBILI  --  0.5   SGOT  --  29   ALT  --  44       Signed: Jose Boss MD

## 2019-11-04 ENCOUNTER — APPOINTMENT (OUTPATIENT)
Dept: GENERAL RADIOLOGY | Age: 84
DRG: 189 | End: 2019-11-04
Attending: HOSPITALIST
Payer: MEDICARE

## 2019-11-04 LAB
ANION GAP SERPL CALC-SCNC: 3 MMOL/L (ref 5–15)
BACTERIA SPEC CULT: NORMAL
BASOPHILS # BLD: 0.4 K/UL (ref 0–0.1)
BASOPHILS NFR BLD: 1 % (ref 0–1)
BUN SERPL-MCNC: 15 MG/DL (ref 6–20)
BUN/CREAT SERPL: 21 (ref 12–20)
CALCIUM SERPL-MCNC: 8.3 MG/DL (ref 8.5–10.1)
CHLORIDE SERPL-SCNC: 108 MMOL/L (ref 97–108)
CO2 SERPL-SCNC: 28 MMOL/L (ref 21–32)
CREAT SERPL-MCNC: 0.73 MG/DL (ref 0.55–1.02)
DIFFERENTIAL METHOD BLD: ABNORMAL
EOSINOPHIL # BLD: 1.3 K/UL (ref 0–0.4)
EOSINOPHIL NFR BLD: 3 % (ref 0–7)
ERYTHROCYTE [DISTWIDTH] IN BLOOD BY AUTOMATED COUNT: 17.3 % (ref 11.5–14.5)
GLUCOSE BLD STRIP.AUTO-MCNC: 127 MG/DL (ref 65–100)
GLUCOSE SERPL-MCNC: 104 MG/DL (ref 65–100)
HCT VFR BLD AUTO: 29 % (ref 35–47)
HGB BLD-MCNC: 8.3 G/DL (ref 11.5–16)
IMM GRANULOCYTES # BLD AUTO: 0 K/UL (ref 0–0.04)
IMM GRANULOCYTES NFR BLD AUTO: 0 % (ref 0–0.5)
LYMPHOCYTES # BLD: 8.8 K/UL (ref 0.8–3.5)
LYMPHOCYTES NFR BLD: 20 % (ref 12–49)
MCH RBC QN AUTO: 31.1 PG (ref 26–34)
MCHC RBC AUTO-ENTMCNC: 28.6 G/DL (ref 30–36.5)
MCV RBC AUTO: 108.6 FL (ref 80–99)
METAMYELOCYTES NFR BLD MANUAL: 5 %
MONOCYTES # BLD: 8.8 K/UL (ref 0–1)
MONOCYTES NFR BLD: 20 % (ref 5–13)
MYELOCYTES NFR BLD MANUAL: 12 %
NEUTS SEG # BLD: 17.1 K/UL (ref 1.8–8)
NEUTS SEG NFR BLD: 39 % (ref 32–75)
NRBC # BLD: 0.13 K/UL (ref 0–0.01)
NRBC BLD-RTO: 0.3 PER 100 WBC
PLATELET # BLD AUTO: 155 K/UL (ref 150–400)
PMV BLD AUTO: 10.2 FL (ref 8.9–12.9)
POTASSIUM SERPL-SCNC: 4 MMOL/L (ref 3.5–5.1)
RBC # BLD AUTO: 2.67 M/UL (ref 3.8–5.2)
RBC MORPH BLD: ABNORMAL
RBC MORPH BLD: ABNORMAL
SERVICE CMNT-IMP: ABNORMAL
SERVICE CMNT-IMP: NORMAL
SODIUM SERPL-SCNC: 139 MMOL/L (ref 136–145)
WBC # BLD AUTO: 43.8 K/UL (ref 3.6–11)

## 2019-11-04 PROCEDURE — 97530 THERAPEUTIC ACTIVITIES: CPT

## 2019-11-04 PROCEDURE — 71045 X-RAY EXAM CHEST 1 VIEW: CPT

## 2019-11-04 PROCEDURE — 74011250637 HC RX REV CODE- 250/637: Performed by: INTERNAL MEDICINE

## 2019-11-04 PROCEDURE — 85025 COMPLETE CBC W/AUTO DIFF WBC: CPT

## 2019-11-04 PROCEDURE — 36415 COLL VENOUS BLD VENIPUNCTURE: CPT

## 2019-11-04 PROCEDURE — 97165 OT EVAL LOW COMPLEX 30 MIN: CPT | Performed by: OCCUPATIONAL THERAPIST

## 2019-11-04 PROCEDURE — 94640 AIRWAY INHALATION TREATMENT: CPT

## 2019-11-04 PROCEDURE — 97116 GAIT TRAINING THERAPY: CPT

## 2019-11-04 PROCEDURE — 82962 GLUCOSE BLOOD TEST: CPT

## 2019-11-04 PROCEDURE — 97162 PT EVAL MOD COMPLEX 30 MIN: CPT

## 2019-11-04 PROCEDURE — 65660000000 HC RM CCU STEPDOWN

## 2019-11-04 PROCEDURE — 74011250636 HC RX REV CODE- 250/636: Performed by: HOSPITALIST

## 2019-11-04 PROCEDURE — 97535 SELF CARE MNGMENT TRAINING: CPT | Performed by: OCCUPATIONAL THERAPIST

## 2019-11-04 PROCEDURE — 74011000250 HC RX REV CODE- 250: Performed by: HOSPITALIST

## 2019-11-04 PROCEDURE — 80048 BASIC METABOLIC PNL TOTAL CA: CPT

## 2019-11-04 PROCEDURE — 74011250637 HC RX REV CODE- 250/637: Performed by: HOSPITALIST

## 2019-11-04 PROCEDURE — 74011250637 HC RX REV CODE- 250/637: Performed by: FAMILY MEDICINE

## 2019-11-04 RX ORDER — IPRATROPIUM BROMIDE 0.5 MG/2.5ML
0.5 SOLUTION RESPIRATORY (INHALATION)
Status: DISCONTINUED | OUTPATIENT
Start: 2019-11-04 | End: 2019-11-05 | Stop reason: HOSPADM

## 2019-11-04 RX ORDER — BUDESONIDE 0.5 MG/2ML
500 INHALANT ORAL
Status: DISCONTINUED | OUTPATIENT
Start: 2019-11-04 | End: 2019-11-05 | Stop reason: HOSPADM

## 2019-11-04 RX ADMIN — GUAIFENESIN, DEXTROMETHORPHAN HBR 2 TABLET: 600; 30 TABLET ORAL at 21:39

## 2019-11-04 RX ADMIN — BENZONATATE 200 MG: 100 CAPSULE ORAL at 10:01

## 2019-11-04 RX ADMIN — IPRATROPIUM BROMIDE 0.5 MG: 0.5 SOLUTION RESPIRATORY (INHALATION) at 20:42

## 2019-11-04 RX ADMIN — BUDESONIDE 500 MCG: 0.5 INHALANT RESPIRATORY (INHALATION) at 20:42

## 2019-11-04 RX ADMIN — PANTOPRAZOLE SODIUM 40 MG: 40 TABLET, DELAYED RELEASE ORAL at 16:07

## 2019-11-04 RX ADMIN — BENZONATATE 200 MG: 100 CAPSULE ORAL at 21:38

## 2019-11-04 RX ADMIN — METHYLPREDNISOLONE SODIUM SUCCINATE 60 MG: 40 INJECTION, POWDER, FOR SOLUTION INTRAMUSCULAR; INTRAVENOUS at 18:05

## 2019-11-04 RX ADMIN — IPRATROPIUM BROMIDE 0.5 MG: 0.5 SOLUTION RESPIRATORY (INHALATION) at 13:50

## 2019-11-04 RX ADMIN — MELATONIN TAB 3 MG 3 MG: 3 TAB at 21:39

## 2019-11-04 RX ADMIN — BUDESONIDE 500 MCG: 0.5 INHALANT RESPIRATORY (INHALATION) at 10:46

## 2019-11-04 RX ADMIN — BENZONATATE 200 MG: 100 CAPSULE ORAL at 16:07

## 2019-11-04 RX ADMIN — Medication 10 ML: at 14:48

## 2019-11-04 RX ADMIN — Medication 10 ML: at 21:39

## 2019-11-04 RX ADMIN — MULTIPLE VITAMINS W/ MINERALS TAB 1 TABLET: TAB at 10:01

## 2019-11-04 RX ADMIN — IPRATROPIUM BROMIDE 0.5 MG: 0.5 SOLUTION RESPIRATORY (INHALATION) at 10:46

## 2019-11-04 RX ADMIN — GUAIFENESIN 600 MG: 600 TABLET, EXTENDED RELEASE ORAL at 18:05

## 2019-11-04 RX ADMIN — GUAIFENESIN 600 MG: 600 TABLET, EXTENDED RELEASE ORAL at 10:01

## 2019-11-04 RX ADMIN — METHYLPREDNISOLONE SODIUM SUCCINATE 60 MG: 40 INJECTION, POWDER, FOR SOLUTION INTRAMUSCULAR; INTRAVENOUS at 10:10

## 2019-11-04 RX ADMIN — DILTIAZEM HYDROCHLORIDE 180 MG: 180 CAPSULE, COATED, EXTENDED RELEASE ORAL at 10:01

## 2019-11-04 RX ADMIN — PANTOPRAZOLE SODIUM 40 MG: 40 TABLET, DELAYED RELEASE ORAL at 10:01

## 2019-11-04 NOTE — PROGRESS NOTES
7:40 AM Bedside report received from Ximena Conde [facility].    8:40 AM Dr. Char Moore notified of patient's c/o \"I can't catch my breath. \" Patient refusing to have PA/LAT completed downstairs at this time. Patient's lung sounds severely diminished throughout with scattered expiratory wheezing. MD gave orders to have STAT portable CXR at this time. 7:20 PM Bedside report given to ST ERIN RN.

## 2019-11-04 NOTE — PROGRESS NOTES
Bedside and Verbal shift change report given to Micheal Mcneill (oncoming nurse) by Ashley Crawford (offgoing nurse). Report included the following information SBAR, Kardex, Procedure Summary, Intake/Output, MAR, Accordion and Recent Results. 2000: Patient sitting up on side of bed, on room air with saturations at 96%. Patient has productive cough, tan yellow tinged sputum noted. Patient denies pain at this time. 2205: PRN Melatonin given per patient's request    7510: Patient oxygen saturations at 84% on room air while sleeping on left side, patient placed on 2 liters nasal cannula    0145: Patient having trouble sleeping, restless in the bed. Attempted to help patient reposition patient in bed, lights dimmed    0515:Labs drawn, patient stating only able to get a few hours sleep. Had difficulty with sleep overnight.  She is sitting on side of bed watching TV

## 2019-11-04 NOTE — PROGRESS NOTES
Problem: Mobility Impaired (Adult and Pediatric)  Goal: *Acute Goals and Plan of Care (Insert Text)  Description  FUNCTIONAL STATUS PRIOR TO ADMISSION: Patient was modified independent using a single point cane for functional mobility community distances. Patient did not use home oxygen. HOME SUPPORT PRIOR TO ADMISSION: The patient lived with spouse but did not require assist.    Physical Therapy Goals  Initiated 11/4/2019  1. Patient will move from supine to sit and sit to supine  in bed with modified independence within 7 day(s). 2.  Patient will transfer from bed to chair and chair to bed with modified independence using the least restrictive device within 7 day(s). 3.  Patient will perform sit to stand with modified independence within 7 day(s). 4.  Patient will ambulate with modified independence for 150 feet with the least restrictive device within 7 day(s). 5.  Patient will ascend/descend 2 stairs with 1 handrail(s) with modified independence within 7 day(s). Outcome: Progressing Towards Goal   PHYSICAL THERAPY EVALUATION  Patient: Hammad Buenrostro (23 y.o. female)  Date: 11/4/2019  Primary Diagnosis: COPD (chronic obstructive pulmonary disease) ([facility]) [J44.9]  SIRS (systemic inflammatory response syndrome) ([facility]) [R65.10]        Precautions:          ASSESSMENT  Based on the objective data described below, the patient presents with decreased strength/endurance, decreased balance and decreased independence with functional mobility S/P admission for COPD and SIRS. Patient is received seated EOB with nasal cannula removed. O2 saturation is 90%. She is agreeable to mobility and nasal cannula is applied at 1l/min. Patient demonstrates the ability to ambulate increased distance with the use of a rolling walker. O2 saturation is maintained about 93-95% throughout mobility including a trip to the restroom.  Patient demonstrates slow and deliberate gait which she relates to fatigue and bilateral LE neuropathy. She demonstrates good balance in standing and with gait provided assistance through rolling walker. Patient is seated at bedside chair. Education is provided on the importance of increased upright time to improve her strength/endurance as well as pulmonary benefits. Patient is left with all needs met at bedside chair on 2l/min via nasal cannula. Current Level of Function Impacting Discharge (mobility/balance): CGA with rolling walker    Functional Outcome Measure: The patient scored 20/28 on the Tinetti outcome measure which is indicative of moderate fall risk. Other factors to consider for discharge: need for supplemental O2, increased need for assistance     Patient will benefit from skilled therapy intervention to address the above noted impairments. PLAN :  Recommendations and Planned Interventions: bed mobility training, transfer training, gait training, therapeutic exercises, neuromuscular re-education, edema management/control, patient and family training/education and therapeutic activities      Frequency/Duration: Patient will be followed by physical therapy:  5 times a week to address goals. Recommendation for discharge: (in order for the patient to meet his/her long term goals)  Physical therapy at least 2 days/week in the home v. Outpatient therapy pending progression with skilled services     This discharge recommendation:  Has not yet been discussed the attending provider and/or case management    IF patient discharges home will need the following DME: TBD, patient reports she has several walkers at home, patient is safest ambulating with an assistive device at this time           SUBJECTIVE:   Patient stated i'm feeling better than yesterday.     OBJECTIVE DATA SUMMARY:   HISTORY:    Past Medical History:   Diagnosis Date    Arthritis     Bunion of right foot 8/20/2015    Chronic pain     back--uses tens unit    Diverticulitis 6/29/2018    Recurrent    Dry eye syndrome 6/29/2018    Fibromyalgia 6/29/2018    GERD (gastroesophageal reflux disease)     History of left breast cancer 2013    lumpectomy radiation    Hypercholesterolemia 6/29/2018    Ill-defined condition     blood transfusion hx    Leukemia ([facility] [address]     Osteoporosis 6/29/2018    Peripheral neuropathy 6/29/2018    Prediabetes 6/29/2018    PUD (peptic ulcer disease)     Radiation therapy complication 5994    Lt breast-No Chemo    Rosacea 6/29/2018    Trouble in sleeping      Past Surgical History:   Procedure Laterality Date    HX APPENDECTOMY      HX BREAST LUMPECTOMY  11/21/2013    LEFT BREAST LUMPECTOMY W/LEFT BREAST SENTINEL NODE BIOPSY,AND NEEDLE LOCALIZATION performed by Farida Medrano MD at [facility] MAIN OR    HX CATARACT REMOVAL      bilateral    HX HYSTERECTOMY      ovarian cyst    HX MOHS PROCEDURES      right    HX ORTHOPAEDIC      back surgery x2    HX OTHER SURGICAL      colonoscopy    HX TONSILLECTOMY      TOTAL KNEE ARTHROPLASTY      left    TOTAL KNEE ARTHROPLASTY      right    US GUIDED CORE BREAST BIOPSY Left 2013    Breast Ca       Personal factors and/or comorbidities impacting plan of care: please see above    Home Situation  Home Environment: Private residence  One/Two Story Residence: One story  Living Alone: No  Support Systems: Spouse/Significant Other/Partner  Patient Expects to be Discharged to[de-identified] Private residence  Current DME Used/Available at Home: Cane, straight, Blood pressure cuff, Raised toilet seat    EXAMINATION/PRESENTATION/DECISION MAKING:   Critical Behavior:  Neurologic State: Alert, Appropriate for age  Orientation Level: Oriented X4  Cognition: Appropriate decision making, Appropriate for age attention/concentration, Appropriate safety awareness, Follows commands     Hearing:   Auditory  Auditory Impairment: Hearing aid(s)  Hearing Aids/Status: Bilateral, With patient  Skin:    Edema:   Range Of Motion:  AROM: Within functional limits           PROM: Within functional limits Strength:    Strength: Generally decreased, functional                    Tone & Sensation:   Tone: Normal              Sensation: Impaired(Bilateral LE neuropathy )               Coordination:  Coordination: Generally decreased, functional  Vision:      Functional Mobility:  Bed Mobility:     Supine to Sit: Stand-by assistance;Bed Modified     Scooting: Stand-by assistance  Transfers:  Sit to Stand: Contact guard assistance  Stand to Sit: Contact guard assistance                       Balance:   Sitting: Intact  Standing: Intact; With support  Ambulation/Gait Training:  Distance (ft): 70 Feet (ft)  Assistive Device: Gait belt;Walker, rolling  Ambulation - Level of Assistance: Contact guard assistance        Gait Abnormalities: Decreased step clearance        Base of Support: Widened     Speed/Emmanuelle: Slow  Step Length: Right shortened;Left shortened                     Stairs: Therapeutic Exercises:       Functional Measure:  Tinetti test:    Sitting Balance: 1  Arises: 1  Attempts to Rise: 2  Immediate Standing Balance: 1  Standing Balance: 1  Nudged: 2  Eyes Closed: 1  Turn 360 Degrees - Continuous/Discontinuous: 0  Turn 360 Degrees - Steady/Unsteady: 1  Sitting Down: 2  Balance Score: 12 Balance total score  Indication of Gait: 1  R Step Length/Height: 1  L Step Length/Height: 1  R Foot Clearance: 1  L Foot Clearance: 1  Step Symmetry: 1  Step Continuity: 1  Path: 1  Trunk: 0  Walking Time: 0  Gait Score: 8 Gait total score  Total Score: 20/28 Overall total score         Tinetti Tool Score Risk of Falls  <19 = High Fall Risk  19-24 = Moderate Fall Risk  25-28 = Low Fall Risk  Tinetti ME. Performance-Oriented Assessment of Mobility Problems in Elderly Patients. Young 68; [id number].  (Scoring Description: PT Bulletin Feb. 10, 1993)    Older adults: Tito Escobar et al, 2009; n = 1000 [facility] elderly evaluated with ABC, MARIMAR, ADL, and IADL)  · Mean MARIMAR score for males aged 69-68 years = 26.21(3.40)  · Mean MARIMAR score for females age 69-68 years = 25.16(4.30)  · Mean MARIMAR score for males over 80 years = 23.29(6.02)  · Mean MARIMAR score for females over 80 years = 17.20(8.32)            Physical Therapy Evaluation Charge Determination   History Examination Presentation Decision-Making   MEDIUM  Complexity : 1-2 comorbidities / personal factors will impact the outcome/ POC  LOW Complexity : 1-2 Standardized tests and measures addressing body structure, function, activity limitation and / or participation in recreation  MEDIUM Complexity : Evolving with changing characteristics  Other outcome measures Tinetti  HIGH       Based on the above components, the patient evaluation is determined to be of the following complexity level: MEDIUM    Pain Rating:      Activity Tolerance:   Fair  Please refer to the flowsheet for vital signs taken during this treatment. After treatment patient left in no apparent distress:   Sitting in chair, Call bell within reach and Caregiver / family present    COMMUNICATION/EDUCATION:   The patients plan of care was discussed with: Registered Nurse. Fall prevention education was provided and the patient/caregiver indicated understanding., Patient/family have participated as able in goal setting and plan of care. and Patient/family agree to work toward stated goals and plan of care.     Thank you for this referral.  Christy Blandon, PT   Time Calculation: 46 mins

## 2019-11-04 NOTE — PROGRESS NOTES
Problem: Chronic Obstructive Pulmonary Disease (COPD)  Goal: *Oxygen saturation during activity within specified parameters  Outcome: Progressing Towards Goal  Goal: *Able to remain out of bed as prescribed  Outcome: Progressing Towards Goal  Goal: *Absence of hypoxia  Outcome: Progressing Towards Goal  Goal: *Optimize nutritional status  Outcome: Progressing Towards Goal     Problem: Patient Education: Go to Patient Education Activity  Goal: Patient/Family Education  Outcome: Progressing Towards Goal

## 2019-11-04 NOTE — PROGRESS NOTES
OCCUPATIONAL THERAPY EVALUATION/DISCHARGE  Patient: Jael Kent (61 y.o. female)  Date: 11/4/2019  Primary Diagnosis: COPD (chronic obstructive pulmonary disease) ([facility] [J44.9]  SIRS (systemic inflammatory response syndrome) ([facility]) [R65.10]       Precautions: fall       ASSESSMENT  Based on the objective data described below, the patient presents with near baseline level of functioning for basic self care admitted  due to medical condition--COPD and SIRS. Pt has not been using a RW for mobility-a cane at times at home, but used it today for adl mobiltiy with stand by assistance and cues for safe technique. Pt has adaptive aids for lower body adls and has used them after her back surgery years ago. Educated pt on [employer] and techniques related to her adl routine at home. Provided handouts for reinforcement and pt verbalized understanding. Pt reports that her  is able to assist her at home if needed. She reports that she is looking forward to returning to her water exercise classes as appropriate. Pt may benefit from OT [facility] Evaluation to reinforce EC during her home routine and perform home safety assessment . Current Level of Function (ADLs/self-care): supervision (using adaptive aids and stand by assist of )    Functional Outcome Measure: The patient scored 65/100 on the Barthel Index outcome measure which is indicative of 35% impairment in adls and mobility. Other factors to consider for discharge: has attended exercise classes in the past, has supportive  at home to assist     PLAN :  Recommend with staff: up in chair with mobility to the bathroom throughout the day.       Recommendation for discharge: (in order for the patient to meet his/her long term goals)  occupational therapy follow up recommended [facility] in her home setting    This discharge recommendation:  Has been discussed with PT    IF patient discharges home will need the following DME: may benefit from [facility] to place at bedside upon returning home       SUBJECTIVE:   Patient stated I go to [facility].     OBJECTIVE DATA SUMMARY:   HISTORY:   Past Medical History:   Diagnosis Date    Arthritis     Bunion of right foot 8/20/2015    Chronic pain     back--uses tens unit    Diverticulitis 6/29/2018    Recurrent    Dry eye syndrome 6/29/2018    Fibromyalgia 6/29/2018    GERD (gastroesophageal reflux disease)     History of left breast cancer 2013    lumpectomy radiation    Hypercholesterolemia 6/29/2018    Ill-defined condition     blood transfusion hx    Leukemia ([address]     Osteoporosis 6/29/2018    Peripheral neuropathy 6/29/2018    Prediabetes 6/29/2018    PUD (peptic ulcer disease)     Radiation therapy complication 7477    Lt breast-No Chemo    Rosacea 6/29/2018    Trouble in sleeping      Past Surgical History:   Procedure Laterality Date    HX APPENDECTOMY      HX BREAST LUMPECTOMY  11/21/2013    LEFT BREAST LUMPECTOMY W/LEFT BREAST SENTINEL NODE BIOPSY,AND NEEDLE LOCALIZATION performed by Edilma Parikh MD at [facility] MAIN OR    HX CATARACT REMOVAL      bilateral    HX HYSTERECTOMY      ovarian cyst    HX MOHS PROCEDURES      right    HX ORTHOPAEDIC      back surgery x2    HX OTHER SURGICAL      colonoscopy    HX TONSILLECTOMY      TOTAL KNEE ARTHROPLASTY      left    TOTAL KNEE ARTHROPLASTY      right    US GUIDED CORE BREAST BIOPSY Left 2013    Breast Ca       Prior Level of Function/Environment/Context: pt reports independence in adls.   Furniture walks in home, sometimes uses cane in community  Expanded or extensive additional review of patient history: complex medical history  Home Situation  Home Environment: Private residence  # Steps to Enter: 2  Rails to Enter: Yes  Hand Rails : Bilateral  One/Two Story Residence: One story(1 step to dining room )  Living Alone: No  Support Systems: Spouse/Significant Other/Partner  Patient Expects to be Discharged to[de-identified] Private residence  Current DME Used/Available at Home: [place] [employer], straight, Shower chair, Walker, rolling, Other (comment), Commode, bedside(reacher, transport chair )  Tub or Shower Type: Shower    Hand dominance: Right    EXAMINATION OF PERFORMANCE DEFICITS:  Cognitive/Behavioral Status:  Neurologic State: Alert; Appropriate for age  Orientation Level: Oriented X4  Cognition: Appropriate decision making; Appropriate for age attention/concentration; Appropriate safety awareness; Follows commands             Skin: generally intact    Edema: none observed    Hearing: Auditory  Auditory Impairment: Hearing aid(s)  Hearing Aids/Status: Bilateral, With patient    Vision/Perceptual:            Not formally assessed                         Range of Motion:  BUEs:    AROM: Within functional limits  PROM: Within functional limits                      Strength:  BUEs:   Strength: Generally decreased, functional                Coordination:  Coordination: Generally decreased, functional            Tone & Sensation:    Tone: Normal  Sensation: Impaired(Bilateral LE neuropathy )                      Balance:  Sitting: Intact  Standing: Intact; With support    Functional Mobility and Transfers for ADLs:  Bed Mobility:  Supine to Sit: Stand-by assistance;Bed Modified  Scooting: Stand-by assistance    Transfers:  Sit to Stand: Contact guard assistance  Stand to Sit: Contact guard assistance    ADL Assessment:     Grooming: independent  Bathing: supervision  Upper body dressing: set up  Lower body dressing: max A--has adaptive aids at home for lower body adls. Toilet transfer: stand by assist, cues for safe RW use  Toileting: independent                                       ADL Intervention and task modifications:   Pt performed ambulation to bathroom, requiring rest breaks due to SOB on 2L nasal cannula. Poor endurance due to medical condition. Recently had breathing treatment prior to tx session.     Pt unable to tolerate standing grooming at sink and was provided hand wiipes at her request.  HR had increased during activity, but quickly returned to 90s with rest break. Pt educated in Energy conservation techniques and provided handouts. Pt verbalized understanding. Therapeutic Exercise:  Encouraged balancing rest and activity and performing seated exercises--pt is familiar and agreeable to perform indpendently   Functional Measure:    Barthel Index:    Bathin  Bladder: 10  Bowels: 10  Groomin  Dressing: 10  Feeding: 10  Mobility: 0  Stairs: 0  Toilet Use: 10  Transfer (Bed to Chair and Back): 10  Total: 65/100      The Barthel ADL Index: Guidelines  1. The index should be used as a record of what a patient does, not as a record of what a patient could do. 2. The main aim is to establish degree of independence from any help, physical or verbal, however minor and for whatever reason. 3. The need for supervision renders the patient not independent. 4. A patient's performance should be established using the best available evidence. Asking the patient, friends/relatives and nurses are the usual sources, but direct observation and common sense are also important. However direct testing is not needed. 5. Usually the patient's performance over the preceding 24-48 hours is important, but occasionally longer periods will be relevant. 6. Middle categories imply that the patient supplies over 50 per cent of the effort. 7. Use of aids to be independent is allowed. David Trejo., Barthel, D.W. (7445). Functional evaluation: the Barthel Index. [facility] (14)2. KELLY Jimenez, Odalys Lima., Sammuel Hatchet.[facility], [address] [place] [address] (). Measuring the change indisability after inpatient rehabilitation; comparison of the responsiveness of the Barthel Index and Functional [place] Measure. Journal of Neurology, Neurosurgery, and Psychiatry, 66(4), 288-341.   SILVIA Ron, Jey Billingsley M.A. (2004.) Assessment of post-stroke quality of life in cost-effectiveness studies: The usefulness of the Barthel Index and the EuroQoL-5D. Quality of Life Research, 15, 141-59          Occupational Therapy Evaluation Charge Determination   History Examination Decision-Making   LOW Complexity : Brief history review  MEDIUM Complexity : 3-5 performance deficits relating to physical, cognitive , or psychosocial skils that result in activity limitations and / or participation restrictions MEDIUM Complexity : Patient may present with comorbidities that affect occupational performnce. Miniml to moderate modification of tasks or assistance (eg, physical or verbal ) with assesment(s) is necessary to enable patient to complete evaluation       Based on the above components, the patient evaluation is determined to be of the following complexity level: LOW   Pain Rating:  No complaints    Activity Tolerance:   Fair needs multiple rest breaks due to medical condition  O2 sats 90% on 2L    After treatment patient left in no apparent distress:    Sitting in chair and Call bell within reach    COMMUNICATION/EDUCATION:   The patients plan of care was discussed with: Physical Therapist and Registered Nurse.     Thank you for this referral.  Ibis Dickens OTR/L

## 2019-11-04 NOTE — PROGRESS NOTES
Hospitalist Progress Note    NAME: Mak Smith   :  1935   MRN:  [id number]       Assessment / Plan:  New Onset AFib with RVR  -Continue Cardizem to 180mg daily    - Cardiology eval  - not candidate for [address] due to FOBT+ and Anemia at this time     Anemia/FOBT + likely Upper GI bleed  - GI following   - conservative management  - outpatient follow up with  for EGD as non-emergent basis   - PPI BID for 1 week then once daily    Acute hypoxic respiratory failure  likely due to anemia initially now appears to have COPD exacerbation  Patient completed IV antibiotics  We will start Solu-Medrol IV, ipratropium neb, Pulmicort nebs   consulted for home oxygen  Outpatient referral to pulmonary for PFTs    History of breast cancer, status post lobectomy and radiation as per patient. myelodysplastic syndrome/thombocytopenia followed by Dr. Carole Deleon  Hematology oncologist consulted for leukocytosis with a WBC count in the 50s-->30s  Hematology oncology input appreciated. We will do occult blood test for the drop in hemoglobin from 9-7. Transfuse patient if hemoglobin goes below 7. Obesity counseled on diet   Lumbar spinal stenosis with peripheral neuropathy tylenol prn  Code Status: Full  Surrogate Decision Maker:  [phone]  DVT Prophylaxis: lovenox  Baseline: ambulatory , patient would likely go back home on discharge.   Body mass index is 29.95 kg/m².: 25.0 - 29.9 Overweight   Disposition: Anticipate that patient can be discharged home in the morning after oxygen has been set up     Subjective:     Chief Complaint / Reason for Physician Visit  Pt seen and examined  Unable to walk to go down for PA and lateral chest x-ray today due to shortness of breath  Per nurse patient was wheezing this morning  Patient has long history of smoking half a pack to a pack per day    Review of Systems:  Symptom Y/N Comments  Symptom Y/N Comments   Fever/Chills n   Chest Pain n    Poor Appetite    Edema     Cough y   Abdominal Pain n    Sputum y   Joint Pain     SOB/AL y   Pruritis/Rash     Nausea/vomit    Tolerating PT/OT     Diarrhea    Tolerating Diet     Constipation    Other       Could NOT obtain due to:      Objective:     VITALS:   Last 24hrs VS reviewed since prior progress note. Most recent are:  Patient Vitals for the past 24 hrs:   Temp Pulse Resp BP SpO2   11/04/19 1001  84  132/55    11/04/19 0749 98.5 °F (36.9 °C) 97 20 125/89 99 %   11/04/19 0332 98.1 °F (36.7 °C) 88 19 132/42 94 %   11/03/19 2344 97.9 °F (36.6 °C) 73 18 132/45 97 %   11/03/19 1915 97.7 °F (36.5 °C) 88 17 132/60 93 %   11/03/19 1658  83   90 %   11/03/19 1657 97.9 °F (36.6 °C) 83 18 145/60 90 %   11/03/19 1118 98.2 °F (36.8 °C) 77 18 123/53 98 %       Intake/Output Summary (Last 24 hours) at 11/4/2019 1021  Last data filed at 11/4/2019 0446  Gross per 24 hour   Intake 755 ml   Output 550 ml   Net 205 ml        PHYSICAL EXAM:  General: WD, WN. Alert, cooperative, no acute distress    EENT:  EOMI. Anicteric sclerae. MMM  Resp:             Decreased breath sounds bilaterally, occasional wheezing  CV:  Regular rate and rhythm  GI:  Soft, Non distended, Non tender.  +Bowel sounds  Neurologic:  Alert and oriented X 3, normal speech,   No lower extremity swelling noted. Reviewed most current lab test results and cultures  YES  Reviewed most current radiology test results   YES  Review and summation of old records today    NO  Reviewed patient's current orders and MAR    YES  PMH/SH reviewed - no change compared to H&P  ________________________________________________________________________  Care Plan discussed with:    Comments   Patient y    Family      RN y    Care Manager     Consultant                        Multidiciplinary team rounds were held today with , nursing, pharmacist and clinical coordinator.   Patient's plan of care was discussed; medications were reviewed and discharge planning was addressed. ________________________________________________________________________  Total NON critical care TIME:  30 Minutes    Total CRITICAL CARE TIME Spent:   Minutes non procedure based      Comments   >50% of visit spent in counseling and coordination of care     ________________________________________________________________________  Maverick Richards MD     Procedures: see electronic medical records for all procedures/Xrays and details which were not copied into this note but were reviewed prior to creation of Plan. LABS:  I reviewed today's most current labs and imaging studies.   Pertinent labs include:  Recent Labs     11/04/19 0457 11/02/19 0427   WBC 43.8* 42.1*   HGB 8.3* 7.9*   HCT 29.0* 28.1*    124*     Recent Labs     11/04/19 0457 11/02/19 0427    140   K 4.0 3.9    108   CO2 28 26   * 113*   BUN 15 30*   CREA 0.73 0.75   CA 8.3* 8.3*       Signed: Maverick Richards MD

## 2019-11-04 NOTE — PROGRESS NOTES
Occupational Therapy  Orders received and medical record reviewed. Pt participated in OT Evaluation. Pt was educated in Energy conservation techniques, home safety and fall prevention--a handout was provided for reinforcement. Pt does not need acute OT services at this time (pt will be on PT caseload), but may benefit from [facility] to facilitate [facility] education and implementation when pt returns home. Pt plans on returning home with her  who is able to assist her as needed. Recommend that pt regularly ambulate to the bathroom and participate in all self care tasks;   use the [facility] for convenience at over night. Full OT note to follow.

## 2019-11-04 NOTE — PROGRESS NOTES
Progress Note      11/4/2019 11:05 AM  NAME: Dianne Garber   MRN:  [id number]   Admit Diagnosis: COPD (chronic obstructive pulmonary disease) ([facility] [J44.9];SIRS (systemic inflammatory response syndrome) ([facility]) [R65.10]     Assessment:     1. Pneumonia  2. Acute hypoxic respiratory failure   3. Paroxysmal atrial fibrillation  4. Gastrointestinal hemorrhage  5. Anemia  6. Thrombocytopenia  7. Breast CA s/p XRT and lobectomy  8. Myelodysplastic syndrome  9. Spinal stenosis w/ neuropathy       Plan:     HgB 11-->7s  Now in sinus rhythm     Echo 11/16 w/ EF 65%. Echo this admit - normal LV function. 10. Recommend conservative therapy  11. Anticoagulation is contraindicated  12. Continue diltiazem 180mg  13. If goes in and out of AFib again can consider starting amiodarone  14. Remainder of care per primary  15. Will be available as needed      [x]        High complexity decision making was performed    Subjective:     Dianne Garber denies chest pain, dyspnea. Discussed with RN events overnight.      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    Lumbar post-laminectomy syndrome M96.1    Spinal stenosis of lumbar region with neurogenic claudication M48.062    Syncope R55    Stenosis of both internal carotid arteries I65.23    Syncope and collapse R55    S/P lumpectomy, left breast Z98.890    Costochondritis M94.0    Diverticulitis K57.92    Dry eye syndrome H04.129    Fibromyalgia M79.7    GERD without esophagitis K21.9    Hypercholesterolemia E78.00    Osteoporosis M81.0    Peripheral neuropathy G62.9    Prediabetes R73.03    Rosacea L71.9    Acute bronchitis due to other specified organisms J20.8    Anemia, unspecified D64.9    Thrombocytopenia (HCC) D69.6    Iron deficiency anemia D50.9    COPD (chronic obstructive pulmonary disease) (HCC) J44.9    SIRS (systemic inflammatory response syndrome) (HCC) R65.10    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 Most recent are:    Visit Vitals  /89 (BP 1 Location: Left arm, BP Patient Position: Sitting)   Pulse 97   Temp 98.5 °F (36.9 °C)   Resp 20   Ht 5' 5\" (1.651 m)   Wt 81.6 kg (180 lb)   SpO2 99%   BMI 29.95 kg/m²       Intake/Output Summary (Last 24 hours) at 11/4/2019 0837  Last data filed at 11/4/2019 0446  Gross per 24 hour   Intake 995 ml   Output 550 ml   Net 445 ml        General Appearance: Well developed, well nourished, alert & oriented x 3,    no acute distress. Ears/Nose/Mouth/Throat: Hearing grossly normal.  Neck: Supple. Chest: Lungs clear to auscultation bilaterally. Cardiovascular: Regular rate and rhythm, S1S2 normal, no murmur. Abdomen: Soft, non-tender, bowel sounds are active. Extremities: No edema bilaterally. Skin: Warm and dry.     PMH/SH reviewed - no change compared to H&P    Data Review    Telemetry: sinus rhythm     Lab Data Personally Reviewed:    Recent Labs     11/04/19 0457 11/02/19 0427   WBC 43.8* 42.1*   HGB 8.3* 7.9*   HCT 29.0* 28.1*    124*   LABRCNT(INR:3,PTP:3,APTT:3,)  Recent Labs     11/04/19 0457 11/02/19 0427    140   K 4.0 3.9    108   CO2 28 26   BUN 15 30*   CREA 0.73 0.75   * 113*   CA 8.3* 8.3* LABRCNT(CPK:3,CpKMB:3,ckndx:3,troiq:3)  Lab Results   Component Value Date/Time    Cholesterol, total 136 10/30/2019 12:14 AM    HDL Cholesterol 39 10/30/2019 12:14 AM    LDL, calculated 78.2 10/30/2019 12:14 AM    Triglyceride 94 10/30/2019 12:14 AM    CHOL/HDL Ratio 3.5 10/30/2019 12:14 AM   LABRCNT(sgot:3,gpt:3,ap:3,tbiL:3,TP:3,ALB:3,GLOB:3,ggt:3,aml:3,amyp:3,lpse:3,hlpse:3)No results for input(s): PH, PCO2, PO2 in the last 72 hours. Lab Results   Component Value Date/Time    Cholesterol, total 136 10/30/2019 12:14 AM    HDL Cholesterol 39 10/30/2019 12:14 AM    LDL, calculated 78.2 10/30/2019 12:14 AM    Triglyceride 94 10/30/2019 12:14 AM    CHOL/HDL Ratio 3.5 10/30/2019 12:14 AM   MEDTABLEGeorge SLADE Leggett III, DO  No results for input(s): PH, PCO2, PO2 in the last 72 hours.     Medications Personally Reviewed:    Current Facility-Administered Medications   Medication Dose Route Frequency    dilTIAZem CD (CARDIZEM CD) capsule 180 mg  180 mg Oral DAILY    benzonatate (TESSALON) capsule 200 mg  200 mg Oral TID    albuterol-ipratropium (DUO-NEB) 2.5 MG-0.5 MG/3 ML  3 mL Nebulization Q6H PRN    pantoprazole (PROTONIX) tablet 40 mg  40 mg Oral ACB&D    guaiFENesin ER (MUCINEX) tablet 600 mg  600 mg Oral BID    sodium chloride (NS) flush 5-40 mL  5-40 mL IntraVENous Q8H    sodium chloride (NS) flush 5-40 mL  5-40 mL IntraVENous PRN    acetaminophen (TYLENOL) tablet 650 mg  650 mg Oral Q4H PRN    ondansetron (ZOFRAN) injection 4 mg  4 mg IntraVENous Q4H PRN    guaiFENesin-dextromethorphan SR (HUMIBID DM) 600-30 mg tablet 2 Tab  2 Tab Oral BID PRN    multivitamin, tx-iron-ca-min (THERA-M w/ IRON) tablet 1 Tab  1 Tab Oral DAILY    melatonin tablet 3 mg  3 mg Oral QHS PRN         [place]ouri Claduette III, DO

## 2019-11-05 ENCOUNTER — HOME HEALTH ADMISSION (OUTPATIENT)
Dept: HOME HEALTH SERVICES | Facility: HOME HEALTH | Age: 84
End: 2019-11-05
Payer: MEDICARE

## 2019-11-05 ENCOUNTER — DOCUMENTATION ONLY (OUTPATIENT)
Dept: CASE MANAGEMENT | Age: 84
End: 2019-11-05

## 2019-11-05 VITALS
DIASTOLIC BLOOD PRESSURE: 48 MMHG | SYSTOLIC BLOOD PRESSURE: 154 MMHG | HEART RATE: 95 BPM | RESPIRATION RATE: 22 BRPM | TEMPERATURE: 98.7 F | BODY MASS INDEX: 29.99 KG/M2 | WEIGHT: 180 LBS | HEIGHT: 65 IN | OXYGEN SATURATION: 92 %

## 2019-11-05 PROCEDURE — 74011636637 HC RX REV CODE- 636/637: Performed by: INTERNAL MEDICINE

## 2019-11-05 PROCEDURE — 74011250636 HC RX REV CODE- 250/636: Performed by: HOSPITALIST

## 2019-11-05 PROCEDURE — 94640 AIRWAY INHALATION TREATMENT: CPT

## 2019-11-05 PROCEDURE — 74011250637 HC RX REV CODE- 250/637: Performed by: INTERNAL MEDICINE

## 2019-11-05 PROCEDURE — 77010033678 HC OXYGEN DAILY

## 2019-11-05 PROCEDURE — 74011250637 HC RX REV CODE- 250/637: Performed by: HOSPITALIST

## 2019-11-05 PROCEDURE — 74011000250 HC RX REV CODE- 250: Performed by: HOSPITALIST

## 2019-11-05 PROCEDURE — 97116 GAIT TRAINING THERAPY: CPT | Performed by: PHYSICAL THERAPIST

## 2019-11-05 RX ORDER — IPRATROPIUM BROMIDE AND ALBUTEROL SULFATE 2.5; .5 MG/3ML; MG/3ML
3 SOLUTION RESPIRATORY (INHALATION)
Qty: 100 NEBULE | Refills: 1 | Status: SHIPPED | OUTPATIENT
Start: 2019-11-05 | End: 2020-01-30 | Stop reason: ALTCHOICE

## 2019-11-05 RX ORDER — PREDNISONE 20 MG/1
20 TABLET ORAL
Qty: 5 TAB | Refills: 0 | Status: SHIPPED | OUTPATIENT
Start: 2019-11-05 | End: 2019-11-21 | Stop reason: ALTCHOICE

## 2019-11-05 RX ORDER — NEBULIZER AND COMPRESSOR
1 EACH MISCELLANEOUS DAILY
Qty: 1 EACH | Refills: 0 | Status: SHIPPED | OUTPATIENT
Start: 2019-11-05 | End: 2020-01-30 | Stop reason: ALTCHOICE

## 2019-11-05 RX ORDER — BUDESONIDE 0.5 MG/2ML
500 INHALANT ORAL 2 TIMES DAILY
Qty: 60 EACH | Refills: 1 | Status: SHIPPED | OUTPATIENT
Start: 2019-11-05 | End: 2020-01-30 | Stop reason: ALTCHOICE

## 2019-11-05 RX ORDER — DILTIAZEM HYDROCHLORIDE 180 MG/1
180 CAPSULE, COATED, EXTENDED RELEASE ORAL DAILY
Qty: 30 CAP | Refills: 1 | Status: ON HOLD | OUTPATIENT
Start: 2019-11-05 | End: 2020-07-21

## 2019-11-05 RX ORDER — PANTOPRAZOLE SODIUM 40 MG/1
40 TABLET, DELAYED RELEASE ORAL
Qty: 60 TAB | Refills: 1 | Status: SHIPPED | OUTPATIENT
Start: 2019-11-05 | End: 2021-02-20

## 2019-11-05 RX ORDER — BENZONATATE 200 MG/1
200 CAPSULE ORAL 3 TIMES DAILY
Qty: 21 CAP | Refills: 0 | Status: SHIPPED | OUTPATIENT
Start: 2019-11-05 | End: 2019-11-12

## 2019-11-05 RX ORDER — PREDNISONE 20 MG/1
20 TABLET ORAL
Status: DISCONTINUED | OUTPATIENT
Start: 2019-11-05 | End: 2019-11-05 | Stop reason: HOSPADM

## 2019-11-05 RX ORDER — GUAIFENESIN 600 MG/1
600 TABLET, EXTENDED RELEASE ORAL 2 TIMES DAILY
Qty: 20 TAB | Refills: 0 | Status: SHIPPED | OUTPATIENT
Start: 2019-11-05 | End: 2019-11-21 | Stop reason: ALTCHOICE

## 2019-11-05 RX ADMIN — GUAIFENESIN 600 MG: 600 TABLET, EXTENDED RELEASE ORAL at 10:02

## 2019-11-05 RX ADMIN — DILTIAZEM HYDROCHLORIDE 180 MG: 180 CAPSULE, COATED, EXTENDED RELEASE ORAL at 10:02

## 2019-11-05 RX ADMIN — BENZONATATE 200 MG: 100 CAPSULE ORAL at 10:02

## 2019-11-05 RX ADMIN — IPRATROPIUM BROMIDE 0.5 MG: 0.5 SOLUTION RESPIRATORY (INHALATION) at 07:22

## 2019-11-05 RX ADMIN — PREDNISONE 20 MG: 20 TABLET ORAL at 10:05

## 2019-11-05 RX ADMIN — PANTOPRAZOLE SODIUM 40 MG: 40 TABLET, DELAYED RELEASE ORAL at 10:02

## 2019-11-05 RX ADMIN — Medication 10 ML: at 17:49

## 2019-11-05 RX ADMIN — BENZONATATE 200 MG: 100 CAPSULE ORAL at 17:48

## 2019-11-05 RX ADMIN — IPRATROPIUM BROMIDE 0.5 MG: 0.5 SOLUTION RESPIRATORY (INHALATION) at 14:22

## 2019-11-05 RX ADMIN — BUDESONIDE 500 MCG: 0.5 INHALANT RESPIRATORY (INHALATION) at 07:22

## 2019-11-05 RX ADMIN — METHYLPREDNISOLONE SODIUM SUCCINATE 60 MG: 40 INJECTION, POWDER, FOR SOLUTION INTRAMUSCULAR; INTRAVENOUS at 02:50

## 2019-11-05 RX ADMIN — Medication 10 ML: at 02:51

## 2019-11-05 RX ADMIN — PANTOPRAZOLE SODIUM 40 MG: 40 TABLET, DELAYED RELEASE ORAL at 17:48

## 2019-11-05 RX ADMIN — MULTIPLE VITAMINS W/ MINERALS TAB 1 TABLET: TAB at 10:02

## 2019-11-05 RX ADMIN — IPRATROPIUM BROMIDE 0.5 MG: 0.5 SOLUTION RESPIRATORY (INHALATION) at 03:12

## 2019-11-05 RX ADMIN — GUAIFENESIN 600 MG: 600 TABLET, EXTENDED RELEASE ORAL at 17:48

## 2019-11-05 NOTE — ROUTINE PROCESS
The following appointments have been successfully scheduled: 
 
Date/time Thursday, November 07, 2019 10:30 AM 
Patient  Cortes Ochoa 1935 ([id number] F) #[id number] [id number] Department [facility] OFFICE Appointment type Transitional Care Provider Mannie Jones

## 2019-11-05 NOTE — PROGRESS NOTES
Problem: Mobility Impaired (Adult and Pediatric) Goal: *Acute Goals and Plan of Care (Insert Text) Description FUNCTIONAL STATUS PRIOR TO ADMISSION: Patient was modified independent using a single point cane for functional mobility community distances. Patient did not use home oxygen. HOME SUPPORT PRIOR TO ADMISSION: The patient lived with spouse but did not require assist. 
 
Physical Therapy Goals Initiated 11/4/2019 1. Patient will move from supine to sit and sit to supine  in bed with modified independence within 7 day(s). 2.  Patient will transfer from bed to chair and chair to bed with modified independence using the least restrictive device within 7 day(s). 3.  Patient will perform sit to stand with modified independence within 7 day(s). 4.  Patient will ambulate with modified independence for 150 feet with the least restrictive device within 7 day(s). 5.  Patient will ascend/descend 2 stairs with 1 handrail(s) with modified independence within 7 day(s). Outcome: Progressing Towards Goal 
 
.Pt

## 2019-11-05 NOTE — FACE TO FACE
Home Health Care Discharge Planning: [facility] Face to Face Encounter NAME: Rolene Schlatter :  1935 MRN:  [id number] Primary Diagnosis: A. Fib, Anemia, Upper GI Bleed, Respiratory Failure, COPD, H/O Breast Cancer, MDS, Obesity, Lumbar Stenosis Date of Face to Face:  2019 10:12 AM        
                        
Face to Face Encounter findings are related to primary reason for home care:   YES 
 
1. I certify that the patient needs intermittent skilled nursing care, physical therapy and/or speech therapy. I will not be following this patient in the Community and Dr. Hennie Soulier, MD will be responsible for signing the [address] of Care. 2. Initial Orders for Care: Physical Therapy and Occupational Therapy 3. I certify that this patient is homebound because of illness or injury, need the aid of supportive devices such as crutches, canes, wheelchairs, and walkers; the use of special transportation; or the assistance of another person in order to leave their place of residence. There exists a normal inability to leave home and leaving home requires a considerable and taxing effort. 4. I certify that this patient is under my care and that I had a Face-to-Face Encounter that meets the physician Face-to-Face Encounter requirements. Document the physical findings from the Face-to-Face Encounter that support the need for skilled services: Has new diagnosis that requires skilled nursing teaching and intervention , Has new medications that requires skilled nursing teaching and monitoring for understanding and compliance , Needs skilled safety assessment and interventions  and Has new finding of weakness and altered mobility that requires skilled physical/occupational and/or speech therapy services for evaluation and interventions. Radha Krishnan MD 
Discharging Physician Office: [phone] Fax:   [phone]

## 2019-11-05 NOTE — PROGRESS NOTES
0720: Bedside report given to Garrett Torres RN. Pt had an uneventful night. Pt rested well. No acute s/s of distress noted.

## 2019-11-05 NOTE — PROGRESS NOTES
ADULT PROTOCOL: JET AEROSOL ASSESSMENT    Patient  Steve Azevedo     82 y.o.   female     11/4/2019  11:52 PM    Breath Sounds Pre Procedure: Right Breath Sounds: Diminished, Wheezing                               Left Breath Sounds: Diminished, Wheezing    Breath Sounds Post Procedure: Right Breath Sounds: Scattered wheezing                                 Left Breath Sounds: Scattered wheezing    Breathing pattern: Pre procedure Breathing Pattern: Regular          Post procedure Breathing Pattern: Regular    Heart Rate: Pre procedure Pulse: 88           Post procedure Pulse: 82    Resp Rate: Pre procedure Respirations: 20           Post procedure Respirations: 18    Peak Flow: Pre bronchodilator             Post bronchodilator       FVC/FEV1:  N/A    Incentive Spirometry:             Cough: Pre procedure Cough: Non-productive               Post procedure Cough: Non-productive    Suctioned: NO    Sputum: Pre procedure                   Post procedure      Oxygen: O2 Device: Nasal cannula   2L NC     Changed: NO    SpO2: Pre procedure SpO2: 92 %   with oxygen              Post procedure SpO2: 96 %  with oxygen    Nebulizer Therapy: Current medications Aerosolized Medications: Ipratropium bromide, Pulmicort      Changed: NO    Smoking History:     Problem List:   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    Lumbar post-laminectomy syndrome M96.1    Spinal stenosis of lumbar region with neurogenic claudication M48.062    Syncope R55    Stenosis of both internal carotid arteries I65.23    Syncope and collapse R55    S/P lumpectomy, left breast Z98.890    Costochondritis M94.0    Diverticulitis K57.92    Dry eye syndrome H04.129    Fibromyalgia M79.7    GERD without esophagitis K21.9    Hypercholesterolemia E78.00    Osteoporosis M81.0    Peripheral neuropathy G62.9    Prediabetes R73.03    Rosacea L71.9    Acute bronchitis due to other specified organisms J20.8    Anemia, unspecified D64.9    Thrombocytopenia (HCC) D69.6    Iron deficiency anemia D50.9    COPD (chronic obstructive pulmonary disease) (HCC) J44.9    SIRS (systemic inflammatory response syndrome) (HCC) R65.10    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

## 2019-11-05 NOTE — PROGRESS NOTES
Problem: Mobility Impaired (Adult and Pediatric)  Goal: *Acute Goals and Plan of Care (Insert Text)  Description  FUNCTIONAL STATUS PRIOR TO ADMISSION: Patient was modified independent using a single point cane for functional mobility community distances. Patient did not use home oxygen. HOME SUPPORT PRIOR TO ADMISSION: The patient lived with spouse but did not require assist.    Physical Therapy Goals  Initiated 11/4/2019  1. Patient will move from supine to sit and sit to supine  in bed with modified independence within 7 day(s). 2.  Patient will transfer from bed to chair and chair to bed with modified independence using the least restrictive device within 7 day(s). 3.  Patient will perform sit to stand with modified independence within 7 day(s). 4.  Patient will ambulate with modified independence for 150 feet with the least restrictive device within 7 day(s). 5.  Patient will ascend/descend 2 stairs with 1 handrail(s) with modified independence within 7 day(s). 11/5/2019 1049 by Megan Desai, PT  Outcome: Progressing Towards Goal  11/5/2019 1032 by Megan Desai, PT  Outcome: Progressing Towards Goal  PHYSICAL THERAPY TREATMENT INCLUDING 6 MINUTE WALK TEST RESULTS  Patient: Pankaj Rooney (65 y.o. female)  Date: 11/5/2019  Diagnosis: COPD (chronic obstructive pulmonary disease) ([facility] [J44.9]  SIRS (systemic inflammatory response syndrome) ([facility]) [R65.10] <principal problem not specified>       Precautions:    Chart, physical therapy assessment, plan of care and goals were reviewed. ASSESSMENT  Patient continues with skilled PT services and is progressing towards goals. She was able to do the 6MWT. On room air at rest PO2 is 94% and   At 3 minutes of ambulation on room air with a RW  and 150 feet PO2 is 90% and  and perceived exertion is 5/10  At 5 minutes walking at 300 feet  on room air PO2 is 90% and  and perceived exertion is 7/10.   At 6 minutes of walking and 360 feet doing 3 steps one at a time with one railing (which she has to get into the house) PO2 on room air is 88%, , perceived exertion is 8/10 and she feels as if her heart is pounding out of her chest.  We put the O2 on at 2L and returned to the room to rest and PO2 is 94% and . She required multiple standing rest breaks and had fairly significant SOB at 90% O2. On further walking and doing the steps she went to 88%. Quite tired by this time. Current Level of Function Impacting Discharge (mobility/balance): none. Other factors to consider for discharge: Needs O2. PLAN :  Patient continues to benefit from skilled intervention to address the above impairments. Continue treatment per established plan of care. to address goals. Recommendation for discharge: (in order for the patient to meet his/her long term goals)  Physical therapy at least 2 days/week in the home     This discharge recommendation:  Has been made in collaboration with the attending provider and/or case management    IF patient discharges home will need the following DME: patient owns DME required for discharge       SUBJECTIVE:   Patient stated I feel as if my heart is pounding out of my chest and my legs feel like jello.     OBJECTIVE DATA SUMMARY:   Critical Behavior:  Neurologic State: Alert, Appropriate for age  Orientation Level: Oriented X4  Cognition: Appropriate decision making, Appropriate for age attention/concentration, Appropriate safety awareness, Follows commands     Functional Mobility Training:  Bed Mobility:         Patient is up in the bedside chair on arrival and returned to the bedside chair. Transfers:  Sit to Stand: Independent  Stand to Sit: Independent                             Balance:  Sitting: Intact  Standing: Intact; With support  Ambulation/Gait Training:  Distance (ft): 360 Feet (ft)  Assistive Device: Walker, rolling;Gait belt  Ambulation - Level of Assistance: Contact guard assistance        Gait Abnormalities: Decreased step clearance        Base of Support: Widened     Speed/Emmanuelle: Pace decreased (<100 feet/min)  Step Length: Left shortened;Right shortened               Wide based slightly antalgic gait. Uses a RW  Stairs:  Number of Stairs Trained: 3  Stairs - Level of Assistance: Contact guard assistance   Rail Use: Right     Patient instructed as part of their cardiac daily wellness check to step on scale. Patient demonstrated stepping on and off scale with Independent and with/without use of upper extremities for stability. 6 MWT results:  Distance Walked in Feet (ft): 360 ft. Breana Rating of Perceived exertion (0-10 point scale):8   Pre Heart Rate: 113 Beginning:    Pre O2 Saturation: 94     Mid walk: 5   Post Heart Rate: 123 End: 8   Post O2 Saturation: 88      Assistive device used: Assistive Device: Walker, rolling;Gait belt        Normative data: 30-57 years old = 0 feet; Men 39-80 years old = 1889 feet; Women 3680 years aff=9306 feet  Modified 10 point Breana RPE scale utilized where 0 = no breathlessness at all; 10 = maximum exertion  Please refer to the flowsheet for any additional vital signs taken during this treatment. Patient ambulated 150 feet in 3 minutes when patient noted SOB and 150 distance when therapist noted SOB. Activity Tolerance:   Fair  Please refer to the flowsheet for vital signs taken during this treatment.     After treatment patient left in no apparent distress:   Sitting in chair, Call bell within reach and Caregiver / family present    COMMUNICATION/COLLABORATION:   The patients plan of care was discussed with: Registered Nurse and Physician    Alan Diaz, PT   Time Calculation: 38 mins

## 2019-11-05 NOTE — PROGRESS NOTES
TRANSITION OF CARE PLAN:     Plan A: Home with Home Health and Home O2    CM requested O2 challenge for pt. Challenge completed by PT. Results of challenge sent to [facility] for review. O2 will need to be delivered to [facility] prior to patient discharge. Per Fred Jesus ([facility]) pt and family would like [facility] for PT services. CM sent referral and is awaiting a response. UPDATE 4:21PM  O2 challenge documentation not sufficient for pt to receive oxygen. CM requested that the challenge be given again and written in a specific format. Pt RN redid challenge and documented the results in the chart. CM faxed new testing results to [facility]. [facility] to deliver oxygen for pt this evening and are en route. Medicare pt has received, reviewed, and signed 2nd IM letter informing them of their right to appeal the discharge. Signed copy has been placed on pt bedside chart. Care Management has completed the discharge planning needs of the patient at this time. Care Management Interventions  PCP Verified by CM: Yes(Jesse Camargo MD)  Mode of Transport at Discharge:  Other (see comment)(pt's  can transport at [facility])  Transition of Care Consult (CM Consult): Discharge Planning  Discharge Durable Medical Equipment: No  Physical Therapy Consult: No  Occupational Therapy Consult: No  Speech Therapy Consult: No  Current Support Network: Lives with Spouse, Own Home(2 story house, 4 steps to enter)  Confirm Follow Up Transport: Family  Plan discussed with Pt/Family/Caregiver: Yes  Discharge Location  Discharge Placement: Home with Dhruv Smith Care Manager  [phone]

## 2019-11-05 NOTE — DISCHARGE INSTRUCTIONS
HOSPITALIST DISCHARGE INSTRUCTIONS  NAME: Mitali Morgan   :  1935   MRN:  [id number]     Date/Time:  2019 10:11 AM    ADMIT DATE: 10/29/2019     DISCHARGE DATE: 2019     DIAGNOSIS: A. Fib, Anemia, Upper GI Bleed, Respiratory Failure, COPD, H/O Breast Cancer, MDS, Obesity, Lumbar Stenosis    MEDICATIONS:                As per medication reconciliation    · It is important that you take the medication exactly as they are prescribed. · Keep your medication in the bottles provided by the pharmacist and keep a list of the medication names, dosages, and times to be taken in your wallet. · Do not take other medications without consulting your doctor. Pain Management: per above medications    What to do at Home    Recommended diet:  Cardiac Diet    Recommended activity: Activity as tolerated    If you experience any of the following symptoms then please call your primary care physician or return to the emergency room if you cannot get hold of your doctor:  Fever, chills, nausea, vomiting, diarrhea, change in mentation, falling, bleeding, shortness of breath. Follow Up:   PCP you are to call and set up an appointment to see them in 2 week. F/U Cardiology  F/U Pulmonology  F/U Hematology  F/U GI      Information obtained by :  I understand that if any problems occur once I am at home I am to contact my physician. I understand and acknowledge receipt of the instructions indicated above.                                                                                                                                            Physician's or R.N.'s Signature                                                                  Date/Time                                                                                                                                              Patient or Representative Signature                                                          Date/Time

## 2019-11-05 NOTE — PROGRESS NOTES
1500 - Oxygen challenge results    Pt's sats were 94% on RA at rest  Pt's sats were 86% on RA with ambulation.   Pt's sats were 88% on 2L O2 with ambulation   Pt's sats were 90% on 2L O2 at rest

## 2019-11-05 NOTE — PROGRESS NOTES
Transitions of Care - Pharmacist Discharge Medication Reconciliation     S/O: Ms. Algie Collet 82 y.o., referred by G team, to pharmacy for transitions of care. Patient was recently hospitalized at [facility] from 10/29/19 to 11/5/19. Patient's PCP: Jayden Hu MD       Past Medical History:   Diagnosis Date    Arthritis     Bunion of right foot 8/20/2015    Chronic pain     back--uses tens unit    Diverticulitis 6/29/2018    Recurrent    Dry eye syndrome 6/29/2018    Fibromyalgia 6/29/2018    GERD (gastroesophageal reflux disease)     History of left breast cancer 2013    lumpectomy radiation    Hypercholesterolemia 6/29/2018    Ill-defined condition     blood transfusion hx    Leukemia ([address]     Osteoporosis 6/29/2018    Peripheral neuropathy 6/29/2018    Prediabetes 6/29/2018    PUD (peptic ulcer disease)     Radiation therapy complication 2200    Lt breast-No Chemo    Rosacea 6/29/2018    Trouble in sleeping      Reason for hospitalization: COPD exac. HUG/Bundle patient?: YES  Core Measure: COPD    Did patient receive antibiotic therapy during hospitalization?: YES  If yes: For bronchitis/possible CAP - ceftriaxone x 5 days and azithromycin and vancomycin x 1 dose on admission  Discharged with abx? NO - therapy completed     Is patient on anticoagulation?: NO - has history of paroxysmal afib per cardiology; not on anticoagulation (contraindicated)  Antiplatelet therapy?: NO    Other pertinent findings:   Patient to continue oral steroids, Duo-neb and pulmicort nebs for COPD exac. Benzonatate and guaifenesin added for supportive care. Diltiazem added for afib with RVR. Not on anticoagulation at this time as mentioned above. Patient did experience UGIB with heme+ stool and drop in HgB. GI recommends conservative treatment at this time with PPI BID. Per discharge notes, patient to continue PPI twice daily x 1 week then once daily. Please reiterate instructions on discharge.  No signs of active bleed; follow-up as outpatient  Patient follows with Dr. María Sauer for myelodysplastic syndrome and history of breast cancer. Patient will follow-up post-discharge for treatment discussion    Admission medication history completed by pharmacy?: YES     Patient was discharged by Dr. Lacey Roca with the following medication list:    Current Discharge Medication List        START taking these medications    Details   albuterol-ipratropium (DUO-NEB) 2.5 mg-0.5 mg/3 ml nebu 3 mL by Nebulization route every six (6) hours as needed (SOB). COPD J44.9  Qty: 100 Nebule, Refills: 1      Nebulizer & Compressor machine 1 Each by Does Not Apply route daily. Qty: 1 Each, Refills: 0      benzonatate (TESSALON) 200 mg capsule Take 1 Cap by mouth three (3) times daily for 7 days. Qty: 21 Cap, Refills: 0      dilTIAZem CD (CARDIZEM CD) 180 mg ER capsule Take 1 Cap by mouth daily. Qty: 30 Cap, Refills: 1      guaiFENesin ER (MUCINEX) 600 mg ER tablet Take 1 Tab by mouth two (2) times a day. Qty: 20 Tab, Refills: 0      pantoprazole (PROTONIX) 40 mg tablet Take 1 Tab by mouth Before breakfast and dinner. Qty: 60 Tab, Refills: 1      predniSONE (DELTASONE) 20 mg tablet Take 20 mg by mouth daily (with breakfast). Qty: 5 Tab, Refills: 0      budesonide (PULMICORT) 0.5 mg/2 mL nbsp 2 mL by Nebulization route two (2) times a day. Qty: 60 Each, Refills: 1           CONTINUE these medications which have NOT CHANGED    Details   ergocalciferol (ERGOCALCIFEROL) 50,000 unit capsule Take 50,000 Units by mouth every Sunday. inulin (FIBER GUMMIES PO) Take 2 Gum by mouth nightly. acetaminophen (TYLENOL) 325 mg tablet Take 650 mg by mouth every four (4) hours as needed for Pain.      vit A/vit C/vit E/zinc/copper (PRESERVISION AREDS PO) Take 1 Tab by mouth nightly. multivitamin (ONE A DAY) tablet Take 1 Tab by mouth nightly.            STOP taking these medications       dextromethorphan-guaiFENesin (MUCINEX DM) 60-1,200 mg Tb12 Comments: Reason for Stopping:               Recommendation(s)/Plan(s): None    Other interventions completed by pharmacy (patient education, medication access, changes to AVS, etc.): None    Thank you,  Elie Sanford, SOLITARIOD

## 2019-11-05 NOTE — DISCHARGE SUMMARY
Hospitalist Discharge Summary     Patient ID:  Zelalem Hamilton  [id number]  57 y.o.  1935  10/29/2019    PCP on record: Fabiola Ramirez MD    Admit date: 10/29/2019  Discharge date and time: 11/5/2019    DISCHARGE DIAGNOSIS:    A. Fib, Anemia, Upper GI Bleed, Respiratory Failure, COPD, H/O Breast Cancer, MDS, Obesity, Lumbar Stenosis    CONSULTATIONS:  IP CONSULT TO HEMATOLOGY  IP CONSULT TO GASTROENTEROLOGY  IP CONSULT TO CARDIOLOGY    Excerpted HPI from H&P of Karlie Rodriguez MD:  Heidi Bejarano is a 82 y.o.  female with PMHx significant for myelodysplastic syndrome, breast cancer COPD,former smoker   presents to the emergency room with chief complaint of cough and shortness of breath that started about 3 or 4 days ago and have gotten progressively worse. Sx  Worsen  more short of breath tonight and decided to be evaluated.  She denies fevers, chills, nausea, vomiting, leg swelling.  She denies any history of congestive heart failure.     She got the flu shot within the past 2 weeks.  She reports some chest pain with coughing.  Her cough is productive of clear sputum. No stool changes.  No new edema or focal weakness. no travel/no sick [place].    ______________________________________________________________________  DISCHARGE SUMMARY/HOSPITAL COURSE:  for full details see H&P, daily progress notes, labs, consult notes.    New Onset AFib with RVR  -Continue Cardizem to 180mg daily    - Cardiology eval  - not candidate for MOBEXO[address] due to FOBT+ and Anemia at this time     Anemia/FOBT + likely Upper GI bleed  - GI following   - conservative management  - outpatient follow up with  for EGD as non-emergent basis   - PPI BID for 1 week then once daily     Acute hypoxic respiratory failure  likely due to anemia initially now appears to have COPD exacerbation  No evidence of Pneumonia, this was 2ry to Bronchitis and COPD Exacerbation  Patient completed IV antibiotics, needs Home O2 2LNC continuous  We will start Solu-Medrol IV, ipratropium neb, Pulmicort nebs   consulted for home oxygen  Outpatient referral to pulmonary for PFTs     History of breast cancer, status post lobectomy and radiation as per patient.     myelodysplastic syndrome/thombocytopenia followed by Dr. Gabino Spivey  Hematology oncologist consulted for leukocytosis with a WBC count in the 50s-->30s  Hematology oncology input appreciated. We will do occult blood test for the drop in hemoglobin from 9-7. Transfuse patient if hemoglobin goes below 7.     Obesity counseled on diet   Lumbar spinal stenosis with peripheral neuropathy tylenol prn  Code Status: Full  Surrogate Decision Maker:  STEFANIAIQJQ [phone]  DVT Prophylaxis: lovenox  Baseline: ambulatory , patient would likely go back home on discharge. Body mass index is 29.95 kg/m².: 25.0 - 29.9 Overweight   Disposition: Anticipate that patient can be discharged home in the morning after oxygen has been set up    D/c Home with [address] and F/U with PCP, Cardiology, Pulmonology, GI, Hematology    _______________________________________________________________________  Patient seen and examined by me on discharge day. Pertinent Findings:  Gen:    Not in distress  Chest: Clear lungs  CVS:   Regular rhythm. No edema  Abd:  Soft, not distended, not tender  Neuro:  Alert, GCS 15  _______________________________________________________________________  DISCHARGE MEDICATIONS:   Current Discharge Medication List      START taking these medications    Details   albuterol-ipratropium (DUO-NEB) 2.5 mg-0.5 mg/3 ml nebu 3 mL by Nebulization route every six (6) hours as needed (SOB). COPD J44.9  Qty: 100 Nebule, Refills: 1      Nebulizer & Compressor machine 1 Each by Does Not Apply route daily. Qty: 1 Each, Refills: 0      benzonatate (TESSALON) 200 mg capsule Take 1 Cap by mouth three (3) times daily for 7 days.   Qty: 21 Cap, Refills: 0      dilTIAZem CD (CARDIZEM CD) 180 mg ER capsule Take 1 Cap by mouth daily. Qty: 30 Cap, Refills: 1      guaiFENesin ER (MUCINEX) 600 mg ER tablet Take 1 Tab by mouth two (2) times a day. Qty: 20 Tab, Refills: 0      pantoprazole (PROTONIX) 40 mg tablet Take 1 Tab by mouth Before breakfast and dinner. Qty: 60 Tab, Refills: 1      predniSONE (DELTASONE) 20 mg tablet Take 20 mg by mouth daily (with breakfast). Qty: 5 Tab, Refills: 0      budesonide (PULMICORT) 0.5 mg/2 mL nbsp 2 mL by Nebulization route two (2) times a day. Qty: 60 Each, Refills: 1         CONTINUE these medications which have NOT CHANGED    Details   ergocalciferol (ERGOCALCIFEROL) 50,000 unit capsule Take 50,000 Units by mouth every Sunday. inulin (FIBER GUMMIES PO) Take 2 Gum by mouth nightly. acetaminophen (TYLENOL) 325 mg tablet Take 650 mg by mouth every four (4) hours as needed for Pain.      vit A/vit C/vit E/zinc/copper (PRESERVISION AREDS PO) Take 1 Tab by mouth nightly. multivitamin (ONE A DAY) tablet Take 1 Tab by mouth nightly. STOP taking these medications       dextromethorphan-guaiFENesin (MUCINEX DM) 60-1,200 mg Tb12 Comments:   Reason for Stopping:                 Patient Follow Up Instructions: Activity: Activity as tolerated  Diet: Cardiac Diet  Wound Care: None needed    Follow-up with PCP, GI, Cardiology, Pulmonology, Hematology  in 2 weeks.   Follow-up tests/labs none  Follow-up Information     Follow up With Specialties Details Why Contact Info    Jeff Thorpe MD Internal Medicine    [facility] [facility] [address] [facility]  [phone]      Michael Ferguson DO Cardiology In 2 weeks  [address]  [address]  [facility]  [phone]      Melony Atkinson MD Gastroenterology In 2 weeks  [address]  [phone]          ________________________________________________________________    Risk of deterioration: Moderate    Condition at Discharge: Stable  __________________________________________________________________    Disposition  Home with family and home health services    ____________________________________________________________________    Code Status: Full Code  ___________________________________________________________________      Total time in minutes spent coordinating this discharge (includes going over instructions, follow-up, prescriptions, and preparing report for sign off to her PCP) :  35 minutes    Signed:  Cuca Victor MD

## 2019-11-05 NOTE — PROGRESS NOTES
Problem: Chronic Obstructive Pulmonary Disease (COPD)  Goal: *Oxygen saturation during activity within specified parameters  Outcome: Progressing Towards Goal  Goal: *Able to remain out of bed as prescribed  Outcome: Progressing Towards Goal  Goal: *Absence of hypoxia  Outcome: Progressing Towards Goal  Goal: *Optimize nutritional status  Outcome: Progressing Towards Goal     Problem: Falls - Risk of  Goal: *Absence of Falls  Description  Document Carlos Buck Fall Risk and appropriate interventions in the flowsheet.   Outcome: Progressing Towards Goal  Note:   Fall Risk Interventions:  Mobility Interventions: Bed/chair exit alarm, PT Consult for mobility concerns, PT Consult for assist device competence, Strengthening exercises (ROM-active/passive), Patient to call before getting OOB, OT consult for ADLs         Medication Interventions: Teach patient to arise slowly, Patient to call before getting OOB, Bed/chair exit alarm    Elimination Interventions: Call light in reach, Stay With Me (per policy), Patient to call for help with toileting needs, Toileting schedule/hourly rounds    History of Falls Interventions: Consult care management for discharge planning, Door open when patient unattended, Evaluate medications/consider consulting pharmacy, Investigate reason for fall, Room close to nurse's station, Utilize gait belt for transfer/ambulation

## 2019-11-06 ENCOUNTER — PATIENT OUTREACH (OUTPATIENT)
Dept: INTERNAL MEDICINE CLINIC | Age: 84
End: 2019-11-06

## 2019-11-06 ENCOUNTER — TELEPHONE (OUTPATIENT)
Dept: INTERNAL MEDICINE CLINIC | Age: 84
End: 2019-11-06

## 2019-11-06 ENCOUNTER — HOME CARE VISIT (OUTPATIENT)
Dept: SCHEDULING | Facility: HOME HEALTH | Age: 84
End: 2019-11-06
Payer: MEDICARE

## 2019-11-06 VITALS
HEART RATE: 62 BPM | TEMPERATURE: 97.3 F | DIASTOLIC BLOOD PRESSURE: 60 MMHG | OXYGEN SATURATION: 97 % | RESPIRATION RATE: 18 BRPM | SYSTOLIC BLOOD PRESSURE: 128 MMHG

## 2019-11-06 PROCEDURE — 3331090002 HH PPS REVENUE DEBIT

## 2019-11-06 PROCEDURE — G0151 HHCP-SERV OF PT,EA 15 MIN: HCPCS

## 2019-11-06 PROCEDURE — 400013 HH SOC

## 2019-11-06 PROCEDURE — 3331090001 HH PPS REVENUE CREDIT

## 2019-11-06 NOTE — TELEPHONE ENCOUNTER
Dante Soulier from [facility] called and has open up home health and needs verbal order for skilled nursing

## 2019-11-06 NOTE — PROGRESS NOTES
Hospital Discharge Follow-Up      Date/Time:  2019 3:07 PM    Patient was admitted to [facility] on 10/29 and discharged on  for DX. COPD. The physician discharge summary was available at the time of outreach. Patient was contacted within 2 business days of discharge. Top Challenges reviewed with the provider     Advance Care Planning:   Does patient have an Advance Directive:  reviewed and needs to be updated     outpatient follow up with  for EGD     Outpatient referral to pulmonary for PFTs       Method of communication with provider :chart routing    Inpatient RRAT score: 34  Was this a readmission? no     Care Transition Nurse (CTN) contacted the patient by telephone to perform post hospital discharge assessment. Verified name and  with patient as identifiers. Provided introduction to self, and explanation of the CTN role. Patient received hospital discharge instructions. CTN reviewed discharge instructions and red flags with patient who verbalized understanding. Patient given an opportunity to ask questions and does not have any further questions or concerns at this time. The PATIENT agrees to contact the PCP office for questions related to their healthcare. CTN provided contact information for future reference. Disease Specific:   N/A    Patients top risk factors for readmission:  none    Home Health orders at discharge: PT, OT, [address]: [facility]  Date of initial visit:     [facility] ordered at discharge: nebulizer, [address]: [address] [facility] received:     Medication(s):   New Medications at Discharge:   albuterol-ipratropium (DUO-NEB) 2.5 mg-0.5 mg/3 ml nebu 3 mL by Nebulization route every six (6) hours as needed (SOB). COPD J44.9  Qty: 100 Nebule, Refills: 1       Nebulizer & Compressor machine 1 Each by Does Not Apply route daily.   Qty: 1 Each, Refills: 0     benzonatate (TESSALON) 200 mg capsule Take 1 Cap by mouth three (3) times daily for 7 days. Qty: 21 Cap, Refills: 0       dilTIAZem CD (CARDIZEM CD) 180 mg ER capsule Take 1 Cap by mouth daily. Qty: 30 Cap, Refills: 1       guaiFENesin ER (MUCINEX) 600 mg ER tablet Take 1 Tab by mouth two (2) times a day. Qty: 20 Tab, Refills: 0       pantoprazole (PROTONIX) 40 mg tablet Take 1 Tab by mouth Before breakfast and dinner. Qty: 60 Tab, Refills: 1       predniSONE (DELTASONE) 20 mg tablet Take 20 mg by mouth daily (with breakfast). Qty: 5 Tab, Refills: 0       budesonide (PULMICORT) 0.5 mg/2 mL nbsp 2 mL by Nebulization route two (2) times a day. Qty: 60 Each, Refills: 1             Discontinued Medications at Discharge:   dextromethorphan-guaiFENesin ([facility] 598 DM) 60-1,200 mg     Medication reconciliation was performed with patient, who verbalizes understanding of administration of home medications. There were no barriers to obtaining medications identified at this time. Referral to Pharm D needed: no     Current Outpatient Medications   Medication Sig    albuterol-ipratropium (DUO-NEB) 2.5 mg-0.5 mg/3 ml nebu 3 mL by Nebulization route every six (6) hours as needed (SOB). COPD J44.9    Nebulizer & Compressor machine 1 Each by Does Not Apply route daily.  benzonatate (TESSALON) 200 mg capsule Take 1 Cap by mouth three (3) times daily for 7 days.  dilTIAZem CD (CARDIZEM CD) 180 mg ER capsule Take 1 Cap by mouth daily.  guaiFENesin ER (MUCINEX) 600 mg ER tablet Take 1 Tab by mouth two (2) times a day.  pantoprazole (PROTONIX) 40 mg tablet Take 1 Tab by mouth Before breakfast and dinner.  predniSONE (DELTASONE) 20 mg tablet Take 20 mg by mouth daily (with breakfast).  budesonide (PULMICORT) 0.5 mg/2 mL nbsp 2 mL by Nebulization route two (2) times a day.  ergocalciferol (ERGOCALCIFEROL) 50,000 unit capsule Take 50,000 Units by mouth every Sunday.     inulin (FIBER GUMMIES PO) Take 2 Gum by mouth nightly.  acetaminophen (TYLENOL) 325 mg tablet Take 650 mg by mouth every four (4) hours as needed for Pain.  vit A/vit C/vit E/zinc/copper (PRESERVISION AREDS PO) Take 1 Tab by mouth nightly.  multivitamin (ONE A DAY) tablet Take 1 Tab by mouth nightly. No current facility-administered medications for this visit. [facility] follow up appointment(s):   Future Appointments   Date Time Provider Anju Dickson   11/7/2019 10:30 AM Patricia Jesus MD 3 Chris Resendiz   12/9/2019  8:30 AM [facility] 2 [facility]   12/12/2019 11:40 AM Natan Mcneil  [facility]:  information provided as a resource     Goals      Establish PCP relationships and regularly scheduled appointments. 11/6  Pt.will attend following appts: Dr. Kamilla Doe 11/7 @ 10:30 am, Ching Urias 12/9 @ 8:30 am, Erin Acevedo (Neurology) 12/12 @ 11:40 am. CTN will f/u with pt.in 1-2 weeks. -[facility]          Understands red flags post discharge. 11/6  Pt. encourage pt.to avoid irritants such as smoke, dust and other pollutants. Notify PCP increased SOB after mild exercise such as walking up stairs, change in sputum production and color, coughing, wheezing, chest tightness, call PCP if symptoms unrelieved by rest and medications. Pt.verbalizes understanding. CTN will f/u with pt.in 1-2 weeks. -[address]

## 2019-11-07 ENCOUNTER — OFFICE VISIT (OUTPATIENT)
Dept: INTERNAL MEDICINE CLINIC | Age: 84
End: 2019-11-07

## 2019-11-07 VITALS
OXYGEN SATURATION: 100 % | BODY MASS INDEX: 31.32 KG/M2 | DIASTOLIC BLOOD PRESSURE: 78 MMHG | HEIGHT: 65 IN | TEMPERATURE: 97.8 F | RESPIRATION RATE: 22 BRPM | SYSTOLIC BLOOD PRESSURE: 124 MMHG | WEIGHT: 188 LBS | HEART RATE: 74 BPM

## 2019-11-07 DIAGNOSIS — J44.1 DECOMPENSATED COPD (CHRONIC OBSTRUCTIVE PULMONARY DISEASE) (HCC): ICD-10-CM

## 2019-11-07 DIAGNOSIS — J96.01 ACUTE RESPIRATORY FAILURE WITH HYPOXIA (HCC): ICD-10-CM

## 2019-11-07 DIAGNOSIS — I48.91 ATRIAL FIBRILLATION WITH RAPID VENTRICULAR RESPONSE (HCC): Primary | ICD-10-CM

## 2019-11-07 DIAGNOSIS — C91.10 CLL (CHRONIC LYMPHOCYTIC LEUKEMIA) (HCC): ICD-10-CM

## 2019-11-07 PROCEDURE — 3331090001 HH PPS REVENUE CREDIT

## 2019-11-07 PROCEDURE — 3331090002 HH PPS REVENUE DEBIT

## 2019-11-07 NOTE — PROGRESS NOTES
This note will not be viewable in [address] Carolyn Callejas is a 82 y.o. female and presents with Transitions Of Care (pneumonia)  . Subjective:  Mrs. James Landry presents to the office today and transition of care subsequent to a hospitalization from 10/29 until 11/5 when she was admitted with rapid atrial fibrillation, acute shortness of breath and severe cough, acute respiratory failure with hypoxemia, decompensated COPD, anemia with heme positive stool associated with chronic anemia followed by Dr. Bentley Stanley suggestive of chronic lymphocytic leukemia versus myelodysplastic syndrome. The patient was treated with antibiotics, IV steroids, oxygen and nebulizer treatments. Patient was followed by GI as well as cardiology. She progressively improved over time but remained hypoxic requiring home oxygen which she continues to use now. She has noted that she continues to be short of breath with walking across the room. She has noted no blood in her stool. She has had no palpitations or syncope. There is been no fevers or chills. She notes generalized debility and weakness. The patient's last chest x-ray was done on 11/4 which revealed mild persistent opacities of the left lower lung likely related to atelectasis. A CTA of the chest was done on 10/29 revealing no blood clot but numerous small pulmonary nodules and groundglass opacities in the left lung and right upper lobe likely related to infectious or inflammatory disease. Mildly enlarged lymph nodes were noted at the time. Patient's echocardiogram showed a normal EF. Her anemia did get down to around 7.2 but then increased with last hemoglobin of 8.3 on 11/4. There been no new problems since discharge.     Past Medical History:   Diagnosis Date    Arthritis     Bunion of right foot 8/20/2015    Chronic pain     back--uses tens unit    Diverticulitis 6/29/2018    Recurrent    Dry eye syndrome 6/29/2018    Fibromyalgia 6/29/2018    GERD (gastroesophageal reflux disease)     History of left breast cancer 2013    lumpectomy radiation    Hypercholesterolemia 6/29/2018    Ill-defined condition     blood transfusion hx    Leukemia ([facility] [address]     Osteoporosis 6/29/2018    Peripheral neuropathy 6/29/2018    Prediabetes 6/29/2018    PUD (peptic ulcer disease)     Radiation therapy complication 4533    Lt breast-No Chemo    Rosacea 6/29/2018    Trouble in sleeping      Past Surgical History:   Procedure Laterality Date    HX APPENDECTOMY      HX BREAST LUMPECTOMY  11/21/2013    LEFT BREAST LUMPECTOMY W/LEFT BREAST SENTINEL NODE BIOPSY,AND NEEDLE LOCALIZATION performed by Bobbi Pennington MD at [facility] MAIN OR    HX CATARACT REMOVAL      bilateral    HX HYSTERECTOMY      ovarian cyst    HX MOHS PROCEDURES      right    HX ORTHOPAEDIC      back surgery x2    HX OTHER SURGICAL      colonoscopy    HX TONSILLECTOMY      TOTAL KNEE ARTHROPLASTY      left    TOTAL KNEE ARTHROPLASTY      right    US GUIDED CORE BREAST BIOPSY Left 2013    Breast Ca     Allergies   Allergen Reactions    Hydrocodone Nausea Only and Vertigo    Gabapentin Diarrhea, Nausea Only and Other (comments)     weakness    Lyrica [Pregabalin] Nausea Only    Oxycodone Nausea and Vomiting and Vertigo     Current Outpatient Medications   Medication Sig Dispense Refill    albuterol-ipratropium (DUO-NEB) 2.5 mg-0.5 mg/3 ml nebu 3 mL by Nebulization route every six (6) hours as needed (SOB). COPD J44.9 100 Nebule 1    Nebulizer & Compressor machine 1 Each by Does Not Apply route daily. 1 Each 0    benzonatate (TESSALON) 200 mg capsule Take 1 Cap by mouth three (3) times daily for 7 days. 21 Cap 0    dilTIAZem CD (CARDIZEM CD) 180 mg ER capsule Take 1 Cap by mouth daily. 30 Cap 1    guaiFENesin ER (MUCINEX) 600 mg ER tablet Take 1 Tab by mouth two (2) times a day. 20 Tab 0    pantoprazole (PROTONIX) 40 mg tablet Take 1 Tab by mouth Before breakfast and dinner.  60 Tab 1    predniSONE (DELTASONE) 20 mg tablet Take 20 mg by mouth daily (with breakfast). 5 Tab 0    budesonide (PULMICORT) 0.5 mg/2 mL nbsp 2 mL by Nebulization route two (2) times a day. 60 Each 1    ergocalciferol (ERGOCALCIFEROL) 50,000 unit capsule Take 50,000 Units by mouth every .  inulin (FIBER GUMMIES PO) Take 2 Gum by mouth nightly.  acetaminophen (TYLENOL) 325 mg tablet Take 650 mg by mouth every four (4) hours as needed for Pain.  vit A/vit C/vit E/zinc/copper (PRESERVISION AREDS PO) Take 1 Tab by mouth nightly.  multivitamin (ONE A DAY) tablet Take 1 Tab by mouth nightly. Social History     Socioeconomic History    Marital status:      Spouse name: Not on file    Number of children: Not on file    Years of education: Not on file    Highest education level: Not on file   Tobacco Use    Smoking status: Former Smoker     Packs/day: 0.50     Years: 50.00     Pack years: 25.00     Last attempt to quit: 2012     Years since quittin.7    Smokeless tobacco: Never Used   Substance and Sexual Activity    Alcohol use:  Yes     Alcohol/week: 2.0 standard drinks     Types: 2 Glasses of wine per week    Drug use: No     Family History   Problem Relation Age of Onset    Cancer Mother         bladder    Cancer Father     Breast Cancer Paternal Grandmother        Health Maintenance   Topic Date Due    GLAUCOMA SCREENING Q2Y  02/15/2020    MEDICARE YEARLY EXAM  2020    DTaP/Tdap/Td series (3 - Td) 2029    Bone Densitometry (Dexa) Screening  Completed    Shingrix Vaccine Age 50>  Completed    Influenza Age 5 to Adult  Completed    Pneumococcal 65+ years  Completed        Review of Systems  Constitutional: Positive for fatigue and weakness but denies fever or chills  Eyes:   negative for visual disturbance and irritation  ENT:   negative for tinnitus,sore throat,nasal congestion,ear pain,hoarseness  Respiratory:  Positive for persistent cough and shortness of breath with physical activity   CV:   negative for chest pain, palpitations, lower extremity edema  GI:   negative for nausea, vomiting, diarrhea, abdominal pain,melena  Endo:               negative for polyuria,polydipsia,polyphagia,heat intolerance  Genitourinary: negative for frequency, dysuria and hematuria  Integumentary: negative for rash and pruritus  Hematologic:  negative for easy bruising and gum/nose bleeding  Musculoskel: negative for myalgias, arthralgias, back pain, muscle weakness, joint pain  Neurological:  negative for headaches, dizziness, vertigo, memory problems and gait   Behavl/Psych: negative for feelings of anxiety, depression, mood changes  ROS otherwise negative      Objective:  Visit Vitals  /78 (BP 1 Location: Left arm, BP Patient Position: Sitting)   Pulse 74   Temp 97.8 °F (36.6 °C) (Oral)   Resp 22   Ht 5' 5\" (1.651 m)   Wt 188 lb (85.3 kg)   SpO2 100%   BMI 31.28 kg/m²     Body mass index is 31.28 kg/m². Physical Exam:   General appearance - alert, well appearing, and in no distress  Mental status - alert, oriented to person, place, and time  EYE-HOWRAD, EOMI,conjunctiva normal bilaterally, lids normal  ENT-ENT exam normal, no neck nodes or sinus tenderness  Nose - normal and patent, no erythema,  Or discharge   Mouth - mucous membranes moist, pharynx normal without lesions  Neck - supple, no significant adenopathy or bruit  Chest -there is poor air movement bilaterally and slightly reduced breath sounds in the left lung as compared to the right. No active wheezing is heard. Heart - normal rate, regular rhythm, normal S1, S2, no murmurs, rubs, clicks or gallops   Abdomen - soft, nontender, nondistended, no masses or organomegaly  Lymph- no adenopathy palpable  Ext-peripheral pulses normal, no pedal edema, no clubbing or cyanosis  Skin-Warm and dry.  no hyperpigmentation, vitiligo, or suspicious lesions  Neuro -alert, oriented, normal speech, no focal findings or movement disorder noted      Assessment/Plan:  Diagnoses and all orders for this visit:    Atrial fibrillation with rapid ventricular response (HCC)    CLL (chronic lymphocytic leukemia) (HCC)    Acute respiratory failure with hypoxia (HCC)    Decompensated COPD (chronic obstructive pulmonary disease) ([facility] [address]        Other instructions:   Patient's medications were reviewed and reconciled. No change in her current medical regimen is made. Continue home oxygen    Hospital records between 10/29 and 11/5 were reviewed with the patient today including progress notes, discharge summary, lab results, imaging studies, cardiac studies    She does have appointments to be seen by cardiology and GI in 2 weeks    We will have her return in 4 weeks time for reevaluation and laboratory check. Follow-up and Dispositions    · Return in about 4 weeks (around 12/5/2019). I have reviewed with the patient details of the assessment and plan and all questions were answered. Relevent patient education was performed. The most recent lab findings were reviewed with the patient. An After Visit Summary was printed and given to the patient.     Kevin Bush MD

## 2019-11-07 NOTE — PROGRESS NOTES
Janeth Smart is a 82 y.o. female presenting for Transitions Of Care (pneumonia)  . 1. Have you been to the ER, urgent care clinic since your last visit? Hospitalized since your last visit? Yes When: 11/5/19 [facility] Reason for visit: pneumonia    2. Have you seen or consulted any other health care providers outside of the [address] [place] [address] since your last visit? Include any pap smears or colon screening. No    Fall Risk Assessment, last 12 mths 9/6/2019   Able to walk? Yes   Fall in past 12 months? Yes   Fall with injury? Yes   Number of falls in past 12 months 1   Fall Risk Score 2         Abuse Screening Questionnaire 7/24/2019   Do you ever feel afraid of your partner? N   Are you in a relationship with someone who physically or mentally threatens you? N   Is it safe for you to go home? Y       3 most recent PHQ Screens 1/21/2019   Little interest or pleasure in doing things Not at all   Feeling down, depressed, irritable, or hopeless Not at all   Total Score PHQ 2 0       There are no discontinued medications.

## 2019-11-07 NOTE — PATIENT INSTRUCTIONS

## 2019-11-08 ENCOUNTER — HOME CARE VISIT (OUTPATIENT)
Dept: SCHEDULING | Facility: HOME HEALTH | Age: 84
End: 2019-11-08
Payer: MEDICARE

## 2019-11-08 PROCEDURE — 3331090001 HH PPS REVENUE CREDIT

## 2019-11-08 PROCEDURE — G0151 HHCP-SERV OF PT,EA 15 MIN: HCPCS

## 2019-11-08 PROCEDURE — 3331090002 HH PPS REVENUE DEBIT

## 2019-11-09 PROCEDURE — 3331090002 HH PPS REVENUE DEBIT

## 2019-11-09 PROCEDURE — 3331090001 HH PPS REVENUE CREDIT

## 2019-11-10 VITALS
RESPIRATION RATE: 16 BRPM | HEART RATE: 77 BPM | SYSTOLIC BLOOD PRESSURE: 132 MMHG | TEMPERATURE: 98.6 F | DIASTOLIC BLOOD PRESSURE: 74 MMHG | OXYGEN SATURATION: 97 %

## 2019-11-10 PROCEDURE — 3331090001 HH PPS REVENUE CREDIT

## 2019-11-10 PROCEDURE — 3331090002 HH PPS REVENUE DEBIT

## 2019-11-11 ENCOUNTER — HOME CARE VISIT (OUTPATIENT)
Dept: SCHEDULING | Facility: HOME HEALTH | Age: 84
End: 2019-11-11
Payer: MEDICARE

## 2019-11-11 PROCEDURE — 3331090001 HH PPS REVENUE CREDIT

## 2019-11-11 PROCEDURE — G0151 HHCP-SERV OF PT,EA 15 MIN: HCPCS

## 2019-11-11 PROCEDURE — 3331090002 HH PPS REVENUE DEBIT

## 2019-11-12 VITALS
OXYGEN SATURATION: 99 % | HEART RATE: 77 BPM | TEMPERATURE: 97.8 F | DIASTOLIC BLOOD PRESSURE: 58 MMHG | RESPIRATION RATE: 18 BRPM | SYSTOLIC BLOOD PRESSURE: 130 MMHG

## 2019-11-12 PROCEDURE — 3331090002 HH PPS REVENUE DEBIT

## 2019-11-12 PROCEDURE — 3331090001 HH PPS REVENUE CREDIT

## 2019-11-12 NOTE — CDMP QUERY
Patient admitted with acute hypoxic respiratory failure. Documentation reflects pneumonia in note(s) dated 10/29 to 11/3. If possible, please specify in the progress notes and d/c summary if Pneumonia was: 
 
? Pneumonia ruled out after study ? Pneumonia still Suspected after study ? Pneumonia confirmed after study The medical record reflects the following: 
  Risk Factors: 82 WF w/hx: COPD Clinical Indicators:  Admitted with acute hypoxic respiratory failure and per DC Summary \"Acute hypoxic respiratory failure  likely due to anemia initially now appears to have COPD exacerbation\" Treatment: duonebs, zithromax IV, tessalon PRN, Rocephin IV, pulmicort neb, mucinex, solu-medrol IV, Vancomycin IV Thank you, [email or web address] 
[phone]

## 2019-11-13 ENCOUNTER — HOME CARE VISIT (OUTPATIENT)
Dept: SCHEDULING | Facility: HOME HEALTH | Age: 84
End: 2019-11-13
Payer: MEDICARE

## 2019-11-13 VITALS
TEMPERATURE: 97.9 F | DIASTOLIC BLOOD PRESSURE: 76 MMHG | RESPIRATION RATE: 16 BRPM | SYSTOLIC BLOOD PRESSURE: 148 MMHG | HEART RATE: 67 BPM | OXYGEN SATURATION: 99 %

## 2019-11-13 PROCEDURE — 3331090001 HH PPS REVENUE CREDIT

## 2019-11-13 PROCEDURE — G0151 HHCP-SERV OF PT,EA 15 MIN: HCPCS

## 2019-11-13 PROCEDURE — 3331090002 HH PPS REVENUE DEBIT

## 2019-11-14 PROCEDURE — 3331090002 HH PPS REVENUE DEBIT

## 2019-11-14 PROCEDURE — 3331090001 HH PPS REVENUE CREDIT

## 2019-11-15 ENCOUNTER — HOME CARE VISIT (OUTPATIENT)
Dept: SCHEDULING | Facility: HOME HEALTH | Age: 84
End: 2019-11-15
Payer: MEDICARE

## 2019-11-15 VITALS
RESPIRATION RATE: 16 BRPM | DIASTOLIC BLOOD PRESSURE: 64 MMHG | WEIGHT: 185 LBS | SYSTOLIC BLOOD PRESSURE: 140 MMHG | BODY MASS INDEX: 30.82 KG/M2 | HEART RATE: 67 BPM | HEIGHT: 65 IN | OXYGEN SATURATION: 97 %

## 2019-11-15 PROCEDURE — 3331090002 HH PPS REVENUE DEBIT

## 2019-11-15 PROCEDURE — G0299 HHS/HOSPICE OF RN EA 15 MIN: HCPCS

## 2019-11-15 PROCEDURE — 3331090001 HH PPS REVENUE CREDIT

## 2019-11-16 PROCEDURE — 3331090001 HH PPS REVENUE CREDIT

## 2019-11-16 PROCEDURE — 3331090002 HH PPS REVENUE DEBIT

## 2019-11-17 PROCEDURE — 3331090001 HH PPS REVENUE CREDIT

## 2019-11-17 PROCEDURE — 3331090002 HH PPS REVENUE DEBIT

## 2019-11-18 PROCEDURE — 3331090001 HH PPS REVENUE CREDIT

## 2019-11-18 PROCEDURE — 3331090002 HH PPS REVENUE DEBIT

## 2019-11-19 ENCOUNTER — HOME CARE VISIT (OUTPATIENT)
Dept: SCHEDULING | Facility: HOME HEALTH | Age: 84
End: 2019-11-19
Payer: MEDICARE

## 2019-11-19 VITALS
RESPIRATION RATE: 18 BRPM | SYSTOLIC BLOOD PRESSURE: 118 MMHG | DIASTOLIC BLOOD PRESSURE: 64 MMHG | OXYGEN SATURATION: 96 % | TEMPERATURE: 98.7 F | HEART RATE: 68 BPM

## 2019-11-19 PROCEDURE — 3331090002 HH PPS REVENUE DEBIT

## 2019-11-19 PROCEDURE — 3331090001 HH PPS REVENUE CREDIT

## 2019-11-19 PROCEDURE — G0151 HHCP-SERV OF PT,EA 15 MIN: HCPCS

## 2019-11-20 PROCEDURE — 3331090001 HH PPS REVENUE CREDIT

## 2019-11-20 PROCEDURE — 3331090002 HH PPS REVENUE DEBIT

## 2019-11-21 ENCOUNTER — HOME CARE VISIT (OUTPATIENT)
Dept: SCHEDULING | Facility: HOME HEALTH | Age: 84
End: 2019-11-21
Payer: MEDICARE

## 2019-11-21 ENCOUNTER — OFFICE VISIT (OUTPATIENT)
Dept: INTERNAL MEDICINE CLINIC | Age: 84
End: 2019-11-21

## 2019-11-21 VITALS
OXYGEN SATURATION: 99 % | RESPIRATION RATE: 16 BRPM | DIASTOLIC BLOOD PRESSURE: 52 MMHG | TEMPERATURE: 98.4 F | SYSTOLIC BLOOD PRESSURE: 132 MMHG | HEART RATE: 72 BPM

## 2019-11-21 VITALS
SYSTOLIC BLOOD PRESSURE: 122 MMHG | RESPIRATION RATE: 20 BRPM | HEIGHT: 65 IN | BODY MASS INDEX: 31.65 KG/M2 | HEART RATE: 81 BPM | WEIGHT: 190 LBS | OXYGEN SATURATION: 98 % | TEMPERATURE: 98.3 F | DIASTOLIC BLOOD PRESSURE: 72 MMHG

## 2019-11-21 DIAGNOSIS — C91.10 CLL (CHRONIC LYMPHOCYTIC LEUKEMIA) (HCC): ICD-10-CM

## 2019-11-21 DIAGNOSIS — I48.91 ATRIAL FIBRILLATION WITH RAPID VENTRICULAR RESPONSE (HCC): Primary | ICD-10-CM

## 2019-11-21 DIAGNOSIS — J96.01 ACUTE RESPIRATORY FAILURE WITH HYPOXIA (HCC): ICD-10-CM

## 2019-11-21 DIAGNOSIS — R73.03 PREDIABETES: Chronic | ICD-10-CM

## 2019-11-21 LAB
A-G RATIO,AGRAT: 1.3 RATIO
ALBUMIN SERPL-MCNC: 3.9 G/DL (ref 3.9–5.4)
ALP SERPL-CCNC: 68 U/L (ref 38–126)
ALT SERPL-CCNC: 16 U/L (ref 0–35)
ANION GAP SERPL CALC-SCNC: 10 MMOL/L
AST SERPL W P-5'-P-CCNC: 16 U/L (ref 14–36)
BILIRUB SERPL-MCNC: 0.5 MG/DL (ref 0.2–1.3)
BUN SERPL-MCNC: 18 MG/DL (ref 7–17)
BUN/CREATININE RATIO,BUCR: 26 RATIO
CALCIUM SERPL-MCNC: 9 MG/DL (ref 8.4–10.2)
CHLORIDE SERPL-SCNC: 109 MMOL/L (ref 98–107)
CO2 SERPL-SCNC: 26 MMOL/L (ref 22–32)
CREAT SERPL-MCNC: 0.7 MG/DL (ref 0.7–1.2)
GLOBULIN,GLOB: 2.9
GLUCOSE SERPL-MCNC: 117 MG/DL (ref 65–105)
POTASSIUM SERPL-SCNC: 5 MMOL/L (ref 3.6–5)
PROT SERPL-MCNC: 6.8 G/DL (ref 6.3–8.2)
SODIUM SERPL-SCNC: 145 MMOL/L (ref 137–145)

## 2019-11-21 PROCEDURE — G0151 HHCP-SERV OF PT,EA 15 MIN: HCPCS

## 2019-11-21 PROCEDURE — 3331090001 HH PPS REVENUE CREDIT

## 2019-11-21 PROCEDURE — 3331090002 HH PPS REVENUE DEBIT

## 2019-11-21 NOTE — PROGRESS NOTES
This note will not be viewable in [address] Augie Morelos is a 82 y.o. female and presents with Follow Up Chronic Condition (2 week fu)  . Subjective:  Mrs. Lizabeth Polk returns to the office today in follow-up of her atrial fibrillation, acute respiratory failure with hypoxia and CLL. She has finished her antibiotics and prednisone. She continues on nebulizer treatments. She notes that her oxygen levels have improved and she is only wearing her oxygen at nighttime. Overall she feels fatigued and notes that her heart beats fast whenever she exerts herself. She has had no dizzy spells. There have been no fevers or chills. She is not coughing up any phlegm or wheezing. She has had no GI upset or lower extremity edema. Denies PND and orthopnea. She is fatigued and debilitated.     Past Medical History:   Diagnosis Date    Arthritis     Bunion of right foot 8/20/2015    Chronic pain     back--uses tens unit    Diverticulitis 6/29/2018    Recurrent    Dry eye syndrome 6/29/2018    Fibromyalgia 6/29/2018    GERD (gastroesophageal reflux disease)     History of left breast cancer 2013    lumpectomy radiation    Hypercholesterolemia 6/29/2018    Ill-defined condition     blood transfusion hx    Leukemia ([facility] [address]     Osteoporosis 6/29/2018    Peripheral neuropathy 6/29/2018    Prediabetes 6/29/2018    PUD (peptic ulcer disease)     Radiation therapy complication 9769    Lt breast-No Chemo    Rosacea 6/29/2018    Trouble in sleeping      Past Surgical History:   Procedure Laterality Date    HX APPENDECTOMY      HX BREAST LUMPECTOMY  11/21/2013    LEFT BREAST LUMPECTOMY W/LEFT BREAST SENTINEL NODE BIOPSY,AND NEEDLE LOCALIZATION performed by Louise Castillo [facility] at [facility] MAIN OR    HX CATARACT REMOVAL      bilateral    HX HYSTERECTOMY      ovarian cyst    HX MOHS PROCEDURES      right    HX ORTHOPAEDIC      back surgery x2    HX OTHER SURGICAL      colonoscopy    HX TONSILLECTOMY      TOTAL KNEE ARTHROPLASTY      left    TOTAL KNEE ARTHROPLASTY      right    US GUIDED CORE BREAST BIOPSY Left 2013    Breast Ca     Allergies   Allergen Reactions    Hydrocodone Nausea Only and Vertigo    Gabapentin Diarrhea, Nausea Only and Other (comments)     weakness    Lyrica [Pregabalin] Nausea Only    Oxycodone Nausea and Vomiting and Vertigo     Current Outpatient Medications   Medication Sig Dispense Refill    albuterol-ipratropium (DUO-NEB) 2.5 mg-0.5 mg/3 ml nebu 3 mL by Nebulization route every six (6) hours as needed (SOB). COPD J44.9 100 Nebule 1    Nebulizer & Compressor machine 1 Each by Does Not Apply route daily. 1 Each 0    dilTIAZem CD (CARDIZEM CD) 180 mg ER capsule Take 1 Cap by mouth daily. 30 Cap 1    pantoprazole (PROTONIX) 40 mg tablet Take 1 Tab by mouth Before breakfast and dinner. 60 Tab 1    budesonide (PULMICORT) 0.5 mg/2 mL nbsp 2 mL by Nebulization route two (2) times a day. 60 Each 1    ergocalciferol (ERGOCALCIFEROL) 50,000 unit capsule Take 50,000 Units by mouth every .  inulin (FIBER GUMMIES PO) Take 2 Gum by mouth nightly.  acetaminophen (TYLENOL) 325 mg tablet Take 650 mg by mouth every four (4) hours as needed for Pain.  vit A/vit C/vit E/zinc/copper (PRESERVISION AREDS PO) Take 1 Tab by mouth nightly.  multivitamin (ONE A DAY) tablet Take 1 Tab by mouth nightly. Social History     Socioeconomic History    Marital status:      Spouse name: Not on file    Number of children: Not on file    Years of education: Not on file    Highest education level: Not on file   Tobacco Use    Smoking status: Former Smoker     Packs/day: 0.50     Years: 50.00     Pack years: 25.00     Last attempt to quit: 2012     Years since quittin.7    Smokeless tobacco: Never Used   Substance and Sexual Activity    Alcohol use:  Yes     Alcohol/week: 2.0 standard drinks     Types: 2 Glasses of wine per week    Drug use: No     Family History   Problem Relation Age of Onset    Cancer Mother         bladder    Cancer Father     Breast Cancer Paternal Grandmother        Health Maintenance   Topic Date Due    GLAUCOMA SCREENING Q2Y  02/15/2020    MEDICARE YEARLY EXAM  07/08/2020    DTaP/Tdap/Td series (3 - Td) 04/03/2029    Bone Densitometry (Dexa) Screening  Completed    Shingrix Vaccine Age 50>  Completed    Influenza Age 5 to Adult  Completed    Pneumococcal 65+ years  Completed        Review of Systems  Constitutional: Positive for fatigue  Eyes:   negative for visual disturbance and irritation  ENT:   negative for tinnitus,sore throat,nasal congestion,ear pain,hoarseness  Respiratory:  Shortness of breath noted with activity  CV: For rapid heartbeat with activity  GI:   negative for nausea, vomiting, diarrhea, abdominal pain,melena  Endo:               negative for polyuria,polydipsia,polyphagia,heat intolerance  Genitourinary: negative for frequency, dysuria and hematuria  Integumentary: negative for rash and pruritus  Hematologic:  negative for easy bruising and gum/nose bleeding  Musculoskel: negative for myalgias, arthralgias, back pain, muscle weakness, joint pain  Neurological:  negative for headaches, dizziness, vertigo, memory problems and gait   Behavl/Psych: negative for feelings of anxiety, depression, mood changes  ROS otherwise negative      Objective:  Visit Vitals  /72 (BP 1 Location: Left arm, BP Patient Position: Sitting)   Pulse 81   Temp 98.3 °F (36.8 °C) (Oral)   Resp 20   Ht 5' 5\" (1.651 m)   Wt 190 lb (86.2 kg)   SpO2 98%   BMI 31.62 kg/m²     Body mass index is 31.62 kg/m².     Physical Exam:   General appearance - alert, well appearing, and in no distress  Mental status - alert, oriented to person, place, and time  EYE-HOWARD, EOMI,conjunctiva normal bilaterally, lids normal  ENT-ENT exam normal, no neck nodes or sinus tenderness  Nose - normal and patent, no erythema,  Or discharge   Mouth - mucous membranes moist, pharynx normal without lesions  Neck - supple, no significant adenopathy or bruit  Chest - clear to auscultation, no wheezes, rales or rhonchi. Heart - normal rate, regular rhythm, normal S1, S2, no murmurs, rubs, clicks or gallops   Abdomen - soft, nontender, nondistended, no masses or organomegaly  Lymph- no adenopathy palpable  Ext-peripheral pulses normal, no pedal edema, no clubbing or cyanosis  Skin-Warm and dry. no hyperpigmentation, vitiligo, or suspicious lesions  Neuro -alert, oriented, normal speech, no focal findings or movement disorder noted      Assessment/Plan:  Diagnoses and all orders for this visit:    Atrial fibrillation with rapid ventricular response (HCC)    CLL (chronic lymphocytic leukemia) (HCC)    Acute respiratory failure with hypoxia (HCC)  -     CBC WITH AUTOMATED DIFF  -     COLLECTION VENOUS BLOOD,VENIPUNCTURE  -     METABOLIC PANEL, COMPREHENSIVE    Prediabetes        Other instructions: The patient's medications were reviewed and reconciled. Continue nighttime oxygen    Continue nebulizer treatments    Follow-up labs today    Continued follow-up with GI, cardiology and hematology    Follow-up in 2 months    Follow-up and Dispositions    · Return in about 2 months (around 1/21/2020). I have reviewed with the patient details of the assessment and plan and all questions were answered. Relevent patient education was performed. The most recent lab findings were reviewed with the patient. An After Visit Summary was printed and given to the patient.     Shaneka Smith MD

## 2019-11-21 NOTE — PROGRESS NOTES
Garret Osler is a 82 y.o. female presenting for Follow Up Chronic Condition (2 week fu)  . 1. Have you been to the ER, urgent care clinic since your last visit? Hospitalized since your last visit? No    2. Have you seen or consulted any other health care providers outside of the [address] [place] [address] since your last visit? Include any pap smears or colon screening. Dr Milka John 11-19-19    Fall Risk Assessment, last 12 mths 9/6/2019   Able to walk? Yes   Fall in past 12 months? Yes   Fall with injury? Yes   Number of falls in past 12 months 1   Fall Risk Score 2         Abuse Screening Questionnaire 7/24/2019   Do you ever feel afraid of your partner? N   Are you in a relationship with someone who physically or mentally threatens you? N   Is it safe for you to go home? Y       3 most recent PHQ Screens 1/21/2019   Little interest or pleasure in doing things Not at all   Feeling down, depressed, irritable, or hopeless Not at all   Total Score PHQ 2 0       There are no discontinued medications.

## 2019-11-21 NOTE — PATIENT INSTRUCTIONS

## 2019-11-22 LAB
BASOPHILS # BLD AUTO: 0 X10E3/UL (ref 0–0.2)
BASOPHILS NFR BLD AUTO: 0 %
EOSINOPHIL # BLD AUTO: 0.7 X10E3/UL (ref 0–0.4)
EOSINOPHIL NFR BLD AUTO: 2 %
ERYTHROCYTE [DISTWIDTH] IN BLOOD BY AUTOMATED COUNT: 17.9 % (ref 12.3–15.4)
HCT VFR BLD AUTO: 23.3 % (ref 34–46.6)
HGB BLD-MCNC: 7.1 G/DL (ref 11.1–15.9)
IMMATURE CELLS, 115398: ABNORMAL
LYMPHOCYTES # BLD AUTO: 7.1 X10E3/UL (ref 0.7–3.1)
LYMPHOCYTES NFR BLD AUTO: 20 %
MCH RBC QN AUTO: 30.5 PG (ref 26.6–33)
MCHC RBC AUTO-ENTMCNC: 30.5 G/DL (ref 31.5–35.7)
MCV RBC AUTO: 100 FL (ref 79–97)
METAMYELOCYTES NFR BLD: 4 % (ref 0–0)
MONOCYTES # BLD AUTO: 10.3 X10E3/UL (ref 0.1–0.9)
MONOCYTES NFR BLD AUTO: 29 %
MORPHOLOGY BLD-IMP: ABNORMAL
MYELOCYTES NFR BLD: 2 % (ref 0–0)
NEUTROPHILS # BLD AUTO: 15.2 X10E3/UL (ref 1.4–7)
NEUTROPHILS NFR BLD AUTO: 42 %
NEUTS BAND NFR BLD AUTO: 1 %
PLATELET # BLD AUTO: 110 X10E3/UL (ref 150–450)
RBC # BLD AUTO: 2.33 X10E6/UL (ref 3.77–5.28)
WBC # BLD AUTO: 35.4 X10E3/UL (ref 3.4–10.8)

## 2019-11-22 PROCEDURE — 3331090002 HH PPS REVENUE DEBIT

## 2019-11-22 PROCEDURE — 3331090001 HH PPS REVENUE CREDIT

## 2019-11-23 PROCEDURE — 3331090001 HH PPS REVENUE CREDIT

## 2019-11-23 PROCEDURE — 3331090002 HH PPS REVENUE DEBIT

## 2019-11-24 PROCEDURE — 3331090002 HH PPS REVENUE DEBIT

## 2019-11-24 PROCEDURE — 3331090001 HH PPS REVENUE CREDIT

## 2019-11-25 PROCEDURE — 3331090001 HH PPS REVENUE CREDIT

## 2019-11-25 PROCEDURE — 3331090002 HH PPS REVENUE DEBIT

## 2019-11-26 ENCOUNTER — HOME CARE VISIT (OUTPATIENT)
Dept: SCHEDULING | Facility: HOME HEALTH | Age: 84
End: 2019-11-26
Payer: MEDICARE

## 2019-11-26 PROCEDURE — 3331090001 HH PPS REVENUE CREDIT

## 2019-11-26 PROCEDURE — 3331090002 HH PPS REVENUE DEBIT

## 2019-11-27 ENCOUNTER — OFFICE VISIT (OUTPATIENT)
Dept: ONCOLOGY | Age: 84
End: 2019-11-27

## 2019-11-27 VITALS
HEART RATE: 83 BPM | DIASTOLIC BLOOD PRESSURE: 93 MMHG | RESPIRATION RATE: 16 BRPM | BODY MASS INDEX: 31.39 KG/M2 | HEIGHT: 65 IN | OXYGEN SATURATION: 95 % | WEIGHT: 188.4 LBS | SYSTOLIC BLOOD PRESSURE: 127 MMHG | TEMPERATURE: 98.2 F

## 2019-11-27 DIAGNOSIS — C91.10 CLL (CHRONIC LYMPHOCYTIC LEUKEMIA) (HCC): ICD-10-CM

## 2019-11-27 DIAGNOSIS — R11.2 NAUSEA AND VOMITING, INTRACTABILITY OF VOMITING NOT SPECIFIED, UNSPECIFIED VOMITING TYPE: ICD-10-CM

## 2019-11-27 DIAGNOSIS — D46.9 MDS (MYELODYSPLASTIC SYNDROME) (HCC): Primary | ICD-10-CM

## 2019-11-27 DIAGNOSIS — D61.82 ANEMIA ASSOCIATED WITH BONE MARROW INFILTRATION (HCC): ICD-10-CM

## 2019-11-27 PROCEDURE — 3331090002 HH PPS REVENUE DEBIT

## 2019-11-27 PROCEDURE — 3331090001 HH PPS REVENUE CREDIT

## 2019-11-27 RX ORDER — LIDOCAINE AND PRILOCAINE 25; 25 MG/G; MG/G
CREAM TOPICAL AS NEEDED
Qty: 30 G | Refills: 0 | Status: SHIPPED | OUTPATIENT
Start: 2019-11-27 | End: 2020-01-30 | Stop reason: ALTCHOICE

## 2019-11-27 RX ORDER — ONDANSETRON 4 MG/1
4 TABLET, FILM COATED ORAL
Qty: 40 TAB | Refills: 2 | Status: SHIPPED | OUTPATIENT
Start: 2019-11-27 | End: 2020-01-30 | Stop reason: ALTCHOICE

## 2019-11-27 RX ORDER — PROCHLORPERAZINE MALEATE 5 MG
5 TABLET ORAL
Qty: 40 TAB | Refills: 2 | Status: SHIPPED | OUTPATIENT
Start: 2019-11-27 | End: 2019-12-27

## 2019-11-27 NOTE — PROGRESS NOTES
[facility]  [address], [address] [facility] [place], [address] [facility]  [phone]      Follow-up Note        Patient: Cynthia Mills MRN: [id number]  SSN: [id number]    YOB: 1935  Age: 82 y.o. Sex: female        Diagnosis:      1. Myelodysplastic Syndrome   IPSS-R Low risk    Del(7q) and trisomy 8    2. Left breast carcinoma:   T1a N0 Mx (Stage IA) infiltrating ductal carcinoma , Tumor size 1 cm, LN -ve, grade 2, ki 67 17%, %, [place] 90%, Her 2 unamplified. Treatment:      1. Discontinued Arimidex 1/2019 after 5 years of treatment  2. Completed radiation treatment to the breast   3. S/P left breast lumpectomy and sentinel LN excision on 11/21/2013    Subjective:      Orlin Parmar is a 70 y.o.  female with stage I invasive ductal carcinoma. She underwent a left breast lumpectomy and sentinel LN excision on 11/21/2013. Ms. Maru Landrum then received radiation to the left breast. She stopped arimidex after taking it for 5 years. I am now seeing her for low grade MDS. She recently suffered PNA and sepsis and was admitted to the hospital briefly. Anemia has worsened. She feels fatigued.          Review of Systems:     Constitutional: fatigue  Eyes: negative   Ears, Nose, Mouth, Throat, and Face: negative   Respiratory: negative   Cardiovascular: negative   Gastrointestinal: negative   Integument/Breast: negative  Hematologic/Lymphatic: negative   Musculoskeletal: joint pain  Neurological: numbness bottoms of both feet      Past Medical History:   Diagnosis Date    Anemia     Arthritis     Bunion of right foot 8/20/2015    Chronic pain     back--uses tens unit    Diverticulitis 6/29/2018    Recurrent    Dry eye syndrome 6/29/2018    Fibromyalgia 6/29/2018    GERD (gastroesophageal reflux disease)     History of left breast cancer 2013    lumpectomy radiation    Hypercholesterolemia 6/29/2018    Ill-defined condition     blood transfusion hx    [facility]     MDS (myelodysplastic syndrome) ([facility]     Osteoporosis 2018    Peripheral neuropathy 2018    Prediabetes 2018    PUD (peptic ulcer disease)     Radiation therapy complication 3681    Lt breast-No Chemo    Rosacea 2018    Trouble in sleeping      Past Surgical History:   Procedure Laterality Date    HX APPENDECTOMY      HX BREAST LUMPECTOMY  2013    LEFT BREAST LUMPECTOMY W/LEFT BREAST SENTINEL NODE BIOPSY,AND NEEDLE LOCALIZATION performed by Claudetta Paradise, MD at [facility] MAIN [facility]    HX CATARACT REMOVAL      bilateral    HX HYSTERECTOMY      ovarian cyst    HX MOHS PROCEDURES      right    HX ORTHOPAEDIC      back surgery x2    HX OTHER SURGICAL      colonoscopy    HX TONSILLECTOMY      TOTAL KNEE ARTHROPLASTY      left    TOTAL KNEE ARTHROPLASTY      right    US GUIDED CORE BREAST BIOPSY Left 2013    Breast Ca      Family History   Problem Relation Age of Onset    Cancer Mother         bladder    Cancer Father     Breast Cancer Paternal Grandmother      Social History     Tobacco Use    Smoking status: Former Smoker     Packs/day: 0.50     Years: 50.00     Pack years: 25.00     Last attempt to quit: 2012     Years since quittin.7    Smokeless tobacco: Never Used   Substance Use Topics    Alcohol use: Yes     Alcohol/week: 2.0 standard drinks     Types: 2 Glasses of wine per week      Prior to Admission medications    Medication Sig Start Date End Date Taking? Authorizing Provider   OXYGEN-AIR DELIVERY SYSTEMS Take 2 L/min by inhalation continuous. Yes Provider, Historical   albuterol-ipratropium (DUO-NEB) 2.5 mg-0.5 mg/3 ml nebu 3 mL by Nebulization route every six (6) hours as needed (SOB). COPD J44.9 19  Yes Dereck Wills MD   Nebulizer & Compressor machine 1 Each by Does Not Apply route daily.  19  Yes Dereck Wills MD   dilTIAZem CD (CARDIZEM CD) 180 mg ER capsule Take 1 Cap by mouth daily. 11/5/19  Yes Sara Huff MD   pantoprazole (PROTONIX) 40 mg tablet Take 1 Tab by mouth Before breakfast and dinner. 11/5/19  Yes Sara Huff MD   budesonide (PULMICORT) 0.5 mg/2 mL nbsp 2 mL by Nebulization route two (2) times a day. 11/5/19  Yes Sara Huff MD   ergocalciferol (ERGOCALCIFEROL) 50,000 unit capsule Take 50,000 Units by mouth every Sunday. Yes Provider, Historical   inulin (FIBER GUMMIES PO) Take 2 Gum by mouth nightly. Yes Provider, Historical   acetaminophen (TYLENOL) 325 mg tablet Take 650 mg by mouth every four (4) hours as needed for Pain. Yes Other, MD Libby   vit A/vit C/vit E/zinc/copper (PRESERVISION AREDS PO) Take 1 Tab by mouth nightly. Yes Provider, Historical   multivitamin (ONE A DAY) tablet Take 1 Tab by mouth nightly. Yes Provider, Historical            Allergies   Allergen Reactions    Hydrocodone Nausea Only and Vertigo    Gabapentin Diarrhea, Nausea Only and Other (comments)     weakness    Lyrica [Pregabalin] Nausea Only    Oxycodone Nausea and Vomiting and Vertigo         Objective:     Visit Vitals  Pulse 83   Resp 16   Ht 5' 5\" (1.651 m)   Wt 188 lb 6.4 oz (85.5 kg)   SpO2 95% Comment: RA   BMI 31.35 kg/m²       Pain Scale: 3/10  Pain Location: Leg (neuropathy BLE)      Physical Exam:    GENERAL: alert, cooperative   EYE: PERRLA,  LYMPHATIC: Cervical, supraclavicular, and axillary nodes normal.   THROAT & NECK: normal and no erythema or exudates noted.    LUNG: clear to auscultation bilaterally   HEART: regular rate and rhythm   ABDOMEN: soft, non-tender   EXTREMITIES: extremities normal   SKIN: Normal.   NEUROLOGIC: negative       Lab Results   Component Value Date/Time    WBC 35.4 (HH) 11/21/2019 11:20 AM    HGB (POC) 10.7 (A) 01/07/2019 09:52 AM    HGB 7.1 (L) 11/21/2019 11:20 AM    HCT (POC) 32.0 (A) 01/07/2019 09:52 AM    HCT 23.3 (L) 11/21/2019 11:20 AM    PLATELET 535 (L) 08/30/7077 11:20 AM     (H) 11/21/2019 11:20 AM Lab Results   Component Value Date/Time    Iron 66 04/29/2019 09:28 AM    TIBC 354 04/29/2019 09:28 AM    Iron % saturation 19 04/29/2019 09:28 AM    Ferritin 170 (H) 04/29/2019 09:28 AM           Assessment:      1. Myelodysplastic Syndrome   IPSS-R Low risk      Del(7q) and trisomy 8    ECOG PS 1  Intent of Treatment - palliative  Prognosis: poor    Anemia has worsened. Leukocytosis is worse  I will arrange for CBC, type and cross as early as next week. I recommended starting systemic therapy with Vidaza. Arlana Standard is a hypomethylating agent which is approved for the treatment for MDS. Hypomethylating can improve blood counts. The response rate is close to 50%. The duration of this treatment plan will be until progression, intolerable side effects, or patient choice. Patient will be meeting with navigation services to discuss any financial barriers to care/estimated cost of care. We will plan to see the patient in follow up at least once per cycle, or sooner if symptoms warrant. The patient's emotional well being was addressed during this office visit and patient seems to be coping well with the diagnosis and the treatment. Common Side Effects of Chemotherapy  Decreased Blood Counts Your blood counts can decrease temporarily due to chemotherapy, they will recover over time. This is an expected side effect that your Doctor will be monitoring.  - If you experience fevers (temperature >100.4°F), bleeding or unexplained bruising, please call the office right away   Risk of Infection Your white blood cells can decrease temporarily due to chemotherapy and can put you at higher risk of infection.   Washing hands frequently with soap and avoiding sick contacts can reduce your risk of infection.  - If you experience fevers (temperature >100.4°F), shaking chills, or any signs of infection, please call the office immediately   Anemia Chemotherapy can cause your red blood cells to temporarily decrease; this is an expected side effect that your Doctor will be monitoring.  - You may experience fatigue if this occurs, please notify the office if you experience bleeding, shortness of breath with minimal exertion or at rest, rapid heartbeat, or feeling as though you may lose consciousness. Hair Loss Chemotherapy can affect your hair follicles and cause you to lose hair. This can occur on your scalp hair but also all over your body including eyebrows and eye lashes   Nausea  You have been prescribed nausea medication to take if needed. Please follow the directions given to you by your Doctor. - Please call the office if the medications you have been given are not relieving nausea. Vomiting Make sure you are taking anti-nausea medication as prescribed. Eating small amounts of bland foods frequently can help. - Please call the office right away if you are vomiting more than 4 times per day or are unable to keep down food or fluids   Diarrhea Eating small amounts of bland foods frequently can help, increase your fluid intake. It is usually ok to take Imodium for diarrhea. - Please call the office right away if you experience more than 4 episodes of watery diarrhea or if you are feeling dehydrated. You can reach Medical Oncology at [address] [place] [address] with further questions or concerns at: [phone]  .  - Calls during normal business hours will reach our office.  - Calls after hours or on the weekend will reach an answering service and the on-call Oncologist will return your call. 2.History of Left breast carcinoma:     T1a N0 Mx (Stage IA) infiltrating ductal carcinoma , Tumor size 1 cm, LN -ve, grade 2, ki 67 17%, %, [place] 90%, Her 2 unamplified.    Dx: 10/28/2013    > S/P left breast lumpectomy and sentinel LN excision on 11/21/2013  > Completed radiation to the breast  > Discontinued Arimidex 1/2019 after 5 years  > In remission    Mammogram (11/29/2018): no evidence of malignancy  DEXA (10/2013) - normal    Symptom management form reviewed with patient. 3. Vitamin D deficiency    > Vitamin D 50,000 units weekly      4. Osteopenia    > Management per Dr. Ida Waddell:        1. Port placement  2. CBC, type/cross and transfuse RBC next week  3. Start Valeriy Fallen. 4. Return at the time of starting treatment. Signed by: Jagdeep Olivia MD                     November 27, 2019        CC.  Kimmie Osorio MD

## 2019-11-27 NOTE — PROGRESS NOTES
83 y/o female here for f/u appt for breast cancer, left sided. Had lumpectomy done. She is done with AI's. She also is here for MDS and JEROME. Pt mammo is Monday. Pt went to ER recently for afib rvr here at [facility]. Pt feels SOB and tired. Labs done recently on 11/21/19. Hb 7.1 wbc 35.4 plt 110. Pt has been weaning herself off of the 02, she checks her pulse ox at home, she does use at night time. Per Poncho Hensley from Dr. Fernanda Red ODT 4MG EVERY 8 HOURS AS NEEDED FOR NAUSEA/AND OR VOMITING. QUANTITY 40 REFILL 2    PER VORB FROM DR RONNA FELIZ 5MG TAB, TAKE ONE TAB BY MOUTH Q6HPRN FOR NAUSEA WAS ORDERED. QUANTITY 40 REFILL 2    VORB: IR INSERT TUNL CVAD  W PORT OVER THAN ONE YEAR as a one time order. PER VORB from Dr. Zhane Davis lidocaine-prilocaine (EMLA) cream apply to affected area as needed for pain dispense 30 refill 2      Chemotherapy education packet provided to the patient and reviewed. Consent was also obtained. All questions and concerns were addressed. Time spent with patient approximately 15 minutes. Pt.  here with her as well.

## 2019-11-28 PROCEDURE — 3331090002 HH PPS REVENUE DEBIT

## 2019-11-28 PROCEDURE — 3331090001 HH PPS REVENUE CREDIT

## 2019-11-29 ENCOUNTER — HOME CARE VISIT (OUTPATIENT)
Dept: HOME HEALTH SERVICES | Facility: HOME HEALTH | Age: 84
End: 2019-11-29
Payer: MEDICARE

## 2019-11-29 ENCOUNTER — HOME CARE VISIT (OUTPATIENT)
Dept: SCHEDULING | Facility: HOME HEALTH | Age: 84
End: 2019-11-29
Payer: MEDICARE

## 2019-11-29 PROCEDURE — G0151 HHCP-SERV OF PT,EA 15 MIN: HCPCS

## 2019-11-29 PROCEDURE — 3331090001 HH PPS REVENUE CREDIT

## 2019-11-29 PROCEDURE — 3331090002 HH PPS REVENUE DEBIT

## 2019-11-30 PROCEDURE — 3331090002 HH PPS REVENUE DEBIT

## 2019-11-30 PROCEDURE — 3331090001 HH PPS REVENUE CREDIT

## 2019-12-01 VITALS
SYSTOLIC BLOOD PRESSURE: 128 MMHG | OXYGEN SATURATION: 97 % | HEART RATE: 78 BPM | TEMPERATURE: 97.5 F | RESPIRATION RATE: 18 BRPM | DIASTOLIC BLOOD PRESSURE: 52 MMHG

## 2019-12-01 PROCEDURE — 3331090002 HH PPS REVENUE DEBIT

## 2019-12-01 PROCEDURE — 3331090001 HH PPS REVENUE CREDIT

## 2019-12-02 ENCOUNTER — PATIENT OUTREACH (OUTPATIENT)
Dept: INTERNAL MEDICINE CLINIC | Age: 84
End: 2019-12-02

## 2019-12-02 ENCOUNTER — HOSPITAL ENCOUNTER (OUTPATIENT)
Dept: INFUSION THERAPY | Age: 84
Discharge: HOME | End: 2019-12-02
Payer: MEDICARE

## 2019-12-02 ENCOUNTER — HOSPITAL ENCOUNTER (OUTPATIENT)
Dept: MAMMOGRAPHY | Age: 84
Discharge: HOME | End: 2019-12-02
Attending: INTERNAL MEDICINE

## 2019-12-02 VITALS — TEMPERATURE: 98.2 F

## 2019-12-02 LAB
BASOPHILS # BLD: 0 K/UL (ref 0–0.1)
BASOPHILS NFR BLD: 0 % (ref 0–1)
COMMENT, HOLDF: NORMAL
DIFFERENTIAL METHOD BLD: ABNORMAL
EOSINOPHIL # BLD: 1.1 K/UL (ref 0–0.4)
EOSINOPHIL NFR BLD: 4 % (ref 0–7)
ERYTHROCYTE [DISTWIDTH] IN BLOOD BY AUTOMATED COUNT: 17.3 % (ref 11.5–14.5)
HCT VFR BLD AUTO: 23.6 % (ref 35–47)
HGB BLD-MCNC: 6.4 G/DL (ref 11.5–16)
IMM GRANULOCYTES # BLD AUTO: 0 K/UL (ref 0–0.04)
IMM GRANULOCYTES NFR BLD AUTO: 0 % (ref 0–0.5)
LYMPHOCYTES # BLD: 5.2 K/UL (ref 0.8–3.5)
LYMPHOCYTES NFR BLD: 19 % (ref 12–49)
MCH RBC QN AUTO: 30.5 PG (ref 26–34)
MCHC RBC AUTO-ENTMCNC: 27.1 G/DL (ref 30–36.5)
MCV RBC AUTO: 112.4 FL (ref 80–99)
METAMYELOCYTES NFR BLD MANUAL: 2 %
MONOCYTES # BLD: 6.5 K/UL (ref 0–1)
MONOCYTES NFR BLD: 24 % (ref 5–13)
MYELOCYTES NFR BLD MANUAL: 9 %
NEUTS BAND NFR BLD MANUAL: 4 %
NEUTS SEG # BLD: 11.4 K/UL (ref 1.8–8)
NEUTS SEG NFR BLD: 38 % (ref 32–75)
NRBC # BLD: 0.07 K/UL (ref 0–0.01)
NRBC BLD-RTO: 0.3 PER 100 WBC
PLATELET # BLD AUTO: 107 K/UL (ref 150–400)
PMV BLD AUTO: 9.5 FL (ref 8.9–12.9)
RBC # BLD AUTO: 2.1 M/UL (ref 3.8–5.2)
RBC MORPH BLD: ABNORMAL
RBC MORPH BLD: ABNORMAL
SAMPLES BEING HELD,HOLD: NORMAL
WBC # BLD AUTO: 27.1 K/UL (ref 3.6–11)

## 2019-12-02 PROCEDURE — 3331090001 HH PPS REVENUE CREDIT

## 2019-12-02 PROCEDURE — 36415 COLL VENOUS BLD VENIPUNCTURE: CPT

## 2019-12-02 PROCEDURE — 85025 COMPLETE CBC W/AUTO DIFF WBC: CPT

## 2019-12-02 PROCEDURE — 3331090002 HH PPS REVENUE DEBIT

## 2019-12-03 ENCOUNTER — DOCUMENTATION ONLY (OUTPATIENT)
Dept: ONCOLOGY | Age: 84
End: 2019-12-03

## 2019-12-03 ENCOUNTER — HOSPITAL ENCOUNTER (OUTPATIENT)
Dept: INTERVENTIONAL RADIOLOGY/VASCULAR | Age: 84
Discharge: HOME | End: 2019-12-03
Attending: INTERNAL MEDICINE
Payer: MEDICARE

## 2019-12-03 VITALS
OXYGEN SATURATION: 95 % | SYSTOLIC BLOOD PRESSURE: 135 MMHG | BODY MASS INDEX: 31.65 KG/M2 | HEART RATE: 83 BPM | RESPIRATION RATE: 16 BRPM | TEMPERATURE: 98.6 F | WEIGHT: 190 LBS | HEIGHT: 65 IN | DIASTOLIC BLOOD PRESSURE: 50 MMHG

## 2019-12-03 DIAGNOSIS — D46.9 MDS (MYELODYSPLASTIC SYNDROME) (HCC): ICD-10-CM

## 2019-12-03 PROCEDURE — C1892 INTRO/SHEATH,FIXED,PEEL-AWAY: HCPCS

## 2019-12-03 PROCEDURE — 3331090002 HH PPS REVENUE DEBIT

## 2019-12-03 PROCEDURE — 74011250636 HC RX REV CODE- 250/636: Performed by: RADIOLOGY

## 2019-12-03 PROCEDURE — 77030039266 HC ADH SKN EXOFIN S2SG -A

## 2019-12-03 PROCEDURE — 36561 INSERT TUNNELED CV CATH: CPT

## 2019-12-03 PROCEDURE — 74011000250 HC RX REV CODE- 250: Performed by: RADIOLOGY

## 2019-12-03 PROCEDURE — 3331090001 HH PPS REVENUE CREDIT

## 2019-12-03 PROCEDURE — 77030031139 HC SUT VCRL2 J&J -A

## 2019-12-03 RX ORDER — LIDOCAINE HYDROCHLORIDE AND EPINEPHRINE 10; 10 MG/ML; UG/ML
20 INJECTION, SOLUTION INFILTRATION; PERINEURAL ONCE
Status: COMPLETED | OUTPATIENT
Start: 2019-12-03 | End: 2019-12-03

## 2019-12-03 RX ORDER — LIDOCAINE HYDROCHLORIDE 20 MG/ML
20 INJECTION, SOLUTION INFILTRATION; PERINEURAL ONCE
Status: COMPLETED | OUTPATIENT
Start: 2019-12-03 | End: 2019-12-03

## 2019-12-03 RX ORDER — FENTANYL CITRATE 50 UG/ML
100 INJECTION, SOLUTION INTRAMUSCULAR; INTRAVENOUS
Status: DISCONTINUED | OUTPATIENT
Start: 2019-12-03 | End: 2019-12-03

## 2019-12-03 RX ORDER — HEPARIN 100 UNIT/ML
300 SYRINGE INTRAVENOUS ONCE
Status: COMPLETED | OUTPATIENT
Start: 2019-12-03 | End: 2019-12-03

## 2019-12-03 RX ORDER — SODIUM CHLORIDE 9 MG/ML
25 INJECTION, SOLUTION INTRAVENOUS CONTINUOUS
Status: DISCONTINUED | OUTPATIENT
Start: 2019-12-03 | End: 2019-12-03

## 2019-12-03 RX ORDER — MIDAZOLAM HYDROCHLORIDE 1 MG/ML
5 INJECTION, SOLUTION INTRAMUSCULAR; INTRAVENOUS
Status: DISCONTINUED | OUTPATIENT
Start: 2019-12-03 | End: 2019-12-03

## 2019-12-03 RX ORDER — HEPARIN SODIUM 200 [USP'U]/100ML
400 INJECTION, SOLUTION INTRAVENOUS ONCE
Status: COMPLETED | OUTPATIENT
Start: 2019-12-03 | End: 2019-12-03

## 2019-12-03 RX ADMIN — LIDOCAINE HYDROCHLORIDE AND EPINEPHRINE 10 ML: 10; 10 INJECTION, SOLUTION INFILTRATION; PERINEURAL at 08:41

## 2019-12-03 RX ADMIN — SODIUM CHLORIDE, PRESERVATIVE FREE 300 UNITS: 5 INJECTION INTRAVENOUS at 08:41

## 2019-12-03 RX ADMIN — FENTANYL CITRATE 50 MCG: 50 INJECTION, SOLUTION INTRAMUSCULAR; INTRAVENOUS at 08:38

## 2019-12-03 RX ADMIN — FENTANYL CITRATE 50 MCG: 50 INJECTION, SOLUTION INTRAMUSCULAR; INTRAVENOUS at 08:43

## 2019-12-03 RX ADMIN — MIDAZOLAM 3 MG: 1 INJECTION INTRAMUSCULAR; INTRAVENOUS at 08:43

## 2019-12-03 RX ADMIN — LIDOCAINE HYDROCHLORIDE 10 ML: 20 INJECTION, SOLUTION INFILTRATION; PERINEURAL at 08:41

## 2019-12-03 RX ADMIN — MIDAZOLAM 2 MG: 1 INJECTION INTRAMUSCULAR; INTRAVENOUS at 08:38

## 2019-12-03 RX ADMIN — FENTANYL CITRATE 50 MCG: 50 INJECTION, SOLUTION INTRAMUSCULAR; INTRAVENOUS at 08:47

## 2019-12-03 RX ADMIN — HEPARIN SODIUM IN SODIUM CHLORIDE: 200 INJECTION INTRAVENOUS at 08:41

## 2019-12-03 RX ADMIN — CEFAZOLIN 2 G: 1 INJECTION, POWDER, FOR SOLUTION INTRAMUSCULAR; INTRAVENOUS at 08:15

## 2019-12-03 NOTE — PROGRESS NOTES
Pt called and asked what her labs were, hgb, mainly. I informed her and also inquired if she got a missed call and she did not. I gave her the number to [phone] to call and get placed for blood transfusion tomorrow.

## 2019-12-03 NOTE — H&P
Radiology History and Physical    Patient: Garret Osler 82 y.o. female       Chief Complaint: No chief complaint on file. History of Present Illness: for port    History:    Past Medical History:   Diagnosis Date    Anemia     Arthritis     Bunion of right foot 2015    Chronic pain     back--uses tens unit    Diverticulitis 2018    Recurrent    Dry eye syndrome 2018    Fibromyalgia 2018    GERD (gastroesophageal reflux disease)     History of left breast cancer 2013    lumpectomy radiation    Hypercholesterolemia 2018    Ill-defined condition     blood transfusion hx    [facility] ([facility] [address]     MDS (myelodysplastic syndrome) ([facility] [address]     Osteoporosis 2018    Peripheral neuropathy 2018    Prediabetes 2018    PUD (peptic ulcer disease)     Radiation therapy complication 7746    Lt breast-No Chemo    Rosacea 2018    Trouble in sleeping      Family History   Problem Relation Age of Onset    Cancer Mother         bladder    Cancer Father     Breast Cancer Paternal Grandmother      Social History     Socioeconomic History    Marital status:      Spouse name: Not on file    Number of children: Not on file    Years of education: Not on file    Highest education level: Not on file   Occupational History    Not on file   Social Needs    Financial resource strain: Not on file    Food insecurity:     Worry: Not on file     Inability: Not on file    Transportation needs:     Medical: Not on file     Non-medical: Not on file   Tobacco Use    Smoking status: Former Smoker     Packs/day: 0.50     Years: 50.00     Pack years: 25.00     Last attempt to quit: 2012     Years since quittin.8    Smokeless tobacco: Never Used   Substance and Sexual Activity    Alcohol use:  Yes     Alcohol/week: 2.0 standard drinks     Types: 2 Glasses of wine per week    Drug use: No    Sexual activity: Not on file   Lifestyle    Physical activity:     Days per week: Not on file     Minutes per session: Not on file    Stress: Not on file   Relationships    Social connections:     Talks on phone: Not on file     Gets together: Not on file     Attends Confucianism service: Not on file     Active member of club or organization: Not on file     Attends meetings of clubs or organizations: Not on file     Relationship status: Not on file    Intimate partner violence:     Fear of current or ex partner: Not on file     Emotionally abused: Not on file     Physically abused: Not on file     Forced sexual activity: Not on file   Other Topics Concern    Not on file   Social History Narrative    Not on file       Allergies: Allergies   Allergen Reactions    Hydrocodone Nausea Only and Vertigo    Gabapentin Diarrhea, Nausea Only and Other (comments)     weakness    Lyrica [Pregabalin] Nausea Only    Oxycodone Nausea and Vomiting and Vertigo       Current Medications:  Current Outpatient Medications   Medication Sig    ondansetron hcl (ZOFRAN) 4 mg tablet Take 1 Tab by mouth every eight (8) hours as needed for Nausea.  prochlorperazine (COMPAZINE) 5 mg tablet Take 1 Tab by mouth every six (6) hours as needed for Nausea for up to 30 days.  lidocaine-prilocaine (EMLA) topical cream Apply  to affected area as needed for Pain.  OXYGEN-AIR DELIVERY SYSTEMS Take 2 L/min by inhalation continuous.  albuterol-ipratropium (DUO-NEB) 2.5 mg-0.5 mg/3 ml nebu 3 mL by Nebulization route every six (6) hours as needed (SOB). COPD J44.9    Nebulizer & Compressor machine 1 Each by Does Not Apply route daily.  dilTIAZem CD (CARDIZEM CD) 180 mg ER capsule Take 1 Cap by mouth daily.  pantoprazole (PROTONIX) 40 mg tablet Take 1 Tab by mouth Before breakfast and dinner.  budesonide (PULMICORT) 0.5 mg/2 mL nbsp 2 mL by Nebulization route two (2) times a day.  ergocalciferol (ERGOCALCIFEROL) 50,000 unit capsule Take 50,000 Units by mouth every Sunday.     inulin (FIBER GUMMIES PO) Take 2 Gum by mouth nightly.  acetaminophen (TYLENOL) 325 mg tablet Take 650 mg by mouth every four (4) hours as needed for Pain.  vit A/vit C/vit E/zinc/copper (PRESERVISION AREDS PO) Take 1 Tab by mouth nightly.  multivitamin (ONE A DAY) tablet Take 1 Tab by mouth nightly. No current facility-administered medications for this encounter. Physical Exam:  Blood pressure 143/49, pulse 95, temperature 98.6 °F (37 °C), resp. rate 18, height 5' 5\" (1.651 m), weight 86.2 kg (190 lb), SpO2 90 %, not currently breastfeeding. LUNG: clear to auscultation bilaterally, HEART: regular rate and rhythm      Alerts:    Hospital Problems  Date Reviewed: 11/27/2019    None          Laboratory:      Recent Labs     12/02/19  1314   HGB 6.4*   HCT 23.6*   WBC 27.1*   *         Plan of Care/Planned Procedure:  Risks, benefits, and alternatives reviewed with patient and she agrees to proceed with the procedure.        Nadir Li MD

## 2019-12-03 NOTE — DISCHARGE INSTRUCTIONS
[facility]  Special Procedures/Angiography Department      Radiologist:  Humera Go     Date:   December 3, 2019      [facility] Discharge Instructions      Watch for signs of infection:    1. Redness,   2. Fever, chills,   3. Increased pain, and/or drainage from the site. If this occurs, call your physician at once. Return next week for a [employer] Check check:     Do not sign in. Go to the podium, and ask them to call our department [phone]). Please try to come between 9:00AM and 1:00PM    Keep your dressing clean and dry. Leave the dressing in place until seen here next week. The dressing may be changed in your physicians office. Continue your previous diet and follow the medication reconciliation list.    You may take Tylenol, as directed on the label, for pain. Avoid ibuprofen (Advil, Motrin) and aspirin as they may cause you to bleed. Because you received sedation, you are not to drive or sign any legal documents for the next 24 hours. Do not lift anything heavier than 5 pounds with the affected arm for the next week.     If you have any questions or concerns, please call [phone] and ask for the nurse on-call

## 2019-12-04 PROCEDURE — 86923 COMPATIBILITY TEST ELECTRIC: CPT

## 2019-12-04 PROCEDURE — 3331090001 HH PPS REVENUE CREDIT

## 2019-12-04 PROCEDURE — 3331090002 HH PPS REVENUE DEBIT

## 2019-12-04 PROCEDURE — 86900 BLOOD TYPING SEROLOGIC ABO: CPT

## 2019-12-04 RX ORDER — DIPHENHYDRAMINE HCL 25 MG
25 CAPSULE ORAL ONCE
Status: COMPLETED | OUTPATIENT
Start: 2019-12-05 | End: 2019-12-05

## 2019-12-04 RX ORDER — SODIUM CHLORIDE 9 MG/ML
250 INJECTION, SOLUTION INTRAVENOUS AS NEEDED
Status: DISCONTINUED | OUTPATIENT
Start: 2019-12-04 | End: 2019-12-06 | Stop reason: HOSPADM

## 2019-12-04 RX ORDER — ACETAMINOPHEN 325 MG/1
650 TABLET ORAL ONCE
Status: COMPLETED | OUTPATIENT
Start: 2019-12-05 | End: 2019-12-05

## 2019-12-05 ENCOUNTER — HOSPITAL ENCOUNTER (OUTPATIENT)
Dept: INFUSION THERAPY | Age: 84
Discharge: HOME | End: 2019-12-05
Payer: MEDICARE

## 2019-12-05 VITALS
OXYGEN SATURATION: 94 % | RESPIRATION RATE: 18 BRPM | SYSTOLIC BLOOD PRESSURE: 120 MMHG | HEART RATE: 74 BPM | TEMPERATURE: 98.6 F | DIASTOLIC BLOOD PRESSURE: 57 MMHG

## 2019-12-05 PROCEDURE — 3331090001 HH PPS REVENUE CREDIT

## 2019-12-05 PROCEDURE — 74011250636 HC RX REV CODE- 250/636: Performed by: NURSE PRACTITIONER

## 2019-12-05 PROCEDURE — P9016 RBC LEUKOCYTES REDUCED: HCPCS

## 2019-12-05 PROCEDURE — 77030013169 SET IV BLD ICUM -A

## 2019-12-05 PROCEDURE — 74011250637 HC RX REV CODE- 250/637: Performed by: NURSE PRACTITIONER

## 2019-12-05 PROCEDURE — 77030012965 HC NDL HUBR BBMI -A

## 2019-12-05 PROCEDURE — 3331090002 HH PPS REVENUE DEBIT

## 2019-12-05 PROCEDURE — 36430 TRANSFUSION BLD/BLD COMPNT: CPT

## 2019-12-05 PROCEDURE — 96523 IRRIG DRUG DELIVERY DEVICE: CPT

## 2019-12-05 RX ORDER — SODIUM CHLORIDE 9 MG/ML
250 INJECTION, SOLUTION INTRAVENOUS AS NEEDED
Status: DISCONTINUED | OUTPATIENT
Start: 2019-12-05 | End: 2019-12-09 | Stop reason: HOSPADM

## 2019-12-05 RX ORDER — HEPARIN 100 UNIT/ML
500 SYRINGE INTRAVENOUS AS NEEDED
Status: DISCONTINUED | OUTPATIENT
Start: 2019-12-05 | End: 2019-12-06 | Stop reason: HOSPADM

## 2019-12-05 RX ORDER — SODIUM CHLORIDE 0.9 % (FLUSH) 0.9 %
5-10 SYRINGE (ML) INJECTION AS NEEDED
Status: DISCONTINUED | OUTPATIENT
Start: 2019-12-05 | End: 2019-12-06 | Stop reason: HOSPADM

## 2019-12-05 RX ADMIN — ACETAMINOPHEN 650 MG: 325 TABLET ORAL at 09:10

## 2019-12-05 RX ADMIN — Medication 10 ML: at 09:06

## 2019-12-05 RX ADMIN — Medication 10 ML: at 12:04

## 2019-12-05 RX ADMIN — HEPARIN 300 UNITS: 100 SYRINGE at 12:05

## 2019-12-05 RX ADMIN — SODIUM CHLORIDE 250 ML: 900 INJECTION, SOLUTION INTRAVENOUS at 09:07

## 2019-12-05 RX ADMIN — DIPHENHYDRAMINE HYDROCHLORIDE 25 MG: 25 CAPSULE ORAL at 09:10

## 2019-12-05 NOTE — PROGRESS NOTES
[facility] Progress Note    Date: 2019    Name: Roslyn Chacon    MRN: [id number]         : 1935      0900: Pt arrived at [facility] ambulatory and in no distress for transfusion of 1 units PRBCs. Assessment completed, no new complaints voiced. IV established via port without difficulty. Signs/symptoms of adverse blood reaction discussed with pt, voiced understanding. Education provided regarding reason for blood transfusion (HGB 6.8)  and possible reactions. All questions and concerns regarding transfusion answered, patient voiced understanding.     Problem: Anemia Care Plan (Adult and Pediatric)  Goal: *Labs within defined limits  Outcome: Progressing Towards Goal     Problem: Knowledge Deficit  Goal: *Verbalizes understanding of procedures and medications  Outcome: Progressing Towards Goal     Problem: Patient Education:  Go to Education Activity  Goal: Patient/Family Education  Outcome: Progressing Towards Goal    Medications received:  Medications Administered     0.9% sodium chloride infusion 250 mL     Admin Date  2019 Action  New Bag Dose  250 mL Rate  15 mL/hr Route  IntraVENous Administered By  Mireille Wood RN          acetaminophen (TYLENOL) tablet 650 mg     Admin Date  2019 Action  Given Dose  650 mg Route  Oral Administered By  Mireille Wood RN          diphenhydrAMINE (BENADRYL) capsule 25 mg     Admin Date  2019 Action  Given Dose  25 mg Route  Oral Administered By  Mireille Wood RN          heparin (porcine) pf 500 Units     Admin Date  2019 Action  Given Dose  300 Units Route  IntraVENous Administered By  Juliana Lew RN          sodium chloride (NS) flush 5-10 mL     Admin Date  2019 Action  Given Dose  10 mL Route  IntraVENous Administered By  Mireille Wood RN           Admin Date  2019 Action  Given Dose  10 mL Route  IntraVENous Administered By  Juliana Lew RN                  9516:  1st unit PRBCs started and infusing without difficulty, observed x 15 minutes  1115:  1st unit completed without adverse reaction noted, NS flushing line. Patient Vitals for the past 12 hrs:   Temp Pulse Resp BP SpO2   12/05/19 1157 98.6 °F (37 °C) 74 18 120/57    12/05/19 1110 98.5 °F (36.9 °C) 74 18 109/45    12/05/19 1010 97.8 °F (36.6 °C) 74 18 132/41    12/05/19 0940 98.3 °F (36.8 °C) 85 18 91/77    12/05/19 0925 97.9 °F (36.6 °C) 73 18 108/47    12/05/19 0905 98.2 °F (36.8 °C) 77 18 103/52    12/05/19 0853 98.1 °F (36.7 °C) 76 18 108/47 94 %       1200 Tolerated transfusion  well, no adverse reaction noted. D/C instructions reviewed, copy to pt, voiced understanding. Patient declined 1 hour post transfusion observation. D/Cd from [facility] ambulatory and in no distress accompanied by spouse.       Future Appointments   Date Time Provider Anju Dickson   12/9/2019  1:00 PM [facility] INFUSION NURSE 1 [address] [place] [address]   12/10/2019  2:00 PM [facility] INFUSION NURSE 2 Parth OLIVER [employer]   12/11/2019  2:00 PM [facility] INFUSION NURSE 2 [address] OLIVER [employer] [facility]   12/12/2019  9:00 AM [facility] INFUSION NURSE 1 [facility] [employer] [facility]   12/12/2019  9:15 AM Nicolasa Goodwin NP ONC DUNIA SCHED   12/12/2019 11:40 AM Jessee Mcneil DO NEUM DUNIA SCHED   12/13/2019 11:00 AM [facility] INFUSION NURSE 1 [facility] [employer] [facility]   1/6/2020  1:00 PM [facility] INFUSION NURSE 2 [facility] [employer] [facility]   1/7/2020  1:00 PM [facility] INFUSION NURSE 2 [facility]   1/8/2020  1:00 PM [facility] INFUSION NURSE 2 [facility]   1/9/2020  1:00 PM [facility] INFUSION NURSE 1 [facility]   1/10/2020  1:00 PM [facility] INFUSION NURSE 1 [address] [facility] COM   1/30/2020 10:00 AM Roxann Jesus MD [phone]  December 5, 2019

## 2019-12-05 NOTE — PROGRESS NOTES
Spiritual Care Partner Volunteer visited patient in [facility] at [facility] on 12/05/2019.   Documented by:  Magda Jose, [facility], Spiritual Care Intern

## 2019-12-05 NOTE — PROGRESS NOTES
[facility] DISCHARGE INSTRUCTIONS FOR:  BLOOD TRANSFUSION We hope you are feeling better after your blood transfusion. Some mild tenderness or slight bruising at your IV site is normal.  Avoid lifting or heavy use of that extremity for the rest of the day. Drink plenty of fluids, eat a normal diet and get some rest. 
 
There are some important signs that you need to watch for in case you experience a delayed reaction to the blood you have received. Call your physician immediately if you develop any of the following symptoms: 1. Severe headache or backache; 
 
2. Fever above 100 degrees; 
 
3. Chills; 
 
4. Difficulty breathing; 
 
5.  Blood or red color in urine; 
 
6. The feeling of weakness or constant fatigue; 
 
7. Yellowing of the whites of your eyes or skin (jaundice). If your physician is not available, call or go to the nearest emergency room, or dial 911. Aylin Mendoza, Signature: ___________________________ 12/5/2019 Yessi Ponce RN

## 2019-12-06 PROCEDURE — 3331090001 HH PPS REVENUE CREDIT

## 2019-12-06 PROCEDURE — 3331090002 HH PPS REVENUE DEBIT

## 2019-12-07 LAB
ABO + RH BLD: NORMAL
BLD PROD TYP BPU: NORMAL
BLOOD GROUP ANTIBODIES SERPL: NORMAL
BPU ID: NORMAL
CROSSMATCH RESULT,%XM: NORMAL
SPECIMEN EXP DATE BLD: NORMAL
STATUS OF UNIT,%ST: NORMAL
UNIT DIVISION, %UDIV: 0

## 2019-12-07 PROCEDURE — 3331090001 HH PPS REVENUE CREDIT

## 2019-12-07 PROCEDURE — 3331090002 HH PPS REVENUE DEBIT

## 2019-12-08 PROCEDURE — 3331090002 HH PPS REVENUE DEBIT

## 2019-12-08 PROCEDURE — 3331090001 HH PPS REVENUE CREDIT

## 2019-12-09 ENCOUNTER — HOSPITAL ENCOUNTER (OUTPATIENT)
Dept: INFUSION THERAPY | Age: 84
Discharge: HOME | End: 2019-12-09
Payer: MEDICARE

## 2019-12-09 ENCOUNTER — HOSPITAL ENCOUNTER (OUTPATIENT)
Dept: INFUSION THERAPY | Age: 84
End: 2019-12-09
Payer: MEDICARE

## 2019-12-09 VITALS
HEIGHT: 65 IN | BODY MASS INDEX: 31.71 KG/M2 | DIASTOLIC BLOOD PRESSURE: 44 MMHG | RESPIRATION RATE: 18 BRPM | OXYGEN SATURATION: 96 % | TEMPERATURE: 98.4 F | HEART RATE: 76 BPM | SYSTOLIC BLOOD PRESSURE: 116 MMHG | WEIGHT: 190.3 LBS

## 2019-12-09 DIAGNOSIS — D46.9 MDS (MYELODYSPLASTIC SYNDROME) (HCC): Primary | ICD-10-CM

## 2019-12-09 LAB
ALBUMIN SERPL-MCNC: 3.7 G/DL (ref 3.5–5)
ALBUMIN/GLOB SERPL: 1.1 {RATIO} (ref 1.1–2.2)
ALP SERPL-CCNC: 70 U/L (ref 45–117)
ALT SERPL-CCNC: 13 U/L (ref 12–78)
ANION GAP SERPL CALC-SCNC: 11 MMOL/L (ref 5–15)
AST SERPL-CCNC: 14 U/L (ref 15–37)
BASOPHILS # BLD: 0 K/UL (ref 0–0.1)
BASOPHILS NFR BLD: 0 % (ref 0–1)
BILIRUB SERPL-MCNC: 0.5 MG/DL (ref 0.2–1)
BUN SERPL-MCNC: 15 MG/DL (ref 6–20)
BUN/CREAT SERPL: 19 (ref 12–20)
CALCIUM SERPL-MCNC: 8.3 MG/DL (ref 8.5–10.1)
CHLORIDE SERPL-SCNC: 106 MMOL/L (ref 97–108)
CO2 SERPL-SCNC: 27 MMOL/L (ref 21–32)
CREAT SERPL-MCNC: 0.8 MG/DL (ref 0.55–1.02)
DIFFERENTIAL METHOD BLD: ABNORMAL
EOSINOPHIL # BLD: 0.7 K/UL (ref 0–0.4)
EOSINOPHIL NFR BLD: 2 % (ref 0–7)
ERYTHROCYTE [DISTWIDTH] IN BLOOD BY AUTOMATED COUNT: 17.2 % (ref 11.5–14.5)
GLOBULIN SER CALC-MCNC: 3.3 G/DL (ref 2–4)
GLUCOSE SERPL-MCNC: 114 MG/DL (ref 65–100)
HCT VFR BLD AUTO: 24.5 % (ref 35–47)
HGB BLD-MCNC: 6.8 G/DL (ref 11.5–16)
IMM GRANULOCYTES # BLD AUTO: 0 K/UL
IMM GRANULOCYTES NFR BLD AUTO: 0 %
LYMPHOCYTES # BLD: 9.2 K/UL (ref 0.8–3.5)
LYMPHOCYTES NFR BLD: 28 % (ref 12–49)
MCH RBC QN AUTO: 29.8 PG (ref 26–34)
MCHC RBC AUTO-ENTMCNC: 27.8 G/DL (ref 30–36.5)
MCV RBC AUTO: 107.5 FL (ref 80–99)
METAMYELOCYTES NFR BLD MANUAL: 5 %
MONOCYTES # BLD: 6.3 K/UL (ref 0–1)
MONOCYTES NFR BLD: 19 % (ref 5–13)
MYELOCYTES NFR BLD MANUAL: 2 %
NEUTS BAND NFR BLD MANUAL: 4 % (ref 0–6)
NEUTS SEG # BLD: 14.5 K/UL (ref 1.8–8)
NEUTS SEG NFR BLD: 40 % (ref 32–75)
NRBC # BLD: 0.15 K/UL (ref 0–0.01)
NRBC BLD-RTO: 0.5 PER 100 WBC
PLATELET # BLD AUTO: 147 K/UL (ref 150–400)
PMV BLD AUTO: 9.6 FL (ref 8.9–12.9)
POTASSIUM SERPL-SCNC: 3.6 MMOL/L (ref 3.5–5.1)
PROT SERPL-MCNC: 7 G/DL (ref 6.4–8.2)
RBC # BLD AUTO: 2.28 M/UL (ref 3.8–5.2)
RBC MORPH BLD: ABNORMAL
SODIUM SERPL-SCNC: 144 MMOL/L (ref 136–145)
WBC # BLD AUTO: 33 K/UL (ref 3.6–11)

## 2019-12-09 PROCEDURE — 3331090002 HH PPS REVENUE DEBIT

## 2019-12-09 PROCEDURE — 36415 COLL VENOUS BLD VENIPUNCTURE: CPT

## 2019-12-09 PROCEDURE — 96375 TX/PRO/DX INJ NEW DRUG ADDON: CPT

## 2019-12-09 PROCEDURE — 74011250636 HC RX REV CODE- 250/636: Performed by: INTERNAL MEDICINE

## 2019-12-09 PROCEDURE — 80053 COMPREHEN METABOLIC PANEL: CPT

## 2019-12-09 PROCEDURE — 77030012965 HC NDL HUBR BBMI -A

## 2019-12-09 PROCEDURE — 96409 CHEMO IV PUSH SNGL DRUG: CPT

## 2019-12-09 PROCEDURE — 85025 COMPLETE CBC W/AUTO DIFF WBC: CPT

## 2019-12-09 PROCEDURE — 74011000258 HC RX REV CODE- 258: Performed by: INTERNAL MEDICINE

## 2019-12-09 PROCEDURE — 3331090001 HH PPS REVENUE CREDIT

## 2019-12-09 RX ORDER — SODIUM CHLORIDE 9 MG/ML
10 INJECTION INTRAMUSCULAR; INTRAVENOUS; SUBCUTANEOUS AS NEEDED
Status: CANCELLED | OUTPATIENT
Start: 2019-12-13

## 2019-12-09 RX ORDER — ALBUTEROL SULFATE 0.83 MG/ML
2.5 SOLUTION RESPIRATORY (INHALATION) AS NEEDED
Status: CANCELLED
Start: 2019-12-11

## 2019-12-09 RX ORDER — SODIUM CHLORIDE 9 MG/ML
10 INJECTION INTRAMUSCULAR; INTRAVENOUS; SUBCUTANEOUS AS NEEDED
Status: CANCELLED | OUTPATIENT
Start: 2019-12-09

## 2019-12-09 RX ORDER — ONDANSETRON 2 MG/ML
8 INJECTION INTRAMUSCULAR; INTRAVENOUS ONCE
Status: CANCELLED
Start: 2019-12-13

## 2019-12-09 RX ORDER — SODIUM CHLORIDE 9 MG/ML
10 INJECTION INTRAMUSCULAR; INTRAVENOUS; SUBCUTANEOUS AS NEEDED
Status: DISCONTINUED | OUTPATIENT
Start: 2019-12-09 | End: 2019-12-10 | Stop reason: HOSPADM

## 2019-12-09 RX ORDER — HEPARIN 100 UNIT/ML
300-500 SYRINGE INTRAVENOUS AS NEEDED
Status: CANCELLED
Start: 2019-12-10

## 2019-12-09 RX ORDER — DIPHENHYDRAMINE HYDROCHLORIDE 50 MG/ML
50 INJECTION, SOLUTION INTRAMUSCULAR; INTRAVENOUS AS NEEDED
Status: CANCELLED
Start: 2019-12-11

## 2019-12-09 RX ORDER — EPINEPHRINE 1 MG/ML
0.3 INJECTION, SOLUTION, CONCENTRATE INTRAVENOUS AS NEEDED
Status: CANCELLED | OUTPATIENT
Start: 2019-12-12

## 2019-12-09 RX ORDER — DEXAMETHASONE SODIUM PHOSPHATE 4 MG/ML
8 INJECTION, SOLUTION INTRA-ARTICULAR; INTRALESIONAL; INTRAMUSCULAR; INTRAVENOUS; SOFT TISSUE ONCE
Status: CANCELLED | OUTPATIENT
Start: 2019-12-11

## 2019-12-09 RX ORDER — HEPARIN 100 UNIT/ML
300-500 SYRINGE INTRAVENOUS AS NEEDED
Status: CANCELLED
Start: 2019-12-13

## 2019-12-09 RX ORDER — EPINEPHRINE 1 MG/ML
0.3 INJECTION, SOLUTION, CONCENTRATE INTRAVENOUS AS NEEDED
Status: CANCELLED | OUTPATIENT
Start: 2019-12-13

## 2019-12-09 RX ORDER — ACETAMINOPHEN 325 MG/1
650 TABLET ORAL AS NEEDED
Status: CANCELLED
Start: 2019-12-09

## 2019-12-09 RX ORDER — HYDROCORTISONE SODIUM SUCCINATE 100 MG/2ML
100 INJECTION, POWDER, FOR SOLUTION INTRAMUSCULAR; INTRAVENOUS AS NEEDED
Status: CANCELLED | OUTPATIENT
Start: 2019-12-11

## 2019-12-09 RX ORDER — ALBUTEROL SULFATE 0.83 MG/ML
2.5 SOLUTION RESPIRATORY (INHALATION) AS NEEDED
Status: CANCELLED
Start: 2019-12-10

## 2019-12-09 RX ORDER — HYDROCORTISONE SODIUM SUCCINATE 100 MG/2ML
100 INJECTION, POWDER, FOR SOLUTION INTRAMUSCULAR; INTRAVENOUS AS NEEDED
Status: CANCELLED | OUTPATIENT
Start: 2019-12-12

## 2019-12-09 RX ORDER — SODIUM CHLORIDE 0.9 % (FLUSH) 0.9 %
10 SYRINGE (ML) INJECTION AS NEEDED
Status: CANCELLED
Start: 2019-12-10

## 2019-12-09 RX ORDER — ONDANSETRON 2 MG/ML
8 INJECTION INTRAMUSCULAR; INTRAVENOUS ONCE
Status: CANCELLED
Start: 2019-12-12

## 2019-12-09 RX ORDER — DIPHENHYDRAMINE HYDROCHLORIDE 50 MG/ML
50 INJECTION, SOLUTION INTRAMUSCULAR; INTRAVENOUS AS NEEDED
Status: CANCELLED
Start: 2019-12-09

## 2019-12-09 RX ORDER — SODIUM CHLORIDE 9 MG/ML
25 INJECTION, SOLUTION INTRAVENOUS CONTINUOUS
Status: CANCELLED | OUTPATIENT
Start: 2019-12-13 | End: 2019-12-13

## 2019-12-09 RX ORDER — EPINEPHRINE 1 MG/ML
0.3 INJECTION, SOLUTION, CONCENTRATE INTRAVENOUS AS NEEDED
Status: CANCELLED | OUTPATIENT
Start: 2019-12-10

## 2019-12-09 RX ORDER — DEXAMETHASONE SODIUM PHOSPHATE 4 MG/ML
8 INJECTION, SOLUTION INTRA-ARTICULAR; INTRALESIONAL; INTRAMUSCULAR; INTRAVENOUS; SOFT TISSUE ONCE
Status: CANCELLED | OUTPATIENT
Start: 2019-12-10

## 2019-12-09 RX ORDER — SODIUM CHLORIDE 9 MG/ML
10 INJECTION INTRAMUSCULAR; INTRAVENOUS; SUBCUTANEOUS AS NEEDED
Status: CANCELLED | OUTPATIENT
Start: 2019-12-11

## 2019-12-09 RX ORDER — EPINEPHRINE 1 MG/ML
0.3 INJECTION, SOLUTION, CONCENTRATE INTRAVENOUS AS NEEDED
Status: CANCELLED | OUTPATIENT
Start: 2019-12-09

## 2019-12-09 RX ORDER — ONDANSETRON 2 MG/ML
8 INJECTION INTRAMUSCULAR; INTRAVENOUS AS NEEDED
Status: CANCELLED | OUTPATIENT
Start: 2019-12-10

## 2019-12-09 RX ORDER — DIPHENHYDRAMINE HYDROCHLORIDE 50 MG/ML
50 INJECTION, SOLUTION INTRAMUSCULAR; INTRAVENOUS AS NEEDED
Status: CANCELLED
Start: 2019-12-10

## 2019-12-09 RX ORDER — SODIUM CHLORIDE 0.9 % (FLUSH) 0.9 %
10 SYRINGE (ML) INJECTION AS NEEDED
Status: CANCELLED
Start: 2019-12-09

## 2019-12-09 RX ORDER — HYDROCORTISONE SODIUM SUCCINATE 100 MG/2ML
100 INJECTION, POWDER, FOR SOLUTION INTRAMUSCULAR; INTRAVENOUS AS NEEDED
Status: CANCELLED | OUTPATIENT
Start: 2019-12-13

## 2019-12-09 RX ORDER — ALBUTEROL SULFATE 0.83 MG/ML
2.5 SOLUTION RESPIRATORY (INHALATION) AS NEEDED
Status: CANCELLED
Start: 2019-12-09

## 2019-12-09 RX ORDER — SODIUM CHLORIDE 0.9 % (FLUSH) 0.9 %
10 SYRINGE (ML) INJECTION AS NEEDED
Status: CANCELLED
Start: 2019-12-13

## 2019-12-09 RX ORDER — DIPHENHYDRAMINE HYDROCHLORIDE 50 MG/ML
50 INJECTION, SOLUTION INTRAMUSCULAR; INTRAVENOUS AS NEEDED
Status: CANCELLED
Start: 2019-12-12

## 2019-12-09 RX ORDER — ONDANSETRON 2 MG/ML
8 INJECTION INTRAMUSCULAR; INTRAVENOUS AS NEEDED
Status: CANCELLED | OUTPATIENT
Start: 2019-12-13

## 2019-12-09 RX ORDER — HEPARIN 100 UNIT/ML
300-500 SYRINGE INTRAVENOUS AS NEEDED
Status: CANCELLED
Start: 2019-12-11

## 2019-12-09 RX ORDER — ALBUTEROL SULFATE 0.83 MG/ML
2.5 SOLUTION RESPIRATORY (INHALATION) AS NEEDED
Status: CANCELLED
Start: 2019-12-13

## 2019-12-09 RX ORDER — SODIUM CHLORIDE 0.9 % (FLUSH) 0.9 %
10 SYRINGE (ML) INJECTION AS NEEDED
Status: CANCELLED
Start: 2019-12-12

## 2019-12-09 RX ORDER — ONDANSETRON 2 MG/ML
8 INJECTION INTRAMUSCULAR; INTRAVENOUS AS NEEDED
Status: CANCELLED | OUTPATIENT
Start: 2019-12-11

## 2019-12-09 RX ORDER — ONDANSETRON 2 MG/ML
8 INJECTION INTRAMUSCULAR; INTRAVENOUS AS NEEDED
Status: CANCELLED | OUTPATIENT
Start: 2019-12-12

## 2019-12-09 RX ORDER — SODIUM CHLORIDE 9 MG/ML
10 INJECTION INTRAMUSCULAR; INTRAVENOUS; SUBCUTANEOUS AS NEEDED
Status: CANCELLED | OUTPATIENT
Start: 2019-12-12

## 2019-12-09 RX ORDER — SODIUM CHLORIDE 9 MG/ML
25 INJECTION, SOLUTION INTRAVENOUS CONTINUOUS
Status: DISCONTINUED | OUTPATIENT
Start: 2019-12-09 | End: 2019-12-10 | Stop reason: HOSPADM

## 2019-12-09 RX ORDER — ALBUTEROL SULFATE 0.83 MG/ML
2.5 SOLUTION RESPIRATORY (INHALATION) AS NEEDED
Status: CANCELLED
Start: 2019-12-12

## 2019-12-09 RX ORDER — ACETAMINOPHEN 325 MG/1
650 TABLET ORAL AS NEEDED
Status: CANCELLED
Start: 2019-12-12

## 2019-12-09 RX ORDER — DIPHENHYDRAMINE HYDROCHLORIDE 50 MG/ML
50 INJECTION, SOLUTION INTRAMUSCULAR; INTRAVENOUS AS NEEDED
Status: CANCELLED
Start: 2019-12-13

## 2019-12-09 RX ORDER — ACETAMINOPHEN 325 MG/1
650 TABLET ORAL AS NEEDED
Status: CANCELLED
Start: 2019-12-13

## 2019-12-09 RX ORDER — ACETAMINOPHEN 325 MG/1
650 TABLET ORAL AS NEEDED
Status: CANCELLED
Start: 2019-12-10

## 2019-12-09 RX ORDER — ONDANSETRON 2 MG/ML
8 INJECTION INTRAMUSCULAR; INTRAVENOUS ONCE
Status: COMPLETED | OUTPATIENT
Start: 2019-12-09 | End: 2019-12-09

## 2019-12-09 RX ORDER — DEXAMETHASONE SODIUM PHOSPHATE 4 MG/ML
8 INJECTION, SOLUTION INTRA-ARTICULAR; INTRALESIONAL; INTRAMUSCULAR; INTRAVENOUS; SOFT TISSUE ONCE
Status: CANCELLED | OUTPATIENT
Start: 2019-12-09

## 2019-12-09 RX ORDER — HYDROCORTISONE SODIUM SUCCINATE 100 MG/2ML
100 INJECTION, POWDER, FOR SOLUTION INTRAMUSCULAR; INTRAVENOUS AS NEEDED
Status: CANCELLED | OUTPATIENT
Start: 2019-12-09

## 2019-12-09 RX ORDER — HEPARIN 100 UNIT/ML
300-500 SYRINGE INTRAVENOUS AS NEEDED
Status: CANCELLED
Start: 2019-12-12

## 2019-12-09 RX ORDER — ACETAMINOPHEN 325 MG/1
650 TABLET ORAL AS NEEDED
Status: CANCELLED
Start: 2019-12-11

## 2019-12-09 RX ORDER — EPINEPHRINE 1 MG/ML
0.3 INJECTION, SOLUTION, CONCENTRATE INTRAVENOUS AS NEEDED
Status: CANCELLED | OUTPATIENT
Start: 2019-12-11

## 2019-12-09 RX ORDER — ONDANSETRON 2 MG/ML
8 INJECTION INTRAMUSCULAR; INTRAVENOUS AS NEEDED
Status: CANCELLED | OUTPATIENT
Start: 2019-12-09

## 2019-12-09 RX ORDER — SODIUM CHLORIDE 9 MG/ML
10 INJECTION INTRAMUSCULAR; INTRAVENOUS; SUBCUTANEOUS AS NEEDED
Status: CANCELLED | OUTPATIENT
Start: 2019-12-10

## 2019-12-09 RX ORDER — ONDANSETRON 2 MG/ML
8 INJECTION INTRAMUSCULAR; INTRAVENOUS ONCE
Status: CANCELLED
Start: 2019-12-09

## 2019-12-09 RX ORDER — SODIUM CHLORIDE 0.9 % (FLUSH) 0.9 %
10 SYRINGE (ML) INJECTION AS NEEDED
Status: DISCONTINUED | OUTPATIENT
Start: 2019-12-09 | End: 2019-12-10 | Stop reason: HOSPADM

## 2019-12-09 RX ORDER — SODIUM CHLORIDE 0.9 % (FLUSH) 0.9 %
10 SYRINGE (ML) INJECTION AS NEEDED
Status: CANCELLED
Start: 2019-12-11

## 2019-12-09 RX ORDER — HEPARIN 100 UNIT/ML
300-500 SYRINGE INTRAVENOUS AS NEEDED
Status: CANCELLED
Start: 2019-12-09

## 2019-12-09 RX ORDER — SODIUM CHLORIDE 9 MG/ML
25 INJECTION, SOLUTION INTRAVENOUS CONTINUOUS
Status: CANCELLED | OUTPATIENT
Start: 2019-12-10 | End: 2019-12-11

## 2019-12-09 RX ORDER — ONDANSETRON 2 MG/ML
8 INJECTION INTRAMUSCULAR; INTRAVENOUS ONCE
Status: CANCELLED
Start: 2019-12-10

## 2019-12-09 RX ORDER — SODIUM CHLORIDE 9 MG/ML
25 INJECTION, SOLUTION INTRAVENOUS CONTINUOUS
Status: CANCELLED | OUTPATIENT
Start: 2019-12-11 | End: 2019-12-12

## 2019-12-09 RX ORDER — ONDANSETRON 2 MG/ML
8 INJECTION INTRAMUSCULAR; INTRAVENOUS ONCE
Status: CANCELLED
Start: 2019-12-11

## 2019-12-09 RX ORDER — DEXAMETHASONE SODIUM PHOSPHATE 4 MG/ML
8 INJECTION, SOLUTION INTRA-ARTICULAR; INTRALESIONAL; INTRAMUSCULAR; INTRAVENOUS; SOFT TISSUE ONCE
Status: COMPLETED | OUTPATIENT
Start: 2019-12-09 | End: 2019-12-09

## 2019-12-09 RX ORDER — DEXAMETHASONE SODIUM PHOSPHATE 4 MG/ML
8 INJECTION, SOLUTION INTRA-ARTICULAR; INTRALESIONAL; INTRAMUSCULAR; INTRAVENOUS; SOFT TISSUE ONCE
Status: CANCELLED | OUTPATIENT
Start: 2019-12-13

## 2019-12-09 RX ORDER — HEPARIN 100 UNIT/ML
300-500 SYRINGE INTRAVENOUS AS NEEDED
Status: DISCONTINUED | OUTPATIENT
Start: 2019-12-09 | End: 2019-12-10 | Stop reason: HOSPADM

## 2019-12-09 RX ORDER — DEXAMETHASONE SODIUM PHOSPHATE 4 MG/ML
8 INJECTION, SOLUTION INTRA-ARTICULAR; INTRALESIONAL; INTRAMUSCULAR; INTRAVENOUS; SOFT TISSUE ONCE
Status: CANCELLED | OUTPATIENT
Start: 2019-12-12

## 2019-12-09 RX ORDER — SODIUM CHLORIDE 9 MG/ML
25 INJECTION, SOLUTION INTRAVENOUS CONTINUOUS
Status: CANCELLED | OUTPATIENT
Start: 2019-12-09 | End: 2019-12-10

## 2019-12-09 RX ORDER — SODIUM CHLORIDE 9 MG/ML
25 INJECTION, SOLUTION INTRAVENOUS CONTINUOUS
Status: CANCELLED | OUTPATIENT
Start: 2019-12-12 | End: 2019-12-12

## 2019-12-09 RX ORDER — HYDROCORTISONE SODIUM SUCCINATE 100 MG/2ML
100 INJECTION, POWDER, FOR SOLUTION INTRAMUSCULAR; INTRAVENOUS AS NEEDED
Status: CANCELLED | OUTPATIENT
Start: 2019-12-10

## 2019-12-09 RX ADMIN — SODIUM CHLORIDE 25 ML/HR: 900 INJECTION, SOLUTION INTRAVENOUS at 14:28

## 2019-12-09 RX ADMIN — Medication 500 UNITS: at 15:27

## 2019-12-09 RX ADMIN — Medication 10 ML: at 15:27

## 2019-12-09 RX ADMIN — ONDANSETRON 8 MG: 2 INJECTION, SOLUTION INTRAMUSCULAR; INTRAVENOUS at 14:34

## 2019-12-09 RX ADMIN — AZACITIDINE 149 MG: 100 INJECTION, POWDER, LYOPHILIZED, FOR SOLUTION INTRAVENOUS; SUBCUTANEOUS at 15:11

## 2019-12-09 RX ADMIN — DEXAMETHASONE SODIUM PHOSPHATE 8 MG: 4 INJECTION, SOLUTION INTRAMUSCULAR; INTRAVENOUS at 14:28

## 2019-12-09 NOTE — PROGRESS NOTES
[facility]fusion [facility] - Chemotherapy Progress Note    6289 Pt admit to [facility] for [address] ambulatory in stable condition. Assessment completed. No new concerns voiced. Port accessed with positive blood return. Labs drawn and sent for processing. Results are within parameters to treat. Chemotherapy Flowsheet 12/9/2019   Cycle C1D1   Date 12/9/2019   Drug / Regimen Vidaza   Pre Meds given       Visit Vitals  BP (!) 103/37 (BP 1 Location: Right arm, BP Patient Position: Sitting)   Pulse 72   Temp 98.6 °F (37 °C)   Resp 18   Ht 5' 5\" (1.651 m)   Wt 86.3 kg (190 lb 4.8 oz)   SpO2 94%   Breastfeeding No   BMI 31.67 kg/m²     Patient Vitals for the past 12 hrs:   Temp Pulse Resp BP SpO2   12/09/19 1527 98.4 °F (36.9 °C) 76 18 116/44 96 %   12/09/19 1247 98.6 °F (37 °C) 72 18 (!) 103/37 94 %     Recent Results (from the past 12 hour(s))   CBC WITH AUTOMATED DIFF    Collection Time: 12/09/19  1:10 PM   Result Value Ref Range    WBC 33.0 (HH) 3.6 - 11.0 K/uL    RBC 2.28 (L) 3.80 - 5.20 M/uL    HGB 6.8 (L) 11.5 - 16.0 g/dL    HCT 24.5 (L) 35.0 - 47.0 %    .5 (H) 80.0 - 99.0 FL    MCH 29.8 26.0 - 34.0 PG    MCHC 27.8 (L) 30.0 - 36.5 g/dL    RDW 17.2 (H) 11.5 - 14.5 %    PLATELET 000 (L) 715 - 400 K/uL    MPV 9.6 8.9 - 12.9 FL    NRBC 0.5 (H) 0  WBC    ABSOLUTE NRBC 0.15 (H) 0.00 - 0.01 K/uL    NEUTROPHILS 40 32 - 75 %    BAND NEUTROPHILS 4 0 - 6 %    LYMPHOCYTES 28 12 - 49 %    MONOCYTES 19 (H) 5 - 13 %    EOSINOPHILS 2 0 - 7 %    BASOPHILS 0 0 - 1 %    METAMYELOCYTES 5 (H) 0 %    MYELOCYTES 2 (H) 0 %    IMMATURE GRANULOCYTES 0 %    ABS. NEUTROPHILS 14.5 (H) 1.8 - 8.0 K/UL    ABS. LYMPHOCYTES 9.2 (H) 0.8 - 3.5 K/UL    ABS. MONOCYTES 6.3 (H) 0.0 - 1.0 K/UL    ABS. EOSINOPHILS 0.7 (H) 0.0 - 0.4 K/UL    ABS. BASOPHILS 0.0 0.0 - 0.1 K/UL    ABS. IMM.  GRANS. 0.0 K/UL    DF MANUAL      RBC COMMENTS ANISOCYTOSIS  1+       METABOLIC PANEL, COMPREHENSIVE    Collection Time: 12/09/19  1:10 PM   Result Value Ref Range    Sodium 144 136 - 145 mmol/L    Potassium 3.6 3.5 - 5.1 mmol/L    Chloride 106 97 - 108 mmol/L    CO2 27 21 - 32 mmol/L    Anion gap 11 5 - 15 mmol/L    Glucose 114 (H) 65 - 100 mg/dL    BUN 15 6 - 20 MG/DL    Creatinine 0.80 0.55 - 1.02 MG/DL    BUN/Creatinine ratio 19 12 - 20      GFR est AA >60 >60 ml/min/1.73m2    GFR est non-AA >60 >60 ml/min/1.73m2    Calcium 8.3 (L) 8.5 - 10.1 MG/DL    Bilirubin, total 0.5 0.2 - 1.0 MG/DL    ALT (SGPT) 13 12 - 78 U/L    AST (SGOT) 14 (L) 15 - 37 U/L    Alk. phosphatase 70 45 - 117 U/L    Protein, total 7.0 6.4 - 8.2 g/dL    Albumin 3.7 3.5 - 5.0 g/dL    Globulin 3.3 2.0 - 4.0 g/dL    A-G Ratio 1.1 1.1 - 2.2         Medications:  Medications Administered     0.9% sodium chloride infusion     Admin Date  12/09/2019 Action  New Bag Dose  25 mL/hr Rate  25 mL/hr Route  IntraVENous Administered By  Kamala Carroll RN          azaCITIDine (VIDAZA) 149 mg in 0.9% sodium chloride 100 mL, overfill volume 10 mL chemo infusion     Admin Date  12/09/2019 Action  New Bag Dose  149 mg Rate  749.4 mL/hr Route  IntraVENous Administered By  Kamala Carroll RN          dexamethasone (DECADRON) 4 mg/mL injection 8 mg     Admin Date  12/09/2019 Action  Given Dose  8 mg Route  IntraVENous Administered By  Kamala Carroll RN          heparin (porcine) pf 300-500 Units     Admin Date  12/09/2019 Action  Given Dose  500 Units Route  InterCATHeter Administered By  Kamala Carroll RN          ondansetron [facility]) injection 8 mg     Admin Date  12/09/2019 Action  Given Dose  8 mg Route  IntraVENous Administered By  Kamala Carroll RN          saline peripheral flush soln 10 mL     Admin Date  12/09/2019 Action  Given Dose  10 mL Route  InterCATHeter Administered By  Kamala Carroll RN                2224 Pt tolerated treatment well. Port maintained positive blood return throughout treatment, flushed with positive blood return at conclusion and throughout. Port flushed with positive blood return.  Per patient request rene needle left intact for tomorrow's treatment. Port flushed and heparinized per protocol. D/c home ambulatory in no distress. Pt aware of next appointment scheduled for 12/10/19.

## 2019-12-10 ENCOUNTER — HOSPITAL ENCOUNTER (OUTPATIENT)
Dept: INFUSION THERAPY | Age: 84
Discharge: HOME | End: 2019-12-10
Payer: MEDICARE

## 2019-12-10 ENCOUNTER — APPOINTMENT (OUTPATIENT)
Dept: INFUSION THERAPY | Age: 84
End: 2019-12-10
Payer: MEDICARE

## 2019-12-10 VITALS
OXYGEN SATURATION: 97 % | HEART RATE: 108 BPM | TEMPERATURE: 97.9 F | DIASTOLIC BLOOD PRESSURE: 69 MMHG | SYSTOLIC BLOOD PRESSURE: 115 MMHG | RESPIRATION RATE: 18 BRPM

## 2019-12-10 DIAGNOSIS — D46.9 MDS (MYELODYSPLASTIC SYNDROME) (HCC): Primary | ICD-10-CM

## 2019-12-10 LAB — PATH REV BLD -IMP: NORMAL

## 2019-12-10 PROCEDURE — 74011000258 HC RX REV CODE- 258: Performed by: INTERNAL MEDICINE

## 2019-12-10 PROCEDURE — 3331090002 HH PPS REVENUE DEBIT

## 2019-12-10 PROCEDURE — 74011250636 HC RX REV CODE- 250/636: Performed by: INTERNAL MEDICINE

## 2019-12-10 PROCEDURE — 3331090001 HH PPS REVENUE CREDIT

## 2019-12-10 PROCEDURE — 96375 TX/PRO/DX INJ NEW DRUG ADDON: CPT

## 2019-12-10 PROCEDURE — 96409 CHEMO IV PUSH SNGL DRUG: CPT

## 2019-12-10 RX ORDER — SODIUM CHLORIDE 9 MG/ML
25 INJECTION, SOLUTION INTRAVENOUS CONTINUOUS
Status: DISCONTINUED | OUTPATIENT
Start: 2019-12-10 | End: 2019-12-11 | Stop reason: HOSPADM

## 2019-12-10 RX ORDER — ONDANSETRON 2 MG/ML
8 INJECTION INTRAMUSCULAR; INTRAVENOUS ONCE
Status: COMPLETED | OUTPATIENT
Start: 2019-12-10 | End: 2019-12-10

## 2019-12-10 RX ORDER — HEPARIN 100 UNIT/ML
300-500 SYRINGE INTRAVENOUS AS NEEDED
Status: DISCONTINUED | OUTPATIENT
Start: 2019-12-10 | End: 2019-12-11 | Stop reason: HOSPADM

## 2019-12-10 RX ORDER — SODIUM CHLORIDE 9 MG/ML
10 INJECTION INTRAMUSCULAR; INTRAVENOUS; SUBCUTANEOUS AS NEEDED
Status: DISCONTINUED | OUTPATIENT
Start: 2019-12-10 | End: 2019-12-11 | Stop reason: HOSPADM

## 2019-12-10 RX ORDER — DEXAMETHASONE SODIUM PHOSPHATE 4 MG/ML
8 INJECTION, SOLUTION INTRA-ARTICULAR; INTRALESIONAL; INTRAMUSCULAR; INTRAVENOUS; SOFT TISSUE ONCE
Status: COMPLETED | OUTPATIENT
Start: 2019-12-10 | End: 2019-12-10

## 2019-12-10 RX ORDER — SODIUM CHLORIDE 0.9 % (FLUSH) 0.9 %
10 SYRINGE (ML) INJECTION AS NEEDED
Status: DISCONTINUED | OUTPATIENT
Start: 2019-12-10 | End: 2019-12-11 | Stop reason: HOSPADM

## 2019-12-10 RX ADMIN — DEXAMETHASONE SODIUM PHOSPHATE 8 MG: 4 INJECTION, SOLUTION INTRAMUSCULAR; INTRAVENOUS at 13:42

## 2019-12-10 RX ADMIN — SODIUM CHLORIDE 25 ML/HR: 900 INJECTION, SOLUTION INTRAVENOUS at 14:50

## 2019-12-10 RX ADMIN — AZACITIDINE 149 MG: 100 INJECTION, POWDER, LYOPHILIZED, FOR SOLUTION INTRAVENOUS; SUBCUTANEOUS at 14:56

## 2019-12-10 RX ADMIN — HEPARIN 300 UNITS: 100 SYRINGE at 15:14

## 2019-12-10 RX ADMIN — Medication 10 ML: at 13:42

## 2019-12-10 RX ADMIN — Medication 10 ML: at 15:14

## 2019-12-10 RX ADMIN — ONDANSETRON 8 MG: 2 INJECTION INTRAMUSCULAR; INTRAVENOUS at 13:47

## 2019-12-10 NOTE — PROGRESS NOTES
[facility] Progress Note    Date: December 10, 2019    Name: Steve Azevedo    MRN: [id number]         : 1935    Ms. Hi Key Arrived ambulatory and in no distress for cycle 1 day 2 of VIDAZA regimen. Assessment was completed, no acute issues at this time, no new complaints voiced. Port was left in from yesterday, port flushed without difficulty and positive for a brisk blood return. Problem: Knowledge Deficit  Goal: *Verbalizes understanding of procedures and medications  Outcome: Progressing Towards Goal     Problem: Patient Education:  Go to Education Activity  Goal: Patient/Family Education  Outcome: Progressing Towards Goal     Problem: Chemotherapy Treatment  Goal: *Chemotherapy regimen followed  Outcome: Progressing Towards Goal  Goal: *Hemodynamically stable  Outcome: Progressing Towards Goal      Chemotherapy Flowsheet 2019   Cycle C1D1   Date 2019   Drug / Regimen Vidaza   Pre Meds given         Ms. Melendez's vitals were reviewed. Patient Vitals for the past 12 hrs:   Temp Pulse Resp BP SpO2   12/10/19 1334 97.9 °F (36.6 °C) (!) 108 18 115/69 97 %       Pre-medications  were administered as ordered and chemotherapy was initiated.   Medications Administered     0.9% sodium chloride infusion     Admin Date  12/10/2019 Action  New Bag Dose  25 mL/hr Rate  25 mL/hr Route  IntraVENous Administered By  America Mullen RN          azaCITIDine (VIDAZA) 149 mg in 0.9% sodium chloride 100 mL, overfill volume 10 mL chemo infusion     Admin Date  12/10/2019 Action  New Bag Dose  149 mg Rate  749.4 mL/hr Route  IntraVENous Administered By  America Mullen RN          dexamethasone (DECADRON) 4 mg/mL injection 8 mg     Admin Date  12/10/2019 Action  Given Dose  8 mg Route  IntraVENous Administered By  America Mullen RN          heparin (porcine) pf 300-500 Units     Admin Date  12/10/2019 Action  Given Dose  300 Units Route  InterCATHeter Administered By  America Mullen RN          ondansetron [facility] injection 8 mg     Admin Date  12/10/2019 Action  Given Dose  8 mg Route  IntraVENous Administered By  Gaby Lea RN          saline peripheral flush soln 10 mL     Admin Date  12/10/2019 Action  Given Dose  10 mL Route  InterCATHeter Administered By  Gaby Lea RN           Admin Date  12/10/2019 Action  Given Dose  10 mL Route  InterCATHeter Administered By  Gaby Lea RN                  Ms. Jacki Funes tolerated treatment well, port flushed and left in place for tomorrow, patient was discharged from [place] [address] in stable condition at 1520.      Future Appointments   Date Time Provider Anju Dianeisti   12/11/2019  2:00 PM [facility] INFUSION NURSE 2 [facility]   12/12/2019  9:00 AM [facility] INFUSION NURSE 1 [facility]   12/12/2019  9:15 AM Toro Hernandes NP ONC DUNIA SCHED   12/13/2019 11:00 AM [facility] INFUSION NURSE 1 [facility]   12/18/2019  1:00 PM [facility] KURT 3 [facility]   12/26/2019  3:00 PM Jessee Mcneil DO NEUM DUNIA SCHED   1/6/2020  1:00 PM [facility] INFUSION NURSE 2 [facility]   1/7/2020  1:00 PM [facility] INFUSION NURSE 2 [address] COM   1/8/2020  1:00 PM [facility] INFUSION NURSE 2 [facility]   1/9/2020  1:00 PM [facility] INFUSION NURSE 1 [facility]   1/10/2020  1:00 PM [facility] INFUSION NURSE 1 [address] COM   1/30/2020 10:00 AM Carly Jesus MD [address] DA[address]  December 10, 2019

## 2019-12-11 ENCOUNTER — HOSPITAL ENCOUNTER (OUTPATIENT)
Dept: INFUSION THERAPY | Age: 84
Discharge: HOME | End: 2019-12-11
Payer: MEDICARE

## 2019-12-11 ENCOUNTER — APPOINTMENT (OUTPATIENT)
Dept: INFUSION THERAPY | Age: 84
End: 2019-12-11
Payer: MEDICARE

## 2019-12-11 VITALS
TEMPERATURE: 98.1 F | HEART RATE: 89 BPM | OXYGEN SATURATION: 96 % | DIASTOLIC BLOOD PRESSURE: 46 MMHG | RESPIRATION RATE: 18 BRPM | SYSTOLIC BLOOD PRESSURE: 122 MMHG

## 2019-12-11 DIAGNOSIS — D46.9 MDS (MYELODYSPLASTIC SYNDROME) (HCC): Primary | ICD-10-CM

## 2019-12-11 PROCEDURE — 86923 COMPATIBILITY TEST ELECTRIC: CPT

## 2019-12-11 PROCEDURE — 3331090002 HH PPS REVENUE DEBIT

## 2019-12-11 PROCEDURE — 96375 TX/PRO/DX INJ NEW DRUG ADDON: CPT

## 2019-12-11 PROCEDURE — 86900 BLOOD TYPING SEROLOGIC ABO: CPT

## 2019-12-11 PROCEDURE — 74011250636 HC RX REV CODE- 250/636: Performed by: INTERNAL MEDICINE

## 2019-12-11 PROCEDURE — 74011000258 HC RX REV CODE- 258: Performed by: INTERNAL MEDICINE

## 2019-12-11 PROCEDURE — 36415 COLL VENOUS BLD VENIPUNCTURE: CPT

## 2019-12-11 PROCEDURE — 3331090001 HH PPS REVENUE CREDIT

## 2019-12-11 PROCEDURE — 96409 CHEMO IV PUSH SNGL DRUG: CPT

## 2019-12-11 RX ORDER — ONDANSETRON 2 MG/ML
8 INJECTION INTRAMUSCULAR; INTRAVENOUS ONCE
Status: COMPLETED | OUTPATIENT
Start: 2019-12-11 | End: 2019-12-11

## 2019-12-11 RX ORDER — SODIUM CHLORIDE 9 MG/ML
10 INJECTION INTRAMUSCULAR; INTRAVENOUS; SUBCUTANEOUS AS NEEDED
Status: DISCONTINUED | OUTPATIENT
Start: 2019-12-11 | End: 2019-12-12 | Stop reason: HOSPADM

## 2019-12-11 RX ORDER — SODIUM CHLORIDE 9 MG/ML
25 INJECTION, SOLUTION INTRAVENOUS CONTINUOUS
Status: DISCONTINUED | OUTPATIENT
Start: 2019-12-11 | End: 2019-12-12 | Stop reason: HOSPADM

## 2019-12-11 RX ORDER — HEPARIN 100 UNIT/ML
300-500 SYRINGE INTRAVENOUS AS NEEDED
Status: DISCONTINUED | OUTPATIENT
Start: 2019-12-11 | End: 2019-12-12 | Stop reason: HOSPADM

## 2019-12-11 RX ORDER — DEXAMETHASONE SODIUM PHOSPHATE 4 MG/ML
8 INJECTION, SOLUTION INTRA-ARTICULAR; INTRALESIONAL; INTRAMUSCULAR; INTRAVENOUS; SOFT TISSUE ONCE
Status: COMPLETED | OUTPATIENT
Start: 2019-12-11 | End: 2019-12-11

## 2019-12-11 RX ADMIN — ONDANSETRON 8 MG: 2 INJECTION INTRAMUSCULAR; INTRAVENOUS at 13:44

## 2019-12-11 RX ADMIN — SODIUM CHLORIDE 25 ML/HR: 900 INJECTION, SOLUTION INTRAVENOUS at 13:40

## 2019-12-11 RX ADMIN — DEXAMETHASONE SODIUM PHOSPHATE 8 MG: 4 INJECTION, SOLUTION INTRAMUSCULAR; INTRAVENOUS at 13:43

## 2019-12-11 RX ADMIN — AZACITIDINE 149 MG: 100 INJECTION, POWDER, LYOPHILIZED, FOR SOLUTION INTRAVENOUS; SUBCUTANEOUS at 14:23

## 2019-12-11 NOTE — PROGRESS NOTES
[facility] Progress Note    Date: 2019    Name: Steve Azevedo    MRN: [id number]         : 1935    Ms. Hi Key Arrived ambulatory and in no distress for cycle 1 day 3 of VIDAZA regimen. Assessment was completed, no acute issues at this time, no new complaints voiced. Port flushed, patient and with a brisk blood return. Chemotherapy Flowsheet 2019   Cycle C1D3   Date 2019   Drug / Regimen VIDAZA   Pre Meds GIVEN   Notes CHEMO GIVEN         Ms. Ramses Munoz vitals were reviewed. Patient Vitals for the past 12 hrs:   Temp Pulse Resp BP SpO2   19 1336 98.1 °F (36.7 °C) 89 18 122/46 96 %       Pre-medications  were administered as ordered and chemotherapy was initiated. Medications Administered     0.9% sodium chloride infusion     Admin Date  2019 Action  New Bag Dose  25 mL/hr Rate  25 mL/hr Route  IntraVENous Administered By  Jj Dumont RN          azaCITIDine (VIDAZA) 149 mg in 0.9% sodium chloride 100 mL, overfill volume 10 mL chemo infusion     Admin Date  2019 Action  New Bag Dose  149 mg Rate  749.4 mL/hr Route  IntraVENous Administered By  America Mullen, DAINA          dexamethasone (DECADRON) 4 mg/mL injection 8 mg     Admin Date  2019 Action  Given Dose  8 mg Route  IntraVENous Administered By  Jj Dumont, RN          ondansetron [facility]) injection 8 mg     Admin Date  2019 Action  Given Dose  8 mg Route  IntraVENous Administered By  Jj Dumont, RN                    Ms. Hi Key tolerated treatment well, port flushed and capped, and left in place to be used on tomorrow, patient was discharged from [place] [address] in stable condition at 12.      Future Appointments   Date Time Provider Anju Dickson   2019  9:00 AM [facility] INFUSION NURSE 1  [facility]   2019  9:15 AM Shari Chahal NP ONC DUNIA SCHED   2019  8:00 AM [facility] INFUSION NURSE 2 ROZINA [facility]ANK[facility]   2019 11:00 AM [facility] INFUSION NURSE 1 [address] COM   12/18/2019  1:00 PM [address] KURT 3 [facility] REG   12/26/2019  3:00 PM Jessee Mcneil, DO ELLYN MARTINENA SCHED   1/6/2020  1:00  [address] INFUSION NURSE 2 [facility] COM   1/7/2020  1:00  [address] INFUSION NURSE 2 [address] COM   1/8/2020  1:00  [address] INFUSION NURSE 2 [facility] COM   1/9/2020  1:00  [address] INFUSION NURSE 1 [facility] COM   1/10/2020  1:00  [address] INFUSION NURSE 1 [address] COM   1/30/2020 10:00 AM Estrella Jesus MD [address] DA[address] [place]  December 11, 2019

## 2019-12-12 ENCOUNTER — HOSPITAL ENCOUNTER (OUTPATIENT)
Dept: INFUSION THERAPY | Age: 84
Discharge: HOME | End: 2019-12-12
Payer: MEDICARE

## 2019-12-12 ENCOUNTER — OFFICE VISIT (OUTPATIENT)
Dept: ONCOLOGY | Age: 84
End: 2019-12-12

## 2019-12-12 ENCOUNTER — APPOINTMENT (OUTPATIENT)
Dept: INFUSION THERAPY | Age: 84
End: 2019-12-12
Payer: MEDICARE

## 2019-12-12 VITALS
HEART RATE: 59 BPM | HEIGHT: 65 IN | DIASTOLIC BLOOD PRESSURE: 57 MMHG | BODY MASS INDEX: 31.7 KG/M2 | TEMPERATURE: 98 F | OXYGEN SATURATION: 98 % | RESPIRATION RATE: 16 BRPM | WEIGHT: 190.26 LBS | SYSTOLIC BLOOD PRESSURE: 143 MMHG

## 2019-12-12 VITALS
OXYGEN SATURATION: 100 % | DIASTOLIC BLOOD PRESSURE: 57 MMHG | TEMPERATURE: 98 F | RESPIRATION RATE: 20 BRPM | HEART RATE: 59 BPM | SYSTOLIC BLOOD PRESSURE: 143 MMHG

## 2019-12-12 DIAGNOSIS — D61.82 ANEMIA ASSOCIATED WITH BONE MARROW INFILTRATION (HCC): ICD-10-CM

## 2019-12-12 DIAGNOSIS — D46.9 MDS (MYELODYSPLASTIC SYNDROME) (HCC): Primary | ICD-10-CM

## 2019-12-12 PROCEDURE — 74011250636 HC RX REV CODE- 250/636: Performed by: INTERNAL MEDICINE

## 2019-12-12 PROCEDURE — 96375 TX/PRO/DX INJ NEW DRUG ADDON: CPT

## 2019-12-12 PROCEDURE — 3331090002 HH PPS REVENUE DEBIT

## 2019-12-12 PROCEDURE — 3331090001 HH PPS REVENUE CREDIT

## 2019-12-12 PROCEDURE — 96409 CHEMO IV PUSH SNGL DRUG: CPT

## 2019-12-12 PROCEDURE — 74011000258 HC RX REV CODE- 258: Performed by: INTERNAL MEDICINE

## 2019-12-12 RX ORDER — HEPARIN 100 UNIT/ML
300-500 SYRINGE INTRAVENOUS AS NEEDED
Status: ACTIVE | OUTPATIENT
Start: 2019-12-12 | End: 2019-12-12

## 2019-12-12 RX ORDER — SODIUM CHLORIDE 9 MG/ML
25 INJECTION, SOLUTION INTRAVENOUS CONTINUOUS
Status: DISPENSED | OUTPATIENT
Start: 2019-12-12 | End: 2019-12-12

## 2019-12-12 RX ORDER — ONDANSETRON 2 MG/ML
8 INJECTION INTRAMUSCULAR; INTRAVENOUS ONCE
Status: COMPLETED | OUTPATIENT
Start: 2019-12-12 | End: 2019-12-12

## 2019-12-12 RX ORDER — SODIUM CHLORIDE 9 MG/ML
10 INJECTION INTRAMUSCULAR; INTRAVENOUS; SUBCUTANEOUS AS NEEDED
Status: ACTIVE | OUTPATIENT
Start: 2019-12-12 | End: 2019-12-12

## 2019-12-12 RX ORDER — DEXAMETHASONE SODIUM PHOSPHATE 4 MG/ML
8 INJECTION, SOLUTION INTRA-ARTICULAR; INTRALESIONAL; INTRAMUSCULAR; INTRAVENOUS; SOFT TISSUE ONCE
Status: COMPLETED | OUTPATIENT
Start: 2019-12-12 | End: 2019-12-12

## 2019-12-12 RX ADMIN — Medication 300 UNITS: at 09:49

## 2019-12-12 RX ADMIN — AZACITIDINE 149 MG: 100 INJECTION, POWDER, LYOPHILIZED, FOR SOLUTION INTRAVENOUS; SUBCUTANEOUS at 09:11

## 2019-12-12 RX ADMIN — SODIUM CHLORIDE 25 ML/HR: 900 INJECTION, SOLUTION INTRAVENOUS at 08:39

## 2019-12-12 RX ADMIN — SODIUM CHLORIDE 10 ML: 9 INJECTION INTRAMUSCULAR; INTRAVENOUS; SUBCUTANEOUS at 08:39

## 2019-12-12 RX ADMIN — Medication 500 UNITS: at 09:26

## 2019-12-12 RX ADMIN — ONDANSETRON 8 MG: 2 INJECTION INTRAMUSCULAR; INTRAVENOUS at 08:42

## 2019-12-12 RX ADMIN — SODIUM CHLORIDE 10 ML: 9 INJECTION INTRAMUSCULAR; INTRAVENOUS; SUBCUTANEOUS at 09:49

## 2019-12-12 RX ADMIN — SODIUM CHLORIDE 10 ML: 9 INJECTION INTRAMUSCULAR; INTRAVENOUS; SUBCUTANEOUS at 09:26

## 2019-12-12 RX ADMIN — DEXAMETHASONE SODIUM PHOSPHATE 8 MG: 4 INJECTION, SOLUTION INTRAMUSCULAR; INTRAVENOUS at 08:45

## 2019-12-12 NOTE — PROGRESS NOTES
[facility] Progress Note    Date: 2019    Name: Remy Torres    MRN: [id number]         : 1935    Ms. Trenton Phelan Arrived ambulatory and in no distress for Vidaza cycle 1 day 4. Assessment was completed, no acute issues at this time, no new complaints voiced. Port needle and dressing remain intact, brisk blood return. Chemotherapy Flowsheet 2019   Cycle C1D4   Date 2019   Drug / Regimen Vidaza   Pre Meds given   Notes given       Patient Vitals for the past 12 hrs:   Temp Pulse Resp BP SpO2   19 0940  (!) 59  143/57    19 0836 98 °F (36.7 °C) (!) 59 20 125/84 100 %       Pre-medications  were administered as ordered and chemotherapy was initiated.      Medications Administered     0.9% sodium chloride infusion     Admin Date  2019 Action  New Bag Dose  25 mL/hr Rate  25 mL/hr Route  IntraVENous Administered By  Salbador Garibay RN          0.9% sodium chloride injection 10 mL     Admin Date  2019 Action  Given Dose  10 mL Route  IntraVENous Administered By  Salbador Garibay RN           Admin Date  2019 Action  Given Dose  10 mL Route  IntraVENous Administered By  Salbador Garibay RN           Admin Date  2019 Action  Given Dose  10 mL Route  IntraVENous Administered By  Odalys Logan RN          azaCITIDine (VIDAZA) 149 mg in 0.9% sodium chloride 100 mL, overfill volume 10 mL chemo infusion     Admin Date  2019 Action  New Bag Dose  149 mg Rate  749.4 mL/hr Route  IntraVENous Administered By  Odalys Logan RN          dexamethasone (DECADRON) 4 mg/mL injection 8 mg     Admin Date  2019 Action  Given Dose  8 mg Route  IntraVENous Administered By  Salbador Garibay RN          heparin (porcine) pf 300-500 Units     Admin Date  2019 Action  Given Dose  500 Units Route  InterCATHeter Administered By  Salbador Garibay RN           Admin Date  2019 Action  Given Dose  300 Units Route  InterCATHeter Administered By  Harpal Cruz RN          ondansetron [facility]) injection 8 mg     Admin Date  12/12/2019 Action  Given Dose  8 mg Route  IntraVENous Administered By  Cyndi Solo RN              Ms. Sang Mcdonald tolerated treatment well and was discharged from [place] [address] in stable condition at 0950. Port de-accessed, flushed & heparinized per protocol. She is to return on 12/13/19 at 0800 for her next appointment.     Future Appointments   Date Time Provider Anju Yessi   12/13/2019  8:00 AM [facility] INFUSION NURSE 2 [address] COM   12/13/2019 11:00 AM [facility] INFUSION NURSE 1 Parth Diaz. [employer] [facility]   12/18/2019  1:00 PM [address] [facility] 3 [facility]   12/26/2019  3:00 PM Jessee Mcneil, DO ELLYN MARTINENA SCHED   1/6/2020  1:00 PM [facility] INFUSION NURSE 2 [facility]   1/7/2020  1:00 PM [facility] INFUSION NURSE 2 [address] COM   1/8/2020  1:00 PM [facility] INFUSION NURSE 2 [facility]   1/9/2020  1:00 PM [facility] INFUSION NURSE 1 [facility] COM   1/10/2020  1:00 PM [facility] INFUSION NURSE 1 [address] COM   1/30/2020 10:00 AM Patricia Jesus  Caty Douglas RN  December 12, 2019

## 2019-12-12 NOTE — PROGRESS NOTES
Spiritual Care Partner Volunteer visited patient in [facility] on 12/12/2019.   Documented by:  Thomasine Sicard, [facility], Spiritual Care Intern

## 2019-12-12 NOTE — PROGRESS NOTES
81 y/o cauc female here for f/u appt. For MDS. Pt is on vidaza cycle 1.    1. Have you been to the ER, urgent care clinic since your last visit? Hospitalized since your last visit? No    2. Have you seen or consulted any other health care providers outside of the [address] [place] [address] since your last visit? Include any pap smears or colon screening.  No

## 2019-12-12 NOTE — PROGRESS NOTES
[facility]  [address], [address] [place] [address] Ronald [place], [address] [facility]  [phone]      Follow-up Note        Patient: Pankaj Rooney MRN: [id number]  SSN: [id number]    YOB: 1935  Age: 82 y.o. Sex: female        Diagnosis:      1. Myelodysplastic Syndrome   IPSS-R Low risk    Del(7q) and trisomy 8    2. Left breast carcinoma:   T1a N0 Mx (Stage IA) infiltrating ductal carcinoma , Tumor size 1 cm, LN -ve, grade 2, ki 67 17%, %, OR 90%, Her 2 unamplified. Treatment:      1. Receiving Vidaza cycle 1 day 4  2. Discontinued Arimidex 1/2019 after 5 years of treatment  3. Completed radiation treatment to the breast   4. S/P left breast lumpectomy and sentinel LN excision on 11/21/2013    Subjective:      Alexander Yoder is a 70 y.o.  female with stage I invasive ductal carcinoma. She underwent a left breast lumpectomy and sentinel LN excision on 11/21/2013. Ms. Jose Angel Galvan then received radiation to the left breast. She stopped arimidex after taking it for 5 years. I am now seeing her for low grade MDS. She recently suffered PNA and sepsis and was admitted to the hospital briefly. Anemia has worsened. She feels fatigued. Ms. Jose Angel Galvan is receiving Vidaza. She has done well thus far. She is getting RBC transfusion tomorrow.       Review of Systems:     Constitutional: fatigue  Eyes: negative   Ears, Nose, Mouth, Throat, and Face: negative   Respiratory: negative   Cardiovascular: negative   Gastrointestinal: negative   Integument/Breast: negative  Hematologic/Lymphatic: negative   Musculoskeletal: joint pain  Neurological: numbness bottoms of both feet      Past Medical History:   Diagnosis Date    Anemia     Arthritis     Bunion of right foot 8/20/2015    Chronic pain     back--uses tens unit    Diverticulitis 6/29/2018    Recurrent    Dry eye syndrome 6/29/2018    Fibromyalgia 6/29/2018    GERD (gastroesophageal reflux disease)     History of left breast cancer     lumpectomy radiation    Hypercholesterolemia 2018    Ill-defined condition     blood transfusion hx    [facility]     MDS (myelodysplastic syndrome) ([facility]     Osteoporosis 2018    Peripheral neuropathy 2018    Prediabetes 2018    PUD (peptic ulcer disease)     Radiation therapy complication 3403    Lt breast-No Chemo    Rosacea 2018    Trouble in sleeping      Past Surgical History:   Procedure Laterality Date    HX APPENDECTOMY      HX BREAST LUMPECTOMY  2013    LEFT BREAST LUMPECTOMY W/LEFT BREAST SENTINEL NODE BIOPSY,AND NEEDLE LOCALIZATION performed by Claudetta Paradise, MD at [facility] MAIN OR    HX CATARACT REMOVAL      bilateral    HX HYSTERECTOMY      ovarian cyst    HX MOHS PROCEDURES      right    HX ORTHOPAEDIC      back surgery x2    HX OTHER SURGICAL      colonoscopy    HX TONSILLECTOMY      IR INSERT TUNL CVC W PORT OVER 5 YEARS  12/3/2019    TOTAL KNEE ARTHROPLASTY      left    TOTAL KNEE ARTHROPLASTY      right    US GUIDED CORE BREAST BIOPSY Left 2013    Breast Ca      Family History   Problem Relation Age of Onset    Cancer Mother         bladder    Cancer Father     Breast Cancer Paternal Grandmother      Social History     Tobacco Use    Smoking status: Former Smoker     Packs/day: 0.50     Years: 50.00     Pack years: 25.00     Last attempt to quit: 2012     Years since quittin.8    Smokeless tobacco: Never Used   Substance Use Topics    Alcohol use: Yes     Alcohol/week: 2.0 standard drinks     Types: 2 Glasses of wine per week      Prior to Admission medications    Medication Sig Start Date End Date Taking? Authorizing Provider   ondansetron hcl (ZOFRAN) 4 mg tablet Take 1 Tab by mouth every eight (8) hours as needed for Nausea.  19   Felipe Angel MD   prochlorperazine (COMPAZINE) 5 mg tablet Take 1 Tab by mouth every six (6) hours as needed for Nausea for up to 30 days. 11/27/19 12/27/19  Roger Reyes MD   lidocaine-prilocaine (EMLA) topical cream Apply  to affected area as needed for Pain. 11/27/19   Roger Reyes MD   OXYGEN-AIR DELIVERY SYSTEMS Take 2 L/min by inhalation continuous. Provider, Historical   albuterol-ipratropium (DUO-NEB) 2.5 mg-0.5 mg/3 ml nebu 3 mL by Nebulization route every six (6) hours as needed (SOB). COPD J44.9 11/5/19   Olegario Thompson MD   Nebulizer & Compressor machine 1 Each by Does Not Apply route daily. 11/5/19   Olegario Thompson MD   dilTIAZem CD (CARDIZEM CD) 180 mg ER capsule Take 1 Cap by mouth daily. 11/5/19   Olegario Thompson MD   pantoprazole (PROTONIX) 40 mg tablet Take 1 Tab by mouth Before breakfast and dinner. 11/5/19   Olegario Thompson MD   budesonide (PULMICORT) 0.5 mg/2 mL nbsp 2 mL by Nebulization route two (2) times a day. 11/5/19   Olegario Thompson MD   ergocalciferol (ERGOCALCIFEROL) 50,000 unit capsule Take 50,000 Units by mouth every Sunday. Provider, Historical   inulin (FIBER GUMMIES PO) Take 2 Gum by mouth nightly. Provider, Historical   acetaminophen (TYLENOL) 325 mg tablet Take 650 mg by mouth every four (4) hours as needed for Pain. Other, MD Libby   vit A/vit C/vit E/zinc/copper (PRESERVISION AREDS PO) Take 1 Tab by mouth nightly. Provider, Historical   multivitamin (ONE A DAY) tablet Take 1 Tab by mouth nightly.     Provider, Historical          Allergies   Allergen Reactions    Hydrocodone Nausea Only and Vertigo    Gabapentin Diarrhea, Nausea Only and Other (comments)     weakness    Lyrica [Pregabalin] Nausea Only    Oxycodone Nausea and Vomiting and Vertigo         Objective:     Visit Vitals  /57 (BP Patient Position: Sitting)   Pulse (!) 59   Temp 98 °F (36.7 °C) (Oral)   Resp 16   Ht 5' 5\" (1.651 m)   Wt 190 lb 4.1 oz (86.3 kg)   SpO2 98% Comment: RA   BMI 31.66 kg/m²     Physical Exam:    GENERAL: alert, cooperative   EYE: PERRLA,  LYMPHATIC: Cervical, supraclavicular, and axillary nodes normal.   THROAT & NECK: normal and no erythema or exudates noted. LUNG: clear to auscultation bilaterally   HEART: regular rate and rhythm   ABDOMEN: soft, non-tender   EXTREMITIES: extremities normal   SKIN: Normal.   NEUROLOGIC: negative       Lab Results   Component Value Date/Time    WBC 33.0 (HH) 12/09/2019 01:10 PM    HGB (POC) 10.7 (A) 01/07/2019 09:52 AM    HGB 6.8 (L) 12/09/2019 01:10 PM    HCT (POC) 32.0 (A) 01/07/2019 09:52 AM    HCT 24.5 (L) 12/09/2019 01:10 PM    PLATELET 760 (L) 06/57/8439 01:10 PM    .5 (H) 12/09/2019 01:10 PM       Lab Results   Component Value Date/Time    Iron 66 04/29/2019 09:28 AM    TIBC 354 04/29/2019 09:28 AM    Iron % saturation 19 04/29/2019 09:28 AM    Ferritin 170 (H) 04/29/2019 09:28 AM           Assessment:      1. Myelodysplastic Syndrome   IPSS-R Low risk      Del(7q) and trisomy 8    ECOG PS 1  Intent of Treatment - palliative  Prognosis: poor    Anemia has worsened. Leukocytosis is worse  I will arrange for CBC, type and cross as early as next week. I recommended starting systemic therapy with Vidaza. Rizvi Dial is a hypomethylating agent which is approved for the treatment for MDS. Receiving Vidaza Cycle 1 Day 4    Tolerating treatment   A detailed system by system evaluation of side effect was performed to assess chemotherapy related toxicity. Blood counts are acceptable. Results reviewed with the patient. Symptom management form reviewed and scanned into the EMR under Media. 2.History of Left breast carcinoma:     T1a N0 Mx (Stage IA) infiltrating ductal carcinoma , Tumor size 1 cm, LN -ve, grade 2, ki 67 17%, %, ID 90%, Her 2 unamplified.    Dx: 10/28/2013    > S/P left breast lumpectomy and sentinel LN excision on 11/21/2013  > Completed radiation to the breast  > Discontinued Arimidex 1/2019 after 5 years  > In remission    Mammogram (11/29/2018): no evidence of malignancy  DEXA (10/2013) - normal    Symptom management form reviewed with patient. 3. Vitamin D deficiency    > Vitamin D 50,000 units weekly      4. Osteopenia    > Management per Dr. Diallo Alu:        1. Continue Vidaza. 2. Transfusing PRBCs 2 units tomorrow  3. Plan to repeat CBC on 12/26/2019  4. Follow up in 4 weeks      I saw the patient in conjunction with CRESCENCIO Hess      Signed by: Divya Escobar MD                     December 12, 2019        CC.  Kaci Salcedo MD

## 2019-12-13 ENCOUNTER — HOSPITAL ENCOUNTER (OUTPATIENT)
Dept: INFUSION THERAPY | Age: 84
Discharge: HOME | End: 2019-12-13
Payer: MEDICARE

## 2019-12-13 ENCOUNTER — HOSPITAL ENCOUNTER (OUTPATIENT)
Dept: INFUSION THERAPY | Age: 84
End: 2019-12-13
Payer: MEDICARE

## 2019-12-13 ENCOUNTER — APPOINTMENT (OUTPATIENT)
Dept: INFUSION THERAPY | Age: 84
End: 2019-12-13
Payer: MEDICARE

## 2019-12-13 VITALS
SYSTOLIC BLOOD PRESSURE: 146 MMHG | TEMPERATURE: 98.1 F | HEART RATE: 65 BPM | RESPIRATION RATE: 18 BRPM | DIASTOLIC BLOOD PRESSURE: 62 MMHG | OXYGEN SATURATION: 100 %

## 2019-12-13 DIAGNOSIS — D46.9 MDS (MYELODYSPLASTIC SYNDROME) (HCC): Primary | ICD-10-CM

## 2019-12-13 PROCEDURE — 74011250636 HC RX REV CODE- 250/636: Performed by: INTERNAL MEDICINE

## 2019-12-13 PROCEDURE — 3331090001 HH PPS REVENUE CREDIT

## 2019-12-13 PROCEDURE — 74011250637 HC RX REV CODE- 250/637: Performed by: NURSE PRACTITIONER

## 2019-12-13 PROCEDURE — 3331090002 HH PPS REVENUE DEBIT

## 2019-12-13 PROCEDURE — 77030013169 SET IV BLD ICUM -A

## 2019-12-13 PROCEDURE — 96413 CHEMO IV INFUSION 1 HR: CPT

## 2019-12-13 PROCEDURE — 96375 TX/PRO/DX INJ NEW DRUG ADDON: CPT

## 2019-12-13 PROCEDURE — 74011000258 HC RX REV CODE- 258: Performed by: INTERNAL MEDICINE

## 2019-12-13 PROCEDURE — 36430 TRANSFUSION BLD/BLD COMPNT: CPT

## 2019-12-13 PROCEDURE — P9016 RBC LEUKOCYTES REDUCED: HCPCS

## 2019-12-13 RX ORDER — SODIUM CHLORIDE 9 MG/ML
25 INJECTION, SOLUTION INTRAVENOUS CONTINUOUS
Status: DISPENSED | OUTPATIENT
Start: 2019-12-13 | End: 2019-12-13

## 2019-12-13 RX ORDER — HEPARIN 100 UNIT/ML
300-500 SYRINGE INTRAVENOUS AS NEEDED
Status: ACTIVE | OUTPATIENT
Start: 2019-12-13 | End: 2019-12-13

## 2019-12-13 RX ORDER — DEXAMETHASONE SODIUM PHOSPHATE 4 MG/ML
8 INJECTION, SOLUTION INTRA-ARTICULAR; INTRALESIONAL; INTRAMUSCULAR; INTRAVENOUS; SOFT TISSUE ONCE
Status: COMPLETED | OUTPATIENT
Start: 2019-12-13 | End: 2019-12-13

## 2019-12-13 RX ORDER — ONDANSETRON 2 MG/ML
8 INJECTION INTRAMUSCULAR; INTRAVENOUS ONCE
Status: COMPLETED | OUTPATIENT
Start: 2019-12-13 | End: 2019-12-13

## 2019-12-13 RX ORDER — ACETAMINOPHEN 325 MG/1
650 TABLET ORAL ONCE
Status: ACTIVE | OUTPATIENT
Start: 2019-12-13 | End: 2019-12-13

## 2019-12-13 RX ORDER — SODIUM CHLORIDE 9 MG/ML
250 INJECTION, SOLUTION INTRAVENOUS AS NEEDED
Status: DISCONTINUED | OUTPATIENT
Start: 2019-12-13 | End: 2019-12-17 | Stop reason: HOSPADM

## 2019-12-13 RX ORDER — DIPHENHYDRAMINE HCL 25 MG
25 CAPSULE ORAL ONCE
Status: COMPLETED | OUTPATIENT
Start: 2019-12-13 | End: 2019-12-13

## 2019-12-13 RX ORDER — SODIUM CHLORIDE 9 MG/ML
10 INJECTION INTRAMUSCULAR; INTRAVENOUS; SUBCUTANEOUS AS NEEDED
Status: ACTIVE | OUTPATIENT
Start: 2019-12-13 | End: 2019-12-13

## 2019-12-13 RX ADMIN — SODIUM CHLORIDE 25 ML/HR: 900 INJECTION, SOLUTION INTRAVENOUS at 08:12

## 2019-12-13 RX ADMIN — ONDANSETRON 8 MG: 2 INJECTION INTRAMUSCULAR; INTRAVENOUS at 08:17

## 2019-12-13 RX ADMIN — HEPARIN 300 UNITS: 100 SYRINGE at 14:39

## 2019-12-13 RX ADMIN — SODIUM CHLORIDE 10 ML: 9 INJECTION INTRAMUSCULAR; INTRAVENOUS; SUBCUTANEOUS at 14:39

## 2019-12-13 RX ADMIN — DIPHENHYDRAMINE HYDROCHLORIDE 25 MG: 25 CAPSULE ORAL at 09:22

## 2019-12-13 RX ADMIN — DEXAMETHASONE SODIUM PHOSPHATE 8 MG: 4 INJECTION, SOLUTION INTRAMUSCULAR; INTRAVENOUS at 08:13

## 2019-12-13 RX ADMIN — AZACITIDINE 149 MG: 100 INJECTION, POWDER, LYOPHILIZED, FOR SOLUTION INTRAVENOUS; SUBCUTANEOUS at 09:31

## 2019-12-13 RX ADMIN — SODIUM CHLORIDE 10 ML: 9 INJECTION INTRAMUSCULAR; INTRAVENOUS; SUBCUTANEOUS at 08:11

## 2019-12-13 NOTE — PROGRESS NOTES
0800 Pt arrived at [facility] ambulatory and in no acute distress for Vidaza cycle 1 day 5 and for transfusion of 2 units PRBCs. Assessment completed, no new complaints voiced. Port needle and dressing intact to right chest, brisk blood return. Signs/symptoms of adverse blood reaction discussed with pt, voiced understanding. Premeds and Vidaza administered as ordered.    Patient Vitals for the past 12 hrs:   Temp Pulse Resp BP SpO2   12/13/19 1443 98.1 °F (36.7 °C) 65 18 146/62    12/13/19 1430 98 °F (36.7 °C) 65 18 148/50    12/13/19 1330 97.8 °F (36.6 °C) 67 18 146/59    12/13/19 1300 98.2 °F (36.8 °C) 67 18 138/59    12/13/19 1245 98.3 °F (36.8 °C) 72 18 131/54    12/13/19 1223 97.3 °F (36.3 °C) 64 18 137/47    12/13/19 1155 97.7 °F (36.5 °C) 70 18 (!) 121/91    12/13/19 1055 98.1 °F (36.7 °C) (!) 59 18 137/55    12/13/19 1025 98.3 °F (36.8 °C) 61 18 141/50    12/13/19 1010 97.9 °F (36.6 °C) 61 18 131/48    12/13/19 0954 97.9 °F (36.6 °C) 65 18 129/54    12/13/19 0809 98.2 °F (36.8 °C) 72 20 154/47 100 %       Medications received:  Medications Administered     0.9% sodium chloride infusion     Admin Date  12/13/2019 Action  New Bag Dose  25 mL/hr Rate  25 mL/hr Route  IntraVENous Administered By  Leisa Etienne RN          0.9% sodium chloride injection 10 mL     Admin Date  12/13/2019 Action  Given Dose  10 mL Route  IntraVENous Administered By  Leisa Etienne RN           Admin Date  12/13/2019 Action  Given Dose  10 mL Route  IntraVENous Administered By  Samir Argueta RN          azaCITIDine (VIDAZA) 149 mg in 0.9% sodium chloride 100 mL, overfill volume 10 mL chemo infusion     Admin Date  12/13/2019 Action  New Bag Dose  149 mg Rate  749.4 mL/hr Route  IntraVENous Administered By  Leisa Etienne RN          dexamethasone (DECADRON) 4 mg/mL injection 8 mg     Admin Date  12/13/2019 Action  Given Dose  8 mg Route  IntraVENous Administered By  Leisa Etienne RN diphenhydrAMINE (BENADRYL) capsule 25 mg     Admin Date  12/13/2019 Action  Given Dose  25 mg Route  Oral Administered By  Renate Castillo RN          heparin (porcine) pf 300-500 Units     Admin Date  12/13/2019 Action  Given Dose  300 Units Route  InterCATHeter Administered By  Renate Castillo RN          ondansetron [facility]) injection 8 mg     Admin Date  12/13/2019 Action  Given Dose  8 mg Route  IntraVENous Administered By  Aileen Tsang, RN              3610:  1st unit PRBCs started and infusing without difficulty, observed x 15 minutes  1210:  1st unit completed without adverse reaction noted, NS flushing line. 1230:  2nd unit PRBCs started and infusing without difficulty, observed x 15 minutes  1430:  2nd unit completed without adverse reaction noted, NS flushing line. Pt tolerated transfusion  well, no adverse reaction noted. D/C instructions reviewed, copy to pt, voiced understanding. Patient declined 1 hour post transfusion observation. D/Cd from [facility] ambulatory and in no acute distress.     Future Appointments   Date Time Provider Anju Dickson   12/18/2019  1:00 PM [facility] KURT 3 [facility] REG   12/19/2019 11:30 AM [place] FT CHAIR 1 [facility] REG   12/26/2019 10:30 AM [place] FT CHAIR 2 [facility] REG   12/26/2019  3:00 PM Jessee Mcneil, DO ELLYN DUQUE SCHED   1/6/2020  1:00 PM [facility] INFUSION NURSE 2 [facility]   1/7/2020  1:00 PM [facility] INFUSION NURSE 2 [address] COM   1/8/2020  1:00 PM [facility] INFUSION NURSE 2 [address] COM   1/9/2020 11:30 AM Mariia Singletary MD [id number]   1/9/2020  1:00 PM [facility] INFUSION NURSE 1 [address] COM   1/10/2020  1:00 PM [facility] INFUSION NURSE 1 [address] [facility]   1/30/2020 10:00 AM Lianne Koyanagi, MD 3 [address]

## 2019-12-14 LAB
ABO + RH BLD: NORMAL
BLD PROD TYP BPU: NORMAL
BLD PROD TYP BPU: NORMAL
BLOOD GROUP ANTIBODIES SERPL: NORMAL
BPU ID: NORMAL
BPU ID: NORMAL
CROSSMATCH RESULT,%XM: NORMAL
CROSSMATCH RESULT,%XM: NORMAL
SPECIMEN EXP DATE BLD: NORMAL
STATUS OF UNIT,%ST: NORMAL
STATUS OF UNIT,%ST: NORMAL
UNIT DIVISION, %UDIV: 0
UNIT DIVISION, %UDIV: 0

## 2019-12-14 PROCEDURE — 3331090001 HH PPS REVENUE CREDIT

## 2019-12-14 PROCEDURE — 3331090002 HH PPS REVENUE DEBIT

## 2019-12-15 PROCEDURE — 3331090001 HH PPS REVENUE CREDIT

## 2019-12-15 PROCEDURE — 3331090002 HH PPS REVENUE DEBIT

## 2019-12-16 ENCOUNTER — APPOINTMENT (OUTPATIENT)
Dept: INFUSION THERAPY | Age: 84
End: 2019-12-16
Payer: MEDICARE

## 2019-12-16 PROCEDURE — 3331090001 HH PPS REVENUE CREDIT

## 2019-12-16 PROCEDURE — 3331090002 HH PPS REVENUE DEBIT

## 2019-12-17 ENCOUNTER — HOSPITAL ENCOUNTER (EMERGENCY)
Age: 84
Discharge: HOME | End: 2019-12-17
Attending: EMERGENCY MEDICINE
Payer: MEDICARE

## 2019-12-17 ENCOUNTER — APPOINTMENT (OUTPATIENT)
Dept: CT IMAGING | Age: 84
End: 2019-12-17
Attending: EMERGENCY MEDICINE
Payer: MEDICARE

## 2019-12-17 ENCOUNTER — APPOINTMENT (OUTPATIENT)
Dept: GENERAL RADIOLOGY | Age: 84
End: 2019-12-17
Attending: EMERGENCY MEDICINE
Payer: MEDICARE

## 2019-12-17 VITALS
WEIGHT: 190 LBS | DIASTOLIC BLOOD PRESSURE: 58 MMHG | RESPIRATION RATE: 24 BRPM | TEMPERATURE: 97.8 F | SYSTOLIC BLOOD PRESSURE: 133 MMHG | HEIGHT: 65 IN | BODY MASS INDEX: 31.65 KG/M2 | OXYGEN SATURATION: 98 % | HEART RATE: 68 BPM

## 2019-12-17 DIAGNOSIS — D72.829 LEUKOCYTOSIS, UNSPECIFIED TYPE: ICD-10-CM

## 2019-12-17 DIAGNOSIS — R10.9 ACUTE ABDOMINAL PAIN: Primary | ICD-10-CM

## 2019-12-17 DIAGNOSIS — K59.00 ACUTE CONSTIPATION: ICD-10-CM

## 2019-12-17 DIAGNOSIS — Z86.2 HISTORY OF MYELODYSPLASTIC SYNDROME: ICD-10-CM

## 2019-12-17 LAB
ALBUMIN SERPL-MCNC: 3.6 G/DL (ref 3.5–5)
ALBUMIN/GLOB SERPL: 1.2 {RATIO} (ref 1.1–2.2)
ALP SERPL-CCNC: 59 U/L (ref 45–117)
ALT SERPL-CCNC: 25 U/L (ref 12–78)
ANION GAP SERPL CALC-SCNC: 4 MMOL/L (ref 5–15)
APPEARANCE UR: CLEAR
AST SERPL-CCNC: 12 U/L (ref 15–37)
ATRIAL RATE: 62 BPM
BASOPHILS # BLD: 0 K/UL (ref 0–0.1)
BASOPHILS NFR BLD: 0 % (ref 0–1)
BILIRUB SERPL-MCNC: 1.2 MG/DL (ref 0.2–1)
BILIRUB UR QL: NEGATIVE
BUN SERPL-MCNC: 15 MG/DL (ref 6–20)
BUN/CREAT SERPL: 20 (ref 12–20)
CALCIUM SERPL-MCNC: 8.5 MG/DL (ref 8.5–10.1)
CALCULATED P AXIS, ECG09: 4 DEGREES
CALCULATED R AXIS, ECG10: -11 DEGREES
CALCULATED T AXIS, ECG11: 75 DEGREES
CHLORIDE SERPL-SCNC: 108 MMOL/L (ref 97–108)
CO2 SERPL-SCNC: 28 MMOL/L (ref 21–32)
COLOR UR: NORMAL
CREAT SERPL-MCNC: 0.74 MG/DL (ref 0.55–1.02)
DIAGNOSIS, 93000: NORMAL
DIFFERENTIAL METHOD BLD: ABNORMAL
EOSINOPHIL # BLD: 1 K/UL (ref 0–0.4)
EOSINOPHIL NFR BLD: 4 % (ref 0–7)
ERYTHROCYTE [DISTWIDTH] IN BLOOD BY AUTOMATED COUNT: 15.9 % (ref 11.5–14.5)
GLOBULIN SER CALC-MCNC: 3.1 G/DL (ref 2–4)
GLUCOSE SERPL-MCNC: 143 MG/DL (ref 65–100)
GLUCOSE UR STRIP.AUTO-MCNC: NEGATIVE MG/DL
HCT VFR BLD AUTO: 32.7 % (ref 35–47)
HEMOCCULT STL QL: NEGATIVE
HGB BLD-MCNC: 9.5 G/DL (ref 11.5–16)
HGB UR QL STRIP: NEGATIVE
IMM GRANULOCYTES # BLD AUTO: 0 K/UL (ref 0–0.04)
IMM GRANULOCYTES NFR BLD AUTO: 0 % (ref 0–0.5)
KETONES UR QL STRIP.AUTO: NEGATIVE MG/DL
LEUKOCYTE ESTERASE UR QL STRIP.AUTO: NEGATIVE
LIPASE SERPL-CCNC: 73 U/L (ref 73–393)
LYMPHOCYTES # BLD: 5.7 K/UL (ref 0.8–3.5)
LYMPHOCYTES NFR BLD: 22 % (ref 12–49)
MAGNESIUM SERPL-MCNC: 2.3 MG/DL (ref 1.6–2.4)
MCH RBC QN AUTO: 29.1 PG (ref 26–34)
MCHC RBC AUTO-ENTMCNC: 29.1 G/DL (ref 30–36.5)
MCV RBC AUTO: 100 FL (ref 80–99)
METAMYELOCYTES NFR BLD MANUAL: 1 %
MONOCYTES # BLD: 4.7 K/UL (ref 0–1)
MONOCYTES NFR BLD: 18 % (ref 5–13)
MYELOCYTES NFR BLD MANUAL: 6 %
NEUTS BAND NFR BLD MANUAL: 3 %
NEUTS SEG # BLD: 12.5 K/UL (ref 1.8–8)
NEUTS SEG NFR BLD: 45 % (ref 32–75)
NITRITE UR QL STRIP.AUTO: NEGATIVE
NRBC # BLD: 0.17 K/UL (ref 0–0.01)
NRBC BLD-RTO: 0.7 PER 100 WBC
P-R INTERVAL, ECG05: 202 MS
PATH REV BLD -IMP: NORMAL
PH UR STRIP: 6 [PH] (ref 5–8)
PLATELET # BLD AUTO: 136 K/UL (ref 150–400)
PMV BLD AUTO: 9.7 FL (ref 8.9–12.9)
POTASSIUM SERPL-SCNC: 4 MMOL/L (ref 3.5–5.1)
PROMYELOCYTES NFR BLD MANUAL: 1 %
PROT SERPL-MCNC: 6.7 G/DL (ref 6.4–8.2)
PROT UR STRIP-MCNC: NEGATIVE MG/DL
Q-T INTERVAL, ECG07: 436 MS
QRS DURATION, ECG06: 82 MS
QTC CALCULATION (BEZET), ECG08: 442 MS
RBC # BLD AUTO: 3.27 M/UL (ref 3.8–5.2)
RBC MORPH BLD: ABNORMAL
RBC MORPH BLD: ABNORMAL
SODIUM SERPL-SCNC: 140 MMOL/L (ref 136–145)
SP GR UR REFRACTOMETRY: 1.01 (ref 1–1.03)
UROBILINOGEN UR QL STRIP.AUTO: 0.2 EU/DL (ref 0.2–1)
VENTRICULAR RATE, ECG03: 62 BPM
WBC # BLD AUTO: 26 K/UL (ref 3.6–11)

## 2019-12-17 PROCEDURE — 93005 ELECTROCARDIOGRAM TRACING: CPT

## 2019-12-17 PROCEDURE — 96374 THER/PROPH/DIAG INJ IV PUSH: CPT

## 2019-12-17 PROCEDURE — 3331090001 HH PPS REVENUE CREDIT

## 2019-12-17 PROCEDURE — 80053 COMPREHEN METABOLIC PANEL: CPT

## 2019-12-17 PROCEDURE — 96375 TX/PRO/DX INJ NEW DRUG ADDON: CPT

## 2019-12-17 PROCEDURE — 83735 ASSAY OF MAGNESIUM: CPT

## 2019-12-17 PROCEDURE — 36415 COLL VENOUS BLD VENIPUNCTURE: CPT

## 2019-12-17 PROCEDURE — 74177 CT ABD & PELVIS W/CONTRAST: CPT

## 2019-12-17 PROCEDURE — 85025 COMPLETE CBC W/AUTO DIFF WBC: CPT

## 2019-12-17 PROCEDURE — 3331090002 HH PPS REVENUE DEBIT

## 2019-12-17 PROCEDURE — 74011250636 HC RX REV CODE- 250/636: Performed by: EMERGENCY MEDICINE

## 2019-12-17 PROCEDURE — 81003 URINALYSIS AUTO W/O SCOPE: CPT

## 2019-12-17 PROCEDURE — 83690 ASSAY OF LIPASE: CPT

## 2019-12-17 PROCEDURE — 74011636320 HC RX REV CODE- 636/320: Performed by: EMERGENCY MEDICINE

## 2019-12-17 PROCEDURE — 82272 OCCULT BLD FECES 1-3 TESTS: CPT

## 2019-12-17 PROCEDURE — 74022 RADEX COMPL AQT ABD SERIES: CPT

## 2019-12-17 PROCEDURE — 99285 EMERGENCY DEPT VISIT HI MDM: CPT

## 2019-12-17 RX ORDER — SODIUM CHLORIDE 9 MG/ML
250 INJECTION, SOLUTION INTRAVENOUS ONCE
Status: COMPLETED | OUTPATIENT
Start: 2019-12-17 | End: 2019-12-17

## 2019-12-17 RX ORDER — SODIUM CHLORIDE 0.9 % (FLUSH) 0.9 %
10 SYRINGE (ML) INJECTION
Status: COMPLETED | OUTPATIENT
Start: 2019-12-17 | End: 2019-12-17

## 2019-12-17 RX ORDER — DOCUSATE SODIUM 100 MG/1
100 CAPSULE, LIQUID FILLED ORAL 2 TIMES DAILY
Qty: 60 CAP | Refills: 2 | Status: SHIPPED | OUTPATIENT
Start: 2019-12-17 | End: 2020-01-30 | Stop reason: ALTCHOICE

## 2019-12-17 RX ORDER — MORPHINE SULFATE 4 MG/ML
4 INJECTION INTRAVENOUS
Status: COMPLETED | OUTPATIENT
Start: 2019-12-17 | End: 2019-12-17

## 2019-12-17 RX ORDER — ONDANSETRON 2 MG/ML
4 INJECTION INTRAMUSCULAR; INTRAVENOUS
Status: COMPLETED | OUTPATIENT
Start: 2019-12-17 | End: 2019-12-17

## 2019-12-17 RX ADMIN — Medication 10 ML: at 08:34

## 2019-12-17 RX ADMIN — IOPAMIDOL 100 ML: 755 INJECTION, SOLUTION INTRAVENOUS at 08:34

## 2019-12-17 RX ADMIN — ONDANSETRON 4 MG: 2 INJECTION INTRAMUSCULAR; INTRAVENOUS at 07:02

## 2019-12-17 RX ADMIN — SODIUM CHLORIDE 250 ML/HR: 900 INJECTION, SOLUTION INTRAVENOUS at 07:02

## 2019-12-17 RX ADMIN — MORPHINE SULFATE 4 MG: 4 INJECTION, SOLUTION INTRAMUSCULAR; INTRAVENOUS at 07:39

## 2019-12-17 NOTE — ED PROVIDER NOTES
EMERGENCY DEPARTMENT HISTORY AND PHYSICAL EXAM      Date: 12/17/2019  Patient Name: Melvin Rivera    History of Presenting Illness     Chief Complaint   Patient presents with    Abdominal Pain       History Provided By: Patient and EMS    HPI: Melvin Rivera, 82 y.o. female presents to the ED with a history of stage I invasive ductal carcinoma who underwent left breast lumpectomy and sentinel lymph node excision in 2013 who is status post radiation to the left breast and who is on Arimidex for a 5-year period of time, currently being treated for low-grade myelodysplastic syndrome by the oncology center and Dr. Gayla Ogden. She received Vidaza infusions as well as blood transfusions last week. 2 days ago, she developed lower abdominal pain that is colicky in nature, 8 out of 10 without fever or vomiting, with nausea. She reports last bowel movement yesterday which was hard with only small mervat of brown stool coming out. She denies any rectal bleeding. She does say she has been experiencing frequent urination approximately every 2 hours but feels like she is emptying her bladder all the way. She has a history of a hysterectomy as well as an appendectomy. Her other medical issues include fibromyalgia, GERD, hypercholesterolemia, peripheral neuropathy, chronic pain, arthritis, bilateral knee arthroplasty, lumbar surgery x2. There are no other complaints, changes, or physical findings at this time. PCP: Slime Roberto MD    Current Facility-Administered Medications on File Prior to Encounter   Medication Dose Route Frequency Provider Last Rate Last Dose    [DISCONTINUED] 0.9% sodium chloride infusion 250 mL  250 mL IntraVENous PRN Nat Bullock MD         Current Outpatient Medications on File Prior to Encounter   Medication Sig Dispense Refill    ondansetron hcl (ZOFRAN) 4 mg tablet Take 1 Tab by mouth every eight (8) hours as needed for Nausea.  40 Tab 2    prochlorperazine (COMPAZINE) 5 mg tablet Take 1 Tab by mouth every six (6) hours as needed for Nausea for up to 30 days. 40 Tab 2    lidocaine-prilocaine (EMLA) topical cream Apply  to affected area as needed for Pain. 30 g 0    OXYGEN-AIR DELIVERY SYSTEMS Take 2 L/min by inhalation continuous.  albuterol-ipratropium (DUO-NEB) 2.5 mg-0.5 mg/3 ml nebu 3 mL by Nebulization route every six (6) hours as needed (SOB). COPD J44.9 100 Nebule 1    Nebulizer & Compressor machine 1 Each by Does Not Apply route daily. 1 Each 0    dilTIAZem CD (CARDIZEM CD) 180 mg ER capsule Take 1 Cap by mouth daily. 30 Cap 1    pantoprazole (PROTONIX) 40 mg tablet Take 1 Tab by mouth Before breakfast and dinner. 60 Tab 1    budesonide (PULMICORT) 0.5 mg/2 mL nbsp 2 mL by Nebulization route two (2) times a day. 60 Each 1    ergocalciferol (ERGOCALCIFEROL) 50,000 unit capsule Take 50,000 Units by mouth every Sunday.  inulin (FIBER GUMMIES PO) Take 2 Gum by mouth nightly.  acetaminophen (TYLENOL) 325 mg tablet Take 650 mg by mouth every four (4) hours as needed for Pain.  vit A/vit C/vit E/zinc/copper (PRESERVISION AREDS PO) Take 1 Tab by mouth nightly.  multivitamin (ONE A DAY) tablet Take 1 Tab by mouth nightly.          Past History     Past Medical History:  Past Medical History:   Diagnosis Date    Anemia     Arthritis     Bunion of right foot 8/20/2015    Chronic pain     back--uses tens unit    Diverticulitis 6/29/2018    Recurrent    Dry eye syndrome 6/29/2018    Fibromyalgia 6/29/2018    GERD (gastroesophageal reflux disease)     History of left breast cancer 2013    lumpectomy radiation    Hypercholesterolemia 6/29/2018    Ill-defined condition     blood transfusion hx    Leukemia ([address]     MDS (myelodysplastic syndrome) ([address]     Osteoporosis 6/29/2018    Peripheral neuropathy 6/29/2018    Prediabetes 6/29/2018    PUD (peptic ulcer disease)     Radiation therapy complication 1864    Lt breast-No Chemo    Rosacea 2018    Trouble in sleeping        Past Surgical History:  Past Surgical History:   Procedure Laterality Date    HX APPENDECTOMY      HX BREAST LUMPECTOMY  2013    LEFT BREAST LUMPECTOMY W/LEFT BREAST SENTINEL NODE BIOPSY,AND NEEDLE LOCALIZATION performed by Ezra St[facility]ard [facility] MA[facility] OR    HX CATARACT REMOVAL      bilateral    HX HYSTERECTOMY      ovarian cyst    HX MOHS PROCEDURES      right    HX ORTHOPAEDIC      back surgery x2    HX OTHER SURGICAL      colonoscopy    HX TONSILLECTOMY      IR INSERT TUNL CVC W PORT OVER 5 YEARS  12/3/2019    TOTAL KNEE ARTHROPLASTY      left    TOTAL KNEE ARTHROPLASTY      right    US GUIDED CORE BREAST BIOPSY Left 2013    Breast Ca       Family History:  Family History   Problem Relation Age of Onset    Cancer Mother         bladder    Cancer Father     Breast Cancer Paternal Grandmother        Social History:  Social History     Tobacco Use    Smoking status: Former Smoker     Packs/day: 0.50     Years: 50.00     Pack years: 25.00     Last attempt to quit: 2012     Years since quittin.8    Smokeless tobacco: Never Used   Substance Use Topics    Alcohol use: Yes     Alcohol/week: 2.0 standard drinks     Types: 2 Glasses of wine per week    Drug use: No       Allergies: Allergies   Allergen Reactions    Hydrocodone Nausea Only and Vertigo    Gabapentin Diarrhea, Nausea Only and Other (comments)     weakness    Lyrica [Pregabalin] Nausea Only    Oxycodone Nausea and Vomiting and Vertigo         Review of Systems   Review of Systems   Constitutional: Positive for appetite change, chills and fatigue. Negative for activity change, diaphoresis, fever and unexpected weight change. HENT: Negative for congestion, hearing loss, nosebleeds, sore throat and voice change. Eyes: Negative for visual disturbance. Respiratory: Negative for chest tightness and shortness of breath.     Cardiovascular: Negative for chest pain and palpitations. Gastrointestinal: Positive for abdominal distention, abdominal pain, constipation, nausea and rectal pain. Negative for anal bleeding, blood in stool, diarrhea and vomiting. Patient denies any diarrhea. Does said she has a large amount of pressure in the rectal area. Genitourinary: Positive for frequency and urgency. Negative for difficulty urinating. Musculoskeletal: Negative. Skin: Negative. Neurological: Negative for dizziness, syncope, speech difficulty, weakness and light-headedness. Hematological: Negative for adenopathy. Does not bruise/bleed easily. Patient does have history of atrial fibrillation, denies taking any anticoagulation at this time. All other systems reviewed and are negative. Physical Exam   Physical Exam   Vital signs and nursing notes reviewed    CONSTITUTIONAL: Alert, in moderate distress; well-developed;  well-nourished. Appears fatigued. HEAD:  Normocephalic, atraumatic  EYES: PERRL; EOM's intact. Nonicteric sclera. ENTM: Nose: no rhinorrhea; Throat: no erythema or exudate, mucous membranes dry. Neck:  Supple. trachea is midline. RESP: Chest clear, equal breath sounds. - W/R/R  CV: S1 and S2 WNL; No murmurs, gallops or rubs. 2+ radial and DP pulses bilaterally. Palpable port in the right chest.  GI: Moderate distention, hyperactive bowel sounds, generalized tenderness without rebound or guarding. No overlying abrasions, contusions or lacerations. Rectal exam chaperoned by DAINA Sevilla reveals brown stool small mervat and no large stool obstruction, rectal vault largely empty. ,  Nonbloody. : No costo-vertebral angle tenderness. BACK:  Non-tender, normal appearance, visible surgical scar over the lumbar spine. UPPER EXT:  Normal inspection. no joint or soft tissue swelling  LOWER EXT: No edema, no calf tenderness.   NEURO: Alert and oriented x3, 5/5 strength and light touch sensation intact in bilateral upper and lower extremities. SKIN: No rashes; Warm and dry  PSYCH: Normal mood, normal affect    Diagnostic Study Results     Labs -     Recent Results (from the past 12 hour(s))   CBC WITH AUTOMATED DIFF    Collection Time: 12/17/19  6:37 AM   Result Value Ref Range    WBC 26.0 (H) 3.6 - 11.0 K/uL    RBC 3.27 (L) 3.80 - 5.20 M/uL    HGB 9.5 (L) 11.5 - 16.0 g/dL    HCT 32.7 (L) 35.0 - 47.0 %    .0 (H) 80.0 - 99.0 FL    MCH 29.1 26.0 - 34.0 PG    MCHC 29.1 (L) 30.0 - 36.5 g/dL    RDW 15.9 (H) 11.5 - 14.5 %    PLATELET 157 (L) 625 - 400 K/uL    MPV 9.7 8.9 - 12.9 FL    NRBC 0.7 (H) 0  WBC    ABSOLUTE NRBC 0.17 (H) 0.00 - 0.01 K/uL    NEUTROPHILS 45 32 - 75 %    BAND NEUTROPHILS 3 %    LYMPHOCYTES 22 12 - 49 %    MONOCYTES 18 (H) 5 - 13 %    EOSINOPHILS 4 0 - 7 %    BASOPHILS 0 0 - 1 %    METAMYELOCYTES 1 %    MYELOCYTES 6 %    PROMYELOCYTES 1 %    IMMATURE GRANULOCYTES 0 0.0 - 0.5 %    ABS. NEUTROPHILS 12.5 (H) 1.8 - 8.0 K/UL    ABS. LYMPHOCYTES 5.7 (H) 0.8 - 3.5 K/UL    ABS. MONOCYTES 4.7 (H) 0.0 - 1.0 K/UL    ABS. EOSINOPHILS 1.0 (H) 0.0 - 0.4 K/UL    ABS. BASOPHILS 0.0 0.0 - 0.1 K/UL    ABS. IMM. GRANS. 0.0 0.00 - 0.04 K/UL    DF MANUAL      RBC COMMENTS ANISOCYTOSIS  1+        RBC COMMENTS MACROCYTOSIS  PRESENT       METABOLIC PANEL, COMPREHENSIVE    Collection Time: 12/17/19  6:37 AM   Result Value Ref Range    Sodium 140 136 - 145 mmol/L    Potassium 4.0 3.5 - 5.1 mmol/L    Chloride 108 97 - 108 mmol/L    CO2 28 21 - 32 mmol/L    Anion gap 4 (L) 5 - 15 mmol/L    Glucose 143 (H) 65 - 100 mg/dL    BUN 15 6 - 20 MG/DL    Creatinine 0.74 0.55 - 1.02 MG/DL    BUN/Creatinine ratio 20 12 - 20      GFR est AA >60 >60 ml/min/1.73m2    GFR est non-AA >60 >60 ml/min/1.73m2    Calcium 8.5 8.5 - 10.1 MG/DL    Bilirubin, total 1.2 (H) 0.2 - 1.0 MG/DL    ALT (SGPT) 25 12 - 78 U/L    AST (SGOT) 12 (L) 15 - 37 U/L    Alk.  phosphatase 59 45 - 117 U/L    Protein, total 6.7 6.4 - 8.2 g/dL    Albumin 3.6 3.5 - 5.0 g/dL    Globulin 3.1 2.0 - 4.0 g/dL    A-G Ratio 1.2 1.1 - 2.2     MAGNESIUM    Collection Time: 12/17/19  6:37 AM   Result Value Ref Range    Magnesium 2.3 1.6 - 2.4 mg/dL   OCCULT BLOOD, STOOL    Collection Time: 12/17/19  6:37 AM   Result Value Ref Range    Occult blood, stool NEGATIVE  NEG     PATHOLOGIST REVIEW    Collection Time: 12/17/19  6:37 AM   Result Value Ref Range    Pathologist review (NOTE)    LIPASE    Collection Time: 12/17/19  6:37 AM   Result Value Ref Range    Lipase 73 73 - 393 U/L   EKG, 12 LEAD, INITIAL    Collection Time: 12/17/19  6:56 AM   Result Value Ref Range    Ventricular Rate 62 BPM    Atrial Rate 62 BPM    P-R Interval 202 ms    QRS Duration 82 ms    Q-T Interval 436 ms    QTC Calculation (Bezet) 442 ms    Calculated P Axis 4 degrees    Calculated R Axis -11 degrees    Calculated T Axis 75 degrees    Diagnosis       Normal sinus rhythm  Poor Anterior Forces  Nonspecific T wave abnormality  When compared with ECG of 01-NOV-2019 03:36,  Sinus rhythm has replaced Atrial fibrillation  Vent. rate has decreased BY  58 BPM  T wave amplitude has increased in Inferior leads    Confirmed by Martin Azul ([id number]) on 12/17/2019 10:23:46 AM     URINALYSIS W/ RFLX MICROSCOPIC    Collection Time: 12/17/19 10:11 AM   Result Value Ref Range    Color YELLOW/STRAW      Appearance CLEAR CLEAR      Specific gravity 1.015 1.003 - 1.030      pH (UA) 6.0 5.0 - 8.0      Protein NEGATIVE  NEG mg/dL    Glucose NEGATIVE  NEG mg/dL    Ketone NEGATIVE  NEG mg/dL    Bilirubin NEGATIVE  NEG      Blood NEGATIVE  NEG      Urobilinogen 0.2 0.2 - 1.0 EU/dL    Nitrites NEGATIVE  NEG      Leukocyte Esterase NEGATIVE  NEG         Radiologic Studies -   CT ABD PELV W CONT   Final Result   IMPRESSION:   Sigmoid diverticulosis. Chronic pancreatitis. Stable left adrenal nodule and   hemorrhagic cyst left kidney. Small nonobstructing left renal stone. No evidence   of small bowel distention or obstruction.       XR ABD ACUTE W 1 V CHEST   Final Result   IMPRESSION: No acute findings. CT Results  (Last 48 hours)               12/17/19 0833  CT ABD PELV W CONT Final result    Impression:  IMPRESSION:   Sigmoid diverticulosis. Chronic pancreatitis. Stable left adrenal nodule and   hemorrhagic cyst left kidney. Small nonobstructing left renal stone. No evidence   of small bowel distention or obstruction. Narrative:  EXAM: CT ABD PELV W CONT       INDICATION: Abdominal pain and distention for 2 days       COMPARISON: 10/14/2015        CONTRAST: 100 mL of Isovue-370. TECHNIQUE:    Following the uneventful intravenous administration of contrast, thin axial   images were obtained through the abdomen and pelvis. Coronal and sagittal   reconstructions were generated. Oral contrast was not administered. CT dose   reduction was achieved through use of a standardized protocol tailored for this   examination and automatic exposure control for dose modulation. FINDINGS:    LUNG BASES: Clear. INCIDENTALLY IMAGED HEART AND MEDIASTINUM: Unremarkable. LIVER: No mass or biliary dilatation. GALLBLADDER: Unremarkable. SPLEEN: No mass. PANCREAS: Scattered calcifications are noted. ADRENALS: 12 mm left adrenal nodule is unchanged. KIDNEYS: Punctate nonobstructing left renal stone. 2.5 cm simple cyst mid pole   right kidney and 3.8 cm simple cyst lower pole left kidney. 13 mm hemorrhagic   cyst mid pole left kidney unchanged. STOMACH: Unremarkable. SMALL BOWEL: No dilatation or wall thickening. COLON: Sigmoid diverticulosis is noted. APPENDIX: Surgically absent. PERITONEUM: Trace free fluid right subphrenic space and deep pelvis. RETROPERITONEUM: The abdominal aorta is atherosclerotic without evidence of   aneurysm. REPRODUCTIVE ORGANS: The uterus is surgically absent. URINARY BLADDER: No mass or calculus. BONES: Postoperative changes are seen in the lumbar spine.    ADDITIONAL COMMENTS: N/A CXR Results  (Last 48 hours)               12/17/19 0652  XR ABD ACUTE W 1 V CHEST Final result    Impression:  IMPRESSION: No acute findings. Narrative:  EXAM: XR ABD ACUTE W 1 V CHEST       INDICATION: Abdominal distention and pain; eval for free air. COMPARISON: None. FINDINGS: The upright chest radiograph demonstrates clear lungs and normal   cardiac and mediastinal contours. There is no pleural effusion or free air under   the diaphragm. A echo catheter seen in place. There is atherosclerosis of the   aorta. Supine and decubitus views of the abdomen demonstrate a nonobstructive bowel gas   pattern. There is no free intraperitoneal air. No soft tissue masses or   pathologic calcifications are identified. There is evidence of prior fusion. Surgical clips are noted. Medical Decision Making   I am the first provider for this patient. I reviewed the vital signs, available nursing notes, past medical history, past surgical history, family history and social history. Vital Signs-Reviewed the patient's vital signs. Patient Vitals for the past 12 hrs:   Temp Pulse Resp BP SpO2   12/17/19 1130  68 24 133/58 98 %   12/17/19 1045  72 24 144/55 95 %   12/17/19 0945  70 17 146/58 96 %   12/17/19 0715 97.8 °F (36.6 °C) 67 18 159/84 93 %   12/17/19 0701  64 19 (!) 132/96 92 %   12/17/19 0655    153/81    12/17/19 0632     93 %   12/17/19 0627     98 %   12/17/19 0626     93 %   12/17/19 0623    138/67    12/17/19 0621 97.7 °F (36.5 °C) 65 20 138/67 92 %       EKG interpretation: (Preliminary)  EKG performed at 6:56 AM shows a normal sinus rhythm at a rate of 62, normal axis, borderline first-degree AV block, no visible acute ST changes, isolated T wave inversion visible in aVL. As interpreted by me.     Records Reviewed: Nursing Notes, Old Medical Records, Previous Radiology Studies and Previous Laboratory Studies    Provider Notes (Medical Decision Making):   82-year-old female presenting with colicky abdominal pain in the setting of firm bowel movements and recent infusions for myelodysplastic syndrome. Differential includes obstruction versus partial obstruction, constipation, dehydration, urinary tract infection, pyelonephritis. Will do screening EKG but no historical or exam findings consistent with cardiopulmonary complaint. Will provide hydration, nausea medication, pain medication as needed. ED Course:   Initial assessment performed. The patients presenting problems have been discussed, and they are in agreement with the care plan formulated and outlined with them. I have encouraged them to ask questions as they arise throughout their visit. ED Course as of Dec 17 1223   Tue Dec 17, 2019   0725 X-ray does not show free air. Spoke with CT given creatinine is back at 0.74. Verify the patient has not had any problems with morphine in the past, will order for pain control. [TL]   [id number] CT does not show any obstructive findings. Awaiting urinalysis. [TL]   1141 Reassessed patient. Tolerated p.o. challenge. Still having significant lower abdominal pain. Feels like she has to have a BM. Will get bedside commode at the bedside. If patient unsuccessful, will get enema. [TL]   1201 Patient with large bowel movement at the bedside in the commode. Visualized, nonbloody or black. Patient feels much better. Plan for discharge home. Discussed natural laxatives including dried apricots, dry cherries or prunes. [TL]      ED Course User Index  [TL] Hannah Mckeon MD   12:21 PM  Reexamined patient's abdomen, soft, nontender, patient feeling much better. Plan for discharge. Disposition:  Discharge    Discharge Note:  12:21 PM  The pt is ready for discharge. The pt's signs, symptoms, diagnosis, and discharge instructions have been discussed and pt has conveyed their understanding.  The pt is to follow up as recommended or return to ER should their symptoms worsen. Plan has been discussed and pt is in agreement. PLAN:  1. Current Discharge Medication List      START taking these medications    Details   docusate sodium (COLACE) 100 mg capsule Take 1 Cap by mouth two (2) times a day for 90 days. Qty: 60 Cap, Refills: 2           2. Follow-up Information     Follow up With Specialties Details Why Contact Nani Nuñez MD Internal Medicine In 2 days For reevaluation of your symptoms. [facility]  [phone]      [facility] EMERGENCY DEPT Emergency Medicine  If symptoms worsen including new fever, recurrent abdominal pain, blood in your stool, uncontrolled vomiting or other new concerning symptoms. [address] [place] [address]  [phone]        Return to ED if worse     Diagnosis     Clinical Impression:   1. Acute abdominal pain    2. Acute constipation    3. History of myelodysplastic syndrome    4. Leukocytosis, unspecified type        Attestations:    Dimitry Zapata MD    Please note that this dictation was completed with [employer], the computer voice recognition software. Quite often unanticipated grammatical, syntax, homophones, and other interpretive errors are inadvertently transcribed by the computer software. Please disregard these errors. Please excuse any errors that have escaped final proofreading. Thank you.

## 2019-12-17 NOTE — ED NOTES
D/C papers signed by and given to pt, verbalizes understanding.   Pt D/C via wheelchair accompanied by

## 2019-12-17 NOTE — ED TRIAGE NOTES
PT arrives via EMS with complaint of abd pain x 2 days. PT reports BM today as \"small marbles,\" formed. PT denies fever/chills. Reports N/V. Pt A&Ox4.

## 2019-12-17 NOTE — ED NOTES
Pt received from off going [employer]. Pt is resting in bed on cardiac monitor, vital signs stable. 1 L NS infusing in Right AC with no signs of infiltration. Pt c/o abdominal pressure and pain in rectum 7/10. Order for pain medication is entered by MD, will administer med.   Pending CT exam and urine sample for analysis

## 2019-12-17 NOTE — DISCHARGE INSTRUCTIONS
Patient Education        Constipation: Care Instructions  Your Care Instructions    Constipation means that you have a hard time passing stools (bowel movements). People pass stools from 3 times a day to once every 3 days. What is normal for you may be different. Constipation may occur with pain in the rectum and cramping. The pain may get worse when you try to pass stools. Sometimes there are small amounts of bright red blood on toilet paper or the surface of stools. This is because of enlarged veins near the rectum (hemorrhoids). A few changes in your diet and lifestyle may help you avoid ongoing constipation. Your doctor may also prescribe medicine to help loosen your stool. Some medicines can cause constipation. These include pain medicines and antidepressants. Tell your doctor about all the medicines you take. Your doctor may want to make a medicine change to ease your symptoms. Follow-up care is a key part of your treatment and safety. Be sure to make and go to all appointments, and call your doctor if you are having problems. It's also a good idea to know your test results and keep a list of the medicines you take. How can you care for yourself at home? · Drink plenty of fluids, enough so that your urine is light yellow or clear like water. If you have kidney, heart, or liver disease and have to limit fluids, talk with your doctor before you increase the amount of fluids you drink. · Include high-fiber foods in your diet each day. These include fruits, vegetables, beans, and whole grains. · Get at least 30 minutes of exercise on most days of the week. Walking is a good choice. You also may want to do other activities, such as running, swimming, cycling, or playing tennis or team sports. · Take a fiber supplement, such as Citrucel or Metamucil, every day. Read and follow all instructions on the label. · Schedule time each day for a bowel movement. A daily routine may help.  Take your time having your bowel movement. · Support your feet with a small step stool when you sit on the toilet. This helps flex your hips and places your pelvis in a squatting position. · Your doctor may recommend an over-the-counter laxative to relieve your constipation. Examples are Milk of Magnesia and MiraLax. Read and follow all instructions on the label. Do not use laxatives on a long-term basis. When should you call for help? Call your doctor now or seek immediate medical care if:    · You have new or worse belly pain.     · You have new or worse nausea or vomiting.     · You have blood in your stools.    Watch closely for changes in your health, and be sure to contact your doctor if:    · Your constipation is getting worse.     · You do not get better as expected. Where can you learn more? Go to http://sumit-arpit.info/. Enter 21 [phone] in the search box to learn more about \"Constipation: Care Instructions. \"  Current as of: June 26, 2019  Content Version: 12.2  © 8656-7882 [employer]. Care instructions adapted under license by [employer] (which disclaims liability or warranty for this information). If you have questions about a medical condition or this instruction, always ask your healthcare professional. [address] any warranty or liability for your use of this information.

## 2019-12-18 PROCEDURE — 3331090002 HH PPS REVENUE DEBIT

## 2019-12-18 PROCEDURE — 3331090001 HH PPS REVENUE CREDIT

## 2019-12-19 PROCEDURE — 3331090002 HH PPS REVENUE DEBIT

## 2019-12-19 PROCEDURE — 3331090001 HH PPS REVENUE CREDIT

## 2019-12-20 PROCEDURE — 3331090002 HH PPS REVENUE DEBIT

## 2019-12-20 PROCEDURE — 3331090001 HH PPS REVENUE CREDIT

## 2019-12-21 PROCEDURE — 3331090002 HH PPS REVENUE DEBIT

## 2019-12-21 PROCEDURE — 3331090001 HH PPS REVENUE CREDIT

## 2019-12-22 PROCEDURE — 3331090001 HH PPS REVENUE CREDIT

## 2019-12-22 PROCEDURE — 3331090002 HH PPS REVENUE DEBIT

## 2019-12-23 PROCEDURE — 3331090002 HH PPS REVENUE DEBIT

## 2019-12-23 PROCEDURE — 3331090001 HH PPS REVENUE CREDIT

## 2019-12-24 PROCEDURE — 3331090001 HH PPS REVENUE CREDIT

## 2019-12-24 PROCEDURE — 3331090002 HH PPS REVENUE DEBIT

## 2019-12-24 NOTE — PROGRESS NOTES
Neurology Note    Patient ID:  Fredo Bee  [id number]  43 y.o.  1935    Subjective: I have balance problems. Date of Consultation:  December 26, 2019     Referring Physician: Dr. Keyana Berry    Reason for Consultation:   sensory disturbance in her lower extremities. History of Present Illness:   Fredo Bee is a 82 y.o.  female who returns to the neurology clinic at [facility] for a follow-up visit. She was initially seen by myself just over 3 months ago for her peripheral neuropathy. Please see my full history and physical which detailed the history of her symptoms. She had seen previous neurologist at [address] [place] [address] neurology prior to myself. Since her last visit, She was hospitalized with pneumonia and then had to begin treatment for myelodysplastic syndrome. .  She has received 5 rounds of treatment. She is also needed to receive transfusions during this. Since beginning all the treatments her fatigue has gotten worse. She is notably more tired and her body is generally more sore. She has not noticed any new numbness tingling or weakness but has her continued symptoms of her neuropathy. Her concern today is that is her neuropathy worse or is it due to her other medical conditions      She did try gabapentin to help with her symptoms but due to significant nausea and dizziness she needed to stop the medication.     Past Medical History:   Diagnosis Date    Anemia     Arthritis     Bunion of right foot 8/20/2015    Chronic pain     back--uses tens unit    Diverticulitis 6/29/2018    Recurrent    Dry eye syndrome 6/29/2018    Fibromyalgia 6/29/2018    GERD (gastroesophageal reflux disease)     History of left breast cancer 2013    lumpectomy radiation    Hypercholesterolemia 6/29/2018    Ill-defined condition     blood transfusion hx    [facility] [address]     MDS (myelodysplastic syndrome) ([facility] [address]     Osteoporosis 6/29/2018    Peripheral neuropathy 2018    Prediabetes 2018    PUD (peptic ulcer disease)     Radiation therapy complication 3441    Lt breast-No Chemo    Rosacea 2018    Trouble in sleeping         Past Surgical History:   Procedure Laterality Date    HX APPENDECTOMY      HX BREAST LUMPECTOMY  2013    LEFT BREAST LUMPECTOMY W/LEFT BREAST SENTINEL NODE BIOPSY,AND NEEDLE LOCALIZATION performed by Lucinda Harp MD at [facility] MAIN OR    HX CATARACT REMOVAL      bilateral    HX HYSTERECTOMY      ovarian cyst    HX MOHS PROCEDURES      right    HX ORTHOPAEDIC      back surgery x2    HX OTHER SURGICAL      colonoscopy    HX TONSILLECTOMY      IR INSERT TUNL CVC W PORT OVER 5 YEARS  12/3/2019    TOTAL KNEE ARTHROPLASTY      left    TOTAL KNEE ARTHROPLASTY      right    US GUIDED CORE BREAST BIOPSY Left     Breast Ca        Family History   Problem Relation Age of Onset    Cancer Mother         bladder    Cancer Father     Breast Cancer Paternal Grandmother         Social History     Tobacco Use    Smoking status: Former Smoker     Packs/day: 0.50     Years: 50.00     Pack years: 25.00     Last attempt to quit: 2012     Years since quittin.8    Smokeless tobacco: Never Used   Substance Use Topics    Alcohol use: Yes     Alcohol/week: 2.0 standard drinks     Types: 2 Glasses of wine per week        Allergies   Allergen Reactions    Hydrocodone Nausea Only and Vertigo    Gabapentin Diarrhea, Nausea Only and Other (comments)     weakness    Lyrica [Pregabalin] Nausea Only    Oxycodone Nausea and Vomiting and Vertigo        Prior to Admission medications    Medication Sig Start Date End Date Taking? Authorizing Provider   ondansetron hcl (ZOFRAN) 4 mg tablet Take 1 Tab by mouth every eight (8) hours as needed for Nausea. 19  Yes Theodora Sheffield MD   prochlorperazine (COMPAZINE) 5 mg tablet Take 1 Tab by mouth every six (6) hours as needed for Nausea for up to 30 days.  19 Yes Roger Reyes MD   lidocaine-prilocaine (EMLA) topical cream Apply  to affected area as needed for Pain. 11/27/19  Yes Roger Reyes MD   OXYGEN-AIR DELIVERY SYSTEMS Take 2 L/min by inhalation continuous. Yes Provider, Historical   Nebulizer & Compressor machine 1 Each by Does Not Apply route daily. 11/5/19  Yes Olegario Thompson MD   dilTIAZem CD (CARDIZEM CD) 180 mg ER capsule Take 1 Cap by mouth daily. 11/5/19  Yes Olegario Thompson MD   pantoprazole (PROTONIX) 40 mg tablet Take 1 Tab by mouth Before breakfast and dinner. 11/5/19  Yes Olegario Thompson MD   ergocalciferol (ERGOCALCIFEROL) 50,000 unit capsule Take 50,000 Units by mouth every Sunday. Yes Provider, Historical   inulin (FIBER GUMMIES PO) Take 2 Gum by mouth nightly. Yes Provider, Historical   acetaminophen (TYLENOL) 325 mg tablet Take 650 mg by mouth every four (4) hours as needed for Pain. Yes Other, MD Libby   vit A/vit C/vit E/zinc/copper (PRESERVISION AREDS PO) Take 1 Tab by mouth nightly. Yes Provider, Historical   multivitamin (ONE A DAY) tablet Take 1 Tab by mouth nightly. Yes Provider, Historical   docusate sodium (COLACE) 100 mg capsule Take 1 Cap by mouth two (2) times a day for 90 days. 12/17/19 3/16/20  Giuliano Monsivais MD   albuterol-ipratropium (DUO-NEB) 2.5 mg-0.5 mg/3 ml nebu 3 mL by Nebulization route every six (6) hours as needed (SOB). COPD J44.9 11/5/19   Donald Lance MD   budesonide (PULMICORT) 0.5 mg/2 mL nbsp 2 mL by Nebulization route two (2) times a day.  11/5/19   Olegario Thompson MD       Review of Systems:    General, constitutional: negative, balance difficulty, fatigue and tiredness  Eyes, vision: negative  Ears, nose, throat: negative  Cardiovascular, heart: negative  Respiratory: negative  Gastrointestinal: negative  Genitourinary: negative  Musculoskeletal: negative, back pain  Skin and integumentary: negative  Psychiatric: negative  Endocrine: negative  Neurological: Numbness in her feet , the rest is negative except for HPI  Hematologic/lymphatic: negative  Allergy/immunology: negative      Objective:       Visit Vitals  /80   Pulse 70   Wt 189 lb (85.7 kg)   SpO2 97%   BMI 31.45 kg/m²       Physical Exam:      General:  appears well nourished in no acute distress  Neck: no carotid bruits  Lungs: clear to auscultation  Heart:  no murmurs, regular rate  Lower extremity: no edema  Skin: intact. She does have scars from her bilateral knee replacements    Awake, alert, oriented to person, place and time    Language was intact. There was no aphasia or dysarthria    Cranial nerves:   II-XII were tested    Perrrla  Fundoscopic examination revealed venous pulsations  Visual fields were full  Eomi, no evidence of nystagmus  Facial sensation:  normal and symmetric  Facial motor: normal and symmetric  Hearing intact  SCM strength intact  Tongue: midline without fasciculations    Motor: Tone normal    No evidence of fasciculations    Strength testing:   deltoid triceps biceps Wrist ext. Wrist flex. intrinsics Hip flex. Hip ext. Knee ext. Knee flex Dorsi flex Plantar flex   Right 5 5 5 5 5 5 5 5 5 5 5 5   Left 5 5 5 5 5 5 5 5 5 5 5 5         Sensory:  Upper extremity: intact to pp, light touch, and vibration > 10 seconds  Lower extremity: Pinprick was decreased to just above the level of her ankles. Vibration was 2 seconds in her toes and 6 seconds at her ankles. This is essentially identical from her last visit 3 months ago    Reflexes:    Right Left  Biceps  2 2  Triceps 2 2  Brachiorad. 2 2  Patella  1          1  Achilles - -    Plantar response:  flexor bilaterally      Cerebellar testing:  no tremor apparent, finger/nose and christy were intact    Romberg: Present    Gait: steady. Is wide-based and slow with her walking.   She does need a cane to help with her balance    Labs:       Assessment:       Patient is a pleasant 82-year-old female with a 2-year history of slowly progressive sensory disturbance, balance problems, and pain in her lower extremities. Her neurological examination is consistent with a distal length dependent neuropathy. Compared to her examination from 3 months ago, I do not see any worsening of her neuromuscular examination in regards to her neuropathy    Plan:       Peripheral neuropathy:  The patient has had an extensive work-up completed. She does have an axonal neuropathy by electrodiagnostic studies. She did have a a large serological work-up which was unrevealing. Given the extensive work-up that had been performed, this is most likely an idiopathic peripheral neuropathy. Reassuringly, her neuromuscular examination is unchanged and her current symptomatology is not due to worsening of her peripheral neuropathy. She will continue with activity and exercise and will try to avoid neuropathic pain medications due to side effects she has had with those in the past.    Generalized fatigue and tiredness:  I do think this is most likely related to her underlying hematologic disorder and ongoing treatment. We did discuss at length how this can impact one's entire health including neuropathy but objectively I did not see any worsening of her neuropathy upon examination today. She will follow back up in regards to her myelodysplastic syndrome:    The patient should return to clinic in 6-9 months    Renewed medication: none today. He did talk again about possible medications to help with neuropathic pain. She has had difficulty in the past with both gabapentin and Lyrica due to side effects. I talked to her about how the medication would not help to make her balance better but could help with the positive symptoms of pain, tingling and burning. She would rather not take medication at this time. Neuropathy: We reviewed the possible causes and etiologies of neuropathy. We discussed the importance of activity and exercise associated with neuropathy.   We discussed the importance of safety with ambulation and considering adaptive equipment as necessary to help her maintain safe ambulation. I spent  40   minutes with the patient  with over 50 % of the time counseling and coordinating the care plan in regards to the diagnosis, diagnostic testing, and treatment plan. The patient had the ability to ask questions and all questions were answered.        Signed By:  María Musa DO FAAN    December 26, 2019

## 2019-12-25 PROCEDURE — 3331090001 HH PPS REVENUE CREDIT

## 2019-12-25 PROCEDURE — 3331090002 HH PPS REVENUE DEBIT

## 2019-12-26 ENCOUNTER — HOSPITAL ENCOUNTER (OUTPATIENT)
Dept: INFUSION THERAPY | Age: 84
Discharge: HOME | End: 2019-12-26
Payer: MEDICARE

## 2019-12-26 ENCOUNTER — TELEPHONE (OUTPATIENT)
Dept: ONCOLOGY | Age: 84
End: 2019-12-26

## 2019-12-26 ENCOUNTER — OFFICE VISIT (OUTPATIENT)
Dept: NEUROLOGY | Age: 84
End: 2019-12-26

## 2019-12-26 VITALS
HEART RATE: 99 BPM | WEIGHT: 187.5 LBS | RESPIRATION RATE: 20 BRPM | DIASTOLIC BLOOD PRESSURE: 41 MMHG | TEMPERATURE: 98.4 F | BODY MASS INDEX: 31.2 KG/M2 | SYSTOLIC BLOOD PRESSURE: 124 MMHG | OXYGEN SATURATION: 100 %

## 2019-12-26 VITALS
WEIGHT: 189 LBS | HEART RATE: 70 BPM | OXYGEN SATURATION: 97 % | SYSTOLIC BLOOD PRESSURE: 140 MMHG | BODY MASS INDEX: 31.45 KG/M2 | DIASTOLIC BLOOD PRESSURE: 80 MMHG

## 2019-12-26 DIAGNOSIS — G60.3 IDIOPATHIC PROGRESSIVE NEUROPATHY: Primary | ICD-10-CM

## 2019-12-26 LAB
BASOPHILS # BLD: 0 K/UL (ref 0–0.1)
BASOPHILS NFR BLD: 0 % (ref 0–1)
DIFFERENTIAL METHOD BLD: ABNORMAL
EOSINOPHIL # BLD: 0.5 K/UL (ref 0–0.4)
EOSINOPHIL NFR BLD: 2 % (ref 0–7)
ERYTHROCYTE [DISTWIDTH] IN BLOOD BY AUTOMATED COUNT: 18.6 % (ref 11.5–14.5)
HCT VFR BLD AUTO: 19.9 % (ref 35–47)
HGB BLD-MCNC: 5.8 G/DL (ref 11.5–16)
IMM GRANULOCYTES # BLD AUTO: 0 K/UL (ref 0–0.04)
IMM GRANULOCYTES NFR BLD AUTO: 0 % (ref 0–0.5)
LYMPHOCYTES # BLD: 2.7 K/UL (ref 0.8–3.5)
LYMPHOCYTES NFR BLD: 10 % (ref 12–49)
MCH RBC QN AUTO: 29.7 PG (ref 26–34)
MCHC RBC AUTO-ENTMCNC: 29.1 G/DL (ref 30–36.5)
MCV RBC AUTO: 102.1 FL (ref 80–99)
METAMYELOCYTES NFR BLD MANUAL: 3 %
MONOCYTES # BLD: 5.7 K/UL (ref 0–1)
MONOCYTES NFR BLD: 21 % (ref 5–13)
MYELOCYTES NFR BLD MANUAL: 2 %
NEUTS BAND NFR BLD MANUAL: 2 %
NEUTS SEG # BLD: 16.9 K/UL (ref 1.8–8)
NEUTS SEG NFR BLD: 60 % (ref 32–75)
NRBC # BLD: 0.05 K/UL (ref 0–0.01)
NRBC BLD-RTO: 0.2 PER 100 WBC
PLATELET # BLD AUTO: 102 K/UL (ref 150–400)
PMV BLD AUTO: 9.7 FL (ref 8.9–12.9)
RBC # BLD AUTO: 1.95 M/UL (ref 3.8–5.2)
RBC MORPH BLD: ABNORMAL
WBC # BLD AUTO: 27.3 K/UL (ref 3.6–11)

## 2019-12-26 PROCEDURE — 77030012965 HC NDL HUBR BBMI -A

## 2019-12-26 PROCEDURE — 36591 DRAW BLOOD OFF VENOUS DEVICE: CPT

## 2019-12-26 PROCEDURE — 85025 COMPLETE CBC W/AUTO DIFF WBC: CPT

## 2019-12-26 PROCEDURE — 86900 BLOOD TYPING SEROLOGIC ABO: CPT

## 2019-12-26 PROCEDURE — 74011250636 HC RX REV CODE- 250/636: Performed by: INTERNAL MEDICINE

## 2019-12-26 PROCEDURE — 86923 COMPATIBILITY TEST ELECTRIC: CPT

## 2019-12-26 PROCEDURE — 3331090002 HH PPS REVENUE DEBIT

## 2019-12-26 PROCEDURE — 36415 COLL VENOUS BLD VENIPUNCTURE: CPT

## 2019-12-26 PROCEDURE — 3331090001 HH PPS REVENUE CREDIT

## 2019-12-26 RX ORDER — SODIUM CHLORIDE 0.9 % (FLUSH) 0.9 %
5-10 SYRINGE (ML) INJECTION AS NEEDED
Status: DISCONTINUED | OUTPATIENT
Start: 2019-12-26 | End: 2019-12-27 | Stop reason: HOSPADM

## 2019-12-26 RX ORDER — HEPARIN 100 UNIT/ML
500 SYRINGE INTRAVENOUS AS NEEDED
Status: DISCONTINUED | OUTPATIENT
Start: 2019-12-26 | End: 2019-12-27 | Stop reason: HOSPADM

## 2019-12-26 RX ADMIN — Medication 1000 UNITS: at 16:24

## 2019-12-26 RX ADMIN — Medication 40 ML: at 16:23

## 2019-12-26 NOTE — TELEPHONE ENCOUNTER
12/26/19 at 5:04 pm - Called the patient and left a message that this office will attempt to reach her again and that this office needs to speak with her as soon as possible regarding an appointment tomorrow 12/27/19.    12/27/19 at 5:22 pm - Called the patient and left a message that per Dr. Domenico Aragon her hemoglobin level was very low and an appointment for a blood transfusion was made for her tomorrow 12/27/19 at [facility] at 8:30 am and to call this office first thing in the morning.

## 2019-12-26 NOTE — PROGRESS NOTES
[address] call from [facility] in lab reporting that CBC that was drawn is clotted. Called pt and left message that we will need her to return to re-draw. [facility] number provided. Per blood bank, pink tops are ok. [address] and spoke to pt. She has an appt on [address] [employer] and then will come back to have blood re-drawn. 1619 - Pt back for blood to be re-drawn. R chest PAC accessed and CBC w/ diff redrawn and sent to lab. Addendum: Hgb 5.8 called to [facility] at Dr. Preeti Mishra office. Transfusion orders rec'd. Pt to be transfused at [facility] at 0830 tomorrow.      Tammy Rater

## 2019-12-26 NOTE — PROGRESS NOTES
[facility] Lab Draw Note:  Arrived - 1025    Visit Vitals  /41 (BP 1 Location: Left arm, BP Patient Position: Sitting)   Pulse 99   Temp 98.4 °F (36.9 °C)   Resp 20   Wt 85 kg (187 lb 8 oz)   SpO2 100%   BMI 31.20 kg/m²       Labs drawn from right chest PAC using 3/4 in rene - Nurse assistANASTASIIA [place], pt difficult peripheral draw  Pt left at 1050 - Tolerated well. Pt denies any acute problems/changes. Discharged from [facility] ambulatory. See [facility] care for pending lab results.

## 2019-12-27 ENCOUNTER — HOSPITAL ENCOUNTER (OUTPATIENT)
Dept: INFUSION THERAPY | Age: 84
Discharge: HOME | End: 2019-12-27
Payer: MEDICARE

## 2019-12-27 VITALS
DIASTOLIC BLOOD PRESSURE: 40 MMHG | TEMPERATURE: 99.2 F | RESPIRATION RATE: 18 BRPM | HEART RATE: 79 BPM | OXYGEN SATURATION: 97 % | SYSTOLIC BLOOD PRESSURE: 114 MMHG

## 2019-12-27 PROCEDURE — 77030013169 SET IV BLD ICUM -A

## 2019-12-27 PROCEDURE — 3331090002 HH PPS REVENUE DEBIT

## 2019-12-27 PROCEDURE — 74011250636 HC RX REV CODE- 250/636: Performed by: INTERNAL MEDICINE

## 2019-12-27 PROCEDURE — 3331090001 HH PPS REVENUE CREDIT

## 2019-12-27 PROCEDURE — P9016 RBC LEUKOCYTES REDUCED: HCPCS

## 2019-12-27 PROCEDURE — 77030012965 HC NDL HUBR BBMI -A

## 2019-12-27 PROCEDURE — 36430 TRANSFUSION BLD/BLD COMPNT: CPT

## 2019-12-27 PROCEDURE — 74011250637 HC RX REV CODE- 250/637: Performed by: NURSE PRACTITIONER

## 2019-12-27 RX ORDER — DIPHENHYDRAMINE HCL 25 MG
25 CAPSULE ORAL ONCE
Status: COMPLETED | OUTPATIENT
Start: 2019-12-27 | End: 2019-12-27

## 2019-12-27 RX ORDER — SODIUM CHLORIDE 9 MG/ML
250 INJECTION, SOLUTION INTRAVENOUS AS NEEDED
Status: DISCONTINUED | OUTPATIENT
Start: 2019-12-27 | End: 2019-12-31 | Stop reason: HOSPADM

## 2019-12-27 RX ORDER — HEPARIN 100 UNIT/ML
500 SYRINGE INTRAVENOUS AS NEEDED
Status: DISCONTINUED | OUTPATIENT
Start: 2019-12-27 | End: 2019-12-28 | Stop reason: HOSPADM

## 2019-12-27 RX ORDER — SODIUM CHLORIDE 0.9 % (FLUSH) 0.9 %
5-10 SYRINGE (ML) INJECTION AS NEEDED
Status: DISCONTINUED | OUTPATIENT
Start: 2019-12-27 | End: 2019-12-28 | Stop reason: HOSPADM

## 2019-12-27 RX ORDER — ACETAMINOPHEN 325 MG/1
650 TABLET ORAL ONCE
Status: COMPLETED | OUTPATIENT
Start: 2019-12-27 | End: 2019-12-27

## 2019-12-27 RX ADMIN — SODIUM CHLORIDE 250 ML: 900 INJECTION, SOLUTION INTRAVENOUS at 09:25

## 2019-12-27 RX ADMIN — Medication 10 ML: at 14:21

## 2019-12-27 RX ADMIN — ACETAMINOPHEN 650 MG: 325 TABLET ORAL at 09:15

## 2019-12-27 RX ADMIN — DIPHENHYDRAMINE HYDROCHLORIDE 25 MG: 25 CAPSULE ORAL at 09:15

## 2019-12-27 RX ADMIN — Medication 500 UNITS: at 14:21

## 2019-12-27 NOTE — PROGRESS NOTES
9349 Pt arrived at [facility] ambulatory in [facility], very short of breath, requiring wheelchair after attempting to walk a small distanced to [facility]. Pt scheduled for transfusion of 2 units PRBCs. Assessment completed. Pt reports she has been very tired, staying in bed for most of day, and very short of breath with walking short distances. Discussed anemia s/s and purpose of transfusion. Port accessed without difficulty. Signs/symptoms of adverse blood reaction discussed with pt, voiced understanding. Patient Vitals for the past 12 hrs:   Temp Pulse Resp BP SpO2   12/27/19 1420 99.2 °F (37.3 °C) 79 18 114/40 97 %   12/27/19 1400 98 °F (36.7 °C) 85 18 (!) 107/38 96 %   12/27/19 1300 98.2 °F (36.8 °C) 84 18 (!) 92/33 99 %   12/27/19 1230 99.8 °F (37.7 °C) 86 18 (!) 95/34 92 %   12/27/19 1215 98.5 °F (36.9 °C) 83 18 (!) 101/39 96 %   12/27/19 1155 98.8 °F (37.1 °C) 82 18 100/45    12/27/19 1130 98.9 °F (37.2 °C) 82 18 92/40 96 %   12/27/19 1030 98.7 °F (37.1 °C) 95 20 92/68    12/27/19 1000 97.9 °F (36.6 °C) 81 20 (!) 98/31 100 %   12/27/19 0945 97.7 °F (36.5 °C) 90 20 110/40    12/27/19 0919 98.5 °F (36.9 °C) (!) 101 24 106/40 100 %     Medications received:  NS @ KVO  Tylenol 650 mg po  Benadryl 25 mg po    0930:  1st unit PRBCs started and infusing without difficulty, observed x 15 minutes  1145:  1st unit completed without adverse reaction noted, NS flushing line. 1200:  2nd unit PRBCs started and infusing without difficulty, observed x 15 minutes  1405:  2nd unit completed without adverse reaction noted, NS flushing line. 1430 Tolerated transfusion  well, no adverse reaction noted. D/C instructions reviewed, copy to pt, voiced understanding. Patient declined 1 hour post transfusion observation. D/Cd from [facility] via wheelchair and in no distress accompanied by spouse. Next appt 01/06/20.   Future Appointments   Date Time Provider Anju Dickson   1/6/2020  1:00 PM [facility] INFUSION NURSE 2 [address] [place] [address] COM   1/7/2020  1:00 PM [facility] INFUSION NURSE 2 [facility]   1/8/2020  1:00 PM [facility] INFUSION NURSE 2 [address] COM   1/9/2020 11:30 AM Nieves Overton MD [address] [place]   1/9/2020  1:00  [facility] [address] COM   1/10/2020  1:00 PM [facility] INFUSION NURSE 1 [address] [facility]   1/30/2020 10:00 AM Patricia Jesus MD [facility]   6/25/2020  3:40 PM Natan Mcneil,  [facility]

## 2019-12-28 PROCEDURE — 3331090001 HH PPS REVENUE CREDIT

## 2019-12-28 PROCEDURE — 3331090002 HH PPS REVENUE DEBIT

## 2019-12-29 PROCEDURE — 3331090002 HH PPS REVENUE DEBIT

## 2019-12-29 PROCEDURE — 3331090001 HH PPS REVENUE CREDIT

## 2019-12-31 ENCOUNTER — APPOINTMENT (OUTPATIENT)
Dept: CT IMAGING | Age: 84
End: 2019-12-31
Attending: EMERGENCY MEDICINE
Payer: MEDICARE

## 2019-12-31 ENCOUNTER — HOSPITAL ENCOUNTER (EMERGENCY)
Age: 84
Discharge: HOME | End: 2019-12-31
Attending: EMERGENCY MEDICINE
Payer: MEDICARE

## 2019-12-31 VITALS
HEIGHT: 65 IN | HEART RATE: 89 BPM | RESPIRATION RATE: 17 BRPM | TEMPERATURE: 98.7 F | WEIGHT: 187.17 LBS | DIASTOLIC BLOOD PRESSURE: 56 MMHG | SYSTOLIC BLOOD PRESSURE: 130 MMHG | OXYGEN SATURATION: 95 % | BODY MASS INDEX: 31.18 KG/M2

## 2019-12-31 DIAGNOSIS — K57.92 DIVERTICULITIS: Primary | ICD-10-CM

## 2019-12-31 LAB
ALBUMIN SERPL-MCNC: 3.2 G/DL (ref 3.5–5)
ALBUMIN/GLOB SERPL: 0.9 {RATIO} (ref 1.1–2.2)
ALP SERPL-CCNC: 75 U/L (ref 45–117)
ALT SERPL-CCNC: 15 U/L (ref 12–78)
ANION GAP SERPL CALC-SCNC: 6 MMOL/L (ref 5–15)
APPEARANCE UR: CLEAR
AST SERPL-CCNC: 10 U/L (ref 15–37)
BACTERIA URNS QL MICRO: NEGATIVE /HPF
BASOPHILS # BLD: 0 K/UL (ref 0–0.1)
BASOPHILS NFR BLD: 0 % (ref 0–1)
BILIRUB SERPL-MCNC: 0.7 MG/DL (ref 0.2–1)
BILIRUB UR QL: NEGATIVE
BUN SERPL-MCNC: 11 MG/DL (ref 6–20)
BUN/CREAT SERPL: 15 (ref 12–20)
CALCIUM SERPL-MCNC: 8.1 MG/DL (ref 8.5–10.1)
CHLORIDE SERPL-SCNC: 108 MMOL/L (ref 97–108)
CO2 SERPL-SCNC: 26 MMOL/L (ref 21–32)
COLOR UR: NORMAL
CREAT SERPL-MCNC: 0.72 MG/DL (ref 0.55–1.02)
DIFFERENTIAL METHOD BLD: ABNORMAL
EOSINOPHIL # BLD: 0 K/UL (ref 0–0.4)
EOSINOPHIL NFR BLD: 0 % (ref 0–7)
EPITH CASTS URNS QL MICRO: NORMAL /LPF
ERYTHROCYTE [DISTWIDTH] IN BLOOD BY AUTOMATED COUNT: 21.4 % (ref 11.5–14.5)
GLOBULIN SER CALC-MCNC: 3.7 G/DL (ref 2–4)
GLUCOSE SERPL-MCNC: 110 MG/DL (ref 65–100)
GLUCOSE UR STRIP.AUTO-MCNC: NEGATIVE MG/DL
HCT VFR BLD AUTO: 25.6 % (ref 35–47)
HGB BLD-MCNC: 7.6 G/DL (ref 11.5–16)
HGB UR QL STRIP: NEGATIVE
HYALINE CASTS URNS QL MICRO: NORMAL /LPF (ref 0–5)
IMM GRANULOCYTES # BLD AUTO: 0 K/UL (ref 0–0.04)
IMM GRANULOCYTES NFR BLD AUTO: 0 % (ref 0–0.5)
KETONES UR QL STRIP.AUTO: NEGATIVE MG/DL
LEUKOCYTE ESTERASE UR QL STRIP.AUTO: NEGATIVE
LIPASE SERPL-CCNC: 93 U/L (ref 73–393)
LYMPHOCYTES # BLD: 2.4 K/UL (ref 0.8–3.5)
LYMPHOCYTES NFR BLD: 11 % (ref 12–49)
MCH RBC QN AUTO: 29.7 PG (ref 26–34)
MCHC RBC AUTO-ENTMCNC: 29.7 G/DL (ref 30–36.5)
MCV RBC AUTO: 100 FL (ref 80–99)
MONOCYTES # BLD: 7.5 K/UL (ref 0–1)
MONOCYTES NFR BLD: 35 % (ref 5–13)
NEUTS BAND NFR BLD MANUAL: 2 %
NEUTS SEG # BLD: 11.5 K/UL (ref 1.8–8)
NEUTS SEG NFR BLD: 52 % (ref 32–75)
NITRITE UR QL STRIP.AUTO: NEGATIVE
NRBC # BLD: 0.09 K/UL (ref 0–0.01)
NRBC BLD-RTO: 0.4 PER 100 WBC
PH UR STRIP: 6.5 [PH] (ref 5–8)
PLATELET # BLD AUTO: 130 K/UL (ref 150–400)
PMV BLD AUTO: 9.7 FL (ref 8.9–12.9)
POTASSIUM SERPL-SCNC: 4.4 MMOL/L (ref 3.5–5.1)
PROT SERPL-MCNC: 6.9 G/DL (ref 6.4–8.2)
PROT UR STRIP-MCNC: NEGATIVE MG/DL
RBC # BLD AUTO: 2.56 M/UL (ref 3.8–5.2)
RBC #/AREA URNS HPF: NORMAL /HPF (ref 0–5)
RBC MORPH BLD: ABNORMAL
RBC MORPH BLD: ABNORMAL
SODIUM SERPL-SCNC: 140 MMOL/L (ref 136–145)
SP GR UR REFRACTOMETRY: 1.01 (ref 1–1.03)
UA: UC IF INDICATED,UAUC: NORMAL
UROBILINOGEN UR QL STRIP.AUTO: 0.2 EU/DL (ref 0.2–1)
WBC # BLD AUTO: 21.4 K/UL (ref 3.6–11)
WBC URNS QL MICRO: NORMAL /HPF (ref 0–4)

## 2019-12-31 PROCEDURE — 80053 COMPREHEN METABOLIC PANEL: CPT

## 2019-12-31 PROCEDURE — 96360 HYDRATION IV INFUSION INIT: CPT

## 2019-12-31 PROCEDURE — 83690 ASSAY OF LIPASE: CPT

## 2019-12-31 PROCEDURE — 81001 URINALYSIS AUTO W/SCOPE: CPT

## 2019-12-31 PROCEDURE — 74177 CT ABD & PELVIS W/CONTRAST: CPT

## 2019-12-31 PROCEDURE — 74011636320 HC RX REV CODE- 636/320: Performed by: EMERGENCY MEDICINE

## 2019-12-31 PROCEDURE — 36415 COLL VENOUS BLD VENIPUNCTURE: CPT

## 2019-12-31 PROCEDURE — 99284 EMERGENCY DEPT VISIT MOD MDM: CPT

## 2019-12-31 PROCEDURE — 85025 COMPLETE CBC W/AUTO DIFF WBC: CPT

## 2019-12-31 PROCEDURE — 74011250637 HC RX REV CODE- 250/637: Performed by: EMERGENCY MEDICINE

## 2019-12-31 PROCEDURE — 74011250636 HC RX REV CODE- 250/636: Performed by: EMERGENCY MEDICINE

## 2019-12-31 RX ORDER — SODIUM CHLORIDE 9 MG/ML
10 INJECTION INTRAMUSCULAR; INTRAVENOUS; SUBCUTANEOUS AS NEEDED
Status: CANCELLED | OUTPATIENT
Start: 2020-01-08

## 2019-12-31 RX ORDER — SODIUM CHLORIDE 9 MG/ML
25 INJECTION, SOLUTION INTRAVENOUS CONTINUOUS
Status: CANCELLED | OUTPATIENT
Start: 2020-02-07 | End: 2020-02-08

## 2019-12-31 RX ORDER — HEPARIN 100 UNIT/ML
300-500 SYRINGE INTRAVENOUS AS NEEDED
Status: CANCELLED
Start: 2020-01-10

## 2019-12-31 RX ORDER — EPINEPHRINE 1 MG/ML
0.3 INJECTION, SOLUTION, CONCENTRATE INTRAVENOUS AS NEEDED
Status: CANCELLED | OUTPATIENT
Start: 2020-01-06

## 2019-12-31 RX ORDER — ACETAMINOPHEN 325 MG/1
650 TABLET ORAL AS NEEDED
Status: CANCELLED
Start: 2020-02-03

## 2019-12-31 RX ORDER — ONDANSETRON 2 MG/ML
8 INJECTION INTRAMUSCULAR; INTRAVENOUS AS NEEDED
Status: CANCELLED | OUTPATIENT
Start: 2020-02-03

## 2019-12-31 RX ORDER — SODIUM CHLORIDE 0.9 % (FLUSH) 0.9 %
10 SYRINGE (ML) INJECTION AS NEEDED
Status: CANCELLED
Start: 2020-01-07

## 2019-12-31 RX ORDER — HYDROCORTISONE SODIUM SUCCINATE 100 MG/2ML
100 INJECTION, POWDER, FOR SOLUTION INTRAMUSCULAR; INTRAVENOUS AS NEEDED
Status: CANCELLED | OUTPATIENT
Start: 2020-01-09

## 2019-12-31 RX ORDER — SODIUM CHLORIDE 9 MG/ML
25 INJECTION, SOLUTION INTRAVENOUS CONTINUOUS
Status: CANCELLED | OUTPATIENT
Start: 2020-01-07 | End: 2020-01-08

## 2019-12-31 RX ORDER — EPINEPHRINE 1 MG/ML
0.3 INJECTION, SOLUTION, CONCENTRATE INTRAVENOUS AS NEEDED
Status: CANCELLED | OUTPATIENT
Start: 2020-01-07

## 2019-12-31 RX ORDER — DEXAMETHASONE SODIUM PHOSPHATE 4 MG/ML
8 INJECTION, SOLUTION INTRA-ARTICULAR; INTRALESIONAL; INTRAMUSCULAR; INTRAVENOUS; SOFT TISSUE ONCE
Status: CANCELLED | OUTPATIENT
Start: 2020-02-07

## 2019-12-31 RX ORDER — DEXAMETHASONE SODIUM PHOSPHATE 4 MG/ML
8 INJECTION, SOLUTION INTRA-ARTICULAR; INTRALESIONAL; INTRAMUSCULAR; INTRAVENOUS; SOFT TISSUE ONCE
Status: CANCELLED | OUTPATIENT
Start: 2020-01-09

## 2019-12-31 RX ORDER — DIPHENHYDRAMINE HYDROCHLORIDE 50 MG/ML
50 INJECTION, SOLUTION INTRAMUSCULAR; INTRAVENOUS AS NEEDED
Status: CANCELLED
Start: 2020-02-05

## 2019-12-31 RX ORDER — HEPARIN 100 UNIT/ML
300-500 SYRINGE INTRAVENOUS AS NEEDED
Status: CANCELLED
Start: 2020-01-08

## 2019-12-31 RX ORDER — DIPHENHYDRAMINE HYDROCHLORIDE 50 MG/ML
50 INJECTION, SOLUTION INTRAMUSCULAR; INTRAVENOUS AS NEEDED
Status: CANCELLED
Start: 2020-01-09

## 2019-12-31 RX ORDER — HYDROCORTISONE SODIUM SUCCINATE 100 MG/2ML
100 INJECTION, POWDER, FOR SOLUTION INTRAMUSCULAR; INTRAVENOUS AS NEEDED
Status: CANCELLED | OUTPATIENT
Start: 2020-01-06

## 2019-12-31 RX ORDER — DIPHENHYDRAMINE HYDROCHLORIDE 50 MG/ML
50 INJECTION, SOLUTION INTRAMUSCULAR; INTRAVENOUS AS NEEDED
Status: CANCELLED
Start: 2020-01-08

## 2019-12-31 RX ORDER — ACETAMINOPHEN 325 MG/1
650 TABLET ORAL AS NEEDED
Status: CANCELLED
Start: 2020-02-05

## 2019-12-31 RX ORDER — HYDROCORTISONE SODIUM SUCCINATE 100 MG/2ML
100 INJECTION, POWDER, FOR SOLUTION INTRAMUSCULAR; INTRAVENOUS AS NEEDED
Status: CANCELLED | OUTPATIENT
Start: 2020-02-06

## 2019-12-31 RX ORDER — DIPHENHYDRAMINE HYDROCHLORIDE 50 MG/ML
50 INJECTION, SOLUTION INTRAMUSCULAR; INTRAVENOUS AS NEEDED
Status: CANCELLED
Start: 2020-02-04

## 2019-12-31 RX ORDER — SODIUM CHLORIDE 0.9 % (FLUSH) 0.9 %
10 SYRINGE (ML) INJECTION AS NEEDED
Status: CANCELLED
Start: 2020-02-05

## 2019-12-31 RX ORDER — ONDANSETRON 2 MG/ML
8 INJECTION INTRAMUSCULAR; INTRAVENOUS AS NEEDED
Status: CANCELLED | OUTPATIENT
Start: 2020-01-07

## 2019-12-31 RX ORDER — DIPHENHYDRAMINE HYDROCHLORIDE 50 MG/ML
50 INJECTION, SOLUTION INTRAMUSCULAR; INTRAVENOUS AS NEEDED
Status: CANCELLED
Start: 2020-01-06

## 2019-12-31 RX ORDER — DIPHENHYDRAMINE HYDROCHLORIDE 50 MG/ML
50 INJECTION, SOLUTION INTRAMUSCULAR; INTRAVENOUS AS NEEDED
Status: CANCELLED
Start: 2020-02-06

## 2019-12-31 RX ORDER — SODIUM CHLORIDE 9 MG/ML
25 INJECTION, SOLUTION INTRAVENOUS CONTINUOUS
Status: CANCELLED | OUTPATIENT
Start: 2020-01-09 | End: 2020-01-10

## 2019-12-31 RX ORDER — SODIUM CHLORIDE 9 MG/ML
25 INJECTION, SOLUTION INTRAVENOUS CONTINUOUS
Status: CANCELLED | OUTPATIENT
Start: 2020-01-06 | End: 2020-01-07

## 2019-12-31 RX ORDER — ALBUTEROL SULFATE 0.83 MG/ML
2.5 SOLUTION RESPIRATORY (INHALATION) AS NEEDED
Status: CANCELLED
Start: 2020-01-08

## 2019-12-31 RX ORDER — CIPROFLOXACIN 500 MG/1
500 TABLET ORAL 2 TIMES DAILY
Qty: 20 TAB | Refills: 0 | Status: SHIPPED | OUTPATIENT
Start: 2019-12-31 | End: 2020-01-10

## 2019-12-31 RX ORDER — SODIUM CHLORIDE 0.9 % (FLUSH) 0.9 %
10 SYRINGE (ML) INJECTION AS NEEDED
Status: CANCELLED
Start: 2020-01-06

## 2019-12-31 RX ORDER — SODIUM CHLORIDE 0.9 % (FLUSH) 0.9 %
10 SYRINGE (ML) INJECTION AS NEEDED
Status: CANCELLED
Start: 2020-01-09

## 2019-12-31 RX ORDER — ACETAMINOPHEN 325 MG/1
650 TABLET ORAL AS NEEDED
Status: CANCELLED
Start: 2020-02-06

## 2019-12-31 RX ORDER — EPINEPHRINE 1 MG/ML
0.3 INJECTION, SOLUTION, CONCENTRATE INTRAVENOUS AS NEEDED
Status: CANCELLED | OUTPATIENT
Start: 2020-02-07

## 2019-12-31 RX ORDER — DIPHENHYDRAMINE HYDROCHLORIDE 50 MG/ML
50 INJECTION, SOLUTION INTRAMUSCULAR; INTRAVENOUS AS NEEDED
Status: CANCELLED
Start: 2020-02-03

## 2019-12-31 RX ORDER — METRONIDAZOLE 250 MG/1
500 TABLET ORAL
Status: COMPLETED | OUTPATIENT
Start: 2019-12-31 | End: 2019-12-31

## 2019-12-31 RX ORDER — DIPHENHYDRAMINE HYDROCHLORIDE 50 MG/ML
50 INJECTION, SOLUTION INTRAMUSCULAR; INTRAVENOUS AS NEEDED
Status: CANCELLED
Start: 2020-01-10

## 2019-12-31 RX ORDER — DEXAMETHASONE SODIUM PHOSPHATE 4 MG/ML
8 INJECTION, SOLUTION INTRA-ARTICULAR; INTRALESIONAL; INTRAMUSCULAR; INTRAVENOUS; SOFT TISSUE ONCE
Status: CANCELLED | OUTPATIENT
Start: 2020-01-07

## 2019-12-31 RX ORDER — HYDROCORTISONE SODIUM SUCCINATE 100 MG/2ML
100 INJECTION, POWDER, FOR SOLUTION INTRAMUSCULAR; INTRAVENOUS AS NEEDED
Status: CANCELLED | OUTPATIENT
Start: 2020-02-04

## 2019-12-31 RX ORDER — ONDANSETRON 2 MG/ML
8 INJECTION INTRAMUSCULAR; INTRAVENOUS AS NEEDED
Status: CANCELLED | OUTPATIENT
Start: 2020-01-10

## 2019-12-31 RX ORDER — DEXAMETHASONE SODIUM PHOSPHATE 4 MG/ML
8 INJECTION, SOLUTION INTRA-ARTICULAR; INTRALESIONAL; INTRAMUSCULAR; INTRAVENOUS; SOFT TISSUE ONCE
Status: CANCELLED | OUTPATIENT
Start: 2020-02-04

## 2019-12-31 RX ORDER — ACETAMINOPHEN 325 MG/1
650 TABLET ORAL AS NEEDED
Status: CANCELLED
Start: 2020-01-08

## 2019-12-31 RX ORDER — HYDROCORTISONE SODIUM SUCCINATE 100 MG/2ML
100 INJECTION, POWDER, FOR SOLUTION INTRAMUSCULAR; INTRAVENOUS AS NEEDED
Status: CANCELLED | OUTPATIENT
Start: 2020-02-03

## 2019-12-31 RX ORDER — DEXAMETHASONE SODIUM PHOSPHATE 4 MG/ML
8 INJECTION, SOLUTION INTRA-ARTICULAR; INTRALESIONAL; INTRAMUSCULAR; INTRAVENOUS; SOFT TISSUE ONCE
Status: CANCELLED | OUTPATIENT
Start: 2020-02-05

## 2019-12-31 RX ORDER — SODIUM CHLORIDE 9 MG/ML
10 INJECTION INTRAMUSCULAR; INTRAVENOUS; SUBCUTANEOUS AS NEEDED
Status: CANCELLED | OUTPATIENT
Start: 2020-01-10

## 2019-12-31 RX ORDER — SODIUM CHLORIDE 9 MG/ML
25 INJECTION, SOLUTION INTRAVENOUS CONTINUOUS
Status: CANCELLED | OUTPATIENT
Start: 2020-01-08 | End: 2020-01-09

## 2019-12-31 RX ORDER — ONDANSETRON 2 MG/ML
8 INJECTION INTRAMUSCULAR; INTRAVENOUS AS NEEDED
Status: CANCELLED | OUTPATIENT
Start: 2020-01-06

## 2019-12-31 RX ORDER — ONDANSETRON 2 MG/ML
8 INJECTION INTRAMUSCULAR; INTRAVENOUS AS NEEDED
Status: CANCELLED | OUTPATIENT
Start: 2020-02-07

## 2019-12-31 RX ORDER — EPINEPHRINE 1 MG/ML
0.3 INJECTION, SOLUTION, CONCENTRATE INTRAVENOUS AS NEEDED
Status: CANCELLED | OUTPATIENT
Start: 2020-01-09

## 2019-12-31 RX ORDER — SODIUM CHLORIDE 0.9 % (FLUSH) 0.9 %
10 SYRINGE (ML) INJECTION AS NEEDED
Status: CANCELLED
Start: 2020-02-06

## 2019-12-31 RX ORDER — SODIUM CHLORIDE 9 MG/ML
25 INJECTION, SOLUTION INTRAVENOUS CONTINUOUS
Status: CANCELLED | OUTPATIENT
Start: 2020-02-05 | End: 2020-02-06

## 2019-12-31 RX ORDER — ALBUTEROL SULFATE 0.83 MG/ML
2.5 SOLUTION RESPIRATORY (INHALATION) AS NEEDED
Status: CANCELLED
Start: 2020-02-06

## 2019-12-31 RX ORDER — ACETAMINOPHEN 325 MG/1
650 TABLET ORAL AS NEEDED
Status: CANCELLED
Start: 2020-01-06

## 2019-12-31 RX ORDER — HYDROCORTISONE SODIUM SUCCINATE 100 MG/2ML
100 INJECTION, POWDER, FOR SOLUTION INTRAMUSCULAR; INTRAVENOUS AS NEEDED
Status: CANCELLED | OUTPATIENT
Start: 2020-01-07

## 2019-12-31 RX ORDER — HYDROCORTISONE SODIUM SUCCINATE 100 MG/2ML
100 INJECTION, POWDER, FOR SOLUTION INTRAMUSCULAR; INTRAVENOUS AS NEEDED
Status: CANCELLED | OUTPATIENT
Start: 2020-01-10

## 2019-12-31 RX ORDER — SODIUM CHLORIDE 0.9 % (FLUSH) 0.9 %
10 SYRINGE (ML) INJECTION AS NEEDED
Status: CANCELLED
Start: 2020-02-03

## 2019-12-31 RX ORDER — HYDROCORTISONE SODIUM SUCCINATE 100 MG/2ML
100 INJECTION, POWDER, FOR SOLUTION INTRAMUSCULAR; INTRAVENOUS AS NEEDED
Status: CANCELLED | OUTPATIENT
Start: 2020-01-08

## 2019-12-31 RX ORDER — HEPARIN 100 UNIT/ML
300-500 SYRINGE INTRAVENOUS AS NEEDED
Status: CANCELLED
Start: 2020-01-09

## 2019-12-31 RX ORDER — SODIUM CHLORIDE 9 MG/ML
10 INJECTION INTRAMUSCULAR; INTRAVENOUS; SUBCUTANEOUS AS NEEDED
Status: CANCELLED | OUTPATIENT
Start: 2020-02-04

## 2019-12-31 RX ORDER — DIPHENHYDRAMINE HYDROCHLORIDE 50 MG/ML
50 INJECTION, SOLUTION INTRAMUSCULAR; INTRAVENOUS AS NEEDED
Status: CANCELLED
Start: 2020-01-07

## 2019-12-31 RX ORDER — ACETAMINOPHEN 325 MG/1
650 TABLET ORAL AS NEEDED
Status: CANCELLED
Start: 2020-02-04

## 2019-12-31 RX ORDER — EPINEPHRINE 1 MG/ML
0.3 INJECTION, SOLUTION, CONCENTRATE INTRAVENOUS AS NEEDED
Status: CANCELLED | OUTPATIENT
Start: 2020-01-08

## 2019-12-31 RX ORDER — ONDANSETRON 2 MG/ML
8 INJECTION INTRAMUSCULAR; INTRAVENOUS ONCE
Status: CANCELLED
Start: 2020-02-06

## 2019-12-31 RX ORDER — DEXAMETHASONE SODIUM PHOSPHATE 4 MG/ML
8 INJECTION, SOLUTION INTRA-ARTICULAR; INTRALESIONAL; INTRAMUSCULAR; INTRAVENOUS; SOFT TISSUE ONCE
Status: CANCELLED | OUTPATIENT
Start: 2020-01-10

## 2019-12-31 RX ORDER — SODIUM CHLORIDE 0.9 % (FLUSH) 0.9 %
10 SYRINGE (ML) INJECTION AS NEEDED
Status: CANCELLED
Start: 2020-02-07

## 2019-12-31 RX ORDER — ONDANSETRON 2 MG/ML
8 INJECTION INTRAMUSCULAR; INTRAVENOUS AS NEEDED
Status: CANCELLED | OUTPATIENT
Start: 2020-02-05

## 2019-12-31 RX ORDER — ONDANSETRON 2 MG/ML
8 INJECTION INTRAMUSCULAR; INTRAVENOUS AS NEEDED
Status: CANCELLED | OUTPATIENT
Start: 2020-01-09

## 2019-12-31 RX ORDER — SODIUM CHLORIDE 9 MG/ML
10 INJECTION INTRAMUSCULAR; INTRAVENOUS; SUBCUTANEOUS AS NEEDED
Status: CANCELLED | OUTPATIENT
Start: 2020-02-03

## 2019-12-31 RX ORDER — HEPARIN 100 UNIT/ML
300-500 SYRINGE INTRAVENOUS AS NEEDED
Status: CANCELLED
Start: 2020-02-07

## 2019-12-31 RX ORDER — SODIUM CHLORIDE 9 MG/ML
25 INJECTION, SOLUTION INTRAVENOUS CONTINUOUS
Status: CANCELLED | OUTPATIENT
Start: 2020-02-06 | End: 2020-02-07

## 2019-12-31 RX ORDER — HEPARIN 100 UNIT/ML
300-500 SYRINGE INTRAVENOUS AS NEEDED
Status: CANCELLED
Start: 2020-02-03

## 2019-12-31 RX ORDER — EPINEPHRINE 1 MG/ML
0.3 INJECTION, SOLUTION, CONCENTRATE INTRAVENOUS AS NEEDED
Status: CANCELLED | OUTPATIENT
Start: 2020-02-06

## 2019-12-31 RX ORDER — SODIUM CHLORIDE 9 MG/ML
10 INJECTION INTRAMUSCULAR; INTRAVENOUS; SUBCUTANEOUS AS NEEDED
Status: CANCELLED | OUTPATIENT
Start: 2020-02-05

## 2019-12-31 RX ORDER — HEPARIN 100 UNIT/ML
300-500 SYRINGE INTRAVENOUS AS NEEDED
Status: CANCELLED
Start: 2020-01-06

## 2019-12-31 RX ORDER — ONDANSETRON 2 MG/ML
8 INJECTION INTRAMUSCULAR; INTRAVENOUS ONCE
Status: CANCELLED
Start: 2020-01-06

## 2019-12-31 RX ORDER — ONDANSETRON 2 MG/ML
8 INJECTION INTRAMUSCULAR; INTRAVENOUS AS NEEDED
Status: CANCELLED | OUTPATIENT
Start: 2020-02-04

## 2019-12-31 RX ORDER — DEXAMETHASONE SODIUM PHOSPHATE 4 MG/ML
8 INJECTION, SOLUTION INTRA-ARTICULAR; INTRALESIONAL; INTRAMUSCULAR; INTRAVENOUS; SOFT TISSUE ONCE
Status: CANCELLED | OUTPATIENT
Start: 2020-02-06

## 2019-12-31 RX ORDER — SODIUM CHLORIDE 9 MG/ML
10 INJECTION INTRAMUSCULAR; INTRAVENOUS; SUBCUTANEOUS AS NEEDED
Status: CANCELLED | OUTPATIENT
Start: 2020-01-09

## 2019-12-31 RX ORDER — ALBUTEROL SULFATE 0.83 MG/ML
2.5 SOLUTION RESPIRATORY (INHALATION) AS NEEDED
Status: CANCELLED
Start: 2020-01-10

## 2019-12-31 RX ORDER — SODIUM CHLORIDE 0.9 % (FLUSH) 0.9 %
10 SYRINGE (ML) INJECTION AS NEEDED
Status: CANCELLED
Start: 2020-02-04

## 2019-12-31 RX ORDER — SODIUM CHLORIDE 9 MG/ML
10 INJECTION INTRAMUSCULAR; INTRAVENOUS; SUBCUTANEOUS AS NEEDED
Status: CANCELLED | OUTPATIENT
Start: 2020-02-07

## 2019-12-31 RX ORDER — DEXAMETHASONE SODIUM PHOSPHATE 4 MG/ML
8 INJECTION, SOLUTION INTRA-ARTICULAR; INTRALESIONAL; INTRAMUSCULAR; INTRAVENOUS; SOFT TISSUE ONCE
Status: CANCELLED | OUTPATIENT
Start: 2020-01-08

## 2019-12-31 RX ORDER — ACETAMINOPHEN 325 MG/1
650 TABLET ORAL AS NEEDED
Status: CANCELLED
Start: 2020-01-10

## 2019-12-31 RX ORDER — ONDANSETRON 2 MG/ML
8 INJECTION INTRAMUSCULAR; INTRAVENOUS ONCE
Status: CANCELLED
Start: 2020-01-09

## 2019-12-31 RX ORDER — ACETAMINOPHEN 325 MG/1
650 TABLET ORAL AS NEEDED
Status: CANCELLED
Start: 2020-01-09

## 2019-12-31 RX ORDER — ONDANSETRON 2 MG/ML
8 INJECTION INTRAMUSCULAR; INTRAVENOUS AS NEEDED
Status: CANCELLED | OUTPATIENT
Start: 2020-01-08

## 2019-12-31 RX ORDER — EPINEPHRINE 1 MG/ML
0.3 INJECTION, SOLUTION, CONCENTRATE INTRAVENOUS AS NEEDED
Status: CANCELLED | OUTPATIENT
Start: 2020-02-03

## 2019-12-31 RX ORDER — HEPARIN 100 UNIT/ML
300-500 SYRINGE INTRAVENOUS AS NEEDED
Status: CANCELLED
Start: 2020-02-05

## 2019-12-31 RX ORDER — ONDANSETRON 2 MG/ML
8 INJECTION INTRAMUSCULAR; INTRAVENOUS ONCE
Status: CANCELLED
Start: 2020-02-05

## 2019-12-31 RX ORDER — HEPARIN 100 UNIT/ML
300-500 SYRINGE INTRAVENOUS AS NEEDED
Status: CANCELLED
Start: 2020-01-07

## 2019-12-31 RX ORDER — ONDANSETRON 2 MG/ML
8 INJECTION INTRAMUSCULAR; INTRAVENOUS ONCE
Status: CANCELLED
Start: 2020-02-07

## 2019-12-31 RX ORDER — ALBUTEROL SULFATE 0.83 MG/ML
2.5 SOLUTION RESPIRATORY (INHALATION) AS NEEDED
Status: CANCELLED
Start: 2020-02-07

## 2019-12-31 RX ORDER — SODIUM CHLORIDE 0.9 % (FLUSH) 0.9 %
10 SYRINGE (ML) INJECTION AS NEEDED
Status: CANCELLED
Start: 2020-01-08

## 2019-12-31 RX ORDER — SODIUM CHLORIDE 9 MG/ML
25 INJECTION, SOLUTION INTRAVENOUS CONTINUOUS
Status: CANCELLED | OUTPATIENT
Start: 2020-02-04 | End: 2020-02-05

## 2019-12-31 RX ORDER — ALBUTEROL SULFATE 0.83 MG/ML
2.5 SOLUTION RESPIRATORY (INHALATION) AS NEEDED
Status: CANCELLED
Start: 2020-01-07

## 2019-12-31 RX ORDER — HEPARIN 100 UNIT/ML
300-500 SYRINGE INTRAVENOUS AS NEEDED
Status: CANCELLED
Start: 2020-02-06

## 2019-12-31 RX ORDER — SODIUM CHLORIDE 9 MG/ML
10 INJECTION INTRAMUSCULAR; INTRAVENOUS; SUBCUTANEOUS AS NEEDED
Status: CANCELLED | OUTPATIENT
Start: 2020-01-06

## 2019-12-31 RX ORDER — ONDANSETRON 2 MG/ML
8 INJECTION INTRAMUSCULAR; INTRAVENOUS ONCE
Status: CANCELLED
Start: 2020-01-08

## 2019-12-31 RX ORDER — SODIUM CHLORIDE 9 MG/ML
10 INJECTION INTRAMUSCULAR; INTRAVENOUS; SUBCUTANEOUS AS NEEDED
Status: CANCELLED | OUTPATIENT
Start: 2020-02-06

## 2019-12-31 RX ORDER — ALBUTEROL SULFATE 0.83 MG/ML
2.5 SOLUTION RESPIRATORY (INHALATION) AS NEEDED
Status: CANCELLED
Start: 2020-02-03

## 2019-12-31 RX ORDER — ALBUTEROL SULFATE 0.83 MG/ML
2.5 SOLUTION RESPIRATORY (INHALATION) AS NEEDED
Status: CANCELLED
Start: 2020-02-05

## 2019-12-31 RX ORDER — ONDANSETRON 2 MG/ML
8 INJECTION INTRAMUSCULAR; INTRAVENOUS ONCE
Status: CANCELLED
Start: 2020-01-07

## 2019-12-31 RX ORDER — ONDANSETRON 2 MG/ML
8 INJECTION INTRAMUSCULAR; INTRAVENOUS ONCE
Status: CANCELLED
Start: 2020-02-04

## 2019-12-31 RX ORDER — EPINEPHRINE 1 MG/ML
0.3 INJECTION, SOLUTION, CONCENTRATE INTRAVENOUS AS NEEDED
Status: CANCELLED | OUTPATIENT
Start: 2020-02-04

## 2019-12-31 RX ORDER — SODIUM CHLORIDE 0.9 % (FLUSH) 0.9 %
10 SYRINGE (ML) INJECTION AS NEEDED
Status: CANCELLED
Start: 2020-01-10

## 2019-12-31 RX ORDER — HYDROCORTISONE SODIUM SUCCINATE 100 MG/2ML
100 INJECTION, POWDER, FOR SOLUTION INTRAMUSCULAR; INTRAVENOUS AS NEEDED
Status: CANCELLED | OUTPATIENT
Start: 2020-02-07

## 2019-12-31 RX ORDER — DIPHENHYDRAMINE HYDROCHLORIDE 50 MG/ML
50 INJECTION, SOLUTION INTRAMUSCULAR; INTRAVENOUS AS NEEDED
Status: CANCELLED
Start: 2020-02-07

## 2019-12-31 RX ORDER — SODIUM CHLORIDE 0.9 % (FLUSH) 0.9 %
5-40 SYRINGE (ML) INJECTION AS NEEDED
Status: DISCONTINUED | OUTPATIENT
Start: 2019-12-31 | End: 2019-12-31 | Stop reason: HOSPADM

## 2019-12-31 RX ORDER — ACETAMINOPHEN 325 MG/1
650 TABLET ORAL AS NEEDED
Status: CANCELLED
Start: 2020-02-07

## 2019-12-31 RX ORDER — ALBUTEROL SULFATE 0.83 MG/ML
2.5 SOLUTION RESPIRATORY (INHALATION) AS NEEDED
Status: CANCELLED
Start: 2020-01-06

## 2019-12-31 RX ORDER — ACETAMINOPHEN 325 MG/1
650 TABLET ORAL AS NEEDED
Status: CANCELLED
Start: 2020-01-07

## 2019-12-31 RX ORDER — EPINEPHRINE 1 MG/ML
0.3 INJECTION, SOLUTION, CONCENTRATE INTRAVENOUS AS NEEDED
Status: CANCELLED | OUTPATIENT
Start: 2020-02-05

## 2019-12-31 RX ORDER — DEXAMETHASONE SODIUM PHOSPHATE 4 MG/ML
8 INJECTION, SOLUTION INTRA-ARTICULAR; INTRALESIONAL; INTRAMUSCULAR; INTRAVENOUS; SOFT TISSUE ONCE
Status: CANCELLED | OUTPATIENT
Start: 2020-02-03

## 2019-12-31 RX ORDER — ALBUTEROL SULFATE 0.83 MG/ML
2.5 SOLUTION RESPIRATORY (INHALATION) AS NEEDED
Status: CANCELLED
Start: 2020-01-09

## 2019-12-31 RX ORDER — SODIUM CHLORIDE 0.9 % (FLUSH) 0.9 %
10 SYRINGE (ML) INJECTION
Status: COMPLETED | OUTPATIENT
Start: 2019-12-31 | End: 2019-12-31

## 2019-12-31 RX ORDER — HYDROCORTISONE SODIUM SUCCINATE 100 MG/2ML
100 INJECTION, POWDER, FOR SOLUTION INTRAMUSCULAR; INTRAVENOUS AS NEEDED
Status: CANCELLED | OUTPATIENT
Start: 2020-02-05

## 2019-12-31 RX ORDER — ONDANSETRON 2 MG/ML
8 INJECTION INTRAMUSCULAR; INTRAVENOUS AS NEEDED
Status: CANCELLED | OUTPATIENT
Start: 2020-02-06

## 2019-12-31 RX ORDER — METRONIDAZOLE 500 MG/1
500 TABLET ORAL 2 TIMES DAILY
Qty: 20 TAB | Refills: 0 | Status: SHIPPED | OUTPATIENT
Start: 2019-12-31 | End: 2020-01-10

## 2019-12-31 RX ORDER — CIPROFLOXACIN 500 MG/1
500 TABLET ORAL
Status: COMPLETED | OUTPATIENT
Start: 2019-12-31 | End: 2019-12-31

## 2019-12-31 RX ORDER — SODIUM CHLORIDE 0.9 % (FLUSH) 0.9 %
5-40 SYRINGE (ML) INJECTION EVERY 8 HOURS
Status: DISCONTINUED | OUTPATIENT
Start: 2019-12-31 | End: 2019-12-31 | Stop reason: HOSPADM

## 2019-12-31 RX ORDER — SODIUM CHLORIDE 9 MG/ML
25 INJECTION, SOLUTION INTRAVENOUS CONTINUOUS
Status: CANCELLED | OUTPATIENT
Start: 2020-01-10 | End: 2020-01-11

## 2019-12-31 RX ORDER — ONDANSETRON 2 MG/ML
8 INJECTION INTRAMUSCULAR; INTRAVENOUS ONCE
Status: CANCELLED
Start: 2020-02-03

## 2019-12-31 RX ORDER — ALBUTEROL SULFATE 0.83 MG/ML
2.5 SOLUTION RESPIRATORY (INHALATION) AS NEEDED
Status: CANCELLED
Start: 2020-02-04

## 2019-12-31 RX ORDER — SODIUM CHLORIDE 9 MG/ML
25 INJECTION, SOLUTION INTRAVENOUS CONTINUOUS
Status: CANCELLED | OUTPATIENT
Start: 2020-02-03 | End: 2020-02-04

## 2019-12-31 RX ORDER — DEXAMETHASONE SODIUM PHOSPHATE 4 MG/ML
8 INJECTION, SOLUTION INTRA-ARTICULAR; INTRALESIONAL; INTRAMUSCULAR; INTRAVENOUS; SOFT TISSUE ONCE
Status: CANCELLED | OUTPATIENT
Start: 2020-01-06

## 2019-12-31 RX ORDER — EPINEPHRINE 1 MG/ML
0.3 INJECTION, SOLUTION, CONCENTRATE INTRAVENOUS AS NEEDED
Status: CANCELLED | OUTPATIENT
Start: 2020-01-10

## 2019-12-31 RX ORDER — ONDANSETRON 2 MG/ML
8 INJECTION INTRAMUSCULAR; INTRAVENOUS ONCE
Status: CANCELLED
Start: 2020-01-10

## 2019-12-31 RX ORDER — SODIUM CHLORIDE 9 MG/ML
10 INJECTION INTRAMUSCULAR; INTRAVENOUS; SUBCUTANEOUS AS NEEDED
Status: CANCELLED | OUTPATIENT
Start: 2020-01-07

## 2019-12-31 RX ORDER — ACETAMINOPHEN 325 MG/1
650 TABLET ORAL
Qty: 20 TAB | Refills: 0 | Status: SHIPPED | OUTPATIENT
Start: 2019-12-31 | End: 2020-07-23

## 2019-12-31 RX ORDER — HEPARIN 100 UNIT/ML
300-500 SYRINGE INTRAVENOUS AS NEEDED
Status: CANCELLED
Start: 2020-02-04

## 2019-12-31 RX ADMIN — Medication 10 ML: at 10:09

## 2019-12-31 RX ADMIN — SODIUM CHLORIDE 500 ML: 900 INJECTION, SOLUTION INTRAVENOUS at 08:40

## 2019-12-31 RX ADMIN — IOPAMIDOL 100 ML: 755 INJECTION, SOLUTION INTRAVENOUS at 10:09

## 2019-12-31 RX ADMIN — METRONIDAZOLE 500 MG: 250 TABLET ORAL at 12:04

## 2019-12-31 RX ADMIN — CIPROFLOXACIN HYDROCHLORIDE 500 MG: 500 TABLET, FILM COATED ORAL at 11:58

## 2019-12-31 NOTE — DISCHARGE INSTRUCTIONS
Patient Education        Diverticulitis: Care Instructions  Your Care Instructions    Diverticulitis occurs when pouches form in the wall of the colon and become inflamed or infected. It can be very painful. Doctors aren't sure what causes diverticulitis. There is no proof that foods such as nuts, seeds, or berries cause it or make it worse. A low-fiber diet may cause the colon to work harder to push stool forward. Pouches may form because of this extra work. It may be hard to think about healthy eating while you're in pain. But as you recover, you might think about how you can use healthy eating for overall better health. Healthy eating may help you avoid future attacks. Follow-up care is a key part of your treatment and safety. Be sure to make and go to all appointments, and call your doctor if you are having problems. It's also a good idea to know your test results and keep a list of the medicines you take. How can you care for yourself at home? · Drink plenty of fluids, enough so that your urine is light yellow or clear like water. If you have kidney, heart, or liver disease and have to limit fluids, talk with your doctor before you increase the amount of fluids you drink. · Stick to liquids or a bland diet (plain rice, bananas, dry toast or crackers, applesauce) until you are feeling better. Then you can return to regular foods and gradually increase the amount of fiber in your diet. · Use a heating pad set on low on your belly to relieve mild cramps and pain. · Get extra rest until you are feeling better. · Be safe with medicines. Read and follow all instructions on the label. ? If the doctor gave you a prescription medicine for pain, take it as prescribed. ? If you are not taking a prescription pain medicine, ask your doctor if you can take an over-the-counter medicine. · If your doctor prescribed antibiotics, take them as directed. Do not stop taking them just because you feel better.  You need to take the full course of antibiotics. To prevent future attacks of diverticulitis  · Avoid constipation:  ? Include fruits, vegetables, beans, and whole grains in your diet each day. These foods are high in fiber. ? Drink plenty of fluids, enough so that your urine is light yellow or clear like water. If you have kidney, heart, or liver disease and have to limit fluids, talk with your doctor before you increase the amount of fluids you drink. ? Get some exercise every day. Build up slowly to 30 to 60 minutes a day on 5 or more days of the week. ? Take a fiber supplement, such as Citrucel or Metamucil, every day if needed. Read and follow all instructions on the label. ? Schedule time each day for a bowel movement. Having a daily routine may help. Take your time and do not strain when having a bowel movement. When should you call for help? Call your doctor now or seek immediate medical care if:    · You have a fever.     · You are vomiting.     · You have new or worse belly pain.     · You cannot pass stools or gas.    Watch closely for changes in your health, and be sure to contact your doctor if you have any problems. Where can you learn more? Go to http://sumit-arpit.info/. Enter H901 in the search box to learn more about \"Diverticulitis: Care Instructions. \"  Current as of: November 7, 2018  Content Version: 12.2  © 7506-9485 [employer]. Care instructions adapted under license by [employer] (which disclaims liability or warranty for this information). If you have questions about a medical condition or this instruction, always ask your healthcare professional. [place] [address] any warranty or liability for your use of this information.

## 2019-12-31 NOTE — ED NOTES
Pt given and signed D/C papers, verbalizes understanding.   Pt D/C home with steady gait accompanied by

## 2019-12-31 NOTE — ED PROVIDER NOTES
EMERGENCY DEPARTMENT HISTORY AND PHYSICAL EXAM      Date: 12/31/2019  Patient Name: Wilma Yeung    History of Presenting Illness     Chief Complaint   Patient presents with    Abdominal Pain     Pt ambulatory to triage with c/o lower abdominal pain radiating to rectum x 2-3 days; nausea w/o vomiting; hx of diverticulitis; denies rectal bleeding    Urinary Frequency     x last night; denies hematuria       History Provided By: Patient and Patient's     HPI: Wilma Yeung, 82 y.o. female with PMHx significant for breast cancer, myelodysplastic syndrome, multiple medical issues as stated below, presents to the ED with cc of moderate to severe left lower quadrant pain over the last 2 to 3 days of moderate intensity. Pain is worse with movement and bowel movements and better at rest.  She is passing normal brown stools without any blood or mucus. She also reports some mild urinary frequency last night. She has a history of diverticulitis and the symptoms feel very similar. She sees a Dr. Renato Rocha from GI. She has no other associated symptoms. No other exacerbating or militating factors. There are no other complaints, changes, or physical findings at this time. PCP: Rito Higuera MD    Current Facility-Administered Medications on File Prior to Encounter   Medication Dose Route Frequency Provider Last Rate Last Dose    [DISCONTINUED] 0.9% sodium chloride infusion 250 mL  250 mL IntraVENous PRN Jocelyn Maria MD   Stopped at 12/27/19 1421     Current Outpatient Medications on File Prior to Encounter   Medication Sig Dispense Refill    docusate sodium (COLACE) 100 mg capsule Take 1 Cap by mouth two (2) times a day for 90 days. 60 Cap 2    ondansetron hcl (ZOFRAN) 4 mg tablet Take 1 Tab by mouth every eight (8) hours as needed for Nausea. 40 Tab 2    lidocaine-prilocaine (EMLA) topical cream Apply  to affected area as needed for Pain.  30 g 0    OXYGEN-AIR DELIVERY SYSTEMS Take 2 L/min by inhalation continuous.  albuterol-ipratropium (DUO-NEB) 2.5 mg-0.5 mg/3 ml nebu 3 mL by Nebulization route every six (6) hours as needed (SOB). COPD J44.9 100 Nebule 1    Nebulizer & Compressor machine 1 Each by Does Not Apply route daily. 1 Each 0    dilTIAZem CD (CARDIZEM CD) 180 mg ER capsule Take 1 Cap by mouth daily. 30 Cap 1    pantoprazole (PROTONIX) 40 mg tablet Take 1 Tab by mouth Before breakfast and dinner. 60 Tab 1    budesonide (PULMICORT) 0.5 mg/2 mL nbsp 2 mL by Nebulization route two (2) times a day. 60 Each 1    ergocalciferol (ERGOCALCIFEROL) 50,000 unit capsule Take 50,000 Units by mouth every Sunday.  inulin (FIBER GUMMIES PO) Take 2 Gum by mouth nightly.  [DISCONTINUED] acetaminophen (TYLENOL) 325 mg tablet Take 650 mg by mouth every four (4) hours as needed for Pain.  vit A/vit C/vit E/zinc/copper (PRESERVISION AREDS PO) Take 1 Tab by mouth nightly.  multivitamin (ONE A DAY) tablet Take 1 Tab by mouth nightly.          Past History     Past Medical History:  Past Medical History:   Diagnosis Date    Anemia     Arthritis     Bunion of right foot 8/20/2015    Chronic pain     back--uses tens unit    Diverticulitis 6/29/2018    Recurrent    Diverticulitis large intestine     Dry eye syndrome 6/29/2018    Fibromyalgia 6/29/2018    GERD (gastroesophageal reflux disease)     History of left breast cancer 2013    lumpectomy radiation    Hypercholesterolemia 6/29/2018    Ill-defined condition     blood transfusion hx    Leukemia ([address]     MDS [facility]odysplastic syndrome) [address] [address]     Osteoporosis 6/29/2018    Peripheral neuropathy 6/29/2018    Prediabetes 6/29/2018    PUD (peptic ulcer disease)     Radiation therapy complication 5490    Lt breast-No Chemo    Rosacea 6/29/2018    Trouble in sleeping        Past Surgical History:  Past Surgical History:   Procedure Laterality Date    HX APPENDECTOMY      HX BREAST LUMPECTOMY  11/21/2013    LEFT BREAST LUMPECTOMY W/LEFT BREAST SENTINEL NODE BIOPSY,AND NEEDLE LOCALIZATION performed by Sarabjit Mckinney MD at [facility] MA[facility] OR    HX CATARACT REMOVAL      bilateral    HX HYSTERECTOMY      ovarian cyst    HX MOHS PROCEDURES      right    HX ORTHOPAEDIC      back surgery x2    HX OTHER SURGICAL      colonoscopy    HX TONSILLECTOMY      IR INSERT TUNL CVC W PORT OVER 5 YEARS  12/3/2019    TOTAL KNEE ARTHROPLASTY      left    TOTAL KNEE ARTHROPLASTY      right    US GUIDED CORE BREAST BIOPSY Left 2013    Breast Ca       Family History:  Family History   Problem Relation Age of Onset    Cancer Mother         bladder    Cancer Father     Breast Cancer Paternal Grandmother        Social History:  Social History     Tobacco Use    Smoking status: Former Smoker     Packs/day: 0.50     Years: 50.00     Pack years: 25.00     Last attempt to quit: 2012     Years since quittin.8    Smokeless tobacco: Never Used   Substance Use Topics    Alcohol use: Yes     Alcohol/week: 2.0 standard drinks     Types: 2 Glasses of wine per week    Drug use: No       Allergies: Allergies   Allergen Reactions    Hydrocodone Nausea Only and Vertigo    Gabapentin Diarrhea, Nausea Only and Other (comments)     weakness    Lyrica [Pregabalin] Nausea Only    Oxycodone Nausea and Vomiting and Vertigo         Review of Systems   Review of Systems   Constitutional: Negative for chills, diaphoresis, fatigue and fever. HENT: Negative for ear pain and sore throat. Eyes: Negative for pain and redness. Respiratory: Negative for cough and shortness of breath. Cardiovascular: Negative for chest pain and leg swelling. Gastrointestinal: Positive for abdominal pain and rectal pain. Negative for diarrhea, nausea and vomiting. Endocrine: Negative for cold intolerance and heat intolerance. Genitourinary: Negative for flank pain and hematuria. Musculoskeletal: Negative for back pain and neck stiffness.    Skin: Negative for rash and wound. Neurological: Negative for dizziness, syncope and headaches. All other systems reviewed and are negative. Physical Exam   Physical Exam  Vitals signs and nursing note reviewed. Constitutional:       Appearance: She is well-developed. HENT:      Head: Normocephalic and atraumatic. Mouth/Throat:      Pharynx: No oropharyngeal exudate. Eyes:      Conjunctiva/sclera: Conjunctivae normal.      Pupils: Pupils are equal, round, and reactive to light. Neck:      Musculoskeletal: Normal range of motion. Cardiovascular:      Rate and Rhythm: Normal rate and regular rhythm. Heart sounds: No murmur. Pulmonary:      Effort: Pulmonary effort is normal. No respiratory distress. Breath sounds: Normal breath sounds. No wheezing. Abdominal:      General: Bowel sounds are normal. There is no distension. Palpations: Abdomen is soft. Tenderness: There is tenderness in the left lower quadrant. There is guarding. There is no right CVA tenderness, left CVA tenderness or rebound. Musculoskeletal: Normal range of motion. General: No deformity. Skin:     General: Skin is warm and dry. Findings: No rash. Neurological:      Mental Status: She is alert and oriented to person, place, and time.       Coordination: Coordination normal.   Psychiatric:         Behavior: Behavior normal.         Diagnostic Study Results     Labs -     Recent Results (from the past 24 hour(s))   METABOLIC PANEL, COMPREHENSIVE    Collection Time: 12/31/19  8:39 AM   Result Value Ref Range    Sodium 140 136 - 145 mmol/L    Potassium 4.4 3.5 - 5.1 mmol/L    Chloride 108 97 - 108 mmol/L    CO2 26 21 - 32 mmol/L    Anion gap 6 5 - 15 mmol/L    Glucose 110 (H) 65 - 100 mg/dL    BUN 11 6 - 20 MG/DL    Creatinine 0.72 0.55 - 1.02 MG/DL    BUN/Creatinine ratio 15 12 - 20      GFR est AA >60 >60 ml/min/1.73m2    GFR est non-AA >60 >60 ml/min/1.73m2    Calcium 8.1 (L) 8.5 - 10.1 MG/DL    Bilirubin, total 0.7 0.2 - 1.0 MG/DL    ALT (SGPT) 15 12 - 78 U/L    AST (SGOT) 10 (L) 15 - 37 U/L    Alk. phosphatase 75 45 - 117 U/L    Protein, total 6.9 6.4 - 8.2 g/dL    Albumin 3.2 (L) 3.5 - 5.0 g/dL    Globulin 3.7 2.0 - 4.0 g/dL    A-G Ratio 0.9 (L) 1.1 - 2.2     CBC WITH AUTOMATED DIFF    Collection Time: 12/31/19  8:39 AM   Result Value Ref Range    WBC 21.4 (H) 3.6 - 11.0 K/uL    RBC 2.56 (L) 3.80 - 5.20 M/uL    HGB 7.6 (L) 11.5 - 16.0 g/dL    HCT 25.6 (L) 35.0 - 47.0 %    .0 (H) 80.0 - 99.0 FL    MCH 29.7 26.0 - 34.0 PG    MCHC 29.7 (L) 30.0 - 36.5 g/dL    RDW 21.4 (H) 11.5 - 14.5 %    PLATELET 656 (L) 353 - 400 K/uL    MPV 9.7 8.9 - 12.9 FL    NRBC 0.4 (H) 0  WBC    ABSOLUTE NRBC 0.09 (H) 0.00 - 0.01 K/uL    NEUTROPHILS 52 32 - 75 %    BAND NEUTROPHILS 2 %    LYMPHOCYTES 11 (L) 12 - 49 %    MONOCYTES 35 (H) 5 - 13 %    EOSINOPHILS 0 0 - 7 %    BASOPHILS 0 0 - 1 %    IMMATURE GRANULOCYTES 0 0.0 - 0.5 %    ABS. NEUTROPHILS 11.5 (H) 1.8 - 8.0 K/UL    ABS. LYMPHOCYTES 2.4 0.8 - 3.5 K/UL    ABS. MONOCYTES 7.5 (H) 0.0 - 1.0 K/UL    ABS. EOSINOPHILS 0.0 0.0 - 0.4 K/UL    ABS. BASOPHILS 0.0 0.0 - 0.1 K/UL    ABS. IMM.  GRANS. 0.0 0.00 - 0.04 K/UL    DF MANUAL      RBC COMMENTS ANISOCYTOSIS  3+        RBC COMMENTS POLYCHROMASIA  1+       LIPASE    Collection Time: 12/31/19  8:39 AM   Result Value Ref Range    Lipase 93 73 - 393 U/L   URINALYSIS W/ REFLEX CULTURE    Collection Time: 12/31/19 10:32 AM   Result Value Ref Range    Color YELLOW/STRAW      Appearance CLEAR CLEAR      Specific gravity 1.010 1.003 - 1.030      pH (UA) 6.5 5.0 - 8.0      Protein NEGATIVE  NEG mg/dL    Glucose NEGATIVE  NEG mg/dL    Ketone NEGATIVE  NEG mg/dL    Bilirubin NEGATIVE  NEG      Blood NEGATIVE  NEG      Urobilinogen 0.2 0.2 - 1.0 EU/dL    Nitrites NEGATIVE  NEG      Leukocyte Esterase NEGATIVE  NEG      WBC 0-4 0 - 4 /hpf    RBC 0-5 0 - 5 /hpf    Epithelial cells FEW FEW /lpf    Bacteria NEGATIVE  NEG /hpf    UA:UC IF INDICATED CULTURE NOT INDICATED BY UA RESULT CNI      Hyaline cast 0-2 0 - 5 /lpf       Radiologic Studies -   CT ABD PELV W CONT   Final Result   IMPRESSION:   1. Acute diverticulitis small amount of free fluid in the pelvis. No evidence   of perforation or abscess formation. 2.  Possible mild interstitial edema in the lung bases. CT Results  (Last 48 hours)               12/31/19 1011  CT ABD PELV W CONT Final result    Impression:  IMPRESSION:   1. Acute diverticulitis small amount of free fluid in the pelvis. No evidence   of perforation or abscess formation. 2.  Possible mild interstitial edema in the lung bases. Narrative:  EXAM: CT ABD PELV W CONT       INDICATION: LLQ abd pain       COMPARISON: 12/17/2019        CONTRAST: 100 mL of Isovue-370. TECHNIQUE:    Following the uneventful intravenous administration of contrast, thin axial   images were obtained through the abdomen and pelvis. Coronal and sagittal   reconstructions were generated. Oral contrast was not administered. CT dose   reduction was achieved through use of a standardized protocol tailored for this   examination and automatic exposure control for dose modulation. FINDINGS:    LUNG BASES: Bibasilar groundglass opacities   INCIDENTALLY IMAGED HEART AND MEDIASTINUM: Cardiomegaly   LIVER: Hepatic steatosis   GALLBLADDER: Unremarkable. SPLEEN: No mass. PANCREAS: Pancreatic calcifications questionable. Focal fatty infiltration of   the pancreatic head. ADRENALS: Unremarkable. KIDNEYS: Renal cysts unchanged. Nonobstructing left renal stone. STOMACH: Unremarkable   SMALL BOWEL: Unremarkable   COLON: Colonic diverticulosis. Colonic wall thickening involving the sigmoid   colon with pericolonic fat stranding compatible with acute diverticulitis. APPENDIX: Not well visualized   PERITONEUM: Small amount of free fluid in the pelvis. RETROPERITONEUM: Atherosclerotic disease no evidence of aneurysm.    REPRODUCTIVE ORGANS: Not visualized   URINARY BLADDER: No mass or calculus. BONES: No destructive bone lesion. Hardware lumbar spine. ADDITIONAL COMMENTS: N/A               CXR Results  (Last 48 hours)    None            Medical Decision Making   I am the first provider for this patient. I reviewed the vital signs, available nursing notes, past medical history, past surgical history, family history and social history. Vital Signs-Reviewed the patient's vital signs. Patient Vitals for the past 24 hrs:   Temp Pulse Resp BP SpO2   12/31/19 1157  89 17 130/56    12/31/19 0822    132/56    12/31/19 0817 98.7 °F (37.1 °C) 95 18  95 %       Pulse Oximetry Analysis - 95% on ra    Cardiac Monitor:   Rate: 95 bpm  Rhythm: Normal Sinus Rhythm        Records Reviewed: Nursing Notes and Old Medical Records    Differential Diagnosis:    Pt presents with acute abdominal pain; vital signs stable with currently a non-peritoneal exam; DDx includes: Gastroenteritis, hepatitis, pancreatitis, obstruction, appendicitis, viral illness, IBD, diverticulitis, mesenteric ischemia, AAA or descending dissection, ACS, kidney stone. Will get labs, treat symptomatically and obtain serial abdominal exams to determine if a CT is warranted. Will reassess and monitor closely. Provider Notes (Medical Decision Making):   Patient seen and evaluated. Patient with findings of suggestive of acute diverticulitis without abscess or perforation. Her abdominal exam is completely benign. Noted significant abnormalities on her CBC. This is consistent with her known diagnosis of mild dysplastic syndrome and there is no indication for transfusion at this time. She has a close follow-up with her oncologist Nasir Kaplan. She will return to the ER immediately with any worsening symptoms. ED Course:     Initial assessment performed.  The patients presenting problems have been discussed, and they are in agreement with the care plan formulated and outlined with them.  I have encouraged them to ask questions as they arise throughout their visit. Critical Care Time:     None    Disposition:  3:50 PM  Amrita Jama  results have been reviewed with her. She has been counseled regarding her diagnosis. She verbally conveys understanding and agreement of the signs, symptoms, diagnosis, treatment and prognosis and additionally agrees to follow up as recommended with Dr. Rachel Luis MD in 24 - 48 hours. She also agrees with the care-plan and conveys that all of her questions have been answered. I have also put together some discharge instructions for her that include: 1) educational information regarding their diagnosis, 2) how to care for their diagnosis at home, as well a 3) list of reasons why they would want to return to the ED prior to their follow-up appointment, should their condition change. PLAN:  1. Discharge Medication List as of 12/31/2019 11:29 AM      START taking these medications    Details   ciprofloxacin HCl (CIPRO) 500 mg tablet Take 1 Tab by mouth two (2) times a day for 10 days. , Normal, Disp-20 Tab, R-0      metroNIDAZOLE (FLAGYL) 500 mg tablet Take 1 Tab by mouth two (2) times a day for 10 days. , Normal, Disp-20 Tab, R-0         CONTINUE these medications which have CHANGED    Details   acetaminophen (TYLENOL) 325 mg tablet Take 2 Tabs by mouth every four (4) hours as needed for Pain., Normal, Disp-20 Tab, R-0         CONTINUE these medications which have NOT CHANGED    Details   docusate sodium (COLACE) 100 mg capsule Take 1 Cap by mouth two (2) times a day for 90 days. , Normal, Disp-60 Cap, R-2      ondansetron hcl (ZOFRAN) 4 mg tablet Take 1 Tab by mouth every eight (8) hours as needed for Nausea., Normal, Disp-40 Tab, R-2      lidocaine-prilocaine (EMLA) topical cream Apply  to affected area as needed for Pain., Normal, Disp-30 g, R-0      OXYGEN-AIR DELIVERY SYSTEMS Take 2 L/min by inhalation continuous. , Historical Med      albuterol-ipratropium (DUO-NEB) 2.5 mg-0.5 mg/3 ml nebu 3 mL by Nebulization route every six (6) hours as needed (SOB). COPD J44.9, Print, Disp-100 Nebule, R-1      Nebulizer & Compressor machine 1 Each by Does Not Apply route daily. , Print, Disp-1 Each, R-0      dilTIAZem CD (CARDIZEM CD) 180 mg ER capsule Take 1 Cap by mouth daily. , Print, Disp-30 Cap, R-1      pantoprazole (PROTONIX) 40 mg tablet Take 1 Tab by mouth Before breakfast and dinner., Print, Disp-60 Tab, R-1      budesonide (PULMICORT) 0.5 mg/2 mL nbsp 2 mL by Nebulization route two (2) times a day., Print, Disp-60 Each, R-1      ergocalciferol (ERGOCALCIFEROL) 50,000 unit capsule Take 50,000 Units by mouth every Sunday., Historical Med      inulin (FIBER GUMMIES PO) Take 2 Gum by mouth nightly., Historical Med      vit A/vit C/vit E/zinc/copper (PRESERVISION AREDS PO) Take 1 Tab by mouth nightly., Historical Med      multivitamin (ONE A DAY) tablet Take 1 Tab by mouth nightly., Historical Med           2. Follow-up Information     Follow up With Specialties Details Why Contact Info    Vivian Flores MD Internal Medicine In 2 days For a follow-up evaluation. [address]      Denisse Charles MD Gastroenterology In 1 week For a follow-up evaluation. [address] [place] [address]  [phone]          Return to ED if worse     Diagnosis     Clinical Impression:   1.  Diverticulitis

## 2020-01-03 ENCOUNTER — OFFICE VISIT (OUTPATIENT)
Dept: INTERNAL MEDICINE CLINIC | Age: 85
End: 2020-01-03

## 2020-01-03 VITALS
HEART RATE: 90 BPM | TEMPERATURE: 98.3 F | BODY MASS INDEX: 30.19 KG/M2 | SYSTOLIC BLOOD PRESSURE: 124 MMHG | WEIGHT: 181.2 LBS | DIASTOLIC BLOOD PRESSURE: 80 MMHG | RESPIRATION RATE: 20 BRPM | OXYGEN SATURATION: 96 % | HEIGHT: 65 IN

## 2020-01-03 DIAGNOSIS — D46.9 MDS (MYELODYSPLASTIC SYNDROME) (HCC): ICD-10-CM

## 2020-01-03 DIAGNOSIS — K57.92 DIVERTICULITIS: Primary | ICD-10-CM

## 2020-01-03 NOTE — PROGRESS NOTES
Wilma Yeung is a 82 y.o. female presenting for Transitions Of Care (diverticulitis)  . 1. Have you been to the ER, urgent care clinic since your last visit? Hospitalized since your last visit? Yes When: 12/31/2019 Where: [facility] Reason for visit: Diverticulitis    2. Have you seen or consulted any other health care providers outside of the [address] [place] [address] since your last visit? Include any pap smears or colon screening. No    Fall Risk Assessment, last 12 mths 9/6/2019   Able to walk? Yes   Fall in past 12 months? Yes   Fall with injury? Yes   Number of falls in past 12 months 1   Fall Risk Score 2         Abuse Screening Questionnaire 7/24/2019   Do you ever feel afraid of your partner? N   Are you in a relationship with someone who physically or mentally threatens you? N   Is it safe for you to go home? Y       3 most recent PHQ Screens 1/21/2019   Little interest or pleasure in doing things Not at all   Feeling down, depressed, irritable, or hopeless Not at all   Total Score PHQ 2 0       There are no discontinued medications.

## 2020-01-03 NOTE — PATIENT INSTRUCTIONS

## 2020-01-03 NOTE — PROGRESS NOTES
This note will not be viewable in [address] Bret Denton is a 82 y.o. female and presents with Transitions Of Care (diverticulitis)  . Subjective:  Mrs. Kimberley Pisano presents to the office today and transition of care of a emergency room visit of 12/31 when she presented with left lower quadrant abdominal pain and was diagnosed with acute diverticulitis. The pain is been there for approximately 2 to 3 days and was moderate in intensity. She had not been passing any type of blood. She does have a previous history of recurrent diverticulitis and is normally followed by . The patient also has myelodysplastic syndrome currently managed by Dr. Xavier Stoll and has been receiving infusions on a monthly basis. She is also had some transfusions as a result of ongoing anemia. While in the emergency room she did have a hemoglobin of 7.6 with a white blood cell count of 21,400 and a platelet count of 606. A CT scan revealed acute diverticulitis without perforation. She was placed on Cipro and Flagyl along with a clear liquid diet. She has noted over time that the pain has subsided but she still feels somewhat crampy and bloated in the suprapubic region and pelvis. She notes that her bowel movements have been slightly maroonish but she has not had a large quantity of blood past.  She denies any fevers or chills. There is been no vomiting.     Past Medical History:   Diagnosis Date    Anemia     Arthritis     Bunion of right foot 8/20/2015    Chronic pain     back--uses tens unit    Diverticulitis 6/29/2018    Recurrent    Diverticulitis large intestine     Dry eye syndrome 6/29/2018    Fibromyalgia 6/29/2018    GERD (gastroesophageal reflux disease)     History of left breast cancer 2013    lumpectomy radiation    Hypercholesterolemia 6/29/2018    Ill-defined condition     blood transfusion hx    Leukemia ([address]     MDS (myelodysplastic syndrome) ([address]     Osteoporosis 6/29/2018    Peripheral neuropathy 6/29/2018    Prediabetes 6/29/2018    PUD (peptic ulcer disease)     Radiation therapy complication 7474    Lt breast-No Chemo    Rosacea 6/29/2018    Trouble in sleeping      Past Surgical History:   Procedure Laterality Date    HX APPENDECTOMY      HX BREAST LUMPECTOMY  11/21/2013    LEFT BREAST LUMPECTOMY W/LEFT BREAST SENTINEL NODE BIOPSY,AND NEEDLE LOCALIZATION performed by Kuldip Kaufman MD at [facility] MAIN OR    HX CATARACT REMOVAL      bilateral    HX HYSTERECTOMY      ovarian cyst    HX MOHS PROCEDURES      right    HX ORTHOPAEDIC      back surgery x2    HX OTHER SURGICAL      colonoscopy    HX TONSILLECTOMY      IR INSERT TUNL CVC W PORT OVER 5 YEARS  12/3/2019    TOTAL KNEE ARTHROPLASTY      left    TOTAL KNEE ARTHROPLASTY      right    US GUIDED CORE BREAST BIOPSY Left 2013    Breast Ca     Allergies   Allergen Reactions    Hydrocodone Nausea Only and Vertigo    Gabapentin Diarrhea, Nausea Only and Other (comments)     weakness    Lyrica [Pregabalin] Nausea Only    Oxycodone Nausea and Vomiting and Vertigo     Current Outpatient Medications   Medication Sig Dispense Refill    ciprofloxacin HCl (CIPRO) 500 mg tablet Take 1 Tab by mouth two (2) times a day for 10 days. 20 Tab 0    metroNIDAZOLE (FLAGYL) 500 mg tablet Take 1 Tab by mouth two (2) times a day for 10 days. 20 Tab 0    acetaminophen (TYLENOL) 325 mg tablet Take 2 Tabs by mouth every four (4) hours as needed for Pain. 20 Tab 0    docusate sodium (COLACE) 100 mg capsule Take 1 Cap by mouth two (2) times a day for 90 days. 60 Cap 2    ondansetron hcl (ZOFRAN) 4 mg tablet Take 1 Tab by mouth every eight (8) hours as needed for Nausea. 40 Tab 2    lidocaine-prilocaine (EMLA) topical cream Apply  to affected area as needed for Pain. 30 g 0    OXYGEN-AIR DELIVERY SYSTEMS Take 2 L/min by inhalation continuous.       albuterol-ipratropium (DUO-NEB) 2.5 mg-0.5 mg/3 ml nebu 3 mL by Nebulization route every six (6) hours as needed (SOB). COPD J44.9 100 Nebule 1    Nebulizer & Compressor machine 1 Each by Does Not Apply route daily. 1 Each 0    dilTIAZem CD (CARDIZEM CD) 180 mg ER capsule Take 1 Cap by mouth daily. 30 Cap 1    pantoprazole (PROTONIX) 40 mg tablet Take 1 Tab by mouth Before breakfast and dinner. 60 Tab 1    budesonide (PULMICORT) 0.5 mg/2 mL nbsp 2 mL by Nebulization route two (2) times a day. 60 Each 1    ergocalciferol (ERGOCALCIFEROL) 50,000 unit capsule Take 50,000 Units by mouth every .  inulin (FIBER GUMMIES PO) Take 2 Gum by mouth nightly.  vit A/vit C/vit E/zinc/copper (PRESERVISION AREDS PO) Take 1 Tab by mouth nightly.  multivitamin (ONE A DAY) tablet Take 1 Tab by mouth nightly. Social History     Socioeconomic History    Marital status:      Spouse name: Not on file    Number of children: Not on file    Years of education: Not on file    Highest education level: Not on file   Tobacco Use    Smoking status: Former Smoker     Packs/day: 0.50     Years: 50.00     Pack years: 25.00     Last attempt to quit: 2012     Years since quittin.8    Smokeless tobacco: Never Used   Substance and Sexual Activity    Alcohol use:  Yes     Alcohol/week: 2.0 standard drinks     Types: 2 Glasses of wine per week    Drug use: No     Family History   Problem Relation Age of Onset    Cancer Mother         bladder    Cancer Father     Breast Cancer Paternal Grandmother        Health Maintenance   Topic Date Due    GLAUCOMA SCREENING Q2Y  02/15/2020    MEDICARE YEARLY EXAM  2020    DTaP/Tdap/Td series (3 - Td) 2029    Bone Densitometry (Dexa) Screening  Completed    Shingrix Vaccine Age 50>  Completed    Influenza Age 5 to Adult  Completed    Pneumococcal 65+ years  Completed        Review of Systems  Constitutional: negative for fevers, chills, anorexia and weight loss  Eyes:   negative for visual disturbance and irritation  ENT:   negative for tinnitus,sore throat,nasal congestion,ear pain,hoarseness  Respiratory:  negative for cough, hemoptysis, dyspnea,wheezing  CV:   negative for chest pain, palpitations, lower extremity edema  GI:   Positive for lower abdominal bloating with a reduction of pain. Also notes nausea. Endo:               negative for polyuria,polydipsia,polyphagia,heat intolerance  Genitourinary: negative for frequency, dysuria and hematuria  Integumentary: negative for rash and pruritus  Hematologic:  negative for easy bruising and gum/nose bleeding  Musculoskel: negative for myalgias, arthralgias, back pain, muscle weakness, joint pain  Neurological:  negative for headaches, dizziness, vertigo, memory problems and gait   Behavl/Psych: negative for feelings of anxiety, depression, mood changes  ROS otherwise negative      Objective:  Visit Vitals  /80 (BP 1 Location: Left arm, BP Patient Position: Sitting)   Pulse 90   Temp 98.3 °F (36.8 °C) (Oral)   Resp 20   Ht 5' 5\" (1.651 m)   Wt 181 lb 3.2 oz (82.2 kg)   SpO2 96%   BMI 30.15 kg/m²     Body mass index is 30.15 kg/m². Physical Exam:   General appearance - alert, well appearing, and in no distress  Mental status - alert, oriented to person, place, and time  EYE-HOWARD, EOMI,conjunctiva normal bilaterally, lids normal  ENT-ENT exam normal, no neck nodes or sinus tenderness  Nose - normal and patent, no erythema,  Or discharge   Mouth - mucous membranes moist, pharynx normal without lesions  Neck - supple, no significant adenopathy or bruit  Chest - clear to auscultation, no wheezes, rales or rhonchi. Heart - normal rate, regular rhythm, normal S1, S2, no murmurs, rubs, clicks or gallops   Abdomen -her abdomen is softly distended but nontender to deep palpation along the left abdomen and suprapubic region. No masses felt. No guarding is evident. Lymph- no adenopathy palpable  Ext-peripheral pulses normal, no pedal edema, no clubbing or cyanosis  Skin-Warm and dry.  no hyperpigmentation, vitiligo, or suspicious lesions  Neuro -alert, oriented, normal speech, no focal findings or movement disorder noted      Assessment/Plan:  Diagnoses and all orders for this visit:    Diverticulitis    MDS (myelodysplastic syndrome) [address]        Other instructions:   Patient's medications were reviewed and reconciled. She should continue her clear liquid diet and advance to a brat diet as pain subsides. She is to finish her course of Flagyl and Cipro. I suspect her nausea is related to the Flagyl. She does have an appointment in the infusion center on Monday and will likely have labs done at that time and will defer to them to have these completed. She understands that if there is any worsening over the weekend with increasing abdominal pain, rectal bleeding or fever that she is to go directly to the emergency room. Hospital records from the emergency room visit of 12/31 were reviewed during the course of this office visit including progress notes, labs, imaging studies. Follow-up as previously scheduled    Follow-up and Dispositions    · Return for As previously scheduled. I have reviewed with the patient details of the assessment and plan and all questions were answered. Relevent patient education was performed. The most recent lab findings were reviewed with the patient. An After Visit Summary was printed and given to the patient.     Dianna Rebolledo MD

## 2020-01-06 ENCOUNTER — APPOINTMENT (OUTPATIENT)
Dept: INFUSION THERAPY | Age: 85
End: 2020-01-06
Payer: MEDICARE

## 2020-01-06 ENCOUNTER — HOSPITAL ENCOUNTER (OUTPATIENT)
Dept: INFUSION THERAPY | Age: 85
Discharge: HOME | End: 2020-01-06
Payer: MEDICARE

## 2020-01-06 VITALS
DIASTOLIC BLOOD PRESSURE: 79 MMHG | BODY MASS INDEX: 30.26 KG/M2 | TEMPERATURE: 97.5 F | SYSTOLIC BLOOD PRESSURE: 105 MMHG | HEIGHT: 65 IN | HEART RATE: 76 BPM | RESPIRATION RATE: 18 BRPM | OXYGEN SATURATION: 98 % | WEIGHT: 181.6 LBS

## 2020-01-06 DIAGNOSIS — D46.9 MDS (MYELODYSPLASTIC SYNDROME) (HCC): Primary | ICD-10-CM

## 2020-01-06 LAB
ALBUMIN SERPL-MCNC: 3.3 G/DL (ref 3.5–5)
ALBUMIN/GLOB SERPL: 0.8 {RATIO} (ref 1.1–2.2)
ALP SERPL-CCNC: 63 U/L (ref 45–117)
ALT SERPL-CCNC: 21 U/L (ref 12–78)
ANION GAP SERPL CALC-SCNC: 8 MMOL/L (ref 5–15)
AST SERPL-CCNC: 22 U/L (ref 15–37)
BASOPHILS # BLD: 0 K/UL (ref 0–0.1)
BASOPHILS NFR BLD: 0 % (ref 0–1)
BILIRUB SERPL-MCNC: 0.4 MG/DL (ref 0.2–1)
BUN SERPL-MCNC: 10 MG/DL (ref 6–20)
BUN/CREAT SERPL: 10 (ref 12–20)
CALCIUM SERPL-MCNC: 8.4 MG/DL (ref 8.5–10.1)
CHLORIDE SERPL-SCNC: 108 MMOL/L (ref 97–108)
CO2 SERPL-SCNC: 26 MMOL/L (ref 21–32)
CREAT SERPL-MCNC: 1.05 MG/DL (ref 0.55–1.02)
DIFFERENTIAL METHOD BLD: ABNORMAL
EOSINOPHIL # BLD: 0.4 K/UL (ref 0–0.4)
EOSINOPHIL NFR BLD: 2 % (ref 0–7)
ERYTHROCYTE [DISTWIDTH] IN BLOOD BY AUTOMATED COUNT: 19.8 % (ref 11.5–14.5)
GLOBULIN SER CALC-MCNC: 3.9 G/DL (ref 2–4)
GLUCOSE SERPL-MCNC: 116 MG/DL (ref 65–100)
HCT VFR BLD AUTO: 25.4 % (ref 35–47)
HGB BLD-MCNC: 7.2 G/DL (ref 11.5–16)
IMM GRANULOCYTES # BLD AUTO: 4.5 K/UL
IMM GRANULOCYTES NFR BLD AUTO: 21 %
LYMPHOCYTES # BLD: 3 K/UL (ref 0.8–3.5)
LYMPHOCYTES NFR BLD: 14 % (ref 12–49)
MCH RBC QN AUTO: 28.7 PG (ref 26–34)
MCHC RBC AUTO-ENTMCNC: 28.3 G/DL (ref 30–36.5)
MCV RBC AUTO: 101.2 FL (ref 80–99)
MONOCYTES # BLD: 5.8 K/UL (ref 0–1)
MONOCYTES NFR BLD: 27 % (ref 5–13)
NEUTS SEG # BLD: 7.7 K/UL (ref 1.8–8)
NEUTS SEG NFR BLD: 36 % (ref 32–75)
NRBC # BLD: 0.09 K/UL (ref 0–0.01)
NRBC BLD-RTO: 0.4 PER 100 WBC
PLATELET # BLD AUTO: 197 K/UL (ref 150–400)
PMV BLD AUTO: 9.4 FL (ref 8.9–12.9)
POTASSIUM SERPL-SCNC: 3.9 MMOL/L (ref 3.5–5.1)
PROT SERPL-MCNC: 7.2 G/DL (ref 6.4–8.2)
RBC # BLD AUTO: 2.51 M/UL (ref 3.8–5.2)
RBC MORPH BLD: ABNORMAL
SODIUM SERPL-SCNC: 142 MMOL/L (ref 136–145)
WBC # BLD AUTO: 21.4 K/UL (ref 3.6–11)

## 2020-01-06 PROCEDURE — 36415 COLL VENOUS BLD VENIPUNCTURE: CPT

## 2020-01-06 PROCEDURE — 85025 COMPLETE CBC W/AUTO DIFF WBC: CPT

## 2020-01-06 PROCEDURE — 96375 TX/PRO/DX INJ NEW DRUG ADDON: CPT

## 2020-01-06 PROCEDURE — 77030012965 HC NDL HUBR BBMI -A

## 2020-01-06 PROCEDURE — 74011250636 HC RX REV CODE- 250/636: Performed by: INTERNAL MEDICINE

## 2020-01-06 PROCEDURE — 80053 COMPREHEN METABOLIC PANEL: CPT

## 2020-01-06 PROCEDURE — 96409 CHEMO IV PUSH SNGL DRUG: CPT

## 2020-01-06 PROCEDURE — 74011000258 HC RX REV CODE- 258: Performed by: INTERNAL MEDICINE

## 2020-01-06 RX ORDER — DEXAMETHASONE SODIUM PHOSPHATE 4 MG/ML
8 INJECTION, SOLUTION INTRA-ARTICULAR; INTRALESIONAL; INTRAMUSCULAR; INTRAVENOUS; SOFT TISSUE ONCE
Status: COMPLETED | OUTPATIENT
Start: 2020-01-06 | End: 2020-01-06

## 2020-01-06 RX ORDER — HEPARIN 100 UNIT/ML
300-500 SYRINGE INTRAVENOUS AS NEEDED
Status: DISCONTINUED | OUTPATIENT
Start: 2020-01-06 | End: 2020-01-07 | Stop reason: HOSPADM

## 2020-01-06 RX ORDER — ONDANSETRON 2 MG/ML
8 INJECTION INTRAMUSCULAR; INTRAVENOUS ONCE
Status: COMPLETED | OUTPATIENT
Start: 2020-01-06 | End: 2020-01-06

## 2020-01-06 RX ORDER — SODIUM CHLORIDE 9 MG/ML
25 INJECTION, SOLUTION INTRAVENOUS CONTINUOUS
Status: DISCONTINUED | OUTPATIENT
Start: 2020-01-06 | End: 2020-01-07 | Stop reason: HOSPADM

## 2020-01-06 RX ORDER — SODIUM CHLORIDE 9 MG/ML
10 INJECTION INTRAMUSCULAR; INTRAVENOUS; SUBCUTANEOUS AS NEEDED
Status: DISCONTINUED | OUTPATIENT
Start: 2020-01-06 | End: 2020-01-07 | Stop reason: HOSPADM

## 2020-01-06 RX ADMIN — ONDANSETRON HYDROCHLORIDE 8 MG: 2 INJECTION INTRAMUSCULAR; INTRAVENOUS at 13:52

## 2020-01-06 RX ADMIN — SODIUM CHLORIDE 10 ML: 9 INJECTION INTRAMUSCULAR; INTRAVENOUS; SUBCUTANEOUS at 14:34

## 2020-01-06 RX ADMIN — AZACITIDINE 149 MG: 100 INJECTION, POWDER, LYOPHILIZED, FOR SOLUTION INTRAVENOUS; SUBCUTANEOUS at 14:18

## 2020-01-06 RX ADMIN — Medication 500 UNITS: at 14:34

## 2020-01-06 RX ADMIN — SODIUM CHLORIDE 25 ML/HR: 900 INJECTION, SOLUTION INTRAVENOUS at 13:49

## 2020-01-06 RX ADMIN — DEXAMETHASONE SODIUM PHOSPHATE 8 MG: 4 INJECTION, SOLUTION INTRAMUSCULAR; INTRAVENOUS at 13:55

## 2020-01-06 NOTE — PROGRESS NOTES
[facility] Progress Note    Date: 2020    Name: Aylin Mendoza    MRN: [id number]         : 1935    Ms. Nam Lujan Arrived ambulatory and in no distress for Vidaza cycle 2 day 1. Assessment was completed. Pt reports recent diverticulitis. Reports symptoms have greatly improved with liquids and BRAT diet. Port accessed without difficulty, labs drawn and in process. Chemotherapy Flowsheet 2020   Cycle C2D1   Date 2020   Drug / Regimen Vidaza   Pre Meds given   Notes given       Patient Vitals for the past 12 hrs:   Temp Pulse Resp BP SpO2   20 1234 97.5 °F (36.4 °C) 76 18 105/79 98 %       Lab results were obtained and reviewed. Recent Results (from the past 12 hour(s))   CBC WITH AUTOMATED DIFF    Collection Time: 20 12:31 PM   Result Value Ref Range    WBC 21.4 (HH) 3.6 - 11.0 K/uL    RBC 2.51 (L) 3.80 - 5.20 M/uL    HGB 7.2 (L) 11.5 - 16.0 g/dL    HCT 25.4 (L) 35.0 - 47.0 %    .2 (H) 80.0 - 99.0 FL    MCH 28.7 26.0 - 34.0 PG    MCHC 28.3 (L) 30.0 - 36.5 g/dL    RDW 19.8 (H) 11.5 - 14.5 %    PLATELET 194 586 - 854 K/uL    MPV 9.4 8.9 - 12.9 FL    NRBC 0.4 (H) 0  WBC    ABSOLUTE NRBC 0.09 (H) 0.00 - 0.01 K/uL    NEUTROPHILS 36 32 - 75 %    LYMPHOCYTES 14 12 - 49 %    MONOCYTES 27 (H) 5 - 13 %    EOSINOPHILS 2 0 - 7 %    BASOPHILS 0 0 - 1 %    IMMATURE GRANULOCYTES 21 %    ABS. NEUTROPHILS 7.7 1.8 - 8.0 K/UL    ABS. LYMPHOCYTES 3.0 0.8 - 3.5 K/UL    ABS. MONOCYTES 5.8 (H) 0.0 - 1.0 K/UL    ABS. EOSINOPHILS 0.4 0.0 - 0.4 K/UL    ABS. BASOPHILS 0.0 0.0 - 0.1 K/UL    ABS. IMM. GRANS.  4.5 K/UL    DF SMEAR SCANNED      RBC COMMENTS NORMOCYTIC, NORMOCHROMIC     METABOLIC PANEL, COMPREHENSIVE    Collection Time: 20 12:31 PM   Result Value Ref Range    Sodium 142 136 - 145 mmol/L    Potassium 3.9 3.5 - 5.1 mmol/L    Chloride 108 97 - 108 mmol/L    CO2 26 21 - 32 mmol/L    Anion gap 8 5 - 15 mmol/L    Glucose 116 (H) 65 - 100 mg/dL    BUN 10 6 - 20 MG/DL    Creatinine 1.05 (H) 0.55 - 1.02 MG/DL    BUN/Creatinine ratio 10 (L) 12 - 20      GFR est AA >60 >60 ml/min/1.73m2    GFR est non-AA 50 (L) >60 ml/min/1.73m2    Calcium 8.4 (L) 8.5 - 10.1 MG/DL    Bilirubin, total 0.4 0.2 - 1.0 MG/DL    ALT (SGPT) 21 12 - 78 U/L    AST (SGOT) 22 15 - 37 U/L    Alk. phosphatase 63 45 - 117 U/L    Protein, total 7.2 6.4 - 8.2 g/dL    Albumin 3.3 (L) 3.5 - 5.0 g/dL    Globulin 3.9 2.0 - 4.0 g/dL    A-G Ratio 0.8 (L) 1.1 - 2.2         Pre-medications  were administered as ordered and chemotherapy was initiated. Medications Administered     0.9% sodium chloride infusion     Admin Date  01/06/2020 Action  New Bag Dose  25 mL/hr Rate  25 mL/hr Route  IntraVENous Administered By  Autumn Tomas RN          0.9% sodium chloride injection 10 mL     Admin Date  01/06/2020 Action  Given Dose  10 mL Route  IntraVENous Administered By  Autumn Tomas RN          azaCITIDine (VIDAZA) 149 mg in 0.9% sodium chloride 100 mL, overfill volume 10 mL chemo infusion     Admin Date  01/06/2020 Action  New Bag Dose  149 mg Rate  749.4 mL/hr Route  IntraVENous Administered By  Autumn Tomas RN          dexamethasone (DECADRON) 4 mg/mL injection 8 mg     Admin Date  01/06/2020 Action  Given Dose  8 mg Route  IntraVENous Administered By  Autumn Tomas RN          heparin (porcine) pf 300-500 Units     Admin Date  01/06/2020 Action  Given Dose  500 Units Route  InterCATHeter Administered By  Autumn Tomas RN          ondansetron [facility]) injection 8 mg     Admin Date  01/06/2020 Action  Given Dose  8 mg Route  IntraVENous Administered By  Autumn Tomas, DAINA                Ms. Jose Angel Galvan tolerated treatment well and was discharged from [facility] in stable condition at 1435. Port flushed & heparinized per protocol. She is to return on 01/07/2020 at 1300 for her next appointment.     Future Appointments   Date Time Provider Anju Dickson   1/7/2020  1:00  [facility] INFUSION NURSE 2 [address] COM   1/8/2020  1:00 PM [facility] INFUSION NURSE 2 [address] COM   1/9/2020 11:30 AM Coco Ocasio MD [address]   1/9/2020  1:00  [facility] [address] COM   1/10/2020  1:00 PM [facility] INFUSION NURSE [address] COM   1/30/2020 10:00 AM Rossi Jesus MD [address]   2/3/2020  1:00 PM [facility] INFUSION NURSE 2 [address]   2/4/2020  1:00 PM [facility] INFUSION NURSE 2 [address] [place]   2/5/2020  1:00 [facility]   2/6/2020  1:00 PM [facility]   2/7/2020  1:00 PM [facility] INFUSION NURSE [address] [place]   6/25/2020  3:40 PM Lissa Mcneil DO [address] RN  January 6, 2020

## 2020-01-07 ENCOUNTER — HOSPITAL ENCOUNTER (OUTPATIENT)
Dept: INFUSION THERAPY | Age: 85
Discharge: HOME | End: 2020-01-07
Payer: MEDICARE

## 2020-01-07 ENCOUNTER — APPOINTMENT (OUTPATIENT)
Dept: INFUSION THERAPY | Age: 85
End: 2020-01-07
Payer: MEDICARE

## 2020-01-07 VITALS
HEART RATE: 79 BPM | TEMPERATURE: 97.9 F | OXYGEN SATURATION: 100 % | SYSTOLIC BLOOD PRESSURE: 118 MMHG | RESPIRATION RATE: 18 BRPM | DIASTOLIC BLOOD PRESSURE: 75 MMHG

## 2020-01-07 DIAGNOSIS — D46.9 MDS (MYELODYSPLASTIC SYNDROME) (HCC): Primary | ICD-10-CM

## 2020-01-07 PROCEDURE — 74011000258 HC RX REV CODE- 258: Performed by: INTERNAL MEDICINE

## 2020-01-07 PROCEDURE — 96375 TX/PRO/DX INJ NEW DRUG ADDON: CPT

## 2020-01-07 PROCEDURE — 74011250636 HC RX REV CODE- 250/636: Performed by: INTERNAL MEDICINE

## 2020-01-07 PROCEDURE — 96413 CHEMO IV INFUSION 1 HR: CPT

## 2020-01-07 RX ORDER — SODIUM CHLORIDE 9 MG/ML
10 INJECTION INTRAMUSCULAR; INTRAVENOUS; SUBCUTANEOUS AS NEEDED
Status: DISCONTINUED | OUTPATIENT
Start: 2020-01-07 | End: 2020-01-08 | Stop reason: HOSPADM

## 2020-01-07 RX ORDER — HEPARIN 100 UNIT/ML
300-500 SYRINGE INTRAVENOUS AS NEEDED
Status: DISCONTINUED | OUTPATIENT
Start: 2020-01-07 | End: 2020-01-08 | Stop reason: HOSPADM

## 2020-01-07 RX ORDER — DEXAMETHASONE SODIUM PHOSPHATE 4 MG/ML
8 INJECTION, SOLUTION INTRA-ARTICULAR; INTRALESIONAL; INTRAMUSCULAR; INTRAVENOUS; SOFT TISSUE ONCE
Status: COMPLETED | OUTPATIENT
Start: 2020-01-07 | End: 2020-01-07

## 2020-01-07 RX ORDER — SODIUM CHLORIDE 9 MG/ML
25 INJECTION, SOLUTION INTRAVENOUS CONTINUOUS
Status: DISCONTINUED | OUTPATIENT
Start: 2020-01-07 | End: 2020-01-08 | Stop reason: HOSPADM

## 2020-01-07 RX ORDER — ONDANSETRON 2 MG/ML
8 INJECTION INTRAMUSCULAR; INTRAVENOUS ONCE
Status: COMPLETED | OUTPATIENT
Start: 2020-01-07 | End: 2020-01-07

## 2020-01-07 RX ADMIN — DEXAMETHASONE SODIUM PHOSPHATE 8 MG: 4 INJECTION, SOLUTION INTRAMUSCULAR; INTRAVENOUS at 12:50

## 2020-01-07 RX ADMIN — ONDANSETRON HYDROCHLORIDE 8 MG: 2 INJECTION INTRAMUSCULAR; INTRAVENOUS at 12:46

## 2020-01-07 RX ADMIN — SODIUM CHLORIDE 25 ML/HR: 900 INJECTION, SOLUTION INTRAVENOUS at 12:46

## 2020-01-07 RX ADMIN — AZACITIDINE 149 MG: 100 INJECTION, POWDER, LYOPHILIZED, FOR SOLUTION INTRAVENOUS; SUBCUTANEOUS at 13:35

## 2020-01-07 NOTE — PROGRESS NOTES
[facility] Progress Note    Date: 2020    Name: Algie Collet    MRN: [id number]         : 1935    Ms. Jacki Funes Arrived ambulatory and in no distress for Vidaza cycle 2 day 2. Assessment was completed, no acute issues at this time, no new complaints voiced. port accessed on 20, needle and dressing remain intact, brisk blood return. Chemotherapy Flowsheet 2020   Cycle C2D2   Date 2020   Drug / Regimen Vidaza   Pre Meds given   Notes given       Patient Vitals for the past 12 hrs:   Temp Pulse Resp BP SpO2   20 1247 97.9 °F (36.6 °C) 79 18 118/75 100 %     Pre-medications  were administered as ordered and chemotherapy was initiated. Medications Administered     0.9% sodium chloride infusion     Admin Date  2020 Action  New Bag Dose  25 mL/hr Rate  25 mL/hr Route  IntraVENous Administered By  Kate Kapadia RN          azaCITIDine (VIDAZA) 149 mg in 0.9% sodium chloride 100 mL, overfill volume 10 mL chemo infusion     Admin Date  2020 Action  New Bag Dose  149 mg Rate  749.4 mL/hr Route  IntraVENous Administered By  Kate Kapadia RN          dexamethasone (DECADRON) 4 mg/mL injection 8 mg     Admin Date  2020 Action  Given Dose  8 mg Route  IntraVENous Administered By  Kate Kapadia RN          ondansetron [facility]) injection 8 mg     Admin Date  2020 Action  Given Dose  8 mg Route  IntraVENous Administered By  Kate Kapadia RN                Ms. Jacki Funes tolerated treatment well and was discharged from [place] [address] in stable condition at [phone]. She is to return on 20 at 1300 for her next appointment.     Future Appointments   Date Time Provider Anju Dickson   2020  1:00 PM [facility]   2020 11:30 AM Christa Almendarez MD [address]   2020  1:00  [facility] 1 [facility]   1/10/2020  1:00 PM [facility] INFUSION NURSE 1 [address] [facility]   2020 10:00 AM Dallas Sosa MD [address]   2/3/2020  1:00 PM [facility] INFUSION NURSE 2 [address] COM   2/4/2020  1:00 PM [facility] INFUSION NURSE 2 [address] COM   2/5/2020  8:00 AM [facility] INFUSION NURSE 2 [address] COM   2/6/2020  8:00 AM [facility] INFUSION NURSE 2 [address] COM   2/7/2020  8:00 AM [facility] INFUSION NURSE 2 [address] [employer]   6/25/2020  3:40 PM Rocky Mcneil DO [address] [facility] [address] Kervin Parisi RN  January 7, 2020

## 2020-01-08 ENCOUNTER — APPOINTMENT (OUTPATIENT)
Dept: INFUSION THERAPY | Age: 85
End: 2020-01-08
Payer: MEDICARE

## 2020-01-08 ENCOUNTER — HOSPITAL ENCOUNTER (OUTPATIENT)
Dept: INFUSION THERAPY | Age: 85
Discharge: HOME | End: 2020-01-08
Payer: MEDICARE

## 2020-01-08 VITALS
SYSTOLIC BLOOD PRESSURE: 120 MMHG | RESPIRATION RATE: 18 BRPM | HEART RATE: 65 BPM | TEMPERATURE: 97.8 F | OXYGEN SATURATION: 100 % | DIASTOLIC BLOOD PRESSURE: 49 MMHG

## 2020-01-08 DIAGNOSIS — D46.9 MDS (MYELODYSPLASTIC SYNDROME) (HCC): Primary | ICD-10-CM

## 2020-01-08 PROCEDURE — 96375 TX/PRO/DX INJ NEW DRUG ADDON: CPT

## 2020-01-08 PROCEDURE — 96413 CHEMO IV INFUSION 1 HR: CPT

## 2020-01-08 PROCEDURE — 74011000258 HC RX REV CODE- 258: Performed by: INTERNAL MEDICINE

## 2020-01-08 PROCEDURE — 74011250636 HC RX REV CODE- 250/636: Performed by: INTERNAL MEDICINE

## 2020-01-08 RX ORDER — SODIUM CHLORIDE 9 MG/ML
25 INJECTION, SOLUTION INTRAVENOUS CONTINUOUS
Status: DISCONTINUED | OUTPATIENT
Start: 2020-01-08 | End: 2020-01-09 | Stop reason: HOSPADM

## 2020-01-08 RX ORDER — DEXAMETHASONE SODIUM PHOSPHATE 4 MG/ML
8 INJECTION, SOLUTION INTRA-ARTICULAR; INTRALESIONAL; INTRAMUSCULAR; INTRAVENOUS; SOFT TISSUE ONCE
Status: COMPLETED | OUTPATIENT
Start: 2020-01-08 | End: 2020-01-08

## 2020-01-08 RX ORDER — SODIUM CHLORIDE 0.9 % (FLUSH) 0.9 %
10 SYRINGE (ML) INJECTION AS NEEDED
Status: DISCONTINUED | OUTPATIENT
Start: 2020-01-08 | End: 2020-01-09 | Stop reason: HOSPADM

## 2020-01-08 RX ORDER — HEPARIN 100 UNIT/ML
300-500 SYRINGE INTRAVENOUS AS NEEDED
Status: DISCONTINUED | OUTPATIENT
Start: 2020-01-08 | End: 2020-01-09 | Stop reason: HOSPADM

## 2020-01-08 RX ORDER — ONDANSETRON 2 MG/ML
8 INJECTION INTRAMUSCULAR; INTRAVENOUS ONCE
Status: COMPLETED | OUTPATIENT
Start: 2020-01-08 | End: 2020-01-08

## 2020-01-08 RX ADMIN — HEPARIN 300 UNITS: 100 SYRINGE at 14:03

## 2020-01-08 RX ADMIN — DEXAMETHASONE SODIUM PHOSPHATE 8 MG: 4 INJECTION, SOLUTION INTRAMUSCULAR; INTRAVENOUS at 12:55

## 2020-01-08 RX ADMIN — Medication 10 ML: at 14:02

## 2020-01-08 RX ADMIN — SODIUM CHLORIDE 25 ML/HR: 900 INJECTION, SOLUTION INTRAVENOUS at 12:55

## 2020-01-08 RX ADMIN — AZACITIDINE 149 MG: 100 INJECTION, POWDER, LYOPHILIZED, FOR SOLUTION INTRAVENOUS; SUBCUTANEOUS at 13:48

## 2020-01-08 RX ADMIN — ONDANSETRON HYDROCHLORIDE 8 MG: 2 INJECTION INTRAMUSCULAR; INTRAVENOUS at 12:55

## 2020-01-08 NOTE — PROGRESS NOTES
[facility] Progress Note    Date: 2020    Name: Dev Acevedo    MRN: [id number]         : 1935    Ms. Edgardo Cesar Arrived ambulatory and in no distress for Vidaza cycle 2 day 3. Assessment was completed, no acute issues at this time, no new complaints voiced. Pt reports feeling much better. Reports she has been able to eat quite a bit more. Port needle and dressing intact, brisk blood return. Chemotherapy Flowsheet 2020   Cycle C2D3   Date 2020   Drug / Regimen Vidaza   Pre Meds given   Notes given     Patient Vitals for the past 12 hrs:   Temp Pulse Resp BP SpO2   20 1253 97.8 °F (36.6 °C) 65 18 120/49 100 %     Pre-medications  were administered as ordered and chemotherapy was initiated.      Medications Administered     0.9% sodium chloride infusion     Admin Date  2020 Action  New Bag Dose  25 mL/hr Rate  25 mL/hr Route  IntraVENous Administered By  Rozina Boykin RN          azaCITIDine (VIDAZA) 149 mg in 0.9% sodium chloride 100 mL, overfill volume 10 mL chemo infusion     Admin Date  2020 Action  New Bag Dose  149 mg Rate  749.4 mL/hr Route  IntraVENous Administered By  Crys Gonzalez RN          dexamethasone (DECADRON) 4 mg/mL injection 8 mg     Admin Date  2020 Action  Given Dose  8 mg Route  IntraVENous Administered By  Rozina Boykin RN          heparin (porcine) pf 300-500 Units     Admin Date  2020 Action  Given Dose  300 Units Route  InterCATHeter Administered By  Rozina Boykin RN          ondansetron [facility]) injection 8 mg     Admin Date  2020 Action  Given Dose  8 mg Route  IntraVENous Administered By  Rozina Boykin RN          saline peripheral flush soln 10 mL     Admin Date  2020 Action  Given Dose  10 mL Route  InterCATHeter Administered By  Rozina Boykin RN              Ms. Edgardo Cesar tolerated treatment well and was discharged from [place] [address] in stable condition at [phone] She is to return on 01/09/2020 at 0900 for her next appointment.     Future Appointments   Date Time Provider Anju Dickson   1/9/2020 11:30 AM Ludivina Ferguson MD [address]   1/9/2020  1:00  [facility] [address] COM   1/10/2020  1:00 PM [facility] INFUSION NURSE 1 [address] COM   1/30/2020 10:00 AM Angélica Jesus MD [address]   2/3/2020  1:00 PM [facility] INFUSION NURSE 2 [address] COM   2/4/2020  1:00 PM [facility] INFUSION NURSE 2 [address] COM   2/5/2020  8:00 AM [facility] INFUSION NURSE 2 [address] COM   2/6/2020  8:00 AM [facility] INFUSION NURSE 2 [address] COM   2/7/2020  8:00 AM [facility] INFUSION NURSE [address] COM   6/25/2020  3:40 PM Katherine Mcneil,  [address] Parisi RN  January 8, 2020

## 2020-01-09 ENCOUNTER — HOSPITAL ENCOUNTER (OUTPATIENT)
Dept: INFUSION THERAPY | Age: 85
Discharge: HOME | End: 2020-01-09
Payer: MEDICARE

## 2020-01-09 ENCOUNTER — OFFICE VISIT (OUTPATIENT)
Dept: ONCOLOGY | Age: 85
End: 2020-01-09

## 2020-01-09 ENCOUNTER — APPOINTMENT (OUTPATIENT)
Dept: INFUSION THERAPY | Age: 85
End: 2020-01-09
Payer: MEDICARE

## 2020-01-09 VITALS
DIASTOLIC BLOOD PRESSURE: 48 MMHG | OXYGEN SATURATION: 98 % | SYSTOLIC BLOOD PRESSURE: 112 MMHG | HEART RATE: 61 BPM | TEMPERATURE: 97.8 F | RESPIRATION RATE: 18 BRPM

## 2020-01-09 VITALS
SYSTOLIC BLOOD PRESSURE: 114 MMHG | OXYGEN SATURATION: 100 % | HEART RATE: 64 BPM | TEMPERATURE: 97.6 F | DIASTOLIC BLOOD PRESSURE: 52 MMHG | RESPIRATION RATE: 18 BRPM

## 2020-01-09 DIAGNOSIS — D61.82 ANEMIA ASSOCIATED WITH BONE MARROW INFILTRATION (HCC): ICD-10-CM

## 2020-01-09 DIAGNOSIS — D46.9 MDS (MYELODYSPLASTIC SYNDROME) (HCC): Primary | ICD-10-CM

## 2020-01-09 LAB
BASOPHILS # BLD: 0.2 K/UL (ref 0–0.1)
BASOPHILS NFR BLD: 1 % (ref 0–1)
DIFFERENTIAL METHOD BLD: ABNORMAL
EOSINOPHIL # BLD: 0 K/UL (ref 0–0.4)
EOSINOPHIL NFR BLD: 0 % (ref 0–7)
ERYTHROCYTE [DISTWIDTH] IN BLOOD BY AUTOMATED COUNT: 18.6 % (ref 11.5–14.5)
HCT VFR BLD AUTO: 25.4 % (ref 35–47)
HGB BLD-MCNC: 7.1 G/DL (ref 11.5–16)
IMM GRANULOCYTES # BLD AUTO: 2.6 K/UL
IMM GRANULOCYTES NFR BLD AUTO: 15 %
LYMPHOCYTES # BLD: 1.4 K/UL (ref 0.8–3.5)
LYMPHOCYTES NFR BLD: 8 % (ref 12–49)
MCH RBC QN AUTO: 27.8 PG (ref 26–34)
MCHC RBC AUTO-ENTMCNC: 28 G/DL (ref 30–36.5)
MCV RBC AUTO: 99.6 FL (ref 80–99)
MONOCYTES # BLD: 5.6 K/UL (ref 0–1)
MONOCYTES NFR BLD: 32 % (ref 5–13)
NEUTS SEG # BLD: 7.7 K/UL (ref 1.8–8)
NEUTS SEG NFR BLD: 44 % (ref 32–75)
NRBC # BLD: 0.29 K/UL (ref 0–0.01)
NRBC BLD-RTO: 1.7 PER 100 WBC
PLATELET # BLD AUTO: 196 K/UL (ref 150–400)
PMV BLD AUTO: 9.4 FL (ref 8.9–12.9)
RBC # BLD AUTO: 2.55 M/UL (ref 3.8–5.2)
RBC MORPH BLD: ABNORMAL
WBC # BLD AUTO: 17.5 K/UL (ref 3.6–11)
WBC MORPH BLD: ABNORMAL

## 2020-01-09 PROCEDURE — 74011000258 HC RX REV CODE- 258: Performed by: INTERNAL MEDICINE

## 2020-01-09 PROCEDURE — 85025 COMPLETE CBC W/AUTO DIFF WBC: CPT

## 2020-01-09 PROCEDURE — 96409 CHEMO IV PUSH SNGL DRUG: CPT

## 2020-01-09 PROCEDURE — 96375 TX/PRO/DX INJ NEW DRUG ADDON: CPT

## 2020-01-09 PROCEDURE — 86923 COMPATIBILITY TEST ELECTRIC: CPT

## 2020-01-09 PROCEDURE — 74011250636 HC RX REV CODE- 250/636: Performed by: INTERNAL MEDICINE

## 2020-01-09 PROCEDURE — 86900 BLOOD TYPING SEROLOGIC ABO: CPT

## 2020-01-09 PROCEDURE — 36415 COLL VENOUS BLD VENIPUNCTURE: CPT

## 2020-01-09 RX ORDER — DEXAMETHASONE SODIUM PHOSPHATE 4 MG/ML
8 INJECTION, SOLUTION INTRA-ARTICULAR; INTRALESIONAL; INTRAMUSCULAR; INTRAVENOUS; SOFT TISSUE ONCE
Status: COMPLETED | OUTPATIENT
Start: 2020-01-09 | End: 2020-01-09

## 2020-01-09 RX ORDER — SODIUM CHLORIDE 9 MG/ML
25 INJECTION, SOLUTION INTRAVENOUS CONTINUOUS
Status: DISPENSED | OUTPATIENT
Start: 2020-01-09 | End: 2020-01-09

## 2020-01-09 RX ORDER — SODIUM CHLORIDE 9 MG/ML
10 INJECTION INTRAMUSCULAR; INTRAVENOUS; SUBCUTANEOUS AS NEEDED
Status: ACTIVE | OUTPATIENT
Start: 2020-01-09 | End: 2020-01-09

## 2020-01-09 RX ORDER — HEPARIN 100 UNIT/ML
300-500 SYRINGE INTRAVENOUS AS NEEDED
Status: ACTIVE | OUTPATIENT
Start: 2020-01-09 | End: 2020-01-09

## 2020-01-09 RX ORDER — ONDANSETRON 2 MG/ML
8 INJECTION INTRAMUSCULAR; INTRAVENOUS ONCE
Status: COMPLETED | OUTPATIENT
Start: 2020-01-09 | End: 2020-01-09

## 2020-01-09 RX ADMIN — AZACITIDINE 149 MG: 100 INJECTION, POWDER, LYOPHILIZED, FOR SOLUTION INTRAVENOUS; SUBCUTANEOUS at 09:32

## 2020-01-09 RX ADMIN — SODIUM CHLORIDE 10 ML: 9 INJECTION INTRAMUSCULAR; INTRAVENOUS; SUBCUTANEOUS at 09:59

## 2020-01-09 RX ADMIN — SODIUM CHLORIDE 10 ML: 9 INJECTION INTRAMUSCULAR; INTRAVENOUS; SUBCUTANEOUS at 08:53

## 2020-01-09 RX ADMIN — SODIUM CHLORIDE 25 ML/HR: 900 INJECTION, SOLUTION INTRAVENOUS at 08:53

## 2020-01-09 RX ADMIN — ONDANSETRON HYDROCHLORIDE 8 MG: 2 INJECTION INTRAMUSCULAR; INTRAVENOUS at 08:54

## 2020-01-09 RX ADMIN — SODIUM CHLORIDE 10 ML: 9 INJECTION INTRAMUSCULAR; INTRAVENOUS; SUBCUTANEOUS at 08:54

## 2020-01-09 RX ADMIN — HEPARIN 300 UNITS: 100 SYRINGE at 09:59

## 2020-01-09 RX ADMIN — DEXAMETHASONE SODIUM PHOSPHATE 8 MG: 4 INJECTION, SOLUTION INTRAMUSCULAR; INTRAVENOUS at 08:55

## 2020-01-09 NOTE — PROGRESS NOTES
Spiritual Care Partner Volunteer visited patient in [facility] on 01/09/2020.   Documented by:  Tomasz Stevenson, [facility], Spiritual Care Intern

## 2020-01-09 NOTE — PROGRESS NOTES
Angelina Bell is a 82 y.o. female  Chief Complaint   Patient presents with    Myelodysplastic Syndrome    Breast Cancer    Follow-up     1. Have you been to the ER, urgent care clinic since your last visit? Hospitalized since your last visit? No    2. Have you seen or consulted any other health care providers outside of the [address] [place] [address] since your last visit? Include any pap smears or colon screening.   No

## 2020-01-09 NOTE — PROGRESS NOTES
[address], [address] [place] [address] Ronald Camejou, [address] [facility]  [phone]      Follow-up Note        Patient: Maxx Joe MRN: [id number]  SSN: [id number]    YOB: 1935  Age: 82 y.o. Sex: female        Diagnosis:      1. Myelodysplastic Syndrome   IPSS-R Low risk    Del(7q) and trisomy 8    2. Left breast carcinoma:   T1a N0 Mx (Stage IA) infiltrating ductal carcinoma , Tumor size 1 cm, LN -ve, grade 2, ki 67 17%, %, NV 90%, Her 2 unamplified. Treatment:      1. Vidaza - Cycle 2 Day 4  2. Discontinued Arimidex 1/2019 after 5 years of treatment  3. Completed radiation treatment to the breast   4. S/P left breast lumpectomy and sentinel LN excision on 11/21/2013    Subjective:      Jamal Miller is a 70 y.o.  female with stage I invasive ductal carcinoma. She underwent a left breast lumpectomy and sentinel LN excision on 11/21/2013. Ms. Beatriz Moss then received radiation to the left breast. She stopped arimidex after taking it for 5 years. I am now seeing her for low grade MDS. She recently suffered PNA and sepsis and was admitted to the hospital briefly. Anemia has worsened. Ms. Beatriz Moss is receiving Vidaza and is tolerating treatment well. She continues to feel fatigued and is scheduled for RBC transfusion tomorrow.       Review of Systems:     Constitutional: fatigue  Eyes: negative   Ears, Nose, Mouth, Throat, and Face: negative   Respiratory: negative   Cardiovascular: negative   Gastrointestinal: negative   Integument/Breast: negative  Hematologic/Lymphatic: negative   Musculoskeletal: joint pain  Neurological: numbness bottoms of both feet      Past Medical History:   Diagnosis Date    Anemia     Arthritis     Bunion of right foot 8/20/2015    Chronic pain     back--uses tens unit    Diverticulitis 6/29/2018    Recurrent    Diverticulitis large intestine     Dry eye syndrome 6/29/2018    Fibromyalgia 2018    GERD (gastroesophageal reflux disease)     History of left breast cancer     lumpectomy radiation    Hypercholesterolemia 2018    Ill-defined condition     blood transfusion hx    Leukemia (HCC)     MDS (myelodysplastic syndrome) ([facility] [address]     Osteoporosis 2018    Peripheral neuropathy 2018    Prediabetes 2018    PUD (peptic ulcer disease)     Radiation therapy complication 1267    Lt breast-No Chemo    Rosacea 2018    Trouble in sleeping      Past Surgical History:   Procedure Laterality Date    HX APPENDECTOMY      HX BREAST LUMPECTOMY  2013    LEFT BREAST LUMPECTOMY W/LEFT BREAST SENTINEL NODE BIOPSY,AND NEEDLE LOCALIZATION performed by Mark Taylor MD at [facility] MAIN OR    HX CATARACT REMOVAL      bilateral    HX HYSTERECTOMY      ovarian cyst    HX MOHS PROCEDURES      right    HX ORTHOPAEDIC      back surgery x2    HX OTHER SURGICAL      colonoscopy    HX TONSILLECTOMY      IR INSERT TUNL CVC W PORT OVER 5 YEARS  12/3/2019    TOTAL KNEE ARTHROPLASTY      left    TOTAL KNEE ARTHROPLASTY      right    US GUIDED CORE BREAST BIOPSY Left     Breast Ca      Family History   Problem Relation Age of Onset    Cancer Mother         bladder    Cancer Father     Breast Cancer Paternal Grandmother      Social History     Tobacco Use    Smoking status: Former Smoker     Packs/day: 0.50     Years: 50.00     Pack years: 25.00     Last attempt to quit: 2012     Years since quittin.9    Smokeless tobacco: Never Used   Substance Use Topics    Alcohol use: Yes     Alcohol/week: 2.0 standard drinks     Types: 2 Glasses of wine per week      Prior to Admission medications    Medication Sig Start Date End Date Taking? Authorizing Provider   ciprofloxacin HCl (CIPRO) 500 mg tablet Take 1 Tab by mouth two (2) times a day for 10 days.  12/31/19 1/10/20  Jason Del Valle MD   metroNIDAZOLE (FLAGYL) 500 mg tablet Take 1 Tab by mouth two (2) times a day for 10 days. 12/31/19 1/10/20  Sang Parikh MD   acetaminophen (TYLENOL) 325 mg tablet Take 2 Tabs by mouth every four (4) hours as needed for Pain. 12/31/19   Sang Parikh MD   docusate sodium (COLACE) 100 mg capsule Take 1 Cap by mouth two (2) times a day for 90 days. 12/17/19 3/16/20  Olive Bazan MD   ondansetron hcl [facility]) 4 mg tablet Take 1 Tab by mouth every eight (8) hours as needed for Nausea. 11/27/19   Robson Higuera MD   lidocaine-prilocaine (EMLA) topical cream Apply  to affected area as needed for Pain. 11/27/19   Robson Higuera MD   OXYGEN-AIR DELIVERY SYSTEMS Take 2 L/min by inhalation continuous. Provider, Historical   albuterol-ipratropium (DUO-NEB) 2.5 mg-0.5 mg/3 ml nebu 3 mL by Nebulization route every six (6) hours as needed (SOB). COPD J44.9 11/5/19   Kerry Koo MD   Nebulizer & Compressor machine 1 Each by Does Not Apply route daily. 11/5/19   Kerry Koo MD   dilTIAZem CD (CARDIZEM CD) 180 mg ER capsule Take 1 Cap by mouth daily. 11/5/19   Kerry Koo MD   pantoprazole (PROTONIX) 40 mg tablet Take 1 Tab by mouth Before breakfast and dinner. 11/5/19   Kerry Koo MD   budesonide (PULMICORT) 0.5 mg/2 mL nbsp 2 mL by Nebulization route two (2) times a day. 11/5/19   Kerry Koo MD   ergocalciferol (ERGOCALCIFEROL) 50,000 unit capsule Take 50,000 Units by mouth every Sunday. Provider, Historical   inulin (FIBER GUMMIES PO) Take 2 Gum by mouth nightly. Provider, Historical   vit A/vit C/vit E/zinc/copper (PRESERVISION AREDS PO) Take 1 Tab by mouth nightly. Provider, Historical   multivitamin (ONE A DAY) tablet Take 1 Tab by mouth nightly.     Provider, Historical          Allergies   Allergen Reactions    Hydrocodone Nausea Only and Vertigo    Gabapentin Diarrhea, Nausea Only and Other (comments)     weakness    Lyrica [Pregabalin] Nausea Only    Oxycodone Nausea and Vomiting and Vertigo Objective:     Visit Vitals  /48 (BP 1 Location: Right arm, BP Patient Position: Sitting)   Pulse 61   Temp 97.8 °F (36.6 °C) (Oral)   Resp 18   SpO2 98%       Pain Scale: 0 - No pain/10  Pain Location:       Physical Exam:    GENERAL: alert, cooperative   EYE: PERRLA,  LYMPHATIC: Cervical, supraclavicular, and axillary nodes normal.   THROAT & NECK: normal and no erythema or exudates noted. LUNG: clear to auscultation bilaterally   HEART: regular rate and rhythm   ABDOMEN: soft, non-tender   EXTREMITIES: extremities normal   SKIN: Normal.   NEUROLOGIC: negative       Lab Results   Component Value Date/Time    WBC 17.5 (H) 01/09/2020 08:52 AM    HGB (POC) 10.7 (A) 01/07/2019 09:52 AM    HGB 7.1 (L) 01/09/2020 08:52 AM    HCT (POC) 32.0 (A) 01/07/2019 09:52 AM    HCT 25.4 (L) 01/09/2020 08:52 AM    PLATELET 932 90/35/0570 08:52 AM    MCV 99.6 (H) 01/09/2020 08:52 AM       Lab Results   Component Value Date/Time    Iron 66 04/29/2019 09:28 AM    TIBC 354 04/29/2019 09:28 AM    Iron % saturation 19 04/29/2019 09:28 AM    Ferritin 170 (H) 04/29/2019 09:28 AM           Assessment:      1. Myelodysplastic Syndrome   IPSS-R Low risk      Del(7q) and trisomy 8    ECOG PS 1  Intent of Treatment - palliative  Prognosis: poor    Anemia has worsened. Leukocytosis is worse  I will arrange for CBC, type and cross as early as next week. I recommended starting systemic therapy with Vidaza. Oletta Elizabeth is a hypomethylating agent which is approved for the treatment for MDS. Receiving systemic chemotherapy   Vidaza - Cycle 2 Day 4    Tolerating treatment   A detailed system by system evaluation of side effect was performed to assess chemotherapy related toxicity. Blood counts are acceptable. Results reviewed with the patient. WBC has come down, platelet count has improved  Hgb is still low. Symptom management form reviewed and scanned into the EMR under Media.       2.History of Left breast carcinoma: T1a N0 Mx (Stage IA) infiltrating ductal carcinoma , Tumor size 1 cm, LN -ve, grade 2, ki 67 17%, %, PA 90%, Her 2 unamplified. Dx: 10/28/2013    > S/P left breast lumpectomy and sentinel LN excision on 11/21/2013  > Completed radiation to the breast  > Discontinued Arimidex 1/2019 after 5 years  > In remission    Mammogram (11/29/2018): no evidence of malignancy  DEXA (10/2013) - normal      3. Vitamin D deficiency    > Vitamin D 50,000 units weekly      4. Osteopenia    > Management per Dr. Ballesteros Kenton:        > Continue Vidaza   > Transfusing PRBCs 2 units tomorrow  > Follow up in 4 weeks    I saw the patient in conjunction with Girish Taylor NP      Signed by: Marbin Tanner MD                     January 9, 2020      CC.  Sandro Rodgers MD

## 2020-01-09 NOTE — PROGRESS NOTES
[facility] Progress Note    Date: 2020    Name: Randall Mon    MRN: [id number]         : 1935    Ms. Dyanna Merlin Arrived ambulatory and in no distress for Vidaza cycle 2 day 4. Assessment was completed, no acute issues at this time, no new complaints voiced. Port needle and dressing intact, brisk blood return. STBB drawn. Chemotherapy Flowsheet 2020   Cycle C2D4   Date 2020   Drug / Regimen Vidaza   Pre Meds given   Notes given       Patient Vitals for the past 12 hrs:   Temp Pulse Resp BP SpO2   20 0905 97.6 °F (36.4 °C) 64 18 114/52 100 %       Lab results were obtained and reviewed. Recent Results (from the past 12 hour(s))   CBC WITH AUTOMATED DIFF    Collection Time: 20  8:52 AM   Result Value Ref Range    WBC 17.5 (H) 3.6 - 11.0 K/uL    RBC 2.55 (L) 3.80 - 5.20 M/uL    HGB 7.1 (L) 11.5 - 16.0 g/dL    HCT 25.4 (L) 35.0 - 47.0 %    MCV 99.6 (H) 80.0 - 99.0 FL    MCH 27.8 26.0 - 34.0 PG    MCHC 28.0 (L) 30.0 - 36.5 g/dL    RDW 18.6 (H) 11.5 - 14.5 %    PLATELET 529 678 - 028 K/uL    MPV 9.4 8.9 - 12.9 FL    NRBC 1.7 (H) 0  WBC    ABSOLUTE NRBC 0.29 (H) 0.00 - 0.01 K/uL    NEUTROPHILS 44 32 - 75 %    LYMPHOCYTES 8 (L) 12 - 49 %    MONOCYTES 32 (H) 5 - 13 %    EOSINOPHILS 0 0 - 7 %    BASOPHILS 1 0 - 1 %    IMMATURE GRANULOCYTES 15 %    ABS. NEUTROPHILS 7.7 1.8 - 8.0 K/UL    ABS. LYMPHOCYTES 1.4 0.8 - 3.5 K/UL    ABS. MONOCYTES 5.6 (H) 0.0 - 1.0 K/UL    ABS. EOSINOPHILS 0.0 0.0 - 0.4 K/UL    ABS. BASOPHILS 0.2 (H) 0.0 - 0.1 K/UL    ABS. IMM. GRANS. 2.6 K/UL    DF SMEAR SCANNED      RBC COMMENTS NORMOCYTIC, NORMOCHROMIC      WBC COMMENTS TOXIC GRANULATION       Pre-medications  were administered as ordered and chemotherapy was initiated.      Medications Administered     0.9% sodium chloride infusion     Admin Date  2020 Action  New Bag Dose  25 mL/hr Rate  25 mL/hr Route  IntraVENous Administered By  Sapphire Florez, DAINA          0.9% sodium chloride injection 10 mL Admin Date  01/09/2020 Action  Given Dose  10 mL Route  IntraVENous Administered By  Ezequiel Pittman RN           Admin Date  01/09/2020 Action  Given Dose  10 mL Route  IntraVENous Administered By  Ezequiel Pittman RN           Admin Date  01/09/2020 Action  Given Dose  10 mL Route  IntraVENous Administered By  Ezequiel Pittman RN          azaCITIDine (VIDAZA) 149 mg in 0.9% sodium chloride 100 mL, overfill volume 10 mL chemo infusion     Admin Date  01/09/2020 Action  New Bag Dose  149 mg Rate  749.4 mL/hr Route  IntraVENous Administered By  Rianna Lowry RN          dexamethasone (DECADRON) 4 mg/mL injection 8 mg     Admin Date  01/09/2020 Action  Given Dose  8 mg Route  IntraVENous Administered By  Ezequiel Pittman RN          heparin (porcine) pf 300-500 Units     Admin Date  01/09/2020 Action  Given Dose  300 Units Route  InterCATHeter Administered By  Ezequiel Pittman RN          ondansetron [facility]) injection 8 mg     Admin Date  01/09/2020 Action  Given Dose  8 mg Route  IntraVENous Administered By  Ezequiel Pittman RN              Ms. Dea Rodriguez tolerated treatment well and was discharged from [place] [address] in stable condition at 0800. She is to return on 01/10/20 at 0800 for her next appointment, for chemo and blood transfusion.      Future Appointments   Date Time Provider Anju Dickson   1/9/2020  1:00  [facility] 1 [address] COM   1/10/2020  1:00 PM [facility] INFUSION NURSE 1 [address] COM   1/30/2020 10:00 AM Fadi Jesus MD [facility]AM DUNIA SCHED   2/3/2020  1:00 PM [facility] INFUSION NURSE [address] COM   2/4/2020  1:00 PM [facility] INFUSION NURSE 2 [address] COM   2/5/2020  8:00 AM [facility] INFUSION NURSE 2 [address] COM   2/6/2020  8:00 AM [facility] INFUSION NURSE 2 [address] COM   2/6/2020  8:45 AM Emily Davis NP ONC DUNIA SCHED   2/7/2020  8:00 AM [facility] INFUSION NURSE 2 [address]. [facility] [employer]   6/25/2020  3:40 PM Art Flores DO Susy [address], RN  January 9, 2020

## 2020-01-10 ENCOUNTER — APPOINTMENT (OUTPATIENT)
Dept: INFUSION THERAPY | Age: 85
End: 2020-01-10
Payer: MEDICARE

## 2020-01-10 ENCOUNTER — HOSPITAL ENCOUNTER (OUTPATIENT)
Dept: INFUSION THERAPY | Age: 85
Discharge: HOME | End: 2020-01-10
Payer: MEDICARE

## 2020-01-10 VITALS
TEMPERATURE: 98.2 F | HEART RATE: 63 BPM | DIASTOLIC BLOOD PRESSURE: 61 MMHG | RESPIRATION RATE: 18 BRPM | SYSTOLIC BLOOD PRESSURE: 149 MMHG | OXYGEN SATURATION: 97 %

## 2020-01-10 DIAGNOSIS — D46.9 MDS (MYELODYSPLASTIC SYNDROME) (HCC): Primary | ICD-10-CM

## 2020-01-10 PROCEDURE — 74011000258 HC RX REV CODE- 258: Performed by: INTERNAL MEDICINE

## 2020-01-10 PROCEDURE — 96413 CHEMO IV INFUSION 1 HR: CPT

## 2020-01-10 PROCEDURE — 36430 TRANSFUSION BLD/BLD COMPNT: CPT

## 2020-01-10 PROCEDURE — 77030012965 HC NDL HUBR BBMI -A

## 2020-01-10 PROCEDURE — 74011250637 HC RX REV CODE- 250/637: Performed by: NURSE PRACTITIONER

## 2020-01-10 PROCEDURE — 96375 TX/PRO/DX INJ NEW DRUG ADDON: CPT

## 2020-01-10 PROCEDURE — P9040 RBC LEUKOREDUCED IRRADIATED: HCPCS

## 2020-01-10 PROCEDURE — 74011250636 HC RX REV CODE- 250/636: Performed by: INTERNAL MEDICINE

## 2020-01-10 PROCEDURE — 77030013169 SET IV BLD ICUM -A

## 2020-01-10 PROCEDURE — P9016 RBC LEUKOCYTES REDUCED: HCPCS

## 2020-01-10 RX ORDER — HEPARIN 100 UNIT/ML
300-500 SYRINGE INTRAVENOUS AS NEEDED
Status: ACTIVE | OUTPATIENT
Start: 2020-01-10 | End: 2020-01-10

## 2020-01-10 RX ORDER — DEXAMETHASONE SODIUM PHOSPHATE 4 MG/ML
8 INJECTION, SOLUTION INTRA-ARTICULAR; INTRALESIONAL; INTRAMUSCULAR; INTRAVENOUS; SOFT TISSUE ONCE
Status: COMPLETED | OUTPATIENT
Start: 2020-01-10 | End: 2020-01-10

## 2020-01-10 RX ORDER — ACETAMINOPHEN 325 MG/1
650 TABLET ORAL ONCE
Status: DISPENSED | OUTPATIENT
Start: 2020-01-10 | End: 2020-01-10

## 2020-01-10 RX ORDER — ONDANSETRON 2 MG/ML
8 INJECTION INTRAMUSCULAR; INTRAVENOUS ONCE
Status: COMPLETED | OUTPATIENT
Start: 2020-01-10 | End: 2020-01-10

## 2020-01-10 RX ORDER — SODIUM CHLORIDE 9 MG/ML
10 INJECTION INTRAMUSCULAR; INTRAVENOUS; SUBCUTANEOUS AS NEEDED
Status: ACTIVE | OUTPATIENT
Start: 2020-01-10 | End: 2020-01-10

## 2020-01-10 RX ORDER — DIPHENHYDRAMINE HCL 25 MG
25 CAPSULE ORAL ONCE
Status: COMPLETED | OUTPATIENT
Start: 2020-01-10 | End: 2020-01-10

## 2020-01-10 RX ORDER — SODIUM CHLORIDE 9 MG/ML
25 INJECTION, SOLUTION INTRAVENOUS CONTINUOUS
Status: DISPENSED | OUTPATIENT
Start: 2020-01-10 | End: 2020-01-10

## 2020-01-10 RX ADMIN — DEXAMETHASONE SODIUM PHOSPHATE 8 MG: 4 INJECTION, SOLUTION INTRAMUSCULAR; INTRAVENOUS at 08:35

## 2020-01-10 RX ADMIN — DIPHENHYDRAMINE HYDROCHLORIDE 25 MG: 25 CAPSULE ORAL at 09:31

## 2020-01-10 RX ADMIN — AZACITIDINE 149 MG: 100 INJECTION, POWDER, LYOPHILIZED, FOR SOLUTION INTRAVENOUS; SUBCUTANEOUS at 09:30

## 2020-01-10 RX ADMIN — HEPARIN 300 UNITS: 100 SYRINGE at 14:22

## 2020-01-10 RX ADMIN — SODIUM CHLORIDE 10 ML: 9 INJECTION INTRAMUSCULAR; INTRAVENOUS; SUBCUTANEOUS at 08:34

## 2020-01-10 RX ADMIN — ONDANSETRON HYDROCHLORIDE 8 MG: 2 INJECTION INTRAMUSCULAR; INTRAVENOUS at 08:35

## 2020-01-10 RX ADMIN — SODIUM CHLORIDE 25 ML/HR: 900 INJECTION, SOLUTION INTRAVENOUS at 08:35

## 2020-01-10 RX ADMIN — SODIUM CHLORIDE 10 ML: 9 INJECTION INTRAMUSCULAR; INTRAVENOUS; SUBCUTANEOUS at 14:21

## 2020-01-10 NOTE — PROGRESS NOTES
\[facility]\"" Progress Note    Date: January 10, 2020    Name: Tomy Britton    MRN: [id number]         : 1935    Ms. Jarred Cho Arrived ambulatory and in no distress for Vidaza cycle 2 day 5, and 2 units PRBCs. Assessment was completed, no acute issues at this time. Pt reports lack of sleep last night. Port needle and dressing intact, brisk blood return. Signs/symptoms of adverse blood reaction discussed with pt, voiced understanding. Chemotherapy Flowsheet 1/10/2020   Cycle C2D5   Date 1/10/2020   Drug / Regimen Vidaza   Pre Meds given   Notes given       Patient Vitals for the past 12 hrs:   Temp Pulse Resp BP SpO2   01/10/20 1425 98.2 °F (36.8 °C) 63 18 149/61 --   01/10/20 1315 97.7 °F (36.5 °C) 66 18 131/47 --   01/10/20 1245 98.2 °F (36.8 °C) 64 18 128/51 --   01/10/20 1230 97.5 °F (36.4 °C) 64 18 136/51 --   01/10/20 1200 97.9 °F (36.6 °C) 63 18 135/50 --   01/10/20 1100 97.7 °F (36.5 °C) 62 18 128/46 --   01/10/20 1030 97.5 °F (36.4 °C) 68 -- 115/45 --   01/10/20 1015 98 °F (36.7 °C) 65 18 112/62 --   01/10/20 0949 98 °F (36.7 °C) (!) 55 18 131/47 --   01/10/20 0830 97.8 °F (36.6 °C) 79 20 123/62 97 %     Pre-medications  were administered as ordered and chemotherapy was initiated.      Medications Administered     0.9% sodium chloride infusion     Admin Date  01/10/2020 Action  New Bag Dose  25 mL/hr Rate  25 mL/hr Route  IntraVENous Administered By  Salas Rubi RN          0.9% sodium chloride injection 10 mL     Admin Date  01/10/2020 Action  Given Dose  10 mL Route  IntraVENous Administered By  Salas Rubi RN           Admin Date  01/10/2020 Action  Given Dose  10 mL Route  IntraVENous Administered By  Salas Rubi RN          azaCITIDine (VIDAZA) 149 mg in 0.9% sodium chloride 100 mL, overfill volume 10 mL chemo infusion     Admin Date  01/10/2020 Action  New Bag Dose  149 mg Rate  749.4 mL/hr Route  IntraVENous Administered By  Flores Blanton RN          dexamethasone (DECADRON) 4 mg/mL injection 8 mg     Admin Date  01/10/2020 Action  Given Dose  8 mg Route  IntraVENous Administered By  Jocelynn Guaman RN          diphenhydrAMINE (BENADRYL) capsule 25 mg     Admin Date  01/10/2020 Action  Given Dose  25 mg Route  Oral Administered By  Sylvain Rasmussen RN          heparin (porcine) pf 300-500 Units     Admin Date  01/10/2020 Action  Given Dose  300 Units Route  InterCATHeter Administered By  Jocelynn Guaman RN          ondansetron [facility]) injection 8 mg     Admin Date  01/10/2020 Action  Given Dose  8 mg Route  IntraVENous Administered By  Jocelynn Guaman RN              1000:  1st unit PRBCs started and infusing without difficulty, observed x 15 minutes  1205:  1st unit completed without adverse reaction noted, NS flushing line. 1215:  2nd unit PRBCs started and infusing without difficulty, observed x 15 minutes  1415:  2nd unit completed without adverse reaction noted, NS flushing line. 1435 Pt tolerated chemo and transfusion  well, no adverse reaction noted. D/C instructions reviewed, copy to pt, voiced understanding. Patient declined 1 hour post transfusion observation. D/Cd from [facility] ambulatory and in no acute distress.  Pt aware of next appointment    Future Appointments   Date Time Provider Anju Dickson   1/30/2020 10:00 AM Haseeb Tariq MD [address]   2/3/2020  1:00  [facility] 2 [address] COM   2/4/2020  1:00 PM [address] [place] [address] [address] COM   2/5/2020  8:00 AM [facility] INFUSION NURSE 2 [address] COM   2/6/2020  8:00 AM [facility] INFUSION NURSE [address] COM   2/6/2020  9:00 AM Carlos Mar MD [address]   2/7/2020  8:00 AM [facility] INFUSION NURSE 2 [address] [facility]   6/25/2020  3:40 PM [address],  [facility]

## 2020-01-10 NOTE — PROGRESS NOTES
Spiritual Care Partner Volunteer visited patient in [facility] at [facility] on 01/10/2020.   Documented by:  Rema Gruber, [facility], Spiritual Care Intern

## 2020-01-16 ENCOUNTER — TELEPHONE (OUTPATIENT)
Dept: INTERNAL MEDICINE CLINIC | Age: 85
End: 2020-01-16

## 2020-01-16 NOTE — TELEPHONE ENCOUNTER
Patient has been constipated since Friday     Would like to know what she should do or if she should come in    [address] [facility],[address]

## 2020-01-16 NOTE — TELEPHONE ENCOUNTER
Radha Polo MD  You 27 minutes ago (10:28 AM)         Start OTC miralax daily and dulcolax 2 tabs every 3rd day untril constipation resolved        Patient notified

## 2020-01-17 DIAGNOSIS — Z85.3 HISTORY OF LEFT BREAST CANCER: Primary | ICD-10-CM

## 2020-01-17 DIAGNOSIS — Z85.3 HISTORY OF BREAST CANCER: Primary | ICD-10-CM

## 2020-01-23 RX ORDER — ERGOCALCIFEROL 1.25 MG/1
CAPSULE ORAL
Qty: 12 CAP | Refills: 1 | Status: SHIPPED | OUTPATIENT
Start: 2020-01-23 | End: 2020-01-30 | Stop reason: ALTCHOICE

## 2020-01-23 RX ORDER — ERGOCALCIFEROL 1.25 MG/1
CAPSULE ORAL
Qty: 12 CAP | Refills: 1 | Status: ON HOLD | OUTPATIENT
Start: 2020-01-23 | End: 2021-02-11 | Stop reason: CLARIF

## 2020-01-24 ENCOUNTER — TELEPHONE (OUTPATIENT)
Dept: ONCOLOGY | Age: 85
End: 2020-01-24

## 2020-01-24 ENCOUNTER — HOSPITAL ENCOUNTER (OUTPATIENT)
Age: 85
Setting detail: OUTPATIENT SURGERY
Discharge: HOME | End: 2020-01-24
Attending: INTERNAL MEDICINE | Admitting: INTERNAL MEDICINE
Payer: MEDICARE

## 2020-01-24 ENCOUNTER — ANESTHESIA EVENT (OUTPATIENT)
Dept: ENDOSCOPY | Age: 85
End: 2020-01-24
Payer: MEDICARE

## 2020-01-24 ENCOUNTER — ANESTHESIA (OUTPATIENT)
Dept: ENDOSCOPY | Age: 85
End: 2020-01-24
Payer: MEDICARE

## 2020-01-24 VITALS
RESPIRATION RATE: 24 BRPM | TEMPERATURE: 97.4 F | OXYGEN SATURATION: 100 % | WEIGHT: 182 LBS | HEIGHT: 65 IN | DIASTOLIC BLOOD PRESSURE: 65 MMHG | SYSTOLIC BLOOD PRESSURE: 105 MMHG | BODY MASS INDEX: 30.32 KG/M2 | HEART RATE: 75 BPM

## 2020-01-24 PROCEDURE — 77030019988 HC FCPS ENDOSC DISP BSC -B: Performed by: INTERNAL MEDICINE

## 2020-01-24 PROCEDURE — 77030018712 HC DEV BLLN INFL BSC -B: Performed by: INTERNAL MEDICINE

## 2020-01-24 PROCEDURE — 74011250636 HC RX REV CODE- 250/636: Performed by: INTERNAL MEDICINE

## 2020-01-24 PROCEDURE — 74011000250 HC RX REV CODE- 250: Performed by: NURSE ANESTHETIST, CERTIFIED REGISTERED

## 2020-01-24 PROCEDURE — 74011250636 HC RX REV CODE- 250/636: Performed by: NURSE ANESTHETIST, CERTIFIED REGISTERED

## 2020-01-24 PROCEDURE — 88305 TISSUE EXAM BY PATHOLOGIST: CPT

## 2020-01-24 PROCEDURE — 76040000019: Performed by: INTERNAL MEDICINE

## 2020-01-24 PROCEDURE — 76060000031 HC ANESTHESIA FIRST 0.5 HR: Performed by: INTERNAL MEDICINE

## 2020-01-24 RX ORDER — EPINEPHRINE 0.1 MG/ML
1 INJECTION INTRACARDIAC; INTRAVENOUS
Status: DISCONTINUED | OUTPATIENT
Start: 2020-01-24 | End: 2020-01-24 | Stop reason: HOSPADM

## 2020-01-24 RX ORDER — DEXTROMETHORPHAN/PSEUDOEPHED 2.5-7.5/.8
1.2 DROPS ORAL
Status: DISCONTINUED | OUTPATIENT
Start: 2020-01-24 | End: 2020-01-24 | Stop reason: HOSPADM

## 2020-01-24 RX ORDER — LIDOCAINE HYDROCHLORIDE 20 MG/ML
INJECTION, SOLUTION EPIDURAL; INFILTRATION; INTRACAUDAL; PERINEURAL AS NEEDED
Status: DISCONTINUED | OUTPATIENT
Start: 2020-01-24 | End: 2020-01-24 | Stop reason: HOSPADM

## 2020-01-24 RX ORDER — ATROPINE SULFATE 0.1 MG/ML
0.5 INJECTION INTRAVENOUS
Status: DISCONTINUED | OUTPATIENT
Start: 2020-01-24 | End: 2020-01-24 | Stop reason: HOSPADM

## 2020-01-24 RX ORDER — PROPOFOL 10 MG/ML
INJECTION, EMULSION INTRAVENOUS AS NEEDED
Status: DISCONTINUED | OUTPATIENT
Start: 2020-01-24 | End: 2020-01-24 | Stop reason: HOSPADM

## 2020-01-24 RX ORDER — EPHEDRINE SULFATE/0.9% NACL/PF 50 MG/5 ML
SYRINGE (ML) INTRAVENOUS AS NEEDED
Status: DISCONTINUED | OUTPATIENT
Start: 2020-01-24 | End: 2020-01-24 | Stop reason: HOSPADM

## 2020-01-24 RX ORDER — SODIUM CHLORIDE 0.9 % (FLUSH) 0.9 %
5-40 SYRINGE (ML) INJECTION AS NEEDED
Status: DISCONTINUED | OUTPATIENT
Start: 2020-01-24 | End: 2020-01-24 | Stop reason: HOSPADM

## 2020-01-24 RX ORDER — FENTANYL CITRATE 50 UG/ML
25 INJECTION, SOLUTION INTRAMUSCULAR; INTRAVENOUS
Status: DISCONTINUED | OUTPATIENT
Start: 2020-01-24 | End: 2020-01-24 | Stop reason: HOSPADM

## 2020-01-24 RX ORDER — FLUMAZENIL 0.1 MG/ML
0.2 INJECTION INTRAVENOUS
Status: DISCONTINUED | OUTPATIENT
Start: 2020-01-24 | End: 2020-01-24 | Stop reason: HOSPADM

## 2020-01-24 RX ORDER — SODIUM CHLORIDE 9 MG/ML
75 INJECTION, SOLUTION INTRAVENOUS CONTINUOUS
Status: DISCONTINUED | OUTPATIENT
Start: 2020-01-24 | End: 2020-01-24 | Stop reason: HOSPADM

## 2020-01-24 RX ORDER — NALOXONE HYDROCHLORIDE 0.4 MG/ML
0.4 INJECTION, SOLUTION INTRAMUSCULAR; INTRAVENOUS; SUBCUTANEOUS
Status: DISCONTINUED | OUTPATIENT
Start: 2020-01-24 | End: 2020-01-24 | Stop reason: HOSPADM

## 2020-01-24 RX ORDER — SODIUM CHLORIDE 0.9 % (FLUSH) 0.9 %
5-40 SYRINGE (ML) INJECTION EVERY 8 HOURS
Status: DISCONTINUED | OUTPATIENT
Start: 2020-01-24 | End: 2020-01-24 | Stop reason: HOSPADM

## 2020-01-24 RX ORDER — MIDAZOLAM HYDROCHLORIDE 1 MG/ML
.25-5 INJECTION, SOLUTION INTRAMUSCULAR; INTRAVENOUS
Status: DISCONTINUED | OUTPATIENT
Start: 2020-01-24 | End: 2020-01-24 | Stop reason: HOSPADM

## 2020-01-24 RX ADMIN — PROPOFOL 20 MG: 10 INJECTION, EMULSION INTRAVENOUS at 11:49

## 2020-01-24 RX ADMIN — SODIUM CHLORIDE 75 ML/HR: 900 INJECTION, SOLUTION INTRAVENOUS at 11:08

## 2020-01-24 RX ADMIN — LIDOCAINE HYDROCHLORIDE 100 MG: 20 INJECTION, SOLUTION EPIDURAL; INFILTRATION; INTRACAUDAL; PERINEURAL at 11:47

## 2020-01-24 RX ADMIN — PROPOFOL 10 MG: 10 INJECTION, EMULSION INTRAVENOUS at 11:51

## 2020-01-24 RX ADMIN — Medication 5 MG: at 11:46

## 2020-01-24 RX ADMIN — PROPOFOL 50 MG: 10 INJECTION, EMULSION INTRAVENOUS at 11:47

## 2020-01-24 RX ADMIN — PROPOFOL 10 MG: 10 INJECTION, EMULSION INTRAVENOUS at 11:53

## 2020-01-24 NOTE — H&P
Gastroenterology Outpatient History and Physical    Patient: Luis Mason    Physician: Audra Lennox, MD    Chief Complaint: Anemia, dysphagia, melena  History of Present Illness: 83yo F w/ Anemia, dysphagia, melena    History:  Past Medical History:   Diagnosis Date    Anemia     Arthritis     Breast cancer ([facility] [address]     left    Bunion of right foot 8/20/2015    Chronic pain     back--uses tens unit    Diverticulitis 6/29/2018    Recurrent    Diverticulitis large intestine     Dry eye syndrome 6/29/2018    Fibromyalgia 6/29/2018    GERD (gastroesophageal reflux disease)     History of left breast cancer 2013    lumpectomy radiation    Hypercholesterolemia 6/29/2018    Ill-defined condition     blood transfusion hx    Leukemia ([facility] [address] [facility]     MDS (myelodysplastic syndrome) ([facility] [address] [facility]     Osteoporosis 6/29/2018    Peripheral neuropathy 6/29/2018    Prediabetes 6/29/2018    PUD (peptic ulcer disease)     Radiation therapy complication 6581    Lt breast-No Chemo    Rosacea 6/29/2018    Trouble in sleeping       Past Surgical History:   Procedure Laterality Date    HX APPENDECTOMY      HX BREAST LUMPECTOMY  11/21/2013    LEFT BREAST LUMPECTOMY W/LEFT BREAST SENTINEL NODE BIOPSY,AND NEEDLE LOCALIZATION performed by Isha Carl MD at [facility] MAIN OR    HX CATARACT REMOVAL      bilateral    HX HYSTERECTOMY      ovarian cyst    HX MOHS PROCEDURES      right    HX ORTHOPAEDIC      back surgery x2    HX OTHER SURGICAL      colonoscopy    HX TONSILLECTOMY      IR INSERT TUNL CVC W PORT OVER 5 YEARS  12/3/2019    TOTAL KNEE ARTHROPLASTY      left    TOTAL KNEE ARTHROPLASTY      right    US GUIDED CORE BREAST BIOPSY Left 2013    Breast Ca      Social History     Socioeconomic History    Marital status:      Spouse name: Not on file    Number of children: Not on file    Years of education: Not on file    Highest education level: Not on file   Tobacco Use    Smoking status: Former Smoker Packs/day: 0.50     Years: 50.00     Pack years: 25.00     Last attempt to quit: 2012     Years since quittin.9    Smokeless tobacco: Never Used   Substance and Sexual Activity    Alcohol use: Yes     Alcohol/week: 2.0 standard drinks     Types: 2 Glasses of wine per week    Drug use: No      Family History   Problem Relation Age of Onset    Cancer Mother         bladder    Cancer Father     Breast Cancer Paternal Grandmother       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    Lumbar post-laminectomy syndrome M96.1    Spinal stenosis of lumbar region with neurogenic claudication M48.062    Syncope R55    Stenosis of both internal carotid arteries I65.23    Syncope and collapse R55    S/P lumpectomy, left breast Z98.890    Costochondritis M94.0    Diverticulitis K57.92    Dry eye syndrome H04.129    Fibromyalgia M79.7    GERD without esophagitis K21.9    Hypercholesterolemia E78.00    Osteoporosis M81.0    Peripheral neuropathy G62.9    Prediabetes R73.03    Rosacea L71.9    Acute respiratory failure with hypoxia ([facility]) J96.01    Acute bronchitis due to other specified organisms J20.8    Anemia, unspecified D64.9    Thrombocytopenia ([facility]) D69.6    Iron deficiency anemia D50.9    Decompensated COPD (chronic obstructive pulmonary disease) ([facility]) J44.1    SIRS (systemic inflammatory response syndrome) ([facility]) R65.10    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    Anemia associated with bone marrow infiltration ([facility]) D61.82    Atrial fibrillation with rapid ventricular response ([facility]) I48.91    CLL (chronic lymphocytic leukemia) ([facility]) C91.10    MDS (myelodysplastic syndrome) ([facility]) D46.9       Allergies:    Allergies   Allergen Reactions    Hydrocodone Nausea Only and Vertigo    Gabapentin Diarrhea, Nausea Only and Other (comments)     weakness    Lyrica [Pregabalin] Nausea Only    Oxycodone Nausea and Vomiting and Vertigo     Medications:   Prior to Admission medications    Medication Sig Start Date End Date Taking? Authorizing Provider   ergocalciferol (ERGOCALCIFEROL) 1,250 mcg (50,000 unit) capsule TAKE ONE CAPSULE BY MOUTH EVERY 7 DAYS 1/23/20  Yes Eboni Black MD   ergocalciferol (ERGOCALCIFEROL) 1,250 mcg (50,000 unit) capsule TAKE ONE CAPSULE BY MOUTH EVERY 7 DAYS 1/23/20  Yes Eboni Black MD   acetaminophen (TYLENOL) 325 mg tablet Take 2 Tabs by mouth every four (4) hours as needed for Pain. 12/31/19  Yes Joni Fermin MD   docusate sodium (COLACE) 100 mg capsule Take 1 Cap by mouth two (2) times a day for 90 days. Patient taking differently: Take 100 mg by mouth two (2) times daily as needed. 12/17/19 3/16/20 Yes Christine Cristobal MD   ondansetron hcl [facility]) 4 mg tablet Take 1 Tab by mouth every eight (8) hours as needed for Nausea. 11/27/19  Yes Eboni Black MD   albuterol-ipratropium (DUO-NEB) 2.5 mg-0.5 mg/3 ml nebu 3 mL by Nebulization route every six (6) hours as needed (SOB). COPD J44.9 11/5/19  Yes Jak Blankenship MD   Nebulizer & Compressor machine 1 Each by Does Not Apply route daily. 11/5/19  Yes Jak Blankenship MD   dilTIAZem CD (CARDIZEM CD) 180 mg ER capsule Take 1 Cap by mouth daily. 11/5/19  Yes Jak Blankenship MD   pantoprazole (PROTONIX) 40 mg tablet Take 1 Tab by mouth Before breakfast and dinner. 11/5/19  Yes Jak Blankenship MD   budesonide (PULMICORT) 0.5 mg/2 mL nbsp 2 mL by Nebulization route two (2) times a day. 11/5/19  Yes Jak Blankenship MD   inulin (FIBER GUMMIES PO) Take 2 Gum by mouth nightly. Yes Provider, Historical   vit A/vit C/vit E/zinc/copper (PRESERVISION AREDS PO) Take 1 Tab by mouth nightly.    Yes Provider, Historical   multivitamin (ONE A DAY) tablet Take 1 Tab by mouth nightly. Yes Provider, Historical   lidocaine-prilocaine (EMLA) topical cream Apply  to affected area as needed for Pain. 11/27/19   Bri Painter MD   OXYGEN-AIR DELIVERY SYSTEMS Take 2 L/min by inhalation continuous. Provider, Historical     Physical Exam:   Vital Signs: Blood pressure 90/58, pulse 89, temperature 98 °F (36.7 °C), resp. rate 16, height 5' 5\" (1.651 m), weight 82.6 kg (182 lb), SpO2 100 %, not currently breastfeeding.   General: well developed, well nourished   HEENT: unremarkable   Heart: regular rhythm no mumur    Lungs: clear   Abdominal:  benign   Neurological: unremarkable   Extremities: no edema     Findings/Diagnosis: Anemia, dysphagia, melena  Plan of Care/Planned Procedure: EGD with conscious/deep sedation    Signed:  Eros Bonilla MD 1/24/2020

## 2020-01-24 NOTE — PROCEDURES
NAME:  Nkechi Wu   :   1935   MRN:   [id number]     Date/Time:  2020 12:03 PM    Esophagogastroduodenoscopy (EGD) Procedure Note    Procedure: Esophagogastroduodenoscopy with biopsy    Indication:  Dysphagia/odynophagia, Iron deficiency anemia, Melena/hematochezia  Pre-operative Diagnosis: see indication above  Post-operative Diagnosis: see findings below  :  Maryjo Leone MD  Referring Provider:   --Rachel Luis MD    Exam:  Airway: clear, no airway problems anticipated  Heart: RRR, without gallops or rubs  Lungs: clear bilaterally without wheezes, crackles, or rhonchi  Abdomen: soft, nontender, nondistended, bowel sounds present  Mental Status: awake, alert and oriented to person, place and time     Anethesia/Sedation:  MAC anesthesia Propofol 90mg IV  Procedure Details   After informed consent was obtained for the procedure, with all risks and benefits of procedure explained the patient was taken to the endoscopy suite and placed in the left lateral decubitus position. Following sequential administration of sedation as per above, the [id number] gastroscope was inserted into the mouth and advanced under direct vision to second portion of the duodenum. A careful inspection was made as the gastroscope was withdrawn, including a retroflexed view of the proximal stomach; findings and interventions are described below. Findings:    -Single 4mm shallow round ulceration at gastroesophageal junction at 40cm; margins biopsied in association with smooth irregularity of the gastroesophageal junction to exclude esophagitis. No mass noted. The presence of the ulcer in the esophagus means no dilatation of the esophagus should be undertaken at this time   -Normal stomach mucosa; biopsied to exclude gastritis  -Normal duodenal mucosa    Therapies:  biopsies of esophagus, stomach  Specimens: #1 gastric; #2 g-e jxn  EBL:  None.          Complications:   None; patient tolerated the procedure well. Impression:    -Single 4mm shallow round ulceration at gastroesophageal junction at 40cm; margins biopsied in association with smooth irregularity of the gastroesophageal junction to exclude esophagitis. No mass noted. The presence of the ulcer in the esophagus means no dilatation of the esophagus should be undertaken at this time   -Normal stomach mucosa; biopsied to exclude gastritis  -Normal duodenal mucosa    Recommendations:  -Continue acid suppression. , -Await pathology. , -Follow symptoms.     Discharge disposition:  Home in the company of  when able to ambulate    Fausto Valentin MD

## 2020-01-24 NOTE — TELEPHONE ENCOUNTER
Pt thinks her  blood count has dropped pt is requesting a CBC lab.  Please give pt a call back [phone]

## 2020-01-24 NOTE — ANESTHESIA PREPROCEDURE EVALUATION
Anesthetic History   No history of anesthetic complications            Review of Systems / Medical History  Patient summary reviewed, nursing notes reviewed and pertinent labs reviewed    Pulmonary    COPD            Comments: Former smoker - Quit 2012 - 25 pack years  H/O Acute Respiratory Failure   Neuro/Psych             Comments: Chronic Back Pain/Spinal Stenosis with Radiculopathy s/p lumbar surgery  Peripheral Neuropathy Cardiovascular              PAD and hyperlipidemia    Exercise tolerance: >4 METS     GI/Hepatic/Renal     GERD (occasional only): well controlled      PUD    Comments: H/O Diverticulosis/Diverticulitis Endo/Other        Obesity, arthritis, cancer (left breast) and anemia    Comments: H/O Left Breast Cancer s/p Lumpectomy + XRT  Prediabetes Other Findings   Comments: Leukocytosis  Erythroblastosis  Fibromyalgia  Rosacea  Osteoporosis           Physical Exam    Airway  Mallampati: II  TM Distance: 4 - 6 cm  Neck ROM: normal range of motion   Mouth opening: Normal     Cardiovascular    Rhythm: regular  Rate: normal      Pertinent negatives: No murmur   Dental    Dentition: Caps/crowns and Implants  Comments: Across upper front  Poor lower dentition with multiple fillings   Pulmonary  Breath sounds clear to auscultation               Abdominal  GI exam deferred       Other Findings            Anesthetic Plan    ASA: 3  Anesthesia type: total IV anesthesia and general          Induction: Intravenous  Anesthetic plan and risks discussed with: Patient      Propofol MAC/Supernova

## 2020-01-24 NOTE — PROGRESS NOTES
Anesthesia reports 90mg Propofol, 100mg Lidocaine, 5mcg Ephedrine and 400mL NS given during procedure. Received report from anesthesia staff on vital signs and status of patient.

## 2020-01-24 NOTE — ROUTINE PROCESS
Maisha Oar 1935 
[id number] Situation: 
Verbal report received from: ИРИНА Lind RN Procedure: Procedure(s): ESOPHAGOGASTRODUODENAL (EGD) BIOPSY 
ESOPHAGOGASTRODUODENOSCOPY (EGD) Background: 
 
Preoperative diagnosis: ANEMIA MELENA Postoperative diagnosis: Esophagitis with ulcer, dysphagia :  Dr. Martin Prudent Assistant(s): Endoscopy RN-1: Fiordaliza Harris; Buster Lai RN Specimens:  
ID Type Source Tests Collected by Time Destination 1 : Gastric bx Preservative Gastric  Darlin Rao MD 1/24/2020 1157 Pathology 2 : GE Junction bx Preservative   Darlin Rao MD 1/24/2020 1157 Pathology H. Pylori  no Assessment: 
Intra-procedure medications Anesthesia gave intra-procedure sedation and medications, see anesthesia flow sheet yes Intravenous fluids: NS@ Bruce Bulla Vital signs stable  yes Abdominal assessment: round and soft  yes Recommendation: 
Discharge patient per MD order  yes. Family or Mary Ann Guillermo,  Permission to share finding with family or friend yes

## 2020-01-24 NOTE — TELEPHONE ENCOUNTER
Pt is not having SOB only on extreme exertion. She is not dizzy or lightheaded. Pt reports fatigue. Pt had GI scoping today. Pt is a MDS patient and receiving tx for this, not due until 2/3/2020. Will have her come in on Monday for h/h and type and cross.

## 2020-01-24 NOTE — DISCHARGE INSTRUCTIONS
Giselle Jose  [id number]  1935    EGD DISCHARGE INSTRUCTIONS  Discomfort:  Sore throat- throat lozenges or warm salt water gargle  redness at IV site- apply warm compress to area; if redness or soreness persist- contact your physician  Gaseous discomfort- walking, belching will help relieve any discomfort  You may not operate a vehicle for 12 hours  You may not engage in an occupation involving machinery or appliances for rest of today  You may not drink alcoholic beverages for at least 12 hours  Avoid making any critical decisions for at least 24 hour  DIET  You may have minimal sips at this time-- do not eat or drink for two hours. You may eat and drink after 1230pm today  You may resume your regular diet - however -  remember your colon is empty and a heavy meal will produce gas. Avoid these foods:  vegetables, fried / greasy foods, carbonated drinks    MEDICATIONS:        ACTIVITY  You may resume your normal daily activities until tomorrow AM;  Spend the remainder of the day resting -  avoid any strenuous activity. CALL M.D. ANY SIGN OF   Increasing pain, nausea, vomiting  Abdominal distension (swelling)  New increased bleeding (oral or rectal)  Fever (chills)  Pain in chest area  Bloody discharge from nose or mouth  Shortness of breath    IMPRESSION:  -Single 4mm shallow round ulceration at gastroesophageal junction at 40cm; margins biopsied in association with smooth irregularity of the gastroesophageal junction to exclude esophagitis. No mass noted.   The presence of the ulcer in the esophagus means no dilatation of the esophagus should be undertaken at this time   -Normal stomach mucosa; biopsied to exclude gastritis  -Normal duodenal mucosa    Follow-up Instructions:   Call Dr. Bree Mae for the results of procedure / biopsy in 7-10 days   Telephone # [phone]  Continue daily pantoprazole    Chelly De Santiago MD

## 2020-01-24 NOTE — ANESTHESIA POSTPROCEDURE EVALUATION
Procedure(s):  ESOPHAGOGASTRODUODENAL (EGD) BIOPSY  ESOPHAGOGASTRODUODENOSCOPY (EGD). total IV anesthesia, general    Anesthesia Post Evaluation        Patient location during evaluation: PACU  Note status: Adequate. Level of consciousness: responsive to verbal stimuli and sleepy but conscious  Pain management: satisfactory to patient  Airway patency: patent  Anesthetic complications: no  Cardiovascular status: acceptable  Respiratory status: acceptable  Hydration status: acceptable  Comments: +Post-Anesthesia Evaluation and Assessment    Patient: Izzy Mendoza MRN: [id number]  SSN: [id number]   YOB: 1935  Age: 82 y.o. Sex: female      Cardiovascular Function/Vital Signs    /61   Pulse 84   Temp 36.3 °C (97.4 °F)   Resp 17   Ht 5' 5\" (1.651 m)   Wt 82.6 kg (182 lb)   SpO2 98%   Breastfeeding No   BMI 30.29 kg/m²     Patient is status post Procedure(s):  ESOPHAGOGASTRODUODENAL (EGD) BIOPSY  ESOPHAGOGASTRODUODENOSCOPY (EGD). Nausea/Vomiting: Controlled. Postoperative hydration reviewed and adequate. Pain:  Pain Scale 1: Numeric (0 - 10) (01/24/20 1210)  Pain Intensity 1: 2 (01/24/20 1210)   Managed. Neurological Status: At baseline. Mental Status and Level of Consciousness: Arousable. Pulmonary Status:   O2 Device: Room air (01/24/20 1222)   Adequate oxygenation and airway patent. Complications related to anesthesia: None    Post-anesthesia assessment completed. No concerns. Signed By: Shea Solis MD    1/24/2020  Post anesthesia nausea and vomiting:  controlled      Vitals Value Taken Time   /65 1/24/2020 12:25 PM   Temp 36.3 °C (97.4 °F) 1/24/2020 12:10 PM   Pulse 74 1/24/2020 12:26 PM   Resp 16 1/24/2020 12:26 PM   SpO2 100 % 1/24/2020 12:26 PM   Vitals shown include unvalidated device data.

## 2020-01-27 ENCOUNTER — HOSPITAL ENCOUNTER (OUTPATIENT)
Dept: MAMMOGRAPHY | Age: 85
Discharge: HOME | End: 2020-01-27
Attending: INTERNAL MEDICINE
Payer: MEDICARE

## 2020-01-27 ENCOUNTER — HOSPITAL ENCOUNTER (OUTPATIENT)
Dept: INFUSION THERAPY | Age: 85
Discharge: HOME | End: 2020-01-27
Payer: MEDICARE

## 2020-01-27 VITALS
OXYGEN SATURATION: 100 % | DIASTOLIC BLOOD PRESSURE: 54 MMHG | TEMPERATURE: 97.9 F | HEART RATE: 89 BPM | SYSTOLIC BLOOD PRESSURE: 134 MMHG | RESPIRATION RATE: 18 BRPM

## 2020-01-27 DIAGNOSIS — Z85.3 HISTORY OF LEFT BREAST CANCER: ICD-10-CM

## 2020-01-27 LAB
HCT VFR BLD AUTO: 22.9 % (ref 35–47)
HGB BLD-MCNC: 6.6 G/DL (ref 11.5–16)

## 2020-01-27 PROCEDURE — 86923 COMPATIBILITY TEST ELECTRIC: CPT

## 2020-01-27 PROCEDURE — 86900 BLOOD TYPING SEROLOGIC ABO: CPT

## 2020-01-27 PROCEDURE — 85018 HEMOGLOBIN: CPT

## 2020-01-27 PROCEDURE — 77030012965 HC NDL HUBR BBMI -A

## 2020-01-27 PROCEDURE — 77063 BREAST TOMOSYNTHESIS BI: CPT

## 2020-01-27 PROCEDURE — 36415 COLL VENOUS BLD VENIPUNCTURE: CPT

## 2020-01-27 PROCEDURE — 36591 DRAW BLOOD OFF VENOUS DEVICE: CPT

## 2020-01-27 PROCEDURE — 74011250636 HC RX REV CODE- 250/636: Performed by: INTERNAL MEDICINE

## 2020-01-27 RX ORDER — DIPHENHYDRAMINE HCL 25 MG
25 CAPSULE ORAL ONCE
Status: COMPLETED | OUTPATIENT
Start: 2020-01-28 | End: 2020-01-28

## 2020-01-27 RX ORDER — SODIUM CHLORIDE 0.9 % (FLUSH) 0.9 %
5-10 SYRINGE (ML) INJECTION AS NEEDED
Status: DISCONTINUED | OUTPATIENT
Start: 2020-01-27 | End: 2020-01-28 | Stop reason: HOSPADM

## 2020-01-27 RX ORDER — HEPARIN 100 UNIT/ML
500 SYRINGE INTRAVENOUS AS NEEDED
Status: DISCONTINUED | OUTPATIENT
Start: 2020-01-27 | End: 2020-01-28 | Stop reason: HOSPADM

## 2020-01-27 RX ORDER — ACETAMINOPHEN 325 MG/1
650 TABLET ORAL ONCE
Status: COMPLETED | OUTPATIENT
Start: 2020-01-28 | End: 2020-01-28

## 2020-01-27 RX ADMIN — Medication 500 UNITS: at 08:50

## 2020-01-27 RX ADMIN — Medication 10 ML: at 08:48

## 2020-01-27 RX ADMIN — Medication 10 ML: at 08:50

## 2020-01-27 NOTE — PROGRESS NOTES
[facility] short consult note:    0838 Pt arrived to [facility] ambulatory and in no distress for labs. Phlebotomist attempted lab draw. Port accessed per protocol. Labs drawn. Port flushed and de-accessed. /54   Pulse 89   Temp 97.9 °F (36.6 °C)   Resp 18   SpO2 100%      Recent Results (from the past 12 hour(s))   HGB & HCT    Collection Time: 01/27/20  8:39 AM   Result Value Ref Range    HGB 6.6 (L) 11.5 - 16.0 g/dL    HCT 22.9 (L) 35.0 - 47.0 %     Medication given:  None    0850 Discharged home ambulatory and in no distress. Tolerated procedure well. Next appointment 2/3 @ [facility]. Transfusion to be scheduled.

## 2020-01-28 ENCOUNTER — HOSPITAL ENCOUNTER (OUTPATIENT)
Dept: INFUSION THERAPY | Age: 85
Discharge: HOME | End: 2020-01-28
Payer: MEDICARE

## 2020-01-28 VITALS
RESPIRATION RATE: 18 BRPM | DIASTOLIC BLOOD PRESSURE: 65 MMHG | OXYGEN SATURATION: 96 % | SYSTOLIC BLOOD PRESSURE: 125 MMHG | HEART RATE: 70 BPM | BODY MASS INDEX: 30.44 KG/M2 | WEIGHT: 182.9 LBS | TEMPERATURE: 97.8 F

## 2020-01-28 PROCEDURE — 74011000250 HC RX REV CODE- 250: Performed by: NURSE PRACTITIONER

## 2020-01-28 PROCEDURE — 77030013169 SET IV BLD ICUM -A

## 2020-01-28 PROCEDURE — 36430 TRANSFUSION BLD/BLD COMPNT: CPT

## 2020-01-28 PROCEDURE — P9016 RBC LEUKOCYTES REDUCED: HCPCS

## 2020-01-28 PROCEDURE — 74011250637 HC RX REV CODE- 250/637: Performed by: NURSE PRACTITIONER

## 2020-01-28 PROCEDURE — 77030012965 HC NDL HUBR BBMI -A

## 2020-01-28 PROCEDURE — 86644 CMV ANTIBODY: CPT

## 2020-01-28 PROCEDURE — 74011250636 HC RX REV CODE- 250/636: Performed by: NURSE PRACTITIONER

## 2020-01-28 RX ORDER — SODIUM CHLORIDE 9 MG/ML
10 INJECTION INTRAMUSCULAR; INTRAVENOUS; SUBCUTANEOUS AS NEEDED
Status: DISCONTINUED | OUTPATIENT
Start: 2020-01-28 | End: 2020-01-29 | Stop reason: HOSPADM

## 2020-01-28 RX ORDER — HEPARIN 100 UNIT/ML
500 SYRINGE INTRAVENOUS AS NEEDED
Status: DISCONTINUED | OUTPATIENT
Start: 2020-01-28 | End: 2020-01-29 | Stop reason: HOSPADM

## 2020-01-28 RX ORDER — SODIUM CHLORIDE 9 MG/ML
250 INJECTION, SOLUTION INTRAVENOUS AS NEEDED
Status: DISCONTINUED | OUTPATIENT
Start: 2020-01-28 | End: 2020-02-01 | Stop reason: HOSPADM

## 2020-01-28 RX ORDER — SODIUM CHLORIDE 0.9 % (FLUSH) 0.9 %
10-40 SYRINGE (ML) INJECTION AS NEEDED
Status: DISCONTINUED | OUTPATIENT
Start: 2020-01-28 | End: 2020-01-29 | Stop reason: HOSPADM

## 2020-01-28 RX ADMIN — ACETAMINOPHEN 650 MG: 325 TABLET ORAL at 08:57

## 2020-01-28 RX ADMIN — SODIUM CHLORIDE 10 ML: 9 INJECTION INTRAMUSCULAR; INTRAVENOUS; SUBCUTANEOUS at 08:52

## 2020-01-28 RX ADMIN — SODIUM CHLORIDE 250 ML: 900 INJECTION, SOLUTION INTRAVENOUS at 08:52

## 2020-01-28 RX ADMIN — DIPHENHYDRAMINE HYDROCHLORIDE 25 MG: 25 CAPSULE ORAL at 08:57

## 2020-01-28 RX ADMIN — Medication 500 UNITS: at 15:35

## 2020-01-28 RX ADMIN — Medication 10 ML: at 15:35

## 2020-01-28 NOTE — PROGRESS NOTES
Outpatient Infusion Center Progress Note    4271  Pt admit to [facility] for Blood Transfusion ambulatory in stable condition. Assessment completed. Pt reports occasional lightheadedness, SOB with exertion, and fatigue/weakness. Port accessed with 20 gauge 0.75 inch rene needle with positive blood return. Medications:  NS  Tylenol PO  Benadryl PO    0955 First unit initiated. 1155 First unit completed. 1310 Second unit initiated. 1525 Second unit completed. Pt monitored post transfusion with no s/s of reaction, pt declined full 60 minute monitoring. Educated and provided with written material regarding s/s of delayed reaction to watch for at home. Patient Vitals for the past 12 hrs:   Temp Pulse Resp BP SpO2   01/28/20 1525 97.8 °F (36.6 °C) 70 18 125/65    01/28/20 1510 97.3 °F (36.3 °C) 68 16 115/59    01/28/20 1410 97.5 °F (36.4 °C) 64 16 104/55    01/28/20 1340 97.3 °F (36.3 °C) 65 16 104/54    01/28/20 1325 98.5 °F (36.9 °C) 67 16 110/53    01/28/20 1302 96.9 °F (36.1 °C) 78 16 130/56    01/28/20 1155 97.5 °F (36.4 °C) 69 16 97/48    01/28/20 1055 98.2 °F (36.8 °C) 64 16 97/58    01/28/20 1025 98.6 °F (37 °C) 70 16 98/58    01/28/20 1010 97.5 °F (36.4 °C) 71 16 99/56    01/28/20 0950 97.6 °F (36.4 °C) 68 18 104/60 96 %   01/28/20 0858 97.7 °F (36.5 °C) 86 16 120/66 99 %       1540  Pt tolerated treatment well. Port flushed and deaccessed per [employer]. D/c home ambulatory in no distress. Pt aware of next appt scheduled for 2/3/20 at 1 pm at [facility].

## 2020-01-28 NOTE — PROGRESS NOTES
OUTPATIENT INFUSION CENTER    DISCHARGE INSTRUCTIONS FOR:  BLOOD TRANSFUSION    We hope you are feeling better after your blood transfusion. Some mild tenderness or slight bruising at your IV site is normal.  Avoid lifting or heavy use of that extremity for the rest of the day. Drink plenty of fluids, eat a normal diet and get some rest.    There are some important signs that you need to watch for in case you experience a delayed reaction to the blood you have received. Call your physician immediately if you develop any of the following symptoms:    1. Severe headache or backache;    2. Fever above 100 degrees;    3. Chills;    4. Difficulty breathing;    5.  Blood or red color in urine;    6. The feeling of weakness or constant fatigue;    7. Yellowing of the whites of your eyes or skin (jaundice). If your physician is not available, call or go to the nearest emergency room, or dial 911.     Randall Mon, Signature: ___________________________ 1/28/2020  Karol Grover RN

## 2020-01-30 ENCOUNTER — OFFICE VISIT (OUTPATIENT)
Dept: INTERNAL MEDICINE CLINIC | Age: 85
End: 2020-01-30

## 2020-01-30 VITALS
DIASTOLIC BLOOD PRESSURE: 82 MMHG | TEMPERATURE: 97.8 F | HEART RATE: 67 BPM | RESPIRATION RATE: 16 BRPM | BODY MASS INDEX: 30.82 KG/M2 | SYSTOLIC BLOOD PRESSURE: 136 MMHG | HEIGHT: 65 IN | OXYGEN SATURATION: 99 % | WEIGHT: 185 LBS

## 2020-01-30 DIAGNOSIS — K57.92 DIVERTICULITIS: ICD-10-CM

## 2020-01-30 DIAGNOSIS — K21.9 GERD WITHOUT ESOPHAGITIS: Chronic | ICD-10-CM

## 2020-01-30 DIAGNOSIS — E78.00 HYPERCHOLESTEROLEMIA: Chronic | ICD-10-CM

## 2020-01-30 DIAGNOSIS — R73.03 PREDIABETES: Primary | Chronic | ICD-10-CM

## 2020-01-30 DIAGNOSIS — D46.9 MDS (MYELODYSPLASTIC SYNDROME) (HCC): Chronic | ICD-10-CM

## 2020-01-30 DIAGNOSIS — K27.9 PUD (PEPTIC ULCER DISEASE): Chronic | ICD-10-CM

## 2020-01-30 NOTE — PROGRESS NOTES
Nkechi Wu is a 82 y.o. female presenting for Follow Up Chronic Condition (6 mo fu)  . 1. Have you been to the ER, urgent care clinic since your last visit? Hospitalized since your last visit? No    2. Have you seen or consulted any other health care providers outside of the [address] [place] [address] since your last visit? Include any pap smears or colon screening. Dr Jordan Brar, last 12 mths 1/9/2020   Able to walk? Yes   Fall in past 12 months? Yes   Fall with injury? -   Number of falls in past 12 months 1   Fall Risk Score -         Abuse Screening Questionnaire 1/9/2020   Do you ever feel afraid of your partner? N   Are you in a relationship with someone who physically or mentally threatens you? N   Is it safe for you to go home? Y       3 most recent PHQ Screens 1/9/2020   Little interest or pleasure in doing things Not at all   Feeling down, depressed, irritable, or hopeless Not at all   Total Score PHQ 2 0       There are no discontinued medications.

## 2020-01-30 NOTE — PROGRESS NOTES
This note will not be viewable in [address] Wilma Yeung is a 82 y.o. female and presents with Follow Up Chronic Condition (6 mo fu)  . Subjective:  Mrs. Saad Taylor returns to the office today in follow-up of multiple medical problems. The patient was last seen here following a hospitalization for diverticulitis with hemorrhage. The patient has been on a low fiber diet over the last month and has had no further bleeding. She denies any abdominal pain. The patient has a history of prediabetes however blood sugars have been well controlled. She has had some recent weight loss which may have contributed to helping with this. She does have a peripheral neuropathy. She is using a cane. The patient denies polyuria, polydipsia or blurred vision. She has hypercholesterolemia now being managed with diet. Her last LDL was in the 70s. She has no history of coronary artery disease and denies exertional chest pains or claudication. She has GERD and was recently found to have a small ulcer at the GE junction. She is on Protonix at the present time and has been doing better. She denies dysphagia. Vitamin D has been supplemented and her last level was normal.  She has had no recent falls. She has myelodysplastic syndrome currently being followed by heme-onc. She receives an infusion 5 days out of each month. She has had multiple recent transfusions of red blood cells due to recurrent anemia. The patient denies any shortness of breath, PND or orthopnea. She has had no fevers, night sweats or recent drop in weight.     Past Medical History:   Diagnosis Date    Anemia     Arthritis     Breast cancer ([address]     left    Bunion of right foot 8/20/2015    Chronic pain     back--uses tens unit    Diverticulitis 6/29/2018    Recurrent    Diverticulitis large intestine     Dry eye syndrome 6/29/2018    Fibromyalgia 6/29/2018    GERD (gastroesophageal reflux disease)     History of left breast cancer 2013    lumpectomy radiation    Hypercholesterolemia 6/29/2018    Ill-defined condition     blood transfusion hx    [facility]     MDS (myelodysplastic syndrome) ([facility]     Osteoporosis 6/29/2018    Peripheral neuropathy 6/29/2018    Prediabetes 6/29/2018    PUD (peptic ulcer disease)     Radiation therapy complication 8184    Lt breast-No Chemo    Rosacea 6/29/2018    Trouble in sleeping      Past Surgical History:   Procedure Laterality Date    HX APPENDECTOMY      HX BREAST LUMPECTOMY  11/21/2013    LEFT BREAST LUMPECTOMY W/LEFT BREAST SENTINEL NODE BIOPSY,AND NEEDLE LOCALIZATION performed by Lito Justice MD at [facility] MAIN OR    HX CATARACT REMOVAL      bilateral    HX HYSTERECTOMY      ovarian cyst    HX MOHS PROCEDURES      right    HX ORTHOPAEDIC      back surgery x2    HX OTHER SURGICAL      colonoscopy    HX TONSILLECTOMY      IR INSERT TUNL CVC W PORT OVER 5 YEARS  12/3/2019    TOTAL KNEE ARTHROPLASTY      left    TOTAL KNEE ARTHROPLASTY      right    UPPER GI ENDOSCOPY,BIOPSY  1/24/2020         US GUIDED CORE BREAST BIOPSY Left 2013    Breast Ca     Allergies   Allergen Reactions    Hydrocodone Nausea Only and Vertigo    Gabapentin Diarrhea, Nausea Only and Other (comments)     weakness    Lyrica [Pregabalin] Nausea Only    Oxycodone Nausea and Vomiting and Vertigo     Current Outpatient Medications   Medication Sig Dispense Refill    ergocalciferol (ERGOCALCIFEROL) 1,250 mcg (50,000 unit) capsule TAKE ONE CAPSULE BY MOUTH EVERY 7 DAYS 12 Cap 1    acetaminophen (TYLENOL) 325 mg tablet Take 2 Tabs by mouth every four (4) hours as needed for Pain. 20 Tab 0    dilTIAZem CD (CARDIZEM CD) 180 mg ER capsule Take 1 Cap by mouth daily. 30 Cap 1    pantoprazole (PROTONIX) 40 mg tablet Take 1 Tab by mouth Before breakfast and dinner.  60 Tab 1     Facility-Administered Medications Ordered in Other Visits   Medication Dose Route Frequency Provider Last Rate Last Dose    0.9% sodium chloride infusion 250 mL  250 mL IntraVENous PRN Mynor Elizondo NP   Stopped at 20 1535     Social History     Socioeconomic History    Marital status:      Spouse name: Not on file    Number of children: Not on file    Years of education: Not on file    Highest education level: Not on file   Tobacco Use    Smoking status: Former Smoker     Packs/day: 0.50     Years: 50.00     Pack years: 25.00     Last attempt to quit: 2012     Years since quittin.9    Smokeless tobacco: Never Used   Substance and Sexual Activity    Alcohol use:  Yes     Alcohol/week: 2.0 standard drinks     Types: 2 Glasses of wine per week    Drug use: No     Family History   Problem Relation Age of Onset    Cancer Mother         bladder    Cancer Father     Breast Cancer Paternal Grandmother        Health Maintenance   Topic Date Due    GLAUCOMA SCREENING Q2Y  02/15/2020    MEDICARE YEARLY EXAM  2020    DTaP/Tdap/Td series (3 - Td) 2029    Bone Densitometry (Dexa) Screening  Completed    Shingrix Vaccine Age 50>  Completed    Influenza Age 5 to Adult  Completed    Pneumococcal 65+ years  Completed        Review of Systems  Constitutional: negative for fevers, chills, anorexia and weight loss  Eyes:   negative for visual disturbance and irritation  ENT:   negative for tinnitus,sore throat,nasal congestion,ear pain,hoarseness  Respiratory:  negative for cough, hemoptysis, dyspnea,wheezing  CV:   negative for chest pain, palpitations, lower extremity edema  GI:   negative for nausea, vomiting, diarrhea, abdominal pain,melena  Endo:               negative for polyuria,polydipsia,polyphagia,heat intolerance  Genitourinary: negative for frequency, dysuria and hematuria  Integumentary: negative for rash and pruritus  Hematologic:  negative for easy bruising and gum/nose bleeding  Musculoskel: negative for myalgias, arthralgias, back pain, muscle weakness, joint pain  Neurological:  negative for headaches, dizziness, vertigo, memory problems and gait   Behavl/Psych: negative for feelings of anxiety, depression, mood changes  ROS otherwise negative      Objective:  Visit Vitals  /82 (BP 1 Location: Right arm, BP Patient Position: Sitting)   Pulse 67   Temp 97.8 °F (36.6 °C) (Oral)   Resp 16   Ht 5' 5\" (1.651 m)   Wt 185 lb (83.9 kg)   SpO2 99%   BMI 30.79 kg/m²     Body mass index is 30.79 kg/m². Physical Exam:   General appearance - alert, well appearing, and in no distress  Mental status - alert, oriented to person, place, and time  EYE-HOWARD, EOMI,conjunctiva normal bilaterally, lids normal  ENT-ENT exam normal, no neck nodes or sinus tenderness  Nose - normal and patent, no erythema,  Or discharge   Mouth - mucous membranes moist, pharynx normal without lesions  Neck - supple, no significant adenopathy or bruit  Chest - clear to auscultation, no wheezes, rales or rhonchi. Heart - normal rate, regular rhythm, normal S1, S2, no murmurs, rubs, clicks or gallops   Abdomen - soft, nontender, nondistended, no masses or organomegaly  Lymph- no adenopathy palpable  Ext-peripheral pulses normal, no pedal edema, no clubbing or cyanosis  Skin-Warm and dry. no hyperpigmentation, vitiligo, or suspicious lesions  Neuro -alert, oriented, normal speech, no focal findings or movement disorder noted      Assessment/Plan:  Diagnoses and all orders for this visit:    Prediabetes    Hypercholesterolemia    MDS (myelodysplastic syndrome) [address] [address]    GERD without esophagitis    PUD (peptic ulcer disease)    Diverticulitis        Other instructions: The patient's medications were reviewed and reconciled. No change in her current medical regimen will be made. She will transition her diet from a low fiber to a normal fiber containing diet as she has been doing well from her diverticulitis over the last month.     Continued follow-up with heme-onc regarding myelodysplastic syndrome, need for transfusions, etc.    Follow-up in 3 months    Follow-up and Dispositions    · Return in about 3 months (around 4/30/2020). I have reviewed with the patient details of the assessment and plan and all questions were answered. Relevent patient education was performed. The most recent lab findings were reviewed with the patient. An After Visit Summary was printed and given to the patient. Pari Cantu MD    Please note that this dictation was completed with [employer], the computer voice recognition software. Quite often unanticipated grammatical, syntax, homophones, and other interpretive errors are inadvertently transcribed by the computer software. Please disregard these errors. Please excuse any errors that have escaped final proofreading.

## 2020-01-30 NOTE — PATIENT INSTRUCTIONS

## 2020-02-03 ENCOUNTER — HOSPITAL ENCOUNTER (OUTPATIENT)
Dept: INFUSION THERAPY | Age: 85
Discharge: HOME | End: 2020-02-03
Payer: MEDICARE

## 2020-02-03 VITALS
RESPIRATION RATE: 18 BRPM | WEIGHT: 186 LBS | HEART RATE: 78 BPM | DIASTOLIC BLOOD PRESSURE: 43 MMHG | BODY MASS INDEX: 30.99 KG/M2 | TEMPERATURE: 98.2 F | SYSTOLIC BLOOD PRESSURE: 124 MMHG | OXYGEN SATURATION: 97 % | HEIGHT: 65 IN

## 2020-02-03 DIAGNOSIS — D46.9 MDS (MYELODYSPLASTIC SYNDROME) (HCC): Primary | ICD-10-CM

## 2020-02-03 LAB
ALBUMIN SERPL-MCNC: 3.7 G/DL (ref 3.5–5)
ALBUMIN/GLOB SERPL: 1.2 {RATIO} (ref 1.1–2.2)
ALP SERPL-CCNC: 57 U/L (ref 45–117)
ALT SERPL-CCNC: 15 U/L (ref 12–78)
ANION GAP SERPL CALC-SCNC: 10 MMOL/L (ref 5–15)
AST SERPL-CCNC: 12 U/L (ref 15–37)
BASOPHILS # BLD: 0 K/UL (ref 0–0.1)
BASOPHILS NFR BLD: 0 % (ref 0–1)
BILIRUB SERPL-MCNC: 0.8 MG/DL (ref 0.2–1)
BUN SERPL-MCNC: 11 MG/DL (ref 6–20)
BUN/CREAT SERPL: 15 (ref 12–20)
CALCIUM SERPL-MCNC: 8.3 MG/DL (ref 8.5–10.1)
CHLORIDE SERPL-SCNC: 107 MMOL/L (ref 97–108)
CO2 SERPL-SCNC: 24 MMOL/L (ref 21–32)
CREAT SERPL-MCNC: 0.72 MG/DL (ref 0.55–1.02)
DIFFERENTIAL METHOD BLD: ABNORMAL
EOSINOPHIL # BLD: 0.4 K/UL (ref 0–0.4)
EOSINOPHIL NFR BLD: 3 % (ref 0–7)
ERYTHROCYTE [DISTWIDTH] IN BLOOD BY AUTOMATED COUNT: 19.3 % (ref 11.5–14.5)
GLOBULIN SER CALC-MCNC: 3 G/DL (ref 2–4)
GLUCOSE SERPL-MCNC: 99 MG/DL (ref 65–100)
HCT VFR BLD AUTO: 27.9 % (ref 35–47)
HGB BLD-MCNC: 8.2 G/DL (ref 11.5–16)
IMM GRANULOCYTES # BLD AUTO: 0 K/UL
IMM GRANULOCYTES NFR BLD AUTO: 0 %
LYMPHOCYTES # BLD: 4.1 K/UL (ref 0.8–3.5)
LYMPHOCYTES NFR BLD: 28 % (ref 12–49)
MCH RBC QN AUTO: 29 PG (ref 26–34)
MCHC RBC AUTO-ENTMCNC: 29.4 G/DL (ref 30–36.5)
MCV RBC AUTO: 98.6 FL (ref 80–99)
MONOCYTES # BLD: 3.5 K/UL (ref 0–1)
MONOCYTES NFR BLD: 24 % (ref 5–13)
NEUTS SEG # BLD: 6.7 K/UL (ref 1.8–8)
NEUTS SEG NFR BLD: 45 % (ref 32–75)
NRBC # BLD: 0.06 K/UL (ref 0–0.01)
NRBC BLD-RTO: 0.4 PER 100 WBC
PLATELET # BLD AUTO: 167 K/UL (ref 150–400)
PMV BLD AUTO: 9.7 FL (ref 8.9–12.9)
POTASSIUM SERPL-SCNC: 4 MMOL/L (ref 3.5–5.1)
PROT SERPL-MCNC: 6.7 G/DL (ref 6.4–8.2)
RBC # BLD AUTO: 2.83 M/UL (ref 3.8–5.2)
RBC MORPH BLD: ABNORMAL
SODIUM SERPL-SCNC: 141 MMOL/L (ref 136–145)
WBC # BLD AUTO: 14.7 K/UL (ref 3.6–11)

## 2020-02-03 PROCEDURE — 85025 COMPLETE CBC W/AUTO DIFF WBC: CPT

## 2020-02-03 PROCEDURE — 80053 COMPREHEN METABOLIC PANEL: CPT

## 2020-02-03 PROCEDURE — 74011000258 HC RX REV CODE- 258: Performed by: INTERNAL MEDICINE

## 2020-02-03 PROCEDURE — 96409 CHEMO IV PUSH SNGL DRUG: CPT

## 2020-02-03 PROCEDURE — 74011250636 HC RX REV CODE- 250/636: Performed by: INTERNAL MEDICINE

## 2020-02-03 PROCEDURE — 96375 TX/PRO/DX INJ NEW DRUG ADDON: CPT

## 2020-02-03 PROCEDURE — 77030012965 HC NDL HUBR BBMI -A

## 2020-02-03 PROCEDURE — 36415 COLL VENOUS BLD VENIPUNCTURE: CPT

## 2020-02-03 RX ORDER — HEPARIN 100 UNIT/ML
300-500 SYRINGE INTRAVENOUS AS NEEDED
Status: DISCONTINUED | OUTPATIENT
Start: 2020-02-03 | End: 2020-02-04 | Stop reason: HOSPADM

## 2020-02-03 RX ORDER — SODIUM CHLORIDE 9 MG/ML
25 INJECTION, SOLUTION INTRAVENOUS CONTINUOUS
Status: DISCONTINUED | OUTPATIENT
Start: 2020-02-03 | End: 2020-02-04 | Stop reason: HOSPADM

## 2020-02-03 RX ORDER — DEXAMETHASONE SODIUM PHOSPHATE 4 MG/ML
8 INJECTION, SOLUTION INTRA-ARTICULAR; INTRALESIONAL; INTRAMUSCULAR; INTRAVENOUS; SOFT TISSUE ONCE
Status: COMPLETED | OUTPATIENT
Start: 2020-02-03 | End: 2020-02-03

## 2020-02-03 RX ORDER — SODIUM CHLORIDE 9 MG/ML
10 INJECTION INTRAMUSCULAR; INTRAVENOUS; SUBCUTANEOUS AS NEEDED
Status: DISCONTINUED | OUTPATIENT
Start: 2020-02-03 | End: 2020-02-04 | Stop reason: HOSPADM

## 2020-02-03 RX ORDER — ONDANSETRON 2 MG/ML
8 INJECTION INTRAMUSCULAR; INTRAVENOUS ONCE
Status: COMPLETED | OUTPATIENT
Start: 2020-02-03 | End: 2020-02-03

## 2020-02-03 RX ADMIN — HEPARIN 300 UNITS: 100 SYRINGE at 14:18

## 2020-02-03 RX ADMIN — AZACITIDINE 149 MG: 100 INJECTION, POWDER, LYOPHILIZED, FOR SOLUTION INTRAVENOUS; SUBCUTANEOUS at 14:03

## 2020-02-03 RX ADMIN — SODIUM CHLORIDE 25 ML/HR: 900 INJECTION, SOLUTION INTRAVENOUS at 13:35

## 2020-02-03 RX ADMIN — SODIUM CHLORIDE 10 ML: 9 INJECTION INTRAMUSCULAR; INTRAVENOUS; SUBCUTANEOUS at 14:17

## 2020-02-03 RX ADMIN — ONDANSETRON HYDROCHLORIDE 8 MG: 2 INJECTION INTRAMUSCULAR; INTRAVENOUS at 13:36

## 2020-02-03 RX ADMIN — DEXAMETHASONE SODIUM PHOSPHATE 8 MG: 4 INJECTION, SOLUTION INTRAMUSCULAR; INTRAVENOUS at 13:48

## 2020-02-03 NOTE — PROGRESS NOTES
[facility] Progress Note    Date: February 3, 2020    Name: Liseth Hernandez    MRN: [id number]         : 1935    Ms. Reji Fierro Arrived ambulatory and in no distress for Vidaza cycle 3 day 1. Assessment was completed, no acute issues at this time, no new complaints voiced. Port accessed without difficulty, labs drawn and in process. Chemotherapy Flowsheet 2/3/2020   Cycle C3D1   Date 2/3/2020   Drug / Regimen Vidaza   Pre Meds given   Notes given       Patient Vitals for the past 12 hrs:   Temp Pulse Resp BP SpO2   20 1227 98.2 °F (36.8 °C) 78 18 124/43 97 %     Lab results were obtained and reviewed. Recent Results (from the past 12 hour(s))   CBC WITH AUTOMATED DIFF    Collection Time: 20 12:32 PM   Result Value Ref Range    WBC 14.7 (H) 3.6 - 11.0 K/uL    RBC 2.83 (L) 3.80 - 5.20 M/uL    HGB 8.2 (L) 11.5 - 16.0 g/dL    HCT 27.9 (L) 35.0 - 47.0 %    MCV 98.6 80.0 - 99.0 FL    MCH 29.0 26.0 - 34.0 PG    MCHC 29.4 (L) 30.0 - 36.5 g/dL    RDW 19.3 (H) 11.5 - 14.5 %    PLATELET 515 682 - 139 K/uL    MPV 9.7 8.9 - 12.9 FL    NRBC 0.4 (H) 0  WBC    ABSOLUTE NRBC 0.06 (H) 0.00 - 0.01 K/uL    NEUTROPHILS 45 32 - 75 %    LYMPHOCYTES 28 12 - 49 %    MONOCYTES 24 (H) 5 - 13 %    EOSINOPHILS 3 0 - 7 %    BASOPHILS 0 0 - 1 %    IMMATURE GRANULOCYTES 0 %    ABS. NEUTROPHILS 6.7 1.8 - 8.0 K/UL    ABS. LYMPHOCYTES 4.1 (H) 0.8 - 3.5 K/UL    ABS. MONOCYTES 3.5 (H) 0.0 - 1.0 K/UL    ABS. EOSINOPHILS 0.4 0.0 - 0.4 K/UL    ABS. BASOPHILS 0.0 0.0 - 0.1 K/UL    ABS. IMM.  GRANS. 0.0 K/UL    DF MANUAL      RBC COMMENTS NORMOCYTIC, NORMOCHROMIC     METABOLIC PANEL, COMPREHENSIVE    Collection Time: 20 12:32 PM   Result Value Ref Range    Sodium 141 136 - 145 mmol/L    Potassium 4.0 3.5 - 5.1 mmol/L    Chloride 107 97 - 108 mmol/L    CO2 24 21 - 32 mmol/L    Anion gap 10 5 - 15 mmol/L    Glucose 99 65 - 100 mg/dL    BUN 11 6 - 20 MG/DL    Creatinine 0.72 0.55 - 1.02 MG/DL    BUN/Creatinine ratio 15 12 - 20      GFR est AA >60 >60 ml/min/1.73m2    GFR est non-AA >60 >60 ml/min/1.73m2    Calcium 8.3 (L) 8.5 - 10.1 MG/DL    Bilirubin, total 0.8 0.2 - 1.0 MG/DL    ALT (SGPT) 15 12 - 78 U/L    AST (SGOT) 12 (L) 15 - 37 U/L    Alk. phosphatase 57 45 - 117 U/L    Protein, total 6.7 6.4 - 8.2 g/dL    Albumin 3.7 3.5 - 5.0 g/dL    Globulin 3.0 2.0 - 4.0 g/dL    A-G Ratio 1.2 1.1 - 2.2         Pre-medications  were administered as ordered and chemotherapy was initiated. Medications Administered     0.9% sodium chloride infusion     Admin Date  02/03/2020 Action  New Bag Dose  25 mL/hr Rate  25 mL/hr Route  IntraVENous Administered By  Shelbi Blanco RN          0.9% sodium chloride injection 10 mL     Admin Date  02/03/2020 Action  Given Dose  10 mL Route  IntraVENous Administered By  Shelbi Blanco RN          azaCITIDine (VIDAZA) 149 mg in 0.9% sodium chloride 100 mL, overfill volume 10 mL chemo infusion     Admin Date  02/03/2020 Action  New Bag Dose  149 mg Rate  749.4 mL/hr Route  IntraVENous Administered By  Andie Bolanos RN          dexamethasone (DECADRON) 4 mg/mL injection 8 mg     Admin Date  02/03/2020 Action  Given Dose  8 mg Route  IntraVENous Administered By  Shelbi Blanco RN          heparin (porcine) pf 300-500 Units     Admin Date  02/03/2020 Action  Given Dose  300 Units Route  InterCATHeter Administered By  Shelbi Blanco RN          ondansetron [facility]) injection 8 mg     Admin Date  02/03/2020 Action  Given Dose  8 mg Route  IntraVENous Administered By  Shelbi Blanco, RN              Ms. Reji Fierro tolerated treatment well and was discharged from [place] [address] in stable condition at 1425. She is to return on 02/04/20 at 1300 for her next appointment.     Future Appointments   Date Time Provider Anju Dickson   2/4/2020  1:00 PM [facility] INFUSION NURSE 2 [address] [facility]   2/5/2020  8:00 AM [facility]   2/6/2020  8:00 AM [facility] INFUSION NURSE [address] [place] [address]   2/6/2020  9:00 AM Jose A Machuca MD [address]   2/7/2020  8:00 AM [facility] INFUSION NURSE 2 [address] [facility]   5/8/2020  8:30 AM Moisés Jesus MD [address]   6/25/2020  3:40 PM Bing Mcneil DO [address], RN  February 3, 2020

## 2020-02-04 ENCOUNTER — HOSPITAL ENCOUNTER (OUTPATIENT)
Dept: INFUSION THERAPY | Age: 85
Discharge: HOME | End: 2020-02-04
Payer: MEDICARE

## 2020-02-04 VITALS
DIASTOLIC BLOOD PRESSURE: 63 MMHG | RESPIRATION RATE: 18 BRPM | OXYGEN SATURATION: 96 % | HEART RATE: 99 BPM | TEMPERATURE: 98.2 F | SYSTOLIC BLOOD PRESSURE: 123 MMHG

## 2020-02-04 DIAGNOSIS — D46.9 MDS (MYELODYSPLASTIC SYNDROME) (HCC): Primary | ICD-10-CM

## 2020-02-04 PROCEDURE — 74011250636 HC RX REV CODE- 250/636: Performed by: INTERNAL MEDICINE

## 2020-02-04 PROCEDURE — 74011000258 HC RX REV CODE- 258: Performed by: INTERNAL MEDICINE

## 2020-02-04 PROCEDURE — 96409 CHEMO IV PUSH SNGL DRUG: CPT

## 2020-02-04 PROCEDURE — 96375 TX/PRO/DX INJ NEW DRUG ADDON: CPT

## 2020-02-04 RX ORDER — SODIUM CHLORIDE 9 MG/ML
25 INJECTION, SOLUTION INTRAVENOUS CONTINUOUS
Status: DISCONTINUED | OUTPATIENT
Start: 2020-02-04 | End: 2020-02-05 | Stop reason: HOSPADM

## 2020-02-04 RX ORDER — SODIUM CHLORIDE 9 MG/ML
10 INJECTION INTRAMUSCULAR; INTRAVENOUS; SUBCUTANEOUS AS NEEDED
Status: DISCONTINUED | OUTPATIENT
Start: 2020-02-04 | End: 2020-02-05 | Stop reason: HOSPADM

## 2020-02-04 RX ORDER — ONDANSETRON 2 MG/ML
8 INJECTION INTRAMUSCULAR; INTRAVENOUS ONCE
Status: COMPLETED | OUTPATIENT
Start: 2020-02-04 | End: 2020-02-04

## 2020-02-04 RX ORDER — HEPARIN 100 UNIT/ML
300-500 SYRINGE INTRAVENOUS AS NEEDED
Status: DISCONTINUED | OUTPATIENT
Start: 2020-02-04 | End: 2020-02-05 | Stop reason: HOSPADM

## 2020-02-04 RX ORDER — DEXAMETHASONE SODIUM PHOSPHATE 4 MG/ML
8 INJECTION, SOLUTION INTRA-ARTICULAR; INTRALESIONAL; INTRAMUSCULAR; INTRAVENOUS; SOFT TISSUE ONCE
Status: COMPLETED | OUTPATIENT
Start: 2020-02-04 | End: 2020-02-04

## 2020-02-04 RX ADMIN — SODIUM CHLORIDE 10 ML: 9 INJECTION INTRAMUSCULAR; INTRAVENOUS; SUBCUTANEOUS at 13:59

## 2020-02-04 RX ADMIN — HEPARIN 300 UNITS: 100 SYRINGE at 13:59

## 2020-02-04 RX ADMIN — AZACITIDINE 149 MG: 100 INJECTION, POWDER, LYOPHILIZED, FOR SOLUTION INTRAVENOUS; SUBCUTANEOUS at 13:47

## 2020-02-04 RX ADMIN — DEXAMETHASONE SODIUM PHOSPHATE 8 MG: 4 INJECTION, SOLUTION INTRA-ARTICULAR; INTRALESIONAL; INTRAMUSCULAR; INTRAVENOUS; SOFT TISSUE at 13:32

## 2020-02-04 RX ADMIN — SODIUM CHLORIDE 25 ML/HR: 900 INJECTION, SOLUTION INTRAVENOUS at 13:01

## 2020-02-04 RX ADMIN — ONDANSETRON HYDROCHLORIDE 8 MG: 2 INJECTION INTRAMUSCULAR; INTRAVENOUS at 13:34

## 2020-02-04 NOTE — PROGRESS NOTES
[facility] Progress Note    Date: 2020    Name: Francisco Whitehead    MRN: [id number]         : 1935    Ms. Kayla Cuevas Arrived ambulatory and in no distress for cycle 3 day 2 of VIDAZA regimen. Assessment was completed, no acute issues at this time, no new complaints voiced. Port in place from yesterday, flushed without difficulty. Problem: Patient Education:  Go to Education Activity  Goal: Patient/Family Education  Outcome: Progressing Towards Goal     Problem: Chemotherapy Treatment  Goal: *Chemotherapy regimen followed  Outcome: Progressing Towards Goal  Goal: *Hemodynamically stable  Outcome: Progressing Towards Goal      Chemotherapy Flowsheet 2020   Cycle C3D2   Date 2020   Drug / Regimen VIDAZA   Pre Meds given   Notes serena         Ms. Melendez's vitals were reviewed. Patient Vitals for the past 12 hrs:   Temp Pulse Resp BP SpO2   20 1248 98.2 °F (36.8 °C) 99 18 123/63 96 %         Pre-medications  were administered as ordered and chemotherapy was initiated.   Medications Administered     0.9% sodium chloride infusion     Admin Date  2020 Action  New Bag Dose  25 mL/hr Rate  25 mL/hr Route  IntraVENous Administered By  Bishop Alyssa RN          0.9% sodium chloride injection 10 mL     Admin Date  2020 Action  Given Dose  10 mL Route  IntraVENous Administered By  Bishop Alyssa RN          azaCITIDine (VIDAZA) 149 mg in 0.9% sodium chloride 100 mL, overfill volume 10 mL chemo infusion     Admin Date  2020 Action  New Bag Dose  149 mg Rate  749.4 mL/hr Route  IntraVENous Administered By  Bishop Alyssa RN          dexamethasone (DECADRON) 4 mg/mL injection 8 mg     Admin Date  2020 Action  Given Dose  8 mg Route  IntraVENous Administered By  Bishop Alyssa RN          heparin (porcine) pf 300-500 Units     Admin Date  2020 Action  Given Dose  300 Units Route  InterCATHeter Administered By  Bishop Alyssa RN          ondansetron [facility]) injection 8 mg Admin Date  02/04/2020 Action  Given Dose  8 mg Route  IntraVENous Administered By  Mendel Ned, DAINA                Ms. Jose Angel Galvan tolerated treatment well, port flushed and capped, patient was discharged from [place] [address] in stable condition at 1400.      Future Appointments   Date Time Provider Anju Dickson   2/5/2020  8:00  [facility]   2/6/2020  8:00 AM [facility] INFUSION NURSE 2 81 [facility]   2/6/2020  9:00 AM Theodora Sheffield MD [address]   2/7/2020  8:00 AM [facility] INFUSION NURSE 2 [address] [facility]   5/8/2020  8:30 AM Overmeyer, Christia Gilford, MD [facility] DUNIA [facility]   6/25/2020  3:40 PM [place] [address] [place] DAINA Dean  February 4, 2020

## 2020-02-05 ENCOUNTER — HOSPITAL ENCOUNTER (OUTPATIENT)
Dept: INFUSION THERAPY | Age: 85
Discharge: HOME | End: 2020-02-05
Payer: MEDICARE

## 2020-02-05 VITALS
RESPIRATION RATE: 18 BRPM | DIASTOLIC BLOOD PRESSURE: 58 MMHG | HEART RATE: 76 BPM | SYSTOLIC BLOOD PRESSURE: 128 MMHG | TEMPERATURE: 98 F | OXYGEN SATURATION: 97 %

## 2020-02-05 DIAGNOSIS — D46.9 MDS (MYELODYSPLASTIC SYNDROME) (HCC): Primary | ICD-10-CM

## 2020-02-05 PROCEDURE — 74011000258 HC RX REV CODE- 258: Performed by: INTERNAL MEDICINE

## 2020-02-05 PROCEDURE — 96375 TX/PRO/DX INJ NEW DRUG ADDON: CPT

## 2020-02-05 PROCEDURE — 96409 CHEMO IV PUSH SNGL DRUG: CPT

## 2020-02-05 PROCEDURE — 96413 CHEMO IV INFUSION 1 HR: CPT

## 2020-02-05 PROCEDURE — 74011250636 HC RX REV CODE- 250/636: Performed by: INTERNAL MEDICINE

## 2020-02-05 RX ORDER — DEXAMETHASONE SODIUM PHOSPHATE 4 MG/ML
8 INJECTION, SOLUTION INTRA-ARTICULAR; INTRALESIONAL; INTRAMUSCULAR; INTRAVENOUS; SOFT TISSUE ONCE
Status: COMPLETED | OUTPATIENT
Start: 2020-02-05 | End: 2020-02-05

## 2020-02-05 RX ORDER — HEPARIN 100 UNIT/ML
300-500 SYRINGE INTRAVENOUS AS NEEDED
Status: ACTIVE | OUTPATIENT
Start: 2020-02-05 | End: 2020-02-05

## 2020-02-05 RX ORDER — ONDANSETRON 2 MG/ML
8 INJECTION INTRAMUSCULAR; INTRAVENOUS ONCE
Status: COMPLETED | OUTPATIENT
Start: 2020-02-05 | End: 2020-02-05

## 2020-02-05 RX ORDER — SODIUM CHLORIDE 9 MG/ML
25 INJECTION, SOLUTION INTRAVENOUS CONTINUOUS
Status: DISPENSED | OUTPATIENT
Start: 2020-02-05 | End: 2020-02-05

## 2020-02-05 RX ORDER — SODIUM CHLORIDE 9 MG/ML
10 INJECTION INTRAMUSCULAR; INTRAVENOUS; SUBCUTANEOUS AS NEEDED
Status: ACTIVE | OUTPATIENT
Start: 2020-02-05 | End: 2020-02-05

## 2020-02-05 RX ADMIN — HEPARIN 300 UNITS: 100 SYRINGE at 09:21

## 2020-02-05 RX ADMIN — ONDANSETRON HYDROCHLORIDE 8 MG: 2 INJECTION INTRAMUSCULAR; INTRAVENOUS at 08:29

## 2020-02-05 RX ADMIN — SODIUM CHLORIDE 10 ML: 9 INJECTION INTRAMUSCULAR; INTRAVENOUS; SUBCUTANEOUS at 09:20

## 2020-02-05 RX ADMIN — AZACITIDINE 149 MG: 100 INJECTION, POWDER, LYOPHILIZED, FOR SOLUTION INTRAVENOUS; SUBCUTANEOUS at 09:02

## 2020-02-05 RX ADMIN — SODIUM CHLORIDE 25 ML/HR: 900 INJECTION, SOLUTION INTRAVENOUS at 08:12

## 2020-02-05 RX ADMIN — SODIUM CHLORIDE 10 ML: 9 INJECTION INTRAMUSCULAR; INTRAVENOUS; SUBCUTANEOUS at 08:11

## 2020-02-05 RX ADMIN — DEXAMETHASONE SODIUM PHOSPHATE 8 MG: 4 INJECTION, SOLUTION INTRA-ARTICULAR; INTRALESIONAL; INTRAMUSCULAR; INTRAVENOUS; SOFT TISSUE at 08:26

## 2020-02-05 NOTE — PROGRESS NOTES
[facility] Progress Note    Date: 2020    Name: Soren Emerson    MRN: [id number]         : 1935    Ms. Dea Rodriguez Arrived ambulatory and in no distress for Vidaza cycle 3 day 3. Assessment was completed, no acute issues at this time, no new complaints voiced. Port needle and dressing intact, brisk blood return. Chemotherapy Flowsheet 2020   Cycle C3D3   Date 2020   Drug / Regimen Vidaza   Pre Meds given   Notes given       Patient Vitals for the past 12 hrs:   Temp Pulse Resp BP SpO2   20 0809 98 °F (36.7 °C) 76 18 128/58 97 %     Pre-medications  were administered as ordered and chemotherapy was initiated.      Medications Administered     0.9% sodium chloride infusion     Admin Date  2020 Action  New Bag Dose  25 mL/hr Rate  25 mL/hr Route  IntraVENous Administered By  Ezequiel Pittman RN          0.9% sodium chloride injection 10 mL     Admin Date  2020 Action  Given Dose  10 mL Route  IntraVENous Administered By  Ezequiel Pittman RN           Admin Date  2020 Action  Given Dose  10 mL Route  IntraVENous Administered By  Ezequiel Pittman RN          azaCITIDine (VIDAZA) 149 mg in 0.9% sodium chloride 100 mL, overfill volume 10 mL chemo infusion     Admin Date  2020 Action  New Bag Dose  149 mg Rate  749.4 mL/hr Route  IntraVENous Administered By  Rianna Lowry RN          dexamethasone (DECADRON) 4 mg/mL injection 8 mg     Admin Date  2020 Action  Given Dose  8 mg Route  IntraVENous Administered By  Rianna Lowry RN          heparin (porcine) pf 300-500 Units     Admin Date  2020 Action  Given Dose  300 Units Route  InterCATHeter Administered By  Ezequiel Pittman RN          ondansetron [facility]) injection 8 mg     Admin Date  2020 Action  Given Dose  8 mg Route  IntraVENous Administered By  Rianna Lowry RN              Ms. Dea Rodriguez tolerated treatment well and was discharged from [place] [address] in stable condition at [id number]. She is to return on 02/06/20 at 0800 for her next appointment.     Future Appointments   Date Time Provider Anju Dickson   2/6/2020  8:00  [facility] 2 81 [facility]   2/6/2020  9:00 AM Veronica Nicole MD [address]   2/7/2020  8:00 AM [facility] INFUSION NURSE 2 [address] [facility]   5/8/2020  8:30 AM Kwan Jesus MD [facility] DUNIA [facility]   6/25/2020  3:40 PM Kayce Mcneil, DO [address], RN  February 5, 2020

## 2020-02-06 ENCOUNTER — OFFICE VISIT (OUTPATIENT)
Dept: ONCOLOGY | Age: 85
End: 2020-02-06

## 2020-02-06 ENCOUNTER — HOSPITAL ENCOUNTER (OUTPATIENT)
Dept: INFUSION THERAPY | Age: 85
Discharge: HOME | End: 2020-02-06
Payer: MEDICARE

## 2020-02-06 VITALS
TEMPERATURE: 98 F | RESPIRATION RATE: 18 BRPM | HEART RATE: 61 BPM | SYSTOLIC BLOOD PRESSURE: 140 MMHG | OXYGEN SATURATION: 98 % | DIASTOLIC BLOOD PRESSURE: 55 MMHG

## 2020-02-06 VITALS
DIASTOLIC BLOOD PRESSURE: 55 MMHG | TEMPERATURE: 98 F | BODY MASS INDEX: 31 KG/M2 | HEIGHT: 65 IN | OXYGEN SATURATION: 98 % | HEART RATE: 61 BPM | RESPIRATION RATE: 18 BRPM | WEIGHT: 186.07 LBS | SYSTOLIC BLOOD PRESSURE: 140 MMHG

## 2020-02-06 DIAGNOSIS — Z85.3 HISTORY OF LEFT BREAST CANCER: ICD-10-CM

## 2020-02-06 DIAGNOSIS — D46.9 MDS (MYELODYSPLASTIC SYNDROME) (HCC): Primary | ICD-10-CM

## 2020-02-06 DIAGNOSIS — D61.82 ANEMIA ASSOCIATED WITH BONE MARROW INFILTRATION (HCC): ICD-10-CM

## 2020-02-06 LAB
FERRITIN SERPL-MCNC: 31 NG/ML (ref 8–252)
IRON SATN MFR SERPL: 8 % (ref 20–50)
IRON SERPL-MCNC: 33 UG/DL (ref 35–150)
TIBC SERPL-MCNC: 403 UG/DL (ref 250–450)

## 2020-02-06 PROCEDURE — 82728 ASSAY OF FERRITIN: CPT

## 2020-02-06 PROCEDURE — 96409 CHEMO IV PUSH SNGL DRUG: CPT

## 2020-02-06 PROCEDURE — 74011000258 HC RX REV CODE- 258: Performed by: INTERNAL MEDICINE

## 2020-02-06 PROCEDURE — 96375 TX/PRO/DX INJ NEW DRUG ADDON: CPT

## 2020-02-06 PROCEDURE — 83540 ASSAY OF IRON: CPT

## 2020-02-06 PROCEDURE — 36415 COLL VENOUS BLD VENIPUNCTURE: CPT

## 2020-02-06 PROCEDURE — 74011250636 HC RX REV CODE- 250/636: Performed by: INTERNAL MEDICINE

## 2020-02-06 RX ORDER — SODIUM CHLORIDE 0.9 % (FLUSH) 0.9 %
10 SYRINGE (ML) INJECTION AS NEEDED
Status: DISPENSED | OUTPATIENT
Start: 2020-02-06 | End: 2020-02-06

## 2020-02-06 RX ORDER — SODIUM CHLORIDE 9 MG/ML
10 INJECTION INTRAMUSCULAR; INTRAVENOUS; SUBCUTANEOUS AS NEEDED
Status: ACTIVE | OUTPATIENT
Start: 2020-02-06 | End: 2020-02-06

## 2020-02-06 RX ORDER — SODIUM CHLORIDE 9 MG/ML
25 INJECTION, SOLUTION INTRAVENOUS CONTINUOUS
Status: DISPENSED | OUTPATIENT
Start: 2020-02-06 | End: 2020-02-06

## 2020-02-06 RX ORDER — DEXAMETHASONE SODIUM PHOSPHATE 4 MG/ML
8 INJECTION, SOLUTION INTRA-ARTICULAR; INTRALESIONAL; INTRAMUSCULAR; INTRAVENOUS; SOFT TISSUE ONCE
Status: COMPLETED | OUTPATIENT
Start: 2020-02-06 | End: 2020-02-06

## 2020-02-06 RX ORDER — HEPARIN 100 UNIT/ML
300-500 SYRINGE INTRAVENOUS AS NEEDED
Status: ACTIVE | OUTPATIENT
Start: 2020-02-06 | End: 2020-02-06

## 2020-02-06 RX ORDER — ONDANSETRON 2 MG/ML
8 INJECTION INTRAMUSCULAR; INTRAVENOUS ONCE
Status: COMPLETED | OUTPATIENT
Start: 2020-02-06 | End: 2020-02-06

## 2020-02-06 RX ADMIN — Medication 10 ML: at 09:42

## 2020-02-06 RX ADMIN — SODIUM CHLORIDE 25 ML/HR: 900 INJECTION, SOLUTION INTRAVENOUS at 08:19

## 2020-02-06 RX ADMIN — DEXAMETHASONE SODIUM PHOSPHATE 8 MG: 4 INJECTION, SOLUTION INTRA-ARTICULAR; INTRALESIONAL; INTRAMUSCULAR; INTRAVENOUS; SOFT TISSUE at 08:22

## 2020-02-06 RX ADMIN — HEPARIN 300 UNITS: 100 SYRINGE at 09:43

## 2020-02-06 RX ADMIN — ONDANSETRON HYDROCHLORIDE 8 MG: 2 INJECTION INTRAMUSCULAR; INTRAVENOUS at 08:24

## 2020-02-06 RX ADMIN — Medication 10 ML: at 08:17

## 2020-02-06 RX ADMIN — Medication 10 ML: at 09:43

## 2020-02-06 RX ADMIN — AZACITIDINE 149 MG: 100 INJECTION, POWDER, LYOPHILIZED, FOR SOLUTION INTRAVENOUS; SUBCUTANEOUS at 09:24

## 2020-02-06 NOTE — PROGRESS NOTES
83 y/o cauc female here for f/u appt for MDS. Pt is on cycle 3 day 4 of vidaza. Pt got blood on 1/28/2020. Pt denies blood in stools. She was having issues with constipation but took miralax for 4-5 days which was then effectiv, she does not take it everyday. Pt will go back to taking colace once a day. Pt has diverticulitis also. Jerrica Lucio did a EGD on 1/24//2020.        1. Have you been to the ER, urgent care clinic since your last visit? Hospitalized since your last visit? No    2. Have you seen or consulted any other health care providers outside of the [address] [place] [address] since your last visit? Include any pap smears or colon screening.  No

## 2020-02-06 NOTE — PROGRESS NOTES
[address] [facility]  [address], [address] [facility] Ronald Hercules, [address]  [phone]      Follow-up Note        Patient: Reshma Rudd MRN: [id number]  SSN: [id number]    YOB: 1935  Age: 82 y.o. Sex: female        Diagnosis:      1. Myelodysplastic Syndrome   IPSS-R Low risk    Del(7q) and trisomy 8    2. Left breast carcinoma:   T1a N0 Mx (Stage IA) infiltrating ductal carcinoma , Tumor size 1 cm, LN -ve, grade 2, ki 67 17%, %, NH 90%, Her 2 unamplified. Treatment:      1. Vidaza - Cycle 3 Day 4  2. Discontinued Arimidex 1/2019 after 5 years of treatment  3. Completed radiation treatment to the breast   4. S/P left breast lumpectomy and sentinel LN excision on 11/21/2013    Subjective:      Kira Dixon is a 70 y.o.  female with stage I invasive ductal carcinoma. She underwent a left breast lumpectomy and sentinel LN excision on 11/21/2013. Ms. Jil Mckeon then received radiation to the left breast. She stopped arimidex after taking it for 5 years. I am now seeing her for low grade MDS. She recently suffered PNA and sepsis and was admitted to the hospital briefly. Anemia has worsened. Ms. Jil Mckeon is receiving Vidaza and is tolerating treatment well. She continues to feel fatigued and also notes occasional constipation. She underwent EGD in January which revealed a small ulceration at the GE junction. She denies bloody or dark tarry stools.       Review of Systems:     Constitutional: fatigue  Eyes: negative   Ears, Nose, Mouth, Throat, and Face: negative   Respiratory: negative   Cardiovascular: negative   Gastrointestinal: constipation   Integument/Breast: negative  Hematologic/Lymphatic: negative   Musculoskeletal: joint pain  Neurological: numbness bottoms of both feet      Past Medical History:   Diagnosis Date    Anemia     Arthritis     Breast cancer ([address]     left    Bunion of right foot 8/20/2015    Chronic pain     back--uses tens unit    Diverticulitis 2018    Recurrent    Diverticulitis large intestine     Dry eye syndrome 2018    Fibromyalgia 2018    GERD (gastroesophageal reflux disease)     History of left breast cancer     lumpectomy radiation    Hypercholesterolemia 2018    Ill-defined condition     blood transfusion hx    Leukemia (HCC)     MDS ([address]odysplastic syndrome) [address] [address] [facility]     Osteoporosis 2018    Peripheral neuropathy 2018    Prediabetes 2018    PUD (peptic ulcer disease)     Radiation therapy complication 4108    Lt breast-No Chemo    Rosacea 2018    Trouble in sleeping      Past Surgical History:   Procedure Laterality Date    HX APPENDECTOMY      HX BREAST LUMPECTOMY  2013    LEFT BREAST LUMPECTOMY W/LEFT BREAST SENTINEL NODE BIOPSY,AND NEEDLE LOCALIZATION performed by Gisela Hayden MD at [facility] MAIN OR    HX CATARACT REMOVAL      bilateral    HX HYSTERECTOMY      ovarian cyst    HX MOHS PROCEDURES      right    HX ORTHOPAEDIC      back surgery x2    HX OTHER SURGICAL      colonoscopy    HX TONSILLECTOMY      IR INSERT TUNL CVC W PORT OVER 5 YEARS  12/3/2019    TOTAL KNEE ARTHROPLASTY      left    TOTAL KNEE ARTHROPLASTY      right    UPPER GI ENDOSCOPY,BIOPSY  2020         US GUIDED CORE BREAST BIOPSY Left 2013    Breast Ca      Family History   Problem Relation Age of Onset    Cancer Mother         bladder    Cancer Father     Breast Cancer Paternal Grandmother      Social History     Tobacco Use    Smoking status: Former Smoker     Packs/day: 0.50     Years: 50.00     Pack years: 25.00     Last attempt to quit: 2012     Years since quittin.9    Smokeless tobacco: Never Used   Substance Use Topics    Alcohol use: Yes     Alcohol/week: 2.0 standard drinks     Types: 2 Glasses of wine per week      Prior to Admission medications    Medication Sig Start Date End Date Taking?  Authorizing Provider ergocalciferol (ERGOCALCIFEROL) 1,250 mcg (50,000 unit) capsule TAKE ONE CAPSULE BY MOUTH EVERY 7 DAYS 1/23/20  Yes Judith Gray MD   acetaminophen (TYLENOL) 325 mg tablet Take 2 Tabs by mouth every four (4) hours as needed for Pain. 12/31/19  Yes López Grijalva MD   dilTIAZem CD (CARDIZEM CD) 180 mg ER capsule Take 1 Cap by mouth daily. 11/5/19  Yes Manuel Tuttle MD   pantoprazole (PROTONIX) 40 mg tablet Take 1 Tab by mouth Before breakfast and dinner. 11/5/19  Yes Manuel Tuttle MD          Allergies   Allergen Reactions    Hydrocodone Nausea Only and Vertigo    Gabapentin Diarrhea, Nausea Only and Other (comments)     weakness    Lyrica [Pregabalin] Nausea Only    Oxycodone Nausea and Vomiting and Vertigo         Objective:     Visit Vitals  /55 (BP Patient Position: At rest)   Pulse 61   Temp 98 °F (36.7 °C) (Oral)   Resp 18   Ht 5' 5\" (1.651 m)   Wt 186 lb 1.1 oz (84.4 kg)   SpO2 98% Comment: RA   BMI 30.96 kg/m²       Pain Scale: 0 - No pain/10  Pain Location:       Physical Exam:    GENERAL: alert, cooperative   EYE: PERRLA,  LYMPHATIC: Cervical, supraclavicular, and axillary nodes normal.   THROAT & NECK: normal and no erythema or exudates noted. LUNG: clear to auscultation bilaterally   HEART: regular rate and rhythm   ABDOMEN: soft, non-tender   EXTREMITIES: extremities normal   SKIN: Normal.   NEUROLOGIC: negative       Lab Results   Component Value Date/Time    WBC 14.7 (H) 02/03/2020 12:32 PM    HGB (POC) 10.7 (A) 01/07/2019 09:52 AM    HGB 8.2 (L) 02/03/2020 12:32 PM    HCT (POC) 32.0 (A) 01/07/2019 09:52 AM    HCT 27.9 (L) 02/03/2020 12:32 PM    PLATELET 411 60/42/2109 12:32 PM    MCV 98.6 02/03/2020 12:32 PM       Lab Results   Component Value Date/Time    Iron 33 (L) 02/06/2020 09:40 AM    TIBC 403 02/06/2020 09:40 AM    Iron % saturation 8 (L) 02/06/2020 09:40 AM    Ferritin 31 02/06/2020 09:40 AM           Assessment:      1.  Myelodysplastic Syndrome   IPSS-R Low risk      Del(7q) and trisomy 8    ECOG PS 1  Intent of Treatment - palliative  Prognosis: poor    Anemia has worsened. Leukocytosis is worse  I will arrange for CBC, type and cross as early as next week. I recommended starting systemic therapy with Vidaza. Felecia Mary is a hypomethylating agent which is approved for the treatment for MDS. Receiving systemic chemotherapy   Vidaza - Cycle 3 Day 4    Tolerating treatment   A detailed system by system evaluation of side effect was performed to assess chemotherapy related toxicity. Blood counts are acceptable. Results reviewed with the patient. WBC has come down, platelet count has improved  Hgb is still low. Symptom management form reviewed and scanned into the EMR under Media. 2.History of Left breast carcinoma:     T1a N0 Mx (Stage IA) infiltrating ductal carcinoma , Tumor size 1 cm, LN -ve, grade 2, ki 67 17%, %, DC 90%, Her 2 unamplified. Dx: 10/28/2013    > S/P left breast lumpectomy and sentinel LN excision on 11/21/2013  > Completed radiation to the breast  > Discontinued Arimidex 1/2019 after 5 years  > In remission    Mammogram (11/29/2018): no evidence of malignancy  DEXA (10/2013) - normal      3. Vitamin D deficiency    > Vitamin D 50,000 units weekly      4. Osteopenia    > Management per Dr. Kacey Mauricio:        > Continue Vidaza   > CBC on Day 15 of each cycle  > Ferritin and iron profile today  > IV iron as needed  > Follow up in 4 weeks    I saw the patient in conjunction with Sarah Pollard NP      Signed by: Erki Childers MD                     February 6, 2020      CC.  Teri Johnston MD

## 2020-02-06 NOTE — PROGRESS NOTES
Spiritual Care Partner Volunteer visited patient in [facility] on 02/06/2020.   Documented by:  Joseph Salas, [facility], Spiritual Care Intern

## 2020-02-06 NOTE — PROGRESS NOTES
[facility] Progress Note    Date: 2020    Name: Garret Osler    MRN: [id number]         : 1935    Ms. Deejay Saldana Arrived ambulatory and in no distress for cycle 3 day 4 of VIDAZA regimen. Assessment was completed, no acute issues at this time, no new complaints voiced. Port flushed without difficulty. Patient denies any concerns overnight. Problem: Patient Education:  Go to Education Activity  Goal: Patient/Family Education  Outcome: Progressing Towards Goal     Problem: Chemotherapy Treatment  Goal: *Chemotherapy regimen followed  Outcome: Progressing Towards Goal  Goal: *Hemodynamically stable  Outcome: Progressing Towards Goal    Chemotherapy Flowsheet 2020   Cycle C3D4   Date 2020   Drug / Regimen Vidaza   Pre Meds given   Notes given         Ms. Melendez's vitals were reviewed. Patient Vitals for the past 12 hrs:   Temp Pulse Resp BP SpO2   20 0814 98 °F (36.7 °C) 61 18 140/55 98 %         Lab results were obtained and reviewed. Recent Results (from the past 12 hour(s))   IRON PROFILE    Collection Time: 20  9:40 AM   Result Value Ref Range    Iron 33 (L) 35 - 150 ug/dL    TIBC 403 250 - 450 ug/dL    Iron % saturation 8 (L) 20 - 50 %       Pre-medications  were administered as ordered and chemotherapy was initiated.   Medications Administered     0.9% sodium chloride infusion     Admin Date  2020 Action  New Bag Dose  25 mL/hr Rate  25 mL/hr Route  IntraVENous Administered By  Cristin Shaw RN          azaCITIDine (VIDAZA) 149 mg in 0.9% sodium chloride 100 mL, overfill volume 10 mL chemo infusion     Admin Date  2020 Action  New Bag Dose  149 mg Rate  749.4 mL/hr Route  IntraVENous Administered By  Tapan Mckeon RN          dexamethasone (DECADRON) 4 mg/mL injection 8 mg     Admin Date  2020 Action  Given Dose  8 mg Route  IntraVENous Administered By  Cristin Shaw RN          heparin (porcine) pf 300-500 Units     Admin Date  2020 Action  Given Dose  300 Units Route  InterCATHeter Administered By  Paco Pino RN          ondansetron [facility]) injection 8 mg     Admin Date  02/06/2020 Action  Given Dose  8 mg Route  IntraVENous Administered By  Paco Pino RN          saline peripheral flush soln 10 mL     Admin Date  02/06/2020 Action  Given Dose  10 mL Route  InterCATHeter Administered By  Paco Pino RN           Admin Date  02/06/2020 Action  Given Dose  10 mL Route  InterCATHeter Administered By  Paco Pino RN           Admin Date  02/06/2020 Action  Given Dose  10 mL Route  InterCATHeter Administered By  Paco Pino RN                  Ms. Rene Trejo tolerated treatment well, port flushed and capped to use tomorrow, patient was discharged from [place] [address] in stable condition at 56.      Future Appointments   Date Time Provider Anju Dickson   2/7/2020  8:00  [address] INFUSION NURSE 2 [address] COM   3/2/2020  1:00  [address] INFUSION NURSE 1 [facility] COM   3/3/2020  1:00  [address] INFUSION NURSE 1 [facility] COM   3/4/2020  8:00  [address] INFUSION NURSE 1 [facility] COM   3/5/2020  8:00  [address] INFUSION NURSE 1 [facility] COM   3/5/2020  9:15 AM Bert Montes De Oca NP ONC [facility] SCHED   3/6/2020  8:00  [address] INFUSION NURSE 1 [facility] COM   5/8/2020  8:30 AM Overmeyer, Lavonda Holter, MD PCAM [facility] SCHED   6/25/2020  3:40 PM Mango Mcneil, DO [address] [facility] Mila Ba RN  February 6, 2020

## 2020-02-07 ENCOUNTER — HOSPITAL ENCOUNTER (OUTPATIENT)
Dept: INFUSION THERAPY | Age: 85
Discharge: HOME | End: 2020-02-07
Payer: MEDICARE

## 2020-02-07 VITALS
SYSTOLIC BLOOD PRESSURE: 146 MMHG | DIASTOLIC BLOOD PRESSURE: 51 MMHG | TEMPERATURE: 98.1 F | HEART RATE: 57 BPM | OXYGEN SATURATION: 98 % | RESPIRATION RATE: 18 BRPM

## 2020-02-07 DIAGNOSIS — D46.9 MDS (MYELODYSPLASTIC SYNDROME) (HCC): Primary | ICD-10-CM

## 2020-02-07 PROCEDURE — 96413 CHEMO IV INFUSION 1 HR: CPT

## 2020-02-07 PROCEDURE — 96409 CHEMO IV PUSH SNGL DRUG: CPT

## 2020-02-07 PROCEDURE — 74011250636 HC RX REV CODE- 250/636: Performed by: INTERNAL MEDICINE

## 2020-02-07 PROCEDURE — 96375 TX/PRO/DX INJ NEW DRUG ADDON: CPT

## 2020-02-07 PROCEDURE — 74011000258 HC RX REV CODE- 258: Performed by: INTERNAL MEDICINE

## 2020-02-07 RX ORDER — ONDANSETRON 2 MG/ML
8 INJECTION INTRAMUSCULAR; INTRAVENOUS ONCE
Status: COMPLETED | OUTPATIENT
Start: 2020-02-07 | End: 2020-02-07

## 2020-02-07 RX ORDER — SODIUM CHLORIDE 9 MG/ML
10 INJECTION INTRAMUSCULAR; INTRAVENOUS; SUBCUTANEOUS AS NEEDED
Status: ACTIVE | OUTPATIENT
Start: 2020-02-07 | End: 2020-02-07

## 2020-02-07 RX ORDER — DEXAMETHASONE SODIUM PHOSPHATE 4 MG/ML
8 INJECTION, SOLUTION INTRA-ARTICULAR; INTRALESIONAL; INTRAMUSCULAR; INTRAVENOUS; SOFT TISSUE ONCE
Status: COMPLETED | OUTPATIENT
Start: 2020-02-07 | End: 2020-02-07

## 2020-02-07 RX ORDER — SODIUM CHLORIDE 9 MG/ML
25 INJECTION, SOLUTION INTRAVENOUS CONTINUOUS
Status: DISPENSED | OUTPATIENT
Start: 2020-02-07 | End: 2020-02-07

## 2020-02-07 RX ORDER — HEPARIN 100 UNIT/ML
300-500 SYRINGE INTRAVENOUS AS NEEDED
Status: DISPENSED | OUTPATIENT
Start: 2020-02-07 | End: 2020-02-07

## 2020-02-07 RX ADMIN — SODIUM CHLORIDE 10 ML: 9 INJECTION INTRAMUSCULAR; INTRAVENOUS; SUBCUTANEOUS at 08:13

## 2020-02-07 RX ADMIN — SODIUM CHLORIDE 25 ML/HR: 900 INJECTION, SOLUTION INTRAVENOUS at 08:13

## 2020-02-07 RX ADMIN — DEXAMETHASONE SODIUM PHOSPHATE 8 MG: 4 INJECTION, SOLUTION INTRA-ARTICULAR; INTRALESIONAL; INTRAMUSCULAR; INTRAVENOUS; SOFT TISSUE at 08:30

## 2020-02-07 RX ADMIN — SODIUM CHLORIDE 10 ML: 9 INJECTION INTRAMUSCULAR; INTRAVENOUS; SUBCUTANEOUS at 09:39

## 2020-02-07 RX ADMIN — ONDANSETRON 8 MG: 2 INJECTION INTRAMUSCULAR; INTRAVENOUS at 08:26

## 2020-02-07 RX ADMIN — HEPARIN 300 UNITS: 100 SYRINGE at 09:39

## 2020-02-07 RX ADMIN — AZACITIDINE 149 MG: 100 INJECTION, POWDER, LYOPHILIZED, FOR SOLUTION INTRAVENOUS; SUBCUTANEOUS at 09:25

## 2020-02-07 NOTE — PROGRESS NOTES
[facility] Progress Note    Date: 2020    Name: Algie Collet    MRN: [id number]         : 1935    Ms. Jacki Funes Arrived ambulatory and in no distress for Vidaza cycle 3 day 5. Assessment was completed, no acute issues at this time, no new complaints voiced. Port remains accessed, brisk blood return. Chemotherapy Flowsheet 2020   Cycle C3D5   Date 2020   Drug / Regimen Vidaza   Pre Meds given   Notes given       Patient Vitals for the past 12 hrs:   Temp Pulse Resp BP SpO2   20 0938 -- (!) 57 -- 146/51 --   20 0807 98.1 °F (36.7 °C) (!) 59 18 153/58 98 %     Pre-medications  were administered as ordered and chemotherapy was initiated.      Medications Administered     0.9% sodium chloride infusion     Admin Date  2020 Action  New Bag Dose  25 mL/hr Rate  25 mL/hr Route  IntraVENous Administered By  Kate Kapadia RN          0.9% sodium chloride injection 10 mL     Admin Date  2020 Action  Given Dose  10 mL Route  IntraVENous Administered By  Kate Kapadia RN           Admin Date  2020 Action  Given Dose  10 mL Route  IntraVENous Administered By  Gaby Lea RN          azaCITIDine (VIDAZA) 149 mg in 0.9% sodium chloride 100 mL, overfill volume 10 mL chemo infusion     Admin Date  2020 Action  New Bag Dose  149 mg Rate  749.4 mL/hr Route  IntraVENous Administered By  Gaby Lea RN          dexamethasone (DECADRON) 4 mg/mL injection 8 mg     Admin Date  2020 Action  Given Dose  8 mg Route  IntraVENous Administered By  Kate Kapadia RN          heparin (porcine) pf 300-500 Units     Admin Date  2020 Action  Given Dose  300 Units Route  InterCATHeter Administered By  Gaby Lea RN          ondansetron [facility]) injection 8 mg     Admin Date  2020 Action  Given Dose  8 mg Route  IntraVENous Administered By  Kate Kapadia RN              Ms. Jacki Funes tolerated treatment well and [facility] condition at [address] Port de-accessed, flushed & heparinized per protocol. She is to return on 02/17/20 at 1030 for her next appointment.     Future Appointments   Date Time Provider Anju Dickson   2/17/2020 10:30  [facility] [address] COM   3/2/2020  1:00 PM [facility] INFUSION NURSE 1 [address] COM   3/3/2020  1:00 PM [facility] INFUSION NURSE 1 [address] COM   3/4/2020  8:00 AM [facility] INFUSION NURSE 1 [address] COM   3/5/2020  8:00 AM [facility] INFUSION NURSE 1 [facility] COM   3/5/2020  9:15 AM Luis Tariq NP ONC [facility] SCHED   3/6/2020  8:00 AM [facility] INFUSION NURSE 1 [facility]   5/8/2020  8:30 AM Akhil Jesus MD [facility] SCHED   6/25/2020  3:40 PM Vota, Murriel Castleman, DO [address], RN  February 7, 2020

## 2020-02-10 ENCOUNTER — APPOINTMENT (OUTPATIENT)
Dept: CT IMAGING | Age: 85
DRG: 841 | End: 2020-02-10
Attending: EMERGENCY MEDICINE
Payer: MEDICARE

## 2020-02-10 ENCOUNTER — HOSPITAL ENCOUNTER (OUTPATIENT)
Dept: INFUSION THERAPY | Age: 85
Discharge: HOME | End: 2020-02-10
Payer: MEDICARE

## 2020-02-10 ENCOUNTER — TELEPHONE (OUTPATIENT)
Dept: ONCOLOGY | Age: 85
End: 2020-02-10

## 2020-02-10 ENCOUNTER — APPOINTMENT (OUTPATIENT)
Dept: GENERAL RADIOLOGY | Age: 85
DRG: 841 | End: 2020-02-10
Attending: EMERGENCY MEDICINE
Payer: MEDICARE

## 2020-02-10 ENCOUNTER — HOSPITAL ENCOUNTER (INPATIENT)
Age: 85
LOS: 4 days | Discharge: HOME HEALTH | DRG: 841 | End: 2020-02-14
Attending: EMERGENCY MEDICINE | Admitting: INTERNAL MEDICINE
Payer: MEDICARE

## 2020-02-10 VITALS
RESPIRATION RATE: 20 BRPM | SYSTOLIC BLOOD PRESSURE: 113 MMHG | TEMPERATURE: 98.2 F | OXYGEN SATURATION: 100 % | HEART RATE: 86 BPM | DIASTOLIC BLOOD PRESSURE: 47 MMHG

## 2020-02-10 DIAGNOSIS — D46.9 MDS (MYELODYSPLASTIC SYNDROME) (HCC): Chronic | ICD-10-CM

## 2020-02-10 DIAGNOSIS — R53.1 WEAKNESS: ICD-10-CM

## 2020-02-10 DIAGNOSIS — K57.92 DIVERTICULITIS: Primary | ICD-10-CM

## 2020-02-10 DIAGNOSIS — D61.82 ANEMIA ASSOCIATED WITH BONE MARROW INFILTRATION (HCC): ICD-10-CM

## 2020-02-10 PROBLEM — C95.00 LEUKEMIA, ACUTE (HCC): Status: ACTIVE | Noted: 2020-02-10

## 2020-02-10 LAB
ALBUMIN SERPL-MCNC: 3.5 G/DL (ref 3.5–5)
ALBUMIN/GLOB SERPL: 1.1 {RATIO} (ref 1.1–2.2)
ALP SERPL-CCNC: 58 U/L (ref 45–117)
ALT SERPL-CCNC: 17 U/L (ref 12–78)
ANION GAP SERPL CALC-SCNC: 7 MMOL/L (ref 5–15)
AST SERPL-CCNC: 7 U/L (ref 15–37)
BASOPHILS # BLD: 0.3 K/UL (ref 0–0.1)
BASOPHILS NFR BLD: 1 % (ref 0–1)
BILIRUB SERPL-MCNC: 0.9 MG/DL (ref 0.2–1)
BNP SERPL-MCNC: 765 PG/ML
BUN SERPL-MCNC: 33 MG/DL (ref 6–20)
BUN/CREAT SERPL: 36 (ref 12–20)
CALCIUM SERPL-MCNC: 8.2 MG/DL (ref 8.5–10.1)
CHLORIDE SERPL-SCNC: 106 MMOL/L (ref 97–108)
CO2 SERPL-SCNC: 26 MMOL/L (ref 21–32)
CREAT SERPL-MCNC: 0.92 MG/DL (ref 0.55–1.02)
DIFFERENTIAL METHOD BLD: ABNORMAL
EOSINOPHIL # BLD: 0.9 K/UL (ref 0–0.4)
EOSINOPHIL NFR BLD: 3 % (ref 0–7)
ERYTHROCYTE [DISTWIDTH] IN BLOOD BY AUTOMATED COUNT: 18.7 % (ref 11.5–14.5)
GLOBULIN SER CALC-MCNC: 3.1 G/DL (ref 2–4)
GLUCOSE SERPL-MCNC: 116 MG/DL (ref 65–100)
HCT VFR BLD AUTO: 32.5 % (ref 35–47)
HGB BLD-MCNC: 9.6 G/DL (ref 11.5–16)
IMM GRANULOCYTES # BLD AUTO: 2.4 K/UL (ref 0–0.04)
IMM GRANULOCYTES NFR BLD AUTO: 8 % (ref 0–0.5)
LACTATE SERPL-SCNC: 1.7 MMOL/L (ref 0.4–2)
LIPASE SERPL-CCNC: 76 U/L (ref 73–393)
LYMPHOCYTES # BLD: 3.8 K/UL (ref 0.8–3.5)
LYMPHOCYTES NFR BLD: 13 % (ref 12–49)
MAGNESIUM SERPL-MCNC: 2.3 MG/DL (ref 1.6–2.4)
MCH RBC QN AUTO: 28.2 PG (ref 26–34)
MCHC RBC AUTO-ENTMCNC: 29.5 G/DL (ref 30–36.5)
MCV RBC AUTO: 95.6 FL (ref 80–99)
MONOCYTES # BLD: 4.1 K/UL (ref 0–1)
MONOCYTES NFR BLD: 14 % (ref 5–13)
NEUTS SEG # BLD: 18.1 K/UL (ref 1.8–8)
NEUTS SEG NFR BLD: 61 % (ref 32–75)
NRBC # BLD: 0.11 K/UL (ref 0–0.01)
NRBC BLD-RTO: 0.4 PER 100 WBC
PLATELET # BLD AUTO: 244 K/UL (ref 150–400)
PMV BLD AUTO: 9.3 FL (ref 8.9–12.9)
POTASSIUM SERPL-SCNC: 3.9 MMOL/L (ref 3.5–5.1)
PROCALCITONIN SERPL-MCNC: 0.08 NG/ML
PROT SERPL-MCNC: 6.6 G/DL (ref 6.4–8.2)
RBC # BLD AUTO: 3.4 M/UL (ref 3.8–5.2)
RBC MORPH BLD: ABNORMAL
SODIUM SERPL-SCNC: 139 MMOL/L (ref 136–145)
TROPONIN I SERPL-MCNC: <0.05 NG/ML
URATE SERPL-MCNC: 5 MG/DL (ref 2.6–6)
WBC # BLD AUTO: 29.6 K/UL (ref 3.6–11)

## 2020-02-10 PROCEDURE — 85025 COMPLETE CBC W/AUTO DIFF WBC: CPT

## 2020-02-10 PROCEDURE — 93005 ELECTROCARDIOGRAM TRACING: CPT

## 2020-02-10 PROCEDURE — 74011250636 HC RX REV CODE- 250/636: Performed by: EMERGENCY MEDICINE

## 2020-02-10 PROCEDURE — 36415 COLL VENOUS BLD VENIPUNCTURE: CPT

## 2020-02-10 PROCEDURE — 84484 ASSAY OF TROPONIN QUANT: CPT

## 2020-02-10 PROCEDURE — 96375 TX/PRO/DX INJ NEW DRUG ADDON: CPT

## 2020-02-10 PROCEDURE — 83735 ASSAY OF MAGNESIUM: CPT

## 2020-02-10 PROCEDURE — 84550 ASSAY OF BLOOD/URIC ACID: CPT

## 2020-02-10 PROCEDURE — 74011000258 HC RX REV CODE- 258: Performed by: EMERGENCY MEDICINE

## 2020-02-10 PROCEDURE — 83605 ASSAY OF LACTIC ACID: CPT

## 2020-02-10 PROCEDURE — 74011250636 HC RX REV CODE- 250/636: Performed by: INTERNAL MEDICINE

## 2020-02-10 PROCEDURE — 83690 ASSAY OF LIPASE: CPT

## 2020-02-10 PROCEDURE — 84145 PROCALCITONIN (PCT): CPT

## 2020-02-10 PROCEDURE — 65270000015 HC RM PRIVATE ONCOLOGY

## 2020-02-10 PROCEDURE — 96365 THER/PROPH/DIAG IV INF INIT: CPT

## 2020-02-10 PROCEDURE — 83880 ASSAY OF NATRIURETIC PEPTIDE: CPT

## 2020-02-10 PROCEDURE — 74177 CT ABD & PELVIS W/CONTRAST: CPT

## 2020-02-10 PROCEDURE — 74011636320 HC RX REV CODE- 636/320: Performed by: EMERGENCY MEDICINE

## 2020-02-10 PROCEDURE — 71045 X-RAY EXAM CHEST 1 VIEW: CPT

## 2020-02-10 PROCEDURE — 87040 BLOOD CULTURE FOR BACTERIA: CPT

## 2020-02-10 PROCEDURE — 99285 EMERGENCY DEPT VISIT HI MDM: CPT

## 2020-02-10 PROCEDURE — 80053 COMPREHEN METABOLIC PANEL: CPT

## 2020-02-10 RX ORDER — LABETALOL HYDROCHLORIDE 5 MG/ML
10 INJECTION, SOLUTION INTRAVENOUS
Status: DISCONTINUED | OUTPATIENT
Start: 2020-02-10 | End: 2020-02-14 | Stop reason: HOSPADM

## 2020-02-10 RX ORDER — ONDANSETRON 2 MG/ML
8 INJECTION INTRAMUSCULAR; INTRAVENOUS AS NEEDED
Status: CANCELLED | OUTPATIENT
Start: 2020-03-16

## 2020-02-10 RX ORDER — LEVOFLOXACIN 5 MG/ML
500 INJECTION, SOLUTION INTRAVENOUS EVERY 24 HOURS
Status: DISCONTINUED | OUTPATIENT
Start: 2020-02-10 | End: 2020-02-13 | Stop reason: CLARIF

## 2020-02-10 RX ORDER — ONDANSETRON 2 MG/ML
4 INJECTION INTRAMUSCULAR; INTRAVENOUS ONCE
Status: COMPLETED | OUTPATIENT
Start: 2020-02-10 | End: 2020-02-10

## 2020-02-10 RX ORDER — ACETAMINOPHEN 325 MG/1
650 TABLET ORAL
Status: DISCONTINUED | OUTPATIENT
Start: 2020-02-10 | End: 2020-02-10

## 2020-02-10 RX ORDER — ALBUTEROL SULFATE 0.83 MG/ML
2.5 SOLUTION RESPIRATORY (INHALATION) AS NEEDED
Status: CANCELLED
Start: 2020-03-02

## 2020-02-10 RX ORDER — PANTOPRAZOLE SODIUM 40 MG/1
40 TABLET, DELAYED RELEASE ORAL
Status: DISCONTINUED | OUTPATIENT
Start: 2020-02-11 | End: 2020-02-14 | Stop reason: HOSPADM

## 2020-02-10 RX ORDER — DIPHENHYDRAMINE HYDROCHLORIDE 50 MG/ML
50 INJECTION, SOLUTION INTRAMUSCULAR; INTRAVENOUS AS NEEDED
Status: CANCELLED
Start: 2020-03-02

## 2020-02-10 RX ORDER — EPINEPHRINE 1 MG/ML
0.3 INJECTION, SOLUTION, CONCENTRATE INTRAVENOUS AS NEEDED
Status: CANCELLED | OUTPATIENT
Start: 2020-03-02

## 2020-02-10 RX ORDER — ERGOCALCIFEROL 1.25 MG/1
50000 CAPSULE ORAL
Status: DISCONTINUED | OUTPATIENT
Start: 2020-02-15 | End: 2020-02-14 | Stop reason: HOSPADM

## 2020-02-10 RX ORDER — SODIUM CHLORIDE 9 MG/ML
100 INJECTION, SOLUTION INTRAVENOUS CONTINUOUS
Status: DISCONTINUED | OUTPATIENT
Start: 2020-02-10 | End: 2020-02-12

## 2020-02-10 RX ORDER — HEPARIN 100 UNIT/ML
300-500 SYRINGE INTRAVENOUS AS NEEDED
Status: CANCELLED
Start: 2020-03-16

## 2020-02-10 RX ORDER — SODIUM CHLORIDE 0.9 % (FLUSH) 0.9 %
10 SYRINGE (ML) INJECTION
Status: COMPLETED | OUTPATIENT
Start: 2020-02-10 | End: 2020-02-10

## 2020-02-10 RX ORDER — ACETAMINOPHEN 325 MG/1
650 TABLET ORAL AS NEEDED
Status: CANCELLED
Start: 2020-03-02

## 2020-02-10 RX ORDER — HYDROCORTISONE SODIUM SUCCINATE 100 MG/2ML
100 INJECTION, POWDER, FOR SOLUTION INTRAMUSCULAR; INTRAVENOUS AS NEEDED
Status: CANCELLED | OUTPATIENT
Start: 2020-03-16

## 2020-02-10 RX ORDER — HEPARIN 100 UNIT/ML
300-500 SYRINGE INTRAVENOUS AS NEEDED
Status: CANCELLED
Start: 2020-03-02

## 2020-02-10 RX ORDER — SODIUM CHLORIDE 0.9 % (FLUSH) 0.9 %
10 SYRINGE (ML) INJECTION AS NEEDED
Status: CANCELLED
Start: 2020-03-02

## 2020-02-10 RX ORDER — ONDANSETRON 2 MG/ML
4 INJECTION INTRAMUSCULAR; INTRAVENOUS
Status: DISCONTINUED | OUTPATIENT
Start: 2020-02-10 | End: 2020-02-14 | Stop reason: HOSPADM

## 2020-02-10 RX ORDER — SODIUM CHLORIDE 0.9 % (FLUSH) 0.9 %
10 SYRINGE (ML) INJECTION AS NEEDED
Status: CANCELLED
Start: 2020-03-16

## 2020-02-10 RX ORDER — SODIUM CHLORIDE 9 MG/ML
10 INJECTION INTRAMUSCULAR; INTRAVENOUS; SUBCUTANEOUS AS NEEDED
Status: CANCELLED | OUTPATIENT
Start: 2020-03-02

## 2020-02-10 RX ORDER — ACETAMINOPHEN 325 MG/1
650 TABLET ORAL
Status: DISCONTINUED | OUTPATIENT
Start: 2020-02-10 | End: 2020-02-14 | Stop reason: HOSPADM

## 2020-02-10 RX ORDER — DIPHENHYDRAMINE HYDROCHLORIDE 50 MG/ML
25 INJECTION, SOLUTION INTRAMUSCULAR; INTRAVENOUS AS NEEDED
Status: CANCELLED
Start: 2020-03-02

## 2020-02-10 RX ORDER — EPINEPHRINE 1 MG/ML
0.3 INJECTION, SOLUTION, CONCENTRATE INTRAVENOUS AS NEEDED
Status: CANCELLED | OUTPATIENT
Start: 2020-03-16

## 2020-02-10 RX ORDER — DIPHENHYDRAMINE HYDROCHLORIDE 50 MG/ML
25 INJECTION, SOLUTION INTRAMUSCULAR; INTRAVENOUS AS NEEDED
Status: CANCELLED
Start: 2020-03-16

## 2020-02-10 RX ORDER — ACETAMINOPHEN 325 MG/1
650 TABLET ORAL AS NEEDED
Status: CANCELLED
Start: 2020-03-16

## 2020-02-10 RX ORDER — ONDANSETRON 2 MG/ML
8 INJECTION INTRAMUSCULAR; INTRAVENOUS AS NEEDED
Status: CANCELLED | OUTPATIENT
Start: 2020-03-02

## 2020-02-10 RX ORDER — METRONIDAZOLE 500 MG/100ML
500 INJECTION, SOLUTION INTRAVENOUS EVERY 12 HOURS
Status: DISCONTINUED | OUTPATIENT
Start: 2020-02-10 | End: 2020-02-13 | Stop reason: CLARIF

## 2020-02-10 RX ORDER — MORPHINE SULFATE 2 MG/ML
2 INJECTION, SOLUTION INTRAMUSCULAR; INTRAVENOUS
Status: COMPLETED | OUTPATIENT
Start: 2020-02-10 | End: 2020-02-10

## 2020-02-10 RX ORDER — HYDROCORTISONE SODIUM SUCCINATE 100 MG/2ML
100 INJECTION, POWDER, FOR SOLUTION INTRAMUSCULAR; INTRAVENOUS AS NEEDED
Status: CANCELLED | OUTPATIENT
Start: 2020-03-02

## 2020-02-10 RX ORDER — OXYCODONE HYDROCHLORIDE 5 MG/1
5 TABLET ORAL
Status: DISCONTINUED | OUTPATIENT
Start: 2020-02-10 | End: 2020-02-14 | Stop reason: HOSPADM

## 2020-02-10 RX ORDER — ALBUTEROL SULFATE 0.83 MG/ML
2.5 SOLUTION RESPIRATORY (INHALATION) AS NEEDED
Status: CANCELLED
Start: 2020-03-16

## 2020-02-10 RX ORDER — DIPHENHYDRAMINE HYDROCHLORIDE 50 MG/ML
50 INJECTION, SOLUTION INTRAMUSCULAR; INTRAVENOUS AS NEEDED
Status: CANCELLED
Start: 2020-03-16

## 2020-02-10 RX ORDER — SODIUM CHLORIDE 9 MG/ML
10 INJECTION INTRAMUSCULAR; INTRAVENOUS; SUBCUTANEOUS AS NEEDED
Status: CANCELLED | OUTPATIENT
Start: 2020-03-16

## 2020-02-10 RX ADMIN — SODIUM CHLORIDE 1000 ML: 900 INJECTION, SOLUTION INTRAVENOUS at 15:55

## 2020-02-10 RX ADMIN — IOPAMIDOL 100 ML: 755 INJECTION, SOLUTION INTRAVENOUS at 15:11

## 2020-02-10 RX ADMIN — SODIUM CHLORIDE 100 ML/HR: 900 INJECTION, SOLUTION INTRAVENOUS at 20:52

## 2020-02-10 RX ADMIN — METRONIDAZOLE 500 MG: 500 INJECTION, SOLUTION INTRAVENOUS at 22:09

## 2020-02-10 RX ADMIN — LEVOFLOXACIN 500 MG: 5 INJECTION, SOLUTION INTRAVENOUS at 20:52

## 2020-02-10 RX ADMIN — ONDANSETRON 4 MG: 2 INJECTION INTRAMUSCULAR; INTRAVENOUS at 15:54

## 2020-02-10 RX ADMIN — MORPHINE SULFATE 2 MG: 2 INJECTION, SOLUTION INTRAMUSCULAR; INTRAVENOUS at 15:54

## 2020-02-10 RX ADMIN — Medication 10 ML: at 15:11

## 2020-02-10 RX ADMIN — PIPERACILLIN AND TAZOBACTAM 3.38 G: 3; .375 INJECTION, POWDER, LYOPHILIZED, FOR SOLUTION INTRAVENOUS at 15:54

## 2020-02-10 NOTE — TELEPHONE ENCOUNTER
Called and spoke to patient. HIPAA verified by two patient identifiers. She feels as though her Hgb has dropped. Will check CBC at [facility] today and transfuse as needed. Also explained that her iron saturations were low and we will arrange for her to get IV iron in the near future. Discussed with Annette at [facility]. Patient expressed understanding and will head over to [facility].

## 2020-02-10 NOTE — TELEPHONE ENCOUNTER
Pt called to state that she has been feeling weak for about 2 days is requested a CBC. Please give pt a call back.  Best contact [phone]

## 2020-02-10 NOTE — ED NOTES
Patient ambulated throughout hallway with walker. Patient reports sob and b/l leg weakness. Dr. Yaz Bermudze made aware.

## 2020-02-10 NOTE — ED TRIAGE NOTES
Pt c/o SOB and dyspnea X 2 days, worsening this morning.   Pt also c/o constipation and 'pressure in rectum.'

## 2020-02-10 NOTE — ED PROVIDER NOTES
EMERGENCY DEPARTMENT HISTORY AND PHYSICAL EXAM      Date: 2/10/2020  Patient Name: Luis Mason    History of Presenting Illness     Chief Complaint   Patient presents with    Shortness of Breath     pt c/o SOB and dyspnea X few days, worsening this morning       History Provided By: Patient    HPI: Luis Mason, 82 y.o. female with PMHx significant for anemia, breast cancer, GERD, myelodysplastic syndrome, neuropathy, peptic ulcer disease, fibromyalgia, presents to the ED with cc of significantly increased fatigue and weakness. Patient states that over the last few days she is just had no energy, she is feeling drained and very weak, feeling short of breath. Additionally she has had some nausea, no significant emesis along with intermittent lower abd pain/fullness in her rectum. No bloody stools, there has been diarrhea, but most recent stool was relatively formed. She does have a relatively recent history of diverticulitis for which she was treated for with antibiotics on an outpatient basis. Overall, she thought that she might need another transfusion given her history myelodysplastic syndrome, for which she is currently undergoing treatment with [facility] with Dr. Christa Garcia and did need a transfusion for RBCs weeks ago as well. She followed up with his office at [facility] today and lab work was obtained however given her symptoms she presented for further work up. She has not had any documented fevers, no productive or nonproductive coughing, no dysuria, hematuria. She has no prior episodes of C. difficile however she does have recent exposure to antibiotics but stool is relatively formed at least currently. No other complaints at this time. There are no other complaints, changes, or physical findings at this time. PCP: David Brown MD    No current facility-administered medications on file prior to encounter.       Current Outpatient Medications on File Prior to Encounter   Medication Sig Dispense Refill    docusate sodium (STOOL SOFTENER PO) Take 2 Tabs by mouth daily.  inulin (FIBER GUMMIES PO) Take 1 Tab by mouth daily.  azacitidine (VIDAZA INJECTION) by Injection route. Pt receives this infusion the first week of every month.  ergocalciferol (ERGOCALCIFEROL) 1,250 mcg (50,000 unit) capsule TAKE ONE CAPSULE BY MOUTH EVERY 7 DAYS 12 Cap 1    acetaminophen (TYLENOL) 325 mg tablet Take 2 Tabs by mouth every four (4) hours as needed for Pain. 20 Tab 0    dilTIAZem CD (CARDIZEM CD) 180 mg ER capsule Take 1 Cap by mouth daily. 30 Cap 1    pantoprazole (PROTONIX) 40 mg tablet Take 1 Tab by mouth Before breakfast and dinner.  61 Tab 1       Past History     Past Medical History:  Past Medical History:   Diagnosis Date    Anemia     Arthritis     Breast cancer ([address]     left    Bunion of right foot 8/20/2015    Chronic pain     back--uses tens unit    Diverticulitis 6/29/2018    Recurrent    Diverticulitis large intestine     Dry eye syndrome 6/29/2018    Fibromyalgia 6/29/2018    GERD (gastroesophageal reflux disease)     History of left breast cancer 2013    lumpectomy radiation    Hypercholesterolemia 6/29/2018    Ill-defined condition     blood transfusion hx    Leukemia ([address]     MDS (myelodysplastic syndrome) ([address]     Osteoporosis 6/29/2018    Peripheral neuropathy 6/29/2018    Prediabetes 6/29/2018    PUD (peptic ulcer disease)     Radiation therapy complication 1236    Lt breast-No Chemo    Rosacea 6/29/2018    Trouble in sleeping        Past Surgical History:  Past Surgical History:   Procedure Laterality Date    HX APPENDECTOMY      HX BREAST LUMPECTOMY  11/21/2013    LEFT BREAST LUMPECTOMY W/LEFT BREAST SENTINEL NODE BIOPSY,AND NEEDLE LOCALIZATION performed by [facility] Poll[facility] at [facility] MAIN OR    HX CATARACT REMOVAL      bilateral    HX HYSTERECTOMY      ovarian cyst    HX MOHS PROCEDURES      right    HX ORTHOPAEDIC      back surgery x2    HX OTHER SURGICAL      colonoscopy    HX TONSILLECTOMY      IR INSERT TUNL CVC W PORT OVER 5 YEARS  12/3/2019    TOTAL KNEE ARTHROPLASTY      left    TOTAL KNEE ARTHROPLASTY      right    UPPER GI ENDOSCOPY,BIOPSY  2020         US GUIDED CORE BREAST BIOPSY Left 2013    Breast Ca       Family History:  Family History   Problem Relation Age of Onset    Cancer Mother         bladder    Cancer Father     Breast Cancer Paternal Grandmother        Social History:  Social History     Tobacco Use    Smoking status: Former Smoker     Packs/day: 0.50     Years: 50.00     Pack years: 25.00     Last attempt to quit: 2012     Years since quittin.0    Smokeless tobacco: Never Used   Substance Use Topics    Alcohol use: Yes     Alcohol/week: 2.0 standard drinks     Types: 2 Glasses of wine per week    Drug use: No       Allergies: Allergies   Allergen Reactions    Hydrocodone Nausea Only and Vertigo    Gabapentin Diarrhea, Nausea Only and Other (comments)     weakness    Lyrica [Pregabalin] Nausea Only    Oxycodone Nausea and Vomiting and Vertigo         Review of Systems   Review of Systems   Constitutional: Negative for chills and fever. HENT: Negative for nosebleeds and sore throat. Eyes: Negative for pain and visual disturbance. Respiratory: Positive for shortness of breath. Negative for cough and wheezing. Cardiovascular: Negative for chest pain and palpitations. Gastrointestinal: Positive for abdominal pain, nausea and rectal pain (fullness but not currently). Negative for diarrhea and vomiting. Genitourinary: Negative for dysuria and hematuria. Musculoskeletal: Negative for back pain and neck pain. Skin: Negative for color change and rash. Neurological: Positive for weakness (generalized). Negative for headaches. Psychiatric/Behavioral: Negative for suicidal ideas. The patient is not nervous/anxious. All other systems reviewed and are negative.       Physical Exam Physical Exam  Vitals signs and nursing note reviewed. Constitutional:       Appearance: Normal appearance. HENT:      Head: Normocephalic and atraumatic. Nose: Nose normal.      Mouth/Throat:      Mouth: Mucous membranes are moist.      Pharynx: Oropharynx is clear. Eyes:      Extraocular Movements: Extraocular movements intact. Pupils: Pupils are equal, round, and reactive to light. Neck:      Musculoskeletal: Normal range of motion and neck supple. No neck rigidity. Cardiovascular:      Rate and Rhythm: Normal rate and regular rhythm. Heart sounds: Normal heart sounds. Pulmonary:      Effort: Pulmonary effort is normal.      Breath sounds: Normal breath sounds. Abdominal:      Palpations: Abdomen is soft. Tenderness: There is abdominal tenderness (suprapubic, mild). There is no guarding or rebound. Musculoskeletal: Normal range of motion. General: No deformity. Skin:     General: Skin is warm and dry. Findings: No rash. Neurological:      General: No focal deficit present. Mental Status: She is alert and oriented to person, place, and time. Motor: Weakness (generalized) present.    Psychiatric:         Mood and Affect: Mood normal.         Behavior: Behavior normal.         Diagnostic Study Results     Labs -     Recent Results (from the past 24 hour(s))   CBC WITH AUTOMATED DIFF    Collection Time: 02/10/20 11:22 AM   Result Value Ref Range    WBC 29.6 (HH) 3.6 - 11.0 K/uL    RBC 3.40 (L) 3.80 - 5.20 M/uL    HGB 9.6 (L) 11.5 - 16.0 g/dL    HCT 32.5 (L) 35.0 - 47.0 %    MCV 95.6 80.0 - 99.0 FL    MCH 28.2 26.0 - 34.0 PG    MCHC 29.5 (L) 30.0 - 36.5 g/dL    RDW 18.7 (H) 11.5 - 14.5 %    PLATELET 251 449 - 655 K/uL    MPV 9.3 8.9 - 12.9 FL    NRBC 0.4 (H) 0  WBC    ABSOLUTE NRBC 0.11 (H) 0.00 - 0.01 K/uL    NEUTROPHILS 61 32 - 75 %    LYMPHOCYTES 13 12 - 49 %    MONOCYTES 14 (H) 5 - 13 %    EOSINOPHILS 3 0 - 7 %    BASOPHILS 1 0 - 1 % IMMATURE GRANULOCYTES 8 (H) 0.0 - 0.5 %    ABS. NEUTROPHILS 18.1 (H) 1.8 - 8.0 K/UL    ABS. LYMPHOCYTES 3.8 (H) 0.8 - 3.5 K/UL    ABS. MONOCYTES 4.1 (H) 0.0 - 1.0 K/UL    ABS. EOSINOPHILS 0.9 (H) 0.0 - 0.4 K/UL    ABS. BASOPHILS 0.3 (H) 0.0 - 0.1 K/UL    ABS. IMM. GRANS. 2.4 (H) 0.00 - 0.04 K/UL    DF SMEAR SCANNED      RBC COMMENTS ANISOCYTOSIS  1+       EKG, 12 LEAD, INITIAL    Collection Time: 02/10/20  1:47 PM   Result Value Ref Range    Ventricular Rate 74 BPM    Atrial Rate 74 BPM    P-R Interval 148 ms    QRS Duration 82 ms    Q-T Interval 394 ms    QTC Calculation (Bezet) 437 ms    Calculated P Axis -23 degrees    Calculated R Axis -17 degrees    Calculated T Axis 51 degrees    Diagnosis       Normal sinus rhythm  Moderate voltage criteria for LVH, may be normal variant  When compared with ECG of 17-DEC-2019 06:56,  No significant change was found     NT-PRO BNP    Collection Time: 02/10/20  3:38 PM   Result Value Ref Range    NT pro- (H) <450 PG/ML   TROPONIN I    Collection Time: 02/10/20  3:38 PM   Result Value Ref Range    Troponin-I, Qt. <0.05 <1.48 ng/mL   METABOLIC PANEL, COMPREHENSIVE    Collection Time: 02/10/20  3:38 PM   Result Value Ref Range    Sodium 139 136 - 145 mmol/L    Potassium 3.9 3.5 - 5.1 mmol/L    Chloride 106 97 - 108 mmol/L    CO2 26 21 - 32 mmol/L    Anion gap 7 5 - 15 mmol/L    Glucose 116 (H) 65 - 100 mg/dL    BUN 33 (H) 6 - 20 MG/DL    Creatinine 0.92 0.55 - 1.02 MG/DL    BUN/Creatinine ratio 36 (H) 12 - 20      GFR est AA >60 >60 ml/min/1.73m2    GFR est non-AA 58 (L) >60 ml/min/1.73m2    Calcium 8.2 (L) 8.5 - 10.1 MG/DL    Bilirubin, total 0.9 0.2 - 1.0 MG/DL    ALT (SGPT) 17 12 - 78 U/L    AST (SGOT) 7 (L) 15 - 37 U/L    Alk.  phosphatase 58 45 - 117 U/L    Protein, total 6.6 6.4 - 8.2 g/dL    Albumin 3.5 3.5 - 5.0 g/dL    Globulin 3.1 2.0 - 4.0 g/dL    A-G Ratio 1.1 1.1 - 2.2     LIPASE    Collection Time: 02/10/20  3:38 PM   Result Value Ref Range    Lipase 76 73 - 393 U/L   MAGNESIUM    Collection Time: 02/10/20  3:38 PM   Result Value Ref Range    Magnesium 2.3 1.6 - 2.4 mg/dL   LACTIC ACID    Collection Time: 02/10/20  3:38 PM   Result Value Ref Range    Lactic acid 1.7 0.4 - 2.0 MMOL/L   URIC ACID    Collection Time: 02/10/20  7:50 PM   Result Value Ref Range    Uric acid 5.0 2.6 - 6.0 MG/DL   PROCALCITONIN    Collection Time: 02/10/20  7:50 PM   Result Value Ref Range    Procalcitonin 0.08 ng/mL       Radiologic Studies -   CT ABD PELV W CONT   Final Result   IMPRESSION: Mild sigmoid colonic diverticulitis. No associated drainable focal   fluid collection. XR CHEST PORT   Final Result   IMPRESSION: No acute cardiopulmonary disease. CT Results  (Last 48 hours)               02/10/20 1510  CT ABD PELV W CONT Final result    Impression:  IMPRESSION: Mild sigmoid colonic diverticulitis. No associated drainable focal   fluid collection. Narrative:  INDICATION: Nausea, lower abdominal pain. CT of the abdomen and pelvis is performed with 5 mm collimation. Study is   performed with 100 cc of nonionic Isovue 370. Sagittal and coronal reformatted   images were also performed. CT dose reduction was achieved with the use of the standardized protocol   tailored for this examination and automatic exposure control for dose   modulation. Direct comparison is made to prior CT dated December 2019. Findings:       Lung bases: The visualized lung bases are clear. Liver: The liver is normal.       Adrenals: Adrenal glands are stable. Pancreas: There are multiple pancreatic ductal calcifications consistent with   chronic pancreatitis. Gallbladder: The gallbladder is normal.       Kidneys: There is a 4 mm nonobstructing left renal calculus.  There is a 1.2 cm   isodense exophytic left renal lesion, unchanged compared to prior CT dated   October 2015 and likely to represent a hyperdense cyst. Additional bilateral   renal cysts are noted. There is no hydronephrosis. Spleen: The spleen is normal.       Lymph nodes. There is no echo hepatitis, mesenteric, retroperitoneal or pelvic   lymphadenopathy. Bowel: There is thickening of the mid sigmoid colon; there are multiple adjacent   diverticula as well as mild inflammatory stranding and trace free fluid. Scattered colonic diverticulosis is noted, most extensive within the sigmoid   colon. There is no focal fluid collection to suggest abscess. There is no focal   fluid collection to suggest abscess. No dilated loop of large or small bowel is   visualized. There is a moderate amount of stool in the rectum. Urinary bladder: Urinary bladder is partially filled and grossly normal.       Bones: The osseous structures are diffusely demineralized. No lytic or sclerotic   osseous lesion is visualized. Miscellaneous: The uterus is absent. CXR Results  (Last 48 hours)               02/10/20 1434  XR CHEST PORT Final result    Impression:  IMPRESSION: No acute cardiopulmonary disease. Narrative:  INDICATION:   dyspnea        Portable AP upright view of the chest.       Direct comparison made to prior chest x-ray dated December 2019. Cardiomediastinal silhouette is stable. Central venous port catheter extends to   the proximal SVC. Lungs are clear bilaterally. Pleural spaces are normal.   Osseous structures are intact. Medical Decision Making   I am the first provider for this patient. I reviewed the vital signs, available nursing notes, past medical history, past surgical history, family history and social history. Vital Signs-Reviewed the patient's vital signs.   Patient Vitals for the past 24 hrs:   Temp Pulse Resp BP SpO2   02/11/20 0000  68      02/10/20 2355 98.6 °F (37 °C) 70 20 113/41 98 %   02/10/20 2147 98.6 °F (37 °C) 91 20 117/58 100 %   02/10/20 2031  84 21 114/46 97 %   02/10/20 1930 99.5 °F (37.5 °C) 75 10 122/48 99 %   02/10/20 1900  76 13 103/44 98 %   02/10/20 1800  88 17 102/78 99 %   02/10/20 1730  72 17 114/47 95 %   02/10/20 1630  69 13 121/51 99 %   02/10/20 1345  79 29 100/50 97 %       Records Reviewed: Nursing Notes and Old Medical Records    Differential Diagnosis:    Recurrent diverticulitis, anemia, pneumonia, UTI, chemotherapy side effects, worsening myelodysplastic syndrome    Provider Notes (Medical Decision Making): We will obtain labs, chest x-ray, CT abdomen pelvis. Give IV fluids, antiemetics and given that patient's white count is significantly elevated from results available from earlier lab work at [address] [place] [address], and her abdominal discomfort we will start empiric antibiotic treatment with Zosyn at this time. Reassess. ED Course:     Initial assessment performed. The patients presenting problems have been discussed, and they are in agreement with the care plan formulated and outlined with them. I have encouraged them to ask questions as they arise throughout their visit. ED Course as of Feb 11 0226   Mon Feb 10, 2020   1733 Patient CT shows uncomplicated diverticulitis. Given its recurrent in approximately 2 months and she is on an immunosuppressant, spoke with GI. At this point, if patient feels well enough to go, okay to switch to Augmentin and again try outpatient treatment with strict return precautions. Discussed with patient at bedside. Will ambulate and as long she passes ambulation trial will DC home with liquid diet and Augmentin with GI follow-up and strict return precautions. [SC]   [id number] Patient was very weak when attempting to ambulate, does not normally walk with a walker and had to stop multiple times going just a short distance. Chest x-ray was clear base of lungs were clear as well.   She has no significant respiratory symptoms    [SC]      ED Course User Index  [SC] Caio Olivera MD       Critical Care Time: none    Disposition:  admit    PLAN:  1. Current Discharge Medication List        2. Follow-up Information    None       Return to ED if worse     Diagnosis     Clinical Impression:   1. Diverticulitis    2.  Weakness

## 2020-02-10 NOTE — PROGRESS NOTES
[facility] Progress Note    Date: February 10, 2020    Name: Lionel Mckeon    MRN: [id number]         : 1935      1120:  Pt arrived ambulatory with c/o of gen weakness and fatigue 3 day post chemo, for lab visit. Labs drawn via RAC, patient tolerated well. Problem: Patient Education:  Go to Education Activity  Goal: Patient/Family Education  Outcome: Progressing Towards Goal    Visit Vitals  /47 (BP 1 Location: Right arm, BP Patient Position: Sitting)   Pulse 86   Temp 98.2 °F (36.8 °C)   Resp 20   SpO2 100%   Breastfeeding No       Lab results were obtained and reviewed. Recent Results (from the past 12 hour(s))   CBC WITH AUTOMATED DIFF    Collection Time: 02/10/20 11:22 AM   Result Value Ref Range    WBC 29.6 (HH) 3.6 - 11.0 K/uL    RBC 3.40 (L) 3.80 - 5.20 M/uL    HGB 9.6 (L) 11.5 - 16.0 g/dL    HCT 32.5 (L) 35.0 - 47.0 %    MCV 95.6 80.0 - 99.0 FL    MCH 28.2 26.0 - 34.0 PG    MCHC 29.5 (L) 30.0 - 36.5 g/dL    RDW 18.7 (H) 11.5 - 14.5 %    PLATELET 343 376 - 588 K/uL    MPV 9.3 8.9 - 12.9 FL    NRBC 0.4 (H) 0  WBC    ABSOLUTE NRBC 0.11 (H) 0.00 - 0.01 K/uL    NEUTROPHILS 61 32 - 75 %    LYMPHOCYTES 13 12 - 49 %    MONOCYTES 14 (H) 5 - 13 %    EOSINOPHILS 3 0 - 7 %    BASOPHILS 1 0 - 1 %    IMMATURE GRANULOCYTES 8 (H) 0.0 - 0.5 %    ABS. NEUTROPHILS 18.1 (H) 1.8 - 8.0 K/UL    ABS. LYMPHOCYTES 3.8 (H) 0.8 - 3.5 K/UL    ABS. MONOCYTES 4.1 (H) 0.0 - 1.0 K/UL    ABS. EOSINOPHILS 0.9 (H) 0.0 - 0.4 K/UL    ABS. BASOPHILS 0.3 (H) 0.0 - 0.1 K/UL    ABS. IMM. GRANS. 2.4 (H) 0.00 - 0.04 K/UL    DF SMEAR SCANNED      RBC COMMENTS ANISOCYTOSIS  1+         Patient assure that symptoms are not a result of low HGB. Referring NP notified of results. Patient [facility] in stable condition accompanied by spouse. Departed [facility] ambulatory and in no distress.     Future Appointments   Date Time Provider Anju Dickson   2020 10:30 AM [facility] 3/2/2020  1:00 PM [facility] [place] INFUSION NURSE 1 [facility]   3/3/2020  1:00 PM [facility] [place] INFUSION NURSE 1 [address] COM   3/4/2020  8:00 AM [facility] [address] [facility]   3/5/2020  8:00 AM [facility]   3/5/2020  9:15 AM Emily Davis NP ONC [place] SCHED   3/6/2020  8:00 AM [facility]   5/8/2020  8:30 AM Fadi Jesus MD PCA [facility] SCHED   6/25/2020  3:40 PM Catarina Mcneil, DO [facility]       Angie Gilbert RN  February 10, 2020

## 2020-02-10 NOTE — PROGRESS NOTES
Pharmacy Clarification of Prior to Admission Medication Regimen     The patient was interviewed regarding clarification of the prior to admission medication regimen. Patient's  was present in room and obtained permission from patient to discuss drug regimen with visitor(s) present. Patient was questioned regarding use of any other inhalers, topical products, over the counter medications, herbal medications, vitamin products or ophthalmic/nasal/otic medication use. Information Obtained From: RX Query, Patient    Pertinent Pharmacy Findings:  [facility]ac[facility]idine [facility] Per patient, she receives this infusion over the first week of every month with Dr. Kristina Wells to treat MDS. PTA medication list was corrected to the following:     Prior to Admission Medications   Prescriptions Last Dose Informant Taking?   acetaminophen (TYLENOL) 325 mg tablet 2/9/2020 at Unknown time Self Yes   Sig: Take 2 Tabs by mouth every four (4) hours as needed for Pain.   azacitidine (VIDAZA INJECTION) 2/9/2020 at Unknown time Self Yes   Sig: by Injection route. Pt receives this infusion the first week of every month. dilTIAZem CD (CARDIZEM CD) 180 mg ER capsule 2/10/2020 at Unknown time Self Yes   Sig: Take 1 Cap by mouth daily. docusate sodium (STOOL SOFTENER PO) 2/10/2020 at Unknown time Self Yes   Sig: Take 2 Tabs by mouth daily. ergocalciferol (ERGOCALCIFEROL) 1,250 mcg (50,000 unit) capsule 2/8/2020 at Unknown time Self Yes   Sig: TAKE ONE CAPSULE BY MOUTH EVERY 7 DAYS   inulin (FIBER GUMMIES PO) 2/9/2020 at Unknown time Self Yes   Sig: Take 1 Tab by mouth daily. pantoprazole (PROTONIX) 40 mg tablet 2/10/2020 at Unknown time Self Yes   Sig: Take 1 Tab by mouth Before breakfast and dinner.       Facility-Administered Medications: None          Thank you,  Andrew Bhakta  Medication History Pharmacy Technician

## 2020-02-11 ENCOUNTER — APPOINTMENT (OUTPATIENT)
Dept: CT IMAGING | Age: 85
DRG: 841 | End: 2020-02-11
Attending: INTERNAL MEDICINE
Payer: MEDICARE

## 2020-02-11 LAB
ALBUMIN SERPL-MCNC: 2.8 G/DL (ref 3.5–5)
ALBUMIN/GLOB SERPL: 1 {RATIO} (ref 1.1–2.2)
ALP SERPL-CCNC: 41 U/L (ref 45–117)
ALT SERPL-CCNC: 13 U/L (ref 12–78)
ANION GAP SERPL CALC-SCNC: 6 MMOL/L (ref 5–15)
APPEARANCE UR: CLEAR
AST SERPL-CCNC: 10 U/L (ref 15–37)
ATRIAL RATE: 74 BPM
BASOPHILS # BLD: 0 K/UL (ref 0–0.1)
BASOPHILS NFR BLD: 0 % (ref 0–1)
BILIRUB SERPL-MCNC: 0.8 MG/DL (ref 0.2–1)
BILIRUB UR QL: NEGATIVE
BUN SERPL-MCNC: 23 MG/DL (ref 6–20)
BUN/CREAT SERPL: 34 (ref 12–20)
CALCIUM SERPL-MCNC: 7.9 MG/DL (ref 8.5–10.1)
CALCULATED P AXIS, ECG09: -23 DEGREES
CALCULATED R AXIS, ECG10: -17 DEGREES
CALCULATED T AXIS, ECG11: 51 DEGREES
CHLORIDE SERPL-SCNC: 113 MMOL/L (ref 97–108)
CO2 SERPL-SCNC: 21 MMOL/L (ref 21–32)
COLOR UR: NORMAL
CREAT SERPL-MCNC: 0.68 MG/DL (ref 0.55–1.02)
DIAGNOSIS, 93000: NORMAL
DIFFERENTIAL METHOD BLD: ABNORMAL
EOSINOPHIL # BLD: 0.5 K/UL (ref 0–0.4)
EOSINOPHIL NFR BLD: 2 % (ref 0–7)
ERYTHROCYTE [DISTWIDTH] IN BLOOD BY AUTOMATED COUNT: 19 % (ref 11.5–14.5)
GLOBULIN SER CALC-MCNC: 2.8 G/DL (ref 2–4)
GLUCOSE SERPL-MCNC: 95 MG/DL (ref 65–100)
GLUCOSE UR STRIP.AUTO-MCNC: NEGATIVE MG/DL
HCT VFR BLD AUTO: 26.5 % (ref 35–47)
HGB BLD-MCNC: 7.6 G/DL (ref 11.5–16)
HGB UR QL STRIP: NEGATIVE
IMM GRANULOCYTES # BLD AUTO: 0 K/UL (ref 0–0.04)
IMM GRANULOCYTES NFR BLD AUTO: 0 % (ref 0–0.5)
KETONES UR QL STRIP.AUTO: NEGATIVE MG/DL
LEUKOCYTE ESTERASE UR QL STRIP.AUTO: NEGATIVE
LYMPHOCYTES # BLD: 4.5 K/UL (ref 0.8–3.5)
LYMPHOCYTES NFR BLD: 18 % (ref 12–49)
MAGNESIUM SERPL-MCNC: 2.1 MG/DL (ref 1.6–2.4)
MCH RBC QN AUTO: 29 PG (ref 26–34)
MCHC RBC AUTO-ENTMCNC: 28.7 G/DL (ref 30–36.5)
MCV RBC AUTO: 101.1 FL (ref 80–99)
METAMYELOCYTES NFR BLD MANUAL: 2 %
MONOCYTES # BLD: 1.8 K/UL (ref 0–1)
MONOCYTES NFR BLD: 7 % (ref 5–13)
MYELOCYTES NFR BLD MANUAL: 1 %
NEUTS BAND NFR BLD MANUAL: 2 %
NEUTS SEG # BLD: 17.5 K/UL (ref 1.8–8)
NEUTS SEG NFR BLD: 68 % (ref 32–75)
NITRITE UR QL STRIP.AUTO: NEGATIVE
NRBC # BLD: 0.09 K/UL (ref 0–0.01)
NRBC BLD-RTO: 0.4 PER 100 WBC
P-R INTERVAL, ECG05: 148 MS
PH UR STRIP: 5.5 [PH] (ref 5–8)
PLATELET # BLD AUTO: 177 K/UL (ref 150–400)
PMV BLD AUTO: 9.8 FL (ref 8.9–12.9)
POTASSIUM SERPL-SCNC: 4.4 MMOL/L (ref 3.5–5.1)
PROT SERPL-MCNC: 5.6 G/DL (ref 6.4–8.2)
PROT UR STRIP-MCNC: NEGATIVE MG/DL
Q-T INTERVAL, ECG07: 394 MS
QRS DURATION, ECG06: 82 MS
QTC CALCULATION (BEZET), ECG08: 437 MS
RBC # BLD AUTO: 2.62 M/UL (ref 3.8–5.2)
RBC MORPH BLD: ABNORMAL
SODIUM SERPL-SCNC: 140 MMOL/L (ref 136–145)
SP GR UR REFRACTOMETRY: 1.01 (ref 1–1.03)
TSH SERPL DL<=0.05 MIU/L-ACNC: 1.29 UIU/ML (ref 0.36–3.74)
UROBILINOGEN UR QL STRIP.AUTO: 0.2 EU/DL (ref 0.2–1)
VENTRICULAR RATE, ECG03: 74 BPM
WBC # BLD AUTO: 25 K/UL (ref 3.6–11)

## 2020-02-11 PROCEDURE — 36415 COLL VENOUS BLD VENIPUNCTURE: CPT

## 2020-02-11 PROCEDURE — 80053 COMPREHEN METABOLIC PANEL: CPT

## 2020-02-11 PROCEDURE — 88313 SPECIAL STAINS GROUP 2: CPT

## 2020-02-11 PROCEDURE — 07DR3ZX EXTRACTION OF ILIAC BONE MARROW, PERCUTANEOUS APPROACH, DIAGNOSTIC: ICD-10-PCS | Performed by: RADIOLOGY

## 2020-02-11 PROCEDURE — 99152 MOD SED SAME PHYS/QHP 5/>YRS: CPT

## 2020-02-11 PROCEDURE — 97161 PT EVAL LOW COMPLEX 20 MIN: CPT

## 2020-02-11 PROCEDURE — 83735 ASSAY OF MAGNESIUM: CPT

## 2020-02-11 PROCEDURE — 88185 FLOWCYTOMETRY/TC ADD-ON: CPT

## 2020-02-11 PROCEDURE — 97165 OT EVAL LOW COMPLEX 30 MIN: CPT

## 2020-02-11 PROCEDURE — 84443 ASSAY THYROID STIM HORMONE: CPT

## 2020-02-11 PROCEDURE — 88341 IMHCHEM/IMCYTCHM EA ADD ANTB: CPT

## 2020-02-11 PROCEDURE — 97535 SELF CARE MNGMENT TRAINING: CPT

## 2020-02-11 PROCEDURE — 81003 URINALYSIS AUTO W/O SCOPE: CPT

## 2020-02-11 PROCEDURE — 74011250637 HC RX REV CODE- 250/637: Performed by: INTERNAL MEDICINE

## 2020-02-11 PROCEDURE — 85025 COMPLETE CBC W/AUTO DIFF WBC: CPT

## 2020-02-11 PROCEDURE — 88311 DECALCIFY TISSUE: CPT

## 2020-02-11 PROCEDURE — 88342 IMHCHEM/IMCYTCHM 1ST ANTB: CPT

## 2020-02-11 PROCEDURE — 97116 GAIT TRAINING THERAPY: CPT

## 2020-02-11 PROCEDURE — 65270000015 HC RM PRIVATE ONCOLOGY

## 2020-02-11 PROCEDURE — 88305 TISSUE EXAM BY PATHOLOGIST: CPT

## 2020-02-11 PROCEDURE — 74011250636 HC RX REV CODE- 250/636: Performed by: RADIOLOGY

## 2020-02-11 PROCEDURE — 88184 FLOWCYTOMETRY/ TC 1 MARKER: CPT

## 2020-02-11 PROCEDURE — 74011250636 HC RX REV CODE- 250/636: Performed by: INTERNAL MEDICINE

## 2020-02-11 RX ORDER — FENTANYL CITRATE 50 UG/ML
100 INJECTION, SOLUTION INTRAMUSCULAR; INTRAVENOUS
Status: DISCONTINUED | OUTPATIENT
Start: 2020-02-11 | End: 2020-02-11

## 2020-02-11 RX ORDER — SODIUM CHLORIDE 9 MG/ML
25 INJECTION, SOLUTION INTRAVENOUS CONTINUOUS
Status: DISCONTINUED | OUTPATIENT
Start: 2020-02-11 | End: 2020-02-11

## 2020-02-11 RX ORDER — MIDAZOLAM HYDROCHLORIDE 1 MG/ML
5 INJECTION, SOLUTION INTRAMUSCULAR; INTRAVENOUS
Status: DISCONTINUED | OUTPATIENT
Start: 2020-02-11 | End: 2020-02-11

## 2020-02-11 RX ADMIN — FENTANYL CITRATE 25 MCG: 50 INJECTION, SOLUTION INTRAMUSCULAR; INTRAVENOUS at 13:24

## 2020-02-11 RX ADMIN — SODIUM CHLORIDE 100 ML/HR: 900 INJECTION, SOLUTION INTRAVENOUS at 08:16

## 2020-02-11 RX ADMIN — METRONIDAZOLE 500 MG: 500 INJECTION, SOLUTION INTRAVENOUS at 08:12

## 2020-02-11 RX ADMIN — SODIUM CHLORIDE 25 ML/HR: 900 INJECTION, SOLUTION INTRAVENOUS at 12:59

## 2020-02-11 RX ADMIN — PANTOPRAZOLE SODIUM 40 MG: 40 TABLET, DELAYED RELEASE ORAL at 15:35

## 2020-02-11 RX ADMIN — METRONIDAZOLE 500 MG: 500 INJECTION, SOLUTION INTRAVENOUS at 21:29

## 2020-02-11 RX ADMIN — FENTANYL CITRATE 50 MCG: 50 INJECTION, SOLUTION INTRAMUSCULAR; INTRAVENOUS at 13:19

## 2020-02-11 RX ADMIN — MIDAZOLAM 2 MG: 1 INJECTION INTRAMUSCULAR; INTRAVENOUS at 13:19

## 2020-02-11 RX ADMIN — MIDAZOLAM 2 MG: 1 INJECTION INTRAMUSCULAR; INTRAVENOUS at 13:23

## 2020-02-11 RX ADMIN — LEVOFLOXACIN 500 MG: 5 INJECTION, SOLUTION INTRAVENOUS at 20:18

## 2020-02-11 NOTE — PROGRESS NOTES
Pharmacy Automatic Renal Dosing Protocol - Antimicrobials    Indication for Antimicrobials: intra-abdominal     Current Regimen of Each Antimicrobial:  Levofloxacin 500 mg every 24 hours (Start Date 2/10; Day # 1)  Metronidazole 250 mg every 8 hours (2/10, day 1)    Previous Antimicrobial Therapy:    Significant Cultures:   2/10 blood:    Radiology / Imaging results: (X-ray, CT scan or MRI):   2/10 diverticulitis    Paralysis, amputations, malnutrition:       Labs:  Recent Labs     02/10/20  1538 02/10/20  1122   CREA 0.92  --    BUN 33*  --    WBC  --  29.6*     Temp (24hrs), Av.9 °F (37.2 °C), Min:98.2 °F (36.8 °C), Max:99.5 °F (37.5 °C)    Creatinine Clearance (mL/min) or Dialysis: 50    Impression/Plan:   · Metronidazole 500 mg every 12 hours   · Levofloxacin 500 mg every 24 hours  · Antimicrobial stop date tbd     Pharmacy will follow daily and adjust medications as appropriate for renal function and/or serum levels.     Thank you,  Gilberto Gan, PHARMD

## 2020-02-11 NOTE — H&P
Interventional and Vascular Radiology History and Physical    Patient: Sarah Rodriguez 82 y.o. female       Chief Complaint: Shortness of Breath (pt c/o SOB and dyspnea X few days, worsening this morning)      History of Present Illness: myelodysplasia work up     History:    Past Medical History:   Diagnosis Date    Anemia     Arthritis     Breast cancer ([facility]     left    Bunion of right foot 2015    Chronic pain     back--uses tens unit    Diverticulitis 2018    Recurrent    Diverticulitis large intestine     Dry eye syndrome 2018    Fibromyalgia 2018    GERD (gastroesophageal reflux disease)     History of left breast cancer 2013    lumpectomy radiation    Hypercholesterolemia 2018    Ill-defined condition     blood transfusion hx    Leukemia ([facility] [facility]     [facility]     Osteoporosis 2018    Peripheral neuropathy 2018    Prediabetes 2018    PUD (peptic ulcer disease)     Radiation therapy complication 0145    Lt breast-No Chemo    Rosacea 2018    Trouble in sleeping      Family History   Problem Relation Age of Onset    Cancer Mother         bladder    Cancer Father     Breast Cancer Paternal Grandmother      Social History     Socioeconomic History    Marital status:      Spouse name: Not on file    Number of children: Not on file    Years of education: Not on file    Highest education level: Not on file   Occupational History    Not on file   Social Needs    Financial resource strain: Not on file    Food insecurity:     Worry: Not on file     Inability: Not on file    Transportation needs:     Medical: Not on file     Non-medical: Not on file   Tobacco Use    Smoking status: Former Smoker     Packs/day: 0.50     Years: 50.00     Pack years: 25.00     Last attempt to quit: 2012     Years since quittin.0    Smokeless tobacco: Never Used   Substance and Sexual Activity    Alcohol use:  Yes Alcohol/week: 2.0 standard drinks     Types: 2 Glasses of wine per week    Drug use: No    Sexual activity: Not on file   Lifestyle    Physical activity:     Days per week: Not on file     Minutes per session: Not on file    Stress: Not on file   Relationships    Social connections:     Talks on phone: Not on file     Gets together: Not on file     Attends [employer] service: Not on file     Active member of club or organization: Not on file     Attends meetings of clubs or organizations: Not on file     Relationship status: Not on file    Intimate partner violence:     Fear of current or ex partner: Not on file     Emotionally abused: Not on file     Physically abused: Not on file     Forced sexual activity: Not on file   Other Topics Concern    Not on file   Social History Narrative    Not on file       Allergies: Allergies   Allergen Reactions    Hydrocodone Nausea Only and Vertigo    Gabapentin Diarrhea, Nausea Only and Other (comments)     weakness    Lyrica [Pregabalin] Nausea Only    Oxycodone Nausea and Vomiting and Vertigo       Current Medications:  Current Facility-Administered Medications   Medication Dose Route Frequency    [START ON 2/15/2020] ergocalciferol capsule 50,000 Units  50,000 Units Oral Q7D    pantoprazole (PROTONIX) tablet 40 mg  40 mg Oral ACB&D    0.9% sodium chloride infusion  100 mL/hr IntraVENous CONTINUOUS    levoFLOXacin (LEVAQUIN) 500 mg in D5W IVPB  500 mg IntraVENous Q24H    acetaminophen (TYLENOL) tablet 650 mg  650 mg Oral Q4H PRN    ondansetron (ZOFRAN) injection 4 mg  4 mg IntraVENous Q6H PRN    oxyCODONE IR (ROXICODONE) tablet 5 mg  5 mg Oral Q6H PRN    labetaloL (NORMODYNE;TRANDATE) injection 10 mg  10 mg IntraVENous Q6H PRN    metroNIDAZOLE (FLAGYL) IVPB premix 500 mg  500 mg IntraVENous Q12H        Physical Exam:  Blood pressure 124/54, pulse 76, temperature 98.2 °F (36.8 °C), resp.  rate 18, height 5' 5\" (1.651 m), weight 83.9 kg (185 lb), SpO2 98 %.  LUNG: clear to auscultation bilaterally, HEART: regular rate and rhythm, S1, S2 normal, no murmur, click, rub or gallop      Alerts:    Hospital Problems  Date Reviewed: 2/10/2020          Codes Class Noted POA    Diverticulitis ICD-10-CM: K57.92  ICD-9-CM: 562.11  6/29/2018 Yes    Overview Signed 6/29/2018  7:07 AM by Carmel Ferraro MD     Recurrent             Leukemia, acute [facility]) ICD-10-CM: C95.00  ICD-9-CM: 208.00  2/10/2020 Yes              Laboratory:      Recent Labs     02/11/20  0417   HGB 7.6*   HCT 26.5*   WBC 25.0*      BUN 23*   CREA 0.68   K 4.4         Plan of Care/Planned Procedure:  Risks, benefits, and alternatives reviewed with patient and she agrees to proceed with the procedure. Conscious sedation will be performed with IV fentanyl and versed.  Plan is for CT guided bone marrow biopsy       Guy Andujar MD

## 2020-02-11 NOTE — ROUTINE PROCESS
Name of procedure: CT guided bone marrow biopsy Complications, if any, r/t procedure: 
None Sedation medications given: 
Versed 4 mg and Fentanyl 75 mcg Sedation tolerated:  
Yes 
 
VS : Stable Post Procedure Care Needed/order sets in connectcare:Yes TRANSFER - OUT REPORT: 
 
Verbal report given to DAINA Brown on Radha Escobar  being transferred to room [phone] for routine progression of care Report consisted of patients Situation, Background, Assessment and  
Recommendations(SBAR). Information from the following report(s) Procedure Summary was reviewed with the receiving nurse. Opportunity for questions and clarification was provided. Patient transported with: 
Transporter via stretcher

## 2020-02-11 NOTE — CONSULTS
[facility]  [address] [facility], [address] [place] [address] Ronald Hercules, [address] [place]  [phone]      Oncology consult Note        Patient: Richar Starr MRN: [id number]  SSN: [id number]    YOB: 1935  Age: 82 y.o. Sex: female        Diagnosis:      1. Myelodysplastic Syndrome   IPSS-R Low risk    Del(7q) and trisomy 8    2. Left breast carcinoma:   T1a N0 Mx (Stage IA) infiltrating ductal carcinoma , Tumor size 1 cm, LN -ve, grade 2, ki 67 17%, %, [place] 90%, Her 2 unamplified. Treatment:      1. Vidaza - s/p 3 cycles  2. Discontinued Arimidex 1/2019 after 5 years of treatment  3. Completed radiation treatment to the breast   4. S/P left breast lumpectomy and sentinel LN excision on 11/21/2013    Subjective:      Antonio Mcgrath is a 70 y.o.  female with a history of stage I invasive ductal carcinoma and MDS. She just completed her 3rd cycle of Vidaza. She was admitted to [facility] yesterday for acute diverticulitis and generalized weakness. She was treated in January for   a bleeding small ulceration at the GE junction.      Review of Systems:     Constitutional: fatigue  Eyes: negative   Ears, Nose, Mouth, Throat, and Face: negative   Respiratory: negative   Cardiovascular: negative   Gastrointestinal: constipation   Integument/Breast: negative  Hematologic/Lymphatic: negative   Musculoskeletal: joint pain  Neurological: numbness bottoms of both feet      Past Medical History:   Diagnosis Date    Anemia     Arthritis     Breast cancer ([address]     left    Bunion of right foot 8/20/2015    Chronic pain     back--uses tens unit    Diverticulitis 6/29/2018    Recurrent    Diverticulitis large intestine     Dry eye syndrome 6/29/2018    Fibromyalgia 6/29/2018    GERD (gastroesophageal reflux disease)     History of left breast cancer 2013    lumpectomy radiation    Hypercholesterolemia 6/29/2018    Ill-defined condition blood transfusion hx    Leukemia ([facility]     MDS (myelodysplastic syndrome) ([facility]     Osteoporosis 2018    Peripheral neuropathy 2018    Prediabetes 2018    PUD (peptic ulcer disease)     Radiation therapy complication 0282    Lt breast-No Chemo    Rosacea 2018    Trouble in sleeping      Past Surgical History:   Procedure Laterality Date    HX APPENDECTOMY      HX BREAST LUMPECTOMY  2013    LEFT BREAST LUMPECTOMY W/LEFT BREAST SENTINEL NODE BIOPSY,AND NEEDLE LOCALIZATION performed by Geovany Suarez MD at [facility] MAIN OR    HX CATARACT REMOVAL      bilateral    HX HYSTERECTOMY      ovarian cyst    HX MOHS PROCEDURES      right    HX ORTHOPAEDIC      back surgery x2    HX OTHER SURGICAL      colonoscopy    HX TONSILLECTOMY      IR INSERT TUNL CVC W PORT OVER 5 YEARS  12/3/2019    TOTAL KNEE ARTHROPLASTY      left    TOTAL KNEE ARTHROPLASTY      right    UPPER GI ENDOSCOPY,BIOPSY  2020         US GUIDED CORE BREAST BIOPSY Left 2013    Breast Ca      Family History   Problem Relation Age of Onset    Cancer Mother         bladder    Cancer Father     Breast Cancer Paternal Grandmother      Social History     Tobacco Use    Smoking status: Former Smoker     Packs/day: 0.50     Years: 50.00     Pack years: 25.00     Last attempt to quit: 2012     Years since quittin.0    Smokeless tobacco: Never Used   Substance Use Topics    Alcohol use: Yes     Alcohol/week: 2.0 standard drinks     Types: 2 Glasses of wine per week      Prior to Admission medications    Medication Sig Start Date End Date Taking? Authorizing Provider   docusate sodium (STOOL SOFTENER PO) Take 2 Tabs by mouth daily. Yes Provider, Historical   inulin (FIBER GUMMIES PO) Take 1 Tab by mouth daily. Yes Provider, Historical   azacitidine (VIDAZA INJECTION) by Injection route. Pt receives this infusion the first week of every month.    Yes Provider, Historical   ergocalciferol (ERGOCALCIFEROL) 1,250 mcg (50,000 unit) capsule TAKE ONE CAPSULE BY MOUTH EVERY 7 DAYS 1/23/20  Yes Florentin Hickey MD   acetaminophen (TYLENOL) 325 mg tablet Take 2 Tabs by mouth every four (4) hours as needed for Pain. 12/31/19  Yes Rocky Kennedy MD   dilTIAZem CD (CARDIZEM CD) 180 mg ER capsule Take 1 Cap by mouth daily. 11/5/19  Yes Quan Velasco MD   pantoprazole (PROTONIX) 40 mg tablet Take 1 Tab by mouth Before breakfast and dinner. 11/5/19  Yes Quan Velasco MD          Allergies   Allergen Reactions    Hydrocodone Nausea Only and Vertigo    Gabapentin Diarrhea, Nausea Only and Other (comments)     weakness    Lyrica [Pregabalin] Nausea Only    Oxycodone Nausea and Vomiting and Vertigo         Objective:     Visit Vitals  /49 (BP 1 Location: Left arm, BP Patient Position: At rest)   Pulse 88   Temp 97.9 °F (36.6 °C)   Resp 20   Ht 5' 5\" (1.651 m)   Wt 185 lb (83.9 kg)   SpO2 97%   BMI 30.79 kg/m²       Physical Exam:    GENERAL: alert, cooperative   EYE: PERRLA,  LYMPHATIC: Cervical, supraclavicular, and axillary nodes normal.   THROAT & NECK: normal and no erythema or exudates noted. LUNG: clear to auscultation bilaterally   HEART: regular rate and rhythm   ABDOMEN: soft, non-tender   EXTREMITIES: extremities normal   SKIN: Normal.   NEUROLOGIC: negative       Lab Results   Component Value Date/Time    WBC 25.0 (H) 02/11/2020 04:17 AM    HGB (POC) 10.7 (A) 01/07/2019 09:52 AM    HGB 7.6 (L) 02/11/2020 04:17 AM    HCT (POC) 32.0 (A) 01/07/2019 09:52 AM    HCT 26.5 (L) 02/11/2020 04:17 AM    PLATELET 862 06/14/8200 04:17 AM    .1 (H) 02/11/2020 04:17 AM       Lab Results   Component Value Date/Time    Iron 33 (L) 02/06/2020 09:40 AM    TIBC 403 02/06/2020 09:40 AM    Iron % saturation 8 (L) 02/06/2020 09:40 AM    Ferritin 31 02/06/2020 09:40 AM           Assessment:      1.  Myelodysplastic Syndrome   IPSS-R Low risk      Del(7q) and trisomy 8    ECOG PS 1  Intent of Treatment - palliative  Prognosis: poor    I recommended starting systemic therapy with Vidaza. Luisa Combe is a hypomethylating agent which is approved for the treatment for MDS. Receiving systemic chemotherapy   Vidaza - s/p 3 cycles    Last bone marrow biopsy was 1/29/2019 -     This admission CBC shows elevated WBCs -mainly neutrophils. Received Dexamethasone 8 mg daily last week for treatment. Obtain peripheral smear -   Repeat bone marrow today        2. History of Left breast carcinoma:     T1a N0 Mx (Stage IA) infiltrating ductal carcinoma , Tumor size 1 cm, LN -ve, grade 2, ki 67 17%, %, [place] 90%, Her 2 unamplified. Dx: 10/28/2013    > S/P left breast lumpectomy and sentinel LN excision on 11/21/2013  > Completed radiation to the breast  > Discontinued Arimidex 1/2019 after 5 years  > In remission    Mammogram (11/29/2018): no evidence of malignancy  DEXA (10/2013) - normal      3. Acute diverticulitis    Followed by Dr. Madelyn Kayser:      1. Bone marrow biopsy today   2. Transfuse to keep Hgb >7 g/dL  3. Peripheral smear  4. Daily CBC    Signed by: Mariel Cadet NP                     February 11, 2020      CC.  Sandro Rodgers MD

## 2020-02-11 NOTE — PROGRESS NOTES
Hospitalist Progress Note    NAME: Elton Garcia   :  1935   MRN:  [id number]       Assessment / Plan:  Acute Diverticulitis:   Ct abdomen showed : Mild sigmoid colonic diverticulitis. No associated drainable focal  fluid collection. C/w levaquin and flagyl  procalcitonin 0.08    MDS with transformation in Leukemia:   leucocytosis  uric acid wnl , admitting  Physician  spoke with Dr. Milka Mittal state that patient may need BMB, keep NPO after MN  Awaiting bone bx    PUD/GERD: c/w PPI. Fibromyalgia: c/w pain medication  Code Status: Full Code  Surrogate Decision Maker:  Mitali Sandoval [phone]  DVT Prophylaxis: SCD  GI Prophylaxis: on PPI  Baseline: independent lives with     30.0 - 39.9 Obese / Body mass index is 30.79 kg/m². Subjective:     Chief Complaint / Reason for Physician Visit  FU  Leucocytosis/diverticulitis . No acute complaints . Discussed with RN events overnight. Review of Systems:  Symptom Y/N Comments  Symptom Y/N Comments   Fever/Chills    Chest Pain n    Poor Appetite    Edema     Cough    Abdominal Pain n    Sputum    Joint Pain     SOB/AL n   Pruritis/Rash     Nausea/vomit n   Tolerating PT/OT     Diarrhea n   Tolerating Diet  npo   Constipation    Other       Could NOT obtain due to:      Objective:     VITALS:   Last 24hrs VS reviewed since prior progress note.  Most recent are:  Patient Vitals for the past 24 hrs:   Temp Pulse Resp BP SpO2   20 0358  73      20 0259 99.1 °F (37.3 °C) 82 18 118/49 96 %   20 0000  68      02/10/20 2355 98.6 °F (37 °C) 70 20 113/41 98 %   02/10/20 2147 98.6 °F (37 °C) 91 20 117/58 100 %   02/10/20 2031  84 21 114/46 97 %   02/10/20 1930 99.5 °F (37.5 °C) 75 10 122/48 99 %   02/10/20 1900  76 13 103/44 98 %   02/10/20 1800  88 17 102/78 99 %   02/10/20 1730  72 17 114/47 95 %   02/10/20 1630  69 13 121/51 99 %   02/10/20 1345  79 29 100/50 97 %       Intake/Output Summary (Last 24 hours) at 2020 9060  Last data filed at 2/10/2020 1640  Gross per 24 hour   Intake 1099.98 ml   Output    Net 1099.98 ml        PHYSICAL EXAM:  General: WD, WN. Alert, cooperative, no acute distress    EENT:  EOMI. Anicteric sclerae. MMM  Resp:  CTA bilaterally, no wheezing or rales. No accessory muscle use  CV:  Regular  rhythm,  No edema  GI:  Soft, Non distended, Non tender.  +Bowel sounds  Neurologic:  Alert and oriented X 3, normal speech,   Psych:   Good insight. Not anxious nor agitated  Skin:  No rashes. No jaundice    Reviewed most current lab test results and cultures  YES  Reviewed most current radiology test results   YES  Review and summation of old records today    NO  Reviewed patient's current orders and MAR    YES  PMH/SH reviewed - no change compared to H&P  ________________________________________________________________________  Care Plan discussed with:    Comments   Patient x    Family      RN x    Care Manager     Consultant                        Multidiciplinary team rounds were held today with , nursing, pharmacist and clinical coordinator. Patient's plan of care was discussed; medications were reviewed and discharge planning was addressed. ________________________________________________________________________  Total NON critical care TIME:  25 Minutes    Total CRITICAL CARE TIME Spent:   Minutes non procedure based      Comments   >50% of visit spent in counseling and coordination of care x    ________________________________________________________________________  Jimmy Grey MD     Procedures: see electronic medical records for all procedures/Xrays and details which were not copied into this note but were reviewed prior to creation of Plan. LABS:  I reviewed today's most current labs and imaging studies.   Pertinent labs include:  Recent Labs     02/11/20  0417 02/10/20  1122   WBC 25.0* 29.6*   HGB 7.6* 9.6*   HCT 26.5* 32.5*    244     Recent Labs 02/11/20  0417 02/10/20  1538    139   K 4.4 3.9   * 106   CO2 21 26   GLU 95 116*   BUN 23* 33*   CREA 0.68 0.92   CA 7.9* 8.2*   MG 2.1 2.3   ALB 2.8* 3.5   TBILI 0.8 0.9   SGOT 10* 7*   ALT 13 17       Signed: General Serina MD

## 2020-02-11 NOTE — H&P
Hospitalist Admission Note    NAME: Jade Dunn   :  1935   MRN:  [id number]     Date/Time:  2/10/2020 8:37 PM    Patient PCP: Ajay Dunbar MD  ______________________________________________________________________  Given the patient's current clinical presentation, I have a high level of concern for decompensation if discharged from the emergency department. Complex decision making was performed, which includes reviewing the patient's available past medical records, laboratory results, and x-ray films. My assessment of this patient's clinical condition and my plan of care is as follows. 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 Rosaura Sagastume state that patient may need BMB, keep NPO after MN  Acute Diverticulitis: start LVQ/Flagyl, provide supportive treatment. Check procalcitonin  PUD/GERD: c/w PPI. Fibromyalgia: c/w pain medication  Code Status: Full Code  Surrogate Decision Maker:  Chana Baird [phone]  DVT Prophylaxis: SCD  GI Prophylaxis: on PPI  Baseline: independent lives with       Subjective:   CHIEF COMPLAINT: \"I feel fatigue and weak, have abdominal pain\"    HISTORY OF PRESENT ILLNESS:     Courtney Khoury is a 82 y.o.  female  with PMHx significant for anemia, breast cancer, GERD, myelodysplastic syndrome, neuropathy, peptic ulcer disease, fibromyalgia, presents to the ED with very unspecific complaints like abdominal tenderness, SOB, fatigue, weakness. Patient was treated for diverticulitis and GI bleed around a month ago, she has MDS and her counts had been steadily going up with the new finding of immature cells in the smear, at this time patient denies chest pain, no SOB, no fever, no N/V no diarrhea, no urinary symptoms, no other associated symptoms. We were asked to admit for work up and evaluation of the above problems.      Past Medical History: Diagnosis Date    Anemia     Arthritis     Breast cancer ([facility] [address]     left    Bunion of right foot 2015    Chronic pain     back--uses tens unit    Diverticulitis 2018    Recurrent    Diverticulitis large intestine     Dry eye syndrome 2018    Fibromyalgia 2018    GERD (gastroesophageal reflux disease)     History of left breast cancer     lumpectomy radiation    Hypercholesterolemia 2018    Ill-defined condition     blood transfusion hx    Leukemia ([facility] [address]     MDS (myelodysplastic syndrome) ([facility] [address] [facility]     Osteoporosis 2018    Peripheral neuropathy 2018    Prediabetes 2018    PUD (peptic ulcer disease)     Radiation therapy complication 2833    Lt breast-No Chemo    Rosacea 2018    Trouble in sleeping         Past Surgical History:   Procedure Laterality Date    HX APPENDECTOMY      HX BREAST LUMPECTOMY  2013    LEFT BREAST LUMPECTOMY W/LEFT BREAST SENTINEL NODE BIOPSY,AND NEEDLE LOCALIZATION performed by Colletta Antonio, MD at [facility] MAIN OR    HX CATARACT REMOVAL      bilateral    HX HYSTERECTOMY      ovarian cyst    HX MOHS PROCEDURES      right    HX ORTHOPAEDIC      back surgery x2    HX OTHER SURGICAL      colonoscopy    HX TONSILLECTOMY      IR INSERT TUNL CVC W PORT OVER 5 YEARS  12/3/2019    TOTAL KNEE ARTHROPLASTY      left    TOTAL KNEE ARTHROPLASTY      right    UPPER GI ENDOSCOPY,BIOPSY  2020         US GUIDED CORE BREAST BIOPSY Left     Breast Ca       Social History     Tobacco Use    Smoking status: Former Smoker     Packs/day: 0.50     Years: 50.00     Pack years: 25.00     Last attempt to quit: 2012     Years since quittin.9    Smokeless tobacco: Never Used   Substance Use Topics    Alcohol use:  Yes     Alcohol/week: 2.0 standard drinks     Types: 2 Glasses of wine per week        Family History   Problem Relation Age of Onset    Cancer Mother         bladder    Cancer Father     Breast Cancer Paternal Grandmother      Allergies   Allergen Reactions    Hydrocodone Nausea Only and Vertigo    Gabapentin Diarrhea, Nausea Only and Other (comments)     weakness    Lyrica [Pregabalin] Nausea Only    Oxycodone Nausea and Vomiting and Vertigo        Prior to Admission medications    Medication Sig Start Date End Date Taking? Authorizing Provider   docusate sodium (STOOL SOFTENER PO) Take 2 Tabs by mouth daily. Yes Provider, Historical   inulin (FIBER GUMMIES PO) Take 1 Tab by mouth daily. Yes Provider, Historical   azacitidine (VIDAZA INJECTION) by Injection route. Pt receives this infusion the first week of every month. Yes Provider, Historical   ergocalciferol (ERGOCALCIFEROL) 1,250 mcg (50,000 unit) capsule TAKE ONE CAPSULE BY MOUTH EVERY 7 DAYS 1/23/20  Yes Dorothea Hale MD   acetaminophen (TYLENOL) 325 mg tablet Take 2 Tabs by mouth every four (4) hours as needed for Pain. 12/31/19  Yes Fransisco Gutierrez MD   dilTIAZem CD (CARDIZEM CD) 180 mg ER capsule Take 1 Cap by mouth daily. 11/5/19  Yes Sofia Sarmiento MD   pantoprazole (PROTONIX) 40 mg tablet Take 1 Tab by mouth Before breakfast and dinner. 11/5/19  Yes Sofia Sarmiento MD       REVIEW OF SYSTEMS:     I am not able to complete the review of systems because:    The patient is intubated and sedated    The patient has altered mental status due to his acute medical problems    The patient has baseline aphasia from prior stroke(s)    The patient has baseline dementia and is not reliable historian    The patient is in acute medical distress and unable to provide information           Total of 12 systems reviewed as follows:       POSITIVE= underlined text  Negative = text not underlined  General:  fever, chills, sweats, generalized weakness, weight loss/gain,      loss of appetite   Eyes:    blurred vision, eye pain, loss of vision, double vision  ENT:    rhinorrhea, pharyngitis   Respiratory:   cough, sputum production, SOB, AL, wheezing, pleuritic pain   Cardiology:   chest pain, palpitations, orthopnea, PND, edema, syncope   Gastrointestinal:  abdominal pain , N/V, diarrhea, dysphagia, constipation, bleeding   Genitourinary:  frequency, urgency, dysuria, hematuria, incontinence   Muskuloskeletal :  arthralgia, myalgia, back pain  Hematology:  easy bruising, nose or gum bleeding, lymphadenopathy   Dermatological: rash, ulceration, pruritis, color change / jaundice  Endocrine:   hot flashes or polydipsia   Neurological:  headache, dizziness, confusion, focal weakness, paresthesia,     Speech difficulties, memory loss, gait difficulty  Psychological: Feelings of anxiety, depression, agitation    Objective:   VITALS:    Visit Vitals  /48   Pulse 75   Temp 99.5 °F (37.5 °C)   Resp 10   Ht 5' 5\" (1.651 m)   Wt 83.9 kg (185 lb)   SpO2 99%   BMI 30.79 kg/m²       PHYSICAL EXAM:    General:    Alert, cooperative, no distress, appears stated age. HEENT: Atraumatic, anicteric sclerae, pink conjunctivae     No oral ulcers, mucosa moist, throat clear, dentition poor  Neck:  Supple, symmetrical,  thyroid: non tender  Lungs:   Coarse BS  Chest wall:  No tenderness  No Accessory muscle use. Heart:   Regular  rhythm,  No  murmur   No edema  Abdomen:   Soft, mild tender. Not distended. Bowel sounds normal  Extremities: No cyanosis. No clubbing,      Skin turgor normal, Capillary refill normal, Radial  pulse 2+  Skin:     Not pale. Not Jaundiced  No rashes   Psych:  Good insight. Not depressed. Not anxious or agitated. Neurologic: EOMs intact. No facial asymmetry. No aphasia or slurred speech. Symmetrical strength, Sensation grossly intact.  Alert and oriented X 4.     _______________________________________________________________________  Care Plan discussed with:    Comments   Patient y    Family      RN y    Care Manager                    Consultant:  y Oncology _______________________________________________________________________  Expected  Disposition:   Home with Family    HH/PT/OT/RN y   SNF/LTC    ESTEE    ________________________________________________________________________  TOTAL TIME:  61 Minutes    Critical Care Provided     Minutes non procedure based      Comments    y Reviewed previous records   >50% of visit spent in counseling and coordination of care y Discussion with patient and/or family and questions answered       ________________________________________________________________________  Signed: Griselda Butt MD    Procedures: see electronic medical records for all procedures/Xrays and details which were not copied into this note but were reviewed prior to creation of Plan. LAB DATA REVIEWED:    Recent Results (from the past 24 hour(s))   CBC WITH AUTOMATED DIFF    Collection Time: 02/10/20 11:22 AM   Result Value Ref Range    WBC 29.6 (HH) 3.6 - 11.0 K/uL    RBC 3.40 (L) 3.80 - 5.20 M/uL    HGB 9.6 (L) 11.5 - 16.0 g/dL    HCT 32.5 (L) 35.0 - 47.0 %    MCV 95.6 80.0 - 99.0 FL    MCH 28.2 26.0 - 34.0 PG    MCHC 29.5 (L) 30.0 - 36.5 g/dL    RDW 18.7 (H) 11.5 - 14.5 %    PLATELET 744 904 - 723 K/uL    MPV 9.3 8.9 - 12.9 FL    NRBC 0.4 (H) 0  WBC    ABSOLUTE NRBC 0.11 (H) 0.00 - 0.01 K/uL    NEUTROPHILS 61 32 - 75 %    LYMPHOCYTES 13 12 - 49 %    MONOCYTES 14 (H) 5 - 13 %    EOSINOPHILS 3 0 - 7 %    BASOPHILS 1 0 - 1 %    IMMATURE GRANULOCYTES 8 (H) 0.0 - 0.5 %    ABS. NEUTROPHILS 18.1 (H) 1.8 - 8.0 K/UL    ABS. LYMPHOCYTES 3.8 (H) 0.8 - 3.5 K/UL    ABS. MONOCYTES 4.1 (H) 0.0 - 1.0 K/UL    ABS. EOSINOPHILS 0.9 (H) 0.0 - 0.4 K/UL    ABS. BASOPHILS 0.3 (H) 0.0 - 0.1 K/UL    ABS. IMM. GRANS.  2.4 (H) 0.00 - 0.04 K/UL    DF SMEAR SCANNED      RBC COMMENTS ANISOCYTOSIS  1+       EKG, 12 LEAD, INITIAL    Collection Time: 02/10/20  1:47 PM   Result Value Ref Range    Ventricular Rate 74 BPM    Atrial Rate 74 BPM    P-R Interval 148 ms    QRS Duration 82 ms    Q-T Interval 394 ms    QTC Calculation (Bezet) 437 ms    Calculated P Axis -23 degrees    Calculated R Axis -17 degrees    Calculated T Axis 51 degrees    Diagnosis       Normal sinus rhythm  Moderate voltage criteria for LVH, may be normal variant  When compared with ECG of 17-DEC-2019 06:56,  No significant change was found     NT-PRO BNP    Collection Time: 02/10/20  3:38 PM   Result Value Ref Range    NT pro- (H) <450 PG/ML   TROPONIN I    Collection Time: 02/10/20  3:38 PM   Result Value Ref Range    Troponin-I, Qt. <0.05 <6.76 ng/mL   METABOLIC PANEL, COMPREHENSIVE    Collection Time: 02/10/20  3:38 PM   Result Value Ref Range    Sodium 139 136 - 145 mmol/L    Potassium 3.9 3.5 - 5.1 mmol/L    Chloride 106 97 - 108 mmol/L    CO2 26 21 - 32 mmol/L    Anion gap 7 5 - 15 mmol/L    Glucose 116 (H) 65 - 100 mg/dL    BUN 33 (H) 6 - 20 MG/DL    Creatinine 0.92 0.55 - 1.02 MG/DL    BUN/Creatinine ratio 36 (H) 12 - 20      GFR est AA >60 >60 ml/min/1.73m2    GFR est non-AA 58 (L) >60 ml/min/1.73m2    Calcium 8.2 (L) 8.5 - 10.1 MG/DL    Bilirubin, total 0.9 0.2 - 1.0 MG/DL    ALT (SGPT) 17 12 - 78 U/L    AST (SGOT) 7 (L) 15 - 37 U/L    Alk.  phosphatase 58 45 - 117 U/L    Protein, total 6.6 6.4 - 8.2 g/dL    Albumin 3.5 3.5 - 5.0 g/dL    Globulin 3.1 2.0 - 4.0 g/dL    A-G Ratio 1.1 1.1 - 2.2     LIPASE    Collection Time: 02/10/20  3:38 PM   Result Value Ref Range    Lipase 76 73 - 393 U/L   MAGNESIUM    Collection Time: 02/10/20  3:38 PM   Result Value Ref Range    Magnesium 2.3 1.6 - 2.4 mg/dL   LACTIC ACID    Collection Time: 02/10/20  3:38 PM   Result Value Ref Range    Lactic acid 1.7 0.4 - 2.0 MMOL/L   URIC ACID    Collection Time: 02/10/20  7:50 PM   Result Value Ref Range    Uric acid 5.0 2.6 - 6.0 MG/DL

## 2020-02-11 NOTE — PROGRESS NOTES
Problem: Mobility Impaired (Adult and Pediatric)  Goal: *Acute Goals and Plan of Care (Insert Text)  Description  FUNCTIONAL STATUS PRIOR TO ADMISSION: Patient was modified independent using a rolling walker for functional mobility outside of the home and furniture walking inside of the home. HOME SUPPORT PRIOR TO ADMISSION: The patient lived with spouse but did not require assist.    Physical Therapy Goals  Initiated 2/11/2020  1. Patient will move from supine to sit and sit to supine , scoot up and down and roll side to side in bed with independence within 7 day(s). 2.  Patient will transfer from bed to chair and chair to bed with modified independence using the least restrictive device within 7 day(s). 3.  Patient will perform sit to stand with modified independence within 7 day(s). 4.  Patient will ambulate with modified independence for 200 feet with the least restrictive device within 7 day(s). 5.  Patient will ascend/descend 4 stairs with handrail(s) with supervision/set-up within 7 day(s). Outcome: Progressing Towards Goal     PHYSICAL THERAPY EVALUATION  Patient: Emily Lynn (16 y.o. female)  Date: 2/11/2020  Primary Diagnosis: Leukemia, acute ([facility] [C95.00]        Precautions:   Fall      ASSESSMENT  Based on the objective data described below, the patient presents with a decline in functional mobility efforts from mod. I baseline that she and her spouse describe today, which consists of furniture walking in the home and RW use outside of the home. Anticipate RW use in the home needed for safety given her debility/deconditioned state as well as neuropathy in BLE. Pt with mild SOB but stable O2 sats. At 97-98% on RA with short distance gait efforts. .. multiple rest breaks. Continue to follow for goals above. Current Level of Function Impacting Discharge (mobility/balance): CGA for transfers/gait with RW use for safety, balance, and activity tolerance    Functional Outcome Measure:   The patient scored 14/28 on the Tinetti outcome measure which is indicative of high fall risk. Other factors to consider for discharge: pt has supportive, able-bodied spouse     Patient will benefit from skilled therapy intervention to address the above noted impairments. PLAN :  Recommendations and Planned Interventions: bed mobility training, transfer training, gait training, therapeutic exercises, neuromuscular re-education, patient and family training/education, and therapeutic activities      Frequency/Duration: Patient will be followed by physical therapy:  4 times a week to address goals. Recommendation for discharge: (in order for the patient to meet his/her long term goals)  Physical therapy at least 2 days/week in the home AND ensure assist and/or supervision for safety with mobility     This discharge recommendation:  Has been made in collaboration with the attending provider and/or case management    IF patient discharges home will need the following DME: patient owns DME required for discharge         SUBJECTIVE:   Patient stated I just find it easier at home to grab onto my furniture, everything is so close.     OBJECTIVE DATA SUMMARY:   HISTORY:    Past Medical History:   Diagnosis Date    Anemia     Arthritis     Breast cancer ([facility] [address]     left    Bunion of right foot 8/20/2015    Chronic pain     back--uses tens unit    Diverticulitis 6/29/2018    Recurrent    Diverticulitis large intestine     Dry eye syndrome 6/29/2018    Fibromyalgia 6/29/2018    GERD (gastroesophageal reflux disease)     History of left breast cancer 2013    lumpectomy radiation    Hypercholesterolemia 6/29/2018    Ill-defined condition     blood transfusion hx    Leukemia ([address]     MDS (myelodysplastic syndrome) ([facility] [address]     Osteoporosis 6/29/2018    Peripheral neuropathy 6/29/2018    Prediabetes 6/29/2018    PUD (peptic ulcer disease)     Radiation therapy complication 3430    Lt breast-No Chemo    Rosacea 6/29/2018 Trouble in sleeping      Past Surgical History:   Procedure Laterality Date    HX APPENDECTOMY      HX BREAST LUMPECTOMY  11/21/2013    LEFT BREAST LUMPECTOMY W/LEFT BREAST SENTINEL NODE BIOPSY,AND NEEDLE LOCALIZATION performed by Rahul Humphrey MD at [facility]IN OR    HX CATARACT REMOVAL      bilateral    HX HYSTERECTOMY      ovarian cyst    HX MOHS PROCEDURES      right    HX ORTHOPAEDIC      back surgery x2    HX OTHER SURGICAL      colonoscopy    HX TONSILLECTOMY      IR INSERT TUNL CVC W PORT OVER 5 YEARS  12/3/2019    TOTAL KNEE ARTHROPLASTY      left    TOTAL KNEE ARTHROPLASTY      right    UPPER GI ENDOSCOPY,BIOPSY  1/24/2020         US GUIDED CORE BREAST BIOPSY Left 2013    Breast Ca       Personal factors and/or comorbidities impacting plan of care:     Home Situation  Home Environment: Private residence  # Steps to Enter: 3  Rails to Enter: Yes  Hand Rails : Left  One/Two Story Residence: One story  Living Alone: No  Support Systems: Family member(s), Spouse/Significant Other/Partner  Patient Expects to be Discharged to[de-identified] Private residence  Current DME Used/Available at Home: Walker, rolling, Wheelchair, Commode, bedside, Grab bars, Shower chair  Tub or Shower Type: Shower    EXAMINATION/PRESENTATION/DECISION MAKING:   Critical Behavior:  Neurologic State: Alert  Orientation Level: Appropriate for age  Cognition: Appropriate decision making  Safety/Judgement: Awareness of environment  Hearing:   Auditory  Auditory Impairment: Hard of hearing, bilateral, Hearing aid(s)  Skin:  Intact  Edema: none  Range Of Motion:  AROM: Generally decreased, functional           PROM: Generally decreased, functional           Strength:    Strength: Generally decreased, functional                    Tone & Sensation:   Tone: Normal              Sensation: Impaired(BLE numbness/tingling)               Coordination:  Coordination: Generally decreased, functional    Functional Mobility:  Bed Mobility:  Rolling: Modified independent  Supine to Sit: Modified independent     Scooting: Independent    Transfers:  Sit to Stand: Contact guard assistance  Stand to Sit: Contact guard assistance        Bed to Chair: Contact guard assistance              Balance:   Sitting: Intact  Standing: Impaired; Without support  Standing - Static: Good;Constant support(RW)  Standing - Dynamic : Fair;Constant support(RW)    Ambulation/Gait Training:  Distance (ft): 50 Feet (ft)(one seated rest break and 3 brief standing rest breaks)  Assistive Device: Walker, rolling;Gait belt        Gait Description (WDL): Exceptions to WDL  Gait Abnormalities: Decreased step clearance  Right Side Weight Bearing: Full  Left Side Weight Bearing: Full  Base of Support: Widened     Speed/Emmanuelle: Pace decreased (<100 feet/min); Shuffled; Slow  Step Length: Right shortened;Left shortened        Interventions: Safety awareness training; Tactile cues; Verbal cues       Functional Measure:  Tinetti test:    Sitting Balance: 1  Arises: 1  Attempts to Rise: 2  Immediate Standing Balance: 1  Standing Balance: 1  Nudged: 2  Eyes Closed: 1  Turn 360 Degrees - Continuous/Discontinuous: 0  Turn 360 Degrees - Steady/Unsteady: 0  Sitting Down: 1  Balance Score: 10 Balance total score  Indication of Gait: 0  R Step Length/Height: 0  L Step Length/Height: 0  R Foot Clearance: 1  L Foot Clearance: 1  Step Symmetry: 1  Step Continuity: 0  Path: 1  Trunk: 0  Walking Time: 0  Gait Score: 4 Gait total score  Total Score: 14/28 Overall total score         Tinetti Tool Score Risk of Falls  <19 = High Fall Risk  19-24 = Moderate Fall Risk  25-28 = Low Fall Risk  Tinetti ME. Performance-Oriented Assessment of Mobility Problems in Elderly Patients. Young 68; [id number].  (Scoring Description: PT Bulletin Feb. 10, 1993)    Older adults: Jeremiah Schwab et al, 2009; n = 1000 [facility] elderly evaluated with ABC, MARIMAR, ADL, and IADL)  · Mean MARIMAR score for males aged 69-68 years = 26.21(3.40)  · Mean MARIMAR score for females age 69-68 years = 25.16(4.30)  · Mean MARIMAR score for males over [de-identified] years = 23.29(6.02)  · Mean MARIMAR score for females over [de-identified] years = 17.20(8.32)           Physical Therapy Evaluation Charge Determination   History Examination Presentation Decision-Making   MEDIUM  Complexity : 1-2 comorbidities / personal factors will impact the outcome/ POC  LOW Complexity : 1-2 Standardized tests and measures addressing body structure, function, activity limitation and / or participation in recreation  LOW Complexity : Stable, uncomplicated  Other outcome measures Tinetti  LOW       Based on the above components, the patient evaluation is determined to be of the following complexity level: LOW     Pain Ratin/10 with mobility    Activity Tolerance:   Fair, requires rest breaks, and observed SOB with activity  Please refer to the flowsheet for vital signs taken during this treatment. After treatment patient left in no apparent distress:   Sitting in chair, Call bell within reach, Caregiver / family present, and BLE elevated     COMMUNICATION/EDUCATION:   The patients plan of care was discussed with: Occupational Therapist and Registered Nurse. Fall prevention education was provided and the patient/caregiver indicated understanding., Patient/family have participated as able in goal setting and plan of care. , and Patient/family agree to work toward stated goals and plan of care.     Thank you for this referral.  Alfredo Rocha, PT, DPT   Time Calculation: 22 mins

## 2020-02-11 NOTE — INTERDISCIPLINARY ROUNDS
Oncology Interdisciplinary rounds were held today to discuss patient plan of care and outcomes. The following members were present: Nursing, Physician, Case Management, Pharmacy, and PT/OT Actual Length of Stay: 1 Expected Length of Stay: - - - Plan            Discharge Gastroenterology consulted Oncology consulted PT/OT\ IV ABX Date TBA Lives with spouse @home

## 2020-02-11 NOTE — ED NOTES
Assumed care of pt, bedside report received. Call bell in reach. Plan of care discussed. Will continue to monitor.

## 2020-02-11 NOTE — PROGRESS NOTES
Problem: Self Care Deficits Care Plan (Adult)  Goal: *Acute Goals and Plan of Care (Insert Text)  Description    FUNCTIONAL STATUS PRIOR TO ADMISSION: Patient was modified independent using a single point cane for functional mobility. HOME SUPPORT: The patient lived with family but did not require assist.    Occupational Therapy Goals  Initiated 2/11/2020  1. Patient will perform grooming with independence within 7 day(s). 2.  Patient will perform bathing with supervision/set-up within 7 day(s). 3.  Patient will perform lower body dressing with supervision/set-up within 7 day(s). 4.  Patient will perform toilet transfers with independence within 7 day(s). 5.  Patient will perform all aspects of toileting with independence within 7 day(s). 6.  Patient will participate in upper extremity therapeutic exercise/activities with independence for 10 minutes within 7 day(s). 7.  Patient will utilize energy conservation techniques during functional activities with verbal cues within 7 day(s). Outcome: Progressing Towards Goal   OCCUPATIONAL THERAPY EVALUATION  Patient: Che Pereira (63 y.o. female)  Date: 2/11/2020  Primary Diagnosis: Leukemia, acute ([facility] [address] [C95.00]        Precautions:   Fall    ASSESSMENT  Based on the objective data described below, the patient presents with decreased balance in standing, and likes to reach for items, and stated that she furniture walks at home and is able to bathe and dress self with no assist.  Pt lives with family and stated that she uses a SPC outside the home but not in the home. She was mod I for bed mobility, and able to stand with CGA and continued to reach for objects in room as she was walking even with HHA from [address] [place] [address]. Pt was tried on RW and she did much better and was safer but stated that she did not want to use RW in her home.   Pt has functional range in BUe and her strength is functional.  She was not able to reach down to pull up her socks and stated that she is able to ewa her pants over feet at home and was able to ewa her slip on shoes with no assist.   was present during eval .  Pt was left sitting up in recliner and is CGA for transfer with RW. Current Level of Function Impacting Discharge (ADLs/self-care): decreased endurance, strength, functional mobility, and ADLs    Functional Outcome Measure: The patient scored 65/100 on the Barthel outcome measure which is indicative of min for ADLs . Other factors to consider for discharge: pt would benefit from home care PT and use of RW at home. Patient will benefit from skilled therapy intervention to address the above noted impairments. PLAN :  Recommendations and Planned Interventions: self care training, functional mobility training, therapeutic exercise, balance training, therapeutic activities, endurance activities, patient education, home safety training and family training/education    Frequency/Duration: Patient will be followed by occupational therapy 4 times a week to address goals. Recommendation for discharge: (in order for the patient to meet his/her long term goals)  No skilled occupational therapy/ follow up rehabilitation needs identified at this time. This discharge recommendation:  Has been made in collaboration with the attending provider and/or case management    IF patient discharges home will need the following DME: may need RW and pt has a walker at home but not sure if it is RW       SUBJECTIVE:   Patient stated I cant wait to get home .     OBJECTIVE DATA SUMMARY:   HISTORY:   Past Medical History:   Diagnosis Date    Anemia     Arthritis     Breast cancer ([address]     left    Bunion of right foot 8/20/2015    Chronic pain     back--uses tens unit    Diverticulitis 6/29/2018    Recurrent    Diverticulitis large intestine     Dry eye syndrome 6/29/2018    Fibromyalgia 6/29/2018    GERD (gastroesophageal reflux disease)     History of left breast cancer 2013    lumpectomy radiation    Hypercholesterolemia 6/29/2018    Ill-defined condition     blood transfusion hx    Leukemia (HCC)     MDS (myelodysplastic syndrome) ([facility] [address]     Osteoporosis 6/29/2018    Peripheral neuropathy 6/29/2018    Prediabetes 6/29/2018    PUD (peptic ulcer disease)     Radiation therapy complication 6681    Lt breast-No Chemo    Rosacea 6/29/2018    Trouble in sleeping      Past Surgical History:   Procedure Laterality Date    HX APPENDECTOMY      HX BREAST LUMPECTOMY  11/21/2013    LEFT BREAST LUMPECTOMY W/LEFT BREAST SENTINEL NODE BIOPSY,AND NEEDLE LOCALIZATION performed by Blanca Santana MD at [facility] MAIN OR    HX CATARACT REMOVAL      bilateral    HX HYSTERECTOMY      ovarian cyst    HX MOHS PROCEDURES      right    HX ORTHOPAEDIC      back surgery x2    HX OTHER SURGICAL      colonoscopy    HX TONSILLECTOMY      IR INSERT TUNL CVC W PORT OVER 5 YEARS  12/3/2019    TOTAL KNEE ARTHROPLASTY      left    TOTAL KNEE ARTHROPLASTY      right    UPPER GI ENDOSCOPY,BIOPSY  1/24/2020         US GUIDED CORE BREAST BIOPSY Left 2013    Breast Ca       Expanded or extensive additional review of patient history:     Home Situation  Home Environment: Private residence  # Steps to Enter: 3  Rails to Enter: Yes  Hand Rails : Left  One/Two Story Residence: One story  Living Alone: No  Support Systems: Family member(s), Spouse/Significant Other/Partner  Patient Expects to be Discharged to[de-identified] Private residence  Current DME Used/Available at Home: Walker, rolling, Wheelchair, Commode, bedside, Grab bars, Shower chair  Tub or Shower Type: Shower    Hand dominance: Right    EXAMINATION OF PERFORMANCE DEFICITS:  Cognitive/Behavioral Status:  Neurologic State: Alert  Orientation Level: Appropriate for age  Cognition: Appropriate decision making  Perception: Appears intact  Perseveration: No perseveration noted  Safety/Judgement: Awareness of environment    Skin: in fair health Edema: none noted    Hearing: Auditory  Auditory Impairment: Hard of hearing, bilateral, Hearing aid(s)    Vision/Perceptual:                 intact                    Range of Motion:    AROM: Generally decreased, functional  PROM: Generally decreased, functional                      Strength:    Strength: Generally decreased, functional                Coordination:  Coordination: Generally decreased, functional  Fine Motor Skills-Upper: Left Intact; Right Intact    Gross Motor Skills-Upper: Left Intact; Right Intact    Tone & Sensation:    Tone: Normal  Sensation: Impaired(BLE)                      Balance:  Sitting: Intact  Standing: Impaired; Without support  Standing - Static: Good;Constant support  Standing - Dynamic : Fair;Constant support    Functional Mobility and Transfers for ADLs:  Bed Mobility:  Rolling: Modified independent  Supine to Sit: Modified independent  Scooting: Independent    Transfers:  Sit to Stand: Contact guard assistance  Stand to Sit: Contact guard assistance  Bed to Chair: Contact guard assistance  Bathroom Mobility: Contact guard assistance  Toilet Transfer : Contact guard assistance    ADL Assessment:  Feeding: Independent    Oral Facial Hygiene/Grooming: Setup    Bathing: Minimum assistance; Moderate assistance    Upper Body Dressing: Independent    Lower Body Dressing: Minimum assistance    Toileting: Contact guard assistance;Stand by assistance           Cognitive Retraining  Safety/Judgement: Awareness of environment      Functional Measure:  Barthel Index:    Bathin  Bladder: 10  Bowels: 10  Groomin  Dressin  Feeding: 10  Mobility: 10  Stairs: 0  Toilet Use: 5  Transfer (Bed to Chair and Back): 10  Total: 65/100        The Barthel ADL Index: Guidelines  1. The index should be used as a record of what a patient does, not as a record of what a patient could do.   2. The main aim is to establish degree of independence from any help, physical or verbal, however minor and for whatever reason. 3. The need for supervision renders the patient not independent. 4. A patient's performance should be established using the best available evidence. Asking the patient, friends/relatives and nurses are the usual sources, but direct observation and common sense are also important. However direct testing is not needed. 5. Usually the patient's performance over the preceding 24-48 hours is important, but occasionally longer periods will be relevant. 6. Middle categories imply that the patient supplies over 50 per cent of the effort. 7. Use of aids to be independent is allowed. Harlee Cowden., Barthel, D.W. (6093). Functional evaluation: the Barthel Index. [address] [facility] (14)2. Lawrence Cervantes maxim KELLY Louie, Philip Irvin, Lavon Torre., [facility], [facility] (1999). Measuring the change indisability after inpatient rehabilitation; comparison of the responsiveness of the Barthel Index and Functional [place] Measure. Journal of Neurology, Neurosurgery, and Psychiatry, 66(4), 442-177. Juanita Storey, [place].MARBIN, OMAR Garcia, & Ismael Mendez M.A. (2004.) Assessment of post-stroke quality of life in cost-effectiveness studies: The usefulness of the Barthel Index and the EuroQoL-5D. Quality of Life Research, 15, 505-50         Occupational Therapy Evaluation Charge Determination   History Examination Decision-Making   LOW Complexity : Brief history review  LOW Complexity : 1-3 performance deficits relating to physical, cognitive , or psychosocial skils that result in activity limitations and / or participation restrictions  LOW Complexity : No comorbidities that affect functional and no verbal or physical assistance needed to complete eval tasks       Based on the above components, the patient evaluation is determined to be of the following complexity level: LOW       Activity Tolerance:   Fair  Please refer to the flowsheet for vital signs taken during this treatment.     After treatment patient left in no apparent distress:    Sitting in chair, Call bell within reach and Caregiver / family present    COMMUNICATION/EDUCATION:   The patients plan of care was discussed with: Physical Therapist, Registered Nurse and family. Home safety education was provided and the patient/caregiver indicated understanding. and Patient/family have participated as able in goal setting and plan of care. This patients plan of care is appropriate for delegation to [facility].     Thank you for this referral.  Cristiana Foreman OT  Time Calculation: 28 mins

## 2020-02-11 NOTE — PROGRESS NOTES
Oncology End of Shift Note      Bedside shift change report given to DAINA etienne (incoming nurse) by Selin Parks (outgoing nurse) on [facility]. Report included the following information SBAR, Kardex, ED Summary, Intake/Output, MAR and Recent Results. Shift Summary:   No change in patient condition throughout shift. No complaints of pain. maintenance fluids running. BMB done today. Patient tolerated procedure well. Tolerating clear diet well. Issues for Physician to Address:  none     Patient on Cardiac Monitoring?     [x] Yes  [] No    Rhythm:          Shift Events        Elle Carrillo

## 2020-02-11 NOTE — PROGRESS NOTES
ANDREW:   1) Home with [facility]   2) Home with f.u appts   3) Pt will need 2ND IM letter at time of discharge     Update- 11:58 AM- CM spoke with pt regarding [facility] services. Pt was previously serviced by [facility]. Pt is requesting to utilize them again. FOC completed, referral sent, waiting on response. Reason for Admission:  Leukemia, acute ([facility] [address]                  RUR Score:  21 MODERATE              Do you (patient/family) have any concerns for transition/discharge? None at this time               Plan for utilizing home health: Appropriate pending pt's progression, states she is currently open with a provider but cannot recall the name. CM will look into this. Current Advanced Directive/Advance Care Plan: None on file. Pt would benefit from Palliative Consult to discuss goals of care due to age, commodities and high risk of readmission. CM will inform attending during IDR's. Transition of Care Plan:         Pt is a 82year old,  female, admitted with Leukemia, acute ([facility] [address] Pt was alert and oriented when meeting with CM, confirming address, emergnecy contact and PCP. Pt states she lives with her  in a one level home, 3 steps to enter and is completely independent with ADLs. Pt states she has a walker, cane and BSC. Per chart pt has oxygen through [address] (set up 11/5/2020)- but at this time pt was not on oxygen in the room. Pt states she has had HH in the past and is currently open with them, unsure of the name, CM looking into this. Pt has not been to SNF in the past but has been to [facility] (approx 3 years ago). Pt states she uses the [facility] [place] and has no problems affording or accessing medications, pt's  will drive her home at time of discharge and as needed. Pt's  was at the bedside during the initial  assessment and is aware of the preferred  time of 9:00 AM on day of d/c.      Anticipate pt will go home with [facility] services but CM will continue to follow pt and remain available for support. Care Management Interventions  PCP Verified by CM:  Yes  Mode of Transport at Discharge: Self  Transition of Care Consult (CM Consult): Discharge Planning  MyChart Signup: No  Discharge Durable Medical Equipment: No  Physical Therapy Consult: Yes  Occupational Therapy Consult: Yes  Speech Therapy Consult: No  Current Support Network: Lives with Spouse, Family Lives Nearby  Confirm Follow Up Transport: Family  The Patient and/or Patient Representative was Provided with a Choice of Provider and Agrees with the Discharge Plan?: Yes  Freedom of Choice List was Provided with Basic Dialogue that Supports the Patient's Individualized Plan of Care/Goals, Treatment Preferences and Shares the Quality Data Associated with the Providers?: Yes  Discharge Location  Discharge Placement: Home with home health      MARIA INES Mitchell, [address] [facility]   [phone]

## 2020-02-11 NOTE — CONSULTS
GI Consultation Note Jil Franz)    NAME: Geovanni Torres : 1935 MRN: [id number]   ATTG: Reyes Valentin MD Prim Onc: CHARLY Ferrer MD PCP: Jil Oliveira MD  Date/Time:  2020 8:26 AM  Subjective:   REASON FOR CONSULT:      Kailash Patten is a 82 y.o.  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 She was admitted via ER on presentation with dyspnea and SOB. She denied melena, BRBPR, hematochezia, CP, D/C, fevers, or chills. She noted mild abdominal discomfort. CT imaging demonstrated mild sigmoid diverticulitis. Labs demonstrated leukocytosis. She was started on antibiotics and feels better at this time. Prior labs done 20 noted low Fe and low %saturation, with nl TIBC. She is set for a BM bx today. She had previously been evaluated by me in 2019 and 2020  With EGD showing reflux changes with associated ulceration. Colonoscopy was deferred at time of her diagnosis with diverticulitis 10/2019. She notes prior colonoscopy was completed by [facility] many years ago.     Past Medical History:   Diagnosis Date    Anemia     Arthritis     Breast cancer ([address]     left    Bunion of right foot 2015    Chronic pain     back--uses tens unit    Diverticulitis 2018    Recurrent    Diverticulitis large intestine     Dry eye syndrome 2018    Fibromyalgia 2018    GERD (gastroesophageal reflux disease)     History of left breast cancer 2013    lumpectomy radiation    Hypercholesterolemia 2018    Ill-defined condition     blood transfusion hx    Leukemia ([address]     MDS (myelodysplastic syndrome) ([address]     Osteoporosis 2018    Peripheral neuropathy 2018    Prediabetes 2018    PUD (peptic ulcer disease)     Radiation therapy complication 8099    Lt breast-No Chemo    Rosacea 2018    Trouble in sleeping       Past Surgical History:   Procedure Laterality Date    HX APPENDECTOMY      HX BREAST LUMPECTOMY  2013    LEFT BREAST LUMPECTOMY W/LEFT BREAST SENTINEL NODE BIOPSY,AND NEEDLE LOCALIZATION performed by Sheppard Kawasaki, MD at [facility] MAIN OR    HX CATARACT REMOVAL      bilateral    HX HYSTERECTOMY      ovarian cyst    HX MOHS PROCEDURES      right    HX ORTHOPAEDIC      back surgery x2    HX OTHER SURGICAL      colonoscopy    HX TONSILLECTOMY      IR INSERT TUNL CVC W PORT OVER 5 YEARS  12/3/2019    TOTAL KNEE ARTHROPLASTY      left    TOTAL KNEE ARTHROPLASTY      right    UPPER GI ENDOSCOPY,BIOPSY  2020         US GUIDED CORE BREAST BIOPSY Left 2013    Breast Ca     Social History     Tobacco Use    Smoking status: Former Smoker     Packs/day: 0.50     Years: 50.00     Pack years: 25.00     Last attempt to quit: 2012     Years since quittin.0    Smokeless tobacco: Never Used   Substance Use Topics    Alcohol use: Yes     Alcohol/week: 2.0 standard drinks     Types: 2 Glasses of wine per week      Family History   Problem Relation Age of Onset    Cancer Mother         bladder    Cancer Father     Breast Cancer Paternal Grandmother       Allergies   Allergen Reactions    Hydrocodone Nausea Only and Vertigo    Gabapentin Diarrhea, Nausea Only and Other (comments)     weakness    Lyrica [Pregabalin] Nausea Only    Oxycodone Nausea and Vomiting and Vertigo      Home Medications:  Prior to Admission Medications   Prescriptions Last Dose Informant Patient Reported? Taking?   acetaminophen (TYLENOL) 325 mg tablet 2020 at Unknown time Self No Yes   Sig: Take 2 Tabs by mouth every four (4) hours as needed for Pain.   azacitidine (VIDAZA INJECTION) 2020 at Unknown time Self Yes Yes   Sig: by Injection route. Pt receives this infusion the first week of every month.    dilTIAZem CD (CARDIZEM CD) 180 mg ER capsule 2/10/2020 at Unknown time Self No Yes   Sig: Take 1 Cap by mouth daily. docusate sodium (STOOL SOFTENER PO) 2/10/2020 at Unknown time Self Yes Yes   Sig: Take 2 Tabs by mouth daily. ergocalciferol (ERGOCALCIFEROL) 1,250 mcg (50,000 unit) capsule 2/8/2020 at Unknown time Self No Yes   Sig: TAKE ONE CAPSULE BY MOUTH EVERY 7 DAYS   inulin (FIBER GUMMIES PO) 2/9/2020 at Unknown time Self Yes Yes   Sig: Take 1 Tab by mouth daily. pantoprazole (PROTONIX) 40 mg tablet 2/10/2020 at Unknown time Self No Yes   Sig: Take 1 Tab by mouth Before breakfast and dinner.       Facility-Administered Medications: None     Hospital medications:  Current Facility-Administered Medications   Medication Dose Route Frequency    [START ON 2/15/2020] ergocalciferol capsule 50,000 Units  50,000 Units Oral Q7D    pantoprazole (PROTONIX) tablet 40 mg  40 mg Oral ACB&D    0.9% sodium chloride infusion  100 mL/hr IntraVENous CONTINUOUS    levoFLOXacin (LEVAQUIN) 500 mg in D5W IVPB  500 mg IntraVENous Q24H    acetaminophen (TYLENOL) tablet 650 mg  650 mg Oral Q4H PRN    ondansetron (ZOFRAN) injection 4 mg  4 mg IntraVENous Q6H PRN    oxyCODONE IR (ROXICODONE) tablet 5 mg  5 mg Oral Q6H PRN    labetaloL (NORMODYNE;TRANDATE) injection 10 mg  10 mg IntraVENous Q6H PRN    metroNIDAZOLE (FLAGYL) IVPB premix 500 mg  500 mg IntraVENous Q12H     REVIEW OF SYSTEMS:     [x]    Total of 11 systems reviewed as follows:  Const:   negative fever, negative chills, negative weight loss  Eyes:   negative diplopia or visual changes, negative eye pain  ENT:   negative coryza, negative sore throat  Resp:   negative cough, hemoptysis  Cards:  negative for chest pain, palpitations, lower extremity edema  :  negative for frequency, dysuria and hematuria  Skin:   negative for rash and pruritus  Heme:  negative for easy bruising and gum/nose bleeding  MS:  negative for myalgias, arthralgias, back pain and muscle weakness  Neurolo:  negative for headaches, dizziness, vertigo, memory problems   Psych:  negative for feelings of anxiety, depression     Pertinent Positives include :    Objective:   VITALS:    Visit Vitals  /45 (BP 1 Location: Left arm, BP Patient Position: At rest)   Pulse 72   Temp 98.6 °F (37 °C)   Resp 14   Ht 5' 5\" (1.651 m)   Wt 83.9 kg (185 lb)   SpO2 98%   BMI 30.79 kg/m²     Temp (24hrs), Av.8 °F (37.1 °C), Min:98.2 °F (36.8 °C), Max:99.5 °F (37.5 °C)    PHYSICAL EXAM:   General:    Alert, cooperative, no distress, appears stated age. Head:   Normocephalic, without obvious abnormality, atraumatic. Eyes:   Conjunctivae clear, anicteric sclerae. Pupils are equal  Nose:  Nares normal. No drainage or sinus tenderness. Throat:    Lips, mucosa, and tongue normal.  No Thrush  Neck:  Supple, symmetrical,  no adenopathy, thyroid: non tender  Back:    Symmetric,  No CVA tenderness. Lungs:   CTA bilaterally. No wheezing/rhonchi/rales. Chest wall:  No tenderness or deformity. No Accessory muscle use. Heart:   Regular rate and rhythm,  no murmur, rub or gallop. Abdomen:   Soft, non-tender. Not distended. Bowel sounds normal. No masses/guarding/rebound  Extremities: Atraumatic, No cyanosis. No edema. No clubbing  Skin:     Texture, turgor normal. No rashes/lesions/jaundice  Lymph: Cervical, supraclavicular normal.  Psych:  Good insight. Not depressed. Not anxious or agitated. Neurologic: EOMs intact. No facial asymmetry/aphasia/slurred speech. Normal strength, A/O X 3.      LAB DATA REVIEWED:    Recent Results (from the past 48 hour(s))   CBC WITH AUTOMATED DIFF    Collection Time: 02/10/20 11:22 AM   Result Value Ref Range    WBC 29.6 (HH) 3.6 - 11.0 K/uL    RBC 3.40 (L) 3.80 - 5.20 M/uL    HGB 9.6 (L) 11.5 - 16.0 g/dL    HCT 32.5 (L) 35.0 - 47.0 %    MCV 95.6 80.0 - 99.0 FL    MCH 28.2 26.0 - 34.0 PG    MCHC 29.5 (L) 30.0 - 36.5 g/dL    RDW 18.7 (H) 11.5 - 14.5 %    PLATELET 400 798 - 610 K/uL    MPV 9.3 8.9 - 12.9 FL    NRBC 0.4 (H) 0  WBC    ABSOLUTE NRBC 0.11 (H) 0.00 - 0.01 K/uL NEUTROPHILS 61 32 - 75 %    LYMPHOCYTES 13 12 - 49 %    MONOCYTES 14 (H) 5 - 13 %    EOSINOPHILS 3 0 - 7 %    BASOPHILS 1 0 - 1 %    IMMATURE GRANULOCYTES 8 (H) 0.0 - 0.5 %    ABS. NEUTROPHILS 18.1 (H) 1.8 - 8.0 K/UL    ABS. LYMPHOCYTES 3.8 (H) 0.8 - 3.5 K/UL    ABS. MONOCYTES 4.1 (H) 0.0 - 1.0 K/UL    ABS. EOSINOPHILS 0.9 (H) 0.0 - 0.4 K/UL    ABS. BASOPHILS 0.3 (H) 0.0 - 0.1 K/UL    ABS. IMM. GRANS. 2.4 (H) 0.00 - 0.04 K/UL    DF SMEAR SCANNED      RBC COMMENTS ANISOCYTOSIS  1+       EKG, 12 LEAD, INITIAL    Collection Time: 02/10/20  1:47 PM   Result Value Ref Range    Ventricular Rate 74 BPM    Atrial Rate 74 BPM    P-R Interval 148 ms    QRS Duration 82 ms    Q-T Interval 394 ms    QTC Calculation (Bezet) 437 ms    Calculated P Axis -23 degrees    Calculated R Axis -17 degrees    Calculated T Axis 51 degrees    Diagnosis       Normal sinus rhythm  Moderate voltage criteria for LVH, may be normal variant  When compared with ECG of 17-DEC-2019 06:56,  No significant change was found     NT-PRO BNP    Collection Time: 02/10/20  3:38 PM   Result Value Ref Range    NT pro- (H) <450 PG/ML   TROPONIN I    Collection Time: 02/10/20  3:38 PM   Result Value Ref Range    Troponin-I, Qt. <0.05 <5.97 ng/mL   METABOLIC PANEL, COMPREHENSIVE    Collection Time: 02/10/20  3:38 PM   Result Value Ref Range    Sodium 139 136 - 145 mmol/L    Potassium 3.9 3.5 - 5.1 mmol/L    Chloride 106 97 - 108 mmol/L    CO2 26 21 - 32 mmol/L    Anion gap 7 5 - 15 mmol/L    Glucose 116 (H) 65 - 100 mg/dL    BUN 33 (H) 6 - 20 MG/DL    Creatinine 0.92 0.55 - 1.02 MG/DL    BUN/Creatinine ratio 36 (H) 12 - 20      GFR est AA >60 >60 ml/min/1.73m2    GFR est non-AA 58 (L) >60 ml/min/1.73m2    Calcium 8.2 (L) 8.5 - 10.1 MG/DL    Bilirubin, total 0.9 0.2 - 1.0 MG/DL    ALT (SGPT) 17 12 - 78 U/L    AST (SGOT) 7 (L) 15 - 37 U/L    Alk.  phosphatase 58 45 - 117 U/L    Protein, total 6.6 6.4 - 8.2 g/dL    Albumin 3.5 3.5 - 5.0 g/dL    Globulin 3.1 2.0 - 4.0 g/dL    A-G Ratio 1.1 1.1 - 2.2     LIPASE    Collection Time: 02/10/20  3:38 PM   Result Value Ref Range    Lipase 76 73 - 393 U/L   MAGNESIUM    Collection Time: 02/10/20  3:38 PM   Result Value Ref Range    Magnesium 2.3 1.6 - 2.4 mg/dL   CULTURE, BLOOD, PAIRED    Collection Time: 02/10/20  3:38 PM   Result Value Ref Range    Special Requests: NO SPECIAL REQUESTS      Culture result: NO GROWTH AFTER 16 HOURS     LACTIC ACID    Collection Time: 02/10/20  3:38 PM   Result Value Ref Range    Lactic acid 1.7 0.4 - 2.0 MMOL/L   URIC ACID    Collection Time: 02/10/20  7:50 PM   Result Value Ref Range    Uric acid 5.0 2.6 - 6.0 MG/DL   PROCALCITONIN    Collection Time: 02/10/20  7:50 PM   Result Value Ref Range    Procalcitonin 0.08 ng/mL   URINALYSIS W/ RFLX MICROSCOPIC    Collection Time: 02/11/20  3:12 AM   Result Value Ref Range    Color YELLOW/STRAW      Appearance CLEAR CLEAR      Specific gravity 1.010 1.003 - 1.030      pH (UA) 5.5 5.0 - 8.0      Protein NEGATIVE  NEG mg/dL    Glucose NEGATIVE  NEG mg/dL    Ketone NEGATIVE  NEG mg/dL    Bilirubin NEGATIVE  NEG      Blood NEGATIVE  NEG      Urobilinogen 0.2 0.2 - 1.0 EU/dL    Nitrites NEGATIVE  NEG      Leukocyte Esterase NEGATIVE  NEG     CBC WITH AUTOMATED DIFF    Collection Time: 02/11/20  4:17 AM   Result Value Ref Range    WBC 25.0 (H) 3.6 - 11.0 K/uL    RBC 2.62 (L) 3.80 - 5.20 M/uL    HGB 7.6 (L) 11.5 - 16.0 g/dL    HCT 26.5 (L) 35.0 - 47.0 %    .1 (H) 80.0 - 99.0 FL    MCH 29.0 26.0 - 34.0 PG    MCHC 28.7 (L) 30.0 - 36.5 g/dL    RDW 19.0 (H) 11.5 - 14.5 %    PLATELET 330 546 - 382 K/uL    MPV 9.8 8.9 - 12.9 FL    NRBC 0.4 (H) 0  WBC    ABSOLUTE NRBC 0.09 (H) 0.00 - 0.01 K/uL    NEUTROPHILS 68 32 - 75 %    BAND NEUTROPHILS 2 %    LYMPHOCYTES 18 12 - 49 %    MONOCYTES 7 5 - 13 %    EOSINOPHILS 2 0 - 7 %    BASOPHILS 0 0 - 1 %    METAMYELOCYTES 2 %    MYELOCYTES 1 %    IMMATURE GRANULOCYTES 0 0.0 - 0.5 %    ABS.  NEUTROPHILS 17.5 (H) 1.8 - 8.0 K/UL    ABS. LYMPHOCYTES 4.5 (H) 0.8 - 3.5 K/UL    ABS. MONOCYTES 1.8 (H) 0.0 - 1.0 K/UL    ABS. EOSINOPHILS 0.5 (H) 0.0 - 0.4 K/UL    ABS. BASOPHILS 0.0 0.0 - 0.1 K/UL    ABS. IMM. GRANS. 0.0 0.00 - 0.04 K/UL    DF MANUAL      RBC COMMENTS ANISOCYTOSIS  1+       MAGNESIUM    Collection Time: 02/11/20  4:17 AM   Result Value Ref Range    Magnesium 2.1 1.6 - 2.4 mg/dL   METABOLIC PANEL, COMPREHENSIVE    Collection Time: 02/11/20  4:17 AM   Result Value Ref Range    Sodium 140 136 - 145 mmol/L    Potassium 4.4 3.5 - 5.1 mmol/L    Chloride 113 (H) 97 - 108 mmol/L    CO2 21 21 - 32 mmol/L    Anion gap 6 5 - 15 mmol/L    Glucose 95 65 - 100 mg/dL    BUN 23 (H) 6 - 20 MG/DL    Creatinine 0.68 0.55 - 1.02 MG/DL    BUN/Creatinine ratio 34 (H) 12 - 20      GFR est AA >60 >60 ml/min/1.73m2    GFR est non-AA >60 >60 ml/min/1.73m2    Calcium 7.9 (L) 8.5 - 10.1 MG/DL    Bilirubin, total 0.8 0.2 - 1.0 MG/DL    ALT (SGPT) 13 12 - 78 U/L    AST (SGOT) 10 (L) 15 - 37 U/L    Alk. phosphatase 41 (L) 45 - 117 U/L    Protein, total 5.6 (L) 6.4 - 8.2 g/dL    Albumin 2.8 (L) 3.5 - 5.0 g/dL    Globulin 2.8 2.0 - 4.0 g/dL    A-G Ratio 1.0 (L) 1.1 - 2.2     TSH 3RD GENERATION    Collection Time: 02/11/20  4:17 AM   Result Value Ref Range    TSH 1.29 0.36 - 3.74 uIU/mL     IMAGING RESULTS:   [x]      I have personally reviewed the actual   []    CXR  [x]    CT  []     US    CT Abdomen/Pelvis 2/10/20   Direct comparison is made to prior CT dated December 2019. Findings:  Lung bases: The visualized lung bases are clear. Liver: The liver is normal.  Adrenals: Adrenal glands are stable. Pancreas: There are multiple pancreatic ductal calcifications consistent with  chronic pancreatitis. Gallbladder: The gallbladder is normal.  Kidneys: There is a 4 mm nonobstructing left renal calculus.  There is a 1.2 cm  isodense exophytic left renal lesion, unchanged compared to prior CT dated  October 2015 and likely to represent a hyperdense cyst. Additional bilateral  renal cysts are noted. There is no hydronephrosis. Spleen: The spleen is normal.  Lymph nodes. There is no ceho hepatitis, mesenteric, retroperitoneal or pelvic  lymphadenopathy. Bowel: There is thickening of the mid sigmoid colon; there are multiple adjacent  diverticula as well as mild inflammatory stranding and trace free fluid. Scattered colonic diverticulosis is noted, most extensive within the sigmoid  colon. There is no focal fluid collection to suggest abscess. There is no focal  fluid collection to suggest abscess. No dilated loop of large or small bowel is  visualized. There is a moderate amount of stool in the rectum. Urinary bladder: Urinary bladder is partially filled and grossly normal.  Bones: The osseous structures are diffusely demineralized. No lytic or sclerotic  osseous lesion is visualized. Miscellaneous: The uterus is absent. IMPRESSION: Mild sigmoid colonic diverticulitis. No associated drainable focal  fluid collection. Recommendations/Plan:      Active Problems:    Diverticulitis (6/29/2018)      Overview: Recurrent      Leukemia, acute ([facility] [facility] (2/10/2020)       ___________________________________________________  RECOMMENDATIONS:    81yo F with recurrent mild diverticulitis involving sigmoid colon. She is responding well to IV antibiotics. She appears to have developing Fe deficiency anemia, possible related to a colonic process, her reflux esophagitis with ulceration, or her MDS with leukemia transformation. Plan:  1) Continue antibiotic with later conversion to PO for total 12d treatment course  2) Will need OP colonoscopy in 1 -2 mo.   I will arrange OP visit with me to coordinate  3) Advance diet after BM diet complete to CLD and then as tolerated    Discussed Code Status:    [x]    Full Code      []    DNR    ___________________________________________________  Care Plan discussed with:    [x]    Patient   []    Family   [x]    Nursing   [x] Jeannette Collazo  Total Time :  50   minutes   ___________________________________________________  GI: Hugo Bueno MD

## 2020-02-11 NOTE — PROGRESS NOTES
Oncology End of Shift Note      Bedside shift change report given to DAINA Kim (incoming nurse) by Kwasi Denton (outgoing nurse) on Dulce Boquet. Report included the following information SBAR, Kardex and MAR. Shift Summary:   Patient was admitted from the ED. She is up with the assistance of one to the bathroom. Patient has not had any complaints of pain. Patient has been NPO since midnight. She does have an Oncology and Gastroenterology consult tomorrow. All schedule medications has been given. Routine rounding has been done. Issues for Physician to Address:       Patient on Cardiac Monitoring?     [x] Yes  [] No    Rhythm: NSR         Shift Events        Kwasi Denton

## 2020-02-11 NOTE — ED NOTES
TRANSFER - OUT REPORT:    Verbal report given to Danica(name) on Winifred Ann  being transferred to Oncology(unit) for routine progression of care       Report consisted of patients Situation, Background, Assessment and   Recommendations(SBAR). Information from the following report(s) SBAR, Kardex, ED Summary, [facility] and Recent Results was reviewed with the receiving nurse. Lines:   Venous Access Device 12/03/19 Upper chest (subclavicular area, right (Active)       Peripheral IV 02/10/20 Right Hand (Active)   Site Assessment Clean, dry, & intact 2/10/2020  1:48 PM   Phlebitis Assessment 0 2/10/2020  1:48 PM   Infiltration Assessment 0 2/10/2020  1:48 PM   Dressing Status Clean, dry, & intact 2/10/2020  1:48 PM   Dressing Type Transparent 2/10/2020  1:48 PM   Hub Color/Line Status Pink 2/10/2020  1:48 PM       Peripheral IV 02/10/20 Left Antecubital (Active)   Site Assessment Clean, dry, & intact 2/10/2020  3:41 PM   Phlebitis Assessment 0 2/10/2020  3:41 PM   Infiltration Assessment 0 2/10/2020  3:41 PM   Dressing Status Clean, dry, & intact 2/10/2020  3:41 PM   Dressing Type Transparent 2/10/2020  3:41 PM   Hub Color/Line Status Pink 2/10/2020  3:41 PM        Opportunity for questions and clarification was provided.       Patient transported with:   [facility]

## 2020-02-12 PROCEDURE — 74011250637 HC RX REV CODE- 250/637: Performed by: INTERNAL MEDICINE

## 2020-02-12 PROCEDURE — 65270000015 HC RM PRIVATE ONCOLOGY

## 2020-02-12 PROCEDURE — 97535 SELF CARE MNGMENT TRAINING: CPT

## 2020-02-12 PROCEDURE — 97116 GAIT TRAINING THERAPY: CPT

## 2020-02-12 PROCEDURE — 74011250636 HC RX REV CODE- 250/636: Performed by: INTERNAL MEDICINE

## 2020-02-12 RX ADMIN — METRONIDAZOLE 500 MG: 500 INJECTION, SOLUTION INTRAVENOUS at 08:12

## 2020-02-12 RX ADMIN — PANTOPRAZOLE SODIUM 40 MG: 40 TABLET, DELAYED RELEASE ORAL at 08:12

## 2020-02-12 RX ADMIN — PANTOPRAZOLE SODIUM 40 MG: 40 TABLET, DELAYED RELEASE ORAL at 16:30

## 2020-02-12 RX ADMIN — METRONIDAZOLE 500 MG: 500 INJECTION, SOLUTION INTRAVENOUS at 21:14

## 2020-02-12 RX ADMIN — LEVOFLOXACIN 500 MG: 5 INJECTION, SOLUTION INTRAVENOUS at 22:32

## 2020-02-12 RX ADMIN — SODIUM CHLORIDE 100 ML/HR: 900 INJECTION, SOLUTION INTRAVENOUS at 00:44

## 2020-02-12 NOTE — PROGRESS NOTES
Problem: Patient Education: Go to Patient Education Activity  Goal: Patient/Family Education  Outcome: Progressing Towards Goal     Problem: Falls - Risk of  Goal: *ABSENCE OF FALLS  Outcome: Progressing Towards Goal

## 2020-02-12 NOTE — PROGRESS NOTES
Problem: Mobility Impaired (Adult and Pediatric)  Goal: *Acute Goals and Plan of Care (Insert Text)  Description  FUNCTIONAL STATUS PRIOR TO ADMISSION: Patient was modified independent using a rolling walker for functional mobility outside of the home and furniture walking inside of the home. HOME SUPPORT PRIOR TO ADMISSION: The patient lived with spouse but did not require assist.    Physical Therapy Goals  Initiated 2/11/2020  1. Patient will move from supine to sit and sit to supine , scoot up and down and roll side to side in bed with independence within 7 day(s). 2.  Patient will transfer from bed to chair and chair to bed with modified independence using the least restrictive device within 7 day(s). 3.  Patient will perform sit to stand with modified independence within 7 day(s). 4.  Patient will ambulate with modified independence for 200 feet with the least restrictive device within 7 day(s). 5.  Patient will ascend/descend 4 stairs with handrail(s) with supervision/set-up within 7 day(s). Outcome: Progressing Towards Goal   PHYSICAL THERAPY TREATMENT  Patient: Jade Dunn (2 y.o. female)  Date: 2/12/2020  Diagnosis: Leukemia, acute ([facility] [C95.00]   <principal problem not specified>       Precautions: Fall  Chart, physical therapy assessment, plan of care and goals were reviewed. ASSESSMENT  Patient continues with skilled PT services and is progressing towards goals. Pt demo increased tolerance to gait training this date with one brief standing rest break only. Required CGA for balance and cues for safe walker mgmt through doorway/ over threshold due to tendency to push too far forward. Pt tolerating up to chair without difficulty this date. Notes that she has been up to bathroom approx every 4 hrs with RW and nursing assist. Will address stair training next session as tolerated. Pt appropriate for d/c home with use of RW and  assist when medically stable.  Recommend follow up HH PT.     Current Level of Function Impacting Discharge (mobility/balance): transfer SBA, amb with RW CGA    Other factors to consider for discharge:  able to assist at d/c. PLAN :  Patient continues to benefit from skilled intervention to address the above impairments. Continue treatment per established plan of care. to address goals. Recommendation for discharge: (in order for the patient to meet his/her long term goals)  Physical therapy at least 2 days/week in the home AND ensure assist and/or supervision for safety with mobility     This discharge recommendation:  Has not yet been discussed the attending provider and/or case management    IF patient discharges home will need the following DME: patient owns DME required for discharge       SUBJECTIVE:   Patient stated Marleny Tucker are waiting on the results of that test.    OBJECTIVE DATA SUMMARY:   Critical Behavior:  Neurologic State: Alert  Orientation Level: Appropriate for age  Cognition: Appropriate decision making  Safety/Judgement: Awareness of environment  Functional Mobility Training:  Bed Mobility:  Rolling: Modified independent  Supine to Sit: Modified independent  Sit to Supine: Modified independent  Scooting: Independent        Transfers:  Sit to Stand: Supervision  Stand to Sit: Supervision        Bed to Chair: Stand-by assistance                    Balance:  Sitting: Intact  Standing: Impaired; Without support  Standing - Static: Good;Constant support(RW)  Standing - Dynamic : Good;Constant support  Ambulation/Gait Training:  Distance (ft): 60 Feet (ft)(one brief standing rest break)  Assistive Device: Walker, rolling  Ambulation - Level of Assistance: Contact guard assistance        Gait Abnormalities: Decreased step clearance        Base of Support: Widened     Speed/Emmanuelle: Pace decreased (<100 feet/min)  Step Length: Left shortened;Right shortened        Interventions: Verbal cues(for safe walker mgmt through doorway)     Pain Rating:  No c/o pain    Activity Tolerance:   Fair and requires rest breaks  Please refer to the flowsheet for vital signs taken during this treatment.     After treatment patient left in no apparent distress:   Sitting in chair and Call bell within reach    COMMUNICATION/COLLABORATION:   The patients plan of care was discussed with: Registered Nurse    Oniel Mix, PT   Time Calculation: 16 mins

## 2020-02-12 NOTE — PROGRESS NOTES
ANDREW:   1) Home with HH   2) Home with f.u appts  3) Pt will need 2ND IM letter at time of discharge     11:24 AM- [facility] has accepted pt for [facility] services, AVS updated. Anticipate pt will d/c home tomorrow pending bone biopsy and if her WBC is leveled. Pt's  aware of preferred  time of 9:00 AM on day of discharge.     Russ Sams, MARIA INES, [address] [facility]   [phone]

## 2020-02-12 NOTE — PROGRESS NOTES
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 Cannot exclude contribution from underlying colonic lesion. Plan:   · Convert antibiotics to PO on discharge home for 12d total treatment with Levaquin/flagyl  · Advance diet to low-residue diet  · I will contact pt to arrange OP f/u with me to set up Colonoscopy in 1-2 mo   Will sign off  Subjective:   Discussed with RN events overnight. Feels much better. Tolerating CLD and wants more food. Has had multiple BMs since admission    Complaint Y/N Description   Abdominal Pain n    Hematemesis n    Hematochezia n    Melena n    Constipation n    Diarrhea n    Dyspepsia n    Dysphagia n    Jaundiced n    Nausea/vomiting n      Review of Systems:  Symptom Y/N Comments  Symptom Y/N Comments   Fever/Chills n   Chest Pain n    Cough n   Headaches n    Sputum n   Joint Pain n    SOB/AL n   Pruritis/Rash n    Tolerating Diet y CLD  Other       Could NOT obtain due to:      Objective:   VITALS:   Last 24hrs VS reviewed since prior progress note. Most recent are:  Visit Vitals  /68 (BP 1 Location: Left arm, BP Patient Position: At rest)   Pulse 68   Temp 98 °F (36.7 °C)   Resp 18   Ht 5' 5\" (1.651 m)   Wt 83.9 kg (185 lb)   SpO2 95%   BMI 30.79 kg/m²     No intake or output data in the 24 hours ending 02/12/20 0805  PHYSICAL EXAM:  General: WD, WN. Alert, cooperative, no acute distress    HEENT: NC, Atraumatic. PERRL. Anicteric sclerae. Lungs:  CTA Bilaterally. No Wheezing/Rhonchi/Rales. Heart:  Regular  rhythm,  No murmur/Rub/Gallops  Abdomen: Soft, Non distended, Non tender.  +BS  Extremities: No c/c/e  Neurologic:  CN 2-12 gi, A/O X 3.   No acute neurological distress   Psych:   Good insight. Not anxious nor agitated. Lab and Radiology Data Reviewed: (see below)    Medications Reviewed: (see below)  PMH/SH reviewed - no change compared to H&P  ________________________________________________________________________  Total time spent with patient: 15 minutes ________________________________________________________________________  Care Plan discussed with:  Patient y   Family     RN y              Consultant:       Zulma Sanchez MD     Procedures: see electronic medical records for all procedures/Xrays and details which were not copied into this note but were reviewed prior to creation of Plan. LABS:  Recent Labs     02/11/20  0417 02/10/20  1122   WBC 25.0* 29.6*   HGB 7.6* 9.6*   HCT 26.5* 32.5*    244     Recent Labs     02/11/20  0417 02/10/20  1950 02/10/20  1538     --  139   K 4.4  --  3.9   *  --  106   CO2 21  --  26   BUN 23*  --  33*   CREA 0.68  --  0.92   GLU 95  --  116*   CA 7.9*  --  8.2*   MG 2.1  --  2.3   URICA  --  5.0  --      Recent Labs     02/11/20  0417 02/10/20  1538   SGOT 10* 7*   AP 41* 58   TP 5.6* 6.6   ALB 2.8* 3.5   GLOB 2.8 3.1   LPSE  --  76     No results for input(s): INR, PTP, APTT, INREXT in the last 72 hours. No results for input(s): FE, TIBC, PSAT, FERR in the last 72 hours. No results found for: FOL, RBCF  No results for input(s): PH, PCO2, PO2 in the last 72 hours. No results for input(s): CPK, CKMB in the last 72 hours.     No lab exists for component: TROPONINI  Lab Results   Component Value Date/Time    Color YELLOW/STRAW 02/11/2020 03:12 AM    Appearance CLEAR 02/11/2020 03:12 AM    Specific gravity 1.010 02/11/2020 03:12 AM    Specific gravity 1.010 12/31/2019 10:32 AM    pH (UA) 5.5 02/11/2020 03:12 AM    Protein NEGATIVE  02/11/2020 03:12 AM    Glucose NEGATIVE  02/11/2020 03:12 AM    Ketone NEGATIVE  02/11/2020 03:12 AM    Bilirubin NEGATIVE  02/11/2020 03:12 AM    Urobilinogen 0.2 02/11/2020 03:12 AM    Nitrites NEGATIVE 02/11/2020 03:12 AM    Leukocyte Esterase NEGATIVE  02/11/2020 03:12 AM    Epithelial cells FEW 12/31/2019 10:32 AM    Bacteria NEGATIVE  12/31/2019 10:32 AM    WBC 0-4 12/31/2019 10:32 AM    RBC 0-5 12/31/2019 10:32 AM       MEDICATIONS:  Current Facility-Administered Medications   Medication Dose Route Frequency    [START ON 2/15/2020] ergocalciferol capsule 50,000 Units  50,000 Units Oral Q7D    pantoprazole (PROTONIX) tablet 40 mg  40 mg Oral ACB&D    0.9% sodium chloride infusion  100 mL/hr IntraVENous CONTINUOUS    levoFLOXacin (LEVAQUIN) 500 mg in D5W IVPB  500 mg IntraVENous Q24H    acetaminophen (TYLENOL) tablet 650 mg  650 mg Oral Q4H PRN    ondansetron (ZOFRAN) injection 4 mg  4 mg IntraVENous Q6H PRN    oxyCODONE IR (ROXICODONE) tablet 5 mg  5 mg Oral Q6H PRN    labetaloL (NORMODYNE;TRANDATE) injection 10 mg  10 mg IntraVENous Q6H PRN    metroNIDAZOLE (FLAGYL) IVPB premix 500 mg  500 mg IntraVENous Q12H

## 2020-02-12 NOTE — PROGRESS NOTES
Hospitalist Progress Note    NAME: Ronna Toure   :  1935   MRN:  [id number]       Assessment / Plan:  Acute Diverticulitis:   Ct abdomen showed : Mild sigmoid colonic diverticulitis. No associated drainable focal  fluid collection. C/w levaquin and flagyl for total 12day . 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 Ana Nicole state that patient may need BMB,   S/p bone bx on , awaiting results     PUD/GERD: c/w PPI. Fibromyalgia: c/w pain medication  Code Status: Full Code  Surrogate Decision Maker:  Brent Elizabeth [phone]  DVT Prophylaxis: SCD  GI Prophylaxis: on PPI  Baseline: independent lives with     30.0 - 39.9 Obese / Body mass index is 30.79 kg/m². Subjective:     Chief Complaint / Reason for Physician Visit  FU  Leucocytosis/diverticulitis . No acute complaints . Tolerating diet . Discussed with RN events overnight. Review of Systems:  Symptom Y/N Comments  Symptom Y/N Comments   Fever/Chills n   Chest Pain n    Poor Appetite    Edema     Cough    Abdominal Pain n    Sputum    Joint Pain     SOB/AL n   Pruritis/Rash     Nausea/vomit n   Tolerating PT/OT     Diarrhea n   Tolerating Diet y    Constipation    Other       Could NOT obtain due to:      Objective:     VITALS:   Last 24hrs VS reviewed since prior progress note.  Most recent are:  Patient Vitals for the past 24 hrs:   Temp Pulse Resp BP SpO2   20 0318 98 °F (36.7 °C) 68 18 125/68 95 %   20 0000  70      20 2325 98.3 °F (36.8 °C) 76 18 116/55 94 %   20 2000  82      20 1951 98.7 °F (37.1 °C) 87 18 133/85 93 %   20 1538 98.2 °F (36.8 °C) 76 18 124/54 98 %   20 1334  88 20 122/49 97 %   20 1329  84 18 122/45 97 %   20 1324  93 18 142/64 98 %   20 1319  81 18 123/51 96 %   20 1253 97.9 °F (36.6 °C) 73 22 122/49 100 % 02/11/20 1045     97 %     No intake or output data in the 24 hours ending 02/12/20 0745     PHYSICAL EXAM:  General: WD, WN. Alert, cooperative, no acute distress    EENT:  EOMI. Anicteric sclerae. MMM  Resp:  CTA bilaterally, no wheezing or rales. No accessory muscle use  CV:  Regular  rhythm,  No edema  GI:  Soft, Non distended, Non tender.  +Bowel sounds  Neurologic:  Alert and oriented X 3, normal speech,   Psych:   Good insight. Not anxious nor agitated  Skin:  No rashes. No jaundice    Reviewed most current lab test results and cultures  YES  Reviewed most current radiology test results   YES  Review and summation of old records today    NO  Reviewed patient's current orders and MAR    YES  PMH/SH reviewed - no change compared to H&P  ________________________________________________________________________  Care Plan discussed with:    Comments   Patient x    Family      RN x    Care Manager     Consultant                        Multidiciplinary team rounds were held today with , nursing, pharmacist and clinical coordinator. Patient's plan of care was discussed; medications were reviewed and discharge planning was addressed. ________________________________________________________________________  Total NON critical care TIME:  25 Minutes    Total CRITICAL CARE TIME Spent:   Minutes non procedure based      Comments   >50% of visit spent in counseling and coordination of care x    ________________________________________________________________________  General MD Serina     Procedures: see electronic medical records for all procedures/Xrays and details which were not copied into this note but were reviewed prior to creation of Plan. LABS:  I reviewed today's most current labs and imaging studies.   Pertinent labs include:  Recent Labs     02/11/20 0417 02/10/20  1122   WBC 25.0* 29.6*   HGB 7.6* 9.6*   HCT 26.5* 32.5*    244     Recent Labs     02/11/20 041 02/10/20  1538    139   K 4.4 3.9   * 106   CO2 21 26   GLU 95 116*   BUN 23* 33*   CREA 0.68 0.92   CA 7.9* 8.2*   MG 2.1 2.3   ALB 2.8* 3.5   TBILI 0.8 0.9   SGOT 10* 7*   ALT 13 17       Signed: General Serina MD

## 2020-02-12 NOTE — PROGRESS NOTES
Oncology End of Shift Note      Bedside shift change report given to DAINA Bustillo (incoming nurse) by Levon Zuñiga (outgoing nurse) on Ronna Sheen. Report included the following information SBAR, Kardex and MAR. Shift Summary:   Patient condition has not change throughout my shift. Patient has been up with the assistance of one. Routine rounding has been done, see MAR. All schedule medications have been given. Issues for Physician to Address:       Patient on Cardiac Monitoring?     [] Yes  [x] No    Rhythm:          Shift Events        Levon Zuñiga

## 2020-02-12 NOTE — PROGRESS NOTES
Oncology End of Shift Note      Bedside shift change report given to Evaristo Trinidad RN (incoming nurse) by Matt Taylor (outgoing nurse) on [facility]. Report included the following information SBAR, Kardex, Intake/Output and MAR. Shift Summary: Pt remained stable throughout shift. Scheduled meds given, no PRN meds given. Pt up x1 assist with walker to the bathroom, no BM today. Pt's IV infiltrated & prefers to use port. Per Dr. Johnston Phi I am able to access her port. Port was accessed with blood return. Pts IV fluids were discontinued by provider today. Hourly rounding completed, pt turns self. Issues for Physician to Address:       Patient on Cardiac Monitoring?     [x] Yes  [] No    Rhythm: Telemetry: normal sinus rhythm         Matt Taylor

## 2020-02-12 NOTE — PROGRESS NOTES
Problem: Self Care Deficits Care Plan (Adult)  Goal: *Acute Goals and Plan of Care (Insert Text)  Description    FUNCTIONAL STATUS PRIOR TO ADMISSION: Patient was modified independent using a single point cane for functional mobility. HOME SUPPORT: The patient lived with family but did not require assist.    Occupational Therapy Goals  Initiated 2/11/2020  1. Patient will perform grooming with independence within 7 day(s). 2.  Patient will perform bathing with supervision/set-up within 7 day(s). 3.  Patient will perform lower body dressing with supervision/set-up within 7 day(s). 4.  Patient will perform toilet transfers with independence within 7 day(s). 5.  Patient will perform all aspects of toileting with independence within 7 day(s). 6.  Patient will participate in upper extremity therapeutic exercise/activities with independence for 10 minutes within 7 day(s). 7.  Patient will utilize energy conservation techniques during functional activities with verbal cues within 7 day(s). Outcome: Progressing Towards Goal   OCCUPATIONAL THERAPY TREATMENT  Patient: Stephane Ingram (89 y.o. female)  Date: 2/12/2020  Diagnosis: Leukemia, acute ([facility] [address] [C95.00]   <principal problem not specified>       Precautions: Fall  Chart, occupational therapy assessment, plan of care, and goals were reviewed. ASSESSMENT  Patient continues with skilled OT services and is progressing towards goals. Pt mod I for bed mobility, and able to stand with SBA and with use of RW, she transferred to and from toilet and walked to bathroom. Pt was able to stand at the sink and complete her UB and LB ADLs and SBA for toileting self. She was tired and walked back to recliner, to rest with  in room. Pt is making progress and feel that she will be able to return home with assist from family.       Current Level of Function Impacting Discharge (ADLs): decreased endurance, strength, functional mobility and ADLs    Other factors to consider for discharge: return home with family and home care PT, pt does not like using walker but is unsafe with her furniture walking. PLAN :  Patient continues to benefit from skilled intervention to address the above impairments. Continue treatment per established plan of care. to address goals. Recommend with staff: Louin Estimable for meals and walk to bathroom with RW    Recommend next OT session: work on ADLs at sink with no rest breaks    Recommendation for discharge: (in order for the patient to meet his/her long term goals)  No skilled occupational therapy/ follow up rehabilitation needs identified at this time. This discharge recommendation:  Has been made in collaboration with the attending provider and/or case management    IF patient discharges home will need the following DME: tbd       SUBJECTIVE:   Patient stated I hope I go home today or tomorrow.     OBJECTIVE DATA SUMMARY:   Cognitive/Behavioral Status:  Neurologic State: Alert  Orientation Level: Appropriate for age  Cognition: Appropriate decision making  Perception: Appears intact  Perseveration: No perseveration noted  Safety/Judgement: Awareness of environment    Functional Mobility and Transfers for ADLs:  Bed Mobility:  Rolling: Modified independent  Supine to Sit: Modified independent  Sit to Supine: Modified independent  Scooting: Independent    Transfers:  Sit to Stand: Stand-by assistance  Functional Transfers  Bathroom Mobility: Stand-by assistance  Toilet Transfer : Stand-by assistance  Bed to Chair: Stand-by assistance    Balance:  Sitting: Intact  Standing: Impaired; Without support  Standing - Static: Good;Constant support  Standing - Dynamic : Constant support;Good    ADL Intervention:  Feeding  Feeding Assistance: Independent    Grooming  Grooming Assistance: Supervision  Position Performed: Standing  Washing Face: Independent  Washing Hands: Independent  Brushing Teeth:  Independent  Brushing/Combing Hair: Independent    Upper Body Bathing  Bathing Assistance: Supervision  Position Performed: Standing    Lower Body Bathing  Bathing Assistance: Supervision  Perineal  : Supervision  Position Performed: Standing    Upper Body Dressing Assistance  Dressing Assistance: Independent         Toileting  Toileting Assistance: Stand-by assistance  Bladder Hygiene: Stand-by assistance  Bowel Hygiene: Stand-by assistance    Cognitive Retraining  Safety/Judgement: Awareness of environment        Activity Tolerance:   Fair  Please refer to the flowsheet for vital signs taken during this treatment.     After treatment patient left in no apparent distress:   Sitting in chair, Call bell within reach, and family     COMMUNICATION/COLLABORATION:   The patients plan of care was discussed with: Physical Therapist, Registered Nurse, and family     Ceasar Berg OT  Time Calculation: 24 mins

## 2020-02-13 LAB
BASOPHILS # BLD: 0 K/UL (ref 0–0.1)
BASOPHILS NFR BLD: 0 % (ref 0–1)
DIFFERENTIAL METHOD BLD: ABNORMAL
EOSINOPHIL # BLD: 0.4 K/UL (ref 0–0.4)
EOSINOPHIL NFR BLD: 3 % (ref 0–7)
ERYTHROCYTE [DISTWIDTH] IN BLOOD BY AUTOMATED COUNT: 18.8 % (ref 11.5–14.5)
HCT VFR BLD AUTO: 23 % (ref 35–47)
HCT VFR BLD AUTO: 28.8 % (ref 35–47)
HGB BLD-MCNC: 6.7 G/DL (ref 11.5–16)
HGB BLD-MCNC: 8.9 G/DL (ref 11.5–16)
IMM GRANULOCYTES # BLD AUTO: 0 K/UL (ref 0–0.04)
IMM GRANULOCYTES NFR BLD AUTO: 0 % (ref 0–0.5)
LYMPHOCYTES # BLD: 3.7 K/UL (ref 0.8–3.5)
LYMPHOCYTES NFR BLD: 30 % (ref 12–49)
MCH RBC QN AUTO: 28.2 PG (ref 26–34)
MCHC RBC AUTO-ENTMCNC: 29.1 G/DL (ref 30–36.5)
MCV RBC AUTO: 96.6 FL (ref 80–99)
METAMYELOCYTES NFR BLD MANUAL: 4 %
MONOCYTES # BLD: 1.1 K/UL (ref 0–1)
MONOCYTES NFR BLD: 9 % (ref 5–13)
MYELOCYTES NFR BLD MANUAL: 9 %
NEUTS SEG # BLD: 5.6 K/UL (ref 1.8–8)
NEUTS SEG NFR BLD: 45 % (ref 32–75)
NRBC # BLD: 0.04 K/UL (ref 0–0.01)
NRBC BLD-RTO: 0.3 PER 100 WBC
PLATELET # BLD AUTO: 144 K/UL (ref 150–400)
PMV BLD AUTO: 9.9 FL (ref 8.9–12.9)
RBC # BLD AUTO: 2.38 M/UL (ref 3.8–5.2)
RBC MORPH BLD: ABNORMAL
WBC # BLD AUTO: 12.4 K/UL (ref 3.6–11)

## 2020-02-13 PROCEDURE — 85025 COMPLETE CBC W/AUTO DIFF WBC: CPT

## 2020-02-13 PROCEDURE — 36430 TRANSFUSION BLD/BLD COMPNT: CPT

## 2020-02-13 PROCEDURE — 85018 HEMOGLOBIN: CPT

## 2020-02-13 PROCEDURE — 74011250636 HC RX REV CODE- 250/636: Performed by: INTERNAL MEDICINE

## 2020-02-13 PROCEDURE — 86900 BLOOD TYPING SEROLOGIC ABO: CPT

## 2020-02-13 PROCEDURE — 74011250637 HC RX REV CODE- 250/637: Performed by: INTERNAL MEDICINE

## 2020-02-13 PROCEDURE — P9016 RBC LEUKOCYTES REDUCED: HCPCS

## 2020-02-13 PROCEDURE — 30233N1 TRANSFUSION OF NONAUTOLOGOUS RED BLOOD CELLS INTO PERIPHERAL VEIN, PERCUTANEOUS APPROACH: ICD-10-PCS | Performed by: INTERNAL MEDICINE

## 2020-02-13 PROCEDURE — 74011250636 HC RX REV CODE- 250/636: Performed by: NURSE PRACTITIONER

## 2020-02-13 PROCEDURE — 86923 COMPATIBILITY TEST ELECTRIC: CPT

## 2020-02-13 PROCEDURE — 74011000258 HC RX REV CODE- 258: Performed by: NURSE PRACTITIONER

## 2020-02-13 PROCEDURE — 97116 GAIT TRAINING THERAPY: CPT

## 2020-02-13 PROCEDURE — 65270000015 HC RM PRIVATE ONCOLOGY

## 2020-02-13 PROCEDURE — 36415 COLL VENOUS BLD VENIPUNCTURE: CPT

## 2020-02-13 RX ORDER — METRONIDAZOLE 250 MG/1
500 TABLET ORAL EVERY 12 HOURS
Status: DISCONTINUED | OUTPATIENT
Start: 2020-02-13 | End: 2020-02-14 | Stop reason: HOSPADM

## 2020-02-13 RX ORDER — LEVOFLOXACIN 500 MG/1
500 TABLET, FILM COATED ORAL EVERY 24 HOURS
Status: DISCONTINUED | OUTPATIENT
Start: 2020-02-13 | End: 2020-02-14 | Stop reason: HOSPADM

## 2020-02-13 RX ORDER — SODIUM CHLORIDE 9 MG/ML
250 INJECTION, SOLUTION INTRAVENOUS AS NEEDED
Status: DISCONTINUED | OUTPATIENT
Start: 2020-02-13 | End: 2020-02-14 | Stop reason: HOSPADM

## 2020-02-13 RX ADMIN — LEVOFLOXACIN 500 MG: 500 TABLET, FILM COATED ORAL at 17:21

## 2020-02-13 RX ADMIN — PANTOPRAZOLE SODIUM 40 MG: 40 TABLET, DELAYED RELEASE ORAL at 17:21

## 2020-02-13 RX ADMIN — PANTOPRAZOLE SODIUM 40 MG: 40 TABLET, DELAYED RELEASE ORAL at 06:52

## 2020-02-13 RX ADMIN — IRON SUCROSE 300 MG: 20 INJECTION, SOLUTION INTRAVENOUS at 19:02

## 2020-02-13 RX ADMIN — METRONIDAZOLE 500 MG: 250 TABLET ORAL at 21:36

## 2020-02-13 RX ADMIN — METRONIDAZOLE 500 MG: 500 INJECTION, SOLUTION INTRAVENOUS at 08:54

## 2020-02-13 NOTE — PROGRESS NOTES
Problem: Mobility Impaired (Adult and Pediatric)  Goal: *Acute Goals and Plan of Care (Insert Text)  Description  FUNCTIONAL STATUS PRIOR TO ADMISSION: Patient was modified independent using a rolling walker for functional mobility outside of the home and furniture walking inside of the home. HOME SUPPORT PRIOR TO ADMISSION: The patient lived with spouse but did not require assist.    Physical Therapy Goals  Initiated 2/11/2020  1. Patient will move from supine to sit and sit to supine , scoot up and down and roll side to side in bed with independence within 7 day(s). 2.  Patient will transfer from bed to chair and chair to bed with modified independence using the least restrictive device within 7 day(s). 3.  Patient will perform sit to stand with modified independence within 7 day(s). 4.  Patient will ambulate with modified independence for 200 feet with the least restrictive device within 7 day(s). 5.  Patient will ascend/descend 4 stairs with handrail(s) with supervision/set-up within 7 day(s). Outcome: Progressing Towards Goal   PHYSICAL THERAPY TREATMENT  Patient: Reshma Rudd (81 y.o. female)  Date: 2/13/2020  Diagnosis: [facility] ([facility] [C95.00]   <principal problem not specified>       Precautions: Fall  Chart, physical therapy assessment, plan of care and goals were reviewed. ASSESSMENT  Patient continues with skilled PT services and is progressing towards goals. Demonstrating improved activity tolerance, strength and balance today. Use of RW for all mobility with improved safety navigating device. Pt ambulated 120ft with no rest break, LOB or c/o SOB. Due for blood and iron transfusion today. Vitals stable with all mobility. Performing toileting at a SUP level and returned to bed at end of session d/t upcoming transfusions. Anticipate steady continued progress acutely and will be safe to discharge home with husbands support and HHPT.      Current Level of Function Impacting Discharge (mobility/balance): SUP level    Other factors to consider for discharge: Hgb 6.7         PLAN :  Patient continues to benefit from skilled intervention to address the above impairments. Continue treatment per established plan of care. to address goals. Recommendation for discharge: (in order for the patient to meet his/her long term goals)  Physical therapy at least 2 days/week in the home     This discharge recommendation:  Has been made in collaboration with the attending provider and/or case management    IF patient discharges home will need the following DME: patient owns DME required for discharge       SUBJECTIVE:   Patient stated I garima use the walker if you want me to.    OBJECTIVE DATA SUMMARY:   Critical Behavior:  Neurologic State: Alert, Eyes open to stimulus  Orientation Level: Oriented X4  Cognition: Appropriate decision making, Appropriate for age attention/concentration, Appropriate safety awareness, Follows commands  Safety/Judgement: Awareness of environment  Functional Mobility Training:  Bed Mobility:  Rolling: Modified independent  Supine to Sit: Modified independent  Sit to Supine: Modified independent           Transfers:  Sit to Stand: Supervision  Stand to Sit: Supervision                             Balance:  Sitting: Intact  Standing: Intact; With support  Standing - Static: Good;Constant support  Standing - Dynamic : Good;Constant support  Ambulation/Gait Training:  Distance (ft): 120 Feet (ft)  Assistive Device: Gait belt;Walker, rolling  Ambulation - Level of Assistance: Stand-by assistance        Gait Abnormalities: Decreased step clearance        Base of Support: Widened     Speed/Emmanuelle: Pace decreased (<100 feet/min)  Step Length: Right shortened;Left shortened                  Activity Tolerance:   Good and SpO2 stable on RA  Please refer to the flowsheet for vital signs taken during this treatment.     After treatment patient left in no apparent distress:   Supine in bed, Call bell within reach, and Side rails x 3    COMMUNICATION/COLLABORATION:   The patients plan of care was discussed with: Registered Nurse    Srinivas Ramsay, PT   Time Calculation: 16 mins

## 2020-02-13 NOTE — PROGRESS NOTES
Spiritual Care Partner Volunteer visited patient in Oncology on February 13, 2020.     Documented by:     GENARO Qureshi, [facility], Staff [address]    [address] Paging Service  [phone]

## 2020-02-13 NOTE — PROGRESS NOTES
[facility]  at [facility] [address], [address] [facility]  [phone]      Oncology Progress Note        Patient: Angelina Bell MRN: [id number]  SSN: [id number]    YOB: 1935  Age: 82 y.o. Sex: female        Diagnosis:      1. Myelodysplastic Syndrome   IPSS-R Low risk    Del(7q) and trisomy 8    2. Left breast carcinoma:   T1a N0 Mx (Stage IA) infiltrating ductal carcinoma , Tumor size 1 cm, LN -ve, grade 2, ki 67 17%, %, GA 90%, Her 2 unamplified. Treatment:      1. Vidaza - s/p 3 cycles  2. Discontinued Arimidex 1/2019 after 5 years of treatment  3. Completed radiation treatment to the breast   4. S/P left breast lumpectomy and sentinel LN excision on 11/21/2013    Subjective:      Kate Bernal is a 70 y.o.  female with a history of stage I invasive ductal carcinoma and MDS. She just completed her 3rd cycle of Vidaza. She was admitted to [address] [place] [address] yesterday for acute diverticulitis and generalized weakness. She was treated in January for   a bleeding small ulceration at the GE junction. Interval History:    Labs are improving. Hgb 6.9 and was transfused 1 unit of PRBCs today. Awaiting bone marrow biopsy results.      Review of Systems:     Constitutional: fatigue  Eyes: negative   Ears, Nose, Mouth, Throat, and Face: negative   Respiratory: negative   Cardiovascular: negative   Gastrointestinal: constipation   Integument/Breast: negative  Hematologic/Lymphatic: negative   Musculoskeletal: joint pain  Neurological: numbness bottoms of both feet      Past Medical History:   Diagnosis Date    Anemia     Arthritis     Breast cancer ([facility] [address]     left    Bunion of right foot 8/20/2015    Chronic pain     back--uses tens unit    Diverticulitis 6/29/2018    Recurrent    Diverticulitis large intestine     Dry eye syndrome 6/29/2018    Fibromyalgia 6/29/2018    GERD (gastroesophageal reflux disease)     History of left breast cancer     lumpectomy radiation    Hypercholesterolemia 2018    Ill-defined condition     blood transfusion hx    Leukemia (HCC)     MDS (myelodysplastic syndrome) ([facility] [address]     Osteoporosis 2018    Peripheral neuropathy 2018    Prediabetes 2018    PUD (peptic ulcer disease)     Radiation therapy complication 8563    Lt breast-No Chemo    Rosacea 2018    Trouble in sleeping      Past Surgical History:   Procedure Laterality Date    HX APPENDECTOMY      HX BREAST LUMPECTOMY  2013    LEFT BREAST LUMPECTOMY W/LEFT BREAST SENTINEL NODE BIOPSY,AND NEEDLE LOCALIZATION performed by Anival Art MD at [facility] MAIN OR    HX CATARACT REMOVAL      bilateral    HX HYSTERECTOMY      ovarian cyst    HX MOHS PROCEDURES      right    HX ORTHOPAEDIC      back surgery x2    HX OTHER SURGICAL      colonoscopy    HX TONSILLECTOMY      IR INSERT TUNL CVC W PORT OVER 5 YEARS  12/3/2019    TOTAL KNEE ARTHROPLASTY      left    TOTAL KNEE ARTHROPLASTY      right    UPPER GI ENDOSCOPY,BIOPSY  2020         US GUIDED CORE BREAST BIOPSY Left 2013    Breast Ca      Family History   Problem Relation Age of Onset    Cancer Mother         bladder    Cancer Father     Breast Cancer Paternal Grandmother      Social History     Tobacco Use    Smoking status: Former Smoker     Packs/day: 0.50     Years: 50.00     Pack years: 25.00     Last attempt to quit: 2012     Years since quittin.0    Smokeless tobacco: Never Used   Substance Use Topics    Alcohol use: Yes     Alcohol/week: 2.0 standard drinks     Types: 2 Glasses of wine per week      Prior to Admission medications    Medication Sig Start Date End Date Taking? Authorizing Provider   docusate sodium (STOOL SOFTENER PO) Take 2 Tabs by mouth daily. Yes Provider, Historical   inulin (FIBER GUMMIES PO) Take 1 Tab by mouth daily.    Yes Provider, Historical   azacitidine (VIDAZA INJECTION) by Injection route. Pt receives this infusion the first week of every month. Yes Provider, Historical   ergocalciferol (ERGOCALCIFEROL) 1,250 mcg (50,000 unit) capsule TAKE ONE CAPSULE BY MOUTH EVERY 7 DAYS 1/23/20  Yes Zenaida Raygoza MD   acetaminophen (TYLENOL) 325 mg tablet Take 2 Tabs by mouth every four (4) hours as needed for Pain. 12/31/19  Yes Christiano Pazi MD   dilTIAZem CD (CARDIZEM CD) 180 mg ER capsule Take 1 Cap by mouth daily. 11/5/19  Yes Hayes Guevara MD   pantoprazole (PROTONIX) 40 mg tablet Take 1 Tab by mouth Before breakfast and dinner. 11/5/19  Yes Hayes Guevara MD          Allergies   Allergen Reactions    Hydrocodone Nausea Only and Vertigo    Gabapentin Diarrhea, Nausea Only and Other (comments)     weakness    Lyrica [Pregabalin] Nausea Only    Oxycodone Nausea and Vomiting and Vertigo         Objective:     Visit Vitals  /67 (BP 1 Location: Left arm, BP Patient Position: At rest)   Pulse 83   Temp 98.4 °F (36.9 °C)   Resp 16   Ht 5' 5\" (1.651 m)   Wt 182 lb 5.1 oz (82.7 kg)   SpO2 96%   BMI 30.34 kg/m²       Physical Exam:    GENERAL: alert, cooperative   EYE: PERRLA,  LYMPHATIC: Cervical, supraclavicular, and axillary nodes normal.   THROAT & NECK: normal and no erythema or exudates noted.    LUNG: clear to auscultation bilaterally   HEART: regular rate and rhythm   ABDOMEN: soft, non-tender   EXTREMITIES: extremities normal   SKIN: Normal.   NEUROLOGIC: negative       Lab Results   Component Value Date/Time    WBC 12.4 (H) 02/13/2020 03:16 AM    HGB (POC) 10.7 (A) 01/07/2019 09:52 AM    HGB 6.7 (L) 02/13/2020 03:16 AM    HCT (POC) 32.0 (A) 01/07/2019 09:52 AM    HCT 23.0 (L) 02/13/2020 03:16 AM    PLATELET 106 (L) 22/88/4735 03:16 AM    MCV 96.6 02/13/2020 03:16 AM       Lab Results   Component Value Date/Time    Iron 33 (L) 02/06/2020 09:40 AM    TIBC 403 02/06/2020 09:40 AM    Iron % saturation 8 (L) 02/06/2020 09:40 AM    Ferritin 31 02/06/2020 09:40 AM Assessment:      1. Myelodysplastic Syndrome   IPSS-R Low risk      Del(7q) and trisomy 8    ECOG PS 1  Intent of Treatment - palliative  Prognosis: poor    I recommended starting systemic therapy with Vidaza. Unique Avalos is a hypomethylating agent which is approved for the treatment for MDS. Receiving systemic chemotherapy   Vidaza - s/p 3 cycles    Last bone marrow biopsy was 1/29/2019 -     This admission CBC shows elevated WBCs -mainly neutrophils. Received Dexamethasone 8 mg daily last week for treatment. Labs are improving - WBCs 12.4 today    Repeat bone marrow results pending     2. Iron Deficiency anemia    Ferritin - 31   Iron sats - 8    History or recent GI bleed as well as diverticulitis    Venofer 300 mg x 2 days    3. History of Left breast carcinoma:     T1a N0 Mx (Stage IA) infiltrating ductal carcinoma , Tumor size 1 cm, LN -ve, grade 2, ki 67 17%, %, MO 90%, Her 2 unamplified. Dx: 10/28/2013    > S/P left breast lumpectomy and sentinel LN excision on 11/21/2013  > Completed radiation to the breast  > Discontinued Arimidex 1/2019 after 5 years  > In remission    Mammogram (11/29/2018): no evidence of malignancy  DEXA (10/2013) - normal      4. Acute diverticulitis    Followed by Dr. Abraham Maria:      1. Bone marrow biopsy results pending  2. Transfuse to keep Hgb >7 g/dL  3. Daily CBC  4. Venofer 300 mg IV daily x 3 days      Signed by: Ronnell Larsen NP                     February 13, 2020      CC.  Dashawn North MD

## 2020-02-13 NOTE — PROGRESS NOTES
Oncology End of Shift Note      Bedside shift change report given to Kimebrlyn Munguia RN (incoming nurse) by Sue Lubin (outgoing nurse) on [facility]. Report included the following information SBAR, Kardex, Intake/Output and MAR. Shift Summary: Pt remained stable throughout shift. Scheduled meds given, no PRN meds given. Hourly rounding completed, pt up ad telma to the bathroom with walker, pt turns self. Pt received 1 unit of blood today. Issues for Physician to Address:       Patient on Cardiac Monitoring?     [x] Yes  [] No    Rhythm:        Sue Lubin

## 2020-02-13 NOTE — PROGRESS NOTES
Bedside and Verbal shift change report given to Iwona LAMA (oncoming nurse) by Jhonatan Smith (offgoing nurse). Report included the following information SBAR, Kardex, Procedure Summary, MAR, Recent Results and Cardiac Rhythm NSR. Patient rested well this night sleeping at long intervals, no complaints voiced, no distress noted.

## 2020-02-13 NOTE — PROGRESS NOTES
Problem: Patient Education: Go to Patient Education Activity  Goal: Patient/Family Education  Outcome: Progressing Towards Goal     Problem: Falls - Risk of  Goal: *Absence of Falls  Description  Document Cassidy Latin Fall Risk and appropriate interventions in the flowsheet.   Outcome: Progressing Towards Goal  Note: Fall Risk Interventions:  Mobility Interventions: Communicate number of staff needed for ambulation/transfer         Medication Interventions: Assess postural VS orthostatic hypotension, Evaluate medications/consider consulting pharmacy    Elimination Interventions: Call light in reach    History of Falls Interventions: Door open when patient unattended

## 2020-02-13 NOTE — PROGRESS NOTES
OT note:  OT attempted to see pt and she had just walked with PT and was tired and per nursing would be getting blood soon.  Pt politely   Refused and will defer and continue to follow

## 2020-02-13 NOTE — PROGRESS NOTES
Pharmacy Automatic Renal Dosing Protocol - Antimicrobials    Indication for Antimicrobials: Diverticulitis in setting of MDS w/ transformation in Leukemia    Current Regimen of Each Antimicrobial:  Levofloxacin 500 mg every 24 hours (Start Date 2/10; Day 3)  Metronidazole 250 mg every 12 hours (2/10, day 3)    Previous Antimicrobial Therapy:    Significant Cultures:   2/10 blood = NGSF (pending)    Radiology / Imaging results: (X-ray, CT scan or MRI):   2/10 diverticulitis    Paralysis, amputations, malnutrition:       Labs:  Recent Labs     20  0316 20  0417 02/10/20  1538 02/10/20  1122   CREA  --  0.68 0.92  --    BUN  --  23* 33*  --    WBC 12.4* 25.0*  --  29.6*     Temp (24hrs), Av.6 °F (37 °C), Min:98.2 °F (36.8 °C), Max:98.9 °F (37.2 °C)    Creatinine Clearance (mL/min) or Dialysis: 65    Impression/Plan:   Empiric Metronidazole and Levofloxacin regimen appropriate  Patient is on regular diet and can tolerate PO, Change Metronidazole to 500 mg PO q12hr and Levofloxacin 500 mg PO q24h. BMP, CBC  Antimicrobial stop date:  , 12 days total     Pharmacy will follow daily and adjust medications as appropriate for renal function and/or serum levels.     Thank you,  Shelbi Harrington, PHARMD

## 2020-02-13 NOTE — PROGRESS NOTES
Hospitalist Progress Note    NAME: Dulce Rodriguez   :  1935   MRN:  [id number]       Assessment / Plan:  Acute on chronic anemia , most likely 2/2 chemo effect   Hb 6.7 this am , pt reported having chemo a week prior to admission  Iron panel showed iron 33 ferritin 403 iron sat 8%. May need IV iron as OP after acute infection resolves  A unit of PRBC ordered , repeat HB tomorrow am  Check stool occult blood     Acute Diverticulitis:   Ct abdomen showed : Mild sigmoid colonic diverticulitis. No associated drainable focal  fluid collection. C/w levaquin and flagyl for total 12day . 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 Candelario Dakin state that patient may need BMB,   S/p bone bx on , awaiting results     PUD/GERD: c/w PPI. Fibromyalgia: c/w pain medication  Code Status: Full Code  Surrogate Decision Maker:  Jeni Hash [phone]  DVT Prophylaxis: SCD  GI Prophylaxis: on PPI  Baseline: independent lives with     30.0 - 39.9 Obese / Body mass index is 30.34 kg/m². Subjective:     Chief Complaint / Reason for Physician Visit  FU  Leucocytosis/diverticulitis . No acute complaints . Drop in hb. .Discussed with RN events overnight. Review of Systems:  Symptom Y/N Comments  Symptom Y/N Comments   Fever/Chills n   Chest Pain n    Poor Appetite    Edema     Cough    Abdominal Pain n    Sputum    Joint Pain     SOB/AL n   Pruritis/Rash     Nausea/vomit n   Tolerating PT/OT     Diarrhea n   Tolerating Diet y    Constipation    Other       Could NOT obtain due to:      Objective:     VITALS:   Last 24hrs VS reviewed since prior progress note.  Most recent are:  Patient Vitals for the past 24 hrs:   Temp Pulse Resp BP SpO2   20 0257 98.6 °F (37 °C) 72 18 126/77 98 %   20 2319 98.2 °F (36.8 °C) 82 18 123/57 98 %   20 1945 98.6 °F (37 °C) 81 18 142/55 95 %   02/12/20 1545 98.8 °F (37.1 °C) 81 18 125/52 100 %   02/12/20 1200 98.9 °F (37.2 °C) 79 18 138/64 98 %   02/12/20 0830 98.5 °F (36.9 °C) 79 18 147/55 98 %       Intake/Output Summary (Last 24 hours) at 2/13/2020 0758  Last data filed at 2/12/2020 1945  Gross per 24 hour   Intake 3720 ml   Output    Net 3720 ml        PHYSICAL EXAM:  General: WD, WN. Alert, cooperative, no acute distress    EENT:  EOMI. Anicteric sclerae. MMM  Resp:  CTA bilaterally, no wheezing or rales. No accessory muscle use  CV:  Regular  rhythm,  No edema  GI:  Soft, Non distended, Non tender.  +Bowel sounds  Neurologic:  Alert and oriented X 3, normal speech,   Psych:   Good insight. Not anxious nor agitated  Skin:  No rashes. No jaundice    Reviewed most current lab test results and cultures  YES  Reviewed most current radiology test results   YES  Review and summation of old records today    NO  Reviewed patient's current orders and MAR    YES  PMH/ reviewed - no change compared to H&P  ________________________________________________________________________  Care Plan discussed with:    Comments   Patient x    Family      RN x    Care Manager     Consultant                       x Multidiciplinary team rounds were held today with , nursing, pharmacist and clinical coordinator. Patient's plan of care was discussed; medications were reviewed and discharge planning was addressed. ________________________________________________________________________  Total NON critical care TIME:  35 Minutes    Total CRITICAL CARE TIME Spent:   Minutes non procedure based      Comments   >50% of visit spent in counseling and coordination of care x    ________________________________________________________________________  Albania Mix MD     Procedures: see electronic medical records for all procedures/Xrays and details which were not copied into this note but were reviewed prior to creation of Plan.       LABS:  I reviewed today's most current labs and imaging studies.   Pertinent labs include:  Recent Labs     02/13/20  0316 02/11/20  0417 02/10/20  1122   WBC 12.4* 25.0* 29.6*   HGB 6.7* 7.6* 9.6*   HCT 23.0* 26.5* 32.5*   * 177 244     Recent Labs     02/11/20  0417 02/10/20  1538    139   K 4.4 3.9   * 106   CO2 21 26   GLU 95 116*   BUN 23* 33*   CREA 0.68 0.92   CA 7.9* 8.2*   MG 2.1 2.3   ALB 2.8* 3.5   TBILI 0.8 0.9   SGOT 10* 7*   ALT 13 17       Signed: Prisca Chung MD

## 2020-02-14 ENCOUNTER — HOME HEALTH ADMISSION (OUTPATIENT)
Dept: HOME HEALTH SERVICES | Facility: HOME HEALTH | Age: 85
End: 2020-02-14

## 2020-02-14 VITALS
OXYGEN SATURATION: 94 % | BODY MASS INDEX: 30.27 KG/M2 | SYSTOLIC BLOOD PRESSURE: 151 MMHG | DIASTOLIC BLOOD PRESSURE: 59 MMHG | HEIGHT: 65 IN | HEART RATE: 61 BPM | RESPIRATION RATE: 20 BRPM | WEIGHT: 181.66 LBS | TEMPERATURE: 98.1 F

## 2020-02-14 LAB
BASOPHILS # BLD: 0 K/UL (ref 0–0.1)
BASOPHILS NFR BLD: 0 % (ref 0–1)
DIFFERENTIAL METHOD BLD: ABNORMAL
EOSINOPHIL # BLD: 0.8 K/UL (ref 0–0.4)
EOSINOPHIL NFR BLD: 6 % (ref 0–7)
ERYTHROCYTE [DISTWIDTH] IN BLOOD BY AUTOMATED COUNT: 18.2 % (ref 11.5–14.5)
HCT VFR BLD AUTO: 27.2 % (ref 35–47)
HGB BLD-MCNC: 8.2 G/DL (ref 11.5–16)
IMM GRANULOCYTES # BLD AUTO: 0 K/UL (ref 0–0.04)
IMM GRANULOCYTES NFR BLD AUTO: 0 % (ref 0–0.5)
LYMPHOCYTES # BLD: 2.8 K/UL (ref 0.8–3.5)
LYMPHOCYTES NFR BLD: 20 % (ref 12–49)
MCH RBC QN AUTO: 28.5 PG (ref 26–34)
MCHC RBC AUTO-ENTMCNC: 30.1 G/DL (ref 30–36.5)
MCV RBC AUTO: 94.4 FL (ref 80–99)
MONOCYTES # BLD: 2.4 K/UL (ref 0–1)
MONOCYTES NFR BLD: 17 % (ref 5–13)
MYELOCYTES NFR BLD MANUAL: 6 %
NEUTS BAND NFR BLD MANUAL: 6 %
NEUTS SEG # BLD: 7.2 K/UL (ref 1.8–8)
NEUTS SEG NFR BLD: 45 % (ref 32–75)
NRBC # BLD: 0.05 K/UL (ref 0–0.01)
NRBC BLD-RTO: 0.4 PER 100 WBC
PLATELET # BLD AUTO: 158 K/UL (ref 150–400)
PMV BLD AUTO: 10.1 FL (ref 8.9–12.9)
RBC # BLD AUTO: 2.88 M/UL (ref 3.8–5.2)
RBC MORPH BLD: ABNORMAL
VIT B12 SERPL-MCNC: 1065 PG/ML (ref 193–986)
WBC # BLD AUTO: 14.1 K/UL (ref 3.6–11)

## 2020-02-14 PROCEDURE — 74011250637 HC RX REV CODE- 250/637: Performed by: INTERNAL MEDICINE

## 2020-02-14 PROCEDURE — 85025 COMPLETE CBC W/AUTO DIFF WBC: CPT

## 2020-02-14 PROCEDURE — 36415 COLL VENOUS BLD VENIPUNCTURE: CPT

## 2020-02-14 PROCEDURE — 82607 VITAMIN B-12: CPT

## 2020-02-14 PROCEDURE — 74011250636 HC RX REV CODE- 250/636: Performed by: INTERNAL MEDICINE

## 2020-02-14 PROCEDURE — 74011000258 HC RX REV CODE- 258: Performed by: INTERNAL MEDICINE

## 2020-02-14 RX ORDER — METRONIDAZOLE 500 MG/1
500 TABLET ORAL EVERY 12 HOURS
Qty: 14 TAB | Refills: 0 | Status: SHIPPED | OUTPATIENT
Start: 2020-02-14 | End: 2020-02-21

## 2020-02-14 RX ORDER — HEPARIN 100 UNIT/ML
300 SYRINGE INTRAVENOUS AS NEEDED
Status: DISCONTINUED | OUTPATIENT
Start: 2020-02-14 | End: 2020-02-14

## 2020-02-14 RX ORDER — HEPARIN 100 UNIT/ML
300 SYRINGE INTRAVENOUS
Status: COMPLETED | OUTPATIENT
Start: 2020-02-14 | End: 2020-02-14

## 2020-02-14 RX ORDER — LEVOFLOXACIN 500 MG/1
500 TABLET, FILM COATED ORAL EVERY 24 HOURS
Qty: 7 TAB | Refills: 0 | Status: SHIPPED | OUTPATIENT
Start: 2020-02-14 | End: 2020-02-21

## 2020-02-14 RX ADMIN — METRONIDAZOLE 500 MG: 250 TABLET ORAL at 08:54

## 2020-02-14 RX ADMIN — IRON SUCROSE 300 MG: 20 INJECTION, SOLUTION INTRAVENOUS at 10:03

## 2020-02-14 RX ADMIN — SODIUM CHLORIDE, PRESERVATIVE FREE 300 UNITS: 5 INJECTION INTRAVENOUS at 12:50

## 2020-02-14 RX ADMIN — PANTOPRAZOLE SODIUM 40 MG: 40 TABLET, DELAYED RELEASE ORAL at 06:41

## 2020-02-14 NOTE — DISCHARGE INSTRUCTIONS
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 Pain Management: per above medications    Recommended diet: low fiber diet     Recommended activity: Activity as tolerated    Wound care: None    Indwelling devices:  None    Supplemental Oxygen: None    Required Lab work: repeat CBC in a week     Glucose management:  None    Code status: Full        Outside physician follow up: Follow-up Information     Follow up With Specialties Details Why Contact Info    Ilya Salazar MD Internal Medicine  Please call for hospital follow up within one week of d/c  [address] [place] [address]  [phone]      [facility], In-Home Clinical Assessments  Please call for a hospital follow up within 48 hours of d/c  [facility]  [phone]    Christa Hall MD Hematology and Oncology, Internal Medicine, Hematology, Oncology Schedule an appointment as soon as possible for a visit in 2 weeks  [facility]  [phone]                 Skilled nursing facility/ SNF MD responsible for above on discharge. Information obtained by :  I understand that if any problems occur once I am at home I am to contact my physician. I understand and acknowledge receipt of the instructions indicated above.                                                                                                                                            Physician's or R.N.'s Signature Date/Time                                                                                                                                              Patient or Repres  Patient Education        Low-Fiber Diet: Care Instructions  Your Care Instructions    When your bowels are irritated, you may need to limit fiber in your diet until the problem clears up. Your doctor and dietitian can help you design a low-fiber diet based on your health and what you prefer to eat. Ask your doctor how long you should stay on a low-fiber diet. Your doctor probably will have you start adding more fiber to your diet as you get better. Always talk with your doctor or dietitian before you make changes in your diet. Follow-up care is a key part of your treatment and safety. Be sure to make and go to all appointments, and call your doctor if you are having problems. It's also a good idea to know your test results and keep a list of the medicines you take. How can you care for yourself at home? · Choose white or refined grains, and avoid whole grains. That means eating white or refined cereals, breads, crackers, rice, or pasta. · Peel the skin from fruits and vegetables before you eat or cook them. Avoid eating skins, seeds, and hulls. ? Eat frozen or canned fruit. Low-fiber fruits include applesauce, ripe bananas, and fruit juice without pulp. ? Eat low-fiber vegetables, which include well-cooked vegetables and vegetable juice. · Drink or eat milk, yogurt, or other milk products, if you can digest dairy without too many problems. Your doctor may limit milk and milk products for a while. If so, he or she may recommend a calcium and vitamin D supplement. · Eat well-cooked meat, such as chicken, turkey, fish, or lean meat. You also can eat eggs and tofu.   · Avoid these foods:  ? Bran, brown or wild rice, oatmeal, granola, corn, pb crackers, barley, and whole wheat and other whole-grain breads, such as rye bread  ? Cereals with more than 3 grams of fiber a serving  ? Berries, rhubarb, prunes, prune juice, and all dried fruits  ? Raw vegetables  ? Cabbage, broccoli, brussels sprouts, and cauliflower  ? Cooked dried beans, lentils, and split peas  ? Crunchy peanut butter  ? Ice cream with fruit pieces in it  ? Coconut, nuts, popcorn, raisins, and seeds, or any ice cream, yogurt, or cheese with these in them  Where can you learn more? Go to http://sumit-arpit.info/. Enter Z564 in the search box to learn more about \"Low-Fiber Diet: Care Instructions. \"  Current as of: November 7, 2018  Content Version: 12.2  © 9098-8235 [employer]. Care instructions adapted under license by [employer] (which disclaims liability or warranty for this information). If you have questions about a medical condition or this instruction, always ask your healthcare professional. [address] any warranty or liability for your use of this information.

## 2020-02-14 NOTE — PROGRESS NOTES
0700- assumed care of pt this am. Pt is alert and oriented resting in bed at this time. 1145- pt to be discharged today, port is accessed and needs to be de-accessed. MD paged for Heparin order to hep-lock port. 1205- second page sent. , order now in system. 1251- port has been heparin locked and de-accessed. Pt has been given all discharge instructions with no questions at this time. Pt is now being discharged home.

## 2020-02-14 NOTE — PROGRESS NOTES
Bedside and Verbal shift change report given to Jayro Dietrich 1313 (oncoming nurse) by Robi Rothman RN (offgoing nurse). Report included the following information SBAR, Kardex, MAR, Recent Results and Cardiac Rhythm SR w/ BBB.  Patient rested well this night sleeping at long intervals, no complaints voiced, no distress noted

## 2020-02-14 NOTE — PROGRESS NOTES
ANDREW:   1) Home with [facility]   2) Home with f.u appts including [facility]     8:53 AM- [facility] updated with all information. Pt's  will drive pt home, pt is cleared to d/c from [address] perspective, RN informed.      Maria Alejandra York, MSW, [address]   [phone]

## 2020-02-14 NOTE — PROGRESS NOTES
Appointment Information  The following appointments have been successfully scheduled:    Date/time Monday, February 17, 2020 04:00 PM  Patient Manjeet Cuenca 1935 (40CY F) #[id number] [id number]  Department [facility]MAIN OFFICE-[facility]  Appointment type Transitional Care  Provider Sharmin Pires

## 2020-02-14 NOTE — DISCHARGE SUMMARY
Hospitalist Discharge Summary     Patient ID:  Radha Escobar  [id number]  84 y.o.  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  female  with PMHx significant for anemia, breast cancer, GERD, myelodysplastic syndrome, neuropathy, peptic ulcer disease, fibromyalgia, presents to the ED with very unspecific complaints like abdominal tenderness, SOB, fatigue, weakness. Patient was treated for diverticulitis and GI bleed around a month ago, she has MDS and her counts had been steadily going up with the new finding of immature cells in the smear, at this time patient denies chest pain, no SOB, no fever, no N/V no diarrhea, no urinary symptoms, no other associated symptoms. We were asked to admit for work up and evaluation of the above problems. ______________________________________________________________________  DISCHARGE SUMMARY/HOSPITAL COURSE:  for full details see H&P, daily progress notes, labs, consult notes. Acute on chronic anemia , most likely 2/2 chemo effect   Hb 6.7 on 02/13, s/p a unit of PRBC . Repeat Hb 8 , pt reported having chemo a week prior to admission  Iron panel showed iron 33 ferritin 403 iron sat 8%. Got 2 doses of IV iron      Acute Diverticulitis:   Ct abdomen showed : Mild sigmoid colonic diverticulitis. No associated drainable focal  fluid collection. C/w levaquin and flagyl for total 12day .  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 Rosaura Sagastume state that patient may need BMB,   S/p bone bx on 02/11, awaiting results , FU with dr Jarod Pillai     PUD/GERD: c/w PPI. Fibromyalgia: c/w pain medication    _______________________________________________________________________  Patient seen and examined by me on discharge day. Pertinent Findings:  Gen:    Not in distress  Chest: Clear lungs  CVS:   Regular rhythm. No edema  Abd:  Soft, not distended, not tender  Neuro:  Alert, orientedx4  _______________________________________________________________________  DISCHARGE MEDICATIONS:   Current Discharge Medication List      START taking these medications    Details   levoFLOXacin (LEVAQUIN) 500 mg tablet Take 1 Tab by mouth every twenty-four (24) hours for 7 days. Qty: 7 Tab, Refills: 0      metroNIDAZOLE (FLAGYL) 500 mg tablet Take 1 Tab by mouth every twelve (12) hours for 7 days. Qty: 14 Tab, Refills: 0         CONTINUE these medications which have NOT CHANGED    Details   inulin (FIBER GUMMIES PO) Take 1 Tab by mouth daily. azacitidine (VIDAZA INJECTION) by Injection route. Pt receives this infusion the first week of every month.      ergocalciferol (ERGOCALCIFEROL) 1,250 mcg (50,000 unit) capsule TAKE ONE CAPSULE BY MOUTH EVERY 7 DAYS  Qty: 12 Cap, Refills: 1      acetaminophen (TYLENOL) 325 mg tablet Take 2 Tabs by mouth every four (4) hours as needed for Pain. Qty: 20 Tab, Refills: 0      dilTIAZem CD (CARDIZEM CD) 180 mg ER capsule Take 1 Cap by mouth daily. Qty: 30 Cap, Refills: 1      pantoprazole (PROTONIX) 40 mg tablet Take 1 Tab by mouth Before breakfast and dinner. Qty: 60 Tab, Refills: 1         STOP taking these medications       docusate sodium (STOOL SOFTENER PO) Comments:   Reason for Stopping:                 Patient Follow Up Instructions:    Activity: Activity as tolerated  Diet: low fiber diet   Wound Care: None needed    Follow-up with see below  in 7 days. Follow-up tests/labs : repeat CBC with diff in a week   Follow-up Information     Follow up With Specialties Details Why Contact Info    Vivian Flores MD Internal Medicine  Please call for hospital follow up within one week of d/c  [address] [facility] [address] [phone]      [facility], In-Home Clinical Assessments  Please call for a hospital follow up within 48 hours of d/c  [facility] [address] [facility]  [phone]    Naun Mancini MD Hematology and Oncology, Internal Medicine, Hematology, Oncology Schedule an appointment as soon as possible for a visit in 2 weeks  [address]  [address] Natan Tavera  [phone]          ________________________________________________________________    Risk of deterioration: Moderate    Condition at Discharge:  Stable  __________________________________________________________________    Disposition  Home with family, no needs    ____________________________________________________________________    Code Status: Full Code  ___________________________________________________________________      Total time in minutes spent coordinating this discharge (includes going over instructions, follow-up, prescriptions, and preparing report for sign off to her PCP) :  35 minutes    Signed:  Juana Panda MD

## 2020-02-15 LAB
ABO + RH BLD: NORMAL
BACTERIA SPEC CULT: NORMAL
BLD PROD TYP BPU: NORMAL
BLOOD GROUP ANTIBODIES SERPL: NORMAL
BPU ID: NORMAL
CROSSMATCH RESULT,%XM: NORMAL
SERVICE CMNT-IMP: NORMAL
SPECIMEN EXP DATE BLD: NORMAL
STATUS OF UNIT,%ST: NORMAL
UNIT DIVISION, %UDIV: 0

## 2020-02-17 ENCOUNTER — HOSPITAL ENCOUNTER (OUTPATIENT)
Dept: INFUSION THERAPY | Age: 85
Discharge: HOME | End: 2020-02-17
Payer: MEDICARE

## 2020-02-17 ENCOUNTER — PATIENT OUTREACH (OUTPATIENT)
Dept: INTERNAL MEDICINE CLINIC | Age: 85
End: 2020-02-17

## 2020-02-17 ENCOUNTER — OFFICE VISIT (OUTPATIENT)
Dept: INTERNAL MEDICINE CLINIC | Age: 85
End: 2020-02-17

## 2020-02-17 VITALS
HEART RATE: 92 BPM | BODY MASS INDEX: 31.22 KG/M2 | HEIGHT: 65 IN | RESPIRATION RATE: 16 BRPM | DIASTOLIC BLOOD PRESSURE: 64 MMHG | OXYGEN SATURATION: 95 % | SYSTOLIC BLOOD PRESSURE: 128 MMHG | TEMPERATURE: 97.8 F | WEIGHT: 187.4 LBS

## 2020-02-17 VITALS
SYSTOLIC BLOOD PRESSURE: 143 MMHG | RESPIRATION RATE: 18 BRPM | OXYGEN SATURATION: 100 % | DIASTOLIC BLOOD PRESSURE: 61 MMHG | HEART RATE: 73 BPM

## 2020-02-17 DIAGNOSIS — C93.10 CHRONIC MYELOMONOCYTIC LEUKEMIA NOT HAVING ACHIEVED REMISSION (HCC): Primary | ICD-10-CM

## 2020-02-17 DIAGNOSIS — K57.92 DIVERTICULITIS: ICD-10-CM

## 2020-02-17 DIAGNOSIS — D50.8 OTHER IRON DEFICIENCY ANEMIA: ICD-10-CM

## 2020-02-17 DIAGNOSIS — D46.9 MDS (MYELODYSPLASTIC SYNDROME) (HCC): Chronic | ICD-10-CM

## 2020-02-17 LAB
BASOPHILS # BLD: 0 K/UL (ref 0–0.1)
BASOPHILS NFR BLD: 0 % (ref 0–1)
DIFFERENTIAL METHOD BLD: ABNORMAL
EOSINOPHIL # BLD: 0.4 K/UL (ref 0–0.4)
EOSINOPHIL NFR BLD: 2 % (ref 0–7)
ERYTHROCYTE [DISTWIDTH] IN BLOOD BY AUTOMATED COUNT: 20.3 % (ref 11.5–14.5)
HCT VFR BLD AUTO: 30.1 % (ref 35–47)
HGB BLD-MCNC: 9.1 G/DL (ref 11.5–16)
IMM GRANULOCYTES # BLD AUTO: 0 K/UL
IMM GRANULOCYTES NFR BLD AUTO: 0 %
LYMPHOCYTES # BLD: 6.5 K/UL (ref 0.8–3.5)
LYMPHOCYTES NFR BLD: 34 % (ref 12–49)
MCH RBC QN AUTO: 29.5 PG (ref 26–34)
MCHC RBC AUTO-ENTMCNC: 30.2 G/DL (ref 30–36.5)
MCV RBC AUTO: 97.7 FL (ref 80–99)
MONOCYTES # BLD: 1 K/UL (ref 0–1)
MONOCYTES NFR BLD: 5 % (ref 5–13)
NEUTS BAND NFR BLD MANUAL: 5 % (ref 0–6)
NEUTS SEG # BLD: 11.1 K/UL (ref 1.8–8)
NEUTS SEG NFR BLD: 54 % (ref 32–75)
NRBC # BLD: 0.07 K/UL (ref 0–0.01)
NRBC BLD-RTO: 0.4 PER 100 WBC
PLATELET # BLD AUTO: 164 K/UL (ref 150–400)
PMV BLD AUTO: 9.6 FL (ref 8.9–12.9)
RBC # BLD AUTO: 3.08 M/UL (ref 3.8–5.2)
RBC MORPH BLD: ABNORMAL
WBC # BLD AUTO: 19 K/UL (ref 3.6–11)

## 2020-02-17 PROCEDURE — 85025 COMPLETE CBC W/AUTO DIFF WBC: CPT

## 2020-02-17 PROCEDURE — 36415 COLL VENOUS BLD VENIPUNCTURE: CPT

## 2020-02-17 NOTE — PATIENT INSTRUCTIONS
Diverticulitis: Care Instructions Your Care Instructions Diverticulitis occurs when pouches form in the wall of the colon and become inflamed or infected. It can be very painful. Doctors aren't sure what causes diverticulitis. There is no proof that foods such as nuts, seeds, or berries cause it or make it worse. A low-fiber diet may cause the colon to work harder to push stool forward. Pouches may form because of this extra work. It may be hard to think about healthy eating while you're in pain. But as you recover, you might think about how you can use healthy eating for overall better health. Healthy eating may help you avoid future attacks. Follow-up care is a key part of your treatment and safety. Be sure to make and go to all appointments, and call your doctor if you are having problems. It's also a good idea to know your test results and keep a list of the medicines you take. How can you care for yourself at home? · Drink plenty of fluids, enough so that your urine is light yellow or clear like water. If you have kidney, heart, or liver disease and have to limit fluids, talk with your doctor before you increase the amount of fluids you drink. · Stick to liquids or a bland diet (plain rice, bananas, dry toast or crackers, applesauce) until you are feeling better. Then you can return to regular foods and gradually increase the amount of fiber in your diet. · Use a heating pad set on low on your belly to relieve mild cramps and pain. · Get extra rest until you are feeling better. · Be safe with medicines. Read and follow all instructions on the label. ? If the doctor gave you a prescription medicine for pain, take it as prescribed. ? If you are not taking a prescription pain medicine, ask your doctor if you can take an over-the-counter medicine. · If your doctor prescribed antibiotics, take them as directed. Do not stop taking them just because you feel better.  You need to take the full course of antibiotics. To prevent future attacks of diverticulitis · Avoid constipation: 
? Include fruits, vegetables, beans, and whole grains in your diet each day. These foods are high in fiber. ? Drink plenty of fluids, enough so that your urine is light yellow or clear like water. If you have kidney, heart, or liver disease and have to limit fluids, talk with your doctor before you increase the amount of fluids you drink. ? Get some exercise every day. Build up slowly to 30 to 60 minutes a day on 5 or more days of the week. ? Take a fiber supplement, such as Citrucel or Metamucil, every day if needed. Read and follow all instructions on the label. ? Schedule time each day for a bowel movement. Having a daily routine may help. Take your time and do not strain when having a bowel movement. When should you call for help? Call your doctor now or seek immediate medical care if: 
  · You have a fever.  
  · You are vomiting.  
  · You have new or worse belly pain.  
  · You cannot pass stools or gas.  
 Watch closely for changes in your health, and be sure to contact your doctor if you have any problems. Where can you learn more? Go to http://sumit-arpit.info/. Enter H901 in the search box to learn more about \"Diverticulitis: Care Instructions. \" Current as of: November 7, 2018 Content Version: 12.2 © 6207-8943 [employer]. Care instructions adapted under license by [employer] (which disclaims liability or warranty for this information). If you have questions about a medical condition or this instruction, always ask your healthcare professional. [place] [address] any warranty or liability for your use of this information.

## 2020-02-17 NOTE — PROGRESS NOTES
Jade Dunn is a 82 y.o. female     Chief Complaint   Patient presents with   Seraien 232     seen at [facility] on 2/10-2/14/20 for chronic anemia. Visit Vitals  /64 (BP 1 Location: Left arm, BP Patient Position: Sitting)   Pulse 92   Temp 97.8 °F (36.6 °C) (Oral)   Resp 16   Ht 5' 5\" (1.651 m)   Wt 187 lb 6.4 oz (85 kg)   SpO2 95%   BMI 31.18 kg/m²       Health Maintenance Due   Topic Date Due    GLAUCOMA SCREENING Q2Y  02/15/2020       1. Have you been to the ER, urgent care clinic since your last visit? Hospitalized since your last visit? Yes seen at [facility] on 2/10-2/14/20 for chronic anemia. 2. Have you seen or consulted any other health care providers outside of the [address] [place] [address] since your last visit? Include any pap smears or colon screening.  No

## 2020-02-17 NOTE — PROGRESS NOTES
[facility] Progress Note    Date: 2020    Name: Cynthia Mills    MRN: [id number]         : 1935      1030:  Pt arrived ambulatory and in no distress, for lab visit post hospitalization. Labs drawn via RAC without difficulty, patient tolerated well. Problem: Patient Education:  Go to Education Activity  Goal: Patient/Family Education  Outcome: Progressing Towards Goal    Visit Vitals  /61   Pulse 73   Resp 18   SpO2 100%       Lab results were obtained and reviewed. Recent Results (from the past 12 hour(s))   CBC WITH AUTOMATED DIFF    Collection Time: 20 10:27 AM   Result Value Ref Range    WBC 19.0 (H) 3.6 - 11.0 K/uL    RBC 3.08 (L) 3.80 - 5.20 M/uL    HGB 9.1 (L) 11.5 - 16.0 g/dL    HCT 30.1 (L) 35.0 - 47.0 %    MCV 97.7 80.0 - 99.0 FL    MCH 29.5 26.0 - 34.0 PG    MCHC 30.2 30.0 - 36.5 g/dL    RDW 20.3 (H) 11.5 - 14.5 %    PLATELET 139 378 - 210 K/uL    MPV 9.6 8.9 - 12.9 FL    NRBC 0.4 (H) 0  WBC    ABSOLUTE NRBC 0.07 (H) 0.00 - 0.01 K/uL    NEUTROPHILS 54 32 - 75 %    BAND NEUTROPHILS 5 0 - 6 %    LYMPHOCYTES 34 12 - 49 %    MONOCYTES 5 5 - 13 %    EOSINOPHILS 2 0 - 7 %    BASOPHILS 0 0 - 1 %    IMMATURE GRANULOCYTES 0 %    ABS. NEUTROPHILS 11.1 (H) 1.8 - 8.0 K/UL    ABS. LYMPHOCYTES 6.5 (H) 0.8 - 3.5 K/UL    ABS. MONOCYTES 1.0 0.0 - 1.0 K/UL    ABS. EOSINOPHILS 0.4 0.0 - 0.4 K/UL    ABS. BASOPHILS 0.0 0.0 - 0.1 K/UL    ABS. IMM. GRANS. 0.0 K/UL    DF MANUAL      RBC COMMENTS ANISOCYTOSIS  1+           1055: Departed [facility] ambulatory and in no distress.     Future Appointments   Date Time Provider Anju Dickson   2020  4:00 PM Lianne Koyanagi, MD 3 Chris Resendiz   2020 [facility]   3/2/2020  1:00 PM [facility] INFUSION NURSE 1 [address] OLIVER [address] [facility]   3/3/2020  1:00 PM [facility] INFUSION NURSE 1 [place] [facility]   3/4/2020  8:00 AM [facility] INFUSION NURSE 1 [facility]   3/5/2020  8:00 AM [facility] INFUSION NURSE 1 Parth Diaz. [employer] [facility]   3/5/2020  9:15 AM Shari Chahal NP ONC DUNIA SCHED   3/6/2020  8:00 AM [facility] INFUSION NURSE 1 [address]. [facility]   5/8/2020  8:30 AM Tiffany Jesus MD [facility] ATHENA SCHED   6/25/2020  3:40 PM Domenico Mcneil,  [facility]       Bashir Stanley RN  February 17, 2020

## 2020-02-17 NOTE — PROGRESS NOTES
Hospital Discharge Follow-Up      Date/Time:  2020 4:06 PM  Marleni Valdovinos. Torsten Manning RN  Care Transition Nurse  [phone]    Patient was admitted to [address] [place] [address] on 2/10 and discharged on  for DX. ACUTE LEUKEMIA, DIVERTICULITIS. The physician discharge summary was available at the time of outreach. Patient was contacted within 2 business days of discharge. Top Challenges reviewed with the provider     Advance Care Planning:   Does patient have an Advance Directive:  reviewed and needs to be updated       Component      Latest Ref Rng & Units 2020         HGB      11.5 - 16.0 g/dL 8.2 (L) 8.9 (L) 6.7 (L)   HCT      35.0 - 47.0 % 27.2 (L) 28.8 (L) 23.0 (L)      S/p bone bx on , awaiting results , FU with dr Twila Hodgkins    Follow-up tests/labs : repeat CBC with diff in a week        Method of communication with provider :chart routing    Was this a readmission? no     Care Transition Nurse (CTN) contacted the patient by telephone to perform post hospital discharge assessment. Verified name and  with patient as identifiers. Provided introduction to self, and explanation of the CTN role. Patient received hospital discharge instructions. CTN reviewed discharge instructions and red flags with patient who verbalized understanding. Patient given an opportunity to ask questions and does not have any further questions or concerns at this time. The patient agrees to contact the PCP office for questions related to their healthcare. CTN provided contact information for future reference. Disease Specific:   N/A    Patients top risk factors for readmission:  medical condition    Home Health orders at discharge: [address]: [facility]  Date of initial visit:     Medication(s):   New Medications at Discharge:   levoFLOXacin (LEVAQUIN) 500 mg tablet Take 1 Tab by mouth every twenty-four (24) hours for 7 days.   Qty: 7 Tab, Refills: 0     metroNIDAZOLE (FLAGYL) 500 mg tablet Take 1 Tab by mouth every twelve (12) hours for 7 days     Discontinued Medications at Discharge:    docusate sodium (STOOL SOFTENER PO) Comments:   Reason for Stopping:            Medication reconciliation was not performed with patient,reports medication reviewed during office visit. Referral to Pharm D needed: no     Current Outpatient Medications   Medication Sig    levoFLOXacin (LEVAQUIN) 500 mg tablet Take 1 Tab by mouth every twenty-four (24) hours for 7 days.  metroNIDAZOLE (FLAGYL) 500 mg tablet Take 1 Tab by mouth every twelve (12) hours for 7 days.  inulin (FIBER GUMMIES PO) Take 1 Tab by mouth daily.  azacitidine (VIDAZA INJECTION) by Injection route. Pt receives this infusion the first week of every month.  ergocalciferol (ERGOCALCIFEROL) 1,250 mcg (50,000 unit) capsule TAKE ONE CAPSULE BY MOUTH EVERY 7 DAYS    acetaminophen (TYLENOL) 325 mg tablet Take 2 Tabs by mouth every four (4) hours as needed for Pain.  dilTIAZem CD (CARDIZEM CD) 180 mg ER capsule Take 1 Cap by mouth daily.  pantoprazole (PROTONIX) 40 mg tablet Take 1 Tab by mouth Before breakfast and dinner. No current facility-administered medications for this visit.       [facility] follow up appointment(s):   Future Appointments   Date Time Provider Department Center   2/18/2020 To Be Determined Cory Márquez RN [place] [id number] [facility]   2/18/2020 To Be Determined Natanael Gaspar, PT [address]   3/2/2020  1:00 PM [address] [place] [address] [address] COM   3/3/2020  1:00 PM [facility] INFUSION NURSE [address] COM   3/4/2020  8:00 AM [address] [place] [address] [address] COM   3/5/2020  8:00 AM [facility] INFUSION NURSE 1 [place] [facility]   3/5/2020  9:15 AM Keiry Berry NP ONC DUNIA SCHED   3/6/2020  8:00 AM [facility] INFUSION NURSE 1 [address] [facility]   5/8/2020  8:30 AM Benny Jesus MD [facility]   6/25/2020  3:40 PM Trinh Mcneli DO Jaclynn Redman [employer]:  information provided as a resource     Goals      Establish PCP relationships and regularly scheduled appointments. 2/17 Patient will attend  follow-up with PCP and specialist as directed, keep a list of her medications she take to bring to f/u appointments. Pt.seen by PCP, Dr. Ace Constantino on 2/17, reports [facility] scheduled 2/18, will start tx [facility] on 3/2-3/5, appt. with Reeys Gong NP 3/5 Chemo., Dr. Billie Sood (f/u bone marrow biopsy) 3/5; Dr. Adriana Morfin (GI) Colonoscopy 2/28. CTN provided pt information on [employer] [phone])  explain briefly type of service;  contact information provided. CTN will f/u with pt.in 1-2 weeks. -[address]

## 2020-02-18 ENCOUNTER — HOME CARE VISIT (OUTPATIENT)
Dept: SCHEDULING | Facility: HOME HEALTH | Age: 85
End: 2020-02-18

## 2020-02-18 ENCOUNTER — HOME CARE VISIT (OUTPATIENT)
Dept: HOME HEALTH SERVICES | Facility: HOME HEALTH | Age: 85
End: 2020-02-18

## 2020-02-20 ENCOUNTER — OFFICE VISIT (OUTPATIENT)
Dept: ONCOLOGY | Age: 85
End: 2020-02-20

## 2020-02-20 ENCOUNTER — HOSPITAL ENCOUNTER (OUTPATIENT)
Dept: INFUSION THERAPY | Age: 85
End: 2020-02-20
Payer: MEDICARE

## 2020-02-20 VITALS
OXYGEN SATURATION: 95 % | WEIGHT: 184.5 LBS | HEART RATE: 77 BPM | SYSTOLIC BLOOD PRESSURE: 124 MMHG | BODY MASS INDEX: 30.74 KG/M2 | TEMPERATURE: 98.4 F | DIASTOLIC BLOOD PRESSURE: 66 MMHG | HEIGHT: 65 IN

## 2020-02-20 DIAGNOSIS — D61.82 ANEMIA ASSOCIATED WITH BONE MARROW INFILTRATION (HCC): ICD-10-CM

## 2020-02-20 DIAGNOSIS — D50.8 OTHER IRON DEFICIENCY ANEMIA: ICD-10-CM

## 2020-02-20 DIAGNOSIS — C93.10 CHRONIC MYELOMONOCYTIC LEUKEMIA NOT HAVING ACHIEVED REMISSION (HCC): Primary | ICD-10-CM

## 2020-02-20 NOTE — PROGRESS NOTES
[facility]  [address], [address] [facility], [facility] Ronald Person, [address] [place]  [phone]      Oncology Progress Note        Patient: Rupa Lund MRN: [id number]  SSN: [id number]    YOB: 1935  Age: 82 y.o. Sex: female        Diagnosis:      1. 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 Left breast carcinoma:   T1a N0 Mx (Stage IA) infiltrating ductal carcinoma , Tumor size 1 cm, LN -ve, grade 2, ki 67 17%, %, CO 90%, Her 2 unamplified. Treatment:      1. Vidaza - s/p 3 cycles  2. Discontinued Arimidex 1/2019 after 5 years of treatment  3. Completed radiation treatment to the breast   4. S/P left breast lumpectomy and sentinel LN excision on 11/21/2013    Subjective:      Honorio Rutledge is a 70 y.o.  female with a history of stage I invasive ductal carcinoma and MDS. She just completed her 3rd cycle of Vidaza. She was admitted to [facility] with acute diverticulitis and generalized weakness. She was treated in January for a bleeding small ulceration at the GE junction. A bone marrow was done and she received RBC and IV iron.        Review of Systems:     Constitutional: fatigue  Eyes: negative   Ears, Nose, Mouth, Throat, and Face: negative   Respiratory: negative   Cardiovascular: negative   Gastrointestinal: constipation   Integument/Breast: negative  Hematologic/Lymphatic: negative   Musculoskeletal: joint pain  Neurological: numbness bottoms of both feet      Past Medical History:   Diagnosis Date    Anemia     Arthritis     Breast cancer ([address]     left    Bunion of right foot 8/20/2015    Chronic pain     back--uses tens unit    Diverticulitis 6/29/2018    Recurrent    Diverticulitis large intestine     Dry eye syndrome 6/29/2018    Fibromyalgia 6/29/2018    GERD (gastroesophageal reflux disease)     History of left breast cancer 2013    lumpectomy radiation    Hypercholesterolemia 2018    Ill-defined condition     blood transfusion hx    Leukemia (HCC)     M[facility]elodysplastic syndrome) [facility]     Osteoporosis 2018    Peripheral neuropathy 2018    Prediabetes 2018    PUD (peptic ulcer disease)     Radiation therapy complication 8357    Lt breast-No Chemo    Rosacea 2018    Trouble in sleeping      Past Surgical History:   Procedure Laterality Date    HX APPENDECTOMY      HX BREAST LUMPECTOMY  2013    LEFT BREAST LUMPECTOMY W/LEFT BREAST SENTINEL NODE BIOPSY,AND NEEDLE LOCALIZATION performed by Lacy Miramontes MD at [facility] MAIN OR    HX CATARACT REMOVAL      bilateral    HX HYSTERECTOMY      ovarian cyst    HX MOHS PROCEDURES      right    HX ORTHOPAEDIC      back surgery x2    HX OTHER SURGICAL      colonoscopy    HX TONSILLECTOMY      IR INSERT TUNL CVC W PORT OVER 5 YEARS  12/3/2019    TOTAL KNEE ARTHROPLASTY      left    TOTAL KNEE ARTHROPLASTY      right    UPPER GI ENDOSCOPY,BIOPSY  2020         US GUIDED CORE BREAST BIOPSY Left 2013    Breast Ca      Family History   Problem Relation Age of Onset    Cancer Mother         bladder    Cancer Father     Breast Cancer Paternal Grandmother      Social History     Tobacco Use    Smoking status: Former Smoker     Packs/day: 0.50     Years: 50.00     Pack years: 25.00     Last attempt to quit: 2012     Years since quittin.0    Smokeless tobacco: Never Used   Substance Use Topics    Alcohol use: Yes     Alcohol/week: 2.0 standard drinks     Types: 2 Glasses of wine per week      Prior to Admission medications    Medication Sig Start Date End Date Taking? Authorizing Provider   levoFLOXacin (LEVAQUIN) 500 mg tablet Take 1 Tab by mouth every twenty-four (24) hours for 7 days. 20  Cullen Henson MD   metroNIDAZOLE (FLAGYL) 500 mg tablet Take 1 Tab by mouth every twelve (12) hours for 7 days.  20  Cullen Henson MD   inulin (FIBER GUMMIES PO) Take 1 Tab by mouth daily. Provider, Historical   azacitidine (VIDAZA INJECTION) by Injection route. Pt receives this infusion the first week of every month. Provider, Historical   ergocalciferol (ERGOCALCIFEROL) 1,250 mcg (50,000 unit) capsule TAKE ONE CAPSULE BY MOUTH EVERY 7 DAYS 1/23/20   Florentin Hickey MD   acetaminophen (TYLENOL) 325 mg tablet Take 2 Tabs by mouth every four (4) hours as needed for Pain. 12/31/19   Rocky Kennedy MD   dilTIAZem CD (CARDIZEM CD) 180 mg ER capsule Take 1 Cap by mouth daily. 11/5/19   Quan Velasco MD   pantoprazole (PROTONIX) 40 mg tablet Take 1 Tab by mouth Before breakfast and dinner. 11/5/19   Olivia Lance MD          Allergies   Allergen Reactions    Hydrocodone Nausea Only and Vertigo    Gabapentin Diarrhea, Nausea Only and Other (comments)     weakness    Lyrica [Pregabalin] Nausea Only    Oxycodone Nausea and Vomiting and Vertigo         Objective:     Visit Vitals  /66 (BP 1 Location: Left arm, BP Patient Position: Sitting)   Pulse 77   Temp 98.4 °F (36.9 °C) (Oral)   Ht 5' 5\" (1.651 m)   Wt 184 lb 8 oz (83.7 kg)   SpO2 95%   BMI 30.70 kg/m²       Physical Exam:    GENERAL: alert, cooperative   EYE: PERRLA,  LYMPHATIC: Cervical, supraclavicular, and axillary nodes normal.   THROAT & NECK: normal and no erythema or exudates noted.    LUNG: clear to auscultation bilaterally   HEART: regular rate and rhythm   ABDOMEN: soft, non-tender   EXTREMITIES: extremities normal   SKIN: Normal.   NEUROLOGIC: negative       Lab Results   Component Value Date/Time    WBC 19.0 (H) 02/17/2020 10:27 AM    HGB (POC) 10.7 (A) 01/07/2019 09:52 AM    HGB 9.1 (L) 02/17/2020 10:27 AM    HCT (POC) 32.0 (A) 01/07/2019 09:52 AM    HCT 30.1 (L) 02/17/2020 10:27 AM    PLATELET 335 23/84/7907 10:27 AM    MCV 97.7 02/17/2020 10:27 AM       Lab Results   Component Value Date/Time    Iron 33 (L) 02/06/2020 09:40 AM    TIBC 403 02/06/2020 09:40 AM    Iron % saturation 8 (L) 02/06/2020 09:40 AM    Ferritin 31 02/06/2020 09:40 AM         Assessment:      1. 1. 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 I offered her referral to [facility] and [facility]. [facility]  She is not a transplant candidate       2. Iron Deficiency anemia    Ferritin - 31   Iron sats - 8    History or recent GI bleed as well as diverticulitis  Venofer 300 mg x 2 days  IV iron in [facility]      3. History of Left breast carcinoma:     T1a N0 Mx (Stage IA) infiltrating ductal carcinoma , Tumor size 1 cm, LN -ve, grade 2, ki 67 17%, %, MD 90%, Her 2 unamplified. Dx: 10/28/2013    > S/P left breast lumpectomy and sentinel LN excision on 11/21/2013  > Completed radiation to the breast  > Discontinued Arimidex 1/2019 after 5 years  > In remission    Mammogram (11/29/2018): no evidence of malignancy  DEXA (10/2013) - normal      4. Acute diverticulitis    Followed by GI  Recently completed Abx       Plan:        1. D/C Vidaza  2. Refer to Dr. Abram Moya @ [facility]  3. IV iron  4. CBC at least twice a month      Signed by: Sofie Michael MD                     February 20, 2020      CC.  Dashawn North MD

## 2020-02-20 NOTE — PROGRESS NOTES
Radha Escobar is a 82 y. [place]uc. female here for follow up for:    Chief Complaint   Patient presents with    Myelodysplastic Syndrome     1. Have you been to the ER, urgent care clinic since your last visit? Hospitalized since your last visit? yes    2. Have you seen or consulted any other health care providers outside of the [address] [place] [address] since your last visit? Include any pap smears or colon screening. No    ER admitted 2/10 discharged 2/14[facility]. WILFREDO DESIR received 1 blood transfusion and 2 iron transfusions while there . (Acute Diverticulitis) . Pt states she is feeling a little better everyday.   Pt has colonoscopy planned for Feb 28th

## 2020-02-26 ENCOUNTER — DOCUMENTATION ONLY (OUTPATIENT)
Dept: ONCOLOGY | Age: 85
End: 2020-02-26

## 2020-02-27 ENCOUNTER — ANESTHESIA EVENT (OUTPATIENT)
Dept: ENDOSCOPY | Age: 85
End: 2020-02-27
Payer: MEDICARE

## 2020-02-27 NOTE — ANESTHESIA PREPROCEDURE EVALUATION
Anesthetic History   No history of anesthetic complications            Review of Systems / Medical History  Patient summary reviewed, nursing notes reviewed and pertinent labs reviewed    Pulmonary    COPD      Smoker      Comments: Former smoker - Quit 2012 - 25 pack years  H/O Acute Respiratory Failure  Denies COPD, only episodes of bronchitis   Neuro/Psych             Comments: Chronic Back Pain/Spinal Stenosis with Radiculopathy s/p lumbar surgery  Peripheral Neuropathy  Syncope Cardiovascular            Dysrhythmias : atrial fibrillation  PAD and hyperlipidemia    Exercise tolerance: >4 METS  Comments: 11/2019 ECHO:  56-60% EF with trace MR    Evaluated by Cardiology yesterday and thought to be stable   GI/Hepatic/Renal     GERD (occasional only): well controlled      PUD    Comments: H/O Diverticulosis/Diverticulitis Endo/Other        Obesity, arthritis, cancer (left breast) and anemia    Comments: H/O Left Breast Cancer s/p Lumpectomy + XRT  Prediabetes Other Findings   Comments: Leukemia   Erythroblastosis  Fibromyalgia  Rosacea  Osteoporosis  Chronic pain  Left breast cancer          Physical Exam    Airway  Mallampati: II  TM Distance: 4 - 6 cm  Neck ROM: normal range of motion   Mouth opening: Normal     Cardiovascular    Rhythm: regular  Rate: normal      Pertinent negatives: No murmur   Dental    Dentition: Caps/crowns, Implants, Upper dentition intact and Lower dentition intact  Comments: Across upper front  Poor lower dentition with multiple fillings   Pulmonary  Breath sounds clear to auscultation               Abdominal  GI exam deferred       Other Findings            Anesthetic Plan    ASA: 3  Anesthesia type: total IV anesthesia          Induction: Intravenous  Anesthetic plan and risks discussed with: Patient

## 2020-02-28 ENCOUNTER — HOSPITAL ENCOUNTER (OUTPATIENT)
Age: 85
Setting detail: OUTPATIENT SURGERY
Discharge: HOME | End: 2020-02-28
Attending: INTERNAL MEDICINE | Admitting: INTERNAL MEDICINE
Payer: MEDICARE

## 2020-02-28 ENCOUNTER — ANESTHESIA (OUTPATIENT)
Dept: ENDOSCOPY | Age: 85
End: 2020-02-28
Payer: MEDICARE

## 2020-02-28 VITALS
TEMPERATURE: 98.2 F | WEIGHT: 180.56 LBS | SYSTOLIC BLOOD PRESSURE: 153 MMHG | OXYGEN SATURATION: 99 % | HEIGHT: 65 IN | HEART RATE: 74 BPM | RESPIRATION RATE: 20 BRPM | DIASTOLIC BLOOD PRESSURE: 71 MMHG | BODY MASS INDEX: 30.08 KG/M2

## 2020-02-28 PROCEDURE — 74011250636 HC RX REV CODE- 250/636: Performed by: ANESTHESIOLOGY

## 2020-02-28 PROCEDURE — 74011250636 HC RX REV CODE- 250/636: Performed by: INTERNAL MEDICINE

## 2020-02-28 PROCEDURE — 76040000019: Performed by: INTERNAL MEDICINE

## 2020-02-28 PROCEDURE — 74011000250 HC RX REV CODE- 250: Performed by: ANESTHESIOLOGY

## 2020-02-28 PROCEDURE — 74011250637 HC RX REV CODE- 250/637: Performed by: INTERNAL MEDICINE

## 2020-02-28 PROCEDURE — 76060000031 HC ANESTHESIA FIRST 0.5 HR: Performed by: INTERNAL MEDICINE

## 2020-02-28 RX ORDER — NALOXONE HYDROCHLORIDE 0.4 MG/ML
0.4 INJECTION, SOLUTION INTRAMUSCULAR; INTRAVENOUS; SUBCUTANEOUS
Status: DISCONTINUED | OUTPATIENT
Start: 2020-02-28 | End: 2020-02-28 | Stop reason: HOSPADM

## 2020-02-28 RX ORDER — ATROPINE SULFATE 0.1 MG/ML
0.5 INJECTION INTRAVENOUS
Status: DISCONTINUED | OUTPATIENT
Start: 2020-02-28 | End: 2020-02-28 | Stop reason: HOSPADM

## 2020-02-28 RX ORDER — EPINEPHRINE 0.1 MG/ML
1 INJECTION INTRACARDIAC; INTRAVENOUS
Status: DISCONTINUED | OUTPATIENT
Start: 2020-02-28 | End: 2020-02-28 | Stop reason: HOSPADM

## 2020-02-28 RX ORDER — FENTANYL CITRATE 50 UG/ML
25 INJECTION, SOLUTION INTRAMUSCULAR; INTRAVENOUS
Status: DISCONTINUED | OUTPATIENT
Start: 2020-02-28 | End: 2020-02-28 | Stop reason: HOSPADM

## 2020-02-28 RX ORDER — SODIUM CHLORIDE 9 MG/ML
75 INJECTION, SOLUTION INTRAVENOUS CONTINUOUS
Status: DISCONTINUED | OUTPATIENT
Start: 2020-02-28 | End: 2020-02-28 | Stop reason: HOSPADM

## 2020-02-28 RX ORDER — MIDAZOLAM HYDROCHLORIDE 1 MG/ML
.25-5 INJECTION, SOLUTION INTRAMUSCULAR; INTRAVENOUS
Status: DISCONTINUED | OUTPATIENT
Start: 2020-02-28 | End: 2020-02-28 | Stop reason: HOSPADM

## 2020-02-28 RX ORDER — FLUMAZENIL 0.1 MG/ML
0.2 INJECTION INTRAVENOUS
Status: DISCONTINUED | OUTPATIENT
Start: 2020-02-28 | End: 2020-02-28 | Stop reason: HOSPADM

## 2020-02-28 RX ORDER — DEXTROMETHORPHAN/PSEUDOEPHED 2.5-7.5/.8
1.2 DROPS ORAL
Status: DISCONTINUED | OUTPATIENT
Start: 2020-02-28 | End: 2020-02-28 | Stop reason: HOSPADM

## 2020-02-28 RX ORDER — SODIUM CHLORIDE 0.9 % (FLUSH) 0.9 %
5-40 SYRINGE (ML) INJECTION EVERY 8 HOURS
Status: DISCONTINUED | OUTPATIENT
Start: 2020-02-28 | End: 2020-02-28 | Stop reason: HOSPADM

## 2020-02-28 RX ORDER — HEPARIN 100 UNIT/ML
300 SYRINGE INTRAVENOUS
Status: COMPLETED | OUTPATIENT
Start: 2020-02-28 | End: 2020-02-28

## 2020-02-28 RX ORDER — SODIUM CHLORIDE 0.9 % (FLUSH) 0.9 %
5-40 SYRINGE (ML) INJECTION AS NEEDED
Status: DISCONTINUED | OUTPATIENT
Start: 2020-02-28 | End: 2020-02-28 | Stop reason: HOSPADM

## 2020-02-28 RX ORDER — LIDOCAINE HYDROCHLORIDE 20 MG/ML
INJECTION, SOLUTION EPIDURAL; INFILTRATION; INTRACAUDAL; PERINEURAL AS NEEDED
Status: DISCONTINUED | OUTPATIENT
Start: 2020-02-28 | End: 2020-02-28 | Stop reason: HOSPADM

## 2020-02-28 RX ORDER — PROPOFOL 10 MG/ML
INJECTION, EMULSION INTRAVENOUS AS NEEDED
Status: DISCONTINUED | OUTPATIENT
Start: 2020-02-28 | End: 2020-02-28 | Stop reason: HOSPADM

## 2020-02-28 RX ADMIN — SODIUM CHLORIDE 75 ML/HR: 900 INJECTION, SOLUTION INTRAVENOUS at 13:22

## 2020-02-28 RX ADMIN — PROPOFOL 220 MG: 10 INJECTION, EMULSION INTRAVENOUS at 14:24

## 2020-02-28 RX ADMIN — Medication 300 UNITS: at 15:03

## 2020-02-28 RX ADMIN — LIDOCAINE HYDROCHLORIDE 40 MG: 20 INJECTION, SOLUTION EPIDURAL; INFILTRATION; INTRACAUDAL; PERINEURAL at 14:04

## 2020-02-28 RX ADMIN — Medication 80 MG: at 14:15

## 2020-02-28 NOTE — PERIOP NOTES
Giselle Garcia  1935  [id number]    Situation:  Verbal report received from: Bree Mae  Procedure: Procedure(s):  COLONOSCOPY    Background:    Preoperative diagnosis: DIVERTICULITIS  Postoperative diagnosis: Sigmoid Diverticulosis  Hemorroids        :  Dr. Bree Mae  Assistant(s): Endoscopy Technician-1: Bella Hernandez  Endoscopy RN-1: Marti Fairchild    Specimens: * No specimens in log *  H. Pylori  no    Assessment:  Intra-procedure medications     Anesthesia gave intra-procedure sedation and medications, see anesthesia flow sheet yes    Intravenous fluids: NS@ KVO     Vital signs stable     Abdominal assessment: round and soft     Recommendation:  Discharge patient per MD order.     Family or Friend Jolynn Medeiros  OKTT  Permission to share finding with family or friend yes

## 2020-02-28 NOTE — ANESTHESIA POSTPROCEDURE EVALUATION
Procedure(s):  COLONOSCOPY.    total IV anesthesia    Anesthesia Post Evaluation        Patient location during evaluation: PACU  Note status: Adequate. Level of consciousness: responsive to verbal stimuli and sleepy but conscious  Pain management: satisfactory to patient  Airway patency: patent  Anesthetic complications: no  Cardiovascular status: acceptable  Respiratory status: acceptable  Hydration status: acceptable  Comments: +Post-Anesthesia Evaluation and Assessment    Patient: Richar Starr MRN: [id number]  SSN: [id number]   YOB: 1935  Age: 82 y.o. Sex: female      Cardiovascular Function/Vital Signs    /55   Pulse 87   Temp 36.8 °C (98.2 °F)   Resp 22   Ht 5' 5\" (1.651 m)   Wt 81.9 kg (180 lb 9 oz)   SpO2 99%   Breastfeeding Unknown   BMI 30.05 kg/m²     Patient is status post Procedure(s):  COLONOSCOPY. Nausea/Vomiting: Controlled. Postoperative hydration reviewed and adequate. Pain:  Pain Scale 1: Numeric (0 - 10) (02/28/20 1441)  Pain Intensity 1: 0 (02/28/20 1441)   Managed. Neurological Status: At baseline. Mental Status and Level of Consciousness: Arousable. Pulmonary Status:   O2 Device: Room air (02/28/20 1441)   Adequate oxygenation and airway patent. Complications related to anesthesia: None    Post-anesthesia assessment completed. No concerns. Signed By: Kaitlyn Ledesma DO    2/28/2020  Post anesthesia nausea and vomiting:  controlled      Vitals Value Taken Time   /55 2/28/2020  2:41 PM   Temp     Pulse 81 2/28/2020  2:42 PM   Resp 18 2/28/2020  2:42 PM   SpO2 98 % 2/28/2020  2:42 PM   Vitals shown include unvalidated device data.

## 2020-02-28 NOTE — PERIOP NOTES
Endoscope was pre-cleaned at bedside immediately following procedure by Fani CODY Anesthesia reports 220mg Propofol, 40mg Lidocaine and 400mL NS given during procedure. Received report from anesthesia staff on vital signs and status of patient.

## 2020-02-28 NOTE — PROCEDURES
NAME:  China Lincoln   :   1935   MRN:   [id number]     Date/Time:  2020 2:28 PM    Colonoscopy Operative Report    Procedure Type:   Colonoscopy --diagnostic     Indications:   Diverticulitis  Pre-operative Diagnosis: see indication above  Post-operative Diagnosis:  See findings below  :  Lukasz Banks MD  Referring Provider: Irena Rubio MD    Exam:  Airway: clear, no airway problems anticipated  Heart: RRR, without gallops or rubs  Lungs: clear bilaterally without wheezes, crackles, or rhonchi  Abdomen: soft, nontender, nondistended, bowel sounds present  Mental Status: awake, alert and oriented to person, place and time    Sedation:  MAC anesthesia Propofol 220mg IV  Procedure Details:  After informed consent was obtained with all risks and benefits of procedure explained and preoperative exam completed, the patient was taken to the endoscopy suite and placed in the left lateral decubitus position. Upon sequential sedation as per above, a digital rectal exam was performed demonstrating internal hemorrhoids. The Olympus videocolonoscope  was inserted in the rectum and carefully advanced to the cecum, which was identified by the ileocecal valve and appendiceal orifice. The distal terminal ileum was evaluated. The quality of preparation was adequate. The colonoscope was slowly withdrawn with careful evaluation between folds. Retroflexion in the rectum was completed demonstrating internal hemorrhoids. Findings:     -Normal terminal ileum  -Sigmoid diverticulosis  -Small grade 1 internal hemorrhoids  -No masses, polyps, or inflammation noted    Specimen Removed:  None. Complications: None. EBL:  None. Impression:    -Normal terminal ileum  -Sigmoid diverticulosis  -Small grade 1 internal hemorrhoids  -No masses, polyps, or inflammation noted    Recommendations: --No further colonoscopy. High fiber diet. Resume normal medication(s).          Discharge Disposition:  Home in the company of a  when able to ambulate.     Paul Jenkins MD

## 2020-02-28 NOTE — DISCHARGE INSTRUCTIONS
Izaiah Montgomery  [id number]  1935    COLON DISCHARGE INSTRUCTIONS  Discomfort:  Redness at IV site- apply warm compress to area; if redness or soreness persist- contact your physician  There may be a slight amount of blood passed from the rectum  Gaseous discomfort- walking, belching will help relieve any discomfort  You may not operate a vehicle for 12 hours  You may not engage in an occupation involving machinery or appliances for rest of today  You may not drink alcoholic beverages for at least 12 hours  Avoid making any critical decisions for at least 24 hour  DIET:   High fiber diet. - however -  remember your colon is empty and a heavy meal will produce gas. Avoid these foods:  vegetables, fried / greasy foods, carbonated drinks for today  MEDICATION:         ACTIVITY:  You may not resume your normal daily activities until tomorrow AM; it is recommended that you spend the remainder of the day resting -  avoid any strenuous activity. CALL M.D.   ANY SIGN OF:   Increasing pain, nausea, vomiting  Abdominal distension (swelling)  New increased bleeding (oral or rectal)  Fever (chills)  Pain in chest area  Bloody discharge from nose or mouth  Shortness of breath    IMPRESSION:  -Normal terminal ileum  -Sigmoid diverticulosis  -Small grade 1 internal hemorrhoids  -No masses, polyps, or inflammation noted    Follow-up Instructions:   Call Dr. Viri Smith if questions arise regarding your procedure  Telephone # [phone]  No repeat colonoscopy indicated    Normie Dakins, MD

## 2020-02-28 NOTE — H&P
Gastroenterology Outpatient History and Physical    Patient: Venice Ziegler    Physician: Valerio Dancer, MD    Chief Complaint: Diverticulitis  History of Present Illness: 81yo F with hx of diverticulitis    History:  Past Medical History:   Diagnosis Date    Anemia     Arthritis     Breast cancer ([address]     left    Bunion of right foot 8/20/2015    Chronic pain     back--uses tens unit    Diverticulitis 6/29/2018    Recurrent    Diverticulitis large intestine     Dry eye syndrome 6/29/2018    Fibromyalgia 6/29/2018    GERD (gastroesophageal reflux disease)     History of left breast cancer 2013    lumpectomy radiation    Hypercholesterolemia 6/29/2018    Ill-defined condition     blood transfusion hx    Leukemia ([address]     MDS ([facility]elodysplastic syndrome) [facility]     Osteoporosis 6/29/2018    Peripheral neuropathy 6/29/2018    Prediabetes 6/29/2018    PUD (peptic ulcer disease)     Radiation therapy complication 2084    Lt breast-No Chemo    Rosacea 6/29/2018    Trouble in sleeping       Past Surgical History:   Procedure Laterality Date    HX APPENDECTOMY      HX BREAST LUMPECTOMY  11/21/2013    LEFT BREAST LUMPECTOMY W/LEFT BREAST SENTINEL NODE BIOPSY,AND NEEDLE LOCALIZATION performed by Gisela Hayden MD at [facility] MAIN OR    HX CATARACT REMOVAL      bilateral    HX HYSTERECTOMY      ovarian cyst    HX MOHS PROCEDURES      right    HX ORTHOPAEDIC      back surgery x2    HX OTHER SURGICAL      colonoscopy    HX TONSILLECTOMY      IR INSERT TUNL CVC W PORT OVER 5 YEARS  12/3/2019    TOTAL KNEE ARTHROPLASTY      left    TOTAL KNEE ARTHROPLASTY      right    UPPER GI ENDOSCOPY,BIOPSY  1/24/2020         US GUIDED CORE BREAST BIOPSY Left 2013    Breast Ca      Social History     Socioeconomic History    Marital status:      Spouse name: Not on file    Number of children: Not on file    Years of education: Not on file    Highest education level: Not on file   Tobacco Use    Smoking status: Former Smoker     Packs/day: 0.50     Years: 50.00     Pack years: 25.00     Last attempt to quit: 2012     Years since quittin.0    Smokeless tobacco: Never Used   Substance and Sexual Activity    Alcohol use:  Yes     Alcohol/week: 2.0 standard drinks     Types: 2 Glasses of wine per week    Drug use: No      Family History   Problem Relation Age of Onset    Cancer Mother         bladder    Cancer Father     Breast Cancer Paternal Grandmother       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    Lumbar post-laminectomy syndrome M96.1    Spinal stenosis of lumbar region with neurogenic claudication M48.062    Syncope R55    Stenosis of both internal carotid arteries I65.23    Syncope and collapse R55    S/P lumpectomy, left breast Z98.890    Costochondritis M94.0    Diverticulitis K57.92    Dry eye syndrome H04.129    Fibromyalgia M79.7    GERD without esophagitis K21.9    Hypercholesterolemia E78.00    Osteoporosis M81.0    Peripheral neuropathy G62.9    Prediabetes R73.03    Rosacea L71.9    Acute respiratory failure with hypoxia ([facility]) J96.01    Acute bronchitis due to other specified organisms J20.8    Anemia, unspecified D64.9    Thrombocytopenia ([facility]) D69.6    Iron deficiency anemia D50.9    Decompensated COPD (chronic obstructive pulmonary disease) ([facility]) J44.1    SIRS (systemic inflammatory response syndrome) ([facility]) R65.10    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    Anemia associated with bone marrow infiltration ([facility]) D61.82    Atrial fibrillation with rapid ventricular response ([facility]) I48.91    MDS (myelodysplastic syndrome) ([facility]) D46.9    PUD (peptic ulcer disease) K27.9    Leukemia, acute ([facility]) C95.00 Allergies: Allergies   Allergen Reactions    Hydrocodone Nausea Only and Vertigo    Gabapentin Diarrhea, Nausea Only and Other (comments)     weakness    Lyrica [Pregabalin] Nausea Only    Oxycodone Nausea and Vomiting and Vertigo     Medications:   Prior to Admission medications    Medication Sig Start Date End Date Taking? Authorizing Provider   ergocalciferol (ERGOCALCIFEROL) 1,250 mcg (50,000 unit) capsule TAKE ONE CAPSULE BY MOUTH EVERY 7 DAYS 1/23/20  Yes Rachael Blanco MD   acetaminophen (TYLENOL) 325 mg tablet Take 2 Tabs by mouth every four (4) hours as needed for Pain. 12/31/19  Yes Tahmina Nicolas MD   dilTIAZem CD (CARDIZEM CD) 180 mg ER capsule Take 1 Cap by mouth daily. 11/5/19  Yes Segun George MD   pantoprazole (PROTONIX) 40 mg tablet Take 1 Tab by mouth Before breakfast and dinner. 11/5/19  Yes Segun George MD     Physical Exam:   Vital Signs: Blood pressure 143/59, pulse 86, temperature 98.2 °F (36.8 °C), resp. rate 18, height 5' 5\" (1.651 m), weight 81.9 kg (180 lb 9 oz), SpO2 96 %, unknown if currently breastfeeding.   General: well developed, well nourished   HEENT: unremarkable   Heart: regular rhythm no mumur    Lungs: clear   Abdominal:  benign   Neurological: unremarkable   Extremities: no edema     Findings/Diagnosis: diverticulitis  Plan of Care/Planned Procedure: Colonoscopy with conscious/deep sedation    Signed:  Ruby Sykes MD 2/28/2020

## 2020-03-02 ENCOUNTER — HOSPITAL ENCOUNTER (OUTPATIENT)
Dept: INFUSION THERAPY | Age: 85
Discharge: HOME | End: 2020-03-02
Payer: MEDICARE

## 2020-03-02 VITALS
OXYGEN SATURATION: 96 % | TEMPERATURE: 97.8 F | RESPIRATION RATE: 20 BRPM | SYSTOLIC BLOOD PRESSURE: 135 MMHG | DIASTOLIC BLOOD PRESSURE: 48 MMHG | HEART RATE: 60 BPM

## 2020-03-02 DIAGNOSIS — D50.8 OTHER IRON DEFICIENCY ANEMIA: Primary | ICD-10-CM

## 2020-03-02 LAB
ALBUMIN SERPL-MCNC: 3.7 G/DL (ref 3.5–5)
ALBUMIN/GLOB SERPL: 1.1 {RATIO} (ref 1.1–2.2)
ALP SERPL-CCNC: 56 U/L (ref 45–117)
ALT SERPL-CCNC: 18 U/L (ref 12–78)
ANION GAP SERPL CALC-SCNC: 10 MMOL/L (ref 5–15)
AST SERPL-CCNC: 14 U/L (ref 15–37)
BASOPHILS # BLD: 0.2 K/UL (ref 0–0.1)
BASOPHILS NFR BLD: 1 % (ref 0–1)
BILIRUB SERPL-MCNC: 0.5 MG/DL (ref 0.2–1)
BUN SERPL-MCNC: 13 MG/DL (ref 6–20)
BUN/CREAT SERPL: 17 (ref 12–20)
CALCIUM SERPL-MCNC: 8.1 MG/DL (ref 8.5–10.1)
CHLORIDE SERPL-SCNC: 110 MMOL/L (ref 97–108)
CO2 SERPL-SCNC: 25 MMOL/L (ref 21–32)
CREAT SERPL-MCNC: 0.75 MG/DL (ref 0.55–1.02)
DIFFERENTIAL METHOD BLD: ABNORMAL
EOSINOPHIL # BLD: 0.8 K/UL (ref 0–0.4)
EOSINOPHIL NFR BLD: 5 % (ref 0–7)
ERYTHROCYTE [DISTWIDTH] IN BLOOD BY AUTOMATED COUNT: 22.3 % (ref 11.5–14.5)
GLOBULIN SER CALC-MCNC: 3.3 G/DL (ref 2–4)
GLUCOSE SERPL-MCNC: 99 MG/DL (ref 65–100)
HCT VFR BLD AUTO: 30.7 % (ref 35–47)
HGB BLD-MCNC: 9.1 G/DL (ref 11.5–16)
IMM GRANULOCYTES # BLD AUTO: 0 K/UL
IMM GRANULOCYTES NFR BLD AUTO: 0 %
LYMPHOCYTES # BLD: 4.2 K/UL (ref 0.8–3.5)
LYMPHOCYTES NFR BLD: 26 % (ref 12–49)
MCH RBC QN AUTO: 30.2 PG (ref 26–34)
MCHC RBC AUTO-ENTMCNC: 29.6 G/DL (ref 30–36.5)
MCV RBC AUTO: 102 FL (ref 80–99)
METAMYELOCYTES NFR BLD MANUAL: 3 %
MONOCYTES # BLD: 2.3 K/UL (ref 0–1)
MONOCYTES NFR BLD: 14 % (ref 5–13)
MYELOCYTES NFR BLD MANUAL: 6 %
NEUTS BAND NFR BLD MANUAL: 3 % (ref 0–6)
NEUTS SEG # BLD: 7.3 K/UL (ref 1.8–8)
NEUTS SEG NFR BLD: 42 % (ref 32–75)
NRBC # BLD: 0.03 K/UL (ref 0–0.01)
NRBC BLD-RTO: 0.2 PER 100 WBC
PLATELET # BLD AUTO: 147 K/UL (ref 150–400)
PMV BLD AUTO: 9.9 FL (ref 8.9–12.9)
POTASSIUM SERPL-SCNC: 4 MMOL/L (ref 3.5–5.1)
PROT SERPL-MCNC: 7 G/DL (ref 6.4–8.2)
RBC # BLD AUTO: 3.01 M/UL (ref 3.8–5.2)
RBC MORPH BLD: ABNORMAL
SODIUM SERPL-SCNC: 145 MMOL/L (ref 136–145)
WBC # BLD AUTO: 16.2 K/UL (ref 3.6–11)

## 2020-03-02 PROCEDURE — 85025 COMPLETE CBC W/AUTO DIFF WBC: CPT

## 2020-03-02 PROCEDURE — 74011250636 HC RX REV CODE- 250/636: Performed by: INTERNAL MEDICINE

## 2020-03-02 PROCEDURE — 96365 THER/PROPH/DIAG IV INF INIT: CPT

## 2020-03-02 PROCEDURE — 80053 COMPREHEN METABOLIC PANEL: CPT

## 2020-03-02 PROCEDURE — 36415 COLL VENOUS BLD VENIPUNCTURE: CPT

## 2020-03-02 RX ORDER — SODIUM CHLORIDE 9 MG/ML
10 INJECTION INTRAMUSCULAR; INTRAVENOUS; SUBCUTANEOUS AS NEEDED
Status: DISCONTINUED | OUTPATIENT
Start: 2020-03-02 | End: 2020-03-03 | Stop reason: HOSPADM

## 2020-03-02 RX ORDER — HEPARIN 100 UNIT/ML
300-500 SYRINGE INTRAVENOUS AS NEEDED
Status: DISCONTINUED | OUTPATIENT
Start: 2020-03-02 | End: 2020-03-03 | Stop reason: HOSPADM

## 2020-03-02 RX ADMIN — HEPARIN 300 UNITS: 100 SYRINGE at 14:02

## 2020-03-02 RX ADMIN — SODIUM CHLORIDE 10 ML: 9 INJECTION INTRAMUSCULAR; INTRAVENOUS; SUBCUTANEOUS at 14:02

## 2020-03-02 RX ADMIN — SODIUM CHLORIDE 10 ML: 9 INJECTION INTRAMUSCULAR; INTRAVENOUS; SUBCUTANEOUS at 12:53

## 2020-03-02 RX ADMIN — FERRIC CARBOXYMALTOSE INJECTION 750 MG: 50 INJECTION, SOLUTION INTRAVENOUS at 13:08

## 2020-03-03 ENCOUNTER — APPOINTMENT (OUTPATIENT)
Dept: INFUSION THERAPY | Age: 85
End: 2020-03-03
Payer: MEDICARE

## 2020-03-04 ENCOUNTER — APPOINTMENT (OUTPATIENT)
Dept: INFUSION THERAPY | Age: 85
End: 2020-03-04
Payer: MEDICARE

## 2020-03-05 ENCOUNTER — APPOINTMENT (OUTPATIENT)
Dept: INFUSION THERAPY | Age: 85
End: 2020-03-05
Payer: MEDICARE

## 2020-03-06 ENCOUNTER — APPOINTMENT (OUTPATIENT)
Dept: INFUSION THERAPY | Age: 85
End: 2020-03-06
Payer: MEDICARE

## 2020-03-11 ENCOUNTER — PATIENT OUTREACH (OUTPATIENT)
Dept: INTERNAL MEDICINE CLINIC | Age: 85
End: 2020-03-11

## 2020-03-13 NOTE — PROGRESS NOTES
Goals      Establish PCP relationships and regularly scheduled appointments. 2/17 Patient will attend  follow-up with PCP and specialist as directed, keep a list of her medications she take to bring to f/u appointments. Pt.seen by PCP, Dr. Hannah Madera on 2/17, reports [facility] scheduled 2/18, will start tx [facility] on 3/2-3/5, appt. with Rock Steven NP 3/5 Chemo., Dr. Paola Venegas (f/u bone marrow biopsy) 3/5; Dr. Anabella Farfan (GI) Colonoscopy 2/28. CTN provided pt information on [employer] [phone])  explain briefly type of service;  contact information provided. CTN will f/u with pt.in 1-2 weeks. -[address]     3/11 Pt.reports f/u with (PCP) Dr. Hannah Madera on 2/17, Dr. Paola Venegas 2/20 ; Dr. Anabella Farfan (GI) Colonoscopy 2/28. Pt.reports referral to [facility]; will continue her care with  appts.  3/3/20.-

## 2020-04-22 ENCOUNTER — APPOINTMENT (OUTPATIENT)
Dept: CT IMAGING | Age: 85
End: 2020-04-22
Attending: EMERGENCY MEDICINE
Payer: MEDICARE

## 2020-04-22 ENCOUNTER — HOSPITAL ENCOUNTER (EMERGENCY)
Age: 85
Discharge: HOME | End: 2020-04-22
Attending: EMERGENCY MEDICINE
Payer: MEDICARE

## 2020-04-22 ENCOUNTER — APPOINTMENT (OUTPATIENT)
Dept: GENERAL RADIOLOGY | Age: 85
End: 2020-04-22
Attending: EMERGENCY MEDICINE
Payer: MEDICARE

## 2020-04-22 VITALS
RESPIRATION RATE: 23 BRPM | WEIGHT: 189.15 LBS | TEMPERATURE: 98 F | OXYGEN SATURATION: 96 % | SYSTOLIC BLOOD PRESSURE: 154 MMHG | DIASTOLIC BLOOD PRESSURE: 60 MMHG | HEIGHT: 65 IN | HEART RATE: 83 BPM | BODY MASS INDEX: 31.52 KG/M2

## 2020-04-22 DIAGNOSIS — R06.02 SOB (SHORTNESS OF BREATH): Primary | ICD-10-CM

## 2020-04-22 LAB
ALBUMIN SERPL-MCNC: 3.7 G/DL (ref 3.5–5)
ALBUMIN/GLOB SERPL: 1.1 {RATIO} (ref 1.1–2.2)
ALP SERPL-CCNC: 55 U/L (ref 45–117)
ALT SERPL-CCNC: 25 U/L (ref 12–78)
ANION GAP SERPL CALC-SCNC: 7 MMOL/L (ref 5–15)
AST SERPL-CCNC: 17 U/L (ref 15–37)
BASOPHILS # BLD: 0 K/UL (ref 0–0.1)
BASOPHILS NFR BLD: 0 % (ref 0–1)
BILIRUB SERPL-MCNC: 0.6 MG/DL (ref 0.2–1)
BNP SERPL-MCNC: 260 PG/ML
BUN SERPL-MCNC: 19 MG/DL (ref 6–20)
BUN/CREAT SERPL: 30 (ref 12–20)
CALCIUM SERPL-MCNC: 8.3 MG/DL (ref 8.5–10.1)
CHLORIDE SERPL-SCNC: 109 MMOL/L (ref 97–108)
CO2 SERPL-SCNC: 25 MMOL/L (ref 21–32)
CREAT SERPL-MCNC: 0.64 MG/DL (ref 0.55–1.02)
DIFFERENTIAL METHOD BLD: ABNORMAL
EOSINOPHIL # BLD: 0.7 K/UL (ref 0–0.4)
EOSINOPHIL NFR BLD: 4 % (ref 0–7)
ERYTHROCYTE [DISTWIDTH] IN BLOOD BY AUTOMATED COUNT: 18.4 % (ref 11.5–14.5)
GLOBULIN SER CALC-MCNC: 3.5 G/DL (ref 2–4)
GLUCOSE SERPL-MCNC: 92 MG/DL (ref 65–100)
HCT VFR BLD AUTO: 32.8 % (ref 35–47)
HGB BLD-MCNC: 9.9 G/DL (ref 11.5–16)
IMM GRANULOCYTES # BLD AUTO: 0 K/UL (ref 0–0.04)
IMM GRANULOCYTES NFR BLD AUTO: 0 % (ref 0–0.5)
LYMPHOCYTES # BLD: 2.9 K/UL (ref 0.8–3.5)
LYMPHOCYTES NFR BLD: 16 % (ref 12–49)
MCH RBC QN AUTO: 32.5 PG (ref 26–34)
MCHC RBC AUTO-ENTMCNC: 30.2 G/DL (ref 30–36.5)
MCV RBC AUTO: 107.5 FL (ref 80–99)
METAMYELOCYTES NFR BLD MANUAL: 2 %
MONOCYTES # BLD: 4 K/UL (ref 0–1)
MONOCYTES NFR BLD: 22 % (ref 5–13)
MYELOCYTES NFR BLD MANUAL: 2 %
NEUTS BAND NFR BLD MANUAL: 7 %
NEUTS SEG # BLD: 9.7 K/UL (ref 1.8–8)
NEUTS SEG NFR BLD: 47 % (ref 32–75)
NRBC # BLD: 0.03 K/UL (ref 0–0.01)
NRBC BLD-RTO: 0.2 PER 100 WBC
PLATELET # BLD AUTO: 167 K/UL (ref 150–400)
PMV BLD AUTO: 10.2 FL (ref 8.9–12.9)
POTASSIUM SERPL-SCNC: 3.8 MMOL/L (ref 3.5–5.1)
PROT SERPL-MCNC: 7.2 G/DL (ref 6.4–8.2)
RBC # BLD AUTO: 3.05 M/UL (ref 3.8–5.2)
RBC MORPH BLD: ABNORMAL
RBC MORPH BLD: ABNORMAL
SODIUM SERPL-SCNC: 141 MMOL/L (ref 136–145)
TROPONIN I SERPL-MCNC: <0.05 NG/ML
WBC # BLD AUTO: 18 K/UL (ref 3.6–11)

## 2020-04-22 PROCEDURE — 85025 COMPLETE CBC W/AUTO DIFF WBC: CPT

## 2020-04-22 PROCEDURE — 84484 ASSAY OF TROPONIN QUANT: CPT

## 2020-04-22 PROCEDURE — 71045 X-RAY EXAM CHEST 1 VIEW: CPT

## 2020-04-22 PROCEDURE — 71275 CT ANGIOGRAPHY CHEST: CPT

## 2020-04-22 PROCEDURE — 74011636320 HC RX REV CODE- 636/320: Performed by: EMERGENCY MEDICINE

## 2020-04-22 PROCEDURE — 93005 ELECTROCARDIOGRAM TRACING: CPT

## 2020-04-22 PROCEDURE — 80053 COMPREHEN METABOLIC PANEL: CPT

## 2020-04-22 PROCEDURE — 99284 EMERGENCY DEPT VISIT MOD MDM: CPT

## 2020-04-22 PROCEDURE — 36415 COLL VENOUS BLD VENIPUNCTURE: CPT

## 2020-04-22 PROCEDURE — 83880 ASSAY OF NATRIURETIC PEPTIDE: CPT

## 2020-04-22 RX ORDER — SODIUM CHLORIDE 0.9 % (FLUSH) 0.9 %
10 SYRINGE (ML) INJECTION
Status: COMPLETED | OUTPATIENT
Start: 2020-04-22 | End: 2020-04-22

## 2020-04-22 RX ADMIN — IOPAMIDOL 51 ML: 755 INJECTION, SOLUTION INTRAVENOUS at 11:08

## 2020-04-22 RX ADMIN — Medication 10 ML: at 11:08

## 2020-04-22 NOTE — PROGRESS NOTES
Nurse Clarification of the Prior to Admission Medication Regimen     The patient was interviewed regarding clarification of the prior to admission medication regimen. The individual(s) listed above were questioned regarding the patient's use of any other inhalers, topical products, over the counter medications, herbal medications, vitamin products or ophthalmic/nasal/otic medication use. Information Obtained From: patient    Recommendations/Findings: The following amendments were made to the patient's active medication list on file at [facility]:     1) Additions:    Oxygen 1-2 liter nasal cannula qhs and prn   pepto-bismol po as ordered on bottle prn    2) Removals:    none    3) Changes:   none       PTA medication list was corrected to the following:     Prior to Admission Medications   Prescriptions Last Dose Informant Taking? Oxygen   Yes   Sig: nightly as needed. 1-2 liter nasal cannula qhs and prn if needed   acetaminophen (TYLENOL) 325 mg tablet  Self No   Sig: Take 2 Tabs by mouth every four (4) hours as needed for Pain. bismuth subsalicylate (PEPTO-BISMOL PO)   Yes   Sig: Take  by mouth daily as needed. As ordered on the bottle   dilTIAZem CD (CARDIZEM CD) 180 mg ER capsule  Self No   Sig: Take 1 Cap by mouth daily. ergocalciferol (ERGOCALCIFEROL) 1,250 mcg (50,000 unit) capsule  Self No   Sig: TAKE ONE CAPSULE BY MOUTH EVERY 7 DAYS   pantoprazole (PROTONIX) 40 mg tablet  Self No   Sig: Take 1 Tab by mouth Before breakfast and dinner.       Facility-Administered Medications: None        Thank you,  Doris Borja

## 2020-04-22 NOTE — DISCHARGE INSTRUCTIONS
You were evaluated in the emergency department for shortness of breath. Your examination was reassuring as was your work-up including blood work, ekg, chest xray, and CTA of the chest.  It will be important for you to follow-up with your primary care physician in 2-3 days. If you develop worsening symptoms such as worsening shortness of breath, fevers, or chest pain, please return to the emergency department immediately.

## 2020-04-22 NOTE — ED PROVIDER NOTES
EMERGENCY DEPARTMENT HISTORY AND PHYSICAL EXAM      Date: 4/22/2020  Patient Name: Uli Stevenson    History of Presenting Illness     Chief Complaint   Patient presents with    Shortness of Breath     Patient states for last week she has been having intermittent SOB. Pt states she wakes up feeling okay and then throughout the day her SOB progresses. Pt denies CP, fever, or cough. Pt states her PCP told her to come to the ER. Pt states she uses oxygen at home as needed, and has had to use it recently. History Provided By: Patient    HPI: Uli Stevenson, 82 y.o. female with history of myelodysplastic syndrome, prior left breast cancer, CML followed by Dr. Graham Smith presents to the ED with cc of shortness of breath. She has been having progressively worsening shortness of breath over the past week. Patient states that symptoms usually present in the morning and get worse throughout the day, she is usually more short of breath at nighttime. Symptoms are not necessarily exertional.  She does express some pleuritic symptoms when taking a deep breath but denies any other chest pain. She states that at nighttime she feels as if she cannot catch her breath. Denies any fevers, cough, exposure to sick contacts. Denies abdominal pain, nausea, vomiting. No prior history of DVT or PE. There are no other complaints, changes, or physical findings at this time. PCP: Bubba Araujo MD    No current facility-administered medications on file prior to encounter. Current Outpatient Medications on File Prior to Encounter   Medication Sig Dispense Refill    Oxygen nightly as needed. 1-2 liter nasal cannula qhs and prn if needed      bismuth subsalicylate (PEPTO-BISMOL PO) Take  by mouth daily as needed.  As ordered on the bottle      ergocalciferol (ERGOCALCIFEROL) 1,250 mcg (50,000 unit) capsule TAKE ONE CAPSULE BY MOUTH EVERY 7 DAYS 12 Cap 1    acetaminophen (TYLENOL) 325 mg tablet Take 2 Tabs by mouth every four (4) hours as needed for Pain. 20 Tab 0    dilTIAZem CD (CARDIZEM CD) 180 mg ER capsule Take 1 Cap by mouth daily. 30 Cap 1    pantoprazole (PROTONIX) 40 mg tablet Take 1 Tab by mouth Before breakfast and dinner.  61 Tab 1       Past History     Past Medical History:  Past Medical History:   Diagnosis Date    Anemia     Arthritis     Breast cancer ([address]     left    Bunion of right foot 8/20/2015    Chronic pain     back--uses tens unit    Diverticulitis 6/29/2018    Recurrent    Diverticulitis large intestine     Dry eye syndrome 6/29/2018    Fibromyalgia 6/29/2018    GERD (gastroesophageal reflux disease)     History of left breast cancer 2013    lumpectomy radiation    Hypercholesterolemia 6/29/2018    Ill-defined condition     blood transfusion hx    [facility] [address]     MDS (myelodysplastic syndrome) [address] [address] [facility]     Osteoporosis 6/29/2018    Peripheral neuropathy 6/29/2018    Prediabetes 6/29/2018    PUD (peptic ulcer disease)     Radiation therapy complication 2645    Lt breast-No Chemo    Rosacea 6/29/2018    Trouble in sleeping        Past Surgical History:  Past Surgical History:   Procedure Laterality Date    COLONOSCOPY N/A 2/28/2020    COLONOSCOPY performed by Sophronia Dubin, [facility]    COLONOSCOPY,DIAGNOSTIC  2/28/2020         HX APPENDECTOMY      HX BREAST LUMPECTOMY  11/21/2013    LEFT BREAST LUMPECTOMY W/LEFT BREAST SENTINEL NODE BIOPSY,AND NEEDLE LOCALIZATION performed by Taylor Reese MD at [facility] MA[facility] OR    HX CATARACT REMOVAL      bilateral    HX HYSTERECTOMY      ovarian cyst    HX MOHS PROCEDURES      right    HX ORTHOPAEDIC      back surgery x2    HX OTHER SURGICAL      colonoscopy    HX TONSILLECTOMY      IR INSERT TUNL CVC W PORT OVER 5 YEARS  12/3/2019    TOTAL KNEE ARTHROPLASTY      left    TOTAL KNEE ARTHROPLASTY      right    UPPER GI ENDOSCOPY,BIOPSY  1/24/2020         US GUIDED CORE BREAST BIOPSY Left 2013    Breast Ca       Family History:  Family History   Problem Relation Age of Onset    Cancer Mother         bladder    Cancer Father     Breast Cancer Paternal Grandmother        Social History:  Social History     Tobacco Use    Smoking status: Former Smoker     Packs/day: 0.50     Years: 50.00     Pack years: 25.00     Last attempt to quit: 2012     Years since quittin.1    Smokeless tobacco: Never Used   Substance Use Topics    Alcohol use: Yes     Alcohol/week: 2.0 standard drinks     Types: 2 Glasses of wine per week    Drug use: No       Allergies: Allergies   Allergen Reactions    Hydrocodone Nausea Only and Vertigo    Gabapentin Diarrhea, Nausea Only and Other (comments)     weakness    Lyrica [Pregabalin] Nausea Only    Oxycodone Nausea and Vomiting and Vertigo         Review of Systems   Review of Systems   Constitutional: Negative for chills and fever. HENT: Negative. Eyes: Negative for visual disturbance. Respiratory: Positive for shortness of breath. Negative for cough. Cardiovascular: Negative for chest pain and leg swelling. Gastrointestinal: Negative for abdominal pain, nausea and vomiting. Genitourinary: Negative. Musculoskeletal: Negative for back pain and gait problem. Skin: Negative for color change and rash. Neurological: Negative for dizziness, weakness, light-headedness and headaches. Hematological: Does not bruise/bleed easily. All other systems reviewed and are negative. Physical Exam   Physical Exam  Vitals signs and nursing note reviewed. Constitutional:       General: She is not in acute distress. Appearance: Normal appearance. She is not ill-appearing, toxic-appearing or diaphoretic. HENT:      Head: Normocephalic and atraumatic. Cardiovascular:      Rate and Rhythm: Normal rate and regular rhythm. Heart sounds: Normal heart sounds. No murmur. Pulmonary:      Effort: Pulmonary effort is normal. No respiratory distress.       Breath sounds: Normal breath sounds. No wheezing. Chest:      Comments: Port noted to right chest  Abdominal:      Palpations: Abdomen is soft. Tenderness: There is no abdominal tenderness. There is no guarding or rebound. Skin:     General: Skin is warm and dry. Findings: No erythema or rash. Neurological:      General: No focal deficit present. Mental Status: She is alert and oriented to person, place, and time. Diagnostic Study Results     Labs -     Recent Results (from the past 12 hour(s))   EKG, 12 LEAD, INITIAL    Collection Time: 04/22/20 10:49 AM   Result Value Ref Range    Ventricular Rate 66 BPM    Atrial Rate 66 BPM    P-R Interval 226 ms    QRS Duration 76 ms    Q-T Interval 438 ms    QTC Calculation (Bezet) 459 ms    Calculated P Axis 63 degrees    Calculated R Axis -17 degrees    Calculated T Axis 69 degrees    Diagnosis       Sinus rhythm with 1st degree AV block  Moderate voltage criteria for LVH, may be normal variant  When compared with ECG of 10-FEB-2020 13:47,  SC interval has increased     NT-PRO BNP    Collection Time: 04/22/20 11:21 AM   Result Value Ref Range    NT pro- <450 PG/ML   TROPONIN I    Collection Time: 04/22/20 11:21 AM   Result Value Ref Range    Troponin-I, Qt. <0.05 <3.88 ng/mL   METABOLIC PANEL, COMPREHENSIVE    Collection Time: 04/22/20 11:21 AM   Result Value Ref Range    Sodium 141 136 - 145 mmol/L    Potassium 3.8 3.5 - 5.1 mmol/L    Chloride 109 (H) 97 - 108 mmol/L    CO2 25 21 - 32 mmol/L    Anion gap 7 5 - 15 mmol/L    Glucose 92 65 - 100 mg/dL    BUN 19 6 - 20 MG/DL    Creatinine 0.64 0.55 - 1.02 MG/DL    BUN/Creatinine ratio 30 (H) 12 - 20      GFR est AA >60 >60 ml/min/1.73m2    GFR est non-AA >60 >60 ml/min/1.73m2    Calcium 8.3 (L) 8.5 - 10.1 MG/DL    Bilirubin, total 0.6 0.2 - 1.0 MG/DL    ALT (SGPT) 25 12 - 78 U/L    AST (SGOT) 17 15 - 37 U/L    Alk.  phosphatase 55 45 - 117 U/L    Protein, total 7.2 6.4 - 8.2 g/dL    Albumin 3.7 3.5 - 5.0 g/dL Globulin 3.5 2.0 - 4.0 g/dL    A-G Ratio 1.1 1.1 - 2.2     CBC WITH AUTOMATED DIFF    Collection Time: 04/22/20 11:21 AM   Result Value Ref Range    WBC 18.0 (H) 3.6 - 11.0 K/uL    RBC 3.05 (L) 3.80 - 5.20 M/uL    HGB 9.9 (L) 11.5 - 16.0 g/dL    HCT 32.8 (L) 35.0 - 47.0 %    .5 (H) 80.0 - 99.0 FL    MCH 32.5 26.0 - 34.0 PG    MCHC 30.2 30.0 - 36.5 g/dL    RDW 18.4 (H) 11.5 - 14.5 %    PLATELET 734 179 - 347 K/uL    MPV 10.2 8.9 - 12.9 FL    NRBC 0.2 (H) 0  WBC    ABSOLUTE NRBC 0.03 (H) 0.00 - 0.01 K/uL    NEUTROPHILS 47 32 - 75 %    BAND NEUTROPHILS 7 %    LYMPHOCYTES 16 12 - 49 %    MONOCYTES 22 (H) 5 - 13 %    EOSINOPHILS 4 0 - 7 %    BASOPHILS 0 0 - 1 %    METAMYELOCYTES 2 %    MYELOCYTES 2 %    IMMATURE GRANULOCYTES 0 0.0 - 0.5 %    ABS. NEUTROPHILS 9.7 (H) 1.8 - 8.0 K/UL    ABS. LYMPHOCYTES 2.9 0.8 - 3.5 K/UL    ABS. MONOCYTES 4.0 (H) 0.0 - 1.0 K/UL    ABS. EOSINOPHILS 0.7 (H) 0.0 - 0.4 K/UL    ABS. BASOPHILS 0.0 0.0 - 0.1 K/UL    ABS. IMM. GRANS. 0.0 0.00 - 0.04 K/UL    DF MANUAL      RBC COMMENTS ANISOCYTOSIS  1+        RBC COMMENTS MACROCYTOSIS  1+           Radiologic Studies -   CTA CHEST W OR W WO CONT   Final Result   IMPRESSION:      No evidence for acute pulmonary embolism. Stable 3 mm left upper lobe lung nodule. New 3 mm nodule versus minimal   atelectatic change in the dependent right lower lobe. Resolution of previous   left lung airspace disease. Resolution of thoracic lymphadenopathy. XR CHEST PORT   Final Result   IMPRESSION:   1. Evidence of cardiomegaly. No evidence of congestive change. 2. No evidence of active lung disease. CT Results  (Last 48 hours)               04/22/20 1108  CTA CHEST W OR W WO CONT Final result    Impression:  IMPRESSION:       No evidence for acute pulmonary embolism. Stable 3 mm left upper lobe lung nodule. New 3 mm nodule versus minimal   atelectatic change in the dependent right lower lobe.  Resolution of previous left lung airspace disease. Resolution of thoracic lymphadenopathy. Narrative:  INDICATION:  Shortness of breath        EXAM:  CTA Chest with contrast for Pulmonary Embolus       COMPARISON:  10/29/2019       TECHNIQUE:  Following the uneventful intravenous administration of Iodinated   contrast, thin helical axial images were obtained through the chest. 3D image   postprocessing was performed. Coronal and sagittal reformatted images were   obtained. CT dose reduction was achieved through use of a standardized protocol   tailored for this examination and automatic exposure control for dose   modulation. FINDINGS:       No evidence for acute pulmonary embolism. No pleural or pericardial effusion. No thoracic lymphadenopathy. Stable 3 mm medial left upper lobe lung nodule (image 66). New 3 mm nodule   versus minimal atelectatic change in the right lower lobe (image 49). Mild   emphysematous change. No other significant lung abnormality. The central airways   are patent. Right chest port is seen       No acute or destructive osseous process               CXR Results  (Last 48 hours)               04/22/20 1022  XR CHEST PORT Final result    Impression:  IMPRESSION:   1. Evidence of cardiomegaly. No evidence of congestive change. 2. No evidence of active lung disease. Narrative:  Portable chest single view dated 4/20/2020       Comparison chest dated 2/10/2020       History is shortness of breath       A single portable AP upright view of the chest was obtained. The cardiac   silhouette is enlarged. There is no evidence of congestive change. There is no   evidence of active lung disease. The Mediport catheter and reservoir on the   right are again noted. Medical Decision Making   I am the first provider for this patient.     I reviewed the vital signs, available nursing notes, past medical history, past surgical history, family history and social history. Vital Signs-Reviewed the patient's vital signs. Patient Vitals for the past 12 hrs:   Temp Pulse Resp BP SpO2   04/22/20 1330  83 23 154/60 96 %   04/22/20 1200  79 20 132/65 96 %   04/22/20 0944 98 °F (36.7 °C) 70 20 162/71 100 %     Records Reviewed: Nursing Notes    Provider Notes (Medical Decision Making):   62-year-old female presenting with shortness of breath over the past 10 days. On examination she is in no acute distress. She is afebrile and vital signs stable. There is no increased respiratory effort and no hypoxia in the ED. She is noted to have leukocytosis 18,000 which is consistent with prior value as of 4/7. This is consistent with her history of CML and myelodysplastic syndrome. Troponin negative and NT proBNP 260. Chest x-ray does not show any acute process. Given her history of malignancy CT angiogram of the chest was performed which was negative for acute PE or any other airspace disease. Given her normal respiratory effort and lack of hypoxia here in the ED I feel she is stable for discharge home. She was encouraged to follow-up closely with her PCP and she expressed understanding. She was given strict return ED precautions and all questions answered    ED Course:   Initial assessment performed. The patients presenting problems have been discussed, and they are in agreement with the care plan formulated and outlined with them. I have encouraged them to ask questions as they arise throughout their visit. Discharge Note:  The patient has been re-evaluated and is ready for discharge. Reviewed available results with patient. Counseled patient on diagnosis and care plan. Patient has expressed understanding, and all questions have been answered. Patient agrees with plan and agrees to follow up as recommended, or to return to the ED if their symptoms worsen.  Discharge instructions have been provided and explained to the patient, along with reasons to return to the ED. Disposition:  Home    DISCHARGE PLAN:  1. Discharge Medication List as of 4/22/2020  1:50 PM        2. Follow-up Information     Follow up With Specialties Details Why Contact Info    Enma Whitfield MD Internal Medicine Schedule an appointment as soon as possible for a visit   [facility]  [phone]          3. Return to ED if worse     Diagnosis     Clinical Impression:   1. SOB (shortness of breath)        Attestations:    Jessica Guy MD    Please note that this dictation was completed with [employer], the computer voice recognition software. Quite often unanticipated grammatical, syntax, homophones, and other interpretive errors are inadvertently transcribed by the computer software. Please disregard these errors. Please excuse any errors that have escaped final proofreading. Thank you.

## 2020-04-22 NOTE — ED NOTES
I have reviewed discharge instructions with the patient. The patient verbalized understanding.  Pt wheeled to lobby no distress noted, no needs at time

## 2020-04-23 ENCOUNTER — PATIENT OUTREACH (OUTPATIENT)
Dept: FAMILY MEDICINE CLINIC | Age: 85
End: 2020-04-23

## 2020-04-23 LAB
ATRIAL RATE: 66 BPM
CALCULATED P AXIS, ECG09: 63 DEGREES
CALCULATED R AXIS, ECG10: -17 DEGREES
CALCULATED T AXIS, ECG11: 69 DEGREES
DIAGNOSIS, 93000: NORMAL
P-R INTERVAL, ECG05: 226 MS
Q-T INTERVAL, ECG07: 438 MS
QRS DURATION, ECG06: 76 MS
QTC CALCULATION (BEZET), ECG08: 459 MS
VENTRICULAR RATE, ECG03: 66 BPM

## 2020-04-23 NOTE — PROGRESS NOTES
Patient contacted regarding recent discharge and COVID-19 risk   Care Transition Nurse/ Ambulatory Care Manager contacted the patient by telephone to perform post discharge assessment. Verified name and  with patient as identifiers. Patient has following risk factors of: COPD, acute respiratory failure, uses home oxygen. CTN/ACM reviewed discharge instructions, medical action plan and red flags related to discharge diagnosis. Reviewed and educated them on any new and changed medications related to discharge diagnosis. Advised obtaining a 90-day supply of all daily and as-needed medications. Education provided regarding infection prevention, and signs and symptoms of COVID-19 and when to seek medical attention with patient who verbalized understanding. Discussed exposure protocols and quarantine from [address] you at higher risk for severe illness  and given an opportunity for questions and concerns. The patient agrees to contact the COVID-19 hotline [phone] or PCP office for questions related to their healthcare. CTN/ACM provided contact information for future reference. From CDC: Are you at higher risk for severe illness?  Wash your hands often.  Avoid close contact (6 feet, which is about two arm lengths) with people who are sick.  Put distance between yourself and other people if COVID-19 is spreading in your community.  Clean and disinfect frequently touched surfaces.  Avoid all cruise travel and non-essential air travel.  Call your healthcare professional if you have concerns about COVID-19 and your underlying condition or if you are sick.     For more information on steps you can take to protect yourself, see CDC's How to Protect Yourself      Patient/family/caregiver given information for Jena Perez and agrees to enroll yes  Patient's preferred e-mail:  [email or web address]  Patient's preferred phone number: [phone]  Based on Loop alert triggers, patient will be contacted by nurse care manager for worsening symptoms. Pt will be further monitored by COVID Loop Team based on severity of symptoms and risk factors. DMB

## 2020-04-28 ENCOUNTER — OFFICE VISIT (OUTPATIENT)
Dept: INTERNAL MEDICINE CLINIC | Age: 85
End: 2020-04-28

## 2020-04-28 VITALS
HEART RATE: 58 BPM | HEIGHT: 65 IN | BODY MASS INDEX: 31.49 KG/M2 | WEIGHT: 189 LBS | DIASTOLIC BLOOD PRESSURE: 80 MMHG | TEMPERATURE: 98.3 F | RESPIRATION RATE: 22 BRPM | SYSTOLIC BLOOD PRESSURE: 124 MMHG | OXYGEN SATURATION: 96 %

## 2020-04-28 DIAGNOSIS — I48.91 ATRIAL FIBRILLATION WITH RAPID VENTRICULAR RESPONSE (HCC): ICD-10-CM

## 2020-04-28 DIAGNOSIS — J43.9 PULMONARY EMPHYSEMA, UNSPECIFIED EMPHYSEMA TYPE (HCC): Primary | ICD-10-CM

## 2020-04-28 DIAGNOSIS — I48.0 PAROXYSMAL ATRIAL FIBRILLATION (HCC): ICD-10-CM

## 2020-04-28 DIAGNOSIS — C93.10 CHRONIC MYELOMONOCYTIC LEUKEMIA NOT HAVING ACHIEVED REMISSION (HCC): ICD-10-CM

## 2020-04-28 NOTE — PROGRESS NOTES
This note will not be viewable in [address] [place] Andino is a 82 y.o. female and presents with ED Follow-up (04-22-20)  . Subjective:  Mrs. Kristina Mas returns to the office today and transition of care subsequent to an emergency room visit of 4/22 when she presented with acute shortness of breath. Patient has a had a previous hospitalization for decompensated COPD, atrial fibrillation and leukemia. On 4/22 she noted shortness of breath with minor activity. A chest x-ray was clear and a CT [place] of the chest was negative for blood clot. EKG and cardiac enzymes were negative for cardiac decompensation and she had no atrial fibrillation that was seen. Globin was 9.9 which is been about the level she normally carries. She had had no fever or chills and no purulent sputum production, wheezing. She notes now that she does have some dyspnea on exertion but no cough, wheezing or chest congestion. She was seen by her her oncologist at [facility] and her last hemoglobin was 10.3. She has been compliant with all of her medications. There have been no palpitations or syncope.     Past Medical History:   Diagnosis Date    Anemia     Arthritis     Breast cancer ([address]     left    Bunion of right foot 8/20/2015    Chronic pain     back--uses tens unit    Diverticulitis 6/29/2018    Recurrent    Diverticulitis large intestine     Dry eye syndrome 6/29/2018    Fibromyalgia 6/29/2018    GERD (gastroesophageal reflux disease)     History of left breast cancer 2013    lumpectomy radiation    Hypercholesterolemia 6/29/2018    Ill-defined condition     blood transfusion hx    Leukemia ([address]     MDS (myelodysplastic syndrome) ([address]     Osteoporosis 6/29/2018    Peripheral neuropathy 6/29/2018    Prediabetes 6/29/2018    PUD (peptic ulcer disease)     Radiation therapy complication 4488    Lt breast-No Chemo    Rosacea 6/29/2018    Trouble in sleeping      Past Surgical History:   Procedure Laterality Date    COLONOSCOPY N/A 2/28/2020    COLONOSCOPY performed [facility]ijit [facility]  2/28/2020         HX APPENDECTOMY      HX BREAST LUMPECTOMY  11/21/2013    LEFT BREAST LUMPECTOMY W/LEFT BREAST SENTINEL NODE BIOPSY,AND NEEDLE LOCALIZATION performed by Heather Welsh MD at [facility] MAIN OR    HX CATARACT REMOVAL      bilateral    HX HYSTERECTOMY      ovarian cyst    HX MOHS PROCEDURES      right    HX ORTHOPAEDIC      back surgery x2    HX OTHER SURGICAL      colonoscopy    HX TONSILLECTOMY      IR INSERT TUNL CVC W PORT OVER 5 YEARS  12/3/2019    TOTAL KNEE ARTHROPLASTY      left    TOTAL KNEE ARTHROPLASTY      right    UPPER GI ENDOSCOPY,BIOPSY  1/24/2020         US GUIDED CORE BREAST BIOPSY Left 2013    Breast Ca     Allergies   Allergen Reactions    Hydrocodone Nausea Only and Vertigo    Gabapentin Diarrhea, Nausea Only and Other (comments)     weakness    Lyrica [Pregabalin] Nausea Only    Oxycodone Nausea and Vomiting and Vertigo     Current Outpatient Medications   Medication Sig Dispense Refill    Oxygen nightly as needed. 1-2 liter nasal cannula qhs and prn if needed      bismuth subsalicylate (PEPTO-BISMOL PO) Take  by mouth daily as needed. As ordered on the bottle      ergocalciferol (ERGOCALCIFEROL) 1,250 mcg (50,000 unit) capsule TAKE ONE CAPSULE BY MOUTH EVERY 7 DAYS 12 Cap 1    acetaminophen (TYLENOL) 325 mg tablet Take 2 Tabs by mouth every four (4) hours as needed for Pain. 20 Tab 0    dilTIAZem CD (CARDIZEM CD) 180 mg ER capsule Take 1 Cap by mouth daily. 30 Cap 1    pantoprazole (PROTONIX) 40 mg tablet Take 1 Tab by mouth Before breakfast and dinner.  61 Tab 1     Social History     Socioeconomic History    Marital status:      Spouse name: Not on file    Number of children: Not on file    Years of education: Not on file    Highest education level: Not on file   Tobacco Use    Smoking status: Former Smoker     Packs/day: 0.50 Years: 50.00     Pack years: 25.00     Last attempt to quit: 2012     Years since quittin.2    Smokeless tobacco: Never Used   Substance and Sexual Activity    Alcohol use:  Yes     Alcohol/week: 2.0 standard drinks     Types: 2 Glasses of wine per week    Drug use: No     Family History   Problem Relation Age of Onset    Cancer Mother         bladder    Cancer Father     Breast Cancer Paternal Grandmother        Health Maintenance   Topic Date Due    GLAUCOMA SCREENING Q2Y  02/15/2020    Medicare Yearly Exam  2020    Influenza Age 5 to Adult  2020    DTaP/Tdap/Td series (3 - Td) 2029    Bone Densitometry (Dexa) Screening  Completed    Shingrix Vaccine Age 50>  Completed    Pneumococcal 65+ years  Completed        Review of Systems  Constitutional: negative for fevers, chills, anorexia and weight loss  Eyes:   negative for visual disturbance and irritation  ENT:   negative for tinnitus,sore throat,nasal congestion,ear pain,hoarseness  Respiratory:  Positive for mild dyspnea on exertion without cough or sputum production  CV:   negative for chest pain, palpitations, lower extremity edema  GI:   negative for nausea, vomiting, diarrhea, abdominal pain,melena  Endo:               negative for polyuria,polydipsia,polyphagia,heat intolerance  Genitourinary: negative for frequency, dysuria and hematuria  Integumentary: negative for rash and pruritus  Hematologic:  negative for easy bruising and gum/nose bleeding  Musculoskel: negative for myalgias, arthralgias, back pain, muscle weakness, joint pain  Neurological:  negative for headaches, dizziness, vertigo, memory problems and gait   Behavl/Psych: negative for feelings of anxiety, depression, mood changes  ROS otherwise negative      Objective:  Visit Vitals  /80 (BP 1 Location: Right arm, BP Patient Position: Sitting)   Pulse (!) 58   Temp 98.3 °F (36.8 °C) (Oral)   Resp 22   Ht 5' 5\" (1.651 m)   Wt 189 lb (85.7 kg)   SpO2 96% BMI 31.45 kg/m²     Body mass index is 31.45 kg/m². Physical Exam:   General appearance - alert, well appearing, and in no distress  Mental status - alert, oriented to person, place, and time  EYE-HOWARD, EOMI,conjunctiva normal bilaterally, lids normal  ENT-ENT exam normal, no neck nodes or sinus tenderness  Nose - normal and patent, no erythema,  Or discharge   Mouth - mucous membranes moist, pharynx normal without lesions  Neck - supple, no significant adenopathy or bruit  Chest -poor air movement noted without active wheezes or rhonchi heart - normal rate, regular rhythm, normal S1, S2, no murmurs, rubs, clicks or gallops   Abdomen - soft, nontender, nondistended, no masses or organomegaly  Lymph- no adenopathy palpable  Ext-peripheral pulses normal, no pedal edema, no clubbing or cyanosis  Skin-Warm and dry. no hyperpigmentation, vitiligo, or suspicious lesions  Neuro -alert, oriented, normal speech, no focal findings or movement disorder noted      Assessment/Plan:  Diagnoses and all orders for this visit:    Pulmonary emphysema, unspecified emphysema type ([address]    Chronic myelomonocytic leukemia not having achieved remission ([address]    Atrial fibrillation with rapid ventricular response (HCC)    Paroxysmal atrial fibrillation ([address]        Other instructions: The patient's medications were reviewed and reconciled. No change in her current medical regimen will be made. The patient is currently stable and at her baseline. Her dyspnea on exertion is multifactorial related to underlying cardiac disease, COPD and her leukemia. There are no signs of any infection at the present time. Emergency room notes, labs, imaging studies from 4/22 were reviewed during the course of this office visit    Follow-up as previously scheduled    Follow-up and Dispositions    · Return for As previously scheduled. I have reviewed with the patient details of the assessment and plan and all questions were answered. Relevent patient education was performed. The most recent lab findings were reviewed with the patient. An After Visit Summary was printed and given to the patient. Jameel Sauer MD    Please note that this dictation was completed with [employer], the computer voice recognition software. Quite often unanticipated grammatical, syntax, homophones, and other interpretive errors are inadvertently transcribed by the computer software. Please disregard these errors. Please excuse any errors that have escaped final proofreading.

## 2020-04-28 NOTE — PATIENT INSTRUCTIONS
Chronic Obstructive Pulmonary Disease (COPD): Care Instructions Your Care Instructions Chronic obstructive pulmonary disease (COPD) is a general term for a group of lung diseases, including emphysema and chronic bronchitis. People with COPD have decreased airflow in and out of the lungs, which makes it hard to breathe. The airways also can get clogged with thick mucus. Cigarette smoking is a major cause of COPD. Although there is no cure for COPD, you can slow its progress. Following your treatment plan and taking care of yourself can help you feel better and live longer. Follow-up care is a key part of your treatment and safety. Be sure to make and go to all appointments, and call your doctor if you are having problems. It's also a good idea to know your test results and keep a list of the medicines you take. How can you care for yourself at home? 
 Staying healthy 
  · Do not smoke. This is the most important step you can take to prevent more damage to your lungs. If you need help quitting, talk to your doctor about stop-smoking programs and medicines. These can increase your chances of quitting for good.  
  · Avoid colds and flu. Get a pneumococcal vaccine shot. If you have had one before, ask your doctor whether you need a second dose. Get the flu vaccine every fall. If you must be around people with colds or the flu, wash your hands often.  
  · Avoid secondhand smoke, air pollution, and high altitudes. Also avoid cold, dry air and hot, humid air. Stay at home with your windows closed when air pollution is bad.  
 Medicines and oxygen therapy 
  · Take your medicines exactly as prescribed. Call your doctor if you think you are having a problem with your medicine.  
  · You may be taking medicines such as: 
? Bronchodilators. These help open your airways and make breathing easier.  Bronchodilators are either short-acting (work for 6 to 9 hours) or long-acting (work for 24 hours). You inhale most bronchodilators, so they start to act quickly. Always carry your quick-relief inhaler with you in case you need it while you are away from home. ? Corticosteroids (prednisone, budesonide). These reduce airway inflammation. They come in pill or inhaled form. You must take these medicines every day for them to work well.  
  · A spacer may help you get more inhaled medicine to your lungs. Ask your doctor or pharmacist if a spacer is right for you. If it is, ask how to use it properly.  
  · Do not take any vitamins, over-the-counter medicine, or herbal products without talking to your doctor first.  
  · If your doctor prescribed antibiotics, take them as directed. Do not stop taking them just because you feel better. You need to take the full course of antibiotics.  
  · Oxygen therapy boosts the amount of oxygen in your blood and helps you breathe easier. Use the flow rate your doctor has recommended, and do not change it without talking to your doctor first.  
Activity 
  · Get regular exercise. Walking is an easy way to get exercise. Start out slowly, and walk a little more each day.  
  · Pay attention to your breathing. You are exercising too hard if you cannot talk while you are exercising.  
  · Take short rest breaks when doing household chores and other activities.  
  · Learn breathing methodssuch as breathing through pursed lipsto help you become less short of breath.  
  · If your doctor has not set you up with a pulmonary rehabilitation program, talk to him or her about whether rehab is right for you. Rehab includes exercise programs, education about your disease and how to manage it, help with diet and other changes, and emotional support. Diet 
  · Eat regular, healthy meals. Use bronchodilators about 1 hour before you eat to make it easier to eat.  Eat several small meals instead of three large ones. Drink beverages at the end of the meal. Avoid foods that are hard to chew.  
  · Eat foods that contain protein so that you do not lose muscle mass.  
  · Talk with your doctor if you gain too much weight or if you lose weight without trying.  
 Mental health 
  · Talk to your family, friends, or a therapist about your feelings. It is normal to feel frightened, angry, hopeless, helpless, and even guilty. Talking openly about bad feelings can help you cope. If these feelings last, talk to your doctor. When should you call for help? Call 911 anytime you think you may need emergency care. For example, call if: 
  · You have severe trouble breathing.  
 Call your doctor now or seek immediate medical care if: 
  · You have new or worse trouble breathing.  
  · You cough up blood.  
  · You have a fever.  
 Watch closely for changes in your health, and be sure to contact your doctor if: 
  · You cough more deeply or more often, especially if you notice more mucus or a change in the color of your mucus.  
  · You have new or worse swelling in your legs or belly.  
  · You are not getting better as expected. Where can you learn more? Go to http://sumit-arpit.info/ Stacey Card in the search box to learn more about \"Chronic Obstructive Pulmonary Disease (COPD): Care Instructions. \" Current as of: June 9, 2019Content Version: 12.4 © 9293-0165 [employer]. Care instructions adapted under license by [employer] (which disclaims liability or warranty for this information). If you have questions about a medical condition or this instruction, always ask your healthcare professional. [address] any warranty or liability for your use of this information.

## 2020-04-28 NOTE — PROGRESS NOTES
Janell Montgomery is a 82 y.o. female presenting for ED Follow-up (04-22-20)  . 1. Have you been to the ER, urgent care clinic since your last visit? Hospitalized since your last visit? ED [facility] ER 4-22-20 for SOB    2. Have you seen or consulted any other health care providers outside of the [address] [place] [address] since your last visit? Include any pap smears or colon screening. Dr Jessica Flores, last 12 mths 1/9/2020   Able to walk? Yes   Fall in past 12 months? Yes   Fall with injury? -   Number of falls in past 12 months 1   Fall Risk Score -         Abuse Screening Questionnaire 1/9/2020   Do you ever feel afraid of your partner? N   Are you in a relationship with someone who physically or mentally threatens you? N   Is it safe for you to go home? Y       3 most recent PHQ Screens 2/17/2020   Little interest or pleasure in doing things Not at all   Feeling down, depressed, irritable, or hopeless Not at all   Total Score PHQ 2 0       There are no discontinued medications.

## 2020-06-18 ENCOUNTER — OFFICE VISIT (OUTPATIENT)
Dept: URGENT CARE | Age: 85
End: 2020-06-18

## 2020-06-18 VITALS — HEART RATE: 77 BPM | RESPIRATION RATE: 18 BRPM | TEMPERATURE: 98.4 F | OXYGEN SATURATION: 98 %

## 2020-06-18 DIAGNOSIS — Z20.822 ENCOUNTER FOR LABORATORY TESTING FOR COVID-19 VIRUS: Primary | ICD-10-CM

## 2020-06-21 LAB — SARS-COV-2, NAA: NOT DETECTED

## 2020-06-22 RX ORDER — CYCLOSPORINE 0.5 MG/ML
1 EMULSION OPHTHALMIC 2 TIMES DAILY
COMMUNITY
End: 2020-07-14 | Stop reason: ALTCHOICE

## 2020-06-22 RX ORDER — HYDROXYUREA 500 MG/1
500 CAPSULE ORAL 2 TIMES DAILY
Status: ON HOLD | COMMUNITY
End: 2020-07-07 | Stop reason: SDUPTHER

## 2020-06-23 ENCOUNTER — HOSPITAL ENCOUNTER (OUTPATIENT)
Age: 85
Setting detail: OUTPATIENT SURGERY
Discharge: HOME | End: 2020-06-23
Attending: INTERNAL MEDICINE | Admitting: INTERNAL MEDICINE
Payer: MEDICARE

## 2020-06-23 ENCOUNTER — ANESTHESIA (OUTPATIENT)
Dept: ENDOSCOPY | Age: 85
End: 2020-06-23
Payer: MEDICARE

## 2020-06-23 ENCOUNTER — ANESTHESIA EVENT (OUTPATIENT)
Dept: ENDOSCOPY | Age: 85
End: 2020-06-23
Payer: MEDICARE

## 2020-06-23 VITALS
OXYGEN SATURATION: 100 % | TEMPERATURE: 98.3 F | BODY MASS INDEX: 31.99 KG/M2 | HEART RATE: 89 BPM | DIASTOLIC BLOOD PRESSURE: 69 MMHG | RESPIRATION RATE: 19 BRPM | WEIGHT: 192 LBS | HEIGHT: 65 IN | SYSTOLIC BLOOD PRESSURE: 150 MMHG

## 2020-06-23 PROCEDURE — 74011000250 HC RX REV CODE- 250: Performed by: REGISTERED NURSE

## 2020-06-23 PROCEDURE — 88305 TISSUE EXAM BY PATHOLOGIST: CPT

## 2020-06-23 PROCEDURE — 76040000019: Performed by: INTERNAL MEDICINE

## 2020-06-23 PROCEDURE — 77030019988 HC FCPS ENDOSC DISP BSC -B: Performed by: INTERNAL MEDICINE

## 2020-06-23 PROCEDURE — 74011250636 HC RX REV CODE- 250/636: Performed by: REGISTERED NURSE

## 2020-06-23 PROCEDURE — 74011250636 HC RX REV CODE- 250/636: Performed by: INTERNAL MEDICINE

## 2020-06-23 PROCEDURE — 76060000031 HC ANESTHESIA FIRST 0.5 HR: Performed by: INTERNAL MEDICINE

## 2020-06-23 RX ORDER — DEXTROMETHORPHAN/PSEUDOEPHED 2.5-7.5/.8
1.2 DROPS ORAL
Status: DISCONTINUED | OUTPATIENT
Start: 2020-06-23 | End: 2020-06-23 | Stop reason: HOSPADM

## 2020-06-23 RX ORDER — SODIUM CHLORIDE 9 MG/ML
75 INJECTION, SOLUTION INTRAVENOUS CONTINUOUS
Status: DISCONTINUED | OUTPATIENT
Start: 2020-06-23 | End: 2020-06-23 | Stop reason: HOSPADM

## 2020-06-23 RX ORDER — EPINEPHRINE 0.1 MG/ML
1 INJECTION INTRACARDIAC; INTRAVENOUS
Status: DISCONTINUED | OUTPATIENT
Start: 2020-06-23 | End: 2020-06-23 | Stop reason: HOSPADM

## 2020-06-23 RX ORDER — MIDAZOLAM HYDROCHLORIDE 1 MG/ML
.25-5 INJECTION INTRAMUSCULAR; INTRAVENOUS
Status: DISCONTINUED | OUTPATIENT
Start: 2020-06-23 | End: 2020-06-23 | Stop reason: HOSPADM

## 2020-06-23 RX ORDER — ATROPINE SULFATE 0.1 MG/ML
0.5 INJECTION INTRAVENOUS
Status: DISCONTINUED | OUTPATIENT
Start: 2020-06-23 | End: 2020-06-23 | Stop reason: HOSPADM

## 2020-06-23 RX ORDER — PROPOFOL 10 MG/ML
INJECTION, EMULSION INTRAVENOUS AS NEEDED
Status: DISCONTINUED | OUTPATIENT
Start: 2020-06-23 | End: 2020-06-23 | Stop reason: HOSPADM

## 2020-06-23 RX ORDER — NALOXONE HYDROCHLORIDE 0.4 MG/ML
0.4 INJECTION, SOLUTION INTRAMUSCULAR; INTRAVENOUS; SUBCUTANEOUS
Status: DISCONTINUED | OUTPATIENT
Start: 2020-06-23 | End: 2020-06-23 | Stop reason: HOSPADM

## 2020-06-23 RX ORDER — LIDOCAINE HYDROCHLORIDE 20 MG/ML
INJECTION, SOLUTION EPIDURAL; INFILTRATION; INTRACAUDAL; PERINEURAL AS NEEDED
Status: DISCONTINUED | OUTPATIENT
Start: 2020-06-23 | End: 2020-06-23 | Stop reason: HOSPADM

## 2020-06-23 RX ORDER — GLYCOPYRROLATE 0.2 MG/ML
INJECTION INTRAMUSCULAR; INTRAVENOUS AS NEEDED
Status: DISCONTINUED | OUTPATIENT
Start: 2020-06-23 | End: 2020-06-23 | Stop reason: HOSPADM

## 2020-06-23 RX ORDER — FLUMAZENIL 0.1 MG/ML
0.2 INJECTION INTRAVENOUS
Status: DISCONTINUED | OUTPATIENT
Start: 2020-06-23 | End: 2020-06-23 | Stop reason: HOSPADM

## 2020-06-23 RX ORDER — FENTANYL CITRATE 50 UG/ML
25 INJECTION, SOLUTION INTRAMUSCULAR; INTRAVENOUS
Status: DISCONTINUED | OUTPATIENT
Start: 2020-06-23 | End: 2020-06-23 | Stop reason: HOSPADM

## 2020-06-23 RX ORDER — SODIUM CHLORIDE 0.9 % (FLUSH) 0.9 %
5-40 SYRINGE (ML) INJECTION AS NEEDED
Status: DISCONTINUED | OUTPATIENT
Start: 2020-06-23 | End: 2020-06-23 | Stop reason: HOSPADM

## 2020-06-23 RX ORDER — SODIUM CHLORIDE 0.9 % (FLUSH) 0.9 %
5-40 SYRINGE (ML) INJECTION EVERY 8 HOURS
Status: DISCONTINUED | OUTPATIENT
Start: 2020-06-23 | End: 2020-06-23 | Stop reason: HOSPADM

## 2020-06-23 RX ADMIN — PROPOFOL 60 MG: 10 INJECTION, EMULSION INTRAVENOUS at 09:27

## 2020-06-23 RX ADMIN — PROPOFOL 20 MG: 10 INJECTION, EMULSION INTRAVENOUS at 09:29

## 2020-06-23 RX ADMIN — PROPOFOL 20 MG: 10 INJECTION, EMULSION INTRAVENOUS at 09:31

## 2020-06-23 RX ADMIN — GLYCOPYRROLATE 0.2 MG: 0.2 INJECTION, SOLUTION INTRAMUSCULAR; INTRAVENOUS at 09:27

## 2020-06-23 RX ADMIN — LIDOCAINE HYDROCHLORIDE 100 MG: 20 INJECTION, SOLUTION EPIDURAL; INFILTRATION; INTRACAUDAL; PERINEURAL at 09:27

## 2020-06-23 RX ADMIN — SODIUM CHLORIDE: 900 INJECTION, SOLUTION INTRAVENOUS at 09:25

## 2020-06-23 NOTE — ROUTINE PROCESS
Kwabena Juanitoviccipriano  1935  [id number]    Situation:  Verbal report received from: Meka Sow RN  Procedure: Procedure(s):  ESOPHAGOGASTRODUODENOSCOPY (EGD)  ESOPHAGOGASTRODUODENAL (EGD) BIOPSY    Background:    Preoperative diagnosis: DYSPHAGIA  Postoperative diagnosis: dysphagia; history of esophageal reflux    :  Dr. Dacia Ricks  Assistant(s): Endoscopy Technician-1: Gerri Carrington  Endoscopy RN-1: Katy Olivarez    Specimens:   ID Type Source Tests Collected by Time Destination   1 : GE junction biopsy for pathology Preservative GE Yoandy Diaz MD 6/23/2020 7413 Pathology     H. Pylori  no    Assessment:  Intra-procedure medications       Anesthesia gave intra-procedure sedation and medications, see anesthesia flow sheet     Intravenous fluids: NS@ KVO     Vital signs stable     Abdominal assessment: round and soft     Recommendation:  Discharge patient per MD order  Family or Friend   Permission to share finding with family or friend yes

## 2020-06-23 NOTE — ANESTHESIA PREPROCEDURE EVALUATION
Anesthetic History   No history of anesthetic complications            Review of Systems / Medical History  Patient summary reviewed, nursing notes reviewed and pertinent labs reviewed    Pulmonary    COPD  Recent URI (No fever, chills or colored secretions)    Smoker      Comments:  On home oxygen at night    Former smoker - Quit 2012 - 25 pack years  H/O Acute Respiratory Failure  Denies COPD, only episodes of bronchitis   Neuro/Psych             Comments: Chronic Back Pain/Spinal Stenosis with Radiculopathy s/p lumbar surgery  Peripheral Neuropathy  Syncope Cardiovascular            Dysrhythmias : atrial fibrillation  Hyperlipidemia    Exercise tolerance: >4 METS  Comments: 11/2019 ECHO:  56-60% EF with trace MR    4- EKG: SR with first degree AV Blk, LVH   GI/Hepatic/Renal     GERD (occasional only): well controlled      PUD    Comments: Dysphagia          H/O Diverticulosis/Diverticulitis Endo/Other        Obesity, arthritis, cancer (left breast) and anemia    Comments: H/O Left Breast Cancer s/p Lumpectomy + XRT  Prediabetes Other Findings   Comments:   MDS (myelodysplastic syndrome) precursor to Leukemia  Erythroblastosis  Fibromyalgia  Rosacea  Osteoporosis  Chronic pain--uses TENS unit  Left breast cancer            Physical Exam    Airway  Mallampati: II  TM Distance: 4 - 6 cm  Neck ROM: normal range of motion   Mouth opening: Normal     Cardiovascular    Rhythm: regular  Rate: normal      Pertinent negatives: No murmur   Dental    Dentition: Caps/crowns, Implants, Upper dentition intact and Lower dentition intact  Comments: Across upper front  Poor lower dentition with multiple fillings   Pulmonary  Breath sounds clear to auscultation               Abdominal  GI exam deferred       Other Findings            Anesthetic Plan    ASA: 3  Anesthesia type: total IV anesthesia          Induction: Intravenous  Anesthetic plan and risks discussed with: Patient      Pt reports that she thinks she may be getting a cold. Discussed at length risks/benefits of having procedure if she is getting sick. Pt voiced understanding and wishes to proceed. Told pt we would have a very low threshold for aborting procedure.   Covid 19 NEG

## 2020-06-23 NOTE — DISCHARGE INSTRUCTIONS
[facility]  [id number]  1935    EGD DISCHARGE INSTRUCTIONS  Discomfort:  Sore throat- throat lozenges or warm salt water gargle  redness at IV site- apply warm compress to area; if redness or soreness persist- contact your physician  Gaseous discomfort- walking, belching will help relieve any discomfort  You may not operate a vehicle for 12 hours  You may not engage in an occupation involving machinery or appliances for rest of today  You may not drink alcoholic beverages for at least 12 hours  Avoid making any critical decisions for at least 24 hour  DIET  You may have minimal sips at this time-- do not eat or drink for two hours. You may eat and drink after 10am today  You may resume your regular diet - however -  remember your colon is empty and a heavy meal will produce gas. Avoid these foods:  vegetables, fried / greasy foods, carbonated drinks    MEDICATIONS:        ACTIVITY  You may resume your normal daily activities until tomorrow AM;  Spend the remainder of the day resting -  avoid any strenuous activity. CALL M.D.   ANY SIGN OF   Increasing pain, nausea, vomiting  Abdominal distension (swelling)  New increased bleeding (oral or rectal)  Fever (chills)  Pain in chest area  Bloody discharge from nose or mouth  Shortness of breath    IMPRESSION:  -Minimal irregularity at gastroesophageal junction at 40cm with no further ulceration noted; biopsied, followed by dilatation with 54FR Savary dilator over wire, with site evaluated endoscopically afterwards   -Normal stomach mucosa  -Normal duodenal mucosa    Follow-up Instructions:   Call Dr. Julia Awad for the results of procedure / biopsy in 7-10 days   Telephone # [phone]    Char Anders MD

## 2020-06-23 NOTE — PROCEDURES
NAME:  Natalya Gutierrez   :   1935   MRN:   [id number]     Date/Time:  2020 9:45 AM    Esophagogastroduodenoscopy (EGD) Procedure Note    Procedure: Esophagogastroduodenoscopy with biopsy, esophageal dilation    Indication:  Dysphagia/odynophagia, GERD  Pre-operative Diagnosis: see indication above  Post-operative Diagnosis: see findings below  :  Jay Mejia MD  Referring Provider:   Enma Whitfield MD     Exam:  Airway: clear, no airway problems anticipated  Heart: RRR, without gallops or rubs  Lungs: clear bilaterally without wheezes, crackles, or rhonchi  Abdomen: soft, nontender, nondistended, bowel sounds present  Mental Status: awake, alert and oriented to person, place and time      Anethesia/Sedation:  MAC anesthesia Propofol 100mg IV  Procedure Details   After informed consent was obtained for the procedure, with all risks and benefits of procedure explained the patient was taken to the endoscopy suite and placed in the left lateral decubitus position. Following sequential administration of sedation as per above, the [id number] gastroscope was inserted into the mouth and advanced under direct vision to second portion of the duodenum. A careful inspection was made as the gastroscope was withdrawn, including a retroflexed view of the proximal stomach; findings and interventions are described below. Findings:    -Minimal irregularity at gastroesophageal junction at 40cm with no further ulceration noted; biopsied, followed by dilatation with 54FR Savary dilator over wire, with site evaluated endoscopically afterwards   -Normal stomach mucosa  -Normal duodenal mucosa     Therapies:  biopsies of esophagus, Savary dilation over wire  Specimens: #1 g-e jxn  EBL:  None.          Complications:   None; patient tolerated the procedure well. Impression:    -Minimal irregularity at gastroesophageal junction at 40cm with no further ulceration noted; biopsied, followed by dilatation with 54FR Savary dilator over wire, with site evaluated endoscopically afterwards   -Normal stomach mucosa  -Normal duodenal mucosa     Recommendations:  -Continue acid suppression. , -Await pathology. , -Follow symptoms.     Discharge disposition:  Home in the company of  when able to ambulate    Aaron Warner MD

## 2020-06-23 NOTE — ANESTHESIA POSTPROCEDURE EVALUATION
Procedure(s):  ESOPHAGOGASTRODUODENOSCOPY (EGD)  ESOPHAGOGASTRODUODENAL (EGD) BIOPSY. general    Anesthesia Post Evaluation        Patient location during evaluation: PACU  Note status: Adequate. Level of consciousness: responsive to verbal stimuli and sleepy but conscious  Pain management: satisfactory to patient  Airway patency: patent  Anesthetic complications: no  Cardiovascular status: acceptable  Respiratory status: acceptable  Hydration status: acceptable  Comments: +Post-Anesthesia Evaluation and Assessment    Patient: Shantel Camarillo MRN: [id number]  SSN: [id number]   YOB: 1935  Age: 82 y.o. Sex: female      Cardiovascular Function/Vital Signs    /69   Pulse 89   Temp 36.8 °C (98.3 °F)   Resp 19   Ht 5' 5\" (1.651 m)   Wt 87.1 kg (192 lb)   SpO2 100%   BMI 31.95 kg/m²     Patient is status post Procedure(s):  ESOPHAGOGASTRODUODENOSCOPY (EGD)  ESOPHAGOGASTRODUODENAL (EGD) BIOPSY. Nausea/Vomiting: Controlled. Postoperative hydration reviewed and adequate. Pain:  Pain Scale 1: Numeric (0 - 10) (06/23/20 1002)  Pain Intensity 1: 0 (06/23/20 1002)   Managed. Neurological Status: At baseline. Mental Status and Level of Consciousness: Arousable. Pulmonary Status:   O2 Device: Room air (06/23/20 1002)   Adequate oxygenation and airway patent. Complications related to anesthesia: None    Post-anesthesia assessment completed. No concerns. Signed By: Justus Liao DO    6/23/2020  Post anesthesia nausea and vomiting:  controlled      INITIAL Post-op Vital signs:   Vitals Value Taken Time   /78 6/23/2020 10:04 AM   Temp 36.8 °C (98.3 °F) 6/23/2020  9:47 AM   Pulse 103 6/23/2020 10:05 AM   Resp 0 6/23/2020 10:06 AM   SpO2 95 % 6/23/2020 10:05 AM   Vitals shown include unvalidated device data.

## 2020-06-23 NOTE — H&P
Gastroenterology Outpatient History and Physical    Patient: Kwabena Rudolph    Physician: Camilo Mathias MD    Chief Complaint: dysphagia  History of Present Illness: 81yo F with hx Breast CA c/o persistent dysphagia. Prior EGD 1/2020 noted ulcer at GE-jxn that on bx was c/w chronic reflux.     History:  Past Medical History:   Diagnosis Date    Anemia     Arthritis     Breast cancer ([facility]     left    Bunion of right foot 8/20/2015    Chronic obstructive pulmonary disease (HCC)     mild    Chronic pain     back--uses tens unit    Diverticulitis 6/29/2018    Recurrent    Diverticulitis large intestine     Dry eye syndrome 6/29/2018    Fibromyalgia 6/29/2018    GERD (gastroesophageal reflux disease)     History of left breast cancer 2013    lumpectomy radiation    Hypercholesterolemia 6/29/2018    Ill-defined condition     blood transfusion hx    Leukemia (HCC)     MDS (myelodysplastic syndrome) ([facility]     Osteoporosis 6/29/2018    Oxygen dependent     at night    Peripheral neuropathy 6/29/2018    Prediabetes 6/29/2018    PUD (peptic ulcer disease)     Radiation therapy complication 0779    Lt breast-No Chemo    Rosacea 6/29/2018    Trouble in sleeping       Past Surgical History:   Procedure Laterality Date    COLONOSCOPY N/A 2/28/2020    COL[facility]jas [facility]  2/28/2020         HX APPENDECTOMY      HX BREAST LUMPECTOMY  11/21/2013    LEFT BREAST LUMPECTOMY W/LEFT BREAST SENTINEL NODE BIOPSY,AND NEEDLE LOCALIZATION performed by Annamaria Rowland MD at [facility] MAIN OR    HX CATARACT REMOVAL      bilateral    HX HYSTERECTOMY      ovarian cyst    HX MOHS PROCEDURES      right    HX ORTHOPAEDIC      back surgery x2    HX OTHER SURGICAL      colonoscopy    HX TONSILLECTOMY      HX VASCULAR ACCESS  02/2020    echo cath right shoulder    IR INSERT TUNL CVC W PORT OVER 5 YEARS  12/3/2019    TOTAL KNEE ARTHROPLASTY      left    TOTAL KNEE ARTHROPLASTY      right    UPPER GI ENDOSCOPY,BIOPSY  2020         US GUIDED CORE BREAST BIOPSY Left 2013    Breast Ca      Social History     Socioeconomic History    Marital status:      Spouse name: Not on file    Number of children: Not on file    Years of education: Not on file    Highest education level: Not on file   Tobacco Use    Smoking status: Former Smoker     Packs/day: 0.50     Years: 50.00     Pack years: 25.00     Last attempt to quit: 2012     Years since quittin.3    Smokeless tobacco: Never Used   Substance and Sexual Activity    Alcohol use:  Yes     Alcohol/week: 2.0 standard drinks     Types: 2 Glasses of wine per week    Drug use: No    Sexual activity: Not Currently      Family History   Problem Relation Age of Onset    Cancer Mother         bladder    Cancer Father     Breast Cancer Paternal Grandmother       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    Lumbar post-laminectomy syndrome M96.1    Spinal stenosis of lumbar region with neurogenic claudication M48.062    Syncope R55    Stenosis of both internal carotid arteries I65.23    Syncope and collapse R55    S/P lumpectomy, left breast Z98.890    Costochondritis M94.0    Diverticulitis K57.92    Dry eye syndrome H04.129    Fibromyalgia M79.7    GERD without esophagitis K21.9    Hypercholesterolemia E78.00    Osteoporosis M81.0    Peripheral neuropathy G62.9    Prediabetes R73.03    Rosacea L71.9    Acute respiratory failure with hypoxia (HCC) J96.01    Acute bronchitis due to other specified organisms J20.8    Anemia, unspecified D64.9    Thrombocytopenia (HCC) D69.6    Iron deficiency anemia D50.9    COPD (chronic obstructive pulmonary disease) ([facility] [address] J44.9    SIRS (systemic inflammatory response syndrome) ([facility]) R65.10    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    Anemia associated with bone marrow infiltration ([facility]) D61.82    Atrial fibrillation with rapid ventricular response ([facility]) I48.91    MDS (myelodysplastic syndrome) ([facility]) D46.9    PUD (peptic ulcer disease) K27.9    Leukemia, acute ([facility]) C95.00    Low back pain M54.5    Paroxysmal atrial fibrillation ([facility]) I48.0       Allergies: Allergies   Allergen Reactions    Latex Rash    Hydrocodone Nausea Only and Vertigo    Gabapentin Diarrhea, Nausea Only and Other (comments)     weakness    Lyrica [Pregabalin] Nausea Only    Oxycodone Nausea and Vomiting and Vertigo     Medications:   Prior to Admission medications    Medication Sig Start Date End Date Taking? Authorizing Provider   hydroxyurea (HYDREA) 500 mg capsule Take 500 mg by mouth two (2) times a day. Indications: chronic myelocytic leukemia   Yes Provider, Historical   cycloSPORINE (Restasis MultiDose) 0.05 % drop ophthalmic drops Administer 1 Drop to both eyes two (2) times a day. Yes Provider, Historical   Oxygen nightly as needed. 1-2 liter nasal cannula qhs and prn if needed   Yes Kristi, MD Libby   bismuth subsalicylate (PEPTO-BISMOL PO) Take  by mouth daily as needed. As ordered on the bottle   Yes Libby Berry MD   ergocalciferol (ERGOCALCIFEROL) 1,250 mcg (50,000 unit) capsule TAKE ONE CAPSULE BY MOUTH EVERY 7 DAYS 1/23/20  Yes Luis Kahn MD   acetaminophen (TYLENOL) 325 mg tablet Take 2 Tabs by mouth every four (4) hours as needed for Pain. 12/31/19  Yes Olya Roque MD   dilTIAZem CD (CARDIZEM CD) 180 mg ER capsule Take 1 Cap by mouth daily. 11/5/19  Yes Benjy Hamilton MD   pantoprazole (PROTONIX) 40 mg tablet Take 1 Tab by mouth Before breakfast and dinner.  11/5/19  Yes Benjy Hamilton MD     Physical Exam:   Vital Signs: Blood pressure 138/72, pulse 78, temperature 98 °F (36.7 °C), resp. rate 20, height 5' 5\" (1.651 m), weight 87.1 kg (192 lb), SpO2 100 %, unknown if currently breastfeeding.   General: well developed, well nourished   HEENT: unremarkable   Heart: regular rhythm no mumur    Lungs: clear   Abdominal:  benign   Neurological: unremarkable   Extremities: no edema     Findings/Diagnosis: Dysphagia  Plan of Care/Planned Procedure: EGD with bx and dilatation with conscious/deep sedation    Signed:  Jennifer Zarate MD 6/23/2020

## 2020-06-24 NOTE — PROGRESS NOTES
Neurology Note    Patient ID:  Janell Montgomery  [id number]  89 y.o.  1935    Subjective: I still have a neuropathy       History of Present Illness:   Janell Montgomery is a 82 y.o.  female who returns to the neurology clinic at [facility] for a follow-up visit. I did last see the patient on December 26, 2019. Please see my history of present illness, examination, and treatment based plan from that day. She had been seen by prior neurologist before my visit with her. She does have a history of a peripheral neuropathy. Since last visit, she does feel that her neuro is worsened in regards to pain and tingling. She also feels that she has more weakness in her legs. She is fearful fall but has not done so. She continues to be followed closely for her myelodysplastic syndrome. She has gotten 3 blood transfusions in February and March. She is also getting iron infusions every week. She is followed very closely by the hematologist.    She did have a recent GI ulcer and did have a follow-up gastroenterology study. The ulcer has now resolved. She denies any specific focal weakness numbness or tingling.     Past Medical History:   Diagnosis Date    Anemia     Arthritis     Breast cancer ([address]     left    Bunion of right foot 8/20/2015    Chronic obstructive pulmonary disease (HCC)     mild    Chronic pain     back--uses tens unit    Diverticulitis 6/29/2018    Recurrent    Diverticulitis large intestine     Dry eye syndrome 6/29/2018    Fibromyalgia 6/29/2018    GERD (gastroesophageal reflux disease)     History of left breast cancer 2013    lumpectomy radiation    Hypercholesterolemia 6/29/2018    Ill-defined condition     blood transfusion hx    Leukemia ([address]     MDS (myelodysplastic syndrome) ([address]     Osteoporosis 6/29/2018    Oxygen dependent     at night    Peripheral neuropathy 6/29/2018    Prediabetes 6/29/2018    PUD (peptic ulcer disease)     Radiation therapy complication 6018    Lt breast-No Chemo    Rosacea 2018    Trouble in sleeping         Past Surgical History:   Procedure Laterality Date    COLONOSCOPY N/A 2020    COLONOSCOPY performed by [facility]thia [facility]ias MD at [facility] ENDOSCOPY    COLONOSCOPY,DIAGNOSTIC  2020         HX APPENDECTOMY      HX BREAST LUMPECTOMY  2013    LEFT BREAST LUMPECTOMY W/LEFT BREAST SENTINEL NODE BIOPSY,AND NEEDLE LOCALIZATION performed by Donald Cook MD at [facility] MAIN OR    HX CATARACT REMOVAL      bilateral    HX HYSTERECTOMY      ovarian cyst    HX MOHS PROCEDURES      right    HX ORTHOPAEDIC      back surgery x2    HX OTHER SURGICAL      colonoscopy    HX TONSILLECTOMY      HX VASCULAR ACCESS  2020    echo cath right shoulder    IR INSERT TUNL CVC W PORT OVER 5 YEARS  12/3/2019    TOTAL KNEE ARTHROPLASTY      left    TOTAL KNEE ARTHROPLASTY      right    UPPER GI ENDOSCOPY,BIOPSY  2020         UPPER GI ENDOSCOPY,BIOPSY  2020         UPPER GI ENDOSCOPY,DILATN W GUIDE  2020         US GUIDED CORE BREAST BIOPSY Left     Breast Ca        Family History   Problem Relation Age of Onset    Cancer Mother         bladder    Cancer Father     Breast Cancer Paternal Grandmother         Social History     Tobacco Use    Smoking status: Former Smoker     Packs/day: 0.50     Years: 50.00     Pack years: 25.00     Last attempt to quit: 2012     Years since quittin.3    Smokeless tobacco: Never Used   Substance Use Topics    Alcohol use: Yes     Alcohol/week: 2.0 standard drinks     Types: 2 Glasses of wine per week        Allergies   Allergen Reactions    Latex Rash    Hydrocodone Nausea Only and Vertigo    Gabapentin Diarrhea, Nausea Only and Other (comments)     weakness    Lyrica [Pregabalin] Nausea Only    Oxycodone Nausea and Vomiting and Vertigo        Prior to Admission medications    Medication Sig Start Date End Date Taking?  Authorizing Provider   hydroxyurea (HYDREA) 500 mg capsule Take 500 mg by mouth two (2) times a day. Indications: chronic myelocytic leukemia   Yes Provider, Historical   cycloSPORINE (Restasis MultiDose) 0.05 % drop ophthalmic drops Administer 1 Drop to both eyes two (2) times a day. Yes Provider, Historical   Oxygen nightly as needed. 1-2 liter nasal cannula qhs and prn if needed   Yes Other, MD Libby   bismuth subsalicylate (PEPTO-BISMOL PO) Take  by mouth daily as needed. As ordered on the bottle   Yes Other, MD Libby   ergocalciferol (ERGOCALCIFEROL) 1,250 mcg (50,000 unit) capsule TAKE ONE CAPSULE BY MOUTH EVERY 7 DAYS 1/23/20  Yes Trice Park MD   acetaminophen (TYLENOL) 325 mg tablet Take 2 Tabs by mouth every four (4) hours as needed for Pain. 12/31/19  Yes Mary Ellen Alicia MD   dilTIAZem CD (CARDIZEM CD) 180 mg ER capsule Take 1 Cap by mouth daily. 11/5/19  Yes Manjula Harris MD   pantoprazole (PROTONIX) 40 mg tablet Take 1 Tab by mouth Before breakfast and dinner. 11/5/19  Yes Manjula Harris MD       Review of Systems:    General, constitutional: negative, balance difficulty, fatigue and tiredness  Eyes, vision: negative  Ears, nose, throat: negative  Cardiovascular, heart: negative  Respiratory: negative  Gastrointestinal: negative  Genitourinary: negative  Musculoskeletal: negative, back pain  Skin and integumentary: negative  Psychiatric: negative  Endocrine: negative  Neurological: Numbness in her feet , the rest is negative except for HPI  Hematologic/lymphatic: negative  Allergy/immunology: negative      Objective:       Visit Vitals  /70   Pulse 80   Temp 98.3 °F (36.8 °C)   Ht 5' 5\" (1.651 m)   Wt 191 lb (86.6 kg)   SpO2 97%   BMI 31.78 kg/m²       Physical Exam:  General:  appears well nourished in no acute distress  Neck: no carotid bruits  Lungs: clear to auscultation  Heart:  no murmurs, regular rate  Lower extremity: no edema  Skin: intact.   She does have scars from her bilateral knee replacements    Awake, alert, oriented to person, place and time    Language was intact. There was no aphasia or dysarthria    Cranial nerves:   II-XII were tested    Perrrla  Visual fields were full  Eomi, no evidence of nystagmus  Facial sensation:  normal and symmetric  Facial motor: normal and symmetric  Hearing intact  SCM strength intact  Tongue: midline without fasciculations    Motor: Tone normal    No evidence of fasciculations    Strength testing:   deltoid triceps biceps Wrist ext. Wrist flex. intrinsics Hip flex. Hip ext. Knee ext. Knee flex Dorsi flex Plantar flex   Right 5 5 5 5 5 5 5 5 5 5 5 5   Left 5 5 5 5 5 5 5 5 5 5 5 5         Sensory:  Upper extremity: intact to pp, light touch, and vibration > 10 seconds  Lower extremity: Pinprick was decreased to the level of her ankle. Vibration was 6 seconds in her toes and 8 seconds at her ankle. This is slightly improved from her visit 7 months ago. Reflexes:    Right Left  Biceps  2 2  Triceps 2 2  Brachiorad. 2 2  Patella  1          1  Achilles - -    Plantar response:  flexor bilaterally    Cerebellar testing:  no tremor apparent, finger/nose and christy were intact    Romberg: Present    Gait: steady. It is wide-based and slow. She does need a cane to help with her balance    Labs:       Assessment and plan:       Patient is a pleasant 82-year-old female with a history of slowly progressive sensory disturbance, balance problems, and pain in her lower extremities. Her neurological examination is consistent with a distal length dependent neuropathy. Compared to her examination from 7 months ago, I do not see any worsening of her neuromuscular examination in regards to her neuropathy and there may be mild improvement. Her neuropathic pain however is more bothersome    Peripheral neuropathy:  The patient has had an extensive work-up completed. She does have an axonal neuropathy by electrodiagnostic studies.     She did have a a large serological work-up which was unrevealing. Given the extensive work-up that had been performed, this is most likely an idiopathic peripheral neuropathy. Reassuringly, her neuromuscular examination is unchanged from an objective standpoint, but her neuropathic pain is worse. She has tried both gabapentin and Lyrica and was unable to tolerate them due to side effect. She is very much interested in trying another medication. We did discuss treatment options and we will start Cymbalta at 30 mg a day. Side effects and risk were discussed with her. I did send a prescription to her pharmacy. Generalized fatigue and tiredness:  I do think this is most likely related to her underlying hematologic disorder and ongoing treatment. She will follow back up in regards to her myelodysplastic syndrome:    The patient should return to clinic in 6-9 months    Renewed medication: Yes. Cymbalta . Side effects and toxicity were reviewed. Neuropathy: We reviewed the possible causes and etiologies of neuropathy. We discussed the importance of activity and exercise associated with neuropathy. We discussed the importance of safety with ambulation and considering adaptive equipment as necessary to help her maintain safe ambulation.       Signed By:  Shelbie Vera DO FAAN    June 25, 2020

## 2020-06-25 ENCOUNTER — OFFICE VISIT (OUTPATIENT)
Dept: NEUROLOGY | Age: 85
End: 2020-06-25

## 2020-06-25 VITALS
HEART RATE: 80 BPM | TEMPERATURE: 98.3 F | SYSTOLIC BLOOD PRESSURE: 128 MMHG | DIASTOLIC BLOOD PRESSURE: 70 MMHG | WEIGHT: 191 LBS | HEIGHT: 65 IN | BODY MASS INDEX: 31.82 KG/M2 | OXYGEN SATURATION: 97 %

## 2020-06-25 DIAGNOSIS — G62.9 NEUROPATHY: Primary | ICD-10-CM

## 2020-06-25 RX ORDER — DULOXETIN HYDROCHLORIDE 30 MG/1
30 CAPSULE, DELAYED RELEASE ORAL DAILY
Qty: 30 CAP | Refills: 5 | Status: SHIPPED | OUTPATIENT
Start: 2020-06-25 | End: 2020-07-14 | Stop reason: ALTCHOICE

## 2020-06-25 NOTE — PATIENT INSTRUCTIONS
Office Policies 
o Phone calls/patient messages: Please allow up to 24 hours for someone in the office to contact you about your call or message. Be mindful your provider may be out of the office or your message may require further review. We encourage you to use [facility] for your messages as this is a faster, more efficient way to communicate with our office 
o Medication Refills: 
Prescription medications require up to 48 business hours to process. We encourage you to use [facility] for your refills. For controlled medications: Please allow up to 72 business hours to process. Certain medications may require you to  a written prescription at our office. NO narcotic/controlled medications will be prescribed after 4pm Monday through Friday or on weekends 
o Form/Paperwork Completion: We ask that you allow 7-14 business days. You may also download your forms to [facility] to have your doctor print off. A Healthy Lifestyle: Care Instructions Your Care Instructions A healthy lifestyle can help you feel good, stay at a healthy weight, and have plenty of energy for both work and play. A healthy lifestyle is something you can share with your whole family. A healthy lifestyle also can lower your risk for serious health problems, such as high blood pressure, heart disease, and diabetes. You can follow a few steps listed below to improve your health and the health of your family. Follow-up care is a key part of your treatment and safety. Be sure to make and go to all appointments, and call your doctor if you are having problems. It's also a good idea to know your test results and keep a list of the medicines you take. How can you care for yourself at home? · Do not eat too much sugar, fat, or fast foods. You can still have dessert and treats now and then. The goal is moderation. · Start small to improve your eating habits.  Pay attention to portion sizes, drink less juice and soda pop, and eat more fruits and vegetables. ? Eat a healthy amount of food. A 3-ounce serving of meat, for example, is about the size of a deck of cards. Fill the rest of your plate with vegetables and whole grains. ? Limit the amount of soda and sports drinks you have every day. Drink more water when you are thirsty. ? Eat at least 5 servings of fruits and vegetables every day. It may seem like a lot, but it is not hard to reach this goal. A serving or helping is 1 piece of fruit, 1 cup of vegetables, or 2 cups of leafy, raw vegetables. Have an apple or some carrot sticks as an afternoon snack instead of a candy bar. Try to have fruits and/or vegetables at every meal. 
· Make exercise part of your daily routine. You may want to start with simple activities, such as walking, bicycling, or slow swimming. Try to be active 30 to 60 minutes every day. You do not need to do all 30 to 60 minutes all at once. For example, you can exercise 3 times a day for 10 or 20 minutes. Moderate exercise is safe for most people, but it is always a good idea to talk to your doctor before starting an exercise program. 
· Keep moving. Hilaria Bertrandi the lawn, work in the garden, or [employer]. Take the stairs instead of the elevator at work. · If you smoke, quit. People who smoke have an increased risk for heart attack, stroke, cancer, and other lung illnesses. Quitting is hard, but there are ways to boost your chance of quitting tobacco for good. ? Use nicotine gum, patches, or lozenges. ? Ask your doctor about stop-smoking programs and medicines. ? Keep trying. In addition to reducing your risk of diseases in the future, you will notice some benefits soon after you stop using tobacco. If you have shortness of breath or asthma symptoms, they will likely get better within a few weeks after you quit. · Limit how much alcohol you drink.  Moderate amounts of alcohol (up to 2 drinks a day for men, 1 drink a day for women) are okay. But drinking too much can lead to liver problems, high blood pressure, and other health problems. Family health If you have a family, there are many things you can do together to improve your health. · Eat meals together as a family as often as possible. · Eat healthy foods. This includes fruits, vegetables, lean meats and dairy, and whole grains. · Include your family in your fitness plan. Most people think of activities such as jogging or tennis as the way to fitness, but there are many ways you and your family can be more active. Anything that makes you breathe hard and gets your heart pumping is exercise. Here are some tips: 
? Walk to do errands or to take your child to school or the bus. 
? Go for a family bike ride after dinner instead of watching TV. Where can you learn more? Go to http://sumit-arpit.info/ Enter E690 in the search box to learn more about \"A Healthy Lifestyle: Care Instructions. \" Current as of: January 31, 2020               Content Version: 12.5 © 1614-3968 [employer]. Care instructions adapted under license by [employer] (which disclaims liability or warranty for this information). If you have questions about a medical condition or this instruction, always ask your healthcare professional. [address] any warranty or liability for your use of this information.

## 2020-06-26 ENCOUNTER — APPOINTMENT (OUTPATIENT)
Dept: CT IMAGING | Age: 85
End: 2020-06-26
Attending: EMERGENCY MEDICINE
Payer: MEDICARE

## 2020-06-26 ENCOUNTER — HOSPITAL ENCOUNTER (INPATIENT)
Age: 85
LOS: 11 days | Discharge: HOME HEALTH | DRG: 011 | End: 2020-07-07
Attending: EMERGENCY MEDICINE | Admitting: INTERNAL MEDICINE
Payer: MEDICARE

## 2020-06-26 ENCOUNTER — ANESTHESIA EVENT (OUTPATIENT)
Dept: SURGERY | Age: 85
DRG: 011 | End: 2020-06-26
Payer: MEDICARE

## 2020-06-26 ENCOUNTER — ANESTHESIA (OUTPATIENT)
Dept: SURGERY | Age: 85
DRG: 011 | End: 2020-06-26
Payer: MEDICARE

## 2020-06-26 ENCOUNTER — APPOINTMENT (OUTPATIENT)
Dept: GENERAL RADIOLOGY | Age: 85
End: 2020-06-26
Attending: EMERGENCY MEDICINE
Payer: MEDICARE

## 2020-06-26 ENCOUNTER — HOSPITAL ENCOUNTER (EMERGENCY)
Age: 85
Discharge: ANOTHER HOSPITAL | End: 2020-06-26
Attending: EMERGENCY MEDICINE
Payer: MEDICARE

## 2020-06-26 VITALS
BODY MASS INDEX: 31 KG/M2 | SYSTOLIC BLOOD PRESSURE: 95 MMHG | TEMPERATURE: 98.4 F | RESPIRATION RATE: 15 BRPM | WEIGHT: 186.07 LBS | OXYGEN SATURATION: 100 % | HEIGHT: 65 IN | HEART RATE: 83 BPM | DIASTOLIC BLOOD PRESSURE: 74 MMHG

## 2020-06-26 DIAGNOSIS — R06.02 SOB (SHORTNESS OF BREATH): ICD-10-CM

## 2020-06-26 DIAGNOSIS — J04.30: ICD-10-CM

## 2020-06-26 DIAGNOSIS — R19.7 DIARRHEA, UNSPECIFIED TYPE: Primary | ICD-10-CM

## 2020-06-26 DIAGNOSIS — D46.9 MYELODYSPLASIA (MYELODYSPLASTIC SYNDROME) (HCC): ICD-10-CM

## 2020-06-26 DIAGNOSIS — D72.829 LEUKOCYTOSIS, UNSPECIFIED TYPE: ICD-10-CM

## 2020-06-26 DIAGNOSIS — J04.30 SUPRAGLOTTITIS WITHOUT AIRWAY OBSTRUCTION: Primary | ICD-10-CM

## 2020-06-26 PROBLEM — J38.7 MASS OF EPIGLOTTIS: Status: ACTIVE | Noted: 2020-06-26

## 2020-06-26 LAB
ALBUMIN SERPL-MCNC: 3.8 G/DL (ref 3.5–5)
ALBUMIN/GLOB SERPL: 1 {RATIO} (ref 1.1–2.2)
ALP SERPL-CCNC: 62 U/L (ref 45–117)
ALT SERPL-CCNC: 22 U/L (ref 12–78)
ANION GAP SERPL CALC-SCNC: 9 MMOL/L (ref 5–15)
APPEARANCE UR: CLEAR
AST SERPL-CCNC: 15 U/L (ref 15–37)
BACTERIA URNS QL MICRO: NEGATIVE /HPF
BASOPHILS # BLD: 0 K/UL (ref 0–0.1)
BASOPHILS NFR BLD: 0 % (ref 0–1)
BILIRUB SERPL-MCNC: 0.6 MG/DL (ref 0.2–1)
BILIRUB UR QL: NEGATIVE
BUN SERPL-MCNC: 18 MG/DL (ref 6–20)
BUN/CREAT SERPL: 33 (ref 12–20)
CALCIUM SERPL-MCNC: 8.6 MG/DL (ref 8.5–10.1)
CHLORIDE SERPL-SCNC: 108 MMOL/L (ref 97–108)
CO2 SERPL-SCNC: 23 MMOL/L (ref 21–32)
COLOR UR: ABNORMAL
CREAT SERPL-MCNC: 0.55 MG/DL (ref 0.55–1.02)
DEPRECATED S PYO AG THROAT QL EIA: NEGATIVE
DIFFERENTIAL METHOD BLD: ABNORMAL
EOSINOPHIL # BLD: 0 K/UL (ref 0–0.4)
EOSINOPHIL NFR BLD: 0 % (ref 0–7)
EPITH CASTS URNS QL MICRO: ABNORMAL /LPF
ERYTHROCYTE [DISTWIDTH] IN BLOOD BY AUTOMATED COUNT: 20.5 % (ref 11.5–14.5)
GLOBULIN SER CALC-MCNC: 3.7 G/DL (ref 2–4)
GLUCOSE BLD STRIP.AUTO-MCNC: 120 MG/DL (ref 65–100)
GLUCOSE BLD STRIP.AUTO-MCNC: 159 MG/DL (ref 65–100)
GLUCOSE SERPL-MCNC: 99 MG/DL (ref 65–100)
GLUCOSE UR STRIP.AUTO-MCNC: NEGATIVE MG/DL
HCT VFR BLD AUTO: 30.8 % (ref 35–47)
HGB BLD-MCNC: 9.3 G/DL (ref 11.5–16)
HGB UR QL STRIP: NEGATIVE
IMM GRANULOCYTES # BLD AUTO: 0 K/UL (ref 0–0.04)
IMM GRANULOCYTES NFR BLD AUTO: 0 % (ref 0–0.5)
KETONES UR QL STRIP.AUTO: NEGATIVE MG/DL
LACTATE BLD-SCNC: 0.56 MMOL/L (ref 0.4–2)
LEUKOCYTE ESTERASE UR QL STRIP.AUTO: ABNORMAL
LYMPHOCYTES # BLD: 3.1 K/UL (ref 0.8–3.5)
LYMPHOCYTES NFR BLD: 10 % (ref 12–49)
MCH RBC QN AUTO: 34.6 PG (ref 26–34)
MCHC RBC AUTO-ENTMCNC: 30.2 G/DL (ref 30–36.5)
MCV RBC AUTO: 114.5 FL (ref 80–99)
METAMYELOCYTES NFR BLD MANUAL: 3 %
MONOCYTES # BLD: 10.4 K/UL (ref 0–1)
MONOCYTES NFR BLD: 34 % (ref 5–13)
MYELOCYTES NFR BLD MANUAL: 2 %
NEUTS SEG # BLD: 15.6 K/UL (ref 1.8–8)
NEUTS SEG NFR BLD: 51 % (ref 32–75)
NITRITE UR QL STRIP.AUTO: NEGATIVE
NRBC # BLD: 0.04 K/UL (ref 0–0.01)
NRBC BLD-RTO: 0.1 PER 100 WBC
PH UR STRIP: 6 [PH] (ref 5–8)
PLATELET # BLD AUTO: 139 K/UL (ref 150–400)
PMV BLD AUTO: 10.2 FL (ref 8.9–12.9)
POTASSIUM SERPL-SCNC: 3.8 MMOL/L (ref 3.5–5.1)
PROT SERPL-MCNC: 7.5 G/DL (ref 6.4–8.2)
PROT UR STRIP-MCNC: NEGATIVE MG/DL
RBC # BLD AUTO: 2.69 M/UL (ref 3.8–5.2)
RBC #/AREA URNS HPF: ABNORMAL /HPF (ref 0–5)
RBC MORPH BLD: ABNORMAL
SERVICE CMNT-IMP: ABNORMAL
SERVICE CMNT-IMP: ABNORMAL
SODIUM SERPL-SCNC: 140 MMOL/L (ref 136–145)
SP GR UR REFRACTOMETRY: 1.02 (ref 1–1.03)
UR CULT HOLD, URHOLD: NORMAL
UROBILINOGEN UR QL STRIP.AUTO: 0.2 EU/DL (ref 0.2–1)
WBC # BLD AUTO: 30.5 K/UL (ref 3.6–11)
WBC URNS QL MICRO: ABNORMAL /HPF (ref 0–4)

## 2020-06-26 PROCEDURE — 74011250636 HC RX REV CODE- 250/636

## 2020-06-26 PROCEDURE — 36415 COLL VENOUS BLD VENIPUNCTURE: CPT

## 2020-06-26 PROCEDURE — 74011000258 HC RX REV CODE- 258: Performed by: NURSE ANESTHETIST, CERTIFIED REGISTERED

## 2020-06-26 PROCEDURE — 87040 BLOOD CULTURE FOR BACTERIA: CPT

## 2020-06-26 PROCEDURE — 99285 EMERGENCY DEPT VISIT HI MDM: CPT

## 2020-06-26 PROCEDURE — 70360 X-RAY EXAM OF NECK: CPT

## 2020-06-26 PROCEDURE — 93005 ELECTROCARDIOGRAM TRACING: CPT

## 2020-06-26 PROCEDURE — 74011000250 HC RX REV CODE- 250: Performed by: EMERGENCY MEDICINE

## 2020-06-26 PROCEDURE — 74011250636 HC RX REV CODE- 250/636: Performed by: EMERGENCY MEDICINE

## 2020-06-26 PROCEDURE — 74011000250 HC RX REV CODE- 250: Performed by: OTOLARYNGOLOGY

## 2020-06-26 PROCEDURE — 77030018836 HC SOL IRR NACL ICUM -A: Performed by: OTOLARYNGOLOGY

## 2020-06-26 PROCEDURE — 71046 X-RAY EXAM CHEST 2 VIEWS: CPT

## 2020-06-26 PROCEDURE — 74011000258 HC RX REV CODE- 258

## 2020-06-26 PROCEDURE — 65620000000 HC RM CCU GENERAL

## 2020-06-26 PROCEDURE — 80053 COMPREHEN METABOLIC PANEL: CPT

## 2020-06-26 PROCEDURE — 76010000149 HC OR TIME 1 TO 1.5 HR: Performed by: OTOLARYNGOLOGY

## 2020-06-26 PROCEDURE — 76210000016 HC OR PH I REC 1 TO 1.5 HR: Performed by: OTOLARYNGOLOGY

## 2020-06-26 PROCEDURE — 74011250636 HC RX REV CODE- 250/636: Performed by: ANESTHESIOLOGY

## 2020-06-26 PROCEDURE — 88305 TISSUE EXAM BY PATHOLOGIST: CPT

## 2020-06-26 PROCEDURE — 87880 STREP A ASSAY W/OPTIC: CPT

## 2020-06-26 PROCEDURE — 0B110F4 BYPASS TRACHEA TO CUTANEOUS WITH TRACHEOSTOMY DEVICE, OPEN APPROACH: ICD-10-PCS | Performed by: OTOLARYNGOLOGY

## 2020-06-26 PROCEDURE — 77030008793 HC TU TRACH CUF COVD -B: Performed by: OTOLARYNGOLOGY

## 2020-06-26 PROCEDURE — 74011250636 HC RX REV CODE- 250/636: Performed by: NURSE ANESTHETIST, CERTIFIED REGISTERED

## 2020-06-26 PROCEDURE — 82962 GLUCOSE BLOOD TEST: CPT

## 2020-06-26 PROCEDURE — 76060000033 HC ANESTHESIA 1 TO 1.5 HR: Performed by: OTOLARYNGOLOGY

## 2020-06-26 PROCEDURE — 81001 URINALYSIS AUTO W/SCOPE: CPT

## 2020-06-26 PROCEDURE — 77030002996 HC SUT SLK J&J -A: Performed by: OTOLARYNGOLOGY

## 2020-06-26 PROCEDURE — 74011000258 HC RX REV CODE- 258: Performed by: EMERGENCY MEDICINE

## 2020-06-26 PROCEDURE — 74011000250 HC RX REV CODE- 250: Performed by: NURSE ANESTHETIST, CERTIFIED REGISTERED

## 2020-06-26 PROCEDURE — 77030002888 HC SUT CHRMC J&J -A: Performed by: OTOLARYNGOLOGY

## 2020-06-26 PROCEDURE — 77030005513 HC CATH URETH FOL11 MDII -B

## 2020-06-26 PROCEDURE — 96376 TX/PRO/DX INJ SAME DRUG ADON: CPT

## 2020-06-26 PROCEDURE — 85025 COMPLETE CBC W/AUTO DIFF WBC: CPT

## 2020-06-26 PROCEDURE — 77030031139 HC SUT VCRL2 J&J -A: Performed by: OTOLARYNGOLOGY

## 2020-06-26 PROCEDURE — 83605 ASSAY OF LACTIC ACID: CPT

## 2020-06-26 PROCEDURE — 77030011640 HC PAD GRND REM COVD -A: Performed by: OTOLARYNGOLOGY

## 2020-06-26 PROCEDURE — 96374 THER/PROPH/DIAG INJ IV PUSH: CPT

## 2020-06-26 PROCEDURE — 71275 CT ANGIOGRAPHY CHEST: CPT

## 2020-06-26 PROCEDURE — 0CBS8ZX EXCISION OF LARYNX, VIA NATURAL OR ARTIFICIAL OPENING ENDOSCOPIC, DIAGNOSTIC: ICD-10-PCS | Performed by: OTOLARYNGOLOGY

## 2020-06-26 PROCEDURE — 96375 TX/PRO/DX INJ NEW DRUG ADDON: CPT

## 2020-06-26 PROCEDURE — 74011636320 HC RX REV CODE- 636/320: Performed by: EMERGENCY MEDICINE

## 2020-06-26 PROCEDURE — 87070 CULTURE OTHR SPECIMN AEROBIC: CPT

## 2020-06-26 PROCEDURE — 77030040356 HC CORD BPLR FRCP COVD -A: Performed by: OTOLARYNGOLOGY

## 2020-06-26 PROCEDURE — 70491 CT SOFT TISSUE NECK W/DYE: CPT

## 2020-06-26 RX ORDER — PHENYLEPHRINE HCL IN 0.9% NACL 0.4MG/10ML
SYRINGE (ML) INTRAVENOUS AS NEEDED
Status: DISCONTINUED | OUTPATIENT
Start: 2020-06-26 | End: 2020-06-26 | Stop reason: HOSPADM

## 2020-06-26 RX ORDER — CEFAZOLIN SODIUM/WATER 2 G/20 ML
SYRINGE (ML) INTRAVENOUS
Status: DISCONTINUED
Start: 2020-06-26 | End: 2020-06-27

## 2020-06-26 RX ORDER — ACETAMINOPHEN 325 MG/1
650 TABLET ORAL ONCE
Status: DISCONTINUED | OUTPATIENT
Start: 2020-06-27 | End: 2020-06-27

## 2020-06-26 RX ORDER — MIDAZOLAM HYDROCHLORIDE 1 MG/ML
INJECTION, SOLUTION INTRAMUSCULAR; INTRAVENOUS
Status: COMPLETED
Start: 2020-06-26 | End: 2020-06-26

## 2020-06-26 RX ORDER — SODIUM CHLORIDE, SODIUM LACTATE, POTASSIUM CHLORIDE, CALCIUM CHLORIDE 600; 310; 30; 20 MG/100ML; MG/100ML; MG/100ML; MG/100ML
INJECTION, SOLUTION INTRAVENOUS
Status: DISCONTINUED | OUTPATIENT
Start: 2020-06-26 | End: 2020-06-26 | Stop reason: HOSPADM

## 2020-06-26 RX ORDER — SODIUM CHLORIDE, SODIUM LACTATE, POTASSIUM CHLORIDE, CALCIUM CHLORIDE 600; 310; 30; 20 MG/100ML; MG/100ML; MG/100ML; MG/100ML
100 INJECTION, SOLUTION INTRAVENOUS CONTINUOUS
Status: DISCONTINUED | OUTPATIENT
Start: 2020-06-26 | End: 2020-06-26 | Stop reason: HOSPADM

## 2020-06-26 RX ORDER — ONDANSETRON 2 MG/ML
4 INJECTION INTRAMUSCULAR; INTRAVENOUS
Status: COMPLETED | OUTPATIENT
Start: 2020-06-26 | End: 2020-06-26

## 2020-06-26 RX ORDER — SODIUM CHLORIDE 0.9 % (FLUSH) 0.9 %
5-40 SYRINGE (ML) INJECTION AS NEEDED
Status: DISCONTINUED | OUTPATIENT
Start: 2020-06-26 | End: 2020-06-26 | Stop reason: HOSPADM

## 2020-06-26 RX ORDER — ROPIVACAINE HYDROCHLORIDE 5 MG/ML
30 INJECTION, SOLUTION EPIDURAL; INFILTRATION; PERINEURAL AS NEEDED
Status: DISCONTINUED | OUTPATIENT
Start: 2020-06-26 | End: 2020-06-27

## 2020-06-26 RX ORDER — DEXAMETHASONE SODIUM PHOSPHATE 10 MG/ML
10 INJECTION INTRAMUSCULAR; INTRAVENOUS ONCE
Status: COMPLETED | OUTPATIENT
Start: 2020-06-26 | End: 2020-06-26

## 2020-06-26 RX ORDER — LIDOCAINE HYDROCHLORIDE 20 MG/ML
10 SOLUTION OROPHARYNGEAL
Status: COMPLETED | OUTPATIENT
Start: 2020-06-26 | End: 2020-06-26

## 2020-06-26 RX ORDER — SODIUM CHLORIDE 0.9 % (FLUSH) 0.9 %
5-40 SYRINGE (ML) INJECTION EVERY 8 HOURS
Status: DISCONTINUED | OUTPATIENT
Start: 2020-06-27 | End: 2020-06-27

## 2020-06-26 RX ORDER — MIDAZOLAM HYDROCHLORIDE 1 MG/ML
1 INJECTION, SOLUTION INTRAMUSCULAR; INTRAVENOUS AS NEEDED
Status: DISCONTINUED | OUTPATIENT
Start: 2020-06-26 | End: 2020-06-27

## 2020-06-26 RX ORDER — SODIUM CHLORIDE, SODIUM LACTATE, POTASSIUM CHLORIDE, CALCIUM CHLORIDE 600; 310; 30; 20 MG/100ML; MG/100ML; MG/100ML; MG/100ML
100 INJECTION, SOLUTION INTRAVENOUS CONTINUOUS
Status: DISCONTINUED | OUTPATIENT
Start: 2020-06-27 | End: 2020-06-27

## 2020-06-26 RX ORDER — SODIUM CHLORIDE 0.9 % (FLUSH) 0.9 %
10 SYRINGE (ML) INJECTION
Status: COMPLETED | OUTPATIENT
Start: 2020-06-26 | End: 2020-06-26

## 2020-06-26 RX ORDER — SODIUM CHLORIDE 0.9 % (FLUSH) 0.9 %
5-40 SYRINGE (ML) INJECTION EVERY 8 HOURS
Status: DISCONTINUED | OUTPATIENT
Start: 2020-06-26 | End: 2020-06-27

## 2020-06-26 RX ORDER — FENTANYL CITRATE 50 UG/ML
25 INJECTION, SOLUTION INTRAMUSCULAR; INTRAVENOUS
Status: COMPLETED | OUTPATIENT
Start: 2020-06-26 | End: 2020-06-26

## 2020-06-26 RX ORDER — SODIUM CHLORIDE 0.9 % (FLUSH) 0.9 %
5-40 SYRINGE (ML) INJECTION AS NEEDED
Status: DISCONTINUED | OUTPATIENT
Start: 2020-06-26 | End: 2020-06-27

## 2020-06-26 RX ORDER — LIDOCAINE HYDROCHLORIDE 20 MG/ML
INJECTION, SOLUTION EPIDURAL; INFILTRATION; INTRACAUDAL; PERINEURAL AS NEEDED
Status: DISCONTINUED | OUTPATIENT
Start: 2020-06-26 | End: 2020-06-26 | Stop reason: HOSPADM

## 2020-06-26 RX ORDER — LIDOCAINE HYDROCHLORIDE AND EPINEPHRINE 10; 10 MG/ML; UG/ML
INJECTION, SOLUTION INFILTRATION; PERINEURAL AS NEEDED
Status: DISCONTINUED | OUTPATIENT
Start: 2020-06-26 | End: 2020-06-26 | Stop reason: HOSPADM

## 2020-06-26 RX ORDER — MIDAZOLAM HYDROCHLORIDE 1 MG/ML
0.5 INJECTION, SOLUTION INTRAMUSCULAR; INTRAVENOUS
Status: DISCONTINUED | OUTPATIENT
Start: 2020-06-26 | End: 2020-06-26 | Stop reason: HOSPADM

## 2020-06-26 RX ORDER — PROPOFOL 10 MG/ML
INJECTION, EMULSION INTRAVENOUS AS NEEDED
Status: DISCONTINUED | OUTPATIENT
Start: 2020-06-26 | End: 2020-06-26 | Stop reason: HOSPADM

## 2020-06-26 RX ORDER — LIDOCAINE HYDROCHLORIDE 10 MG/ML
0.1 INJECTION, SOLUTION EPIDURAL; INFILTRATION; INTRACAUDAL; PERINEURAL AS NEEDED
Status: DISCONTINUED | OUTPATIENT
Start: 2020-06-26 | End: 2020-06-27

## 2020-06-26 RX ORDER — SODIUM CHLORIDE 900 MG/100ML
INJECTION INTRAVENOUS
Status: COMPLETED
Start: 2020-06-26 | End: 2020-06-26

## 2020-06-26 RX ORDER — ALFENTANIL HYDROCHLORIDE 500 UG/ML
INJECTION INTRAVENOUS AS NEEDED
Status: DISCONTINUED | OUTPATIENT
Start: 2020-06-26 | End: 2020-06-26 | Stop reason: HOSPADM

## 2020-06-26 RX ORDER — ONDANSETRON 2 MG/ML
INJECTION INTRAMUSCULAR; INTRAVENOUS AS NEEDED
Status: DISCONTINUED | OUTPATIENT
Start: 2020-06-26 | End: 2020-06-26 | Stop reason: HOSPADM

## 2020-06-26 RX ORDER — HYDROMORPHONE HYDROCHLORIDE 1 MG/ML
0.5 INJECTION, SOLUTION INTRAMUSCULAR; INTRAVENOUS; SUBCUTANEOUS
Status: DISCONTINUED | OUTPATIENT
Start: 2020-06-26 | End: 2020-06-26 | Stop reason: HOSPADM

## 2020-06-26 RX ORDER — ONDANSETRON 2 MG/ML
4 INJECTION INTRAMUSCULAR; INTRAVENOUS AS NEEDED
Status: DISCONTINUED | OUTPATIENT
Start: 2020-06-26 | End: 2020-06-26 | Stop reason: HOSPADM

## 2020-06-26 RX ORDER — SODIUM CHLORIDE 0.9 % (FLUSH) 0.9 %
5-10 SYRINGE (ML) INJECTION AS NEEDED
Status: DISCONTINUED | OUTPATIENT
Start: 2020-06-26 | End: 2020-06-26 | Stop reason: HOSPADM

## 2020-06-26 RX ORDER — SODIUM CHLORIDE 9 MG/ML
25 INJECTION, SOLUTION INTRAVENOUS CONTINUOUS
Status: DISCONTINUED | OUTPATIENT
Start: 2020-06-27 | End: 2020-06-27

## 2020-06-26 RX ORDER — MORPHINE SULFATE 10 MG/ML
2 INJECTION, SOLUTION INTRAMUSCULAR; INTRAVENOUS
Status: DISCONTINUED | OUTPATIENT
Start: 2020-06-26 | End: 2020-06-26 | Stop reason: HOSPADM

## 2020-06-26 RX ORDER — PIPERACILLIN SODIUM, TAZOBACTAM SODIUM 3; .375 G/15ML; G/15ML
INJECTION, POWDER, LYOPHILIZED, FOR SOLUTION INTRAVENOUS
Status: COMPLETED
Start: 2020-06-26 | End: 2020-06-26

## 2020-06-26 RX ORDER — FENTANYL CITRATE 50 UG/ML
50 INJECTION, SOLUTION INTRAMUSCULAR; INTRAVENOUS AS NEEDED
Status: DISCONTINUED | OUTPATIENT
Start: 2020-06-26 | End: 2020-06-27

## 2020-06-26 RX ORDER — SODIUM CHLORIDE 0.9 % (FLUSH) 0.9 %
5-40 SYRINGE (ML) INJECTION EVERY 8 HOURS
Status: DISCONTINUED | OUTPATIENT
Start: 2020-06-26 | End: 2020-06-26 | Stop reason: HOSPADM

## 2020-06-26 RX ORDER — DIPHENHYDRAMINE HYDROCHLORIDE 50 MG/ML
12.5 INJECTION, SOLUTION INTRAMUSCULAR; INTRAVENOUS AS NEEDED
Status: DISCONTINUED | OUTPATIENT
Start: 2020-06-26 | End: 2020-06-26 | Stop reason: HOSPADM

## 2020-06-26 RX ORDER — FENTANYL CITRATE 50 UG/ML
25 INJECTION, SOLUTION INTRAMUSCULAR; INTRAVENOUS
Status: DISCONTINUED | OUTPATIENT
Start: 2020-06-26 | End: 2020-06-26 | Stop reason: HOSPADM

## 2020-06-26 RX ADMIN — FENTANYL CITRATE 25 MCG: 0.05 INJECTION, SOLUTION INTRAMUSCULAR; INTRAVENOUS at 15:15

## 2020-06-26 RX ADMIN — ALFENTANIL HYDROCHLORIDE 250 MCG: 500 INJECTION INTRAVENOUS at 21:17

## 2020-06-26 RX ADMIN — MIDAZOLAM HYDROCHLORIDE 0.5 MG: 1 INJECTION, SOLUTION INTRAMUSCULAR; INTRAVENOUS at 22:44

## 2020-06-26 RX ADMIN — ALFENTANIL HYDROCHLORIDE 250 MCG: 500 INJECTION INTRAVENOUS at 21:19

## 2020-06-26 RX ADMIN — ONDANSETRON 4 MG: 2 INJECTION INTRAMUSCULAR; INTRAVENOUS at 11:54

## 2020-06-26 RX ADMIN — LIDOCAINE HYDROCHLORIDE 100 MG: 20 INJECTION, SOLUTION EPIDURAL; INFILTRATION; INTRACAUDAL; PERINEURAL at 21:02

## 2020-06-26 RX ADMIN — CEFTRIAXONE SODIUM 2 G: 2 INJECTION, POWDER, FOR SOLUTION INTRAMUSCULAR; INTRAVENOUS at 15:15

## 2020-06-26 RX ADMIN — Medication 10 ML: at 13:02

## 2020-06-26 RX ADMIN — Medication 80 MCG: at 21:10

## 2020-06-26 RX ADMIN — ALFENTANIL HYDROCHLORIDE 500 MCG: 500 INJECTION INTRAVENOUS at 21:02

## 2020-06-26 RX ADMIN — PIPERACILLIN AND TAZOBACTAM: 3; .375 INJECTION, POWDER, LYOPHILIZED, FOR SOLUTION INTRAVENOUS at 22:00

## 2020-06-26 RX ADMIN — PROPOFOL 100 MG: 10 INJECTION, EMULSION INTRAVENOUS at 21:02

## 2020-06-26 RX ADMIN — DEXMEDETOMIDINE HYDROCHLORIDE 8 MCG: 100 INJECTION, SOLUTION, CONCENTRATE INTRAVENOUS at 21:01

## 2020-06-26 RX ADMIN — LIDOCAINE HYDROCHLORIDE 10 ML: 20 SOLUTION ORAL; TOPICAL at 11:54

## 2020-06-26 RX ADMIN — ONDANSETRON HYDROCHLORIDE 4 MG: 2 INJECTION, SOLUTION INTRAMUSCULAR; INTRAVENOUS at 21:12

## 2020-06-26 RX ADMIN — IOPAMIDOL 100 ML: 755 INJECTION, SOLUTION INTRAVENOUS at 14:03

## 2020-06-26 RX ADMIN — ONDANSETRON 4 MG: 2 INJECTION INTRAMUSCULAR; INTRAVENOUS at 15:15

## 2020-06-26 RX ADMIN — SODIUM CHLORIDE 1000 ML: 900 INJECTION, SOLUTION INTRAVENOUS at 13:08

## 2020-06-26 RX ADMIN — DEXMEDETOMIDINE HYDROCHLORIDE 8 MCG: 100 INJECTION, SOLUTION, CONCENTRATE INTRAVENOUS at 20:46

## 2020-06-26 RX ADMIN — ALFENTANIL HYDROCHLORIDE 250 MCG: 500 INJECTION INTRAVENOUS at 21:21

## 2020-06-26 RX ADMIN — DEXMEDETOMIDINE HYDROCHLORIDE 8 MCG: 100 INJECTION, SOLUTION, CONCENTRATE INTRAVENOUS at 20:39

## 2020-06-26 RX ADMIN — FENTANYL CITRATE 50 MCG: 50 INJECTION, SOLUTION INTRAMUSCULAR; INTRAVENOUS at 23:50

## 2020-06-26 RX ADMIN — DEXMEDETOMIDINE HYDROCHLORIDE 8 MCG: 100 INJECTION, SOLUTION, CONCENTRATE INTRAVENOUS at 20:50

## 2020-06-26 RX ADMIN — Medication 80 MCG: at 21:04

## 2020-06-26 RX ADMIN — DEXAMETHASONE SODIUM PHOSPHATE 10 MG: 10 INJECTION, SOLUTION INTRAMUSCULAR; INTRAVENOUS at 19:52

## 2020-06-26 RX ADMIN — DEXMEDETOMIDINE HYDROCHLORIDE 8 MCG: 100 INJECTION, SOLUTION, CONCENTRATE INTRAVENOUS at 20:33

## 2020-06-26 RX ADMIN — SODIUM CHLORIDE, POTASSIUM CHLORIDE, SODIUM LACTATE AND CALCIUM CHLORIDE: 600; 310; 30; 20 INJECTION, SOLUTION INTRAVENOUS at 20:25

## 2020-06-26 RX ADMIN — ALFENTANIL HYDROCHLORIDE 250 MCG: 500 INJECTION INTRAVENOUS at 21:29

## 2020-06-26 RX ADMIN — PHENYLEPHRINE HYDROCHLORIDE 20 MCG/MIN: 10 INJECTION INTRAVENOUS at 21:05

## 2020-06-26 RX ADMIN — SODIUM CHLORIDE 532 ML: 900 INJECTION, SOLUTION INTRAVENOUS at 13:09

## 2020-06-26 RX ADMIN — SODIUM CHLORIDE: 900 INJECTION, SOLUTION INTRAVENOUS at 22:00

## 2020-06-26 RX ADMIN — PIPERACILLIN AND TAZOBACTAM 3.38 G: 3; .375 INJECTION, POWDER, LYOPHILIZED, FOR SOLUTION INTRAVENOUS at 21:29

## 2020-06-26 RX ADMIN — MIDAZOLAM 0.5 MG: 1 INJECTION INTRAMUSCULAR; INTRAVENOUS at 22:44

## 2020-06-26 RX ADMIN — ALFENTANIL HYDROCHLORIDE 500 MCG: 500 INJECTION INTRAVENOUS at 21:33

## 2020-06-26 RX ADMIN — FENTANYL CITRATE 25 MCG: 50 INJECTION, SOLUTION INTRAMUSCULAR; INTRAVENOUS at 11:54

## 2020-06-26 NOTE — ED PROVIDER NOTES
HPI .  Patient has a history of myelodysplastic syndrome, breast cancer, COPD, diverticulitis, fibromyalgia, dry eye syndrome, GERD, hyperlipidemia, osteoporosis, prediabetes, rosacea, and insomnia. Patient is transferred from [facility].  She reportedly had an upper endoscopy 3 days ago. She presented today with a sore throat and a cough. She also was having a foul taste in her mouth. CT scan earlier today showed a soft tissue mass in the epiglottic glottic area which was felt to be inflammatory, infectious, or malignant. Patient was transferred here because ENT does not go to that hospital.  Note: I just interviewed the patient. She has had a fullness and intermittent choking sensation in her throat since last March. She had an ulcer in the spring but her endoscopy several days ago apparently showed that the ulcer had resolved. She is not aware of any problems during her endoscopy. She woke up from her conscious sedation and was doing well until yesterday when she noted greenish material that she started coughing up mixed with flecks of blood. Purulent material has a bad taste to it.   Past Medical History:   Diagnosis Date    Anemia     Arthritis     Breast cancer ([address]     left    Bunion of right foot 8/20/2015    Chronic obstructive pulmonary disease (HCC)     mild    Chronic pain     back--uses tens unit    Diverticulitis 6/29/2018    Recurrent    Diverticulitis large intestine     Dry eye syndrome 6/29/2018    Fibromyalgia 6/29/2018    GERD (gastroesophageal reflux disease)     History of left breast cancer 2013    lumpectomy radiation    Hypercholesterolemia 6/29/2018    Ill-defined condition     blood transfusion hx    Leukemia ([address]     MDS (myelodysplastic syndrome) ([address]     Osteoporosis 6/29/2018    Oxygen dependent     at night    Peripheral neuropathy 6/29/2018    Prediabetes 6/29/2018    PUD (peptic ulcer disease)     Radiation therapy complication 2803 [address] breast-No Chemo    Rosacea 2018    Trouble in sleeping        Past Surgical History:   Procedure Laterality Date    COLONOSCOPY N/A 2020    COLONOSCOPY performed by Boston Cavazos MD at [facility] ENDOSCOPY    COLONOSCOPY,DIAGNOSTIC  2020         HX APPENDECTOMY      HX BREAST LUMPECTOMY  2013    LEFT BREAST LUMPECTOMY W/LEFT BREAST SENTINEL NODE BIOPSY,AND NEEDLE LOCALIZATION performed by Nessa Kaplan MD at [facility] MAIN OR    HX CATARACT REMOVAL      bilateral    HX HYSTERECTOMY      ovarian cyst    HX MOHS PROCEDURES      right    HX ORTHOPAEDIC      back surgery x2    HX OTHER SURGICAL      colonoscopy    HX TONSILLECTOMY      HX VASCULAR ACCESS  2020    echo cath right shoulder    IR INSERT TUNL CVC W PORT OVER 5 YEARS  12/3/2019    TOTAL KNEE ARTHROPLASTY      left    TOTAL KNEE ARTHROPLASTY      right    UPPER GI ENDOSCOPY,BIOPSY  2020         UPPER GI ENDOSCOPY,BIOPSY  2020         UPPER GI ENDOSCOPY,DILATN W GUIDE  2020         US GUIDED CORE BREAST BIOPSY Left 2013    Breast Ca         Family History:   Problem Relation Age of Onset    Cancer Mother         bladder    Cancer Father     Breast Cancer Paternal Grandmother        Social History     Socioeconomic History    Marital status:      Spouse name: Not on file    Number of children: Not on file    Years of education: Not on file    Highest education level: Not on file   Occupational History    Not on file   Social Needs    Financial resource strain: Not on file    Food insecurity     Worry: Not on file     Inability: Not on file    Transportation needs     Medical: Not on file     Non-medical: Not on file   Tobacco Use    Smoking status: Former Smoker     Packs/day: 0.50     Years: 50.00     Pack years: 25.00     Last attempt to quit: 2012     Years since quittin.3    Smokeless tobacco: Never Used   Substance and Sexual Activity    Alcohol use:  Yes     Alcohol/week: 2.0 standard drinks     Types: 2 Glasses of wine per week    Drug use: No    Sexual activity: Not Currently   Lifestyle    Physical activity     Days per week: Not on file     Minutes per session: Not on file    Stress: Not on file   Relationships    Social connections     Talks on phone: Not on file     Gets together: Not on file     Attends [employer] service: Not on file     Active member of club or organization: Not on file     Attends meetings of clubs or organizations: Not on file     Relationship status: Not on file    Intimate partner violence     Fear of current or ex partner: Not on file     Emotionally abused: Not on file     Physically abused: Not on file     Forced sexual activity: Not on file   Other Topics Concern    Not on file   Social History Narrative    Not on file         ALLERGIES: Latex; Hydrocodone; Gabapentin; Lyrica [pregabalin]; and Oxycodone    Review of Systems   All other systems reviewed and are negative. Vitals:    06/26/20 1726 06/26/20 1731   BP: 163/54 163/54   Pulse: (!) 105 96   Resp: 20 19   Temp: 98.7 °F (37.1 °C)    SpO2: 100% 98%   Weight: 84.4 kg (186 lb)    Height: 5' 5\" (1.651 m)             Physical Exam  Vitals signs and nursing note reviewed. Constitutional:       Appearance: She is well-developed. Comments: No stridor; no respiratory distress   HENT:      Head: Normocephalic and atraumatic. Comments: Coughing up greenish material intermittently  Eyes:      Pupils: Pupils are equal, round, and reactive to light. Neck:      Musculoskeletal: Normal range of motion and neck supple. Cardiovascular:      Rate and Rhythm: Normal rate and regular rhythm. Heart sounds: Normal heart sounds. No murmur. No friction rub. No gallop. Pulmonary:      Effort: Pulmonary effort is normal. No respiratory distress. Breath sounds: No wheezing or rales. Abdominal:      Palpations: Abdomen is soft. Tenderness: There is no abdominal tenderness. There is no rebound. Musculoskeletal: Normal range of motion. General: No tenderness. Skin:     Findings: No erythema. Neurological:      Mental Status: She is alert. Cranial Nerves: No cranial nerve deficit. Comments: Motor; symmetric   Psychiatric:         Behavior: Behavior normal.          MDM       Procedures          CONSULT NOTE:  Spoke to Dr Chelly Soria concerning the patient. The patient's history, presentation, physical findings, and results were all discussed. 6:03 PM    Note: Dr. Chelly Soria has come in and seen the patient and is going to take her to the OR. She will need an ICU bed after that. I just contacted the intensivist and they are putting orders in and will come down and see the patient in the ER.

## 2020-06-26 NOTE — ED NOTES
Dr. Joseph Cash is aware that the pt was becoming hypoxic on RA o2 sats were 85% on RA. Placed pt on 2 L of oxygen via NC at this time.

## 2020-06-26 NOTE — ED NOTES
TRANSFER - OUT REPORT:    Verbal report given to Janice Cantor RN (name) on [place]  being transferred to [facility] (unit)     Report consisted of patients Situation, Background, Assessment and   Recommendations(SBAR). Information from the following report(s) SBAR, Kardex and ED Summary was reviewed with the receiving nurse. Lines:   Venous Access Device 12/03/19 Upper chest (subclavicular area, right (Active)        Opportunity for questions and clarification was provided.       Patient transported by [employer]

## 2020-06-26 NOTE — CONSULTS
Dr. Cherilynn Olszewski,    Thank you for involving me in this patient's care. Please see below for my assessment and feel free to contact me directly with any questions. -    OTOLARYNGOLOGY - HEAD AND NECK SURGERY HISTORY AND PHYSICAL    Requesting Physician:  Sharee Blanco MD       CC:   Throat and neck pain    HPI:     Cynthia Mills is a 82 y.o. female seen today in consultation at the request of Dr. Cherilynn Olszewski for evaluation of throat and neck pain. Patient has a history of myelodysplastic syndrome, breast cancer, COPD, diverticulitis, fibromyalgia, dry eye syndrome, GERD, hyperlipidemia, osteoporosis, prediabetes, rosacea, and insomnia. Patient is transferred from [facility].  She had an EGD 3 days ago with Dr. Faisal Anderson to follow up on an ulcer. That procedure was uncomplicated reportedly and the ulcer had in fact resolved. She did well at home until the past 24 hours when her throat has been hurting more. Now she has a productive cough, dysphonia and SOB. She presented today with a sore throat and a cough. She also was having a foul taste in her mouth. CT scan earlier today showed a soft tissue mass in the epiglottic glottic area which was felt to be inflammatory, infectious, or malignant. She has had a fullness and intermittent choking sensation in her throat for about 4-6 weeks (predating the EGD). She has not had otalgia, neck masses or hemoptysis.   She does have a history of smoking in the past.    Past Medical History:   Diagnosis Date    Anemia     Arthritis     Breast cancer ([address]     left    Bunion of right foot 8/20/2015    Chronic obstructive pulmonary disease (HCC)     mild    Chronic pain     back--uses tens unit    Diverticulitis 6/29/2018    Recurrent    Diverticulitis large intestine     Dry eye syndrome 6/29/2018    Fibromyalgia 6/29/2018    GERD (gastroesophageal reflux disease)     History of left breast cancer 2013    lumpectomy radiation    Hypercholesterolemia 6/29/2018    Ill-defined condition     blood transfusion hx    Leukemia (HCC)     [address]) [address] [address] [facility]     Osteoporosis 6/29/2018    Oxygen dependent     at night    Peripheral neuropathy 6/29/2018    Prediabetes 6/29/2018    PUD (peptic ulcer disease)     Radiation therapy complication 0757    Lt breast-No Chemo    Rosacea 6/29/2018    Trouble in sleeping      Past Surgical History:   Procedure Laterality Date    COLONOSCOPY N/A 2/28/2020    COLON[facility] performed [facility]en [facility] at [facility]  2/28/2020         HX APPENDECTOMY      HX BREAST LUMPECTOMY  11/21/2013    LEFT BREAST LUMPECTOMY W/LEFT BREAST SENTINEL NODE BIOPSY,AND NEEDLE LOCALIZATION performed by Rudolph Murguia MD at [facility] MAIN OR    HX CATARACT REMOVAL      bilateral    HX HYSTERECTOMY      ovarian cyst    HX MOHS PROCEDURES      right    HX ORTHOPAEDIC      back surgery x2    HX OTHER SURGICAL      colonoscopy    HX TONSILLECTOMY      HX VASCULAR ACCESS  02/2020    echo cath right shoulder    IR INSERT TUNL CVC W PORT OVER 5 YEARS  12/3/2019    TOTAL KNEE ARTHROPLASTY      left    TOTAL KNEE ARTHROPLASTY      right    UPPER GI ENDOSCOPY,BIOPSY  1/24/2020         UPPER GI ENDOSCOPY,BIOPSY  6/23/2020         UPPER GI ENDOSCOPY,DILATN W GUIDE  6/23/2020         US GUIDED CORE BREAST BIOPSY Left 2013    Breast Ca     Current Facility-Administered Medications   Medication Dose Route Frequency    dexamethasone (PF) (DECADRON) 10 mg/mL injection 10 mg  10 mg IntraVENous ONCE     Current Outpatient Medications   Medication Sig    DULoxetine (CYMBALTA) 30 mg capsule Take 1 Cap by mouth daily.  hydroxyurea (HYDREA) 500 mg capsule Take 500 mg by mouth two (2) times a day. Indications: chronic myelocytic leukemia    cycloSPORINE (Restasis MultiDose) 0.05 % drop ophthalmic drops Administer 1 Drop to both eyes two (2) times a day.  Oxygen nightly as needed.  1-2 liter nasal cannula qhs and prn if needed    bismuth subsalicylate (PEPTO-BISMOL PO) Take  by mouth daily as needed. As ordered on the bottle    ergocalciferol (ERGOCALCIFEROL) 1,250 mcg (50,000 unit) capsule TAKE ONE CAPSULE BY MOUTH EVERY 7 DAYS    acetaminophen (TYLENOL) 325 mg tablet Take 2 Tabs by mouth every four (4) hours as needed for Pain.  dilTIAZem CD (CARDIZEM CD) 180 mg ER capsule Take 1 Cap by mouth daily.  pantoprazole (PROTONIX) 40 mg tablet Take 1 Tab by mouth Before breakfast and dinner. Allergies   Allergen Reactions    Latex Rash    Hydrocodone Nausea Only and Vertigo    Gabapentin Diarrhea, Nausea Only and Other (comments)     weakness    Lyrica [Pregabalin] Nausea Only    Oxycodone Nausea and Vomiting and Vertigo     Family History   Problem Relation Age of Onset    Cancer Mother         bladder    Cancer Father     Breast Cancer Paternal Grandmother      Social History     Tobacco Use    Smoking status: Former Smoker     Packs/day: 0.50     Years: 50.00     Pack years: 25.00     Last attempt to quit: 2012     Years since quittin.3    Smokeless tobacco: Never Used   Substance Use Topics    Alcohol use: Yes     Alcohol/week: 2.0 standard drinks     Types: 2 Glasses of wine per week    Drug use: No         REVIEW OF SYSTEMS  A full 10 point review of systems was performed today. The review was non-pertinent other than the details already listed in the history of present illness. Visit Vitals  /54   Pulse 96   Temp 98.7 °F (37.1 °C)   Resp 19   Ht 5' 5\" (1.651 m)   Wt 84.4 kg (186 lb) Comment: Weight checnked at [facility]   SpO2 98%   BMI 30.95 kg/m²        PHYSICAL EXAM  General:  Alert and oriented x 3. Mild distress. Voice wet and muffled. MSK:   Normal muscle bulk  Psych:  Mood and affect appropriate. Neuro:  CN II - XII grossly intact bilaterally. Eyes:  PERRL/EOMI, no nystagmus. ENT:   EACs are patent, clean and dry.  TMs clear and intact with normal anatomic landmarks. No middle ear fluid. Anterior rhinoscopy without mucopurulence or polyps. OC/OP clear without masses or lesions. Lymph:  Neck tender on right side but without lymphadenopathy. Resp:   Soft stridor  Skin:   Head and neck skin is without suspicious lesions. PROCEDURE:    Flexible Nasolaryngoscopy  Indication: stridor  Findings: No mass in NP. Fossae of Rosenmüller are free of tumor. Bilateral eustachian tube orifices are well defined with no overlying mass. Base of tongue symmetric. Epiglottis is crisp. Large violaceous mass protruding from right hypopharynx/oropharynx extending past midline completely obscuring a view of any supraglottic or glottic structures. A timeout was performed in which the patient's name and procedure were verified. The patient's nose was topicalized with afrin and lidocaine. A flexible laryngoscope was then inserted into the left nostril and advanced posteriorly to the nasopharynx. Findings noted above. DATA REVIEW:  CT neck dated 6/26/2020: I personally reviewed the scan which has the following pertinent findings:  Mass vs. Infectious phlegmon vs. Inflammatory lesion right supraglottis extending into retropharyngeal space and carotid space. Lab Results   Component Value Date/Time    WBC 30.5 (H) 06/26/2020 11:35 AM    HGB (POC) 10.7 (A) 01/07/2019 09:52 AM    HGB 9.3 (L) 06/26/2020 11:35 AM    HCT (POC) 32.0 (A) 01/07/2019 09:52 AM    HCT 30.8 (L) 06/26/2020 11:35 AM    PLATELET 305 (L) 50/74/0827 11:35 AM    .5 (H) 06/26/2020 11:35 AM      Lab Results   Component Value Date/Time    INR 1.0 11/13/2016 11:52 AM    INR 1.0 08/15/2016 11:02 AM    INR 1.0 07/05/2009 10:48 PM    Prothrombin time 10.0 11/13/2016 11:52 AM    Prothrombin time 9.8 08/15/2016 11:02 AM    Prothrombin time 10.1 07/05/2009 10:48 PM         ASSESSMENT/PLAN:  82 y.o. F with large obstructive lesion in her right pharynx with concern for impending airway obstruction.   I suspect this is infectious although a hematoma or malignancy is possible. Either way her airway is not safe and she will need a tracheostomy tonight under local anesthesia with direct laryngoscopy and biopsy/drainage of the lesion. She will need postop admission to the ICU for close monitoring and new trach care. We discussed the risk of death, severe bleeding, loss of her airway, need for additional procedures and treatment, and the general risks of anesthesia. Signed written consent obtained. NPO as of yesterday. The patient was counseled at length about the risks of urban Covid-19 during their perioperative period and any recovery window from their procedure. The patient was made aware that urban Covid-19  may worsen their prognosis for recovering from their procedure and lend to a higher morbidity and/or mortality risk. All material risks, benefits, and reasonable alternatives including postponing the procedure were discussed. The patient does  wish to proceed with the procedure at this time. Lisa Moreno, [address] [facility] [address], [facility] [address], [facility] [address]   (N) [phone]  (C) [phone]  (U) [phone]

## 2020-06-26 NOTE — ED PROVIDER NOTES
EMERGENCY DEPARTMENT HISTORY AND PHYSICAL EXAM      Date: 6/26/2020  Patient Name: Nasima Talbert    History of Presenting Illness     Chief Complaint   Patient presents with    Sore Throat     endoscopy done on tuesday with Dr. Viviana Kang here; yesterday started with sore throat cough and mucous. some of the mucous has specs in blood in it. neck is sore on right and inside of throat hurts. History Provided By: Patient    HPI: Nasima Talbert, 82 y.o. female presents to the ED with cc of sore throat and cough. Patient's symptoms started 3 days ago after her endoscopy. She states her throat pain was mild at that time. She points to the right anterior throat. Currently, the pain is more severe. She has taken Tylenol for her symptoms, with no relief. She denies fever or chills. She does have a cough productive of green sputum. She states at times, she has noticed blood in the sputum. She is able to swallow liquids and solids, but she states it hurts to do so. She denies chest pain, leg pain or leg edema. There are no other complaints, changes, or physical findings at this time. PCP: Fili Hanna MD    No current facility-administered medications on file prior to encounter. Current Outpatient Medications on File Prior to Encounter   Medication Sig Dispense Refill    DULoxetine (CYMBALTA) 30 mg capsule Take 1 Cap by mouth daily. 30 Cap 5    hydroxyurea (HYDREA) 500 mg capsule Take 500 mg by mouth two (2) times a day. Indications: chronic myelocytic leukemia      cycloSPORINE (Restasis MultiDose) 0.05 % drop ophthalmic drops Administer 1 Drop to both eyes two (2) times a day.  Oxygen nightly as needed. 1-2 liter nasal cannula qhs and prn if needed      bismuth subsalicylate (PEPTO-BISMOL PO) Take  by mouth daily as needed.  As ordered on the bottle      ergocalciferol (ERGOCALCIFEROL) 1,250 mcg (50,000 unit) capsule TAKE ONE CAPSULE BY MOUTH EVERY 7 DAYS 12 Cap 1    acetaminophen (TYLENOL) 325 mg tablet Take 2 Tabs by mouth every four (4) hours as needed for Pain. 20 Tab 0    dilTIAZem CD (CARDIZEM CD) 180 mg ER capsule Take 1 Cap by mouth daily. 30 Cap 1    pantoprazole (PROTONIX) 40 mg tablet Take 1 Tab by mouth Before breakfast and dinner.  61 Tab 1       Past History     Past Medical History:  Past Medical History:   Diagnosis Date    Anemia     Arthritis     Breast cancer ([facility] [address]     left    Bunion of right foot 8/20/2015    Chronic obstructive pulmonary disease (HCC)     mild    Chronic pain     back--uses tens unit    Diverticulitis 6/29/2018    Recurrent    Diverticulitis large intestine     Dry eye syndrome 6/29/2018    Fibromyalgia 6/29/2018    GERD (gastroesophageal reflux disease)     History of left breast cancer 2013    lumpectomy radiation    Hypercholesterolemia 6/29/2018    Ill-defined condition     blood transfusion hx    Leukemia (HCC)     MDS (myelodysplastic syndrome) ([facility] [address]     Osteoporosis 6/29/2018    Oxygen dependent     at night    Peripheral neuropathy 6/29/2018    Prediabetes 6/29/2018    PUD (peptic ulcer disease)     Radiation therapy complication 9820    Lt breast-No Chemo    Rosacea 6/29/2018    Trouble in sleeping        Past Surgical History:  Past Surgical History:   Procedure Laterality Date    COLONOSCOPY N/A 2/28/2020    COLONOSCOPY performed by Dominga [facility]ard [facility] at [facility]  2/28/2020         HX APPENDECTOMY      HX BREAST LUMPECTOMY  11/21/2013    LEFT BREAST LUMPECTOMY W/LEFT BREAST SENTINEL NODE BIOPSY,AND NEEDLE LOCALIZATION performed [facility]    HX CATARACT REMOVAL      bilateral    HX HYSTERECTOMY      ovarian cyst    HX MOHS PROCEDURES      right    HX ORTHOPAEDIC      back surgery x2    HX OTHER SURGICAL      colonoscopy    HX TONSILLECTOMY      HX VASCULAR ACCESS  02/2020    echo cath right shoulder    IR INSERT TUNL CVC W PORT OVER 5 YEARS  12/3/2019  TOTAL KNEE ARTHROPLASTY      left    TOTAL KNEE ARTHROPLASTY      right    UPPER GI ENDOSCOPY,BIOPSY  2020         UPPER GI ENDOSCOPY,BIOPSY  2020         UPPER GI ENDOSCOPY,DILATN W GUIDE  2020         US GUIDED CORE BREAST BIOPSY Left 2013    Breast Ca       Family History:  Family History   Problem Relation Age of Onset    Cancer Mother         bladder    Cancer Father     Breast Cancer Paternal Grandmother        Social History:  Social History     Tobacco Use    Smoking status: Former Smoker     Packs/day: 0.50     Years: 50.00     Pack years: 25.00     Last attempt to quit: 2012     Years since quittin.3    Smokeless tobacco: Never Used   Substance Use Topics    Alcohol use: Yes     Alcohol/week: 2.0 standard drinks     Types: 2 Glasses of wine per week    Drug use: No       Allergies: Allergies   Allergen Reactions    Latex Rash    Hydrocodone Nausea Only and Vertigo    Gabapentin Diarrhea, Nausea Only and Other (comments)     weakness    Lyrica [Pregabalin] Nausea Only    Oxycodone Nausea and Vomiting and Vertigo         Review of Systems   Review of Systems   Constitutional: Negative for chills and fever. HENT: Positive for sore throat. Negative for congestion. Eyes: Negative. Respiratory: Positive for cough and shortness of breath. Cardiovascular: Negative for chest pain. Gastrointestinal: Negative for abdominal pain. Endocrine: Negative for heat intolerance. Genitourinary: Negative. Musculoskeletal: Negative for back pain. Skin: Negative for rash. Allergic/Immunologic: Negative for immunocompromised state. Neurological: Negative for dizziness. Hematological: Does not bruise/bleed easily. Psychiatric/Behavioral: Negative. All other systems reviewed and are negative. Physical Exam   Physical Exam  Vitals signs and nursing note reviewed. Constitutional:       General: She is not in acute distress.      Appearance: She is well-developed. HENT:      Head: Normocephalic. Neck:      Musculoskeletal: Normal range of motion and neck supple. Cardiovascular:      Rate and Rhythm: Normal rate and regular rhythm. Heart sounds: Normal heart sounds. Pulmonary:      Effort: Pulmonary effort is normal.      Breath sounds: Normal breath sounds. Abdominal:      General: Bowel sounds are normal.      Palpations: Abdomen is soft. Tenderness: There is no abdominal tenderness. Musculoskeletal: Normal range of motion. Skin:     General: Skin is warm and dry. Neurological:      General: No focal deficit present. Mental Status: She is alert and oriented to person, place, and time. Psychiatric:         Behavior: Behavior normal.         Diagnostic Study Results     Labs -     Recent Results (from the past 12 hour(s))   CBC WITH AUTOMATED DIFF    Collection Time: 06/26/20 11:35 AM   Result Value Ref Range    WBC 30.5 (H) 3.6 - 11.0 K/uL    RBC 2.69 (L) 3.80 - 5.20 M/uL    HGB 9.3 (L) 11.5 - 16.0 g/dL    HCT 30.8 (L) 35.0 - 47.0 %    .5 (H) 80.0 - 99.0 FL    MCH 34.6 (H) 26.0 - 34.0 PG    MCHC 30.2 30.0 - 36.5 g/dL    RDW 20.5 (H) 11.5 - 14.5 %    PLATELET 931 (L) 161 - 400 K/uL    MPV 10.2 8.9 - 12.9 FL    NRBC 0.1 (H) 0  WBC    ABSOLUTE NRBC 0.04 (H) 0.00 - 0.01 K/uL    NEUTROPHILS 51 32 - 75 %    LYMPHOCYTES 10 (L) 12 - 49 %    MONOCYTES 34 (H) 5 - 13 %    EOSINOPHILS 0 0 - 7 %    BASOPHILS 0 0 - 1 %    METAMYELOCYTES 3 %    MYELOCYTES 2 %    IMMATURE GRANULOCYTES 0 0.0 - 0.5 %    ABS. NEUTROPHILS 15.6 (H) 1.8 - 8.0 K/UL    ABS. LYMPHOCYTES 3.1 0.8 - 3.5 K/UL    ABS. MONOCYTES 10.4 (H) 0.0 - 1.0 K/UL    ABS. EOSINOPHILS 0.0 0.0 - 0.4 K/UL    ABS. BASOPHILS 0.0 0.0 - 0.1 K/UL    ABS. IMM.  GRANS. 0.0 0.00 - 0.04 K/UL    DF MANUAL      RBC COMMENTS MACROCYTOSIS  2+        RBC COMMENTS MICROCYTOSIS  1+        RBC COMMENTS POLYCHROMASIA  1+        RBC COMMENTS ANISOCYTOSIS  3+       METABOLIC PANEL, COMPREHENSIVE Collection Time: 06/26/20 11:35 AM   Result Value Ref Range    Sodium 140 136 - 145 mmol/L    Potassium 3.8 3.5 - 5.1 mmol/L    Chloride 108 97 - 108 mmol/L    CO2 23 21 - 32 mmol/L    Anion gap 9 5 - 15 mmol/L    Glucose 99 65 - 100 mg/dL    BUN 18 6 - 20 MG/DL    Creatinine 0.55 0.55 - 1.02 MG/DL    BUN/Creatinine ratio 33 (H) 12 - 20      GFR est AA >60 >60 ml/min/1.73m2    GFR est non-AA >60 >60 ml/min/1.73m2    Calcium 8.6 8.5 - 10.1 MG/DL    Bilirubin, total 0.6 0.2 - 1.0 MG/DL    ALT (SGPT) 22 12 - 78 U/L    AST (SGOT) 15 15 - 37 U/L    Alk. phosphatase 62 45 - 117 U/L    Protein, total 7.5 6.4 - 8.2 g/dL    Albumin 3.8 3.5 - 5.0 g/dL    Globulin 3.7 2.0 - 4.0 g/dL    A-G Ratio 1.0 (L) 1.1 - 2.2     POC LACTIC ACID    Collection Time: 06/26/20 11:56 AM   Result Value Ref Range    Lactic Acid (POC) 0.56 0.40 - 2.00 mmol/L   EKG, 12 LEAD, INITIAL    Collection Time: 06/26/20  1:03 PM   Result Value Ref Range    Ventricular Rate 79 BPM    Atrial Rate 79 BPM    P-R Interval 218 ms    QRS Duration 90 ms    Q-T Interval 396 ms    QTC Calculation (Bezet) 454 ms    Calculated P Axis 34 degrees    Calculated R Axis -17 degrees    Calculated T Axis 78 degrees    Diagnosis       Sinus rhythm with marked sinus arrhythmia with 1st degree AV block  Minimal voltage criteria for LVH, may be normal variant  When compared with ECG of 22-APR-2020 10:49,  No significant change was found     STREP AG SCREEN, GROUP A    Collection Time: 06/26/20  1:12 PM   Result Value Ref Range    Group A Strep Ag ID Negative NEG         Radiologic Studies -   CT NECK SOFT TISSUE W CONT   Final Result   IMPRESSION:    1. Marked edema and thickening of the right aryepiglottic fold extending   inferiorly to the level of the glottis measuring approximately 2.8 x 2.5 cm,   with soft tissue infiltration extending into the right paraglottic fat, right   carotid and retropharyngeal spaces.  While this may represent a nonspecific   infectious or inflammatory supraglottitis, the associated sclerosis of the right   arytenoid cartilage raises suspicion for supraglottic laryngeal malignancy. ENT   consultation and direct visualization is recommended. 2. No enlarged cervical lymph nodes. 3. Questionable 3 mm aneurysm of the anterior communicating artery. Recommend   CTA or MRA of the head for further evaluation on an outpatient basis. 4. Likely moderate stenosis of the bilateral proximal internal carotid arteries. 23X            CTA CHEST W OR W WO CONT   Final Result   Impression:   1. No evidence of pulmonary embolism. No evidence of acute process in the chest.            XR NECK SOFT TISSUE   Final Result   IMPRESSION:   1. Epiglottis is within normal limits. XR CHEST PA LAT   Final Result   IMPRESSION: Lungs are clear of an acute process. CT Results  (Last 48 hours)    None        CXR Results  (Last 48 hours)    None          Medical Decision Making   I am the first provider for this patient. I reviewed the vital signs, available nursing notes, past medical history, past surgical history, family history and social history. Vital Signs-Reviewed the patient's vital signs. Patient Vitals for the past 12 hrs:   Temp Pulse Resp BP SpO2   06/26/20 1003 98.3 °F (36.8 °C) 100 18 116/68 97 %       EKG interpretation: (Preliminary)  Rhythm: normal sinus rhythm and 1st degree block; and regular . Rate (approx.): 79; Axis: normal; MA interval: normal; QRS interval: normal ; ST/T wave: normal; Other findings: unchanged from previous ekg. Records Reviewed: Nursing Notes, Old Medical Records, Previous Radiology Studies and Previous Laboratory Studies    Provider Notes (Medical Decision Making):   Pharyngitis, bronchitis, pneumonia,    ED Course:   Initial assessment performed. The patients presenting problems have been discussed, and they are in agreement with the care plan formulated and outlined with them.   I have encouraged them to ask questions as they arise throughout their visit. Consult note:    I discussed the case with Dr. Rama Osler, emergency physician at [facility]. He has accepted the patient in transfer         Critical Care Time:   CRITICAL CARE NOTE :    11:21 AM    IMPENDING DETERIORATION -Respiratory and Metabolic  ASSOCIATED RISK FACTORS - Hypotension, Hypoxia and Metabolic changes  MANAGEMENT- Bedside Assessment and Supervision of Care  INTERPRETATION -  Xrays, CT Scan, ECG and Blood Pressure  INTERVENTIONS - hemodynamic mngmt and Metobolic interventions  CASE REVIEW - Medical Sub-Specialist, Nursing and Family  TREATMENT RESPONSE -Unchanged   PERFORMED BY - Self    NOTES   :  I have spent 35 minutes of critical care time involved in lab review, consultations with specialist, family decision- making, bedside attention and documentation. This time excludes time spent in any separate billed procedures. During this entire length of time I was immediately available to the patient . Emilee Ingram MD      Disposition:  Transfer      DISCHARGE PLAN:  1. Current Discharge Medication List        2. Follow-up Information    None       3. Return to ED if worse     Diagnosis     Clinical Impression:   1. Supraglottitis without airway obstruction    2. Leukocytosis, unspecified type    3. Myelodysplasia (myelodysplastic syndrome) ([facility] [address]    4. SOB (shortness of breath)        Attestations:    Emilee Ingram MD    Please note that this dictation was completed with [employer]oon[employer], the computer voice recognition software. Quite often unanticipated grammatical, syntax, homophones, and other interpretive errors are inadvertently transcribed by the computer software. Please disregard these errors. Please excuse any errors that have escaped final proofreading. Thank you.

## 2020-06-26 NOTE — ED NOTES
Bedside report given to [address] transport team. Copy of emtala made and placed in scan box.

## 2020-06-26 NOTE — REMOTE MONITORING
Left a message with unit clerk Jules [facility] for DAINA Angel regarding sepsis bundle and abx. Call back number [phone], thank you.   Signed By: Zain Galdamez RN, [facility]     June 26, 2020

## 2020-06-27 ENCOUNTER — APPOINTMENT (OUTPATIENT)
Dept: GENERAL RADIOLOGY | Age: 85
DRG: 011 | End: 2020-06-27
Attending: OTOLARYNGOLOGY
Payer: MEDICARE

## 2020-06-27 PROBLEM — Z90.710 STATUS POST TRACHELECTOMY: Status: ACTIVE | Noted: 2020-06-27

## 2020-06-27 PROBLEM — D46.9 MYELODYSPLASTIC SYNDROME (HCC): Status: ACTIVE | Noted: 2020-06-27

## 2020-06-27 LAB
ANION GAP SERPL CALC-SCNC: 7 MMOL/L (ref 5–15)
ATRIAL RATE: 79 BPM
BUN SERPL-MCNC: 12 MG/DL (ref 6–20)
BUN/CREAT SERPL: 18 (ref 12–20)
CALCIUM SERPL-MCNC: 8 MG/DL (ref 8.5–10.1)
CALCULATED P AXIS, ECG09: 34 DEGREES
CALCULATED R AXIS, ECG10: -17 DEGREES
CALCULATED T AXIS, ECG11: 78 DEGREES
CHLORIDE SERPL-SCNC: 111 MMOL/L (ref 97–108)
CO2 SERPL-SCNC: 24 MMOL/L (ref 21–32)
CREAT SERPL-MCNC: 0.66 MG/DL (ref 0.55–1.02)
DIAGNOSIS, 93000: NORMAL
ERYTHROCYTE [DISTWIDTH] IN BLOOD BY AUTOMATED COUNT: 19.9 % (ref 11.5–14.5)
GLUCOSE SERPL-MCNC: 153 MG/DL (ref 65–100)
HCT VFR BLD AUTO: 28.6 % (ref 35–47)
HCT VFR BLD AUTO: 31.5 % (ref 35–47)
HGB BLD-MCNC: 8.2 G/DL (ref 11.5–16)
HGB BLD-MCNC: 9.1 G/DL (ref 11.5–16)
MCH RBC QN AUTO: 34.5 PG (ref 26–34)
MCHC RBC AUTO-ENTMCNC: 28.9 G/DL (ref 30–36.5)
MCV RBC AUTO: 119.3 FL (ref 80–99)
NRBC # BLD: 0.02 K/UL (ref 0–0.01)
NRBC BLD-RTO: 0.1 PER 100 WBC
P-R INTERVAL, ECG05: 218 MS
PLATELET # BLD AUTO: 159 K/UL (ref 150–400)
PMV BLD AUTO: 10.1 FL (ref 8.9–12.9)
POTASSIUM SERPL-SCNC: 4.2 MMOL/L (ref 3.5–5.1)
Q-T INTERVAL, ECG07: 396 MS
QRS DURATION, ECG06: 90 MS
QTC CALCULATION (BEZET), ECG08: 454 MS
RBC # BLD AUTO: 2.64 M/UL (ref 3.8–5.2)
SODIUM SERPL-SCNC: 142 MMOL/L (ref 136–145)
VENTRICULAR RATE, ECG03: 79 BPM
WBC # BLD AUTO: 37 K/UL (ref 3.6–11)

## 2020-06-27 PROCEDURE — 80048 BASIC METABOLIC PNL TOTAL CA: CPT

## 2020-06-27 PROCEDURE — 74011250636 HC RX REV CODE- 250/636: Performed by: OTOLARYNGOLOGY

## 2020-06-27 PROCEDURE — 74011250637 HC RX REV CODE- 250/637: Performed by: INTERNAL MEDICINE

## 2020-06-27 PROCEDURE — C9113 INJ PANTOPRAZOLE SODIUM, VIA: HCPCS | Performed by: INTERNAL MEDICINE

## 2020-06-27 PROCEDURE — 0CJY8ZZ INSPECTION OF MOUTH AND THROAT, VIA NATURAL OR ARTIFICIAL OPENING ENDOSCOPIC: ICD-10-PCS | Performed by: OTOLARYNGOLOGY

## 2020-06-27 PROCEDURE — 74011250637 HC RX REV CODE- 250/637: Performed by: OTOLARYNGOLOGY

## 2020-06-27 PROCEDURE — 77010033711 HC HIGH FLOW OXYGEN

## 2020-06-27 PROCEDURE — 74018 RADEX ABDOMEN 1 VIEW: CPT

## 2020-06-27 PROCEDURE — 74011000250 HC RX REV CODE- 250: Performed by: INTERNAL MEDICINE

## 2020-06-27 PROCEDURE — 74011250636 HC RX REV CODE- 250/636: Performed by: INTERNAL MEDICINE

## 2020-06-27 PROCEDURE — 85018 HEMOGLOBIN: CPT

## 2020-06-27 PROCEDURE — 74011000258 HC RX REV CODE- 258: Performed by: INTERNAL MEDICINE

## 2020-06-27 PROCEDURE — 36415 COLL VENOUS BLD VENIPUNCTURE: CPT

## 2020-06-27 PROCEDURE — 77030018798 HC PMP KT ENTRL FED COVD -A

## 2020-06-27 PROCEDURE — 65620000000 HC RM CCU GENERAL

## 2020-06-27 PROCEDURE — 85027 COMPLETE CBC AUTOMATED: CPT

## 2020-06-27 RX ORDER — LORAZEPAM 2 MG/ML
2 INJECTION INTRAMUSCULAR
Status: DISCONTINUED | OUTPATIENT
Start: 2020-06-27 | End: 2020-07-07 | Stop reason: HOSPADM

## 2020-06-27 RX ORDER — SODIUM CHLORIDE 0.9 % (FLUSH) 0.9 %
5-40 SYRINGE (ML) INJECTION EVERY 8 HOURS
Status: DISCONTINUED | OUTPATIENT
Start: 2020-06-27 | End: 2020-07-07 | Stop reason: HOSPADM

## 2020-06-27 RX ORDER — MORPHINE SULFATE 2 MG/ML
1 INJECTION, SOLUTION INTRAMUSCULAR; INTRAVENOUS
Status: DISCONTINUED | OUTPATIENT
Start: 2020-06-27 | End: 2020-07-07 | Stop reason: HOSPADM

## 2020-06-27 RX ORDER — DILTIAZEM HYDROCHLORIDE 180 MG/1
180 CAPSULE, COATED, EXTENDED RELEASE ORAL DAILY
Status: DISCONTINUED | OUTPATIENT
Start: 2020-06-27 | End: 2020-06-29

## 2020-06-27 RX ORDER — HYDROXYUREA 500 MG/1
500 CAPSULE ORAL 2 TIMES DAILY
Status: DISCONTINUED | OUTPATIENT
Start: 2020-06-27 | End: 2020-06-27

## 2020-06-27 RX ORDER — DEXAMETHASONE SODIUM PHOSPHATE 4 MG/ML
4 INJECTION, SOLUTION INTRA-ARTICULAR; INTRALESIONAL; INTRAMUSCULAR; INTRAVENOUS; SOFT TISSUE EVERY 6 HOURS
Status: DISCONTINUED | OUTPATIENT
Start: 2020-06-27 | End: 2020-06-28

## 2020-06-27 RX ORDER — MORPHINE SULFATE 2 MG/ML
1 INJECTION, SOLUTION INTRAMUSCULAR; INTRAVENOUS ONCE
Status: COMPLETED | OUTPATIENT
Start: 2020-06-27 | End: 2020-06-27

## 2020-06-27 RX ORDER — DULOXETIN HYDROCHLORIDE 30 MG/1
30 CAPSULE, DELAYED RELEASE ORAL DAILY
Status: DISCONTINUED | OUTPATIENT
Start: 2020-06-27 | End: 2020-07-07

## 2020-06-27 RX ORDER — SODIUM CHLORIDE, SODIUM LACTATE, POTASSIUM CHLORIDE, CALCIUM CHLORIDE 600; 310; 30; 20 MG/100ML; MG/100ML; MG/100ML; MG/100ML
75 INJECTION, SOLUTION INTRAVENOUS CONTINUOUS
Status: DISPENSED | OUTPATIENT
Start: 2020-06-27 | End: 2020-06-28

## 2020-06-27 RX ORDER — ENOXAPARIN SODIUM 100 MG/ML
40 INJECTION SUBCUTANEOUS EVERY 24 HOURS
Status: DISCONTINUED | OUTPATIENT
Start: 2020-06-27 | End: 2020-07-03

## 2020-06-27 RX ORDER — OXYMETAZOLINE HCL 0.05 %
2 SPRAY, NON-AEROSOL (ML) NASAL ONCE
Status: COMPLETED | OUTPATIENT
Start: 2020-06-27 | End: 2020-06-27

## 2020-06-27 RX ORDER — LORAZEPAM 2 MG/ML
INJECTION INTRAMUSCULAR
Status: DISPENSED
Start: 2020-06-27 | End: 2020-06-27

## 2020-06-27 RX ORDER — ONDANSETRON 2 MG/ML
4 INJECTION INTRAMUSCULAR; INTRAVENOUS
Status: DISCONTINUED | OUTPATIENT
Start: 2020-06-27 | End: 2020-07-07 | Stop reason: HOSPADM

## 2020-06-27 RX ORDER — ALPRAZOLAM 0.25 MG/1
0.25 TABLET ORAL 3 TIMES DAILY
Status: DISCONTINUED | OUTPATIENT
Start: 2020-06-27 | End: 2020-07-07 | Stop reason: HOSPADM

## 2020-06-27 RX ORDER — SODIUM CHLORIDE 0.9 % (FLUSH) 0.9 %
5-40 SYRINGE (ML) INJECTION AS NEEDED
Status: DISCONTINUED | OUTPATIENT
Start: 2020-06-27 | End: 2020-07-07 | Stop reason: HOSPADM

## 2020-06-27 RX ADMIN — PIPERACILLIN AND TAZOBACTAM 3.38 G: 3; .375 INJECTION, POWDER, LYOPHILIZED, FOR SOLUTION INTRAVENOUS at 11:31

## 2020-06-27 RX ADMIN — ALPRAZOLAM 0.25 MG: 0.25 TABLET ORAL at 15:36

## 2020-06-27 RX ADMIN — SODIUM CHLORIDE, SODIUM LACTATE, POTASSIUM CHLORIDE, AND CALCIUM CHLORIDE 75 ML/HR: 600; 310; 30; 20 INJECTION, SOLUTION INTRAVENOUS at 00:51

## 2020-06-27 RX ADMIN — OXYMETAZOLINE HCL 2 SPRAY: 0.05 SPRAY NASAL at 07:45

## 2020-06-27 RX ADMIN — Medication 10 ML: at 00:51

## 2020-06-27 RX ADMIN — HYDROXYUREA 500 MG: 500 CAPSULE ORAL at 11:14

## 2020-06-27 RX ADMIN — Medication 10 ML: at 15:36

## 2020-06-27 RX ADMIN — SODIUM CHLORIDE 40 MG: 9 INJECTION INTRAMUSCULAR; INTRAVENOUS; SUBCUTANEOUS at 08:23

## 2020-06-27 RX ADMIN — Medication 10 ML: at 21:00

## 2020-06-27 RX ADMIN — DEXAMETHASONE SODIUM PHOSPHATE 4 MG: 4 INJECTION, SOLUTION INTRAMUSCULAR; INTRAVENOUS at 00:56

## 2020-06-27 RX ADMIN — LORAZEPAM 2 MG: 2 INJECTION INTRAMUSCULAR; INTRAVENOUS at 07:44

## 2020-06-27 RX ADMIN — ALPRAZOLAM 0.25 MG: 0.25 TABLET ORAL at 10:21

## 2020-06-27 RX ADMIN — DEXAMETHASONE SODIUM PHOSPHATE 4 MG: 4 INJECTION, SOLUTION INTRAMUSCULAR; INTRAVENOUS at 18:40

## 2020-06-27 RX ADMIN — SODIUM CHLORIDE, SODIUM LACTATE, POTASSIUM CHLORIDE, AND CALCIUM CHLORIDE 75 ML/HR: 600; 310; 30; 20 INJECTION, SOLUTION INTRAVENOUS at 05:54

## 2020-06-27 RX ADMIN — MORPHINE SULFATE 1 MG: 2 INJECTION, SOLUTION INTRAMUSCULAR; INTRAVENOUS at 05:47

## 2020-06-27 RX ADMIN — Medication 10 ML: at 05:27

## 2020-06-27 RX ADMIN — DILTIAZEM HYDROCHLORIDE 180 MG: 180 CAPSULE, COATED, EXTENDED RELEASE ORAL at 10:21

## 2020-06-27 RX ADMIN — ALPRAZOLAM 0.25 MG: 0.25 TABLET ORAL at 21:00

## 2020-06-27 RX ADMIN — DULOXETINE 30 MG: 30 CAPSULE, DELAYED RELEASE ORAL at 10:21

## 2020-06-27 RX ADMIN — PIPERACILLIN AND TAZOBACTAM 3.38 G: 3; .375 INJECTION, POWDER, LYOPHILIZED, FOR SOLUTION INTRAVENOUS at 20:30

## 2020-06-27 RX ADMIN — HYDROXYUREA 500 MG: 500 CAPSULE ORAL at 17:34

## 2020-06-27 RX ADMIN — DEXAMETHASONE SODIUM PHOSPHATE 4 MG: 4 INJECTION, SOLUTION INTRAMUSCULAR; INTRAVENOUS at 12:42

## 2020-06-27 RX ADMIN — PIPERACILLIN AND TAZOBACTAM 3.38 G: 3; .375 INJECTION, POWDER, LYOPHILIZED, FOR SOLUTION INTRAVENOUS at 03:15

## 2020-06-27 RX ADMIN — LORAZEPAM 2 MG: 2 INJECTION INTRAMUSCULAR; INTRAVENOUS at 16:50

## 2020-06-27 RX ADMIN — DEXAMETHASONE SODIUM PHOSPHATE 4 MG: 4 INJECTION, SOLUTION INTRAMUSCULAR; INTRAVENOUS at 06:06

## 2020-06-27 NOTE — PROGRESS NOTES
[phone] Arrived in bed from PACU, oriented to room and call system. 2350 Fentanyl given per pt request. Complete chlorhexidine bed bath provided, tolerated well. Dr. Karly Graham at bedside, update provided. Primary Nurse Yoandy Valle, DAINA and Guinevere Snellen, RN performed a dual skin assessment on this patient No impairment noted  Current Bed:   CONSTANCE Seay  Nikolas score is 14    0000 Mahmood placed. 0330 Labs drawn. Weaning FiO2 on trach collar. 0545 Pt awake very anxious, intermittent coughing spells, reports feeling unable to catch breath. Dr. Karly Graham notified and order received for morphine x 1 dose. 0550 Morphine given. 0600 Trach care completed. Bleeding around trach site and coughing up thick bloody secretions, minimal trach secretions. 0730 Bedside and Verbal shift change report given to Catia Meier (oncoming nurse) by Iram Bautista (offgoing nurse). Report included the following information SBAR, Kardex, Procedure Summary, Intake/Output, MAR, Recent Results and Alarm Parameters .

## 2020-06-27 NOTE — ROUTINE PROCESS
TRANSFER - OUT REPORT: 
 
Verbal report given to Jamie Lowe on [place]  being transferred to OR for ordered procedure Report consisted of patients Situation, Background, Assessment and  
Recommendations(SBAR). Information from the following report(s) SBAR, ED Summary, [facility] and Recent Results was reviewed with the receiving nurse. Lines:  
Venous Access Device 12/03/19 Upper chest (subclavicular area, right (Active) Opportunity for questions and clarification was provided.

## 2020-06-27 NOTE — PROGRESS NOTES
Awake tracheostomy and laryngoscopy performed. 6 cuffed shiley in place and cuff inflated. Biopsy taken of supraglottis. Operative findings were diffuse edema more on the right supraglottis than left. Diffuse bruising as well. No purulence or mucosal lesions. Biopsy taken of right AE fold. Weaned to trach collar in OR to PACU. Observe in ICU overnight. I recommend continuing Decadron 10mg Q8H and starting Zosyn. Etiology is unclear at this point as she was symptomatic prior to the esophagoscopy. I will follow closely with serial exams. Will need to repeat CT in days ahead to assess for change. Rosalino Alford, [address], [facility]  [address] [facility], [address]   (j) [phone]  (q) [phone]

## 2020-06-27 NOTE — PROGRESS NOTES
82 y.o. F presenting with acute supraglottic edema and respiratory distress. CT showed a right deep neck space mass vs phlegmon with associated airway compromise. Currently POD1 tracheostomy and DL/biopsy. Admitted to ICU for postop care. Receiving Zosyn and Decadron. Having bleeding from the tracheostomy overnight. Pain and anxiety have been problematic. Morphine helping for pain. Still clearly anxious with coughing fits. Nursing staff requesting something to help calm her. Patient c/o hunger and has had no nutrition in past 48 hours. Visit Vitals  /50   Pulse 89   Temp 98.2 °F (36.8 °C)   Resp 17   Ht 5' 5\" (1.651 m)   Wt 87.6 kg (193 lb 2 oz)   SpO2 96%   BMI 32.14 kg/m²    6 cuffed shiley in place. Bloody drainage from around the trach site. Wound cleaned. Small bruise on tongue  No LAD but neck tender on right side. No fluctuance or crepitus. Lab Results   Component Value Date/Time    WBC 37.0 (H) 06/27/2020 03:20 AM    HGB (POC) 10.7 (A) 01/07/2019 09:52 AM    HGB 9.1 (L) 06/27/2020 03:20 AM    HCT (POC) 32.0 (A) 01/07/2019 09:52 AM    HCT 31.5 (L) 06/27/2020 03:20 AM    PLATELET 456 42/72/6322 03:20 AM    .3 (H) 06/27/2020 03:20 AM      Current Facility-Administered Medications   Medication Dose Route Frequency    lactated Ringers infusion  75 mL/hr IntraVENous CONTINUOUS    . PHARMACY TO SUBSTITUTE PER PROTOCOL (Reordered from: cycloSPORINE (Restasis MultiDose) 0.05 % drop ophthalmic drops)    Per Protocol    dilTIAZem ER (CARDIZEM CD) capsule 180 mg  180 mg Oral DAILY    DULoxetine (CYMBALTA) capsule 30 mg  30 mg Oral DAILY    hydroxyurea (HYDREA) chemo cap 500 mg  500 mg Oral BID    pantoprazole (PROTONIX) 40 mg in 0.9% sodium chloride 10 mL injection  40 mg IntraVENous DAILY    piperacillin-tazobactam (ZOSYN) 3.375 g in 0.9% sodium chloride (MBP/ADV) 100 mL  3.375 g IntraVENous Q8H    dexamethasone (DECADRON) 4 mg/mL injection 4 mg  4 mg IntraVENous Q6H    sodium chloride (NS) flush 5-40 mL  5-40 mL IntraVENous Q8H    sodium chloride (NS) flush 5-40 mL  5-40 mL IntraVENous PRN    ondansetron (ZOFRAN) injection 4 mg  4 mg IntraVENous Q4H PRN    enoxaparin (LOVENOX) injection 40 mg  40 mg SubCUTAneous Q24H    LORazepam (ATIVAN) injection 4.38 mg  0.05 mg/kg IntraVENous Q6H PRN    oxymetazoline (AFRIN) 0.05 % nasal spray 2 Spray  2 Spray Both Nostrils ONCE    morphine injection 1 mg  1 mg IntraVENous Q1H PRN    acetaminophen (TYLENOL) solution 500 mg  500 mg Per NG tube Q6H PRN      Zosyn day 1    PROCEDURE:    Flexible Nasolaryngoscopy  Indication: supraglottic edema and upper airway obstruction. Findings: No mass in NP. Fossae of Rosenmüller are free of tumor. Bilateral eustachian tube orifices are well defined with no overlying mass. Base of tongue symmetric. Mild epiglottic edema. Small clot sitting on glottis. Improved edema from yesterday. Dobhoff placed right naris. A timeout was performed in which the patient's name and procedure were verified. A flexible laryngoscope was then inserted into the left nostril and advanced posteriorly to the nasopharynx. Findings noted above. A/P:  Stable on POD1 tracheostomy and DL/biopsy. Swelling improving on decadron and zosyn.  -Ativan Q6H prn anxiety  -Morphine and tylenol prn pain  -hold Lovenox dose for tracheostomy oozing and maintain SCDs for ppx. Surgicel applied around the wound. Reinforce drain sponge with dry gauze prn.  -Once bloody drainage decreases, can deflate the cuff with RT. Be prepared for deep suctioning as she has a small clot on  Her glottis. -Routine trach care per RT  -Xray to confirm good placement of dobhoff.  consult nutrition re: tube feeding recommendations. Speech for swallow evaluation.  -As bleeding and pain resolve, ok to transfer to floor status to hospitalist service later this afternoon if ICU bed is needed.   -Will continue to follow closely. Rolan Graham, MD  [facility]  [address] [facility], [address] [facility], [address] [place] [address]   o) [phone]  (T) [phone]

## 2020-06-27 NOTE — PROGRESS NOTES
1930: Bedside shift change report given to DAINA Valentni (oncoming nurse) by Dg Brenner RN (offgoing nurse). Report included the following information SBAR, Kardex, ED Summary, OR Summary, Procedure Summary, Intake/Output, MAR, Recent Results, Med Rec Status, Cardiac Rhythm NSR, Alarm Parameters  and Dual Neuro Assessment: pt very drowsy r/t recent PRN Ativan administration, but responded appropriately once awoken via lite sternal rub. 0140: Pt awake and agitated. PRN Ativan IVP administered. 2 RNs @ the bedside to provide full CHG bath. 0345: RN @ the bedside to draw labs. 1780: Pt complained of pain and asked for Tylenol. RN @ the bedside to administer PRN Tylenol. RN noticed that pt's urine changed to a slight pink tinged color. 0730: Bedside shift change report given to Dg Brenner RN (oncoming nurse) by DAINA Valentin (offgoing nurse). Report included the following information SBAR, Kardex, ED Summary, OR Summary, Procedure Summary, Intake/Output, MAR, Recent Results, Cardiac Rhythm NSR and Dual Neuro Assessment.

## 2020-06-27 NOTE — OP NOTES
[address]  OPERATIVE REPORT    Name:  Flaquito Olsen  MR#:  [id number]  :  1935  ACCOUNT #:  [de-identified]  DATE OF SERVICE:  2020    PREOPERATIVE DIAGNOSES:  1. Acute respiratory failure with upper airway obstruction. 2.  Supraglottic edema. POSTOPERATIVE DIAGNOSES:  1. Acute respiratory failure with upper airway obstruction. 2.  Supraglottic edema. PROCEDURES PERFORMED:  1. Tracheostomy. 2.  Direct laryngoscopy with biopsy. SURGEON:  Sheila Cassidy MD    ASSISTANT:  None. ANESTHESIA:  General.    COMPLICATIONS:  None. SPECIMENS REMOVED:  Right aryepiglottic fold. IMPLANTS:  6 cuffed Shiley. ESTIMATED BLOOD LOSS:  5 mL. IV FLUIDS:  1 L. DRAINS:  None. DISPOSITION:  PACU, then home. CONDITION:  Stable    OPERATIVE FINDINGS:  1.  A tracheostomy was performed under local anesthesia. A 6 cuffed Shiley was placed. Surjit flap was not formed. There was no purulence within the wound of the tracheostomy. 2.  A direct laryngoscopy was performed. There was extensive mucosal edema of the supraglottis with ecchymosis. I did not find a mucosal abnormality or injury. A biopsy was taken of the right aryepiglottic fold, which was the most concerning area for abnormality. There was no purulence. There was no hematoma either. BRIEF HISTORY OF PRESENT ILLNESS:  The patient is a pleasant 28-year-old female, who presented emergently to the ER today at [facility] (formerly [facility]) and CT scan showed concern for infection versus mass or other lesion in the right supraglottis extending into the carotid space and retropharynx. She was transferred to [facility] where I examined her and felt the most appropriate course of action was to proceed with awake tracheostomy to secure her airway with direct laryngoscopy with tissue biopsy. The risks of surgery were reviewed in detail with her and she gave informed written consent.     DESCRIPTION OF PROCEDURE:  The patient was identified in the preoperative holding area and taken emergently to the operating room. She was placed in supine position and the head of bed was elevated to assist with breathing. A total of 9 mL of 1% lidocaine with 100,000 parts epinephrine were injected into the anterior neck skin. She was prepped and draped in sterile fashion. A time-out was performed in which we identified her name, medical record number, and the proposed procedure. A horizontal incision was made 2 fingerbreadths above the sternal notch with a 15-blade. A cervical lipectomy was performed with the Bovie. The strap muscles were divided. The thyroid isthmus was divided. The cricoid was identified. Cricoid hook was placed and used to elevate the larynx. After communicating with the anesthesia team, tracheotomy incision was then made with a 15-blade between the second and third tracheal rings. A 6 cuffed Shiley was easily placed and inflated. Good CO2 return was verified and then the patient was placed under general anesthesia. The table was then rotated to 90 degrees counterclockwise and a direct laryngoscopy was performed using a Dedo laryngoscope. See above for operative findings. After biopsies were taken, hemostasis was achieved by applying neuro patties hydrated with 1% lidocaine with 1:100,000 parts epinephrine into the supraglottis at the level of the biopsy. The pharynx was then suctioned of all blood and debris. The laryngoscope was removed. She was noted to have no damage to teeth or gums or tongue. There is a small 2 mm abrasion to the lower lip, but no bleeding. Care was then turned back to the anesthesia team, who weaned the anesthetic and transferred the patient back to the recovery room on a tracheostomy collar.         MD MELIA Phillips/[email or web address]_[email or web address]/BC_CAD  D:  06/26/2020 21:49  T:  06/27/2020 6:34  JOB #:  [id number]

## 2020-06-27 NOTE — ANESTHESIA POSTPROCEDURE EVALUATION
Procedure(s):  TRACHEOSTOMY, WITH DIRECT LARYNGOSCOPY. No value filed. Anesthesia Post Evaluation        Patient location during evaluation: PACU  Note status: Adequate. Level of consciousness: responsive to verbal stimuli and sleepy but conscious  Pain management: satisfactory to patient  Airway patency: patent  Anesthetic complications: no  Cardiovascular status: acceptable  Respiratory status: acceptable  Hydration status: acceptable  Comments: +Post-Anesthesia Evaluation and Assessment    Patient: Carlee Copoer MRN: [id number]  SSN: [id number]   YOB: 1935  Age: 82 y.o. Sex: female          Cardiovascular Function/Vital Signs    /46   Pulse 86   Temp 37.9 °C (100.2 °F)   Resp 13   Ht 5' 5\" (1.651 m)   Wt 84.4 kg (186 lb)   SpO2 93%   BMI 30.95 kg/m²     Patient is status post Procedure(s):  TRACHEOSTOMY, WITH DIRECT LARYNGOSCOPY. Nausea/Vomiting: Controlled. Postoperative hydration reviewed and adequate. Pain:  Pain Scale 1: FLACC (06/26/20 2146)  Pain Intensity 1: 0 (06/26/20 2146)   Managed. Neurological Status:   Neuro (WDL): Exceptions to WDL (06/26/20 2146)   At baseline. Mental Status and Level of Consciousness: Arousable. Pulmonary Status:   O2 Device: 02 face tent (06/26/20 2149)   Adequate oxygenation and airway patent. Complications related to anesthesia: None    Post-anesthesia assessment completed. No concerns. I have evaluated the patient and the patient is stable and ready to be discharged from PACU . Signed By: Gracie Salazar MD    6/26/2020        INITIAL Post-op Vital signs:   Vitals Value Taken Time   /62 6/26/2020 10:38 PM   Temp     Pulse 111 6/26/2020 10:39 PM   Resp 28 6/26/2020 10:39 PM   SpO2 97 % 6/26/2020 10:39 PM   Vitals shown include unvalidated device data.

## 2020-06-27 NOTE — PROGRESS NOTES
[facility]    ICU TEAM Progress Note    Name: Christine Curry   : 1935   MRN: [id number]   Date: 2020      Assessment:     ICU Problems and plan :  1. Airway obstruction s/p trach POD #1 - found clot on glotis no tumor - mild bleeding overnight   a. continue zosyn and decadron   b. Oozing around trach - follow closely   2. COPD on home O2   a. Nebs and O2   3. Anxiety   a. PRN ativan   4. CML        A. Follow counts         B cont hydroxy  5. GERD - PPI   6. HTN   A. Resume dilt 180 CD   7. PAFIB    A.  Hold anticoagulation     Subjective:   Progress Note: 2020      Overnight Events:   2020  Oozing around trach site/anxious     POD:  1 Day Post-Op trach     S/P:   Procedure(s):  TRACHEOSTOMY, WITH DIRECT LARYNGOSCOPY    Active Problem List:     Problem List  Date Reviewed: 2020          Codes Class    Paroxysmal atrial fibrillation (HCC) ICD-10-CM: I48.0  ICD-9-CM: 427.31         PUD (peptic ulcer disease) (Chronic) ICD-10-CM: K27.9  ICD-9-CM: 533.90         Atrial fibrillation with rapid ventricular response (HCC) ICD-10-CM: I48.91  ICD-9-CM: 427.31         Diverticulitis ICD-10-CM: K57.92  ICD-9-CM: 562.11     Overview Signed 2018  7:07 AM by Zelma Baumgarten, MD     Recurrent             Dry eye syndrome ICD-10-CM: X16.681  ICD-9-CM: 375.15         Fibromyalgia ICD-10-CM: M79.7  ICD-9-CM: 729.1         GERD without esophagitis (Chronic) ICD-10-CM: K21.9  ICD-9-CM: 530.81         Hypercholesterolemia (Chronic) ICD-10-CM: E78.00  ICD-9-CM: 272.0         Osteoporosis ICD-10-CM: M81.0  ICD-9-CM: 733.00         Peripheral neuropathy (Chronic) ICD-10-CM: G62.9  ICD-9-CM: 356.9         Prediabetes (Chronic) ICD-10-CM: R73.03  ICD-9-CM: 790.29         Rosacea ICD-10-CM: L71.9  ICD-9-CM: 695.3         Status post trachelectomy ICD-10-CM: Z90.710  ICD-9-CM: V88.01         Myelodysplastic syndrome ([facility] ICD-10-CM: D46.9  ICD-9-CM: 238.75         Mass of epiglottis ICD-10-CM: J38.7  ICD-9-CM: 478.79         Leukemia, acute ([facility] ICD-10-CM: C95.00  ICD-9-CM: 208.00         MDS (myelodysplastic syndrome) (HCC) (Chronic) ICD-10-CM: D46.9  ICD-9-CM: 238.75         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HCC) ICD-10-CM: C93.10  ICD-9-CM: 205.10         Anemia associated with bone marrow infiltration (HCC) ICD-10-CM: E83.74  ICD-9-CM: 284.2         COPD (chronic obstructive pulmonary disease) (HCC) ICD-10-CM: J44.9  ICD-9-CM: 496         SIRS (systemic inflammatory response syndrome) (HCC) ICD-10-CM: R65.10  ICD-9-CM: 995.90         Iron deficiency anemia ICD-10-CM: D50.9  ICD-9-CM: 280.9         Anemia, unspecified ICD-10-CM: D64.9  ICD-9-CM: 285. 9         Thrombocytopenia (HCC) ICD-10-CM: D69.6  ICD-9-CM: 287.5         Acute bronchitis due to other specified organisms ICD-10-CM: J20.8  ICD-9-CM: 466.0         Acute respiratory failure with hypoxia (HCC) ICD-10-CM: J96.01  ICD-9-CM: 518.81         S/P lumpectomy, left breast ICD-10-CM: N51.562  ICD-9-CM: V45.89         Costochondritis ICD-10-CM: M94.0  ICD-9-CM: 733.6         Syncope and collapse ICD-10-CM: R55  ICD-9-CM: 780.2         Stenosis of both internal carotid arteries ICD-10-CM: I65.23  ICD-9-CM: 433.10, 433.30         Syncope ICD-10-CM: R55  ICD-9-CM: 780.2         Spinal stenosis of lumbar region with neurogenic claudication (Chronic) ICD-10-CM: H76.588  ICD-9-CM: 724.03         Lumbar back pain with radiculopathy affecting left lower extremity ICD-10-CM: M54.16  ICD-9-CM: 724.4         Lumbar back pain with radiculopathy affecting right lower extremity ICD-10-CM: M54.16  ICD-9-CM: 724.4         Idiopathic small and large fiber sensory neuropathy ICD-10-CM: G60.8  ICD-9-CM: 356.4         Lumbar post-laminectomy syndrome (Chronic) ICD-10-CM: M96.1  ICD-9-CM: 722.83         Low back pain ICD-10-CM: M54.5  ICD-9-CM: 724.2         Vitamin D deficiency (Chronic) ICD-10-CM: E55.9  ICD-9-CM: 268.9               Past Medical History: has a past medical history of Anemia, Arthritis, Breast cancer ([facility] [address] Bunion of right foot (8/20/2015), Chronic obstructive pulmonary disease ([facility] [address] Chronic pain, Diverticulitis (6/29/2018), Diverticulitis large intestine, Dry eye syndrome (6/29/2018), Fibromyalgia (6/29/2018), GERD (gastroesophageal reflux disease), History of left breast cancer (2013), Hypercholesterolemia (6/29/2018), Ill-defined condition, Leukemia ([facility] MDS (myelodysplastic syndrome) ([facility] [address] Osteoporosis (6/29/2018), Oxygen dependent, Peripheral neuropathy (6/29/2018), Prediabetes (6/29/2018), PUD (peptic ulcer disease), Radiation therapy complication (6568), Rosacea (6/29/2018), and Trouble in sleeping. Past Surgical History:      has a past surgical history that includes hx mohs procedure; us guided core breast biopsy (Left, 2013); hx breast lumpectomy (11/21/2013); ir insert tunl cvc w port over 5 years (12/3/2019); upper gi endoscopy,biopsy (1/24/2020); colonoscopy,diagnostic (2/28/2020); colonoscopy (N/A, 2/28/2020); hx vascular access (02/2020); hx appendectomy; hx tonsillectomy; hx cataract removal; hx orthopaedic; pr total knee arthroplasty; pr total knee arthroplasty; hx hysterectomy; hx other surgical; upper gi endoscopy,biopsy (6/23/2020); and upper gi endoscopy,dilatn w guide (6/23/2020). Home Medications:     Prior to Admission medications    Medication Sig Start Date End Date Taking? Authorizing Provider   DULoxetine (CYMBALTA) 30 mg capsule Take 1 Cap by mouth daily. 6/25/20   Jessee Mcneil DO   hydroxyurea (HYDREA) 500 mg capsule Take 500 mg by mouth two (2) times a day. Indications: chronic myelocytic leukemia    Provider, Historical   cycloSPORINE (Restasis MultiDose) 0.05 % drop ophthalmic drops Administer 1 Drop to both eyes two (2) times a day. Provider, Historical   Oxygen nightly as needed.  1-2 liter nasal cannula qhs and prn if needed    Other, MD Libby   bismuth subsalicylate (PEPTO-BISMOL PO) Take  by mouth daily as needed. As ordered on the bottle    Other, MD Libby   ergocalciferol (ERGOCALCIFEROL) 1,250 mcg (50,000 unit) capsule TAKE ONE CAPSULE BY MOUTH EVERY 7 DAYS 20   Meggan Sena MD   acetaminophen (TYLENOL) 325 mg tablet Take 2 Tabs by mouth every four (4) hours as needed for Pain. 19   Cayden Gross MD   dilTIAZem CD (CARDIZEM CD) 180 mg ER capsule Take 1 Cap by mouth daily. 19   Sergio Cintron MD   pantoprazole (PROTONIX) 40 mg tablet Take 1 Tab by mouth Before breakfast and dinner. 19   Sergio Cintron MD       Allergies/Social/Family History: Allergies   Allergen Reactions    Latex Rash    Hydrocodone Nausea Only and Vertigo    Gabapentin Diarrhea, Nausea Only and Other (comments)     weakness    Lyrica [Pregabalin] Nausea Only    Oxycodone Nausea and Vomiting and Vertigo      Social History     Tobacco Use    Smoking status: Former Smoker     Packs/day: 0.50     Years: 50.00     Pack years: 25.00     Last attempt to quit: 2012     Years since quittin.3    Smokeless tobacco: Never Used   Substance Use Topics    Alcohol use:  Yes     Alcohol/week: 2.0 standard drinks     Types: 2 Glasses of wine per week      Family History   Problem Relation Age of Onset    Cancer Mother         bladder    Cancer Father     Breast Cancer Paternal Grandmother        Review of Systems:     Anxious     Objective:   Vital Signs:  Visit Vitals  /50   Pulse 89   Temp 98.2 °F (36.8 °C)   Resp 17   Ht 5' 5\" (1.651 m)   Wt 87.6 kg (193 lb 2 oz)   SpO2 96%   BMI 32.14 kg/m²    O2 Flow Rate (L/min): 2 l/min O2 Device: Tracheal collar, Tracheostomy Temp (24hrs), Av.9 °F (37.2 °C), Min:98.2 °F (36.8 °C), Max:100.2 °F (37.9 °C)           Intake/Output:     Intake/Output Summary (Last 24 hours) at 2020 0823  Last data filed at 2020 0700  Gross per 24 hour   Intake 1261.25 ml   Output 1705 ml   Net -443.75 ml       Physical Exam:    General:  Alert, cooperative, well noursished, well developed, appears stated age   Eyes:  Sclera anicteric. Pupils equally round and reactive to light. Mouth/Throat: Mucous membranes normal, oral pharynx clear   Neck: Supple s/p trach    Lungs:   Clear to auscultation bilaterally, good effort   CV:  Regular rate and rhythm,no murmur, click, rub or gallop   Abdomen:   Soft, non-tender. bowel sounds normal. non-distended   Extremities: No cyanosis or edema   Skin: Skin color, texture, turgor normal. no acute rash or lesions   Lymph nodes: Cervical and supraclavicular normal   Musculoskeletal: No swelling or deformity   Lines/Devices:  Intact, no erythema, drainage or tenderness   Psych: Alert and oriented, normal mood affect given the setting       LABS AND  DATA: Personally reviewed  Recent Labs     06/27/20 0320 06/26/20  1135   WBC 37.0* 30.5*   HGB 9.1* 9.3*   HCT 31.5* 30.8*    139*     Recent Labs     06/27/20  0320 06/26/20  1135    140   K 4.2 3.8   * 108   CO2 24 23   BUN 12 18   CREA 0.66 0.55   * 99   CA 8.0* 8.6     Recent Labs     06/26/20  1135   AP 62   TP 7.5   ALB 3.8   GLOB 3.7     No results for input(s): INR, PTP, APTT, INREXT in the last 72 hours. No results for input(s): PHI, PCO2I, PO2I, FIO2I in the last 72 hours. No results for input(s): CPK, CKMB, TROIQ, BNPP in the last 72 hours. Hemodynamics:   PAP:   CO:     Wedge:   CI:     CVP:    SVR:       PVR:       Ventilator Settings:  Mode Rate Tidal Volume Pressure FiO2 PEEP            50 %       Peak airway pressure:      Minute ventilation:          MEDS: Reviewed    Chest X-Ray:  CXR Results  (Last 48 hours)               06/26/20 1232  XR CHEST PA LAT Final result    Impression:  IMPRESSION: Lungs are clear of an acute process. Narrative:  EXAM:  XR CHEST PA LAT       INDICATION:   Cough/shortness of breath       COMPARISON: 2020.        FINDINGS: PA and lateral radiographs of the chest demonstrate Port-A-Cath tip   overlies SVC. The lungs are clear of an acute process. [place] [facility] Heart size is slightly   enlarged and stable. .  There is degenerative spurring mid thoracic spine. Postoperative changes are noted upper lumbar spine. .                  DISPOSITION  Transfer to non-ICU bed later today if does well         I personally spent 35 minutes of critical care time. This is time spent at this critically ill patient's bedside actively involved in patient care as well as the coordination of care and discussions with the patient's family. This does not include any procedural time which has been billed separately.     [address] [place] [address]  6/27/2020

## 2020-06-27 NOTE — BRIEF OP NOTE
Brief Postoperative Note    Patient: Dianne Garber  YOB: 1935  MRN: [id number]    Date of Procedure: 6/26/2020     Pre-Op Diagnosis: THROAT MASS    Post-Op Diagnosis: supraglottic edema and acute airway obstruction      Procedure(s):  TRACHEOSTOMY, WITH DIRECT LARYNGOSCOPY    Surgeon(s):  Julia Martinez MD    Surgical Assistant: None    Anesthesia: General     Estimated Blood Loss (mL): Minimal    Complications: None    Specimens:   ID Type Source Tests Collected by Time Destination   1 : right area epiglotic fold Fresh Epiglottis  Julia Martinez MD 6/26/2020 2124 Pathology        Implants: * No implants in log *    Drains: * No LDAs found *    Findings: right>left supraglottic edema and ecchymosis    Electronically Signed by Esvin Cristobal MD on 6/26/2020 at 9:40 PM

## 2020-06-27 NOTE — PROGRESS NOTES
0730 Bedside shift change report given to Noelle Dominguez (oncoming nurse) by Katherine Torres (offgoing nurse). Report included the following information SBAR, Intake/Output, MAR, Recent Results and Cardiac Rhythm NSR 1st degree AVB. 0730 Dr. Alistair Sousa with Otolaryngology at bedside for Flexible Nasolaryngoscopy. Plan to hold Lovenox at this time d/t oozing from tracheostomy site and keep tracheostomy cuff inflated until oozing subsides. 46 Dobhoff placed by Dr. Alistair Sousa using laryngoscope. KUB ordered to verify placement. 3444 Radiology at bedside for CXR  0830 Pt detatting to 88%. Pt repositioned and Trach collar increased to 100%. O2 saturation slow to recover. Respiratory notified and updated on plan of care  1296 Dr. Diony Brown at bedside. Plan for Q12 H&H d/t continuous oozing from tracheostomy site  1530 Repeat H&H drawn  1615 Hgb 8. 2(was 9.1). Dr. Diony Brown aware. Next repeat due with AM labs  1845 O2 saturation 98%, trach collar decreased to 70% FIO2    1930 Bedside shift change report given to Homero (oncoming nurse) by Noelle Dominguez (offgoing nurse). Report included the following information SBAR, Intake/Output, MAR, Recent Results and Cardiac Rhythm NSR AVB 1st degree.

## 2020-06-27 NOTE — INTERDISCIPLINARY ROUNDS
IDR/SLIDR Summary Patient: Tobias Chu MRN: [id number]    Age: 82 y.o. YOB: 1935 Room/Bed: [address] [place] [address] Admit Diagnosis: Mass of epiglottis [J38.7]  Principal Diagnosis: <principal problem not specified>  
Goals: airway protection, stable VS 
Readmission: NO  Quality Measure: SCIP and COPD 
VTE Prophylaxis: Mechanical 
Influenza Vaccine screening completed? YES Pneumococcal Vaccine screening completed? YES Mobility needs: Yes   Nutrition plan:No 
Consults:P.T, O.T. and Case Management Financial concerns:Yes  Escalated to CM? YES 
RRAT Score: 23   Interventions:H2H Testing due for pt today? YES 
LOS: 1 days Expected length of stay ? days Discharge plan: home   PCP: Darling Floyd MD 
Transportation needs: Yes Days before discharge:two or more days before discharge Discharge disposition: Home Signed:  
 
Nel Arciniega RN 
6/27/2020 
12:45 AM

## 2020-06-27 NOTE — H&P
Critical care history and physical    Subjective:    Chief complaint: Admitted post emergent tracheostomy for compromised airway    History of present illness: This is a very pleasant 44-year-old lady with more than 60-pack-year smoking history quit 1 year ago and multiple medical problems including myelodysplastic syndrome, history of breast cancer 6 years ago treated with lumpectomy and radiation therapy, COPD with occasional supplemental oxygen use, GERD and esophageal ulcer disease [seems to be healing as indicated and recent endoscopy] and other medical issue who was transferred today from [facility] with 1 day history of progressive sore throat neck pain and purulent taste on swallowing. CAT scan was done at that hospital showed marked edema and thickening of the right aryepiglottic fold extending inferiorly to the level of the glottis with soft tissue infiltration extended into the right paraglottic space. ENT evaluated the patient in the ER and performed flexible nasopharyngoscopy showing \"No mass in NP. Fossae of Rosenmüller are free of tumor. Bilateral eustachian tube orifices are well defined with no overlying mass. Base of tongue symmetric. Epiglottis is crisp. Large violaceous mass protruding from right hypopharynx/oropharynx extending past midline completely obscuring a view of any supraglottic or glottic structures. \". Patient was taken immediately to the OR where a cuffed Shiley size 6 tracheostomy was placed without complication. Operative finding were \" diffuse edema more on the right supraglottis than left. Diffuse bruising as well. No purulence or mucosal lesions. Biopsy taken of right AE fold. \"  Of note is 3 days ago patient underwent endoscopy with no complication reported.   On further questioning and per other provider documentation patient been feeling some progressive sore throat for the last 5 to 6 weeks, she did not feel any acute abnormality postprocedure and her symptoms started on the day of presentation. Currently patient in ICU, conscious alert oriented x3 however nonverbal given the recent trach answer appropriately to question by nodding and sign language. She denies any complaint at this time other than being hungry.     Past Medical History:   Diagnosis Date    Anemia     Arthritis     Breast cancer ([address]     left    Bunion of right foot 8/20/2015    Chronic obstructive pulmonary disease (HCC)     mild    Chronic pain     back--uses tens unit    Diverticulitis 6/29/2018    Recurrent    Diverticulitis large intestine     Dry eye syndrome 6/29/2018    Fibromyalgia 6/29/2018    GERD (gastroesophageal reflux disease)     History of left breast cancer 2013    lumpectomy radiation    Hypercholesterolemia 6/29/2018    Ill-defined condition     blood transfusion hx    Leukemia (HCC)     MDS (myelodysplastic syndrome) ([address]     Osteoporosis 6/29/2018    Oxygen dependent     at night    Peripheral neuropathy 6/29/2018    Prediabetes 6/29/2018    PUD (peptic ulcer disease)     Radiation therapy complication 1670    Lt breast-No Chemo    Rosacea 6/29/2018    Trouble in sleeping       Past Surgical History:   Procedure Laterality Date    COLONOSCOPY N/A 2/28/2020    COLONOSCOPY performed by Leatha Talley MD at [facility]   2/28/2020         HX APPENDECTOMY      HX BREAST LUMPECTOMY  11/21/2013    LEFT BREAST LUMPECTOMY W/LEFT BREAST SENTINEL NODE BIOPSY,AND NEEDLE LOCALIZATION performed by Gloria Panda MD at [facility] MAIN OR    HX CATARACT REMOVAL      bilateral    HX HYSTERECTOMY      ovarian cyst    HX MOHS PROCEDURES      right    HX ORTHOPAEDIC      back surgery x2    HX OTHER SURGICAL      colonoscopy    HX TONSILLECTOMY      HX VASCULAR ACCESS  02/2020    echo cath right shoulder    IR INSERT TUNL CVC W PORT OVER 5 YEARS  12/3/2019    TOTAL KNEE ARTHROPLASTY      left    TOTAL KNEE ARTHROPLASTY      right    UPPER GI ENDOSCOPY,BIOPSY  2020         UPPER GI ENDOSCOPY,BIOPSY  2020         UPPER GI ENDOSCOPY,DILATN W GUIDE  2020         US GUIDED CORE BREAST BIOPSY Left     Breast Ca      Prior to Admission medications    Medication Sig Start Date End Date Taking? Authorizing Provider   DULoxetine (CYMBALTA) 30 mg capsule Take 1 Cap by mouth daily. 20   Jessee Mcneil DO   hydroxyurea (HYDREA) 500 mg capsule Take 500 mg by mouth two (2) times a day. Indications: chronic myelocytic leukemia    Provider, Historical   cycloSPORINE (Restasis MultiDose) 0.05 % drop ophthalmic drops Administer 1 Drop to both eyes two (2) times a day. Provider, Historical   Oxygen nightly as needed. 1-2 liter nasal cannula qhs and prn if needed    Other, MD Libby   bismuth subsalicylate (PEPTO-BISMOL PO) Take  by mouth daily as needed. As ordered on the bottle    Other, MD Libby   ergocalciferol (ERGOCALCIFEROL) 1,250 mcg (50,000 unit) capsule TAKE ONE CAPSULE BY MOUTH EVERY 7 DAYS 20   Manjinder Gipson MD   acetaminophen (TYLENOL) 325 mg tablet Take 2 Tabs by mouth every four (4) hours as needed for Pain. 19   Quin Lopez MD   dilTIAZem CD (CARDIZEM CD) 180 mg ER capsule Take 1 Cap by mouth daily. 19   Frank Sanchez MD   pantoprazole (PROTONIX) 40 mg tablet Take 1 Tab by mouth Before breakfast and dinner. 19   Frank Sanchez MD     Allergies   Allergen Reactions    Latex Rash    Hydrocodone Nausea Only and Vertigo    Gabapentin Diarrhea, Nausea Only and Other (comments)     weakness    Lyrica [Pregabalin] Nausea Only    Oxycodone Nausea and Vomiting and Vertigo      Social History     Tobacco Use    Smoking status: Former Smoker     Packs/day: 0.50     Years: 50.00     Pack years: 25.00     Last attempt to quit: 2012     Years since quittin.3    Smokeless tobacco: Never Used   Substance Use Topics    Alcohol use:  Yes     Alcohol/week: 2.0 standard drinks Types: 2 Glasses of wine per week      Family History   Problem Relation Age of Onset    Cancer Mother         bladder    Cancer Father     Breast Cancer Paternal Grandmother       Review of Systems  Not able to obtain as the patient is nonverbal  Objective:    06/26 1901 - 06/27 0700  In: 600 [I.V.:600]  Out: -   No intake/output data recorded. Patient Vitals for the past 8 hrs:   BP Temp Pulse Resp SpO2 Height Weight   06/27/20 0015 125/53  96 14      06/27/20 0000 133/62 99.3 °F (37.4 °C) (!) 104 22 99 %  87.6 kg (193 lb 2 oz)   06/26/20 2353 139/65  (!) 107 16 97 %     06/26/20 2345 141/60 99.4 °F (37.4 °C) (!) 143 16 94 %     06/26/20 2300 134/59  98 16 98 %     06/26/20 2245 146/60 98.9 °F (37.2 °C) (!) 109 25 94 %     06/26/20 2238 153/62  (!) 113 25 99 %     06/26/20 2215 103/46  86 13 93 %     06/26/20 2203 122/50  99 14 93 %     06/26/20 2200 (!) 96/39  85 14 93 %     06/26/20 2159 99/40  87 13 93 %     06/26/20 2155 (!) 102/39  88 14 93 %     06/26/20 2150 (!) 116/39  94 15 96 %     06/26/20 2149 136/49  (!) 106 22 95 %     06/26/20 2147   (!) 107 12 97 %     06/26/20 2146 136/49  99 15 95 %     06/26/20 2024  100.2 °F (37.9 °C)        06/26/20 2017   (!) 105 22 92 %     06/26/20 2016 153/76  (!) 108 16      06/26/20 2000 165/78  96 25 92 %     06/26/20 1731 163/54  96 19 98 %     06/26/20 1726 163/54 98.7 °F (37.1 °C) (!) 105 20 100 % 5' 5\" (1.651 m) 84.4 kg (186 lb)     Physical Exam  Vitals signs and nursing note reviewed. Constitutional:       Appearance: Normal appearance. She is obese. HENT:      Head: Normocephalic and atraumatic. Right Ear: External ear normal.      Left Ear: External ear normal.      Nose: Nose normal.      Mouth/Throat:      Mouth: Mucous membranes are dry. Comments: No erythema  Eyes:      Extraocular Movements: Extraocular movements intact.       Conjunctiva/sclera: Conjunctivae normal. Pupils: Pupils are equal, round, and reactive to light. Neck:      Comments: Newly inserted size 6 Shiley cuffed tracheostomy tube, no significant bleeding or discharge  Cardiovascular:      Rate and Rhythm: Normal rate and regular rhythm. Pulses: Normal pulses. Heart sounds: Normal heart sounds. Pulmonary:      Effort: Pulmonary effort is normal.      Breath sounds: Normal breath sounds. No wheezing. Abdominal:      General: Abdomen is flat. Bowel sounds are normal.      Palpations: Abdomen is soft. Genitourinary:     Comments: Mahmood catheter  Musculoskeletal:      Right lower leg: No edema. Left lower leg: No edema. Skin:     General: Skin is warm and dry. Capillary Refill: Capillary refill takes less than 2 seconds. Neurological:      General: No focal deficit present. Mental Status: She is alert and oriented to person, place, and time. Psychiatric:         Mood and Affect: Mood normal.         Behavior: Behavior normal.      Comments: Difficult to assess as she is nonverbal at this time          Labs:    Recent Results (from the past 24 hour(s))   CBC WITH AUTOMATED DIFF    Collection Time: 06/26/20 11:35 AM   Result Value Ref Range    WBC 30.5 (H) 3.6 - 11.0 K/uL    RBC 2.69 (L) 3.80 - 5.20 M/uL    HGB 9.3 (L) 11.5 - 16.0 g/dL    HCT 30.8 (L) 35.0 - 47.0 %    .5 (H) 80.0 - 99.0 FL    MCH 34.6 (H) 26.0 - 34.0 PG    MCHC 30.2 30.0 - 36.5 g/dL    RDW 20.5 (H) 11.5 - 14.5 %    PLATELET 015 (L) 918 - 400 K/uL    MPV 10.2 8.9 - 12.9 FL    NRBC 0.1 (H) 0  WBC    ABSOLUTE NRBC 0.04 (H) 0.00 - 0.01 K/uL    NEUTROPHILS 51 32 - 75 %    LYMPHOCYTES 10 (L) 12 - 49 %    MONOCYTES 34 (H) 5 - 13 %    EOSINOPHILS 0 0 - 7 %    BASOPHILS 0 0 - 1 %    METAMYELOCYTES 3 %    MYELOCYTES 2 %    IMMATURE GRANULOCYTES 0 0.0 - 0.5 %    ABS. NEUTROPHILS 15.6 (H) 1.8 - 8.0 K/UL    ABS. LYMPHOCYTES 3.1 0.8 - 3.5 K/UL    ABS. MONOCYTES 10.4 (H) 0.0 - 1.0 K/UL    ABS.  EOSINOPHILS 0.0 0.0 - 0.4 K/UL    ABS. BASOPHILS 0.0 0.0 - 0.1 K/UL    ABS. IMM. GRANS. 0.0 0.00 - 0.04 K/UL    DF MANUAL      RBC COMMENTS MACROCYTOSIS  2+        RBC COMMENTS MICROCYTOSIS  1+        RBC COMMENTS POLYCHROMASIA  1+        RBC COMMENTS ANISOCYTOSIS  3+       METABOLIC PANEL, COMPREHENSIVE    Collection Time: 06/26/20 11:35 AM   Result Value Ref Range    Sodium 140 136 - 145 mmol/L    Potassium 3.8 3.5 - 5.1 mmol/L    Chloride 108 97 - 108 mmol/L    CO2 23 21 - 32 mmol/L    Anion gap 9 5 - 15 mmol/L    Glucose 99 65 - 100 mg/dL    BUN 18 6 - 20 MG/DL    Creatinine 0.55 0.55 - 1.02 MG/DL    BUN/Creatinine ratio 33 (H) 12 - 20      GFR est AA >60 >60 ml/min/1.73m2    GFR est non-AA >60 >60 ml/min/1.73m2    Calcium 8.6 8.5 - 10.1 MG/DL    Bilirubin, total 0.6 0.2 - 1.0 MG/DL    ALT (SGPT) 22 12 - 78 U/L    AST (SGOT) 15 15 - 37 U/L    Alk.  phosphatase 62 45 - 117 U/L    Protein, total 7.5 6.4 - 8.2 g/dL    Albumin 3.8 3.5 - 5.0 g/dL    Globulin 3.7 2.0 - 4.0 g/dL    A-G Ratio 1.0 (L) 1.1 - 2.2     POC LACTIC ACID    Collection Time: 06/26/20 11:56 AM   Result Value Ref Range    Lactic Acid (POC) 0.56 0.40 - 2.00 mmol/L   EKG, 12 LEAD, INITIAL    Collection Time: 06/26/20  1:03 PM   Result Value Ref Range    Ventricular Rate 79 BPM    Atrial Rate 79 BPM    P-R Interval 218 ms    QRS Duration 90 ms    Q-T Interval 396 ms    QTC Calculation (Bezet) 454 ms    Calculated P Axis 34 degrees    Calculated R Axis -17 degrees    Calculated T Axis 78 degrees    Diagnosis       Sinus rhythm with marked sinus arrhythmia with 1st degree AV block  Minimal voltage criteria for LVH, may be normal variant  When compared with ECG of 22-APR-2020 10:49,  No significant change was found     STREP AG SCREEN, GROUP A    Collection Time: 06/26/20  1:12 PM   Result Value Ref Range    Group A Strep Ag ID Negative NEG     URINALYSIS W/ RFLX MICROSCOPIC    Collection Time: 06/26/20  2:19 PM   Result Value Ref Range    Color YELLOW/STRAW      Appearance CLEAR CLEAR      Specific gravity 1.016 1.003 - 1.030      pH (UA) 6.0 5.0 - 8.0      Protein Negative NEG mg/dL    Glucose Negative NEG mg/dL    Ketone Negative NEG mg/dL    Bilirubin Negative NEG      Blood Negative NEG      Urobilinogen 0.2 0.2 - 1.0 EU/dL    Nitrites Negative NEG      Leukocyte Esterase SMALL (A) NEG      WBC 0-4 0 - 4 /hpf    RBC 0-5 0 - 5 /hpf    Epithelial cells FEW FEW /lpf    Bacteria Negative NEG /hpf   URINE CULTURE HOLD SAMPLE    Collection Time: 06/26/20  2:19 PM   Result Value Ref Range    Urine culture hold        Urine on hold in Microbiology dept for 2 days. If unpreserved urine is submitted, it cannot be used for addtional testing after 24 hours, recollection will be required. GLUCOSE, POC    Collection Time: 06/26/20  8:31 PM   Result Value Ref Range    Glucose (POC) 120 (H) 65 - 100 mg/dL    Performed by [address] [place] [address], POC    Collection Time: 06/26/20  9:53 PM   Result Value Ref Range    Glucose (POC) 159 (H) 65 - 100 mg/dL    Performed by [facility]  [place]en        Imaging   I reviewed CTA of the chest and neck done here today, finding as detailed in HPI and I agree with the report. Assessment:  Active Problems:    PUD (peptic ulcer disease) (1/30/2020)      Mass of epiglottis (6/26/2020)      Status post trachelectomy (6/27/2020)      Myelodysplastic syndrome ([address] (6/27/2020)        Plan: This lady with multiple medical problems presented with acute progressive worsening of sore throat with purulent tasting discharge upon swallowing. CAT scan was concerning for epiglottic mass and ENT performed direct laryngoscope in the ER as detailed above. She was taken to OR for emergent tracheostomy as her airway felt to be compromised and finding in the OR are detailed above in HPI. Currently she is in the ICU off ventilator, no evidence of acute complication post tracheostomy   Appreciate ENT input.   Biopsies taken and the etiology for this presentation is not yet clear.  We will start Zosyn, septic work-up obtained in the ER.  Continue with Decadron as recommended by ENT. Will need to repeat CAT scan in couple of days to follow-up on changes.  Resume home medication, patient will need swallow eval before swallowing pills. Speech consultation placed  Daily lab  DVT and GI prophylaxis  Patient will require more than 2 midnights in the hospital    I spent at least 40 minutes of critical care time providing direct care to Ms. Kimmie Osorio excluding any procedure. This time include assessing the patient at bedside, reviewing chart, reviewing, ordering and interpreting images, lab results and medication.     Signed:  Sarah Bell MD 6/27/2020

## 2020-06-27 NOTE — ANESTHESIA PREPROCEDURE EVALUATION
Relevant Problems   No relevant active problems       Anesthetic History   No history of anesthetic complications            Review of Systems / Medical History  Patient summary reviewed, nursing notes reviewed and pertinent labs reviewed    Pulmonary  Within defined limits  COPD               Neuro/Psych   Within defined limits           Cardiovascular  Within defined limits          Dysrhythmias       Exercise tolerance: <4 METS     GI/Hepatic/Renal  Within defined limits   GERD           Endo/Other  Within defined limits      Arthritis and cancer     Other Findings   Comments: Large obstructing mass in supraglottic region. Physical Exam    Airway  Mallampati: III  TM Distance: 4 - 6 cm  Neck ROM: normal range of motion   Mouth opening: Diminished (comment)     Cardiovascular  Regular rate and rhythm,  S1 and S2 normal,  no murmur, click, rub, or gallop             Dental  No notable dental hx       Pulmonary  Breath sounds clear to auscultation               Abdominal  GI exam deferred       Other Findings            Anesthetic Plan    ASA: 4, emergent                  treac under local by ENT .

## 2020-06-27 NOTE — PERIOP NOTES
Prosper Joshua and informed Dr. Maverick Reina concerning vitals: Hr 86, bp 103/46. Pt easily arouse and follow commands with any difficulty. New order for Alden gtt keep sbp greater than 120. TRANSFER - OUT REPORT: 
 
Verbal report given to ***(name) on Soren Cap  being transferred to [facility](unit) for routine post - op Report consisted of patients Situation, Background, Assessment and  
Recommendations(SBAR). Time Pre op antibiotic given:2200 Anesthesia Stop time: *** Mahmood Present on Transfer to floor:no Order for Mahmood on Chart:no Discharge Prescriptions with Chart:no Information from the following report(s) SBAR, ED Summary, OR Summary, Intake/Output, Recent Results, Cardiac Rhythm NSR and Alarm Parameters  was reviewed with the receiving nurse. Opportunity for questions and clarification was provided. Is the patient on 02? YES 
     L/Min 10 Other TRACH COLLAR Is the patient on a monitor? YES Is the nurse transporting with the patient? YES Surgical Waiting Area notified of patient's transfer from PACU? NO The following personal items collected during your admission accompanied patient upon transfer:  
Dental Appliance: Dental Appliances: None Vision:   
Hearing Aid:   
Jewelry:   
Clothing:   
Other Valuables:   
Valuables sent to safe:

## 2020-06-28 LAB
ANION GAP SERPL CALC-SCNC: 5 MMOL/L (ref 5–15)
BACTERIA SPEC CULT: NORMAL
BUN SERPL-MCNC: 21 MG/DL (ref 6–20)
BUN/CREAT SERPL: 30 (ref 12–20)
CALCIUM SERPL-MCNC: 7.6 MG/DL (ref 8.5–10.1)
CHLORIDE SERPL-SCNC: 109 MMOL/L (ref 97–108)
CO2 SERPL-SCNC: 26 MMOL/L (ref 21–32)
CREAT SERPL-MCNC: 0.7 MG/DL (ref 0.55–1.02)
ERYTHROCYTE [DISTWIDTH] IN BLOOD BY AUTOMATED COUNT: 19.2 % (ref 11.5–14.5)
GLUCOSE SERPL-MCNC: 162 MG/DL (ref 65–100)
HCT VFR BLD AUTO: 25.7 % (ref 35–47)
HCT VFR BLD AUTO: 26.8 % (ref 35–47)
HGB BLD-MCNC: 7.2 G/DL (ref 11.5–16)
HGB BLD-MCNC: 7.6 G/DL (ref 11.5–16)
MCH RBC QN AUTO: 33.6 PG (ref 26–34)
MCHC RBC AUTO-ENTMCNC: 28 G/DL (ref 30–36.5)
MCV RBC AUTO: 120.1 FL (ref 80–99)
NRBC # BLD: 0.05 K/UL (ref 0–0.01)
NRBC BLD-RTO: 0.1 PER 100 WBC
PLATELET # BLD AUTO: 139 K/UL (ref 150–400)
PMV BLD AUTO: 10.2 FL (ref 8.9–12.9)
POTASSIUM SERPL-SCNC: 3.9 MMOL/L (ref 3.5–5.1)
RBC # BLD AUTO: 2.14 M/UL (ref 3.8–5.2)
SERVICE CMNT-IMP: NORMAL
SODIUM SERPL-SCNC: 140 MMOL/L (ref 136–145)
WBC # BLD AUTO: 33.6 K/UL (ref 3.6–11)

## 2020-06-28 PROCEDURE — 77030018861

## 2020-06-28 PROCEDURE — 77010033678 HC OXYGEN DAILY

## 2020-06-28 PROCEDURE — 85018 HEMOGLOBIN: CPT

## 2020-06-28 PROCEDURE — 74011250637 HC RX REV CODE- 250/637: Performed by: OTOLARYNGOLOGY

## 2020-06-28 PROCEDURE — 36415 COLL VENOUS BLD VENIPUNCTURE: CPT

## 2020-06-28 PROCEDURE — 74011000250 HC RX REV CODE- 250: Performed by: INTERNAL MEDICINE

## 2020-06-28 PROCEDURE — 74011250636 HC RX REV CODE- 250/636: Performed by: OTOLARYNGOLOGY

## 2020-06-28 PROCEDURE — 77030006998

## 2020-06-28 PROCEDURE — 77030018798 HC PMP KT ENTRL FED COVD -A

## 2020-06-28 PROCEDURE — 74011250636 HC RX REV CODE- 250/636: Performed by: INTERNAL MEDICINE

## 2020-06-28 PROCEDURE — 74011250637 HC RX REV CODE- 250/637: Performed by: INTERNAL MEDICINE

## 2020-06-28 PROCEDURE — 85027 COMPLETE CBC AUTOMATED: CPT

## 2020-06-28 PROCEDURE — 77030008793 HC TU TRACH CUF COVD -B

## 2020-06-28 PROCEDURE — 65620000000 HC RM CCU GENERAL

## 2020-06-28 PROCEDURE — C9113 INJ PANTOPRAZOLE SODIUM, VIA: HCPCS | Performed by: INTERNAL MEDICINE

## 2020-06-28 PROCEDURE — 80048 BASIC METABOLIC PNL TOTAL CA: CPT

## 2020-06-28 PROCEDURE — 74011000258 HC RX REV CODE- 258: Performed by: INTERNAL MEDICINE

## 2020-06-28 RX ORDER — DEXTROSE MONOHYDRATE 100 MG/ML
INJECTION, SOLUTION INTRAVENOUS
Status: DISCONTINUED
Start: 2020-06-28 | End: 2020-06-28

## 2020-06-28 RX ORDER — DEXAMETHASONE SODIUM PHOSPHATE 4 MG/ML
4 INJECTION, SOLUTION INTRA-ARTICULAR; INTRALESIONAL; INTRAMUSCULAR; INTRAVENOUS; SOFT TISSUE EVERY 12 HOURS
Status: DISCONTINUED | OUTPATIENT
Start: 2020-06-28 | End: 2020-06-30

## 2020-06-28 RX ADMIN — DULOXETINE 30 MG: 30 CAPSULE, DELAYED RELEASE ORAL at 08:41

## 2020-06-28 RX ADMIN — ALPRAZOLAM 0.25 MG: 0.25 TABLET ORAL at 15:32

## 2020-06-28 RX ADMIN — LORAZEPAM 2 MG: 2 INJECTION INTRAMUSCULAR; INTRAVENOUS at 18:54

## 2020-06-28 RX ADMIN — LORAZEPAM 2 MG: 2 INJECTION INTRAMUSCULAR; INTRAVENOUS at 01:40

## 2020-06-28 RX ADMIN — ACETAMINOPHEN 500 MG: 650 SUSPENSION ORAL at 11:38

## 2020-06-28 RX ADMIN — PIPERACILLIN AND TAZOBACTAM 3.38 G: 3; .375 INJECTION, POWDER, LYOPHILIZED, FOR SOLUTION INTRAVENOUS at 11:38

## 2020-06-28 RX ADMIN — PIPERACILLIN AND TAZOBACTAM 3.38 G: 3; .375 INJECTION, POWDER, LYOPHILIZED, FOR SOLUTION INTRAVENOUS at 04:30

## 2020-06-28 RX ADMIN — Medication 10 ML: at 21:09

## 2020-06-28 RX ADMIN — ALPRAZOLAM 0.25 MG: 0.25 TABLET ORAL at 08:41

## 2020-06-28 RX ADMIN — HYDROXYUREA 500 MG: 500 CAPSULE ORAL at 18:12

## 2020-06-28 RX ADMIN — HYDROXYUREA 500 MG: 500 CAPSULE ORAL at 08:41

## 2020-06-28 RX ADMIN — ACETAMINOPHEN 500 MG: 650 SUSPENSION ORAL at 18:12

## 2020-06-28 RX ADMIN — DILTIAZEM HYDROCHLORIDE 180 MG: 180 CAPSULE, COATED, EXTENDED RELEASE ORAL at 08:41

## 2020-06-28 RX ADMIN — SODIUM CHLORIDE 40 MG: 9 INJECTION INTRAMUSCULAR; INTRAVENOUS; SUBCUTANEOUS at 08:41

## 2020-06-28 RX ADMIN — Medication 10 ML: at 13:07

## 2020-06-28 RX ADMIN — DEXAMETHASONE SODIUM PHOSPHATE 4 MG: 4 INJECTION, SOLUTION INTRAMUSCULAR; INTRAVENOUS at 00:47

## 2020-06-28 RX ADMIN — ALPRAZOLAM 0.25 MG: 0.25 TABLET ORAL at 21:09

## 2020-06-28 RX ADMIN — PIPERACILLIN AND TAZOBACTAM 3.38 G: 3; .375 INJECTION, POWDER, LYOPHILIZED, FOR SOLUTION INTRAVENOUS at 19:00

## 2020-06-28 RX ADMIN — Medication 10 ML: at 06:15

## 2020-06-28 RX ADMIN — ACETAMINOPHEN 500 MG: 650 SOLUTION ORAL at 04:53

## 2020-06-28 RX ADMIN — DEXAMETHASONE SODIUM PHOSPHATE 4 MG: 4 INJECTION, SOLUTION INTRAMUSCULAR; INTRAVENOUS at 06:15

## 2020-06-28 RX ADMIN — DEXAMETHASONE SODIUM PHOSPHATE 4 MG: 4 INJECTION, SOLUTION INTRAMUSCULAR; INTRAVENOUS at 18:12

## 2020-06-28 NOTE — PROGRESS NOTES
82 y.o. F presenting with acute supraglottic edema and respiratory distress. CT showed a right deep neck space mass vs phlegmon with associated airway compromise. Currently POD2 tracheostomy and DL/biopsy. Receiving Zosyn and Decadron. Continued slow ooze from trach site. Pain and anxiety have been better. Receiving tube feeds. Visit Vitals  /45   Pulse (!) 107   Temp 98 °F (36.7 °C)   Resp 26   Ht 5' 5\" (1.651 m)   Wt 87.6 kg (193 lb 2 oz)   SpO2 (!) 89%   BMI 32.14 kg/m²    6 cuffed shiley in place. Surgicel at trach site. No active bleeding for 10 minutes while I was at bedside. Cuff up. No LAD but neck tender on right side. No fluctuance or crepitus. Lab Results   Component Value Date/Time    WBC 33.6 (H) 06/28/2020 03:51 AM    HGB (POC) 10.7 (A) 01/07/2019 09:52 AM    HGB 7.2 (L) 06/28/2020 03:51 AM    HCT (POC) 32.0 (A) 01/07/2019 09:52 AM    HCT 25.7 (L) 06/28/2020 03:51 AM    PLATELET 378 (L) 10/31/0612 03:51 AM    .1 (H) 06/28/2020 03:51 AM      Current Facility-Administered Medications   Medication Dose Route Frequency    . PHARMACY TO SUBSTITUTE PER PROTOCOL (Reordered from: cycloSPORINE (Restasis MultiDose) 0.05 % drop ophthalmic drops)    Per Protocol    dilTIAZem ER (CARDIZEM CD) capsule 180 mg  180 mg Oral DAILY    DULoxetine (CYMBALTA) capsule 30 mg  30 mg Oral DAILY    pantoprazole (PROTONIX) 40 mg in 0.9% sodium chloride 10 mL injection  40 mg IntraVENous DAILY    piperacillin-tazobactam (ZOSYN) 3.375 g in 0.9% sodium chloride (MBP/ADV) 100 mL  3.375 g IntraVENous Q8H    dexamethasone (DECADRON) 4 mg/mL injection 4 mg  4 mg IntraVENous Q6H    sodium chloride (NS) flush 5-40 mL  5-40 mL IntraVENous Q8H    sodium chloride (NS) flush 5-40 mL  5-40 mL IntraVENous PRN    ondansetron (ZOFRAN) injection 4 mg  4 mg IntraVENous Q4H PRN    enoxaparin (LOVENOX) injection 40 mg  40 mg SubCUTAneous Q24H    LORazepam (ATIVAN) injection 2 mg  2 mg IntraVENous Q6H PRN    morphine injection 1 mg  1 mg IntraVENous Q1H PRN    acetaminophen (TYLENOL) solution 500 mg  500 mg Per NG tube Q6H PRN    ALPRAZolam (XANAX) tablet 0.25 mg  0.25 mg Oral TID    hydroxyurea (HYDREA) 100 mg/mL chemo suspension (compound) 500 mg  500 mg Per NG tube BID      Zosyn day 2    PROCEDURE:    Flexible Nasolaryngoscopy  Indication: supraglottic edema and upper airway obstruction. Findings: No mass in NP. Fossae of Rosenmüller are free of tumor. Bilateral eustachian tube orifices are well defined with no overlying mass. Base of tongue symmetric. Epiglottis normal.  Good view of larynx now. Mild edema on right AE fold. Improved edema from yesterday. Dobhoff right naris. A timeout was performed in which the patient's name and procedure were verified. A flexible laryngoscope was then inserted into the left nostril and advanced posteriorly to the nasopharynx. Findings noted above. A/P:  POD2 tracheostomy and DL/biopsy. Swelling improving on decadron and zosyn. Pathology pending. Slow ooze from trach site.  -Ativan Q6H prn anxiety  -Morphine and tylenol prn pain  -hold Lovenox dose for tracheostomy oozing and maintain SCDs for ppx. Reinforce drain sponge with dry gauze prn. Consider transfusion for downtrending H/H. She reports that she will accept blood products which is contrary to her chart designation. She received pRBCs for her chronic anemia in February upon chart review. I would prefer to hold off returning to the OR as this would require an orotracheal intubation. Trach site too fresh to safely remove at bedside. Notify me at noon if this is still ongoing.  -Once bloody drainage decreases, can deflate the cuff with RT.    -Continue zosyn.   Decrease decadron to 4mg to BID with plan to d/c tomorrow.  -Repeat neck CT w/ contrast tomorrow to assess for development of abscess.  -Routine trach care per RT  -continue tube feeds  -If pathology is benign, I anticipate removing the tracheostomy prior to discharge.  -Will continue to follow closely. Yoselyn Hooks, [address] [facility] [address], [facility] [address], [facility] [address]   o) [phone]  (W) [phone]

## 2020-06-28 NOTE — PROGRESS NOTES
0730 Bedside and Verbal shift change report given to DAINA Kumari (oncoming nurse) by Yuki Crowley (offgoing nurse). Report included the following information SBAR, Kardex, Intake/Output, MAR and Cardiac Rhythm NSR.   0745 ENT at bedside  1200 Dr. Abida Montenegro updated on bleeding around trach. Scant amount of bleeding present, will hold off deflating cuff until CT done in AM  1850 Patient anxious, RR 40. PRN ativan administered. 1930 Bedside and Verbal shift change report given to [facility] VELASQUEZ (oncoming nurse) by Ric Pop (offgoing nurse). Report included the following information SBAR, Kardex, Intake/Output, MAR and Cardiac Rhythm NSR.

## 2020-06-28 NOTE — INTERDISCIPLINARY ROUNDS
IDR/SLIDR Summary Patient: Linoel Mckeon MRN: [id number]    Age: 82 y.o. YOB: 1935 Room/Bed: [address] [place] [address] Admit Diagnosis: Mass of epiglottis [J38.7]  Principal Diagnosis: <principal problem not specified>  
Goals: Manage pain and breathing Readmission: NO  Quality Measure: Not applicable VTE Prophylaxis: Mechanical 
Influenza Vaccine screening completed? NO Pneumococcal Vaccine screening completed? NO Mobility needs: No   Nutrition plan:Yes 
Consults:P.T, O.T. and Respiratory Financial concerns:Yes  Escalated to CM? NO 
RRAT Score: ? Interventions:COPD Educator to follow Testing due for pt today? NO 
LOS: 2 days Expected length of stay 5 days Discharge plan: TBD   PCP: Dallas Sosa MD 
Transportation needs: No   
Days before discharge:two or more days before discharge Discharge disposition: Home Signed:  
 
Marj Rand 6/28/2020 
3:15 AM

## 2020-06-28 NOTE — PROGRESS NOTES
Problem: Falls - Risk of  Goal: *Absence of Falls  Description: Document Jorge Fisher Fall Risk and appropriate interventions in the flowsheet. Outcome: Progressing Towards Goal  Note: Fall Risk Interventions:  Mobility Interventions: Assess mobility with egress test, Bed/chair exit alarm, Communicate number of staff needed for ambulation/transfer, OT consult for ADLs, PT Consult for mobility concerns, PT Consult for assist device competence, Strengthening exercises (ROM-active/passive)         Medication Interventions: Bed/chair exit alarm, Evaluate medications/consider consulting pharmacy    Elimination Interventions: Bed/chair exit alarm, Call light in reach, Elevated toilet seat              Problem: Patient Education: Go to Patient Education Activity  Goal: Patient/Family Education  Outcome: Progressing Towards Goal     Problem: Pressure Injury - Risk of  Goal: *Prevention of pressure injury  Description: Document Nikolas Scale and appropriate interventions in the flowsheet. Outcome: Progressing Towards Goal  Note: Pressure Injury Interventions:  Sensory Interventions: Assess changes in LOC, Assess need for specialty bed, Avoid rigorous massage over bony prominences, Check visual cues for pain, Discuss PT/OT consult with provider, Float heels, Keep linens dry and wrinkle-free, Maintain/enhance activity level, Minimize linen layers, Monitor skin under medical devices, Pad between skin to skin, Pressure redistribution bed/mattress (bed type), Turn and reposition approx.  every two hours (pillows and wedges if needed)    Moisture Interventions: Absorbent underpads, Apply protective barrier, creams and emollients, Assess need for specialty bed, Check for incontinence Q2 hours and as needed, Contain wound drainage, Internal/External urinary devices, Maintain skin hydration (lotion/cream), Minimize layers, Moisture barrier    Activity Interventions: Assess need for specialty bed, Chair cushion, Increase time out of bed, Pressure redistribution bed/mattress(bed type), PT/OT evaluation    Mobility Interventions: Assess need for specialty bed, Chair cushion, Float heels, HOB 30 degrees or less, Pressure redistribution bed/mattress (bed type), PT/OT evaluation, Turn and reposition approx.  every two hours(pillow and wedges)    Nutrition Interventions: Document food/fluid/supplement intake, Discuss nutritional consult with provider, Offer support with meals,snacks and hydration    Friction and Shear Interventions: Apply protective barrier, creams and emollients, Feet elevated on foot rest, Foam dressings/transparent film/skin sealants, HOB 30 degrees or less, Lift sheet, Minimize layers                Problem: Patient Education: Go to Patient Education Activity  Goal: Patient/Family Education  Outcome: Progressing Towards Goal     Problem: Pain  Goal: *Control of Pain  Outcome: Progressing Towards Goal  Goal: *PALLIATIVE CARE:  Alleviation of Pain  Outcome: Progressing Towards Goal     Problem: Patient Education: Go to Patient Education Activity  Goal: Patient/Family Education  Outcome: Progressing Towards Goal     Problem: Breathing Pattern - Ineffective  Goal: *Absence of hypoxia  Outcome: Progressing Towards Goal  Goal: *Use of effective breathing techniques  Outcome: Progressing Towards Goal  Goal: *PALLIATIVE CARE:  Alleviation of Dyspnea  Outcome: Progressing Towards Goal     Problem: Patient Education: Go to Patient Education Activity  Goal: Patient/Family Education  Outcome: Progressing Towards Goal     Problem: Infection - Risk of, Surgical Site Infection  Goal: *Absence of surgical site infection signs and symptoms  Outcome: Progressing Towards Goal     Problem: Patient Education: Go to Patient Education Activity  Goal: Patient/Family Education  Outcome: Progressing Towards Goal     Problem: Infection - Risk of, Central Venous Catheter-Associated Bloodstream Infection  Goal: *Absence of infection signs and symptoms  Outcome: Progressing Towards Goal     Problem: Patient Education: Go to Patient Education Activity  Goal: Patient/Family Education  Outcome: Progressing Towards Goal     Problem: Infection - Risk of, Urinary Catheter-Associated Urinary Tract Infection  Goal: *Absence of infection signs and symptoms  Outcome: Progressing Towards Goal     Problem: Patient Education: Go to Patient Education Activity  Goal: Patient/Family Education  Outcome: Progressing Towards Goal

## 2020-06-28 NOTE — PROGRESS NOTES
[facility]    ICU TEAM Progress Note    Name: Cynthia Mills   : 1935   MRN: [id number]   Date: 2020      Assessment:     ICU Problems and plan :  1. Airway obstruction s/p trach POD #2   a. continue zosyn and decadron   b. Oozing around trach - follow closely   c. Notify Dr. Oscar Marina if bleeding continues by noon on 2020  d. Will continue monitor patient in the ICU until the cuff is deflated and bleeding stopped.   e. OR Per ENT  2. COPD on home O2   a. Nebs and O2   3. Anxiety/Pain  a. PRN ativan   b. Morphine   4. CML        A. Follow counts         B cont hydroxy  5. GERD - PPI   6. HTN   A. Resume dilt 180 CD   7. PAFIB    A.  Hold anticoagulation/DVT PPX for bleeding     Subjective:   Progress Note: 2020      Overnight Events:   2020  Oozing around trach site, Continue monitor, H/H7./.7 down from 8./.5  Patient reports feeling better today, able to tolerate some clears    POD:  2 Day Post-Op trach     S/P:   Procedure(s):  TRACHEOSTOMY, WITH DIRECT LARYNGOSCOPY    Active Problem List:     Problem List  Date Reviewed: 2020          Codes Class    Paroxysmal atrial fibrillation (HCC) ICD-10-CM: I48.0  ICD-9-CM: 427.31         PUD (peptic ulcer disease) (Chronic) ICD-10-CM: K27.9  ICD-9-CM: 533.90         Atrial fibrillation with rapid ventricular response (HCC) ICD-10-CM: I48.91  ICD-9-CM: 427.31         Diverticulitis ICD-10-CM: K57.92  ICD-9-CM: 562.11     Overview Signed 2018  7:07 AM by Lianne Koyanagi, MD     Recurrent             Dry eye syndrome ICD-10-CM: M36.332  ICD-9-CM: 375.15         Fibromyalgia ICD-10-CM: M79.7  ICD-9-CM: 729.1         GERD without esophagitis (Chronic) ICD-10-CM: K21.9  ICD-9-CM: 530.81         Hypercholesterolemia (Chronic) ICD-10-CM: E78.00  ICD-9-CM: 272.0         Osteoporosis ICD-10-CM: M81.0  ICD-9-CM: 733.00         Peripheral neuropathy (Chronic) ICD-10-CM: G62.9  ICD-9-CM: 356.9         Prediabetes (Chronic) ICD-10-CM: R73.03  ICD-9-CM: 790.29         Rosacea ICD-10-CM: L71.9  ICD-9-CM: 695.3         Status post trachelectomy ICD-10-CM: Z90.710  ICD-9-CM: V88.01         Myelodysplastic syndrome ([facility] [address] ICD-10-CM: D46.9  ICD-9-CM: 238.75         Mass of epiglottis ICD-10-CM: J38.7  ICD-9-CM: 478.79         Leukemia, acute (HCC) ICD-10-CM: C95.00  ICD-9-CM: 208.00         MDS (myelodysplastic syndrome) (HCC) (Chronic) ICD-10-CM: D46.9  ICD-9-CM: 238.75         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HCC) ICD-10-CM: C93.10  ICD-9-CM: 205.10         Anemia associated with bone marrow infiltration (HCC) ICD-10-CM: D89.09  ICD-9-CM: 284.2         COPD (chronic obstructive pulmonary disease) (HCC) ICD-10-CM: J44.9  ICD-9-CM: 496         SIRS (systemic inflammatory response syndrome) (HCC) ICD-10-CM: R65.10  ICD-9-CM: 995.90         Iron deficiency anemia ICD-10-CM: D50.9  ICD-9-CM: 280.9         Anemia, unspecified ICD-10-CM: D64.9  ICD-9-CM: 285. 9         Thrombocytopenia (HCC) ICD-10-CM: D69.6  ICD-9-CM: 287.5         Acute bronchitis due to other specified organisms ICD-10-CM: J20.8  ICD-9-CM: 466.0         Acute respiratory failure with hypoxia (HCC) ICD-10-CM: J96.01  ICD-9-CM: 518.81         S/P lumpectomy, left breast ICD-10-CM: U67.457  ICD-9-CM: V45.89         Costochondritis ICD-10-CM: M94.0  ICD-9-CM: 733.6         Syncope and collapse ICD-10-CM: R55  ICD-9-CM: 780.2         Stenosis of both internal carotid arteries ICD-10-CM: I65.23  ICD-9-CM: 433.10, 433.30         Syncope ICD-10-CM: R55  ICD-9-CM: 780.2         Spinal stenosis of lumbar region with neurogenic claudication (Chronic) ICD-10-CM: L01.571  ICD-9-CM: 724.03         Lumbar back pain with radiculopathy affecting left lower extremity ICD-10-CM: M54.16  ICD-9-CM: 724.4         Lumbar back pain with radiculopathy affecting right lower extremity ICD-10-CM: M54.16  ICD-9-CM: 724.4         Idiopathic small and large fiber sensory neuropathy ICD-10-CM: G60.8  ICD-9-CM: 356.4         Lumbar post-laminectomy syndrome (Chronic) ICD-10-CM: M96.1  ICD-9-CM: 722.83         Low back pain ICD-10-CM: M54.5  ICD-9-CM: 724.2         Vitamin D deficiency (Chronic) ICD-10-CM: E55.9  ICD-9-CM: 268.9               Past Medical History:      has a past medical history of Anemia, Arthritis, Breast cancer ([facility] [address] Bunion of right foot (8/20/2015), Chronic obstructive pulmonary disease ([facility] [address] Chronic pain, Diverticulitis (6/29/2018), Diverticulitis large intestine, Dry eye syndrome (6/29/2018), Fibromyalgia (6/29/2018), GERD (gastroesophageal reflux disease), History of left breast cancer (2013), Hypercholesterolemia (6/29/2018), Ill-defined condition, Leukemia ([facility] MDS (myelodysplastic syndrome) ([facility] Osteoporosis (6/29/2018), Oxygen dependent, Peripheral neuropathy (6/29/2018), Prediabetes (6/29/2018), PUD (peptic ulcer disease), Radiation therapy complication (5095), Rosacea (6/29/2018), and Trouble in sleeping. Past Surgical History:      has a past surgical history that includes hx mohs procedure; us guided core breast biopsy (Left, 2013); hx breast lumpectomy (11/21/2013); ir insert tunl cvc w port over 5 years (12/3/2019); upper gi endoscopy,biopsy (1/24/2020); colonoscopy,diagnostic (2/28/2020); colonoscopy (N/A, 2/28/2020); hx vascular access (02/2020); hx appendectomy; hx tonsillectomy; hx cataract removal; hx orthopaedic; pr total knee arthroplasty; pr total knee arthroplasty; hx hysterectomy; hx other surgical; upper gi endoscopy,biopsy (6/23/2020); and upper gi endoscopy,dilatn w guide (6/23/2020). Home Medications:     Prior to Admission medications    Medication Sig Start Date End Date Taking? Authorizing Provider   DULoxetine (CYMBALTA) 30 mg capsule Take 1 Cap by mouth daily. 6/25/20   Jessee Mcneil,    hydroxyurea (HYDREA) 500 mg capsule Take 500 mg by mouth two (2) times a day.  Indications: chronic myelocytic leukemia    Provider, Historical cycloSPORINE (Restasis MultiDose) 0.05 % drop ophthalmic drops Administer 1 Drop to both eyes two (2) times a day. Provider, Historical   Oxygen nightly as needed. 1-2 liter nasal cannula qhs and prn if needed    Other, MD Libby   bismuth subsalicylate (PEPTO-BISMOL PO) Take  by mouth daily as needed. As ordered on the bottle    Other, MD Libby   ergocalciferol (ERGOCALCIFEROL) 1,250 mcg (50,000 unit) capsule TAKE ONE CAPSULE BY MOUTH EVERY 7 DAYS 20   Nieves Overton MD   acetaminophen (TYLENOL) 325 mg tablet Take 2 Tabs by mouth every four (4) hours as needed for Pain. 19   Kimmie Nunn MD   dilTIAZem CD (CARDIZEM CD) 180 mg ER capsule Take 1 Cap by mouth daily. 19   Raquel Acharya MD   pantoprazole (PROTONIX) 40 mg tablet Take 1 Tab by mouth Before breakfast and dinner. 19   Raquel Acharya MD       Allergies/Social/Family History: Allergies   Allergen Reactions    Latex Rash    Hydrocodone Nausea Only and Vertigo    Gabapentin Diarrhea, Nausea Only and Other (comments)     weakness    Lyrica [Pregabalin] Nausea Only    Oxycodone Nausea and Vomiting and Vertigo      Social History     Tobacco Use    Smoking status: Former Smoker     Packs/day: 0.50     Years: 50.00     Pack years: 25.00     Last attempt to quit: 2012     Years since quittin.3    Smokeless tobacco: Never Used   Substance Use Topics    Alcohol use:  Yes     Alcohol/week: 2.0 standard drinks     Types: 2 Glasses of wine per week      Family History   Problem Relation Age of Onset    Cancer Mother         bladder    Cancer Father     Breast Cancer Paternal Grandmother        Review of Systems:     Anxious     Objective:   Vital Signs:  Visit Vitals  /45   Pulse (!) 107   Temp 98 °F (36.7 °C)   Resp 26   Ht 5' 5\" (1.651 m)   Wt 87.6 kg (193 lb 2 oz)   SpO2 (!) 89%   BMI 32.14 kg/m²    O2 Flow Rate (L/min): 12 l/min O2 Device: Tracheal collar Temp (24hrs), Av.3 °F (36.8 °C), Min:97.8 °F (36.6 °C), Max:98.9 °F (37.2 °C)           Intake/Output:     Intake/Output Summary (Last 24 hours) at 6/28/2020 7357  Last data filed at 6/28/2020 0700  Gross per 24 hour   Intake 2870 ml   Output 2130 ml   Net 740 ml       Physical Exam:    General:  Alert, cooperative, well noursished, well developed, appears stated age   Eyes:  Sclera anicteric. Pupils equally round and reactive to light. Mouth/Throat: Mucous membranes normal, oral pharynx clear   Neck: Supple s/p trach, mild oozing   Lungs:   Clear to auscultation bilaterally, good effort   CV:  Regular rate and rhythm,no murmur, click, rub or gallop   Abdomen:   Soft, non-tender. bowel sounds normal. non-distended   Extremities: No cyanosis or edema   Skin: Skin color, texture, turgor normal. no acute rash or lesions   Musculoskeletal: No swelling or deformity   Lines/Devices:  Intact, no erythema, drainage or tenderness   Psych: Alert and oriented, normal mood affect given the setting       LABS AND  DATA: Personally reviewed  Recent Labs     06/28/20  0351 06/27/20  1537 06/27/20  0320   WBC 33.6*  --  37.0*   HGB 7.2* 8.2* 9.1*   HCT 25.7* 28.6* 31.5*   *  --  159     Recent Labs     06/28/20  0351 06/27/20  0320    142   K 3.9 4.2   * 111*   CO2 26 24   BUN 21* 12   CREA 0.70 0.66   * 153*   CA 7.6* 8.0*     Recent Labs     06/26/20  1135   AP 62   TP 7.5   ALB 3.8   GLOB 3.7     No results for input(s): INR, PTP, APTT, INREXT, INREXT in the last 72 hours. No results for input(s): PHI, PCO2I, PO2I, FIO2I in the last 72 hours. No results for input(s): CPK, CKMB, TROIQ, BNPP in the last 72 hours.     Hemodynamics:   PAP:   CO:     Wedge:   CI:     CVP:    SVR:       PVR:       Ventilator Settings:  Mode Rate Tidal Volume Pressure FiO2 PEEP            70 %       Peak airway pressure:      Minute ventilation:          MEDS: Reviewed    Chest X-Ray:  CXR Results  (Last 48 hours)               06/26/20 1232  XR CHEST PA LAT Final result    Impression:  IMPRESSION: Lungs are clear of an acute process. Narrative:  EXAM:  XR CHEST PA LAT       INDICATION:   Cough/shortness of breath       COMPARISON: 2020. FINDINGS: PA and lateral radiographs of the chest demonstrate Port-A-Cath tip   overlies SVC. The lungs are clear of an acute process. Angelina Frizzle Heart size is slightly   enlarged and stable. .  There is degenerative spurring mid thoracic spine. Postoperative changes are noted upper lumbar spine. .                  DISPOSITION  Stay in ICU if continues to have inflated tracheostomy cuff    I personally spent 35 minutes of critical care time. This is time spent at this critically ill patient's bedside actively involved in patient care as well as the coordination of care and discussions with the patient's family. This does not include any procedural time which has been billed separately.     Kenya Paez MD  [facility]  6/28/2020

## 2020-06-28 NOTE — PROGRESS NOTES
TRANSITION OF CARE  RUR--           30%  Disposition--TBD  Transport--   Family    Care Management Interventions  PCP Verified by CM: Yes  Transition of Care Consult (CM Consult): Other(TBD)  Physical Therapy Consult: No  Occupational Therapy Consult: No  Speech Therapy Consult: Yes  Current Support Network: Lives with Spouse  The Plan for Transition of Care is Related to the Following Treatment Goals : TBD  Discharge Location  Discharge Placement: Other:(TBD)    Reason for Admission: Epiglotic mass             S/P tracheostomy and DL biopsy           Myelodysplastic syndrome           History of COPD- has home oxygen concentrator and portable oxygen concentrator with only PRN use in recent months. Provider is Priscilla. History of breast cancer 6 years ago, GERD, esophageal ulcer. RUR Score:            30%           Plan for utilizing home health:     TBD     PCP: First and Last name:  Elvia Lozano MD   Name of Practice:    Are you a current patient: Yes    Approximate date of last visit: June 2020   Can you participate in a virtual visit with your PCP:                     Current Advanced Directive/Advance Care Plan: On file. Patient confirmed that primary decision maker is  Albin Lai and secondary decision maker is son Mariama Kapoor \"Hima\" Elvia Lozano. Transition of Care Plan:                    CM met with patient. Patient is unable to speak but readily communicates with gestures and writing. Patient lives with her . Patient reports good family /social support. Patient is ambulatory and expects return to independent functioning. Patient confirmed PCP, health insurance, and prescription coverage. Transport at discharge will be provided by family. Patient has been to [facility] inpatient rehab x2. Patient has had outpatient PT. No previous home health services.

## 2020-06-28 NOTE — PROGRESS NOTES
1930: Bedside and Verbal shift change report given to Ely Cisse RN (oncoming nurse) by Soham Zeng RN (offgoing nurse). Report included the following information SBAR, Kardex, ED Summary, OR Summary, Procedure Summary, Intake/Output, MAR, Recent Results, Med Rec Status and Cardiac Rhythm NSR.     2000: Resumed pt care, VSS. CHG bath complete, tolerated well. Trach site still oozing blood, trach care performed    0000: Trach care performed, inner cannula changed, pt tolerated well     0300: AM Labs drawn and sent     0400: Pt down to CT with x2 RN's     0415: Pt back from CT     0430: Pt with x5 liquid BM's since start of shift, orders for flexiseal     0500: Trach tie and dressing change, tolerated well     0630: Dr. Charles Lea at bedside, possibility of taking pt down to O.R. this evening. Orders to hold TF until decision to take back to O.R. is made     0645: Trach collar titrated down to 70%     0730: Bedside and Verbal shift change report given to DAINA Rao (oncoming nurse) by Ely Cisse RN (offgoing nurse). Report included the following information SBAR, Kardex, ED Summary, OR Summary, Procedure Summary, Intake/Output, MAR, Recent Results, Med Rec Status and Cardiac Rhythm NSR/PAC's.

## 2020-06-29 ENCOUNTER — ANESTHESIA (OUTPATIENT)
Dept: SURGERY | Age: 85
DRG: 011 | End: 2020-06-29
Payer: MEDICARE

## 2020-06-29 ENCOUNTER — ANESTHESIA EVENT (OUTPATIENT)
Dept: SURGERY | Age: 85
DRG: 011 | End: 2020-06-29
Payer: MEDICARE

## 2020-06-29 ENCOUNTER — APPOINTMENT (OUTPATIENT)
Dept: CT IMAGING | Age: 85
DRG: 011 | End: 2020-06-29
Attending: OTOLARYNGOLOGY
Payer: MEDICARE

## 2020-06-29 LAB
ANION GAP SERPL CALC-SCNC: 4 MMOL/L (ref 5–15)
BUN SERPL-MCNC: 22 MG/DL (ref 6–20)
BUN/CREAT SERPL: 33 (ref 12–20)
CALCIUM SERPL-MCNC: 8.4 MG/DL (ref 8.5–10.1)
CHLORIDE SERPL-SCNC: 108 MMOL/L (ref 97–108)
CO2 SERPL-SCNC: 26 MMOL/L (ref 21–32)
CREAT SERPL-MCNC: 0.67 MG/DL (ref 0.55–1.02)
ERYTHROCYTE [DISTWIDTH] IN BLOOD BY AUTOMATED COUNT: 19.4 % (ref 11.5–14.5)
GLUCOSE SERPL-MCNC: 157 MG/DL (ref 65–100)
HCT VFR BLD AUTO: 26.1 % (ref 35–47)
HCT VFR BLD AUTO: 27.1 % (ref 35–47)
HGB BLD-MCNC: 7.5 G/DL (ref 11.5–16)
HGB BLD-MCNC: 7.7 G/DL (ref 11.5–16)
MCH RBC QN AUTO: 34.1 PG (ref 26–34)
MCHC RBC AUTO-ENTMCNC: 28.4 G/DL (ref 30–36.5)
MCV RBC AUTO: 119.9 FL (ref 80–99)
NRBC # BLD: 0.09 K/UL (ref 0–0.01)
NRBC BLD-RTO: 0.3 PER 100 WBC
PLATELET # BLD AUTO: 153 K/UL (ref 150–400)
PMV BLD AUTO: 10.3 FL (ref 8.9–12.9)
POTASSIUM SERPL-SCNC: 4.2 MMOL/L (ref 3.5–5.1)
RBC # BLD AUTO: 2.26 M/UL (ref 3.8–5.2)
SODIUM SERPL-SCNC: 138 MMOL/L (ref 136–145)
WBC # BLD AUTO: 33.6 K/UL (ref 3.6–11)

## 2020-06-29 PROCEDURE — 74011000258 HC RX REV CODE- 258: Performed by: RADIOLOGY

## 2020-06-29 PROCEDURE — 74011000250 HC RX REV CODE- 250: Performed by: NURSE ANESTHETIST, CERTIFIED REGISTERED

## 2020-06-29 PROCEDURE — 0BW1XFZ REVISION OF TRACHEOSTOMY DEVICE IN TRACHEA, EXTERNAL APPROACH: ICD-10-PCS | Performed by: OTOLARYNGOLOGY

## 2020-06-29 PROCEDURE — 74011250636 HC RX REV CODE- 250/636: Performed by: NURSE ANESTHETIST, CERTIFIED REGISTERED

## 2020-06-29 PROCEDURE — 36591 DRAW BLOOD OFF VENOUS DEVICE: CPT

## 2020-06-29 PROCEDURE — 70491 CT SOFT TISSUE NECK W/DYE: CPT

## 2020-06-29 PROCEDURE — 74011000250 HC RX REV CODE- 250: Performed by: INTERNAL MEDICINE

## 2020-06-29 PROCEDURE — 77030036668 HC HEMSTAT APPL W/HEMADERM KT -BARD -F: Performed by: OTOLARYNGOLOGY

## 2020-06-29 PROCEDURE — 77030018836 HC SOL IRR NACL ICUM -A: Performed by: OTOLARYNGOLOGY

## 2020-06-29 PROCEDURE — C9113 INJ PANTOPRAZOLE SODIUM, VIA: HCPCS | Performed by: INTERNAL MEDICINE

## 2020-06-29 PROCEDURE — 0W3Q7ZZ CONTROL BLEEDING IN RESPIRATORY TRACT, VIA NATURAL OR ARTIFICIAL OPENING: ICD-10-PCS | Performed by: OTOLARYNGOLOGY

## 2020-06-29 PROCEDURE — 74011250637 HC RX REV CODE- 250/637: Performed by: INTERNAL MEDICINE

## 2020-06-29 PROCEDURE — 77010033678 HC OXYGEN DAILY

## 2020-06-29 PROCEDURE — 85027 COMPLETE CBC AUTOMATED: CPT

## 2020-06-29 PROCEDURE — 74011250636 HC RX REV CODE- 250/636: Performed by: INTERNAL MEDICINE

## 2020-06-29 PROCEDURE — 77030040356 HC CORD BPLR FRCP COVD -A: Performed by: OTOLARYNGOLOGY

## 2020-06-29 PROCEDURE — 77030040361 HC SLV COMPR DVT MDII -B: Performed by: OTOLARYNGOLOGY

## 2020-06-29 PROCEDURE — 76060000033 HC ANESTHESIA 1 TO 1.5 HR: Performed by: OTOLARYNGOLOGY

## 2020-06-29 PROCEDURE — 76010000149 HC OR TIME 1 TO 1.5 HR: Performed by: OTOLARYNGOLOGY

## 2020-06-29 PROCEDURE — 74011000258 HC RX REV CODE- 258: Performed by: NURSE ANESTHETIST, CERTIFIED REGISTERED

## 2020-06-29 PROCEDURE — 77030002888 HC SUT CHRMC J&J -A: Performed by: OTOLARYNGOLOGY

## 2020-06-29 PROCEDURE — 80048 BASIC METABOLIC PNL TOTAL CA: CPT

## 2020-06-29 PROCEDURE — 74011250636 HC RX REV CODE- 250/636: Performed by: OTOLARYNGOLOGY

## 2020-06-29 PROCEDURE — 77030011640 HC PAD GRND REM COVD -A: Performed by: OTOLARYNGOLOGY

## 2020-06-29 PROCEDURE — 74011250637 HC RX REV CODE- 250/637: Performed by: OTOLARYNGOLOGY

## 2020-06-29 PROCEDURE — 85018 HEMOGLOBIN: CPT

## 2020-06-29 PROCEDURE — 77030002996 HC SUT SLK J&J -A: Performed by: OTOLARYNGOLOGY

## 2020-06-29 PROCEDURE — 74011000258 HC RX REV CODE- 258: Performed by: INTERNAL MEDICINE

## 2020-06-29 PROCEDURE — 65620000000 HC RM CCU GENERAL

## 2020-06-29 PROCEDURE — 74011636320 HC RX REV CODE- 636/320: Performed by: RADIOLOGY

## 2020-06-29 PROCEDURE — 77030031139 HC SUT VCRL2 J&J -A: Performed by: OTOLARYNGOLOGY

## 2020-06-29 RX ORDER — GLYCOPYRROLATE 0.2 MG/ML
INJECTION INTRAMUSCULAR; INTRAVENOUS AS NEEDED
Status: DISCONTINUED | OUTPATIENT
Start: 2020-06-29 | End: 2020-06-29 | Stop reason: HOSPADM

## 2020-06-29 RX ORDER — ROCURONIUM BROMIDE 10 MG/ML
INJECTION, SOLUTION INTRAVENOUS AS NEEDED
Status: DISCONTINUED | OUTPATIENT
Start: 2020-06-29 | End: 2020-06-29 | Stop reason: HOSPADM

## 2020-06-29 RX ORDER — SODIUM CHLORIDE 0.9 % (FLUSH) 0.9 %
10 SYRINGE (ML) INJECTION
Status: COMPLETED | OUTPATIENT
Start: 2020-06-29 | End: 2020-06-29

## 2020-06-29 RX ORDER — SODIUM CHLORIDE, SODIUM LACTATE, POTASSIUM CHLORIDE, CALCIUM CHLORIDE 600; 310; 30; 20 MG/100ML; MG/100ML; MG/100ML; MG/100ML
INJECTION, SOLUTION INTRAVENOUS
Status: DISCONTINUED | OUTPATIENT
Start: 2020-06-29 | End: 2020-06-29 | Stop reason: HOSPADM

## 2020-06-29 RX ORDER — DILTIAZEM HYDROCHLORIDE 30 MG/1
45 TABLET, FILM COATED ORAL 4 TIMES DAILY
Status: DISCONTINUED | OUTPATIENT
Start: 2020-06-29 | End: 2020-07-07

## 2020-06-29 RX ORDER — PROPOFOL 10 MG/ML
INJECTION, EMULSION INTRAVENOUS AS NEEDED
Status: DISCONTINUED | OUTPATIENT
Start: 2020-06-29 | End: 2020-06-29 | Stop reason: HOSPADM

## 2020-06-29 RX ORDER — ONDANSETRON 2 MG/ML
INJECTION INTRAMUSCULAR; INTRAVENOUS AS NEEDED
Status: DISCONTINUED | OUTPATIENT
Start: 2020-06-29 | End: 2020-06-29 | Stop reason: HOSPADM

## 2020-06-29 RX ORDER — NEOSTIGMINE METHYLSULFATE 1 MG/ML
INJECTION INTRAVENOUS AS NEEDED
Status: DISCONTINUED | OUTPATIENT
Start: 2020-06-29 | End: 2020-06-29 | Stop reason: HOSPADM

## 2020-06-29 RX ORDER — DEXAMETHASONE SODIUM PHOSPHATE 4 MG/ML
INJECTION, SOLUTION INTRA-ARTICULAR; INTRALESIONAL; INTRAMUSCULAR; INTRAVENOUS; SOFT TISSUE AS NEEDED
Status: DISCONTINUED | OUTPATIENT
Start: 2020-06-29 | End: 2020-06-29 | Stop reason: HOSPADM

## 2020-06-29 RX ADMIN — ACETAMINOPHEN 500 MG: 650 SUSPENSION ORAL at 18:33

## 2020-06-29 RX ADMIN — DILTIAZEM HYDROCHLORIDE 45 MG: 30 TABLET, FILM COATED ORAL at 14:20

## 2020-06-29 RX ADMIN — DEXAMETHASONE SODIUM PHOSPHATE 4 MG: 4 INJECTION, SOLUTION INTRAMUSCULAR; INTRAVENOUS at 06:01

## 2020-06-29 RX ADMIN — DEXAMETHASONE SODIUM PHOSPHATE 4 MG: 4 INJECTION, SOLUTION INTRAMUSCULAR; INTRAVENOUS at 19:25

## 2020-06-29 RX ADMIN — DEXMEDETOMIDINE HYDROCHLORIDE 12 MCG: 100 INJECTION, SOLUTION, CONCENTRATE INTRAVENOUS at 19:31

## 2020-06-29 RX ADMIN — Medication 10 ML: at 21:15

## 2020-06-29 RX ADMIN — Medication 10 ML: at 06:02

## 2020-06-29 RX ADMIN — ALPRAZOLAM 0.25 MG: 0.25 TABLET ORAL at 15:54

## 2020-06-29 RX ADMIN — ROCURONIUM BROMIDE 20 MG: 10 SOLUTION INTRAVENOUS at 19:25

## 2020-06-29 RX ADMIN — ONDANSETRON HYDROCHLORIDE 4 MG: 2 INJECTION, SOLUTION INTRAMUSCULAR; INTRAVENOUS at 19:25

## 2020-06-29 RX ADMIN — PIPERACILLIN AND TAZOBACTAM 3.38 G: 3; .375 INJECTION, POWDER, LYOPHILIZED, FOR SOLUTION INTRAVENOUS at 12:25

## 2020-06-29 RX ADMIN — DILTIAZEM HYDROCHLORIDE 45 MG: 30 TABLET, FILM COATED ORAL at 18:33

## 2020-06-29 RX ADMIN — PIPERACILLIN AND TAZOBACTAM 3.38 G: 3; .375 INJECTION, POWDER, LYOPHILIZED, FOR SOLUTION INTRAVENOUS at 03:04

## 2020-06-29 RX ADMIN — PIPERACILLIN AND TAZOBACTAM 3.38 G: 3; .375 INJECTION, POWDER, LYOPHILIZED, FOR SOLUTION INTRAVENOUS at 21:15

## 2020-06-29 RX ADMIN — ACETAMINOPHEN 500 MG: 650 SUSPENSION ORAL at 00:27

## 2020-06-29 RX ADMIN — SODIUM CHLORIDE, POTASSIUM CHLORIDE, SODIUM LACTATE AND CALCIUM CHLORIDE: 600; 310; 30; 20 INJECTION, SOLUTION INTRAVENOUS at 19:09

## 2020-06-29 RX ADMIN — NEOSTIGMINE METHYLSULFATE 2 MG: 1 INJECTION, SOLUTION INTRAVENOUS at 19:51

## 2020-06-29 RX ADMIN — ALPRAZOLAM 0.25 MG: 0.25 TABLET ORAL at 08:23

## 2020-06-29 RX ADMIN — IOPAMIDOL 100 ML: 612 INJECTION, SOLUTION INTRAVENOUS at 04:00

## 2020-06-29 RX ADMIN — HYDROXYUREA 500 MG: 500 CAPSULE ORAL at 08:41

## 2020-06-29 RX ADMIN — PROPOFOL 100 MG: 10 INJECTION, EMULSION INTRAVENOUS at 19:20

## 2020-06-29 RX ADMIN — DILTIAZEM HYDROCHLORIDE 45 MG: 30 TABLET, FILM COATED ORAL at 21:14

## 2020-06-29 RX ADMIN — Medication 10 ML: at 15:54

## 2020-06-29 RX ADMIN — DILTIAZEM HYDROCHLORIDE 45 MG: 30 TABLET, FILM COATED ORAL at 10:44

## 2020-06-29 RX ADMIN — ALPRAZOLAM 0.25 MG: 0.25 TABLET ORAL at 21:14

## 2020-06-29 RX ADMIN — DEXAMETHASONE SODIUM PHOSPHATE 4 MG: 4 INJECTION, SOLUTION INTRAMUSCULAR; INTRAVENOUS at 18:33

## 2020-06-29 RX ADMIN — HYDROXYUREA 500 MG: 500 CAPSULE ORAL at 18:33

## 2020-06-29 RX ADMIN — PROPOFOL 50 MG: 10 INJECTION, EMULSION INTRAVENOUS at 19:30

## 2020-06-29 RX ADMIN — Medication 10 ML: at 04:20

## 2020-06-29 RX ADMIN — DEXMEDETOMIDINE HYDROCHLORIDE 8 MCG: 100 INJECTION, SOLUTION, CONCENTRATE INTRAVENOUS at 19:42

## 2020-06-29 RX ADMIN — PROPOFOL 50 MG: 10 INJECTION, EMULSION INTRAVENOUS at 19:37

## 2020-06-29 RX ADMIN — SODIUM CHLORIDE 40 MG: 9 INJECTION INTRAMUSCULAR; INTRAVENOUS; SUBCUTANEOUS at 08:23

## 2020-06-29 RX ADMIN — GLYCOPYRROLATE 0.4 MG: 0.2 INJECTION, SOLUTION INTRAMUSCULAR; INTRAVENOUS at 19:51

## 2020-06-29 RX ADMIN — SODIUM CHLORIDE 50 ML: 900 INJECTION, SOLUTION INTRAVENOUS at 04:20

## 2020-06-29 RX ADMIN — ACETAMINOPHEN 500 MG: 650 SUSPENSION ORAL at 12:35

## 2020-06-29 RX ADMIN — ACETAMINOPHEN 500 MG: 650 SUSPENSION ORAL at 05:59

## 2020-06-29 RX ADMIN — MORPHINE SULFATE 1 MG: 2 INJECTION, SOLUTION INTRAMUSCULAR; INTRAVENOUS at 20:29

## 2020-06-29 NOTE — PERIOP NOTES
KATHERYN WAS GIVEN TO THE STERILE FIELD TO BE USED BY MD DURING PROCEDURE  REF:  [id number]  LOT: [id number]  EXP: 06/28/2022

## 2020-06-29 NOTE — PROGRESS NOTES
Problem: Falls - Risk of  Goal: *Absence of Falls  Description: Document Jessica Ayers Fall Risk and appropriate interventions in the flowsheet. Outcome: Progressing Towards Goal  Note: Fall Risk Interventions:  Mobility Interventions: Assess mobility with egress test, Communicate number of staff needed for ambulation/transfer, OT consult for ADLs, Patient to call before getting OOB, PT Consult for mobility concerns, PT Consult for assist device competence, Strengthening exercises (ROM-active/passive)         Medication Interventions: Evaluate medications/consider consulting pharmacy    Elimination Interventions: Call light in reach, Elevated toilet seat, Toileting schedule/hourly rounds              Problem: Patient Education: Go to Patient Education Activity  Goal: Patient/Family Education  Outcome: Progressing Towards Goal     Problem: Pressure Injury - Risk of  Goal: *Prevention of pressure injury  Description: Document Nikolas Scale and appropriate interventions in the flowsheet. Outcome: Progressing Towards Goal  Note: Pressure Injury Interventions:  Sensory Interventions: Assess changes in LOC, Assess need for specialty bed, Float heels, Keep linens dry and wrinkle-free, Minimize linen layers, Turn and reposition approx. every two hours (pillows and wedges if needed)    Moisture Interventions: Absorbent underpads, Apply protective barrier, creams and emollients, Internal/External urinary devices, Check for incontinence Q2 hours and as needed, Moisture barrier    Activity Interventions: Assess need for specialty bed, Pressure redistribution bed/mattress(bed type)    Mobility Interventions: Assess need for specialty bed, Float heels, Pressure redistribution bed/mattress (bed type), Turn and reposition approx.  every two hours(pillow and wedges)    Nutrition Interventions: Discuss nutritional consult with provider    Friction and Shear Interventions: Apply protective barrier, creams and emollients, Lift sheet, Minimize layers, Transferring/repositioning devices                Problem: Patient Education: Go to Patient Education Activity  Goal: Patient/Family Education  Outcome: Progressing Towards Goal     Problem: Pain  Goal: *Control of Pain  Outcome: Progressing Towards Goal  Goal: *PALLIATIVE CARE:  Alleviation of Pain  Outcome: Progressing Towards Goal     Problem: Patient Education: Go to Patient Education Activity  Goal: Patient/Family Education  Outcome: Progressing Towards Goal     Problem: Breathing Pattern - Ineffective  Goal: *Absence of hypoxia  Outcome: Progressing Towards Goal  Goal: *Use of effective breathing techniques  Outcome: Progressing Towards Goal  Goal: *PALLIATIVE CARE:  Alleviation of Dyspnea  Outcome: Progressing Towards Goal     Problem: Patient Education: Go to Patient Education Activity  Goal: Patient/Family Education  Outcome: Progressing Towards Goal     Problem: Infection - Risk of, Surgical Site Infection  Goal: *Absence of surgical site infection signs and symptoms  Outcome: Progressing Towards Goal     Problem: Patient Education: Go to Patient Education Activity  Goal: Patient/Family Education  Outcome: Progressing Towards Goal     Problem: Infection - Risk of, Central Venous Catheter-Associated Bloodstream Infection  Goal: *Absence of infection signs and symptoms  Outcome: Progressing Towards Goal     Problem: Patient Education: Go to Patient Education Activity  Goal: Patient/Family Education  Outcome: Progressing Towards Goal     Problem: Infection - Risk of, Urinary Catheter-Associated Urinary Tract Infection  Goal: *Absence of infection signs and symptoms  Outcome: Progressing Towards Goal     Problem: Patient Education: Go to Patient Education Activity  Goal: Patient/Family Education  Outcome: Progressing Towards Goal     Problem: Chronic Obstructive Pulmonary Disease (COPD)  Goal: *Oxygen saturation during activity within specified parameters  Outcome: Progressing Towards Goal  Goal: *Able to remain out of bed as prescribed  Outcome: Progressing Towards Goal  Goal: *Absence of hypoxia  Outcome: Progressing Towards Goal  Goal: *Optimize nutritional status  Outcome: Progressing Towards Goal     Problem: Patient Education: Go to Patient Education Activity  Goal: Patient/Family Education  Outcome: Progressing Towards Goal

## 2020-06-29 NOTE — BRIEF OP NOTE
Brief Postoperative Note    Patient: Ashley Pisano  YOB: 1935  MRN: [id number]    Date of Procedure: 6/29/2020     Pre-Op Diagnosis: tracheostomy hemorrhage    Post-Op Diagnosis: Same as preoperative diagnosis. Procedure(s):  TRACH REVISION    Surgeon(s):  Shahzad Brown MD    Surgical Assistant: None    Anesthesia: General     Estimated Blood Loss (mL): Minimal    Complications: None    Specimens: * No specimens in log *     Implants: * No implants in log *    Drains:   Nasogastric Tube 06/27/20 (Active)   Site Assessment Clean, dry, & intact 6/29/2020  4:00 PM   Securement Device Adhesive-based burton 6/29/2020  4:00 PM   G Port Status Clamped 6/29/2020  4:00 PM   External Insertion Jameson (cms) 75 cms 6/29/2020  4:00 PM   Action Taken Placement verified (comment) 6/29/2020  4:00 PM   Gastric Residual (mL) 0 ml 6/29/2020  4:00 AM   Tube Feeding/Formula Options Diabetic (Glucerna 1.2) 6/29/2020 12:00 PM   Modular Nutrients Protein liquid 6/29/2020 12:00 PM   Tube Feeding/Verify Rate (mL/hr) 0 6/29/2020  7:00 AM   Water Flush Volume (mL) 0 mL 6/29/2020  6:30 AM   Intake (ml) 0 ml 6/29/2020  7:00 AM   Medication Volume 0 ml 6/29/2020  6:30 AM   Drainage Chamber Level (ml) 0 ml 6/29/2020  6:30 AM   Output (ml) 0 ml 6/29/2020  6:30 AM       Fecal Management (Active)   Stool Consistency Liquid 6/29/2020  4:00 PM   Position of Indicator Line Visible 6/29/2020  4:00 PM   Signal Indicator Bubble Appropriate 6/29/2020  4:00 PM   Skin Assessment of the Anal Area Treatment with Zinc Oxide cream 6/29/2020  4:00 PM   Tube Irrigated No 6/29/2020  4:00 PM   Irrigation Volume (mL) 10 6/29/2020  4:00 AM   Drainage Bag Level (mL) 125 6/29/2020  4:00 PM   Output (ml) 25 ml 6/29/2020  4:00 PM       Findings: diffuse venous bleeding on the skin edge, strap muscles, and thyroid. All controlled with bipolar and raul powder. New 6 cuffed shiley placed.     Electronically Signed by Ginny Curry MD on 6/29/2020 at 7:58 PM

## 2020-06-29 NOTE — INTERDISCIPLINARY ROUNDS
IDR/SLIDR Summary Patient: Francisco Whitehead MRN: [id number]    Age: 82 y.o. YOB: 1935 Room/Bed: OR/PL Admit Diagnosis: Mass of epiglottis [J38.7]  Principal Diagnosis: <principal problem not specified>  
Goals: TBD Readmission: NO  Quality Measure: COPD 
VTE Prophylaxis: Mechanical 
Influenza Vaccine screening completed? YES Pneumococcal Vaccine screening completed? YES Mobility needs: Yes   Nutrition plan:Yes 
Consults:P.T, O.T., Speech and Respiratory Financial concerns:Yes  Escalated to CM? YES 
RRAT Score: 35   Interventions:H2H Testing due for pt today? YES 
LOS: 3 days Expected length of stay TBD days Discharge plan: TBD   PCP: Aleksandr Jaramillo MD 
Transportation needs: Yes Days before discharge:two or more days before discharge Discharge disposition: TBD Signed:  
 
Ella Pearce 6/29/2020 
10:34 PM

## 2020-06-29 NOTE — PROGRESS NOTES
Spiritual Care Assessment/Progress Note  [facility]      NAME: Soren Emerson      MRN: [id number]  AGE: 82 y.o.  SEX: female  Restorationism Affiliation: Cheondoism   Language: English     6/29/2020     Total Time (in minutes): 15     Spiritual Assessment begun in [facility] CORONARY CARE through conversation with:         [x]Patient        [] Family    [] Friend(s)        Reason for Consult: Initial/Spiritual assessment, critical care     Spiritual beliefs: (Please include comment if needed)     [x] Identifies with a gladis tradition:    Cheondoism/Evangelical     [x] Supported by a gladis community:       [employer]      [] Claims no spiritual orientation:           [] Seeking spiritual identity:                [] Adheres to an individual form of spirituality:           [] Not able to assess:                           Identified resources for coping:      [x] Prayer                               [] Music                  [] Guided Imagery     [x] Family/friends                 [] Pet visits     [] Devotional reading                         [] Unknown     [] Other:                                               Interventions offered during this visit: (See comments for more details)    Patient Interventions: Affirmation of emotions/emotional suffering, Affirmation of gladis, Initial/Spiritual assessment, Critical care, Prayer (assurance of)     Family/Friend(s): Initial Assessment     Plan of Care:     [x] Support spiritual and/or cultural needs    [] Support AMD and/or advance care planning process      [] Support grieving process   [] Coordinate Rites and/or Rituals    [] Coordination with community clergy   [] No spiritual needs identified at this time   [] Detailed Plan of Care below (See Comments)  [] Make referral to Music Therapy  [] Make referral to Pet Therapy     [] Make referral to Addiction services  [] Make referral to [facility]  [] Make referral to Spiritual Care Partner  [] No future visits requested        [] Follow up visits as needed     Comments: Initial visit with patient. Introduced myself and spiritual care services. Assured Ms. Barone Heady of pastoral care support. She was able to communicate somewhat - she attended [place] and her children attended [place], she is a member of [facility] [facility]. Her  entered toward end of visit; assured them both of prayer and care. Spiritual care is available to continue to follow. Please page as needed/desired. [phone]. Visit by: Ansley Gottlieb.  Jada Espinosa D.Min, MA, [facility]    Lead  Profession Development & Advancement

## 2020-06-29 NOTE — ROUTINE PROCESS
Patient: Zee Duty MRN: [id number]  SSN: [id number] YOB: 1935  Age: 82 y.o. Sex: female Patient is status post Procedure(s): 
TRACH REVISION. Surgeon(s) and Role: * Promise Sauceda MD - Primary Local/Dose/Irrigation: SEE MAR Venous Access Device echo cath 06/26/20 (Active) Central Line Being Utilized Yes 6/29/2020  4:00 PM  
Criteria for Appropriate Use Long term IV/antibiotic administration 6/29/2020  4:00 PM  
Site Assessment Clean, dry, & intact 6/29/2020  4:00 PM  
Date of Last Dressing Change 06/29/20 6/29/2020  4:00 PM  
Dressing Status Clean, dry, & intact 6/29/2020  4:00 PM  
Dressing Type Disk with Chlorhexadine gluconate (CHG) 6/29/2020  4:00 PM  
Action Taken Open ports on tubing capped 6/29/2020  4:00 PM  
Date Accessed (Medial Site) 06/26/20 6/29/2020  4:00 PM  
Access Needle Size (Site #1) 21 G 6/29/2020  4:00 PM  
Access Needle Length (Medial Site) 1 inch 6/29/2020  4:00 PM  
Positive Blood Return (Medial Site) Yes 6/29/2020  4:00 PM  
Action Taken (Medial Site) Infusing 6/29/2020  4:00 PM  
Alcohol Cap Used Yes 6/29/2020  4:00 PM  
        
Nasogastric Tube 06/27/20 (Active) Site Assessment Clean, dry, & intact 6/29/2020  4:00 PM  
Securement Device Adhesive-based burton 6/29/2020  4:00 PM  
G Port Status Clamped 6/29/2020  4:00 PM  
External Insertion Jameson (cms) 75 cms 6/29/2020  4:00 PM  
Action Taken Placement verified (comment) 6/29/2020  4:00 PM  
Gastric Residual (mL) 0 ml 6/29/2020  4:00 AM  
Tube Feeding/Formula Options Diabetic (Glucerna 1.2) 6/29/2020 12:00 PM  
Modular Nutrients Protein liquid 6/29/2020 12:00 PM  
Tube Feeding/Verify Rate (mL/hr) 0 6/29/2020  7:00 AM  
Water Flush Volume (mL) 0 mL 6/29/2020  6:30 AM  
Intake (ml) 0 ml 6/29/2020  7:00 AM  
Medication Volume 0 ml 6/29/2020  6:30 AM  
Drainage Chamber Level (ml) 0 ml 6/29/2020  6:30 AM  
Output (ml) 0 ml 6/29/2020  6:30 AM  
   
Fecal Management (Active) Stool Consistency Liquid 6/29/2020  4:00 PM  
Position of Indicator Line Visible 6/29/2020  4:00 PM  
Signal Indicator Bubble Appropriate 6/29/2020  4:00 PM  
Skin Assessment of the Anal Area Treatment with Zinc Oxide cream 6/29/2020  4:00 PM  
Tube Irrigated No 6/29/2020  4:00 PM  
Irrigation Volume (mL) 10 6/29/2020  4:00 AM  
Drainage Bag Level (mL) 125 6/29/2020  4:00 PM  
Output (ml) 25 ml 6/29/2020  4:00 PM  
  
Airway - Tracheostomy Tube 06/26/20 Cuffed;Disposable Inner cannula; Shiley (Active) Site Assessment Bleeding 6/29/2020  4:00 PM  
Trach Dressing Changed Yes 6/29/2020  4:00 PM  
Trach Cleaned With Normal saline 6/29/2020  4:00 PM  
Trach Tie Changed No 6/29/2020  4:00 PM  
Trach Cannula Changed No 6/29/2020  4:00 PM  
Inner Cannula #6 Shiley; Cuffed, cuff inflated 6/29/2020  4:00 PM  
Line Jameson Top of the lock 6/29/2020  4:00 PM  
Side Secured Centered 6/29/2020  4:00 PM  
Spare Trach at Bedside Yes 6/29/2020  4:00 PM  
Spare Trach Tube One Size Smaller at Bedside Yes 6/29/2020  4:00 PM  
Ambu Bag With Mask at Bedside Yes 6/29/2020  4:00 PM  
         
 
 
 
Dressing/Packing:  Wound Neck-Dressing Type: Other (Comment)(Split drain sponge, Velcro Trach Tie) (06/29/20 1951) Splint/Cast:  ] Other:

## 2020-06-29 NOTE — INTERDISCIPLINARY ROUNDS
IDR/SLIDR Summary Patient: Claudeen Roan MRN: [id number]    Age: 82 y.o. YOB: 1935 Room/Bed: [address] [place] [address] Admit Diagnosis: Mass of epiglottis [J38.7]  Principal Diagnosis: <principal problem not specified>  
Goals: TBD Readmission: NO  Quality Measure: COPD 
VTE Prophylaxis: Mechanical 
Influenza Vaccine screening completed? YES Pneumococcal Vaccine screening completed? YES Mobility needs: Yes   Nutrition plan:Yes 
Consults:P.T, O.T., Speech and Respiratory Financial concerns:Yes  Escalated to CM? YES 
RRAT Score: 35   Interventions:H2H Testing due for pt today? YES 
LOS: 2 days Expected length of stay TBD days Discharge plan: TBD   PCP: Yeimy Zepeda MD 
Transportation needs: Yes Days before discharge:two or more days before discharge Discharge disposition: TBD Signed:  
 
Ivan Olsen 
6/28/2020 
10:34 PM

## 2020-06-29 NOTE — PROGRESS NOTES
[facility]    ICU TEAM Progress Note    Name: Steve Azevedo   : 1935   MRN: [id number]   Date: 2020      Assessment/Plan:       1. Supraglottic Mass  a. Post Trach and biopsy - pathology pending  b. Plan to return to OR today for possible cautery of ongoing bleeding  c. Continue perioperative antibiotics per ENT  d. Continue Decadron taper  e. Continue ATC and wean oxygen as tolerated  2. COPD  a. Not in exacerbation  b. Duonebs PRN  3. HTN/Afib  a. Continue Cardizem - controlled  4. CML  a. Continue Hydroxyurea  5. Mood disorder  a. Continue Xanax and Cymbalta  6. Diarrhea  a. Likely secondary to feeds  b. Feeds on hold  c. Start benefiber with resumption of feeds      Subjective:   Progress Note: 2020      Reason for ICU Admission:     83 yo female with HTN, afib, history of breast cancer/MDS, COPD, and other medical problems presenting to the hospital with with sore throat and radiographic findings suggestive of supraglottic malignancy vs phlegmon. Patient was taken tot he OR by Dr. aKren Goodman   For laryngoscopy with biopsy and trach placement. Overnight Events:     Patient noted with some bleeding from trach site and diarrhea. No complains this AM but asks for food.        Active Problem List:     Problem List  Date Reviewed: 2020          Codes Class    Paroxysmal atrial fibrillation (HCC) ICD-10-CM: I48.0  ICD-9-CM: 427.31         PUD (peptic ulcer disease) (Chronic) ICD-10-CM: K27.9  ICD-9-CM: 533.90         Atrial fibrillation with rapid ventricular response (HCC) ICD-10-CM: I48.91  ICD-9-CM: 427.31         Diverticulitis ICD-10-CM: K57.92  ICD-9-CM: 562.11     Overview Signed 2018  7:07 AM by Sarah Bee MD     Recurrent             Dry eye syndrome ICD-10-CM: R22.141  ICD-9-CM: 375.15         Fibromyalgia ICD-10-CM: M79.7  ICD-9-CM: 729.1         GERD without esophagitis (Chronic) ICD-10-CM: K21.9  ICD-9-CM: 530.81         Hypercholesterolemia (Chronic) ICD-10-CM: E78.00  ICD-9-CM: 272.0         Osteoporosis ICD-10-CM: M81.0  ICD-9-CM: 733.00         Peripheral neuropathy (Chronic) ICD-10-CM: G62.9  ICD-9-CM: 356.9         Prediabetes (Chronic) ICD-10-CM: R73.03  ICD-9-CM: 790.29         Rosacea ICD-10-CM: L71.9  ICD-9-CM: 695.3         Status post trachelectomy ICD-10-CM: Z90.710  ICD-9-CM: V88.01         Myelodysplastic syndrome (HCC) ICD-10-CM: D46.9  ICD-9-CM: 238.75         Mass of epiglottis ICD-10-CM: J38.7  ICD-9-CM: 478.79         Leukemia, acute (HCC) ICD-10-CM: C95.00  ICD-9-CM: 208.00         MDS (myelodysplastic syndrome) (HCC) (Chronic) ICD-10-CM: D46.9  ICD-9-CM: 238.75         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HCC) ICD-10-CM: C93.10  ICD-9-CM: 205.10         Anemia associated with bone marrow infiltration (HCC) ICD-10-CM: G16.32  ICD-9-CM: 284.2         COPD (chronic obstructive pulmonary disease) (HCC) ICD-10-CM: J44.9  ICD-9-CM: 496         SIRS (systemic inflammatory response syndrome) (HCC) ICD-10-CM: R65.10  ICD-9-CM: 995.90         Iron deficiency anemia ICD-10-CM: D50.9  ICD-9-CM: 280.9         Anemia, unspecified ICD-10-CM: D64.9  ICD-9-CM: 285. 9         Thrombocytopenia ([address] ICD-10-CM: D69.6  ICD-9-CM: 287.5         Acute bronchitis due to other specified organisms ICD-10-CM: J20.8  ICD-9-CM: 466.0         Acute respiratory failure with hypoxia (HCC) ICD-10-CM: J96.01  ICD-9-CM: 518.81         S/P lumpectomy, left breast ICD-10-CM: L44.931  ICD-9-CM: V45.89         Costochondritis ICD-10-CM: M94.0  ICD-9-CM: 733.6         Syncope and collapse ICD-10-CM: R55  ICD-9-CM: 780.2         Stenosis of both internal carotid arteries ICD-10-CM: I65.23  ICD-9-CM: 433.10, 433.30         Syncope ICD-10-CM: R55  ICD-9-CM: 780.2         Spinal stenosis of lumbar region with neurogenic claudication (Chronic) ICD-10-CM: I68.580  ICD-9-CM: 724.03         Lumbar back pain with radiculopathy affecting left lower extremity ICD-10-CM: M54.16  ICD-9-CM: 724.4 Lumbar back pain with radiculopathy affecting right lower extremity ICD-10-CM: M54.16  ICD-9-CM: 724.4         Idiopathic small and large fiber sensory neuropathy ICD-10-CM: G60.8  ICD-9-CM: 356.4         Lumbar post-laminectomy syndrome (Chronic) ICD-10-CM: M96.1  ICD-9-CM: 722.83         Low back pain ICD-10-CM: M54.5  ICD-9-CM: 724.2         Vitamin D deficiency (Chronic) ICD-10-CM: E55.9  ICD-9-CM: 268.9               Past Medical History:      has a past medical history of Anemia, Arthritis, Breast cancer ([facility] [address] Bunion of right foot (8/20/2015), Chronic obstructive pulmonary disease ([facility] [address] Chronic pain, Diverticulitis (6/29/2018), Diverticulitis large intestine, Dry eye syndrome (6/29/2018), Fibromyalgia (6/29/2018), GERD (gastroesophageal reflux disease), History of left breast cancer (2013), Hypercholesterolemia (6/29/2018), Ill-defined condition, Leukemia ([facility] [address] MDS (myelodysplastic syndrome) ([facility] [address] Osteoporosis (6/29/2018), Oxygen dependent, Peripheral neuropathy (6/29/2018), Prediabetes (6/29/2018), PUD (peptic ulcer disease), Radiation therapy complication (0489), Rosacea (6/29/2018), and Trouble in sleeping. Past Surgical History:      has a past surgical history that includes hx mohs procedure; us guided core breast biopsy (Left, 2013); hx breast lumpectomy (11/21/2013); ir insert tunl cvc w port over 5 years (12/3/2019); upper gi endoscopy,biopsy (1/24/2020); colonoscopy,diagnostic (2/28/2020); colonoscopy (N/A, 2/28/2020); hx vascular access (02/2020); hx appendectomy; hx tonsillectomy; hx cataract removal; hx orthopaedic; pr total knee arthroplasty; pr total knee arthroplasty; hx hysterectomy; hx other surgical; upper gi endoscopy,biopsy (6/23/2020); and upper gi endoscopy,dilatn w guide (6/23/2020). Home Medications:     Prior to Admission medications    Medication Sig Start Date End Date Taking? Authorizing Provider   DULoxetine (CYMBALTA) 30 mg capsule Take 1 Cap by mouth daily.  6/25/20 Jessee Mcneil,    hydroxyurea (HYDREA) 500 mg capsule Take 500 mg by mouth two (2) times a day. Indications: chronic myelocytic leukemia    Provider, Historical   cycloSPORINE (Restasis MultiDose) 0.05 % drop ophthalmic drops Administer 1 Drop to both eyes two (2) times a day. Provider, Historical   Oxygen nightly as needed. 1-2 liter nasal cannula qhs and prn if needed    Other, MD Libby   bismuth subsalicylate (PEPTO-BISMOL PO) Take  by mouth daily as needed. As ordered on the bottle    Other, MD Libby   ergocalciferol (ERGOCALCIFEROL) 1,250 mcg (50,000 unit) capsule TAKE ONE CAPSULE BY MOUTH EVERY 7 DAYS 20   Theodora Sheffield MD   acetaminophen (TYLENOL) 325 mg tablet Take 2 Tabs by mouth every four (4) hours as needed for Pain. 19   Consuelo Smith MD   dilTIAZem CD (CARDIZEM CD) 180 mg ER capsule Take 1 Cap by mouth daily. 19   Sophie Arreola MD   pantoprazole (PROTONIX) 40 mg tablet Take 1 Tab by mouth Before breakfast and dinner. 19   Sophie Arreola MD       Allergies/Social/Family History: Allergies   Allergen Reactions    Latex Rash    Hydrocodone Nausea Only and Vertigo    Gabapentin Diarrhea, Nausea Only and Other (comments)     weakness    Lyrica [Pregabalin] Nausea Only    Oxycodone Nausea and Vomiting and Vertigo      Social History     Tobacco Use    Smoking status: Former Smoker     Packs/day: 0.50     Years: 50.00     Pack years: 25.00     Last attempt to quit: 2012     Years since quittin.3    Smokeless tobacco: Never Used   Substance Use Topics    Alcohol use: Yes     Alcohol/week: 2.0 standard drinks     Types: 2 Glasses of wine per week      Family History   Problem Relation Age of Onset    Cancer Mother         bladder    Cancer Father     Breast Cancer Paternal Grandmother        Review of Systems:     A comprehensive review of systems was negative except for that written in the HPI.     Objective:   Vital Signs:  Visit Vitals  /89   Pulse 85   Temp 98.1 °F (36.7 °C)   Resp 23   Ht 5' 5\" (1.651 m)   Wt 86.9 kg (191 lb 9.3 oz)   SpO2 97%   BMI 31.88 kg/m²    O2 Flow Rate (L/min): 12 l/min O2 Device: Tracheal collar, Tracheostomy   Temp (24hrs), Av.1 °F (36.7 °C), Min:97.9 °F (36.6 °C), Max:98.4 °F (36.9 °C)           Intake/Output:     Intake/Output Summary (Last 24 hours) at 2020 0747  Last data filed at 2020 0700  Gross per 24 hour   Intake 3510 ml   Output 2985 ml   Net 525 ml       Physical Exam:    General: Ill appearing, NAD  HENT: Atraumatic, Trach in place  Cardio: RRR  Respiratory: Coarse breath sounds BL  GI: Soft not tender abdomen  Extremities: -ve edema  Neuro: Grossly intact      LABS AND  DATA: Personally reviewed  Recent Labs     20  0310 20  1942 20  0351   WBC 33.6*  --  33.6*   HGB 7.7* 7.6* 7.2*   HCT 27.1* 26.8* 25.7*     --  139*     Recent Labs     20  0308 20  0351    140   K 4.2 3.9    109*   CO2 26 26   BUN 22* 21*   CREA 0.67 0.70   * 162*   CA 8.4* 7.6*     Recent Labs     20  1135   AP 62   TP 7.5   ALB 3.8   GLOB 3.7     No results for input(s): INR, PTP, APTT, INREXT, INREXT in the last 72 hours. No results for input(s): PHI, PCO2I, PO2I, FIO2I in the last 72 hours. No results for input(s): CPK, CKMB, TROIQ, BNPP in the last 72 hours.     Hemodynamics:   PAP:   CO:     Wedge:   CI:     CVP:    SVR:       PVR:       Ventilator Settings:  Mode Rate Tidal Volume Pressure FiO2 PEEP            70 %       Peak airway pressure:      Minute ventilation:          MEDS: Reviewed    Chest X-Ray:  CXR Results  (Last 48 hours)    None              Multidisciplinary Rounds Completed:  Pending    ABCDEF Bundle/Checklist  Pain Medications: Oxycodone and Acetaminophen  Target RASS: N/A  Sedation Medications: N/A  CAM-ICU:  Negative  Mobility: Good   PT/OT: Speech therapy consulted and on board   Restraints: None needed at this time  Discussed Plan of Care (goals of care): Yes  Addressed Code Status: Full Code    CARDIOVASCULAR  Cardiac Gtts: None  SBP Goal of: > 90 mmHg  MAP Goal of: > 65 mmHg  Transfusion Trigger (Hgb): <7 g/dL    RESPIRATORY  Vent Goals:   Chlorhexidine   DVT Prophylaxis (if no, list reason): SCD's or Sequential Compression Device   SPO2 Goal: > 92%  Pulmonary toilet: Duo-Nebs     GI/  Mahmood Catheter Present: No  GI Prophylaxis: Protonix (pantoprazole)   Nutrition: Yes   IVFs:   Bowel Movement: Yes  Bowel Regimen: None needed at this time  Insulin:     ANTIBIOTICS  Antibiotics:  Zosyn    T/L/D  Tubes: Tracheostomy  Lines: Peripheral IV and Port  Drains: None    SPECIAL EQUIPMENT  None    DISPOSITION  Stay in ICU    CRITICAL CARE CONSULTANT NOTE  I had a face to face encounter with the patient, reviewed and interpreted patient data including clinical events, labs, images, vital signs, I/O's, and examined patient.   I have discussed the case and the plan and management of the patient's care with the consulting services, the bedside nurses and the respiratory therapist.      Level [address] [facility] MD Isak  [address] [place] [address]  6/29/2020

## 2020-06-29 NOTE — PROGRESS NOTES
Transitions of Care Plan:   RUR:  31%   OR today for exploration of trach wound & possible cautery   Baseline -  Home oxygen; resides with ; independent   Disposition - pending medical progress    Clinical update as of 6/29/20:  -  Patient returning to OR for trach wound exploration    CM will continue to follow for transition of care needs.     Elizabeth Gardner MPH

## 2020-06-29 NOTE — PROGRESS NOTES
NUTRITION COMPLETE ASSESSMENT    RECOMMENDATIONS:   POC BG and correction scale insuloin     Interventions/Plan:   Food/Nutrient Delivery: Modify rate, concentration, composition, and schedule     Osmolite 1.5 @ 45 ml/hr with 2 packets Prosource daily and 140 ml water flush q 4 hr    Assessment:   Reason for Assessment: Provider Consult-Tube Feeding management    Tube Feeding: (on hold for OR this afternoon): Glucerna 1.2 @ 40 ml/hr with 250 ml water flush q 6 hr  Diet: NPO  Nutritionally Significant Medications: [x] Reviewed & Includes: Decadron, Protonix     Subjective: Attempted to see x 2; pt sleeping soundly. Objective:  Ms Trenton Phelan is an 82year old female admitted with Mass of epiglottis. PMHx: COPD, myelodysplastic syndrome, Breast CA, COPD, GERD, esophageal ulcer disease. Noted: 6/26 Emergent trach d/t UAO and supraglottic enema; bleeding around trach-plan to return to OR this afternoon. MST negative indicating pt well-nourished PTA. Tube feeding ordered to meet nutrient needs. Patient having loose stools-flexiseal placed. Discussed during rounds-will adjust tube feeding formula; patient may not be tolerating fiber/FOS. After surgery resume enteral feeding with Osmolite 1.5. Suggested goal: Osmolite 1.5 @ 45 ml/hr with 2 packets Prosource daily and 140 ml water flush q 4 hr. This will provide 1080 ml, 1740 calories, 98 gm protein and 1790 ml free water (tube feeding/flush) per day to meet estimated needs. BG elevated d/t steroid; no history of DM. Consider adding POC testing and correction scale insulin. Estimated Nutrition Needs:   Kcals/day: 1600 Kcals/day(4561-9192 (MSJ x 1.2-1.3)  Protein: 87 g( (1.0-1.2g/kg)  Fluid: (1 ml/kcal)  Based On: [place] Jeor  Weight Used: Actual wt(87 kg)    Pt expected to meet estimated nutrient needs:  [x]   Yes     []  No  [] Unable to predict at this time  Nutrition Diagnosis:   1.  Inadequate oral intake related to upper airway obstruction as evidenced by NPO s/p emergent trach; enteral nutrition support. Goals: Tolerate tube feeding at goal x 5-7 days. Monitoring & Evaluation:    - Enteral/parenteral nutrition intake   - Electrolyte and renal profile, Weight/weight change, GI, CV-pulmonary     Previous Nutrition Goals Met: N/A  Previous Recommendations: N/A    Education & Discharge Needs:   [x] None Identified   [] Identified and addressed    [x] Participated in care plan, discharge planning, and/or interdisciplinary rounds        Cultural, Lutheran and ethnic food preferences identified:  None    Skin Integrity: [x]Intact  []Other  Edema: [x]None []Other  Last BM: 6/29  Food Allergies: [x]None []Other    Anthropometrics:    Weight Loss Metrics 6/29/2020 6/26/2020 6/26/2020 6/26/2020 6/25/2020 6/23/2020 4/28/2020   Today's Wt 191 lb 9.3 oz - 186 lb 1.1 oz - 191 lb 192 lb 189 lb   BMI - 31.88 kg/m2 - 30.96 kg/m2 31.78 kg/m2 31.95 kg/m2 31.45 kg/m2      Last 3 Recorded Weights in this Encounter    06/28/20 0400 06/29/20 0400 06/29/20 1016   Weight: 87.6 kg (193 lb 2 oz) 86.9 kg (191 lb 9.3 oz) 86.9 kg (191 lb 9.3 oz)      Weight Source: Bed  Height: 5' 5\" (165.1 cm),    Body mass index is 31.88 kg/m². IBW : 56.7 kg (125 lb), % IBW (Calculated): 153.26 %   ,      Labs:    Lab Results   Component Value Date/Time    Sodium 138 06/29/2020 03:08 AM    Potassium 4.2 06/29/2020 03:08 AM    Chloride 108 06/29/2020 03:08 AM    CO2 26 06/29/2020 03:08 AM    Glucose 157 (H) 06/29/2020 03:08 AM    BUN 22 (H) 06/29/2020 03:08 AM    Creatinine 0.67 06/29/2020 03:08 AM    Calcium 8.4 (L) 06/29/2020 03:08 AM    Magnesium 2.1 02/11/2020 04:17 AM    Albumin 3.8 06/26/2020 11:35 AM     Lab Results   Component Value Date/Time    Hemoglobin A1c 4.6 (L) 07/08/2019 09:21 AM      Lab Results   Component Value Date/Time    ALT (SGPT) 22 06/26/2020 11:35 AM    AST (SGOT) 15 06/26/2020 11:35 AM    Alk.  phosphatase 62 06/26/2020 11:35 AM    Bilirubin, total 0.6 06/26/2020 11:35 AM        Sid Vizcarra RD [facility]

## 2020-06-29 NOTE — PROGRESS NOTES
82 y.o. F presenting with acute supraglottic edema and respiratory distress. CT showed a right deep neck space mass vs phlegmon with associated airway compromise. Currently POD3 tracheostomy and DL/biopsy. Receiving Zosyn and Decadron which is tapering. Continued slow ooze from [facility] site improved during day shift yesterday and resumed overnight. H/H stable,though. Pain and anxiety have been better. Receiving tube feeds but having diarrhea and c/o abdominal pain. Visit Vitals  /56   Pulse 74   Temp 98.1 °F (36.7 °C)   Resp 21   Ht 5' 5\" (1.651 m)   Wt 86.9 kg (191 lb 9.3 oz)   SpO2 94%   BMI 31.88 kg/m²    6 cuffed shiley in place. Surgicel at [facility] site. No active bleeding on my exam but nursing staff just removed a clot and applied a clean gauze. Cuff up. No LAD but neck tender on right side. No fluctuance or crepitus. Lab Results   Component Value Date/Time    WBC 33.6 (H) 06/29/2020 03:10 AM    HGB (POC) 10.7 (A) 01/07/2019 09:52 AM    HGB 7.7 (L) 06/29/2020 03:10 AM    HCT (POC) 32.0 (A) 01/07/2019 09:52 AM    HCT 27.1 (L) 06/29/2020 03:10 AM    PLATELET 620 16/50/5884 03:10 AM    .9 (H) 06/29/2020 03:10 AM      Current Facility-Administered Medications   Medication Dose Route Frequency    acetaminophen (TYLENOL) solution 500 mg  500 mg Per NG tube Q6H    dexamethasone (DECADRON) 4 mg/mL injection 4 mg  4 mg IntraVENous Q12H    . PHARMACY TO SUBSTITUTE PER PROTOCOL (Reordered from: cycloSPORINE (Restasis MultiDose) 0.05 % drop ophthalmic drops)    Per Protocol    dilTIAZem ER (CARDIZEM CD) capsule 180 mg  180 mg Oral DAILY    DULoxetine (CYMBALTA) capsule 30 mg  30 mg Oral DAILY    pantoprazole (PROTONIX) 40 mg in 0.9% sodium chloride 10 mL injection  40 mg IntraVENous DAILY    piperacillin-tazobactam (ZOSYN) 3.375 g in 0.9% sodium chloride (MBP/ADV) 100 mL  3.375 g IntraVENous Q8H    sodium chloride (NS) flush 5-40 mL  5-40 mL IntraVENous Q8H    sodium chloride (NS) flush 5-40 mL  5-40 mL IntraVENous PRN    ondansetron (ZOFRAN) injection 4 mg  4 mg IntraVENous Q4H PRN    [Held by provider] enoxaparin (LOVENOX) injection 40 mg  40 mg SubCUTAneous Q24H    LORazepam (ATIVAN) injection 2 mg  2 mg IntraVENous Q6H PRN    morphine injection 1 mg  1 mg IntraVENous Q1H PRN    ALPRAZolam (XANAX) tablet 0.25 mg  0.25 mg Oral TID    hydroxyurea (HYDREA) 100 mg/mL chemo suspension (compound) 500 mg  500 mg Per NG tube BID      Zosyn day 3    CT neck dated 6/29/2020: I personally reviewed the scan which has the following pertinent findings:  I do not appreciate a fluid collection. Clot just above the trach but edema of the supraglottic structures themselves appears improved. Secretions within the airway. Official report pending. A/P:  POD3 tracheostomy and DL/biopsy. Swelling improving on decadron and zosyn. Pathology pending. Slow ooze from trach site has resumed. Now with belly pain.  -Ativan Q6H prn anxiety  -Morphine prn and tylenol scheduled for pain  -hold Lovenox dose for tracheostomy oozing and maintain SCDs for ppx. Reinforce drain sponge with dry gauze prn.  H/H stable. Hold tube feeds today. Will round again this afternoon. If ongoing bleeding, plan to return to OR this evening.  -Deflate the cuff with RT. If bleeding increases, inflate cuff again.    -Continue zosyn. D/c decadron.  -Routine trach care per RT  -If pathology is benign, I anticipate removing the tracheostomy prior to discharge.  -Will continue to follow closely. Lisa Moreno, [address], [facility]  [address] [facility], [address]  [address] [facility], [address] [facility]   o) [phone]  (O) [phone]

## 2020-06-29 NOTE — PROGRESS NOTES
0730 Received verbal bedside report from [address]. Assumed care of the pt.    0800 Pt still bleeding around trach site. Dressing changed and pt suctioned. 1600 Pre op CHG bath given. 1700 2nd pre op CHG bath given. 1930 Bedside and Verbal shift change report given to Radha Garg RN (oncoming nurse) by Bogdan Kenney RN (offgoing nurse). Report included the following information SBAR, Kardex, ED Summary, Procedure Summary, Intake/Output, Recent Results and Cardiac Rhythm NSR 1 degree AVB.

## 2020-06-29 NOTE — PROGRESS NOTES
OR time reserved for ~4pm for exploration of trach wound and cautery as needed. Leave cuff up until then. Hold tube feeds. Continue to hold Lovenox. Will discuss with . Discussed with covering nurse. Clau Wiseman, [address] [facility] [address], [facility]  200 [facility], [address] [facility], [address] [place] [address]   (C) [phone]  (W) [phone]  (U) [phone]

## 2020-06-29 NOTE — PROGRESS NOTES
SLP Contact Note    Consult received and appreciated. Patient chart reviewed. Patient going to the OR today and therefore will hold SLP for now.       Thank you,  RYAN HortonEd, [address] [facility]  Speech-Language Pathologist

## 2020-06-29 NOTE — ANESTHESIA PREPROCEDURE EVALUATION
Relevant Problems   No relevant active problems       Anesthetic History   No history of anesthetic complications            Review of Systems / Medical History  Patient summary reviewed, nursing notes reviewed and pertinent labs reviewed    Pulmonary  Within defined limits  COPD               Neuro/Psych   Within defined limits           Cardiovascular  Within defined limits          Dysrhythmias       Exercise tolerance: <4 METS     GI/Hepatic/Renal  Within defined limits   GERD           Endo/Other  Within defined limits      Arthritis and cancer     Other Findings   Comments: Large obstructing mass in supraglottic region.            Physical Exam    Airway  Mallampati: III  TM Distance: 4 - 6 cm  Neck ROM: normal range of motion   Mouth opening: Diminished (comment)  Tracheostomy present   Cardiovascular  Regular rate and rhythm,  S1 and S2 normal,  no murmur, click, rub, or gallop             Dental  No notable dental hx       Pulmonary  Breath sounds clear to auscultation               Abdominal  GI exam deferred       Other Findings            Anesthetic Plan    ASA: 4          Post procedure ventilation   Induction: Intravenous

## 2020-06-30 LAB
ANION GAP SERPL CALC-SCNC: 6 MMOL/L (ref 5–15)
BUN SERPL-MCNC: 22 MG/DL (ref 6–20)
BUN/CREAT SERPL: 33 (ref 12–20)
CALCIUM SERPL-MCNC: 8.1 MG/DL (ref 8.5–10.1)
CHLORIDE SERPL-SCNC: 108 MMOL/L (ref 97–108)
CO2 SERPL-SCNC: 27 MMOL/L (ref 21–32)
CREAT SERPL-MCNC: 0.67 MG/DL (ref 0.55–1.02)
ERYTHROCYTE [DISTWIDTH] IN BLOOD BY AUTOMATED COUNT: 19.2 % (ref 11.5–14.5)
GLUCOSE SERPL-MCNC: 164 MG/DL (ref 65–100)
HCT VFR BLD AUTO: 27.1 % (ref 35–47)
HCT VFR BLD AUTO: 27.2 % (ref 35–47)
HGB BLD-MCNC: 7.8 G/DL (ref 11.5–16)
HGB BLD-MCNC: 7.9 G/DL (ref 11.5–16)
MCH RBC QN AUTO: 34.2 PG (ref 26–34)
MCHC RBC AUTO-ENTMCNC: 28.8 G/DL (ref 30–36.5)
MCV RBC AUTO: 118.9 FL (ref 80–99)
NRBC # BLD: 0.13 K/UL (ref 0–0.01)
NRBC BLD-RTO: 0.5 PER 100 WBC
PLATELET # BLD AUTO: 141 K/UL (ref 150–400)
PMV BLD AUTO: 10.4 FL (ref 8.9–12.9)
POTASSIUM SERPL-SCNC: 4.2 MMOL/L (ref 3.5–5.1)
RBC # BLD AUTO: 2.28 M/UL (ref 3.8–5.2)
SODIUM SERPL-SCNC: 141 MMOL/L (ref 136–145)
WBC # BLD AUTO: 26.2 K/UL (ref 3.6–11)

## 2020-06-30 PROCEDURE — 36415 COLL VENOUS BLD VENIPUNCTURE: CPT

## 2020-06-30 PROCEDURE — 51798 US URINE CAPACITY MEASURE: CPT

## 2020-06-30 PROCEDURE — 74011250637 HC RX REV CODE- 250/637: Performed by: INTERNAL MEDICINE

## 2020-06-30 PROCEDURE — 80048 BASIC METABOLIC PNL TOTAL CA: CPT

## 2020-06-30 PROCEDURE — 97530 THERAPEUTIC ACTIVITIES: CPT

## 2020-06-30 PROCEDURE — 92610 EVALUATE SWALLOWING FUNCTION: CPT | Performed by: SPEECH-LANGUAGE PATHOLOGIST

## 2020-06-30 PROCEDURE — 74011250637 HC RX REV CODE- 250/637: Performed by: OTOLARYNGOLOGY

## 2020-06-30 PROCEDURE — 74011250636 HC RX REV CODE- 250/636: Performed by: INTERNAL MEDICINE

## 2020-06-30 PROCEDURE — 85027 COMPLETE CBC AUTOMATED: CPT

## 2020-06-30 PROCEDURE — 74011250636 HC RX REV CODE- 250/636: Performed by: OTOLARYNGOLOGY

## 2020-06-30 PROCEDURE — 85018 HEMOGLOBIN: CPT

## 2020-06-30 PROCEDURE — 97165 OT EVAL LOW COMPLEX 30 MIN: CPT

## 2020-06-30 PROCEDURE — 65270000032 HC RM SEMIPRIVATE

## 2020-06-30 PROCEDURE — 92524 BEHAVRAL QUALIT ANALYS VOICE: CPT | Performed by: SPEECH-LANGUAGE PATHOLOGIST

## 2020-06-30 PROCEDURE — 74011000258 HC RX REV CODE- 258: Performed by: INTERNAL MEDICINE

## 2020-06-30 PROCEDURE — 97162 PT EVAL MOD COMPLEX 30 MIN: CPT

## 2020-06-30 PROCEDURE — 74011000250 HC RX REV CODE- 250: Performed by: INTERNAL MEDICINE

## 2020-06-30 PROCEDURE — C9113 INJ PANTOPRAZOLE SODIUM, VIA: HCPCS | Performed by: INTERNAL MEDICINE

## 2020-06-30 RX ADMIN — ALPRAZOLAM 0.25 MG: 0.25 TABLET ORAL at 23:03

## 2020-06-30 RX ADMIN — DILTIAZEM HYDROCHLORIDE 45 MG: 30 TABLET, FILM COATED ORAL at 17:20

## 2020-06-30 RX ADMIN — PIPERACILLIN AND TAZOBACTAM 3.38 G: 3; .375 INJECTION, POWDER, LYOPHILIZED, FOR SOLUTION INTRAVENOUS at 12:01

## 2020-06-30 RX ADMIN — ALPRAZOLAM 0.25 MG: 0.25 TABLET ORAL at 08:17

## 2020-06-30 RX ADMIN — LORAZEPAM 2 MG: 2 INJECTION INTRAMUSCULAR; INTRAVENOUS at 05:23

## 2020-06-30 RX ADMIN — PIPERACILLIN AND TAZOBACTAM 3.38 G: 3; .375 INJECTION, POWDER, LYOPHILIZED, FOR SOLUTION INTRAVENOUS at 19:02

## 2020-06-30 RX ADMIN — HYDROXYUREA 500 MG: 500 CAPSULE ORAL at 17:20

## 2020-06-30 RX ADMIN — DULOXETINE 30 MG: 30 CAPSULE, DELAYED RELEASE ORAL at 08:17

## 2020-06-30 RX ADMIN — ACETAMINOPHEN 500 MG: 650 SUSPENSION ORAL at 00:11

## 2020-06-30 RX ADMIN — HYDROXYUREA 500 MG: 500 CAPSULE ORAL at 08:17

## 2020-06-30 RX ADMIN — DEXAMETHASONE SODIUM PHOSPHATE 4 MG: 4 INJECTION, SOLUTION INTRAMUSCULAR; INTRAVENOUS at 06:47

## 2020-06-30 RX ADMIN — Medication 10 ML: at 23:04

## 2020-06-30 RX ADMIN — ACETAMINOPHEN 500 MG: 650 SUSPENSION ORAL at 23:03

## 2020-06-30 RX ADMIN — SODIUM CHLORIDE 40 MG: 9 INJECTION INTRAMUSCULAR; INTRAVENOUS; SUBCUTANEOUS at 08:17

## 2020-06-30 RX ADMIN — ACETAMINOPHEN 500 MG: 650 SUSPENSION ORAL at 17:20

## 2020-06-30 RX ADMIN — ACETAMINOPHEN 500 MG: 650 SUSPENSION ORAL at 12:00

## 2020-06-30 RX ADMIN — PIPERACILLIN AND TAZOBACTAM 3.38 G: 3; .375 INJECTION, POWDER, LYOPHILIZED, FOR SOLUTION INTRAVENOUS at 04:37

## 2020-06-30 RX ADMIN — DILTIAZEM HYDROCHLORIDE 45 MG: 30 TABLET, FILM COATED ORAL at 12:00

## 2020-06-30 RX ADMIN — LORAZEPAM 2 MG: 2 INJECTION INTRAMUSCULAR; INTRAVENOUS at 12:00

## 2020-06-30 RX ADMIN — Medication 10 ML: at 13:48

## 2020-06-30 RX ADMIN — ACETAMINOPHEN 500 MG: 650 SUSPENSION ORAL at 05:35

## 2020-06-30 RX ADMIN — ALPRAZOLAM 0.25 MG: 0.25 TABLET ORAL at 16:12

## 2020-06-30 RX ADMIN — DILTIAZEM HYDROCHLORIDE 45 MG: 30 TABLET, FILM COATED ORAL at 23:03

## 2020-06-30 RX ADMIN — Medication 10 ML: at 06:49

## 2020-06-30 RX ADMIN — DILTIAZEM HYDROCHLORIDE 45 MG: 30 TABLET, FILM COATED ORAL at 08:17

## 2020-06-30 NOTE — PROGRESS NOTES
.. TRANSFER - IN REPORT:    Verbal report received from Merly Rice ,[facility] (name) on Fredo Caraballoevelia  being received from CCU (unit) for routine progression of care      Report consisted of patients Situation, Background, Assessment and   Recommendations(SBAR). Information from the following report(s) SBAR, Kardex and MAR was reviewed with the receiving nurse. Opportunity for questions and clarification was provided. Assessment completed upon patients arrival to unit and care assumed.

## 2020-06-30 NOTE — PROGRESS NOTES
2040: TRANSFER - IN REPORT:    Verbal report received from OR RN(name) on Erica Oseguera  being received from OR(unit) for routine post - op      Report consisted of patients Situation, Background, Assessment and   Recommendations(SBAR). Information from the following report(s) SBAR, Kardex, Procedure Summary, Intake/Output, Accordion and Recent Results was reviewed with the receiving nurse. Opportunity for questions and clarification was provided. Assessment completed upon patients arrival to unit and care assumed. Primary Nurse Jennie Velasco and DAINA Nur performed a dual skin assessment on this patient Impairment noted- see wound doc flow sheet    Current Bed:     CONSTANCE Seay  Nikolas score is 15        0730: Bedside and Verbal shift change report given to [address] [place] [address] (oncoming nurse) by Sabrina Chatman (offgoing nurse). Report included the following information SBAR, Kardex, Procedure Summary, Intake/Output, MAR, Accordion and Recent Results.

## 2020-06-30 NOTE — PROGRESS NOTES
82 y.o. F presenting with acute supraglottic edema and respiratory distress. CT showed a right deep neck space mass vs phlegmon with associated airway compromise. Currently POD4 tracheostomy and DL/biopsy and POD1 takeback for bleeding from trach. Receiving Zosyn and Decadron 4mg BID. Trach dry overnight with minimal secretions. WEaned to trach collar. H/H stable and downtrending WBD but still elevated. No fevers. Pain and anxiety have been better. Receiving tube feeds. Visit Vitals  /50 (BP 1 Location: Right arm, BP Patient Position: At rest)   Pulse 70   Temp (!) 96.6 °F (35.9 °C)   Resp 19   Ht 5' 5\" (1.651 m)   Wt 85.7 kg (188 lb 15 oz)   SpO2 93%   BMI 31.44 kg/m²    6 cuffed shiley in place. NO bleeding  Cuff up. No LAD but neck tender on right side, improved from yesterday. No fluctuance or crepitus. Lab Results   Component Value Date/Time    WBC 26.2 (H) 06/30/2020 04:43 AM    HGB (POC) 10.7 (A) 01/07/2019 09:52 AM    HGB 7.8 (L) 06/30/2020 04:43 AM    HCT (POC) 32.0 (A) 01/07/2019 09:52 AM    HCT 27.1 (L) 06/30/2020 04:43 AM    PLATELET 409 (L) 62/19/4142 04:43 AM    .9 (H) 06/30/2020 04:43 AM      Current Facility-Administered Medications   Medication Dose Route Frequency    dilTIAZem IR (CARDIZEM) tablet 45 mg  45 mg Oral QID    acetaminophen (TYLENOL) solution 500 mg  500 mg Per NG tube Q6H    dexamethasone (DECADRON) 4 mg/mL injection 4 mg  4 mg IntraVENous Q12H    . PHARMACY TO SUBSTITUTE PER PROTOCOL (Reordered from: cycloSPORINE (Restasis MultiDose) 0.05 % drop ophthalmic drops)    Per Protocol    DULoxetine (CYMBALTA) capsule 30 mg  30 mg Oral DAILY    pantoprazole (PROTONIX) 40 mg in 0.9% sodium chloride 10 mL injection  40 mg IntraVENous DAILY    piperacillin-tazobactam (ZOSYN) 3.375 g in 0.9% sodium chloride (MBP/ADV) 100 mL  3.375 g IntraVENous Q8H    sodium chloride (NS) flush 5-40 mL  5-40 mL IntraVENous Q8H    sodium chloride (NS) flush 5-40 mL  5-40 mL IntraVENous PRN    ondansetron (ZOFRAN) injection 4 mg  4 mg IntraVENous Q4H PRN    [Held by provider] enoxaparin (LOVENOX) injection 40 mg  40 mg SubCUTAneous Q24H    LORazepam (ATIVAN) injection 2 mg  2 mg IntraVENous Q6H PRN    morphine injection 1 mg  1 mg IntraVENous Q1H PRN    ALPRAZolam (XANAX) tablet 0.25 mg  0.25 mg Oral TID    hydroxyurea (HYDREA) 100 mg/mL chemo suspension (compound) 500 mg  500 mg Per NG tube BID      Zosyn day 4    CT neck dated 6/29/2020: I personally reviewed the scan which has the following pertinent findings:  I do not appreciate a fluid collection. Clot just above the trach but edema of the supraglottic structures themselves appears improved. Secretions within the airway. A/P:  POD4 tracheostomy and DL/biopsy. POD 1 takeback for bleeding from trach site. NO bleeding now. Swelling improving on decadron and zosyn. Pathology pending. Belly pain resolved. -Ativan Q6H prn anxiety  -Morphine prn and tylenol scheduled for pain  -hold Lovenox and use SCDs  -Deflate the cuff with RT. If doing well, transition to hospitalist service out of ICU.   -Continue zosyn. D/c decadron.  -Routine trach care per RT  -Speech evaluation. If cleared, consider removing dobhoff  -If pathology is benign, I anticipate removing the tracheostomy prior to discharge.  -Will continue to follow closely. [facility], [address], [facility]  [address] [facility], [address]   o) [phone]  (Y) [phone]

## 2020-06-30 NOTE — OP NOTES
[address] [place]   OPERATIVE REPORT    Name:  Shola Bernabe  MR#:  [id number]  :  1935  ACCOUNT #:  [de-identified]  DATE OF SERVICE:  2020      PREOPERATIVE DIAGNOSES:  1. Tracheostomy hemorrhage. 2.  Acute respiratory failure with hypoxia. POSTOPERATIVE DIAGNOSES:  1. Tracheostomy hemorrhage. 2.  Acute respiratory failure with hypoxia. PROCEDURE PERFORMED:  Revision tracheostomy with control of bleeding. SURGEON:  Halima Schwarz MD    ASSISTANT:  None. ANESTHESIA:  General endotracheal.    COMPLICATIONS:  None. SPECIMENS REMOVED:  None. IMPLANTS:  6 cuffed Shiley. ESTIMATED BLOOD LOSS:  Minimal.    IV FLUIDS:  1 liter. DRAINS:  None. DISPOSITION:  PACU, then home. CONDITION:  Stable. OPERATIVE FINDINGS:  There is a diffuse bleeding from the skin edge, strap muscles, and thyroid gland. This was all controlled with bipolar cautery and Radha powder. At the conclusion of the case, the airway was lavaged and suctioned. Also the subglottis superior to the tracheostomy was suctioned to clean any clot that was sitting above the trach. DESCRIPTION OF PROCEDURE:  The patient was identified in the preoperative holding area and brought to the operating room where she was placed in supine position. General anesthesia was induced by attaching her tracheostomy to the anesthesia circuit. She was prepped and draped in sterile fashion. Time-out was performed in which we identified her name, medical record number, and the proposed procedure. The silk sutures were removed from her tracheostomy site and the tracheostomy was removed. The Surgicel was removed from the wound. A 6.5 anode tube was placed into the airway. She was ventilated through this. I then removed all clots and irrigated within the wound. I cauterized all of the bleeding surfaces as listed above with bipolar. I suctioned in the airway above the tracheostomy and below the tracheostomy.   I applied Radha powder to the wound bed and suctioned any excess powder. I did this after irrigating in the wound to again see if there was any further bleeding and I did not see any. I then removed the anode tube and carefully placed a new 6 cuffed Shiley into the tracheostomy incision. I irrigated through the tracheostomy itself and applied constant pressure to the ventilation circuit in an attempt to lavage the lungs. I then deep suctioned the lungs, I removed a small bloody mucus clot. I then attached back to the circuit and ventilation was much improved. I secured the new tracheostomy and placed with 2-0 silk sutures and a Velcro tie. I then turned care of the patient over to my Anesthesia colleague, who weaned the anesthetic and transferred the patient to the PACU in stable condition.         MD MELIA Clinton/[email or web address]  D:  06/29/2020 20:04  T:  JOB #:  [id number]

## 2020-06-30 NOTE — PROGRESS NOTES
[address] [facility] Adult  Hospitalist Group     ICU Transfer/Accept Summary     [place] [address] [place]  DATE OF TRANSFER: 6/30/2020       PATIENT ID: Zelalem Hamilton  MRN: [id number]   YOB: 1935    PRIMARY CARE PROVIDER: Fabiola Ramirez MD   DATE OF ADMISSION: 6/26/2020  5:14 PM    ATTENDING PHYSICIAN: Renee Art MD  CONSULTATIONS:   IP CONSULT TO OTOLARYNGOLOGY    PROCEDURES/SURGERIES:   Procedure(s):  TRACH REVISION    REASON FOR ADMISSION: <principal problem not specified>     HOSPITAL PROBLEM LIST:  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    Lumbar post-laminectomy syndrome M96.1    Spinal stenosis of lumbar region with neurogenic claudication M48.062    Syncope R55    Stenosis of both internal carotid arteries I65.23    Syncope and collapse R55    S/P lumpectomy, left breast Z98.890    Costochondritis M94.0    Diverticulitis K57.92    Dry eye syndrome H04.129    Fibromyalgia M79.7    GERD without esophagitis K21.9    Hypercholesterolemia E78.00    Osteoporosis M81.0    Peripheral neuropathy G62.9    Prediabetes R73.03    Rosacea L71.9    Acute respiratory failure with hypoxia ([facility]) J96.01    Acute bronchitis due to other specified organisms J20.8    Anemia, unspecified D64.9    Thrombocytopenia ([facility]) D69.6    Iron deficiency anemia D50.9    COPD (chronic obstructive pulmonary disease) ([facility]) J44.9    SIRS (systemic inflammatory response syndrome) ([facility]) R65.10    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    Anemia associated with bone marrow infiltration ([facility]) D61.82    Atrial fibrillation with rapid ventricular response ([facility]) I48.91    MDS (myelodysplastic syndrome) ([facility]) D46.9  PUD (peptic ulcer disease) K27.9    Leukemia, acute ([facility] C95.00    Low back pain M54.5    Paroxysmal atrial fibrillation ([facility]) I48.0    Mass of epiglottis J38.7    Status post trachelectomy Z90.710    Myelodysplastic syndrome ([facility] D46.9         Brief HPI and Hospital Course:      84F p/w Airway problems post EGD- can transfer out of CCU  Assessment and Plan:  1. Supraglottic Mass  a. Post Trach and biopsy - pathology pending  b. Patient returned to OR 6/29 for bleeding control and had cautery done. i. Bleeding is significantly improved  c. Continue perioperative antibiotics per ENT - to be continued for 1-2 weeks per discussion with Dr. Alistair Sousa  d. Stop steroids  e. Continue ATC and wean oxygen as tolerated  i. Plan to deflate trach balloon today and then have speech evaluate  2. COPD  a. Not in exacerbation  b. Duonebs PRN  3. HTN/Afib  a. Continue Cardizem - controlled  4. CML  a. Continue Hydroxyurea  5. Mood disorder  a. Continue Xanax and Cymbalta  6. Diarrhea  a. Likely secondary to feeds  b. Plan to have speech evaluate for possible oral feeds. c. Switched feeds, if no improvement will start benefiber           PHYSICAL EXAMINATION:  Visit Vitals  /51   Pulse 65   Temp 98.1 °F (36.7 °C)   Resp 17   Ht 5' 5\" (1.651 m)   Wt 85.7 kg (188 lb 15 oz)   SpO2 94%   BMI 31.44 kg/m²       General:          Alert, cooperative, no distress  HEENT:           Atraumatic, MMM            Neck:               Trach in situ. On trach collar  Lungs:             Clear to auscultation bilaterally. No Wheezing or Rhonchi. No rales. Heart:              Regular  rhythm,  No  murmur   No edema  Abdomen:       Soft, non-tender. Not distended. Bowel sounds normal  Extremities:     No cyanosis. No clubbing  Skin:                Not pale. Not Jaundiced  No rashes   Psych:             Not anxious or agitated.   Neurologic:      Alert, moves all extremities,    CODE STATUS:   Full Code    DNR    Partial    Comfort Care     Signed:   Bertha Harris MD  Date of Service:  6/30/2020  1:33 PM

## 2020-06-30 NOTE — PROGRESS NOTES
0730 Bedside and Verbal shift change report given to DAINA Kumari (oncoming nurse) by Lukasz Ozuna (offgoing nurse). Report included the following information SBAR, Kardex, Intake/Output, MAR and Cardiac Rhythm NSR.   0840 Cuff deflated, patient tolerating well. 1100 Mahmood removed, reminded patient to call when needing to void. 1140 Speech at bedside  1420 PT/OT at bedside. Patient up in chair. 1730 Bladder scanned patient, 187mL urine shown. Patient with no urge to urinate. 1915 TRANSFER - OUT REPORT:    Verbal report given to DAINA Nur(name) on Christine Curry  being transferred to (unit) for routine progression of care       Report consisted of patients Situation, Background, Assessment and   Recommendations(SBAR). Information from the following report(s) SBAR, Kardex, Intake/Output, MAR and Cardiac Rhythm NSR was reviewed with the receiving nurse. Lines:   Venous Access Device echo cath 06/26/20 (Active)   Central Line Being Utilized Yes 6/30/2020  4:00 PM   Criteria for Appropriate Use Limited/no vessel suitable for conventional peripheral access 6/30/2020  4:00 PM   Site Assessment Clean, dry, & intact 6/30/2020  4:00 PM   Date of Last Dressing Change 06/29/20 6/30/2020  8:00 AM   Dressing Status Clean, dry, & intact 6/30/2020  4:00 PM   Dressing Type Disk with Chlorhexadine gluconate (CHG); Transparent 6/30/2020  4:00 PM   Action Taken Open ports on tubing capped 6/30/2020  4:00 PM   Date Accessed (Medial Site) 06/26/20 6/30/2020 12:00 AM   Access Needle Size (Site #1) 21 G 6/29/2020  4:00 PM   Access Needle Length (Medial Site) 1 inch 6/29/2020  4:00 PM   Positive Blood Return (Medial Site) Yes 6/30/2020 12:00 AM   Action Taken (Medial Site) Infusing 6/30/2020  4:00 AM   Alcohol Cap Used Yes 6/30/2020  4:00 PM        Opportunity for questions and clarification was provided.       Patient transported with:   Monitor  Registered Nurse

## 2020-06-30 NOTE — ANESTHESIA POSTPROCEDURE EVALUATION
Post-Anesthesia Evaluation and Assessment    Patient: Uli Stevenson MRN: [id number]  SSN: [id number]    YOB: 1935  Age: 82 y.o. Sex: female       Cardiovascular Function/Vital Signs  Visit Vitals  /60   Pulse 72   Temp 37 °C (98.6 °F)   Resp 20   Ht 5' 5\" (1.651 m)   Wt 86.9 kg (191 lb 9.3 oz)   SpO2 94%   BMI 31.88 kg/m²       Patient is status post General anesthesia for Procedure(s):  TRACH REVISION. Nausea/Vomiting: None    Postoperative hydration reviewed and adequate. Pain:  Pain Scale 1: Numeric (0 - 10) (06/29/20 2025)  Pain Intensity 1: 6 (06/29/20 2025)   Managed    Neurological Status:   Neuro (WDL): Exceptions to WDL (06/26/20 2245)  Neuro  Neurologic State: Alert (06/29/20 0800)  Orientation Level: Oriented X4 (06/29/20 0800)  Cognition: Follows commands (06/29/20 0800)  Speech: Mouths words (06/29/20 0800)  LUE Motor Response: Purposeful (06/29/20 0800)  LLE Motor Response: Purposeful (06/29/20 0800)  RUE Motor Response: Purposeful (06/29/20 0800)  RLE Motor Response: Purposeful (06/29/20 0800)   At baseline    Mental Status and Level of Consciousness: Alert and oriented to person, place, and time    Pulmonary Status:   O2 Device: Other (comment) (06/29/20 2027)   Adequate oxygenation and airway patent    Complications related to anesthesia: None    Post-anesthesia assessment completed. No concerns    Signed By: Farzana Arvizu MD     June 29, 2020              Procedure(s):  [facility] REVISION. general    <BSHSIANPOST>    INITIAL Post-op Vital signs:   Vitals Value Taken Time   /60 6/29/2020  8:30 PM   Temp 37 °C (98.6 °F) 6/29/2020  8:30 PM   Pulse 72 6/29/2020  8:30 PM   Resp 20 6/29/2020  8:30 PM   SpO2 92 % 6/29/2020  8:34 PM   Vitals shown include unvalidated device data.

## 2020-06-30 NOTE — PROGRESS NOTES
[facility]    ICU TEAM Progress Note    Name: Mak Smith   : 1935   MRN: [id number]   Date: 2020      Assessment/Plan:       1. Supraglottic Mass  a. Post Trach and biopsy - pathology pending  b. Patient returned to OR  for bleeding control and had cautery done. i. Bleeding is significantly improved  c. Continue perioperative antibiotics per ENT - to be continued for 1-2 weeks per discussion with Dr. Abhishek Carrillo  d. Stop steroids  e. Continue ATC and wean oxygen as tolerated  i. Plan to deflate trach balloon today and then have speech evaluate  2. COPD  a. Not in exacerbation  b. Duonebs PRN  3. HTN/Afib  a. Continue Cardizem - controlled  4. CML  a. Continue Hydroxyurea  5. Mood disorder  a. Continue Xanax and Cymbalta  6. Diarrhea  a. Likely secondary to feeds  b. Plan to have speech evaluate for possible oral feeds. c. Switched feeds, if no improvement will start benefiber      Subjective:   Progress Note: 2020      Reason for ICU Admission:     83 yo female with HTN, afib, history of breast cancer/MDS, COPD, and other medical problems presenting to the hospital with with sore throat and radiographic findings suggestive of supraglottic malignancy vs phlegmon. Patient was taken tot he OR by Dr. Abhishek Carrillo   For laryngoscopy with biopsy and trach placement. Overnight Events:     No significant events overnight.        Active Problem List:     Problem List  Date Reviewed: 2020          Codes Class    Paroxysmal atrial fibrillation (HCC) ICD-10-CM: I48.0  ICD-9-CM: 427.31         PUD (peptic ulcer disease) (Chronic) ICD-10-CM: K27.9  ICD-9-CM: 533.90         Atrial fibrillation with rapid ventricular response ([facility] [address] ICD-10-CM: I48.91  ICD-9-CM: 427.31         Diverticulitis ICD-10-CM: K57.92  ICD-9-CM: 562.11     Overview Signed 2018  7:07 AM by Lalit Mike MD     Recurrent             Dry eye syndrome ICD-10-CM: Z29.591  ICD-9-CM: 375.15 Fibromyalgia ICD-10-CM: M79.7  ICD-9-CM: 729.1         GERD without esophagitis (Chronic) ICD-10-CM: K21.9  ICD-9-CM: 530.81         Hypercholesterolemia (Chronic) ICD-10-CM: E78.00  ICD-9-CM: 272.0         Osteoporosis ICD-10-CM: M81.0  ICD-9-CM: 733.00         Peripheral neuropathy (Chronic) ICD-10-CM: G62.9  ICD-9-CM: 356.9         Prediabetes (Chronic) ICD-10-CM: R73.03  ICD-9-CM: 790.29         Rosacea ICD-10-CM: L71.9  ICD-9-CM: 695.3         Status post trachelectomy ICD-10-CM: Z90.710  ICD-9-CM: V88.01         Myelodysplastic syndrome (HCC) ICD-10-CM: D46.9  ICD-9-CM: 238.75         Mass of epiglottis ICD-10-CM: J38.7  ICD-9-CM: 478.79         Leukemia, acute (HCC) ICD-10-CM: C95.00  ICD-9-CM: 208.00         MDS (myelodysplastic syndrome) (HCC) (Chronic) ICD-10-CM: D46.9  ICD-9-CM: 238.75         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HCC) ICD-10-CM: C93.10  ICD-9-CM: 205.10         Anemia associated with bone marrow infiltration (HCC) ICD-10-CM: E93.60  ICD-9-CM: 284.2         COPD (chronic obstructive pulmonary disease) (HCC) ICD-10-CM: J44.9  ICD-9-CM: 496         SIRS (systemic inflammatory response syndrome) (HCC) ICD-10-CM: R65.10  ICD-9-CM: 995.90         Iron deficiency anemia ICD-10-CM: D50.9  ICD-9-CM: 280.9         Anemia, unspecified ICD-10-CM: D64.9  ICD-9-CM: 285. 9         Thrombocytopenia ([address] ICD-10-CM: D69.6  ICD-9-CM: 287.5         Acute bronchitis due to other specified organisms ICD-10-CM: J20.8  ICD-9-CM: 466.0         Acute respiratory failure with hypoxia (HCC) ICD-10-CM: J96.01  ICD-9-CM: 518.81         S/P lumpectomy, left breast ICD-10-CM: F01.344  ICD-9-CM: V45.89         Costochondritis ICD-10-CM: M94.0  ICD-9-CM: 733.6         Syncope and collapse ICD-10-CM: R55  ICD-9-CM: 780.2         Stenosis of both internal carotid arteries ICD-10-CM: I65.23  ICD-9-CM: 433.10, 433.30         Syncope ICD-10-CM: R55  ICD-9-CM: 780.2         Spinal stenosis of lumbar region with neurogenic claudication (Chronic) ICD-10-CM: M39.259  ICD-9-CM: 724.03         Lumbar back pain with radiculopathy affecting left lower extremity ICD-10-CM: M54.16  ICD-9-CM: 724.4         Lumbar back pain with radiculopathy affecting right lower extremity ICD-10-CM: M54.16  ICD-9-CM: 724.4         Idiopathic small and large fiber sensory neuropathy ICD-10-CM: G60.8  ICD-9-CM: 356.4         Lumbar post-laminectomy syndrome (Chronic) ICD-10-CM: M96.1  ICD-9-CM: 722.83         Low back pain ICD-10-CM: M54.5  ICD-9-CM: 724.2         Vitamin D deficiency (Chronic) ICD-10-CM: E55.9  ICD-9-CM: 268.9               Past Medical History:      has a past medical history of Anemia, Arthritis, Breast cancer ([facility] [address] Bunion of right foot (8/20/2015), Chronic obstructive pulmonary disease ([facility] [address] Chronic pain, Diverticulitis (6/29/2018), Diverticulitis large intestine, Dry eye syndrome (6/29/2018), Fibromyalgia (6/29/2018), GERD (gastroesophageal reflux disease), History of left breast cancer (2013), Hypercholesterolemia (6/29/2018), Ill-defined condition, Leukemia ([facility] [address] MDS (myelodysplastic syndrome) ([facility] [address] Osteoporosis (6/29/2018), Oxygen dependent, Peripheral neuropathy (6/29/2018), Prediabetes (6/29/2018), PUD (peptic ulcer disease), Radiation therapy complication (3853), Rosacea (6/29/2018), and Trouble in sleeping. Past Surgical History:      has a past surgical history that includes hx mohs procedure; us guided core breast biopsy (Left, 2013); hx breast lumpectomy (11/21/2013); ir insert tunl cvc w port over 5 years (12/3/2019); upper gi endoscopy,biopsy (1/24/2020); colonoscopy,diagnostic (2/28/2020); colonoscopy (N/A, 2/28/2020); hx vascular access (02/2020); hx appendectomy; hx tonsillectomy; hx cataract removal; hx orthopaedic; pr total knee arthroplasty; pr total knee arthroplasty; hx hysterectomy; hx other surgical; upper gi endoscopy,biopsy (6/23/2020); and upper gi endoscopy,dilatn w guide (6/23/2020).     Home Medications: Prior to Admission medications    Medication Sig Start Date End Date Taking? Authorizing Provider   DULoxetine (CYMBALTA) 30 mg capsule Take 1 Cap by mouth daily. 20   Jessee Mcneil DO   hydroxyurea (HYDREA) 500 mg capsule Take 500 mg by mouth two (2) times a day. Indications: chronic myelocytic leukemia    Provider, Historical   cycloSPORINE (Restasis MultiDose) 0.05 % drop ophthalmic drops Administer 1 Drop to both eyes two (2) times a day. Provider, Historical   Oxygen nightly as needed. 1-2 liter nasal cannula qhs and prn if needed    Other, MD Libby   bismuth subsalicylate (PEPTO-BISMOL PO) Take  by mouth daily as needed. As ordered on the bottle    Other, MD Libby   ergocalciferol (ERGOCALCIFEROL) 1,250 mcg (50,000 unit) capsule TAKE ONE CAPSULE BY MOUTH EVERY 7 DAYS 20   Cinthia Robin MD   acetaminophen (TYLENOL) 325 mg tablet Take 2 Tabs by mouth every four (4) hours as needed for Pain. 19   Delicia Matthews MD   dilTIAZem CD (CARDIZEM CD) 180 mg ER capsule Take 1 Cap by mouth daily. 19   Martin Hoffmann MD   pantoprazole (PROTONIX) 40 mg tablet Take 1 Tab by mouth Before breakfast and dinner. 19   Martin Hoffmann MD       Allergies/Social/Family History: Allergies   Allergen Reactions    Latex Rash    Hydrocodone Nausea Only and Vertigo    Gabapentin Diarrhea, Nausea Only and Other (comments)     weakness    Lyrica [Pregabalin] Nausea Only    Oxycodone Nausea and Vomiting and Vertigo      Social History     Tobacco Use    Smoking status: Former Smoker     Packs/day: 0.50     Years: 50.00     Pack years: 25.00     Last attempt to quit: 2012     Years since quittin.3    Smokeless tobacco: Never Used   Substance Use Topics    Alcohol use:  Yes     Alcohol/week: 2.0 standard drinks     Types: 2 Glasses of wine per week      Family History   Problem Relation Age of Onset    Cancer Mother         bladder    Cancer Father     Breast Cancer Paternal Grandmother        Review of Systems:     A comprehensive review of systems was negative except for that written in the HPI. Objective:   Vital Signs:  Visit Vitals  /50 (BP 1 Location: Right arm, BP Patient Position: At rest)   Pulse 70   Temp (!) 96.6 °F (35.9 °C)   Resp 19   Ht 5' 5\" (1.651 m)   Wt 85.7 kg (188 lb 15 oz)   SpO2 93%   BMI 31.44 kg/m²    O2 Flow Rate (L/min): 12 l/min O2 Device: Tracheal collar   Temp (24hrs), Av.8 °F (36.6 °C), Min:96.6 °F (35.9 °C), Max:98.6 °F (37 °C)           Intake/Output:     Intake/Output Summary (Last 24 hours) at 2020 0738  Last data filed at 2020 0600  Gross per 24 hour   Intake 1365 ml   Output 2850 ml   Net -1485 ml       Physical Exam:    General: Ill appearing, NAD  HENT: Atraumatic, Trach in place  Cardio: RRR  Respiratory: Coarse breath sounds BL  GI: Soft not tender abdomen  Extremities: -ve edema  Neuro: Grossly intact      LABS AND  DATA: Personally reviewed  Recent Labs     20  0443 20  1554 20  0310   WBC 26.2*  --  33.6*   HGB 7.8* 7.5* 7.7*   HCT 27.1* 26.1* 27.1*   *  --  153     Recent Labs     20  0443 20  0308    138   K 4.2 4.2    108   CO2 27 26   BUN 22* 22*   CREA 0.67 0.67   * 157*   CA 8.1* 8.4*     No results for input(s): AP, TBIL, TP, ALB, GLOB, AML, LPSE in the last 72 hours. No lab exists for component: SGOT, GPT, AMYP  No results for input(s): INR, PTP, APTT, INREXT, INREXT, INREXT in the last 72 hours. No results for input(s): PHI, PCO2I, PO2I, FIO2I in the last 72 hours. No results for input(s): CPK, CKMB, TROIQ, BNPP in the last 72 hours.     Hemodynamics:   PAP:   CO:     Wedge:   CI:     CVP:    SVR:       PVR:       Ventilator Settings:  Mode Rate Tidal Volume Pressure FiO2 PEEP            100 %       Peak airway pressure:      Minute ventilation:          MEDS: Reviewed    Chest X-Ray:  CXR Results  (Last 48 hours)    None Multidisciplinary Rounds Completed:  Pending    ABCDEF Bundle/Checklist  Pain Medications: Oxycodone and Acetaminophen  Target RASS: N/A  Sedation Medications: N/A  CAM-ICU:  Negative  Mobility: Good   PT/OT: Speech therapy consulted and on board   Restraints: None needed at this time  Discussed Plan of Care (goals of care): Yes  Addressed Code Status: Full Code    CARDIOVASCULAR  Cardiac Gtts: None  SBP Goal of: > 90 mmHg  MAP Goal of: > 65 mmHg  Transfusion Trigger (Hgb): <7 g/dL    RESPIRATORY  Vent Goals:   Chlorhexidine   DVT Prophylaxis (if no, list reason): SCD's or Sequential Compression Device   SPO2 Goal: > 92%  Pulmonary toilet: Duo-Nebs     GI/  Mahmood Catheter Present: No  GI Prophylaxis: Protonix (pantoprazole)   Nutrition: Yes   IVFs:   Bowel Movement: Yes  Bowel Regimen: None needed at this time  Insulin:     ANTIBIOTICS  Antibiotics:  Zosyn    T/L/D  Tubes: Tracheostomy  Lines: Peripheral IV and Port  Drains: None    SPECIAL EQUIPMENT  None    DISPOSITION  Stay in ICU - transfer if stable    CRITICAL CARE CONSULTANT NOTE  I had a face to face encounter with the patient, reviewed and interpreted patient data including clinical events, labs, images, vital signs, I/O's, and examined patient.   I have discussed the case and the plan and management of the patient's care with the consulting services, the bedside nurses and the respiratory therapist.      Level [address] [facility] [address], [place]  [address] [place] [address]  6/30/2020

## 2020-06-30 NOTE — PROGRESS NOTES
Problem: Voice Impaired (Adult)  Goal: *Acute Goals and Plan of Care (Insert Text)  Description: Speech therapy goals  Initiated 6/30/2020   1. Patient will tolerate finger occlusion trials with SLP to determine ability to tolerate PMV within 7 days    Outcome: Progressing Towards Goal     Problem: Dysphagia (Adult)  Goal: *Acute Goals and Plan of Care (Insert Text)  Description: Speech therapy goals  Initiated 6/30/2020   1. Patient will participate in re-evaluation of swallow function within 7 days   2. Patient will participate in [address] study within 7 days    Outcome: Progressing Towards Goal     [address]  Patient: Sharyle Ruth (60 y.o. female)  Date: 6/30/2020  Primary Diagnosis: Mass of epiglottis [J38.7]  Procedure(s) (LRB):  TRACH REVISION (N/A) 1 Day Post-Op   Precautions: aspiration       ASSESSMENT :  Based on the objective data described below, the patient presents with suspected moderate pharyngeal dysphagia characterized by suspected delayed swallow initiation and reduced hyolaryngeal elevation/excursion via palpation. Areas of mass and edema greatly increase risks for aspiration. Unable to assess full extent of aspiration risks at bedside due to open trach, inability to tolerate PMV and cuff inflation. Evaluation was further limited by significant coughing after cuff deflation (even after cuff was re-inflated by nsg, patient reporting feeling anxious and still coughing with interventions). Attempted cuff deflation (per nsg was deflated this morning and tolerating, but air in  balloon on SLP entry) and nsg deflated cuff and deep suctioned. Patient did not tolerate finger occlusion trials with significant back pressure and no airflow into upper pharynx. Upper airway obstruction related to mass, infection and edema prevents ability to consider PMV at this time.   Do not suspect she will tolerate finger occlusion or PMV in the near future, but will follow to assess as edema improves. Patient will benefit from skilled intervention to address the above impairments. Patients rehabilitation potential is considered to be Guarded     PLAN :  Recommendations and Planned Interventions:  --recommend continued NPO except 2-3 ice chips every hour or two for comfort and to reduce disuse atrophy  --will follow to determine readiness for MBS Study. Nsg to try to deflate cuff again later today  Frequency/Duration: Patient will be followed by speech-language pathology 4 times a week to address goals. Discharge Recommendations: To Be Determined     SUBJECTIVE:   Patient alert, cooperative. #6 shiley cuffed trach in place. Cuff inflated on SLP entry. Nsg deflated and suctioned.    OBJECTIVE:     Past Medical History:   Diagnosis Date    Anemia     Arthritis     Breast cancer ([facility] [address]     left    Bunion of right foot 8/20/2015    Chronic obstructive pulmonary disease (HCC)     mild    Chronic pain     back--uses tens unit    Diverticulitis 6/29/2018    Recurrent    Diverticulitis large intestine     Dry eye syndrome 6/29/2018    Fibromyalgia 6/29/2018    GERD (gastroesophageal reflux disease)     History of left breast cancer 2013    lumpectomy radiation    Hypercholesterolemia 6/29/2018    Ill-defined condition     blood transfusion hx    Leukemia (HCC)     MDS (myelodysplastic syndrome) ([facility] [address]     Osteoporosis 6/29/2018    Oxygen dependent     at night    Peripheral neuropathy 6/29/2018    Prediabetes 6/29/2018    PUD (peptic ulcer disease)     Radiation therapy complication 7073    Lt breast-No Chemo    Rosacea 6/29/2018    Trouble in sleeping      Past Surgical History:   Procedure Laterality Date    COLONOSCOPY N/A 2/28/2020    COLONOSCOPY performed by Yoly Ramirez MD at [address]  2/28/2020         HX APPENDECTOMY      HX BREAST LUMPECTOMY  11/21/2013    LEFT BREAST LUMPECTOMY W/LEFT BREAST SENTINEL NODE BIOPSY,AND NEEDLE LOCALIZATION performed by Claudetta Paradise, MD at [facility] MAIN OR    HX CATARACT REMOVAL      bilateral    HX HYSTERECTOMY      ovarian cyst    HX MOHS PROCEDURES      right    HX ORTHOPAEDIC      back surgery x2    HX OTHER SURGICAL      colonoscopy    HX TONSILLECTOMY      HX VASCULAR ACCESS  02/2020    echo cath right shoulder    IR INSERT TUNL CVC W PORT OVER 5 YEARS  12/3/2019    TOTAL KNEE ARTHROPLASTY      left    TOTAL KNEE ARTHROPLASTY      right    UPPER GI ENDOSCOPY,BIOPSY  1/24/2020         UPPER GI ENDOSCOPY,BIOPSY  6/23/2020         UPPER GI ENDOSCOPY,DILATN W GUIDE  6/23/2020         US GUIDED CORE BREAST BIOPSY Left 2013    Breast Ca     Prior Level of Function/Home Situation:   Home Situation  Home Environment: Private residence  One/Two Story Residence: One story  Living Alone: No  Support Systems: Spouse/Significant Other/Partner  Patient Expects to be Discharged to[de-identified] Private residence  Current DME Used/Available at Home: Cane, straight  Diet prior to admission: regular  Current Diet:  NPO with [place]    Cognitive and Communication Status:  Neurologic State: Alert  Orientation Level: Oriented X4  Cognition: Follows commands  Perception: Appears intact  Perseveration: No perseveration noted  Safety/Judgement: Not assessed  Swallowing Evaluation:   Oral Assessment:  Oral Assessment  Labial: No impairment  Dentition: Natural  Oral Hygiene: moist mucosa   Lingual: No impairment  P.O. Trials:  Patient Position: upright in bed   Vocal quality prior to P.O.: (aphonic due to trach placement )  Consistency Presented: Ice chips  How Presented: SLP-fed/presented;Spoon     Bolus Acceptance: No impairment  Bolus Formation/Control: No impairment     Propulsion: No impairment  Oral Residue: None  Initiation of Swallow: Delayed (# of seconds)  Laryngeal Elevation: Decreased  Aspiration Signs/Symptoms: (unable to assess due to trach with inflated cuff)  Pharyngeal Phase Characteristics: Double swallow; Suspected pharyngeal residue(coughin frequently with and without trials)             Oral Phase Severity: No impairment  Pharyngeal Phase Severity : Moderate(suspected )    NOMS:   The NOMS functional outcome measure was used to quantify this patient's level of swallowing impairment. Based on the NOMS, the patient was determined to be at level 1 for swallow function       NOMS Swallowing Levels:  Level 1 (CN): NPO  Level 2 (CM): NPO but takes consistency in therapy  Level 3 (CL): Takes less than 50% of nutrition p.o. and continues with nonoral feedings; and/or safe with mod cues; and/or max diet restriction  Level 4 (CK): Safe swallow but needs mod cues; and/or mod diet restriction; and/or still requires some nonoral feeding/supplements  Level 5 (CJ): Safe swallow with min diet restriction; and/or needs min cues  Level 6 (CI): Independent with p.o.; rare cues; usually self cues; may need to avoid some foods or needs extra time  Level 7 ([address] [place] [address]): Independent for all p.o.  BRADEN. (2003). National Outcomes Measurement System (NOMS): Adult Speech-Language Pathology User's Guide. Speech/Language Evaluation  Motor Speech:  Oral-Motor Structure/Motor Speech  Labial: No impairment  Dentition: Natural  Oral Hygiene: moist mucosa   Lingual: No impairment  Language Comprehension and Expression:              Neuro-Linguistics:                                                  Pragmatics:        Voice:                 Vocal Quality: (aphonic due to trach placement )      Did not tolerate finger occlusion with significant back pressure to trach. Decreased tolerance of cuff deflation with significant coughing that did not lessen until cuff re-inflated by nsg. Suspect anxiety further impacted. NOMS: The NOMS functional outcome measure was used to quantify this patient's level of voice  impairment. Based on the NOMS, the patient was determined to be at level 1 for voice function.          NOMS Voice:  Level 1 (CN): Unable to use voice to communicate. Needs AAC. Level 2 (CM): Voice not functional most of time. Level 3 (CL): Voice functional but distracting & interferes with communication. Participation in activities is limited most of time. Level 4 (CK): Voice is functional and sometimes distracting. High vocal demand consistently impacted. Level 5 (CJ): Voice occasionally sounds normal with self monitoring; high vocal demand occasionally impacted. Level 6 (CI): Voice sounds normal across most situations. High vocal demand rarely impacted. Level 7 ([address] [place] [address]): Voice is normal and self monitoring is effective but only occasionally needed. BRADEN. (2003). National Outcomes Measurement System (NOMS): Adult Speech-Language Pathology User's Guide. Pain:  Pain Scale 1: Numeric (0 - 10)  Pain Intensity 1: 0       After treatment:   Patient left in no apparent distress in bed, Call bell within reach, and Nursing notified    COMMUNICATION/EDUCATION:     The patient's plan of care including recommendations, planned interventions, and recommended diet changes were discussed with: Registered nurse. Patient/family have participated as able in goal setting and plan of care. Patient/family agree to work toward stated goals and plan of care. Thank you for this referral.  Mallorie Suarez M.CD.  CCC-SLP   Time Calculation: 29 mins

## 2020-06-30 NOTE — PROGRESS NOTES
Problem: Mobility Impaired (Adult and Pediatric)  Goal: *Acute Goals and Plan of Care (Insert Text)  Description: FUNCTIONAL STATUS PRIOR TO ADMISSION: Patient was modified independent using a single point cane for functional mobility. HOME SUPPORT PRIOR TO ADMISSION: The patient lived with her . Physical Therapy Goals  Initiated 6/30/2020  1. Patient will move from supine to sit and sit to supine, scoot up and down and roll side to side in bed with minimal assistance/contact guard assist within 7 day(s). 2.  Patient will transfer from bed to chair and chair to bed with minimal assistance/contact guard assist using the least restrictive device within 7 day(s). 3.  Patient will perform sit to stand with minimal assistance/contact guard assist within 7 day(s). 4.  Patient will ambulate with minimal assistance/contact guard assist for  feet with the least restrictive device within 7 day(s). 5.  Patient will ascend/descend 4 stairs with bilateral handrail(s) with moderate assistance within 7 day(s). Outcome: Not Met     PHYSICAL THERAPY EVALUATION  Patient: Algie Collet (21 y.o. female)  Date: 6/30/2020  Primary Diagnosis: Mass of epiglottis [J38.7]  Procedure(s) (LRB):  TRACH REVISION (N/A) 1 Day Post-Op   Precautions:       ASSESSMENT  Based on the objective data described below, the patient presents with trach placement (+ trach collar trials) POD #1. Per SLP, patient unable to tolerate PMV or finger occlusion for verbalizations at this time. Patient required prolonged sitting at edge of bed 2* perceived dizziness in vertical position (however, BP, SpO2, and HR stable). Patient required frequent tactile cues for successful initiation of tasks 2* anxiety/fear and general weakness.  Overall, patient required moderate assist x 2 for bed mobility, intermittent minimal-moderate assist for seated balance (frequent trunk retropulsion), moderate assist x 2 for sit <> stand transfer (for buttocks clearance and translation of COM anteriorly), and maximal assist x 2 for bed > chair transfer (patient with the inability to displace feet from floor at this time; therefore, facilitation provided at pelvis for successful \"pivot. \" Based on today's assessment, anticipate rehabilitation needs (SLP, OT, PT). Current Level of Function Impacting Discharge (mobility/balance): A x 2    Functional Outcome Measure: The patient scored 10/100 on the Barthel Index outcome measure which is indicative of 90% impairment in ability to independently perform self-care tasks and functional mobility . Other factors to consider for discharge: New trach, Previously ambulatory with a [facility]     Patient will benefit from skilled therapy intervention to address the above noted impairments. PLAN :  Recommendations and Planned Interventions: bed mobility training, transfer training, gait training, therapeutic exercises, edema management/control, patient and family training/education, and therapeutic activities      Frequency/Duration: Patient will be followed by physical therapy:  5 times a week to address goals. Recommendation for discharge: (in order for the patient to meet his/her long term goals)  To be determined: Inpatient vs. SNF     This discharge recommendation:  A follow-up discussion with the attending provider and/or case management is planned    IF patient discharges home will need the following DME: to be determined (TBD)         SUBJECTIVE:   Patient stated That's exactly what I wanted [while giving this therapist the thumbs-up].     OBJECTIVE DATA SUMMARY:   HISTORY:    Past Medical History:   Diagnosis Date    Anemia     Arthritis     Breast cancer ([address]     left    Bunion of right foot 8/20/2015    Chronic obstructive pulmonary disease (HCC)     mild    Chronic pain     back--uses tens unit    Diverticulitis 6/29/2018    Recurrent    Diverticulitis large intestine     Dry eye syndrome 6/29/2018    Fibromyalgia 6/29/2018    GERD (gastroesophageal reflux disease)     History of left breast cancer 2013    lumpectomy radiation    Hypercholesterolemia 6/29/2018    Ill-defined condition     blood transfusion hx    Leukemia (HCC)     MDS [facility]myelodysplastic syndrome) ([facility] [address]     Osteoporosis 6/29/2018    Oxygen dependent     at night    Peripheral neuropathy 6/29/2018    Prediabetes 6/29/2018    PUD (peptic ulcer disease)     Radiation therapy complication 4547    Lt breast-No Chemo    Rosacea 6/29/2018    Trouble in sleeping      Past Surgical History:   Procedure Laterality Date    COLONOSCOPY N/A 2/28/2020    COLONOSCOPY performed by Carmen Soni MD at [facility] ENDOSCOPY    COLONOSCOPY,DIAGNOSTIC  2/28/2020         HX APPENDECTOMY      HX BREAST LUMPECTOMY  11/21/2013    LEFT BREAST LUMPECTOMY W/LEFT BREAST SENTINEL NODE BIOPSY,AND NEEDLE LOCALIZATION performed by Rudolph Murguia MD at [facility] MAIN OR    HX CATARACT REMOVAL      bilateral    HX HYSTERECTOMY      ovarian cyst    HX MOHS PROCEDURES      right    HX ORTHOPAEDIC      back surgery x2    HX OTHER SURGICAL      colonoscopy    HX TONSILLECTOMY      HX VASCULAR ACCESS  02/2020    echo cath right shoulder    IR INSERT TUNL CVC W PORT OVER 5 YEARS  12/3/2019    TOTAL KNEE ARTHROPLASTY      left    TOTAL KNEE ARTHROPLASTY      right    UPPER GI ENDOSCOPY,BIOPSY  1/24/2020         UPPER GI ENDOSCOPY,BIOPSY  6/23/2020         UPPER GI ENDOSCOPY,DILATN W GUIDE  6/23/2020         US GUIDED CORE BREAST BIOPSY Left 2013    Breast Ca       Personal factors and/or comorbidities impacting plan of care: PMH    Home Situation  Home Environment: Private residence  # Steps to Enter: 3  Rails to Enter: Yes  Hand Rails : Bilateral  Wheelchair Ramp: No  One/Two Story Residence: One story  Living Alone: No  Support Systems: Spouse/Significant Other/Partner  Patient Expects to be Discharged to[de-identified] Private residence  Current DME Used/Available at Home: Cane, straight, Raised toilet seat  Tub or Shower Type: Shower(With seat & grab bars)    EXAMINATION/PRESENTATION/DECISION MAKING:   Critical Behavior:  Neurologic State: Alert  Orientation Level: Oriented X4  Cognition: Follows commands  Safety/Judgement: Not assessed  Hearing: Auditory  Auditory Impairment: None    Range Of Motion:  AROM: Generally decreased, functional                       Strength:    Strength: Generally decreased, functional                    Tone & Sensation:                  Sensation: Impaired(B feet numbness)               Coordination:  Coordination: Generally decreased, functional  Vision:   Acuity: Within Defined Limits  Corrective Lenses: Glasses  Functional Mobility:  Bed Mobility:     Supine to Sit: Moderate assistance;Assist x2        Transfers:  Sit to Stand: Moderate assistance;Assist x2(from elevated bed height)  Stand to Sit: Moderate assistance;Assist x2        Bed to Chair: Maximum assistance;Assist x2              Balance:   Sitting: Impaired; Without support  Sitting - Static: Fair (occasional)  Standing: Impaired; Without support  Standing - Static: (Retropulsion)  Standing - Dynamic : (Decr. ability to displace feet from floor)    Functional Measure:  Barthel Index:    Bathin  Bladder: 5  Bowels: 0(Flexi-seal)  Groomin  Dressin  Feedin(NG tube)  Mobility: 0  Stairs: 0  Toilet Use: 0  Transfer (Bed to Chair and Back): 5  Total: 10/100       The Barthel ADL Index: Guidelines  1. The index should be used as a record of what a patient does, not as a record of what a patient could do. 2. The main aim is to establish degree of independence from any help, physical or verbal, however minor and for whatever reason. 3. The need for supervision renders the patient not independent. 4. A patient's performance should be established using the best available evidence.  Asking the patient, friends/relatives and nurses are the usual sources, but direct observation and common sense are also important. However direct testing is not needed. 5. Usually the patient's performance over the preceding 24-48 hours is important, but occasionally longer periods will be relevant. 6. Middle categories imply that the patient supplies over 50 per cent of the effort. 7. Use of aids to be independent is allowed. Francheska Crimes., Barthel, D.W. (5948). Functional evaluation: the Barthel Index. [facility] (14)2. Tiffanie Harper maxim KELLY Louie, Rory Gonzalez., Kelly Roca., Epimenio Michelle, [address] (1999). Measuring the change indisability after inpatient rehabilitation; comparison of the responsiveness of the Barthel Index and Functional [place] Measure. Journal of Neurology, Neurosurgery, and Psychiatry, 66(4), 191-507. SILVIA Reddy Mai, OMAR Garcia, & Jazmin Pascual M.A. (2004.) Assessment of post-stroke quality of life in cost-effectiveness studies: The usefulness of the Barthel Index and the EuroQoL-5D. Quality of Life Research, 13, 097-79     Based on the above components, the patient evaluation is determined to be of the following complexity level: MEDIUM    Pain Rating:  Trach discomfort reported by patient    Activity Tolerance:   Fair, observed SOB with activity, and perceived dizziness with positional changes   Please refer to the flowsheet for vital signs taken during this treatment. After treatment patient left in no apparent distress:   Sitting in chair and Call bell within reach    COMMUNICATION/EDUCATION:   The patients plan of care was discussed with: Occupational therapist, Registered nurse, and Physician. Fall prevention education was provided and the patient/caregiver indicated understanding., Patient/family have participated as able in goal setting and plan of care. , and Patient/family agree to work toward stated goals and plan of care.     Thank you for this referral.  Irasema Mancini, PT, DPT   Time Calculation: 32 mins

## 2020-06-30 NOTE — PROGRESS NOTES
Problem: Self Care Deficits Care Plan (Adult)  Goal: *Acute Goals and Plan of Care (Insert Text)  Description:   FUNCTIONAL STATUS PRIOR TO ADMISSION: Patient was modified independent using a single point cane for functional mobility. HOME SUPPORT: The patient lived with , driving, no falls. Occupational Therapy Goals  Initiated 6/30/2020  1. Patient will perform standing ADLs at sink with least restrictive DME with supervision/set-up within 7 day(s). 2.  Patient will perform upper body dressing and lower body dressing with supervision/set-up within 7 day(s). 3.  Patient will perform bathing with supervision/set-up within 7 day(s). 4.  Patient will perform toilet transfers using least restrictive DME with supervision/set-up within 7 day(s). 5.  Patient will perform all aspects of toileting with supervision/set-up within 7 day(s). Outcome: Progressing Towards Goal       OCCUPATIONAL THERAPY EVALUATION  Patient: Aylin Mendoza (75 y.o. female)  Date: 6/30/2020  Primary Diagnosis: Mass of epiglottis [J38.7]  Procedure(s) (LRB):  TRACH REVISION (N/A) 1 Day Post-Op   Precautions: fall       ASSESSMENT  Based on the objective data described below, the patient presents with decreased strength/generalized weakness, fall risk, impaired standing balance, pain and coughing with secretions at trach site and decline in functional status s/p trach placement and revision. Pt was mod A x 2 for supine to sit, mod A x 2 to stand from elevated bed height, fair sitting balance during ADL task but improved with placement of foot stool under feet. Max A x 2 needed to SPT to bedside recliner due to decreased weight shifting and heavy posterior lean. She is below her independent baseline and IPR due to deconditioning as well as new trach. Current Level of Function Impacting Discharge (ADLs/self-care): mod A x 2  to stand, max A x 2 to SPT, decline functional status, new trach    Functional Outcome Measure:   The patient scored Total: 20/100 on the Barthel Index outcome measure which is indicative of 80% impaired ability to care for basic self needs/dependency on others; inferred 100% dependency on others for instrumental ADLs. Other factors to consider for discharge: independent, lives with      Patient will benefit from skilled therapy intervention to address the above noted impairments. PLAN :  Recommendations and Planned Interventions: self care training, functional mobility training, therapeutic exercise, balance training, therapeutic activities, endurance activities, and patient education    Frequency/Duration: Patient will be followed by occupational therapy 5 times a week to address goals. Recommendation for discharge: (in order for the patient to meet his/her long term goals)  Therapy 3 hours per day 5-7 days per week    This discharge recommendation:  Has not yet been discussed the attending provider and/or case management    IF patient discharges home will need the following DME: none       SUBJECTIVE:   Patient mouthing words, writing on paper.  Pt went to [place]    OBJECTIVE DATA SUMMARY:   HISTORY:   Past Medical History:   Diagnosis Date    Anemia     Arthritis     Breast cancer ([facility] [address]     left    Bunion of right foot 8/20/2015    Chronic obstructive pulmonary disease (HCC)     mild    Chronic pain     back--uses tens unit    Diverticulitis 6/29/2018    Recurrent    Diverticulitis large intestine     Dry eye syndrome 6/29/2018    Fibromyalgia 6/29/2018    GERD (gastroesophageal reflux disease)     History of left breast cancer 2013    lumpectomy radiation    Hypercholesterolemia 6/29/2018    Ill-defined condition     blood transfusion hx    Leukemia ([address]     MDS (myelodysplastic syndrome) ([facility] [address]     Osteoporosis 6/29/2018    Oxygen dependent     at night    Peripheral neuropathy 6/29/2018    Prediabetes 6/29/2018    PUD (peptic ulcer disease)     Radiation therapy complication 7812    Lt breast-No Chemo    Rosacea 6/29/2018    Trouble in sleeping      Past Surgical History:   Procedure Laterality Date    COLONOSCOPY N/A 2/28/2020    COLONOSCOPY performed by Chaya Lee MD at [facility] ENDOSCOPY    COLONOSCOPY,DIAGNOSTIC  2/28/2020         HX APPENDECTOMY      HX BREAST LUMPECTOMY  11/21/2013    LEFT BREAST LUMPECTOMY W/LEFT BREAST SENTINEL NODE BIOPSY,AND NEEDLE LOCALIZATION performed by Harrison Pulido MD at [facility] MAIN OR    HX CATARACT REMOVAL      bilateral    HX HYSTERECTOMY      ovarian cyst    HX MOHS PROCEDURES      right    HX ORTHOPAEDIC      back surgery x2    HX OTHER SURGICAL      colonoscopy    HX TONSILLECTOMY      HX VASCULAR ACCESS  02/2020    echo cath right shoulder    IR INSERT TUNL CVC W PORT OVER 5 YEARS  12/3/2019    TOTAL KNEE ARTHROPLASTY      left    TOTAL KNEE ARTHROPLASTY      right    UPPER GI ENDOSCOPY,BIOPSY  1/24/2020         UPPER GI ENDOSCOPY,BIOPSY  6/23/2020         UPPER GI ENDOSCOPY,DILATN W GUIDE  6/23/2020         US GUIDED CORE BREAST BIOPSY Left 2013    Breast Ca       Expanded or extensive additional review of patient history:     Home Situation  Home Environment: Private residence  # Steps to Enter: 3  Rails to Enter: Yes  Hand Rails : Bilateral  Wheelchair Ramp: No  One/Two Story Residence: One story  Living Alone: No  Support Systems: Spouse/Significant Other/Partner  Patient Expects to be Discharged to[de-identified] Private residence  Current DME Used/Available at Home: Cane, straight, Raised toilet seat  Tub or Shower Type: Shower(With seat & grab bars)    Hand dominance: Right    EXAMINATION OF PERFORMANCE DEFICITS:  Cognitive/Behavioral Status:  Neurologic State: Alert  Orientation Level: Oriented X4  Cognition: Follows commands  Perception: Appears intact  Perseveration: No perseveration noted  Safety/Judgement: Not assessed    Skin: intact    Edema: none noted    Hearing:   Auditory  Auditory Impairment: None    Vision/Perceptual:                           Acuity: Within Defined Limits    Corrective Lenses: Glasses    Range of Motion:    AROM: Generally decreased, functional                         Strength:    Strength: Generally decreased, functional                Coordination:  Coordination: Generally decreased, functional  Fine Motor Skills-Upper: Left Intact; Right Intact    Gross Motor Skills-Upper: Left Intact; Right Intact    Tone & Sensation:       Sensation: Impaired(B feet numbness)                      Balance:  Sitting: Impaired; Without support  Sitting - Static: Fair (occasional)  Standing: Impaired; Without support  Standing - Static: Constant support;Poor  Standing - Dynamic : Poor    Functional Mobility and Transfers for ADLs:  Bed Mobility:  Supine to Sit: Moderate assistance;Assist x2    Transfers:  Sit to Stand: Moderate assistance;Assist x2(from elevated bed height)  Stand to Sit: Moderate assistance;Assist x2  Bed to Chair: Maximum assistance;Assist x2  Toilet Transfer : Maximum assistance;Assist x2(inferred by transfer to chair, needs BSC')    ADL Assessment:  Feeding: Setup    Oral Facial Hygiene/Grooming: Setup(seated EOB to brush hair)    Bathing: Moderate assistance    Upper Body Dressing: Moderate assistance    Lower Body Dressing: Maximum assistance    Toileting: Total assistance;Maximum assistance                ADL Intervention and task modifications:                                     Cognitive Retraining  Safety/Judgement: Not assessed      Functional Measure:  Barthel Index:    Bathin  Bladder: 5  Bowels: 5  Groomin  Dressin  Feedin  Mobility: 0  Stairs: 0  Toilet Use: 0  Transfer (Bed to Chair and Back): 5  Total: 20/100        The Barthel ADL Index: Guidelines  1. The index should be used as a record of what a patient does, not as a record of what a patient could do. 2. The main aim is to establish degree of independence from any help, physical or verbal, however minor and for whatever reason.   3. The need for supervision renders the patient not independent. 4. A patient's performance should be established using the best available evidence. Asking the patient, friends/relatives and nurses are the usual sources, but direct observation and common sense are also important. However direct testing is not needed. 5. Usually the patient's performance over the preceding 24-48 hours is important, but occasionally longer periods will be relevant. 6. Middle categories imply that the patient supplies over 50 per cent of the effort. 7. Use of aids to be independent is allowed. Claudio Bell., Barthel, DShawneeW. (9035). Functional evaluation: the Barthel Index. [facility] (14)2. Jose Lennon maxim KELLY Louie, Pk Kaplan., Gissel Felton., [place], [address] [place] [address] (1999). Measuring the change indisability after inpatient rehabilitation; comparison of the responsiveness of the Barthel Index and Functional [place] Measure. Journal of Neurology, Neurosurgery, and Psychiatry, 66(4), 496-572. Melquiades Gillespie, N.J.A, OMAR Garcia, & Yoav Hewitt M.A. (2004.) Assessment of post-stroke quality of life in cost-effectiveness studies: The usefulness of the Barthel Index and the EuroQoL-5D. Quality of Life Research, 15, 327-85         Occupational Therapy Evaluation Charge Determination   History Examination Decision-Making   LOW Complexity : Brief history review  LOW Complexity : 1-3 performance deficits relating to physical, cognitive , or psychosocial skils that result in activity limitations and / or participation restrictions  LOW Complexity : No comorbidities that affect functional and no verbal or physical assistance needed to complete eval tasks       Based on the above components, the patient evaluation is determined to be of the following complexity level: LOW   Pain Rating:  Pointed to pain at trach site, increased coughing with activity    Activity Tolerance:    WNL  Please refer to the flowsheet for vital signs taken during this treatment. After treatment patient left in no apparent distress:    Sitting in chair    COMMUNICATION/EDUCATION:   The patients plan of care was discussed with: Physical therapist, Registered nurse, and Physician. Patient/family have participated as able in goal setting and plan of care. This patients plan of care is appropriate for delegation to [facility].     Thank you for this referral.  Dillon Lindo OT  Time Calculation: 29 mins

## 2020-07-01 LAB
ANION GAP SERPL CALC-SCNC: 4 MMOL/L (ref 5–15)
BUN SERPL-MCNC: 27 MG/DL (ref 6–20)
BUN/CREAT SERPL: 47 (ref 12–20)
CALCIUM SERPL-MCNC: 7.9 MG/DL (ref 8.5–10.1)
CHLORIDE SERPL-SCNC: 106 MMOL/L (ref 97–108)
CO2 SERPL-SCNC: 28 MMOL/L (ref 21–32)
CREAT SERPL-MCNC: 0.58 MG/DL (ref 0.55–1.02)
ERYTHROCYTE [DISTWIDTH] IN BLOOD BY AUTOMATED COUNT: 19.1 % (ref 11.5–14.5)
FOLATE SERPL-MCNC: 6.4 NG/ML (ref 5–21)
GLUCOSE SERPL-MCNC: 149 MG/DL (ref 65–100)
HCT VFR BLD AUTO: 26.2 % (ref 35–47)
HCT VFR BLD AUTO: 27.4 % (ref 35–47)
HGB BLD-MCNC: 7.5 G/DL (ref 11.5–16)
HGB BLD-MCNC: 7.9 G/DL (ref 11.5–16)
MCH RBC QN AUTO: 33.9 PG (ref 26–34)
MCHC RBC AUTO-ENTMCNC: 28.6 G/DL (ref 30–36.5)
MCV RBC AUTO: 118.6 FL (ref 80–99)
NRBC # BLD: 0.18 K/UL (ref 0–0.01)
NRBC BLD-RTO: 0.6 PER 100 WBC
PLATELET # BLD AUTO: 151 K/UL (ref 150–400)
PMV BLD AUTO: 10.2 FL (ref 8.9–12.9)
POTASSIUM SERPL-SCNC: 3.8 MMOL/L (ref 3.5–5.1)
RBC # BLD AUTO: 2.21 M/UL (ref 3.8–5.2)
SODIUM SERPL-SCNC: 138 MMOL/L (ref 136–145)
VIT B12 SERPL-MCNC: 1837 PG/ML (ref 193–986)
WBC # BLD AUTO: 31.4 K/UL (ref 3.6–11)

## 2020-07-01 PROCEDURE — 82607 VITAMIN B-12: CPT

## 2020-07-01 PROCEDURE — 80048 BASIC METABOLIC PNL TOTAL CA: CPT

## 2020-07-01 PROCEDURE — 74011000250 HC RX REV CODE- 250: Performed by: INTERNAL MEDICINE

## 2020-07-01 PROCEDURE — 74011250637 HC RX REV CODE- 250/637: Performed by: INTERNAL MEDICINE

## 2020-07-01 PROCEDURE — 85018 HEMOGLOBIN: CPT

## 2020-07-01 PROCEDURE — 65270000032 HC RM SEMIPRIVATE

## 2020-07-01 PROCEDURE — 74011250637 HC RX REV CODE- 250/637: Performed by: OTOLARYNGOLOGY

## 2020-07-01 PROCEDURE — 97530 THERAPEUTIC ACTIVITIES: CPT | Performed by: OCCUPATIONAL THERAPIST

## 2020-07-01 PROCEDURE — 97110 THERAPEUTIC EXERCISES: CPT

## 2020-07-01 PROCEDURE — 74011000258 HC RX REV CODE- 258: Performed by: INTERNAL MEDICINE

## 2020-07-01 PROCEDURE — 74011250636 HC RX REV CODE- 250/636: Performed by: INTERNAL MEDICINE

## 2020-07-01 PROCEDURE — 85027 COMPLETE CBC AUTOMATED: CPT

## 2020-07-01 PROCEDURE — C9113 INJ PANTOPRAZOLE SODIUM, VIA: HCPCS | Performed by: INTERNAL MEDICINE

## 2020-07-01 PROCEDURE — 82746 ASSAY OF FOLIC ACID SERUM: CPT

## 2020-07-01 PROCEDURE — 97535 SELF CARE MNGMENT TRAINING: CPT | Performed by: OCCUPATIONAL THERAPIST

## 2020-07-01 PROCEDURE — 36591 DRAW BLOOD OFF VENOUS DEVICE: CPT

## 2020-07-01 PROCEDURE — 92526 ORAL FUNCTION THERAPY: CPT

## 2020-07-01 RX ORDER — FOLIC ACID 1 MG/1
1 TABLET ORAL DAILY
Status: DISCONTINUED | OUTPATIENT
Start: 2020-07-01 | End: 2020-07-07 | Stop reason: HOSPADM

## 2020-07-01 RX ADMIN — Medication 10 ML: at 13:51

## 2020-07-01 RX ADMIN — ALPRAZOLAM 0.25 MG: 0.25 TABLET ORAL at 22:27

## 2020-07-01 RX ADMIN — ACETAMINOPHEN 500 MG: 650 SUSPENSION ORAL at 18:08

## 2020-07-01 RX ADMIN — HYDROXYUREA 500 MG: 500 CAPSULE ORAL at 18:17

## 2020-07-01 RX ADMIN — SODIUM CHLORIDE 40 MG: 9 INJECTION INTRAMUSCULAR; INTRAVENOUS; SUBCUTANEOUS at 10:12

## 2020-07-01 RX ADMIN — PIPERACILLIN AND TAZOBACTAM 3.38 G: 3; .375 INJECTION, POWDER, LYOPHILIZED, FOR SOLUTION INTRAVENOUS at 03:48

## 2020-07-01 RX ADMIN — DILTIAZEM HYDROCHLORIDE 45 MG: 30 TABLET, FILM COATED ORAL at 10:16

## 2020-07-01 RX ADMIN — PIPERACILLIN AND TAZOBACTAM 3.38 G: 3; .375 INJECTION, POWDER, LYOPHILIZED, FOR SOLUTION INTRAVENOUS at 19:31

## 2020-07-01 RX ADMIN — Medication 10 ML: at 22:27

## 2020-07-01 RX ADMIN — ACETAMINOPHEN 500 MG: 650 SUSPENSION ORAL at 12:27

## 2020-07-01 RX ADMIN — PIPERACILLIN AND TAZOBACTAM 3.38 G: 3; .375 INJECTION, POWDER, LYOPHILIZED, FOR SOLUTION INTRAVENOUS at 12:15

## 2020-07-01 RX ADMIN — ALPRAZOLAM 0.25 MG: 0.25 TABLET ORAL at 18:08

## 2020-07-01 RX ADMIN — ACETAMINOPHEN 500 MG: 650 SUSPENSION ORAL at 06:02

## 2020-07-01 RX ADMIN — DULOXETINE 30 MG: 30 CAPSULE, DELAYED RELEASE ORAL at 10:17

## 2020-07-01 RX ADMIN — Medication 10 ML: at 06:02

## 2020-07-01 RX ADMIN — DILTIAZEM HYDROCHLORIDE 45 MG: 30 TABLET, FILM COATED ORAL at 22:27

## 2020-07-01 RX ADMIN — DILTIAZEM HYDROCHLORIDE 45 MG: 30 TABLET, FILM COATED ORAL at 18:08

## 2020-07-01 RX ADMIN — ALPRAZOLAM 0.25 MG: 0.25 TABLET ORAL at 10:17

## 2020-07-01 RX ADMIN — HYDROXYUREA 500 MG: 500 CAPSULE ORAL at 10:17

## 2020-07-01 NOTE — PROGRESS NOTES
Pt tolerating cap trach well at this time. Speech and RN at bedside with this RT. Speech and RT agree that clarification is needed for uncapping pt and placing on ATC at night.  RN to follow up with MD.

## 2020-07-01 NOTE — PROGRESS NOTES
Problem: Falls - Risk of  Goal: *Absence of Falls  Description: Document Mayra Dear Fall Risk and appropriate interventions in the flowsheet. Outcome: Progressing Towards Goal  Note: Fall Risk Interventions:  Mobility Interventions: Communicate number of staff needed for ambulation/transfer, Strengthening exercises (ROM-active/passive), Patient to call before getting OOB         Medication Interventions: Evaluate medications/consider consulting pharmacy, Patient to call before getting OOB    Elimination Interventions: Call light in reach, Patient to call for help with toileting needs              Problem: Patient Education: Go to Patient Education Activity  Goal: Patient/Family Education  Outcome: Progressing Towards Goal     Problem: Pressure Injury - Risk of  Goal: *Prevention of pressure injury  Description: Document Nikolas Scale and appropriate interventions in the flowsheet. Outcome: Progressing Towards Goal  Note: Pressure Injury Interventions:  Sensory Interventions: Assess changes in LOC, Avoid rigorous massage over bony prominences, Minimize linen layers, Keep linens dry and wrinkle-free, Pressure redistribution bed/mattress (bed type)    Moisture Interventions: Absorbent underpads, Apply protective barrier, creams and emollients, Maintain skin hydration (lotion/cream)    Activity Interventions: Increase time out of bed, Pressure redistribution bed/mattress(bed type)    Mobility Interventions: Pressure redistribution bed/mattress (bed type), Turn and reposition approx.  every two hours(pillow and wedges)    Nutrition Interventions: Document food/fluid/supplement intake    Friction and Shear Interventions: Lift sheet, Minimize layers, Apply protective barrier, creams and emollients                Problem: Patient Education: Go to Patient Education Activity  Goal: Patient/Family Education  Outcome: Progressing Towards Goal     Problem: Infection - Risk of, Surgical Site Infection  Goal: *Absence of surgical site infection signs and symptoms  Outcome: Progressing Towards Goal     Problem: Patient Education: Go to Patient Education Activity  Goal: Patient/Family Education  Outcome: Progressing Towards Goal     Problem: Infection - Risk of, Central Venous Catheter-Associated Bloodstream Infection  Goal: *Absence of infection signs and symptoms  Outcome: Progressing Towards Goal     Problem: Patient Education: Go to Patient Education Activity  Goal: Patient/Family Education  Outcome: Progressing Towards Goal

## 2020-07-01 NOTE — PROGRESS NOTES
SLP Contact Note    SLP treatment complete. Pt red-capped prior to SLP arrival and tolerating without difficulty. Recommend red-cap during all hours as tolerated so that pt can be worked toward decannulation. Recommend ice chips after oral care, and MBS to be completed once pt is decannulated. Full note to follow.       Thank you,  RYAN DuffEd, [id number] [facility]  Speech-Language Pathologist

## 2020-07-01 NOTE — PROGRESS NOTES
Hospitalist Progress Note  Marina Villela MD  Answering service: [phone] from in house phone      Date of Service:  2020  NAME:  Zee Duty  :  1935  MRN:  [id number]    Admission Summary:   84F p/w acute resp distress- s/p emergency trach. Interval history / Subjective:   Patient seen and examined at bedside, looks well, interactive, can't speak but nodding appropriately. Assessment & Plan:     1. Supraglottic Mass  a. Post Trach and biopsy - pathology benign, hemorrhage only. b. Patient returned to OR  for bleeding control and had cautery done. i. Bleeding is significantly improved  c. Continue perioperative antibiotics per ENT - to be continued for 1-2 weeks per discussion with Dr. Jade Fink  d. off steroids  e. Continue ATC and wean oxygen as tolerated  f. Downsize tube, capping trials- aim for removing trach ?. g. PT/OT eval- recommending SNF. 2. COPD  a. Not in exacerbation  b. Duonebs PRN  3. HTN/Afib  a. Continue Cardizem - controlled  4. CML  a. Continue Hydroxyurea  5. Mood disorder  a. Continue Xanax and Cymbalta  6. Diarrhea  a. Likely secondary to feeds  b. Plan to have speech evaluate for possible oral feeds. c. Switched feeds, if no improvement will start benefiber    Code status: Full  DVT prophylaxis: SCD  Care Plan discussed with: Patient/Family and Nurse  Disposition: TBD     Hospital Problems  Date Reviewed: 2020          Codes Class Noted POA    PUD (peptic ulcer disease) (Chronic) ICD-10-CM: K27.9  ICD-9-CM: 533.90  2020 Yes        Status post trachelectomy ICD-10-CM: Z90.710  ICD-9-CM: V88.01  2020 Unknown        Myelodysplastic syndrome ([facility] [address] ICD-10-CM: D46.9  ICD-9-CM: 238.75  2020 Unknown        Mass of epiglottis ICD-10-CM: J38.7  ICD-9-CM: 478.79  2020 Unknown            Review of Systems:   Pertinent items are mentioned in interval history.     Vital Signs: Last 24hrs VS reviewed since prior progress note. Most recent are:  Visit Vitals  /47   Pulse 62   Temp 98.2 °F (36.8 °C)   Resp 18   Ht 5' 5\" (1.651 m)   Wt 85.7 kg (188 lb 15 oz)   SpO2 100%   BMI 31.44 kg/m²         Intake/Output Summary (Last 24 hours) at 7/1/2020 1013  Last data filed at 6/30/2020 2041  Gross per 24 hour   Intake 1000 ml   Output 160 ml   Net 840 ml        Physical Examination:   General:  Alert, oriented, No acute distress  ENT: can't speak with Trach in situ. No bleeding. Resp:  No accessory muscle use, Good AE, no wheezes, no rhonchi  Abd:  Soft, non-tender, non-distended  Extremities:  No cyanosis or clubbing, no significant edema  Neuro:  Grossly normal, no focal neuro deficits, follows commands     Data Review:    Review and/or order of clinical lab test  Review and/or order of tests in the radiology section of CPT  Review and/or order of tests in the medicine section of CPT  Labs:     Recent Labs     07/01/20  0351 06/30/20  1616 06/30/20  0443   WBC 31.4*  --  26.2*   HGB 7.5* 7.9* 7.8*   HCT 26.2* 27.2* 27.1*     --  141*     Recent Labs     07/01/20  0351 06/30/20  0443 06/29/20  0308    141 138   K 3.8 4.2 4.2    108 108   CO2 28 27 26   BUN 27* 22* 22*   CREA 0.58 0.67 0.67   * 164* 157*   CA 7.9* 8.1* 8.4*     No results for input(s): ALT, AP, TBIL, TBILI, TP, ALB, GLOB, GGT, AML, LPSE in the last 72 hours. No lab exists for component: SGOT, GPT, AMYP, HLPSE  No results for input(s): INR, PTP, APTT, INREXT in the last 72 hours. No results for input(s): FE, TIBC, PSAT, FERR in the last 72 hours. Lab Results   Component Value Date/Time    Folate 6.4 07/01/2020 03:51 AM      No results for input(s): PH, PCO2, PO2 in the last 72 hours. No results for input(s): CPK, CKNDX, TROIQ in the last 72 hours.     No lab exists for component: CPKMB  Lab Results   Component Value Date/Time    Cholesterol, total 136 10/30/2019 12:14 AM    HDL Cholesterol 39 10/30/2019 12:14 AM    LDL, calculated 78.2 10/30/2019 12:14 AM    Triglyceride 94 10/30/2019 12:14 AM    CHOL/HDL Ratio 3.5 10/30/2019 12:14 AM     Lab Results   Component Value Date/Time    Glucose (POC) 159 (H) 06/26/2020 09:53 PM    Glucose (POC) 120 (H) 06/26/2020 08:31 PM    Glucose (POC) 127 (H) 11/04/2019 05:15 AM    Glucose (POC) 96 11/16/2016 07:44 AM     Lab Results   Component Value Date/Time    Color YELLOW/STRAW 06/26/2020 02:19 PM    Appearance CLEAR 06/26/2020 02:19 PM    Specific gravity 1.016 06/26/2020 02:19 PM    Specific gravity 1.010 02/11/2020 03:12 AM    pH (UA) 6.0 06/26/2020 02:19 PM    Protein Negative 06/26/2020 02:19 PM    Glucose Negative 06/26/2020 02:19 PM    Ketone Negative 06/26/2020 02:19 PM    Bilirubin Negative 06/26/2020 02:19 PM    Urobilinogen 0.2 06/26/2020 02:19 PM    Nitrites Negative 06/26/2020 02:19 PM    Leukocyte Esterase SMALL (A) 06/26/2020 02:19 PM    Epithelial cells FEW 06/26/2020 02:19 PM    Bacteria Negative 06/26/2020 02:19 PM    WBC 0-4 06/26/2020 02:19 PM    RBC 0-5 06/26/2020 02:19 PM     Medications Reviewed:     Current Facility-Administered Medications   Medication Dose Route Frequency    dilTIAZem IR (CARDIZEM) tablet 45 mg  45 mg Oral QID    acetaminophen (TYLENOL) solution 500 mg  500 mg Per NG tube Q6H    . PHARMACY TO SUBSTITUTE PER PROTOCOL (Reordered from: cycloSPORINE (Restasis MultiDose) 0.05 % drop ophthalmic drops)    Per Protocol    DULoxetine (CYMBALTA) capsule 30 mg  30 mg Oral DAILY    pantoprazole (PROTONIX) 40 mg in 0.9% sodium chloride 10 mL injection  40 mg IntraVENous DAILY    piperacillin-tazobactam (ZOSYN) 3.375 g in 0.9% sodium chloride (MBP/ADV) 100 mL  3.375 g IntraVENous Q8H    sodium chloride (NS) flush 5-40 mL  5-40 mL IntraVENous Q8H    sodium chloride (NS) flush 5-40 mL  5-40 mL IntraVENous PRN    ondansetron (ZOFRAN) injection 4 mg  4 mg IntraVENous Q4H PRN    [Held by provider] enoxaparin (LOVENOX) injection 40 mg 40 mg SubCUTAneous Q24H    LORazepam (ATIVAN) injection 2 mg  2 mg IntraVENous Q6H PRN    morphine injection 1 mg  1 mg IntraVENous Q1H PRN    ALPRAZolam (XANAX) tablet 0.25 mg  0.25 mg Oral TID    hydroxyurea (HYDREA) 100 mg/mL chemo suspension (compound) 500 mg  500 mg Per NG tube BID   ______________________________________________________________________  EXPECTED LENGTH OF STAY: 11d 0h  ACTUAL LENGTH OF STAY:          Charity Galeazzi, MD

## 2020-07-01 NOTE — PROGRESS NOTES
Problem: Falls - Risk of  Goal: *Absence of Falls  Description: Document Marlen Calle Fall Risk and appropriate interventions in the flowsheet. Outcome: Progressing Towards Goal  Note: Fall Risk Interventions:  Mobility Interventions: Communicate number of staff needed for ambulation/transfer, OT consult for ADLs, PT Consult for mobility concerns, PT Consult for assist device competence, Strengthening exercises (ROM-active/passive), Patient to call before getting OOB         Medication Interventions: Evaluate medications/consider consulting pharmacy, Patient to call before getting OOB, Teach patient to arise slowly    Elimination Interventions: Call light in reach, Patient to call for help with toileting needs, Toileting schedule/hourly rounds(flexi seal in place)              Problem: Pressure Injury - Risk of  Goal: *Prevention of pressure injury  Description: Document Nikolas Scale and appropriate interventions in the flowsheet. Outcome: Progressing Towards Goal  Note: Pressure Injury Interventions:  Sensory Interventions: Assess changes in LOC, Assess need for specialty bed, Avoid rigorous massage over bony prominences, Float heels, Keep linens dry and wrinkle-free, Maintain/enhance activity level, Minimize linen layers, Monitor skin under medical devices, Pressure redistribution bed/mattress (bed type), Turn and reposition approx. every two hours (pillows and wedges if needed)    Moisture Interventions: Absorbent underpads, Apply protective barrier, creams and emollients, Check for incontinence Q2 hours and as needed, Internal/External fecal devices, Maintain skin hydration (lotion/cream), Minimize layers, Moisture barrier    Activity Interventions: Increase time out of bed, Pressure redistribution bed/mattress(bed type), PT/OT evaluation    Mobility Interventions: Float heels, Pressure redistribution bed/mattress (bed type), PT/OT evaluation, Turn and reposition approx.  every two hours(pillow and wedges)    Nutrition Interventions: Document food/fluid/supplement intake(tube feedings)    Friction and Shear Interventions: Apply protective barrier, creams and emollients, Lift sheet, Minimize layers                Problem: Pain  Goal: *Control of Pain  Outcome: Progressing Towards Goal  Goal: *PALLIATIVE CARE:  Alleviation of Pain  Outcome: Progressing Towards Goal     Problem: Patient Education: Go to Patient Education Activity  Goal: Patient/Family Education  Outcome: Progressing Towards Goal     Problem: Breathing Pattern - Ineffective  Goal: *Absence of hypoxia  Outcome: Progressing Towards Goal  Goal: *Use of effective breathing techniques  Outcome: Progressing Towards Goal  Goal: *PALLIATIVE CARE:  Alleviation of Dyspnea  Outcome: Progressing Towards Goal     Problem: Infection - Risk of, Surgical Site Infection  Goal: *Absence of surgical site infection signs and symptoms  Outcome: Progressing Towards Goal     Problem: Patient Education: Go to Patient Education Activity  Goal: Patient/Family Education  Outcome: Progressing Towards Goal     Problem: Infection - Risk of, Central Venous Catheter-Associated Bloodstream Infection  Goal: *Absence of infection signs and symptoms  Outcome: Progressing Towards Goal     Problem: Patient Education: Go to Patient Education Activity  Goal: Patient/Family Education  Outcome: Progressing Towards Goal     Problem: Nutrition Deficit  Goal: *Optimize nutritional status  Outcome: Progressing Towards Goal     Problem: Patient Education: Go to Patient Education Activity  Goal: Patient/Family Education  Outcome: Progressing Towards Goal     Problem: Patient Education: Go to Patient Education Activity  Goal: Patient/Family Education  Outcome: Progressing Towards Goal     Problem: Patient Education: Go to Patient Education Activity  Goal: Patient/Family Education  Outcome: Progressing Towards Goal     Problem: Patient Education: Go to Patient Education Activity  Goal: Patient/Family Education  Outcome: Progressing Towards Goal

## 2020-07-01 NOTE — PROGRESS NOTES
82 y.o. F presenting with acute supraglottic edema and respiratory distress. CT showed a right deep neck space mass vs phlegmon with associated airway compromise. Currently POD5 tracheostomy and DL/biopsy and POD2 takeback for bleeding from trach. Receiving Zosyn. Decadron off x 24 hours Trach dry overnight with minimal secretions. Suctioned x2 over night shift without much mucus. H/H stable. Still leukocytotis. No fevers. Reports right neck pain is much better    Visit Vitals  /47   Pulse 62   Temp 98.2 °F (36.8 °C)   Resp 18   Ht 5' 5\" (1.651 m)   Wt 85.7 kg (188 lb 15 oz)   SpO2 100%   BMI 31.44 kg/m²    6 cuffed shiley in place. NO bleeding  Cuff down. Minimal tan secretions with deep suctioning  No LAD but neck mildly tender on right side, improved from yesterday. No fluctuance or crepitus. dobhoff right naris    Lab Results   Component Value Date/Time    WBC 31.4 (H) 07/01/2020 03:51 AM    HGB (POC) 10.7 (A) 01/07/2019 09:52 AM    HGB 7.5 (L) 07/01/2020 03:51 AM    HCT (POC) 32.0 (A) 01/07/2019 09:52 AM    HCT 26.2 (L) 07/01/2020 03:51 AM    PLATELET 805 75/13/3686 03:51 AM    .6 (H) 07/01/2020 03:51 AM      Current Facility-Administered Medications   Medication Dose Route Frequency    dilTIAZem IR (CARDIZEM) tablet 45 mg  45 mg Oral QID    acetaminophen (TYLENOL) solution 500 mg  500 mg Per NG tube Q6H    . PHARMACY TO SUBSTITUTE PER PROTOCOL (Reordered from: cycloSPORINE (Restasis MultiDose) 0.05 % drop ophthalmic drops)    Per Protocol    DULoxetine (CYMBALTA) capsule 30 mg  30 mg Oral DAILY    pantoprazole (PROTONIX) 40 mg in 0.9% sodium chloride 10 mL injection  40 mg IntraVENous DAILY    piperacillin-tazobactam (ZOSYN) 3.375 g in 0.9% sodium chloride (MBP/ADV) 100 mL  3.375 g IntraVENous Q8H    sodium chloride (NS) flush 5-40 mL  5-40 mL IntraVENous Q8H    sodium chloride (NS) flush 5-40 mL  5-40 mL IntraVENous PRN    ondansetron (ZOFRAN) injection 4 mg  4 mg IntraVENous Q4H PRN    [Held by provider] enoxaparin (LOVENOX) injection 40 mg  40 mg SubCUTAneous Q24H    LORazepam (ATIVAN) injection 2 mg  2 mg IntraVENous Q6H PRN    morphine injection 1 mg  1 mg IntraVENous Q1H PRN    ALPRAZolam (XANAX) tablet 0.25 mg  0.25 mg Oral TID    hydroxyurea (HYDREA) 100 mg/mL chemo suspension (compound) 500 mg  500 mg Per NG tube BID      Zosyn day 5    CT neck dated 6/29/2020: I personally reviewed the scan which has the following pertinent findings:  I do not appreciate a fluid collection. Clot just above the trach but edema of the supraglottic structures themselves appears improved. Secretions within the airway. Path: benign    PROCEDURE:    Flexible Nasolaryngoscopy  Indication: airway obstruction  Findings: No mass in NP. Fossae of Rosenmüller are free of tumor. Bilateral eustachian tube orifices are well defined with no overlying mass. Base of tongue symmetric. Epiglottis is crisp. No masses in pyriform sinuses. Right AE fold with dark ecchymosis but no edema or mass. Swelling has nearly completely resolved. Airway widely patent. Vocal cords move symmetrically. A timeout was performed in which the patient's name and procedure were verified. A flexible laryngoscope was then inserted into the left nostril and advanced posteriorly to the nasopharynx. Findings noted above. A/P:  POD5 tracheostomy and DL/biopsy. POD 2 takeback for bleeding from trach site. NO bleeding now. Swelling nearly totally resolved in the pharynx. Neck pain better. Still elevated WBC but no fevers. Minimal trach secretions  -Ativan Q6H prn anxiety  -Morphine prn and tylenol scheduled for pain  -hold Lovenox and use SCDs  -Later this morning will downsize to 4 shiley and cap this morning. Hopefully can remove trach tomorrow if doing well with capping trials today.  -Continue zosyn.    -Routine trach care per RT  -Appreciate SLP input.   Swelling nearly resolved and suspect dobhoff can come out tomorrow as trach comes out.  -Will continue to follow closely. [facility], [address], [facility]   [address] [facility], [address]   o) [phone]  (N) [phone]

## 2020-07-01 NOTE — PROGRESS NOTES
6 cuffed trach removed. 4CFS placed and capped. Speaking again and breathing well. PLan to cap during the day and place inner cannula at night with RT. If all well through today and tonight will remove trach in the morning. Will need supplemental oxygen via nasal cannula now if needed. Discussed with covering nurse.     BF

## 2020-07-01 NOTE — PROGRESS NOTES
Problem: Dysphagia (Adult)  Goal: *Acute Goals and Plan of Care (Insert Text)  Description: Speech therapy goals  Initiated 6/30/2020   1. Patient will participate in re-evaluation of swallow function within 7 days   2. Patient will participate in MBS study within 7 days    Outcome: Progressing Towards Goal     Problem: Voice Impaired (Adult)  Goal: *Acute Goals and Plan of Care (Insert Text)  Description: Speech therapy goals  Initiated 6/30/2020   1. Patient will tolerate finger occlusion trials with SLP to determine ability to tolerate PMV within 7 days    Outcome: Resolved/Met     [id number] [facility] TREATMENT  Patient: Hammad Buenrostro (74 y.o. female)  Date: 7/1/2020  Diagnosis: Mass of epiglottis [J38.7]   <principal problem not specified>  Procedure(s) (LRB):  TRACH REVISION (N/A) 2 Days Post-Op  Precautions: Aspiration      ASSESSMENT:  Upon entering room, pt already red-capped and thus PMV trials not needed. Therefore, completed swallowing treatment with red-cap placed. Patient presented with intermittent coughing with thin liquids which could be indicative of aspiration. Furthermore, pt is at elevated risk for prandial aspiration given trach placement with potential impact to pharyngeal sensation and pressures, as well as possible neck mass. Given that pt working towards decannulation, will plan to complete Modified Barium Swallow Study after decannulation to assess safety of diet initiation. PLAN:  Recommendations and Planned Interventions:  --NPO with ice chips after oral care  --Vigilant oral care  --MBS when pt decannulated    Patient continues to benefit from skilled intervention to address the above impairments. Continue treatment per established plan of care. Discharge Recommendations: To Be Determined     SUBJECTIVE:   Patient stated, \"Have you seen my orange mask?   written on white board before pt remembered that she could now speak with cap on trach.     OBJECTIVE: Cognitive and Communication Status:  Neurologic State: Alert, Appropriate for age  Orientation Level: Oriented X4  Cognition: Appropriate decision making, Appropriate for age attention/concentration, Appropriate safety awareness  Perception: Appears intact  Perseveration: No perseveration noted  Safety/Judgement: Awareness of environment, Fall prevention  Dysphagia Treatment:  Oral Assessment:  Oral Assessment  Labial: No impairment  Dentition: Natural  Oral Hygiene: oral mucosa moist and clear of secretions  Lingual: No impairment  Velum: No impairment  Mandible: No impairment  P.O. Trials:  Patient Position: upright in bed  Vocal quality prior to P.O.: Low volume  Consistency Presented: Ice chips; Thin liquid  How Presented: Self-fed/presented;SLP-fed/presented;Spoon;Cup/sip     Bolus Acceptance: No impairment  Bolus Formation/Control: No impairment     Propulsion: No impairment  Oral Residue: None  Initiation of Swallow: No impairment  Laryngeal Elevation: Decreased  Aspiration Signs/Symptoms: Weak cough  Pharyngeal Phase Characteristics: No impairment, issues, or problems              Oral Phase Severity: No impairment  Pharyngeal Phase Severity : Mild-moderate(certainly at risk)  Pain:  Pain Scale 1: Numeric (0 - 10)  Pain Intensity 1: 0       After treatment:   Patient left in no apparent distress in bed, Call bell within reach, and Nursing notified    COMMUNICATION/EDUCATION:   Patient was educated regarding her deficit(s) of swallowing as this relates to her presence of trach. She demonstrated Good understanding as evidenced by teach back.     The patient's plan of care including recommendations, planned interventions, and recommended diet changes were discussed with: Registered nurse and Respiratory therapist.     LEONOR Can  Time Calculation: 19 mins

## 2020-07-01 NOTE — PROGRESS NOTES
NUTRITION       EN order: Osmolite 1.5 @ 45 mL/hr with 2 packets Prosource daily + 140 mL H2O q 4 hours    Chart reviewed for brief follow-up/management of TF. Noted: supraglottic path benign, plans for decannulation ?tomorrow. SLP following. Plan for MBS once decannulated. For now, remains on TF via [place] and tolerating well. Continues to have loose stool via flexiseal.  Noted to be \"loose\" vs \"watery\" as documented previously. Regardless, may benefit from addition of soluble fiber for stool bulking if to remain on EN support. Per MD note, aware to start Benefiber. However, hopefully can transition to oral diet as suspect stooling to further improve with cessation of tube feeds. Labs/lytes look good. Off steroids. BG better. B vitamins checked. Supplemental folic acid initiated. Will continue to follow along with MBS results. Recent Labs     07/01/20  0351 06/30/20  0443 06/29/20  0308   * 164* 157*   BUN 27* 22* 22*   CREA 0.58 0.67 0.67    141 138   K 3.8 4.2 4.2    108 108   CO2 28 27 26   CA 7.9* 8.1* 8.4*       Recent Labs     07/01/20  0351 06/30/20  1616 06/30/20  0443   WBC 31.4*  --  26.2*   HGB 7.5* 7.9* 7.8*   HCT 26.2* 27.2* 27.1*     --  141*     Folate   Date Value Ref Range Status   07/01/2020 6.4 5.0 - 21.0 ng/mL Final     Vitamin B12   Date Value Ref Range Status   07/01/2020 1,837 (H) 193 - 986 pg/mL Final         Estimated Nutrition Needs:   Kcals/day: 1600 Kcals/day(0637-9551 (MSJ x 1.2-1.3)  Protein: 87 g( (1.0-1.2g/kg)  Fluid: (1 ml/kcal)     Based On:  [place] [address]  Weight Used: Actual wt(87 kg)        Ana Leiva RD  Available via [employer]  Or Pager# [phone]

## 2020-07-01 NOTE — PROGRESS NOTES
Problem: Self Care Deficits Care Plan (Adult)  Goal: *Acute Goals and Plan of Care (Insert Text)  Description:   FUNCTIONAL STATUS PRIOR TO ADMISSION: Patient was modified independent using a single point cane for functional mobility. HOME SUPPORT: The patient lived with , driving, no falls. Occupational Therapy Goals  Initiated 6/30/2020  1. Patient will perform standing ADLs at sink with least restrictive DME with supervision/set-up within 7 day(s). 2.  Patient will perform upper body dressing and lower body dressing with supervision/set-up within 7 day(s). 3.  Patient will perform bathing with supervision/set-up within 7 day(s). 4.  Patient will perform toilet transfers using least restrictive DME with supervision/set-up within 7 day(s). 5.  Patient will perform all aspects of toileting with supervision/set-up within 7 day(s). Outcome: Progressing Towards Goal     OCCUPATIONAL THERAPY TREATMENT  Patient: Dianne Garber (39 y.o. female)  Date: 7/1/2020  Diagnosis: Mass of epiglottis [J38.7]   <principal problem not specified>  Procedure(s) (LRB):  TRACH REVISION (N/A) 2 Days Post-Op  Precautions:  fall, trach  Chart, occupational therapy assessment, plan of care, and goals were reviewed. ASSESSMENT  Patient continues with skilled OT services and is progressing towards goals. She remains motivated and hard working. She continues to be limited by decreased strength LEs > UEs, endurance, mobility, balance in standing and safety. She required physical assist for LE ADLs, toileting, forward wt shift to stand and sit and to safely maintain standing and ability to pivot to chair. Pt noted with B knee buckling due to LE weakness. Recommend IP rehab at discharge to maximize her safety and independence prior to retuning home with family. Pt with AL and O2 sats 97-98% on 3L o2 via NC.     Current Level of Function Impacting Discharge (ADLs): max A LE ADLs, max A functional mobility performing stand pivot transfer bed to chair    Other factors to consider for discharge: see above         PLAN :  Patient continues to benefit from skilled intervention to address the above impairments. Continue treatment per established plan of care. to address goals. Recommend with staff: Recommend with nursing patient to complete as able in order to maintain strength, endurance and independence: ADLs with max A/setup, completed OOB to chair 3x/day and mobilizing to the [facility] for toileting with max assist x 2. Thank you for your assistance. Recommend next OT session: standing balance, LE ADL, endurance    Recommendation for discharge: (in order for the patient to meet his/her long term goals)  Therapy 3 hours per day 5-7 days per week    This discharge recommendation:  Has been made in collaboration with the attending provider and/or case management    IF patient discharges home will need the following DME: TBD       SUBJECTIVE:   Patient stated I want to move.     OBJECTIVE DATA SUMMARY:   Cognitive/Behavioral Status:  Neurologic State: Alert; Appropriate for age  Orientation Level: Oriented X4  Cognition: Appropriate decision making; Appropriate for age attention/concentration; Follows commands  Perception: Appears intact  Perseveration: No perseveration noted  Safety/Judgement: Awareness of environment; Fall prevention; Insight into deficits    Functional Mobility and Transfers for ADLs:  Bed Mobility:  Supine to Sit: Minimum assistance; Additional time;Assist x1  Scooting: Minimum assistance; Additional time;Assist x1    Transfers:  Sit to Stand: Maximum assistance; Additional time;Assist x1(A for forward wt shift- LOB posteriorly and poor strength LE)     Bed to Chair: Maximum assistance; Additional time;Assist x1(multiple seated rest breaks required, unable to step)    Balance:  Sitting: Intact; With support  Standing: Impaired;Pull to stand; With support(B knee buckling, poor strength)  Standing - Static: Poor;Constant support  Standing - Dynamic : Poor;Constant support    ADL Intervention:  Lower Body Dressing Assistance  Dressing Assistance: Maximum assistance  Protective Undergarmet: Maximum assistance(to pull up adult diaper like underwear- LOB)  Socks: Moderate assistance(able to cross leg, but A to thread over toes)  Leg Crossed Method Used: Yes  Position Performed: Seated edge of bed;Standing  Cues: Don;Doff;Physical assistance; Tactile cues provided;Verbal cues provided;Visual cues provided    Cognitive Retraining  Safety/Judgement: Awareness of environment; Fall prevention; Insight into deficits    Pain:  C/o B knee pain 4/10    Activity Tolerance:   Fair and requires frequent rest breaks  Please refer to the flowsheet for vital signs taken during this treatment. After treatment patient left in no apparent distress:   Sitting in chair and Call bell within reach    COMMUNICATION/COLLABORATION:   The patients plan of care was discussed with: Physical therapist and Registered nurse.      Naveed Wiggins OT  Time Calculation: 24 mins

## 2020-07-01 NOTE — PROGRESS NOTES
Spoke with Dr. Wes Gomes about changing patient's pill form medication to liquid form because she has a Dobhoff. Dr. Wes Gomes stated that he is okay with skipping pill form medication because they are planning to remove the trach tomorrow and may possibly remove the Dobhouff as well pending a swallow eval with Speech. Dr. Wes Gomes also stated that as long as the patient's HR remains stable without Diltiazem, he is okay with holding it.

## 2020-07-01 NOTE — PROGRESS NOTES
Problem: Mobility Impaired (Adult and Pediatric)  Goal: *Acute Goals and Plan of Care (Insert Text)  Description: FUNCTIONAL STATUS PRIOR TO ADMISSION: Patient was modified independent using a single point cane for functional mobility. HOME SUPPORT PRIOR TO ADMISSION: The patient lived with her . Physical Therapy Goals  Initiated 6/30/2020  1. Patient will move from supine to sit and sit to supine, scoot up and down and roll side to side in bed with minimal assistance/contact guard assist within 7 day(s). 2.  Patient will transfer from bed to chair and chair to bed with minimal assistance/contact guard assist using the least restrictive device within 7 day(s). 3.  Patient will perform sit to stand with minimal assistance/contact guard assist within 7 day(s). 4.  Patient will ambulate with minimal assistance/contact guard assist for  feet with the least restrictive device within 7 day(s). 5.  Patient will ascend/descend 4 stairs with bilateral handrail(s) with moderate assistance within 7 day(s). Outcome: Progressing Towards Goal   PHYSICAL THERAPY TREATMENT  Patient: Dianne Garber (52 y.o. female)  Date: 7/1/2020  Diagnosis: Mass of epiglottis [J38.7]   <principal problem not specified>  Procedure(s) (LRB):  TRACH REVISION (N/A) 2 Days Post-Op  Precautions:    Chart, physical therapy assessment, plan of care and goals were reviewed. ASSESSMENT  Patient continues with skilled PT services and is progressing towards goals. Patient received in chair after working with OT. Pt reporting fatigue yet agreeable to perform seated exercises. Pt with good carryover and educated pt to continue to prevent deconditioning, pt stated verbal understanding. Pt able to scoot hips posteriorly to reposition in chair, elevated legs for comfort. Will continue to practice transfers next session as deferred this session due to fatigue.      Current Level of Function Impacting Discharge (mobility/balance): mod A for transfers    Other factors to consider for discharge: fall risk, very motivated, mod I at baseline, lived with           PLAN :  Patient continues to benefit from skilled intervention to address the above impairments. Continue treatment per established plan of care. to address goals. Recommendation for discharge: (in order for the patient to meet his/her long term goals)  Therapy 3 hours per day 5-7 days per week  If pt were to d/c home, would require physical assistance for all mobility    This discharge recommendation:  Has been made in collaboration with the attending provider and/or case management    IF patient discharges home will need the following DME: wheelchair, rolling walker       SUBJECTIVE:   Patient stated my legs feel weak.     OBJECTIVE DATA SUMMARY:   Critical Behavior:  Neurologic State: Alert, Appropriate for age  Orientation Level: Oriented X4  Cognition: Appropriate decision making, Appropriate for age attention/concentration, Appropriate safety awareness  Safety/Judgement: Awareness of environment, Fall prevention  Functional Mobility Training:  Bed Mobility:     Supine to Sit: Minimum assistance; Additional time;Assist x1     Scooting: Supervision        Transfers:  Sit to Stand: Maximum assistance; Additional time;Assist x1(A for forward wt shift- LOB posteriorly and poor strength LE)  Stand to Sit: Maximum assistance; Additional time;Assist x1(for forward wt shift to control sit)        Bed to Chair: Maximum assistance; Additional time;Assist x1(multiple seated rest breaks required, unable to step)                    Balance:  Sitting: Intact; With support  Standing: Impaired;Pull to stand; With support(B knee buckling, poor strength)  Standing - Static: Poor;Constant support  Standing - Dynamic : Poor;Constant support          Therapeutic Exercises:   Pt performed seated B ankle pumps, LAQ, and marches x10. Educated pt to perform sets of 10 for 2-3x/day.  Also educated on quad sets and glute sets    Activity Tolerance:   Fair and requires rest breaks  Please refer to the flowsheet for vital signs taken during this treatment. After treatment patient left in no apparent distress:   Sitting in chair and Call bell within reach    COMMUNICATION/COLLABORATION:   The patients plan of care was discussed with: Occupational therapist and Registered nurse.      Gail Lozada PT,DPT   Time Calculation: 15 mins

## 2020-07-02 ENCOUNTER — APPOINTMENT (OUTPATIENT)
Dept: GENERAL RADIOLOGY | Age: 85
DRG: 011 | End: 2020-07-02
Attending: FAMILY MEDICINE
Payer: MEDICARE

## 2020-07-02 LAB
BACTERIA SPEC CULT: NORMAL
BACTERIA SPEC CULT: NORMAL
GLUCOSE BLD STRIP.AUTO-MCNC: 123 MG/DL (ref 65–100)
HCT VFR BLD AUTO: 21.6 % (ref 35–47)
HCT VFR BLD AUTO: 25.6 % (ref 35–47)
HGB BLD-MCNC: 6.2 G/DL (ref 11.5–16)
HGB BLD-MCNC: 7.4 G/DL (ref 11.5–16)
SERVICE CMNT-IMP: ABNORMAL
SERVICE CMNT-IMP: NORMAL
SERVICE CMNT-IMP: NORMAL

## 2020-07-02 PROCEDURE — 74230 X-RAY XM SWLNG FUNCJ C+: CPT

## 2020-07-02 PROCEDURE — 86900 BLOOD TYPING SEROLOGIC ABO: CPT

## 2020-07-02 PROCEDURE — 65270000032 HC RM SEMIPRIVATE

## 2020-07-02 PROCEDURE — 74011250636 HC RX REV CODE- 250/636: Performed by: INTERNAL MEDICINE

## 2020-07-02 PROCEDURE — 87635 SARS-COV-2 COVID-19 AMP PRB: CPT

## 2020-07-02 PROCEDURE — 85018 HEMOGLOBIN: CPT

## 2020-07-02 PROCEDURE — 74011250637 HC RX REV CODE- 250/637: Performed by: INTERNAL MEDICINE

## 2020-07-02 PROCEDURE — 92526 ORAL FUNCTION THERAPY: CPT | Performed by: SPEECH-LANGUAGE PATHOLOGIST

## 2020-07-02 PROCEDURE — 77010033678 HC OXYGEN DAILY

## 2020-07-02 PROCEDURE — 36430 TRANSFUSION BLD/BLD COMPNT: CPT

## 2020-07-02 PROCEDURE — 92611 MOTION FLUOROSCOPY/SWALLOW: CPT | Performed by: SPEECH-LANGUAGE PATHOLOGIST

## 2020-07-02 PROCEDURE — 97530 THERAPEUTIC ACTIVITIES: CPT

## 2020-07-02 PROCEDURE — P9016 RBC LEUKOCYTES REDUCED: HCPCS

## 2020-07-02 PROCEDURE — 36415 COLL VENOUS BLD VENIPUNCTURE: CPT

## 2020-07-02 PROCEDURE — 74011000258 HC RX REV CODE- 258: Performed by: INTERNAL MEDICINE

## 2020-07-02 PROCEDURE — 94760 N-INVAS EAR/PLS OXIMETRY 1: CPT

## 2020-07-02 PROCEDURE — 74011250637 HC RX REV CODE- 250/637: Performed by: OTOLARYNGOLOGY

## 2020-07-02 PROCEDURE — 86920 COMPATIBILITY TEST SPIN: CPT

## 2020-07-02 PROCEDURE — 82962 GLUCOSE BLOOD TEST: CPT

## 2020-07-02 PROCEDURE — 97535 SELF CARE MNGMENT TRAINING: CPT

## 2020-07-02 PROCEDURE — C9113 INJ PANTOPRAZOLE SODIUM, VIA: HCPCS | Performed by: INTERNAL MEDICINE

## 2020-07-02 PROCEDURE — 74011000250 HC RX REV CODE- 250: Performed by: INTERNAL MEDICINE

## 2020-07-02 RX ORDER — SODIUM CHLORIDE 9 MG/ML
250 INJECTION, SOLUTION INTRAVENOUS AS NEEDED
Status: DISCONTINUED | OUTPATIENT
Start: 2020-07-02 | End: 2020-07-07 | Stop reason: HOSPADM

## 2020-07-02 RX ADMIN — PIPERACILLIN AND TAZOBACTAM 3.38 G: 3; .375 INJECTION, POWDER, LYOPHILIZED, FOR SOLUTION INTRAVENOUS at 11:39

## 2020-07-02 RX ADMIN — Medication 10 ML: at 05:13

## 2020-07-02 RX ADMIN — ACETAMINOPHEN 500 MG: 650 SUSPENSION ORAL at 18:05

## 2020-07-02 RX ADMIN — DILTIAZEM HYDROCHLORIDE 45 MG: 30 TABLET, FILM COATED ORAL at 09:11

## 2020-07-02 RX ADMIN — DULOXETINE 30 MG: 30 CAPSULE, DELAYED RELEASE ORAL at 09:11

## 2020-07-02 RX ADMIN — DILTIAZEM HYDROCHLORIDE 45 MG: 30 TABLET, FILM COATED ORAL at 18:06

## 2020-07-02 RX ADMIN — ACETAMINOPHEN 500 MG: 650 SUSPENSION ORAL at 05:13

## 2020-07-02 RX ADMIN — ALPRAZOLAM 0.25 MG: 0.25 TABLET ORAL at 09:11

## 2020-07-02 RX ADMIN — HYDROXYUREA 500 MG: 500 CAPSULE ORAL at 09:53

## 2020-07-02 RX ADMIN — Medication 1 DROP: at 18:05

## 2020-07-02 RX ADMIN — Medication 10 ML: at 22:14

## 2020-07-02 RX ADMIN — DILTIAZEM HYDROCHLORIDE 45 MG: 30 TABLET, FILM COATED ORAL at 12:32

## 2020-07-02 RX ADMIN — Medication 10 ML: at 13:23

## 2020-07-02 RX ADMIN — ACETAMINOPHEN 500 MG: 650 SUSPENSION ORAL at 11:39

## 2020-07-02 RX ADMIN — ACETAMINOPHEN 500 MG: 650 SUSPENSION ORAL at 23:24

## 2020-07-02 RX ADMIN — ALPRAZOLAM 0.25 MG: 0.25 TABLET ORAL at 16:17

## 2020-07-02 RX ADMIN — PIPERACILLIN AND TAZOBACTAM 3.38 G: 3; .375 INJECTION, POWDER, LYOPHILIZED, FOR SOLUTION INTRAVENOUS at 03:43

## 2020-07-02 RX ADMIN — FOLIC ACID 1 MG: 1 TABLET ORAL at 09:11

## 2020-07-02 RX ADMIN — DILTIAZEM HYDROCHLORIDE 45 MG: 30 TABLET, FILM COATED ORAL at 22:08

## 2020-07-02 RX ADMIN — PIPERACILLIN AND TAZOBACTAM 3.38 G: 3; .375 INJECTION, POWDER, LYOPHILIZED, FOR SOLUTION INTRAVENOUS at 22:05

## 2020-07-02 RX ADMIN — SODIUM CHLORIDE 40 MG: 9 INJECTION INTRAMUSCULAR; INTRAVENOUS; SUBCUTANEOUS at 09:11

## 2020-07-02 RX ADMIN — HYDROXYUREA 500 MG: 500 CAPSULE ORAL at 18:11

## 2020-07-02 RX ADMIN — ALPRAZOLAM 0.25 MG: 0.25 TABLET ORAL at 22:09

## 2020-07-02 RX ADMIN — ACETAMINOPHEN 500 MG: 650 SUSPENSION ORAL at 00:19

## 2020-07-02 NOTE — PROGRESS NOTES
Problem: Mobility Impaired (Adult and Pediatric)  Goal: *Acute Goals and Plan of Care (Insert Text)  Description: FUNCTIONAL STATUS PRIOR TO ADMISSION: Patient was modified independent using a single point cane for functional mobility. HOME SUPPORT PRIOR TO ADMISSION: The patient lived with her . Physical Therapy Goals  Initiated 6/30/2020  1. Patient will move from supine to sit and sit to supine, scoot up and down and roll side to side in bed with minimal assistance/contact guard assist within 7 day(s). 2.  Patient will transfer from bed to chair and chair to bed with minimal assistance/contact guard assist using the least restrictive device within 7 day(s). 3.  Patient will perform sit to stand with minimal assistance/contact guard assist within 7 day(s). 4.  Patient will ambulate with minimal assistance/contact guard assist for  feet with the least restrictive device within 7 day(s). 5.  Patient will ascend/descend 4 stairs with bilateral handrail(s) with moderate assistance within 7 day(s). Outcome: Progressing Towards Goal   PHYSICAL THERAPY TREATMENT  Patient: Lionel Mckeon (68 y.o. female)  Date: 7/2/2020  Diagnosis: Mass of epiglottis [J38.7]   <principal problem not specified>  Procedure(s) (LRB):  TRACH REVISION (N/A) 3 Days Post-Op  Precautions:  fall, NPO  Chart, physical therapy assessment, plan of care and goals were reviewed. ASSESSMENT  Patient continues with skilled PT services and is progressing towards goals. Pt presents in bed, decannulated earlier today, dressing intact; tube feed via NGT, FMS in place. Pt alert, eager to participate. Tolerated increased activity this date with stable vital signs on RA. Pt reported mild dizziness with initial transition to EOB, BP stable. Tolerated standing with RW for assist with self care and transfer to chair with RW. Noted small shuffling steps during pivot, min A x 2 for balance and line mgmt.      Pt motivated to participate in rehab process and return home. Will benefit from mobility progression with acute PT followed by IPR at d/c. Current Level of Function Impacting Discharge (mobility/balance): bed mob min A, transfer with RW min A x 2    Other factors to consider for discharge: modified indep baseline LOF, supportive          PLAN :  Patient continues to benefit from skilled intervention to address the above impairments. Continue treatment per established plan of care. to address goals. Recommendation for discharge: (in order for the patient to meet his/her long term goals)  Therapy 3 hours per day 5-7 days per week    This discharge recommendation:  Has not yet been discussed the attending provider and/or case management    IF patient discharges home will need the following DME: to be determined (TBD)       SUBJECTIVE:   Patient stated How am I going to get in the house?     OBJECTIVE DATA SUMMARY:   Critical Behavior:  Neurologic State: Alert  Orientation Level: Oriented X4  Cognition: Appropriate decision making  Safety/Judgement: Awareness of environment  Functional Mobility Training:  Bed Mobility:     Supine to Sit: Minimum assistance;Bed Modified(assist for trunk and lines)     Scooting: Supervision        Transfers:  Sit to Stand: Minimum assistance;Assist x2(cues for hand placement)  Stand to Sit: Minimum assistance;Assist x2        Bed to Chair: Minimum assistance;Assist x2; Additional time; Adaptive equipment(bed to chair stand pivot with RW; small/ shuffling steps)                    Balance:  Sitting: Intact  Standing: Impaired; With support(use of RW)  Standing - Static: Fair;Constant support  Standing - Dynamic : Fair;Constant support  Pain Rating:  No c/o pain    Activity Tolerance:   Good  Please refer to the flowsheet for vital signs taken during this treatment.     After treatment patient left in no apparent distress:   Sitting in chair, Heels elevated for pressure relief, and Call bell within reach    COMMUNICATION/COLLABORATION:   The patients plan of care was discussed with: Occupational therapist and Registered nurse.      Jacinto German, PT   Time Calculation: 31 mins

## 2020-07-02 NOTE — PROGRESS NOTES
Problem: Dysphagia (Adult)  Goal: *Acute Goals and Plan of Care (Insert Text)  Description: Speech therapy goals  Initiated 7/2/2020   1. Patient will tolerate full liquid diet without s/s of aspiration within 7 days   2. Patient will tolerate solid trials with SLP without s/s of aspiration to determine safety for diet upgrade within 7 days   Initiated 6/30/2020   1. Patient will participate in re-evaluation of swallow function within 7 days MET 7/2/2020   2. Patient will participate in [address] study within 7 days  MEt 7/2/2020    Outcome: Progressing Towards Goal   SPEECH PATHOLOGY MODIFIED BARIUM SWALLOW STUDY  Patient: Inocencio Clark (14 y.o. female)  Date: 7/2/2020  Primary Diagnosis: Mass of epiglottis [J38.7]  Procedure(s) (LRB):  TRACH REVISION (N/A) 3 Days Post-Op   Precautions: aspiratino       ASSESSMENT :  Based on the objective data described below, the patient presents with mild oropharyngeal dysphagia s/p decannulation. Mildly delayed mastication and posterior propulsion with periods of decreased coordination. Mild lingual residue that cleared with a liquid wash or additional swallow. Swallow initiation was mildly delayed with mildly reduced tongue base retraction, anterior hyoid movement, and laryngeal elevation. Epiglottic inversion was sluggish but complete. There was no penetration or aspiration observed including with successive sips thin liquids. Mild/moderate pharyngeal residue. Of note, patient coughing with solids despite no airway compromise. Esophageal retention was noted with solids and suspect this may have been etiology of cough. Alternating solids and liquids cleared esophageal retention. If difficulties are noted upon transition to solids, may benefit from further assessment of possible esophageal dysphagia. Patient will benefit from skilled intervention to address the above impairments.   Patients rehabilitation potential is considered to be Fair     PLAN :  Recommendations and Planned Interventions:  --recommend begin slowly with a full liquid diet for today given complicated medical course and as long as patient does well, will assess for upgrade to solids tomorrow. Recommend alternating bites/sips, strict upright for all intake, straws ok. --based on amount of PO intake, could consider removal of Dobbhoff tube per MD/RD guidance. Would consider reducing tube feeds to stimulate hunger  Frequency/Duration: Patient will be followed by speech-language pathology 3 times a week to address goals. Discharge Recommendations: To Be Determined     SUBJECTIVE:   Patient stated really? That's great! .  Patient very excited over MBS results    OBJECTIVE:     Past Medical History:   Diagnosis Date    Anemia     Arthritis     Breast cancer ([facility] [address]     left    Bunion of right foot 8/20/2015    Chronic obstructive pulmonary disease (HCC)     mild    Chronic pain     back--uses tens unit    Diverticulitis 6/29/2018    Recurrent    Diverticulitis large intestine     Dry eye syndrome 6/29/2018    Fibromyalgia 6/29/2018    GERD (gastroesophageal reflux disease)     History of left breast cancer 2013    lumpectomy radiation    Hypercholesterolemia 6/29/2018    Ill-defined condition     blood transfusion hx    Leukemia (HCC)     MDS (myelodysplastic syndrome) ([facility] [address]     Osteoporosis 6/29/2018    Oxygen dependent     at night    Peripheral neuropathy 6/29/2018    Prediabetes 6/29/2018    PUD (peptic ulcer disease)     Radiation therapy complication 1232    Lt breast-No Chemo    Rosacea 6/29/2018    Trouble in sleeping      Past Surgical History:   Procedure Laterality Date    COLONOSCOPY N/A 2/28/2020    COLONOSCOPY performed by Ramonita Ibrahim MD at [facility]  2/28/2020         HX APPENDECTOMY      HX BREAST LUMPECTOMY  11/21/2013    LEFT BREAST LUMPECTOMY W/LEFT BREAST SENTINEL NODE BIOPSY,AND NEEDLE LOCALIZATION performed by Alysia Klinefelter, MD at \[facility]\"" MAIN OR    HX CATARACT REMOVAL      bilateral    HX HYSTERECTOMY      ovarian cyst    HX MOHS PROCEDURES      right    HX ORTHOPAEDIC      back surgery x2    HX OTHER SURGICAL      colonoscopy    HX TONSILLECTOMY      HX VASCULAR ACCESS  02/2020    echo cath right shoulder    IR INSERT TUNL CVC W PORT OVER 5 YEARS  12/3/2019    TOTAL KNEE ARTHROPLASTY      left    TOTAL KNEE ARTHROPLASTY      right    UPPER GI ENDOSCOPY,BIOPSY  1/24/2020         UPPER GI ENDOSCOPY,BIOPSY  6/23/2020         UPPER GI ENDOSCOPY,DILATN W GUIDE  6/23/2020         US GUIDED CORE BREAST BIOPSY Left 2013    Breast Ca     Prior Level of Function/Home Situation:   Home Situation  Home Environment: Private residence  # Steps to Enter: 3  Rails to Enter: Yes  Hand Rails : Bilateral  Wheelchair Ramp: No  One/Two Story Residence: One story  Living Alone: No  Support Systems: Spouse/Significant Other/Partner  Patient Expects to be Discharged to[de-identified] Private residence  Current DME Used/Available at Home: Cane, straight, Raised toilet seat  Tub or Shower Type: Shower(With seat & grab bars)  Diet prior to admission: regular  Current Diet:  NPO with Vladimir    Radiologist: Dr. Anderson Penhaydeet: Lateral;Fluoro  Patient Position: upright in hausted chair     Trial 1:   Consistency Presented: Thin liquid;Puree; Solid   How Presented: SLP-fed/presented;Self-fed/presented;Cup/sip;Spoon;Straw;Successive swallows   Consistency Amount: (200cc thin Ba, 1 tbsp Ba paste )   Bolus Acceptance: No impairment   Bolus Formation/Control: Impaired: Delayed;Mastication   Propulsion: Delayed (# of seconds); Discoordination(mild )   Oral Residue: Lingual   Initiation of Swallow: Triggered at vallecula   Timing: No impairment;Pooling 1-5 sec(no more than 1-2 second delay )   Penetration: None   Aspiration/Timing: No evidence of aspiration   Pharyngeal Clearance: Vallecular residue;Pyriform residue ; Less than 10%;10-50%(mostly mild, occasional moderate, cleared with extra swallow)   Attempted Modifications: Double swallow; Alternate liquids/solids   Effective Modifications: Alternate liquids/solids; Double swallow   Cues for Modifications: Minimal   Comments: of note, retained contrast in esophagus after purees and solids, improved with liquids    Decreased Tongue Base Retraction?: Yes  Laryngeal Elevation: Inadequate epiglottic inversion(reduced hyoid excursion )  Aspiration/Penetration Score: 1 (No penetration or aspiration-Contrast does not enter the airway)  Pharyngeal Symmetry: Not assessed  Pharyngeal-Esophageal Segment: Suspected esophageal dysphagia  Pharyngeal Dysfunction: Decreased tongue base retraction;Decreased strength;Decreased elevation/closure    Oral Phase Severity: Mild  Pharyngeal Phase Severity: Mild  NOMS:   The NOMS functional outcome measure was used to quantify this patient's level of swallowing impairment. Based on the NOMS, the patient was determined to be at level 4 for swallow function         NOMS Swallowing Levels:  Level 1 (CN): NPO  Level 2 (CM): NPO but takes consistency in therapy  Level 3 (CL): Takes less than 50% of nutrition p.o. and continues with nonoral feedings; and/or safe with mod cues; and/or max diet restriction  Level 4 (CK): Safe swallow but needs mod cues; and/or mod diet restriction; and/or still requires some nonoral feeding/supplements  Level 5 (CJ): Safe swallow with min diet restriction; and/or needs min cues  Level 6 (CI): Independent with p.o.; rare cues; usually self cues; may need to avoid some foods or needs extra time  Level 7 ([address] [place] [address]): Independent for all p.o.  BRADEN. (2003). National Outcomes Measurement System (NOMS): Adult Speech-Language Pathology User's Guide. Dysphagia treatment: education provided to patient in radiology suite including review of images from study. Discussed dysphagia, aspiration risks, diet recommendations and safe swallowing strategies.   Educated on importance of alternating bites/sips or double swallow to clear residue every few bites to clear residue. Patient had excellent understanding and asked appropriate questions and verbalized understanding of all education. COMMUNICATION/EDUCATION:   The patients plan of care including findings from MBS, recommendations, planned interventions, and recommended diet changes were discussed with: Registered nurse. Patient/family have participated as able in goal setting and plan of care. Patient/family agree to work toward stated goals and plan of care. Thank you for this referral.  Paulo Tesfaye M.CD.  CCC-SLP   Time Calculation: 30 mins

## 2020-07-02 NOTE — PROGRESS NOTES
ANDREW  RUR: 28%    Chart reviewed. Patient admitted 6/26/20 from home with  Post emergent tracheostomy for compromised airway. The patient has a past medical hx myelodysplastic syndrome, hx of breast CA, COPD (occasional use of home O2), GERD and esophageal ulcer disease. [facility] medical management. Dobhoff tube placed 6/29/20. Patient lives with spouse. Per IDR 7/1/20, plans are for SN per therapy recommendaations. CM will continue to follow. MBS planned today. Plan to meet with patient/contact spouse for choice. Aron Graves, [facility]   [phone]    12:50p  CM met with patient and spouse Gardenia Francis) at bedside. Discussed therapy recommendations for SNF. Patient /Spouse desire facilities close to their home with good recommendations. Patient asked about [facility]. CM explained to her that [facility] was [facility] and that therapies had not recommended that level of care. CM provided patient/spouse with facilities in [place]/close to [facility]. Preference given for [facility]. Spouse had questions re visitation as he referenced we were in PHASE 3 of re-opening. CM advised that facilities were not allowing visitors. He asked that CM check on this for verification. Referral sent via [email or web address]. CM called Antonyodilia at [facility] [phone]. Facility has female beds. Facility is not accepting visitors at this time. She states possibly, visitation in 2-3 weeks by appt only. CM awaiting response. Aron Graves, [facility]    2:57p  CM received return call from Aubrey at [facility]. She had questions regarding  Trach removal, feedings and patient's chemo po medication. The chemo medication may be a barrier to d/c to SNF. She is checking with her  and DON. CM awaiting return call.    3:52pm CM received return call from facility. Patient approved for admission. Facility requesting COVID test.  Assigned nurse Yudy Berry, RN) informed.

## 2020-07-02 NOTE — PROGRESS NOTES
82 y.o. F presenting with acute supraglottic edema and respiratory distress. CT showed a right deep neck space mass vs phlegmon with associated airway compromise. Currently POD6 tracheostomy and DL/biopsy and POD3 takeback for bleeding from trach. Receiving Zosyn. Trach  Capped overnight w/o event. Reports right neck pain is much better    Visit Vitals  /63 (BP 1 Location: Right arm, BP Patient Position: At rest)   Pulse 62   Temp 97.5 °F (36.4 °C)   Resp 17   Ht 5' 5\" (1.651 m)   Wt 85.7 kg (188 lb 15 oz)   SpO2 95%   BMI 31.44 kg/m²    4 cuffless shiley in place, capped. NO bleeding  Trach removed. Occlusive dressing applied. No LAD but neck mildly tender on right side, improved from yesterday. No fluctuance or crepitus.   dobhoff right naris    Lab Results   Component Value Date/Time    WBC 31.4 (H) 07/01/2020 03:51 AM    HGB (POC) 10.7 (A) 01/07/2019 09:52 AM    HGB 7.4 (L) 07/02/2020 03:48 AM    HCT (POC) 32.0 (A) 01/07/2019 09:52 AM    HCT 25.6 (L) 07/02/2020 03:48 AM    PLATELET 214 12/15/6892 03:51 AM    .6 (H) 07/01/2020 03:51 AM      Current Facility-Administered Medications   Medication Dose Route Frequency    folic acid (FOLVITE) tablet 1 mg  1 mg Oral DAILY    polyvinyl alcohol-povidone (NATURAL TEARS) 0.5-0.6 % ophthalmic solution 1 Drop  1 Drop Both Eyes PRN    dilTIAZem IR (CARDIZEM) tablet 45 mg  45 mg Oral QID    acetaminophen (TYLENOL) solution 500 mg  500 mg Per NG tube Q6H    DULoxetine (CYMBALTA) capsule 30 mg  30 mg Oral DAILY    pantoprazole (PROTONIX) 40 mg in 0.9% sodium chloride 10 mL injection  40 mg IntraVENous DAILY    piperacillin-tazobactam (ZOSYN) 3.375 g in 0.9% sodium chloride (MBP/ADV) 100 mL  3.375 g IntraVENous Q8H    sodium chloride (NS) flush 5-40 mL  5-40 mL IntraVENous Q8H    sodium chloride (NS) flush 5-40 mL  5-40 mL IntraVENous PRN    ondansetron (ZOFRAN) injection 4 mg  4 mg IntraVENous Q4H PRN    [Held by provider] enoxaparin (LOVENOX) injection 40 mg  40 mg SubCUTAneous Q24H    LORazepam (ATIVAN) injection 2 mg  2 mg IntraVENous Q6H PRN    morphine injection 1 mg  1 mg IntraVENous Q1H PRN    ALPRAZolam (XANAX) tablet 0.25 mg  0.25 mg Oral TID    hydroxyurea (HYDREA) 100 mg/mL chemo suspension (compound) 500 mg  500 mg Per NG tube BID      Zosyn day 6    CT neck dated 6/29/2020: I personally reviewed the scan which has the following pertinent findings:  I do not appreciate a fluid collection. Clot just above the trach but edema of the supraglottic structures themselves appears improved. Secretions within the airway. Path: benign         A/P:  POD6 tracheostomy and DL/biopsy. POD 3 takeback for bleeding from trach site. Clinically improved. -Ativan Q6H prn anxiety  -Morphine prn and tylenol scheduled for pain  -hold Lovenox and use SCDs  -Occlusive dressing to trach site  -Continue zosyn while in house. Plan to transition to Augmentin once taking po in anticipation of 2 weeks of antibiotic coverage for what is likely a deep neck space cellulitis. Will need f/u CT after completion of antibiotics as an outpatient.   -Appreciate SLP input. Will defer to SLP regarding removal of dobhoff. May make the most sense to remove the dobhoff, then get MBS instead of doing swallow study with dobhoff in.    -Will continue to follow. If need for assistance over the long holiday, please call my office for on call physician. Otherwise, I would like her to be discharged on Augmentin with follow up in my office one week after discharge. Will need supplies for TID occlusive dressing changes. [facility] Rob[facility], [address] [facility] [address], [facility]  [address] [facility], [address] [facility], [address] [place] [address]   o) [phone]  (t) [phone]

## 2020-07-02 NOTE — PROGRESS NOTES
Problem: Self Care Deficits Care Plan (Adult)  Goal: *Acute Goals and Plan of Care (Insert Text)  Description:   FUNCTIONAL STATUS PRIOR TO ADMISSION: Patient was modified independent using a single point cane for functional mobility. HOME SUPPORT: The patient lived with , driving, no falls. Occupational Therapy Goals  Initiated 6/30/2020  1. Patient will perform standing ADLs at sink with least restrictive DME with supervision/set-up within 7 day(s). 2.  Patient will perform upper body dressing and lower body dressing with supervision/set-up within 7 day(s). 3.  Patient will perform bathing with supervision/set-up within 7 day(s). 4.  Patient will perform toilet transfers using least restrictive DME with supervision/set-up within 7 day(s). 5.  Patient will perform all aspects of toileting with supervision/set-up within 7 day(s). Outcome: Progressing Towards Goal   OCCUPATIONAL THERAPY TREATMENT  Patient: Jimmy Cabrera (28 y.o. 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 ASSESSMENT  Patient continues with skilled OT services and is progressing towards goals. Pt demonstrated improved mobility and ability to help with hygiene when standing. Yesterday, pt was was max assist x 1 with sit to stand using RW. Today, pt was min assist x 2, mostly needing a 2nd person to manage lines and leads. Feel pt would have assisted more with hygiene if undergarment was a pull-up depend. Pt very pleasant and motivated to participate. Will con't to follow and address listed goals.        Current Level of Function Impacting Discharge (ADLs): mod to max assist with self-care    Other factors to consider for discharge: weakness, endurance         PLAN :  Patient continues to benefit from skilled intervention to address the above impairments. Continue treatment per established plan of care. to address goals. Recommend with staff: OOB 3x per day    Recommend next OT session: access bathroom    Recommendation for discharge: (in order for the patient to meet his/her long term goals)  Therapy 3 hours per day 5-7 days per week    This discharge recommendation:  A follow-up discussion with the attending provider and/or case management is planned    IF patient discharges home will need the following DME: TBD       SUBJECTIVE:   Patient stated Maribell Felipe got it out this am (trach).     OBJECTIVE DATA SUMMARY:   Cognitive/Behavioral Status:  Neurologic State: Alert  Orientation Level: Oriented X4  Cognition: Appropriate decision making        Safety/Judgement: Awareness of environment    Functional Mobility and Transfers for ADLs:  Bed Mobility:  Supine to Sit: Minimum assistance;Assist x1    Transfers:  Sit to Stand: Minimum assistance;Assist x2  Functional Transfers  Toilet Transfer : Minimum assistance;Assist x2       Balance:  Sitting: Intact  Standing: Impaired; With support    ADL Intervention:       Grooming  Grooming Assistance: Set-up         Lower Body Bathing  Perineal  : Moderate assistance;Maximum assistance  Position Performed: Standing  Adaptive Equipment: Walker         Lower Body Dressing Assistance  Protective Undergarmet: Maximum assistance  Position Performed: Standing    Toileting  Toileting Assistance: Maximum assistance  Bladder Hygiene: Moderate assistance  Bowel Hygiene: Maximum assistance  Clothing Management: Maximum assistance    Cognitive Retraining  Safety/Judgement: Awareness of environment      Pain:  Mild headache    Activity Tolerance:   Good  Please refer to the flowsheet for vital signs taken during this treatment.     After treatment patient left in no apparent distress:   Sitting in chair and Call bell within reach    COMMUNICATION/COLLABORATION:   The patients plan of care was discussed with: Physical therapist and Registered nurse.      Kaya Laguna, OTR/L  Time Calculation: 31 mins

## 2020-07-02 NOTE — PROGRESS NOTES
NUTRITION    Interventions/Plan/Recommendations:   1. Adjust to nocturnal TF of Osmolite 1.5 @ 50 mL/hr x 12 hours with 100 mL H2O q 4 hours  2. Add ONS Ensure High Protein and Magic Cup with meals  3. If pt does well with full liquids tonight and transition to solids in AM (>/=75% of meals, or >/=50% of meals + supplements), then consider pulling DHT         Call received from RN re: GEOVANNA results. Pt decannulated this AM.  MBS completed this afternoon and advanced to full liquids tonight with plans to transition to solids tomorrow. SLP recommending transition to nocturnal TF to stimulate hunger. RD in agreement. Currently, continuous TF order for Osmolite 1.5 @ 45 mL/hr with 2 packets Prosource daily + 140 mL H2O q 4 hours. Recommend adjusting to Osmolite 1.5 @ 50 mL/hr x 12 hours and keeping TF off until after dinner tonight. If pt does well with dinner meal and transitions to solids well, then recommend removing DHT. Will continue to follow along. Recent Labs     07/01/20  0351 06/30/20  0443   * 164*   BUN 27* 22*   CREA 0.58 0.67    141   K 3.8 4.2    108   CO2 28 27   CA 7.9* 8.1*       Estimated Nutrition Needs:   Kcals/day: 1600 Kcals/day(7629-8995 (MSJ x 1.2-1.3)  Protein: 87 g( (1.0-1.2g/kg)  Fluid: (1 ml/kcal)     Based On:  [address]  Weight Used: Actual wt(87 kg)        Quentin Dominique RD  Available via [employer]  Or Pager# [phone]

## 2020-07-02 NOTE — PROGRESS NOTES
Problem: Falls - Risk of  Goal: *Absence of Falls  Description: Document Jroge Fisher Fall Risk and appropriate interventions in the flowsheet. Outcome: Progressing Towards Goal  Note: Fall Risk Interventions:  Mobility Interventions: Communicate number of staff needed for ambulation/transfer, PT Consult for mobility concerns         Medication Interventions: Patient to call before getting OOB, Evaluate medications/consider consulting pharmacy    Elimination Interventions: Call light in reach, Toileting schedule/hourly rounds              Problem: Patient Education: Go to Patient Education Activity  Goal: Patient/Family Education  Outcome: Progressing Towards Goal     Problem: Pressure Injury - Risk of  Goal: *Prevention of pressure injury  Description: Document Nikolas Scale and appropriate interventions in the flowsheet.   Outcome: Progressing Towards Goal  Note: Pressure Injury Interventions:  Sensory Interventions: Assess changes in LOC, Assess need for specialty bed    Moisture Interventions: Apply protective barrier, creams and emollients, Absorbent underpads, Internal/External fecal devices    Activity Interventions: Increase time out of bed    Mobility Interventions: Float heels, HOB 30 degrees or less, Pressure redistribution bed/mattress (bed type)    Nutrition Interventions: Document food/fluid/supplement intake    Friction and Shear Interventions: Apply protective barrier, creams and emollients, Feet elevated on foot rest, HOB 30 degrees or less                Problem: Patient Education: Go to Patient Education Activity  Goal: Patient/Family Education  Outcome: Progressing Towards Goal     Problem: Pain  Goal: *Control of Pain  Outcome: Progressing Towards Goal  Goal: *PALLIATIVE CARE:  Alleviation of Pain  Outcome: Progressing Towards Goal     Problem: Patient Education: Go to Patient Education Activity  Goal: Patient/Family Education  Outcome: Progressing Towards Goal     Problem: Breathing Pattern - Ineffective  Goal: *Absence of hypoxia  Outcome: Progressing Towards Goal  Goal: *Use of effective breathing techniques  Outcome: Progressing Towards Goal  Goal: *PALLIATIVE CARE:  Alleviation of Dyspnea  Outcome: Progressing Towards Goal     Problem: Patient Education: Go to Patient Education Activity  Goal: Patient/Family Education  Outcome: Progressing Towards Goal     Problem: Infection - Risk of, Surgical Site Infection  Goal: *Absence of surgical site infection signs and symptoms  Outcome: Progressing Towards Goal     Problem: Patient Education: Go to Patient Education Activity  Goal: Patient/Family Education  Outcome: Progressing Towards Goal     Problem: Infection - Risk of, Central Venous Catheter-Associated Bloodstream Infection  Goal: *Absence of infection signs and symptoms  Outcome: Progressing Towards Goal     Problem: Patient Education: Go to Patient Education Activity  Goal: Patient/Family Education  Outcome: Progressing Towards Goal     Problem: Infection - Risk of, Urinary Catheter-Associated Urinary Tract Infection  Goal: *Absence of infection signs and symptoms  Outcome: Progressing Towards Goal     Problem: Patient Education: Go to Patient Education Activity  Goal: Patient/Family Education  Outcome: Progressing Towards Goal     Problem: Chronic Obstructive Pulmonary Disease (COPD)  Goal: *Oxygen saturation during activity within specified parameters  Outcome: Progressing Towards Goal  Goal: *Able to remain out of bed as prescribed  Outcome: Progressing Towards Goal  Goal: *Absence of hypoxia  Outcome: Progressing Towards Goal  Goal: *Optimize nutritional status  Outcome: Progressing Towards Goal     Problem: Patient Education: Go to Patient Education Activity  Goal: Patient/Family Education  Outcome: Progressing Towards Goal     Problem: Nutrition Deficit  Goal: *Optimize nutritional status  Outcome: Progressing Towards Goal     Problem: Patient Education: Go to Patient Education Activity  Goal: Patient/Family Education  Outcome: Progressing Towards Goal     Problem: Patient Education: Go to Patient Education Activity  Goal: Patient/Family Education  Outcome: Progressing Towards Goal     Problem: Patient Education: Go to Patient Education Activity  Goal: Patient/Family Education  Outcome: Progressing Towards Goal     Problem: Patient Education: Go to Patient Education Activity  Goal: Patient/Family Education  Outcome: Progressing Towards Goal

## 2020-07-02 NOTE — PROGRESS NOTES
Patient told PT about a spot on the right side of her tongue. I checked her tongue, there is a pea size bump with bruising on the right side, non-painful. Lola Joseph sent a message to Dr. Julia Gray.

## 2020-07-02 NOTE — PROGRESS NOTES
Spoke with [address] from dietician in regards of Speech stating that patient would do well with Full Liquid diet and nocturnal feeds.  [address] will change tube feedings to nocturnal.

## 2020-07-02 NOTE — PROGRESS NOTES
SLP Contact Note    Noted patient decannulated this morning. Therefore, will plan on completing Modified Barium Swallow Study today. Diet recommendations to follow.       Thank you,  RYAN HaqueEd, [id number] [facility]  Speech-Language Pathologist

## 2020-07-03 LAB
ABO + RH BLD: NORMAL
ANION GAP SERPL CALC-SCNC: 6 MMOL/L (ref 5–15)
BLD PROD TYP BPU: NORMAL
BLOOD GROUP ANTIBODIES SERPL: NORMAL
BPU ID: NORMAL
BUN SERPL-MCNC: 20 MG/DL (ref 6–20)
BUN/CREAT SERPL: 36 (ref 12–20)
CALCIUM SERPL-MCNC: 7.8 MG/DL (ref 8.5–10.1)
CHLORIDE SERPL-SCNC: 109 MMOL/L (ref 97–108)
CO2 SERPL-SCNC: 26 MMOL/L (ref 21–32)
CREAT SERPL-MCNC: 0.55 MG/DL (ref 0.55–1.02)
CROSSMATCH RESULT,%XM: NORMAL
ERYTHROCYTE [DISTWIDTH] IN BLOOD BY AUTOMATED COUNT: 20.7 % (ref 11.5–14.5)
GLUCOSE BLD STRIP.AUTO-MCNC: 132 MG/DL (ref 65–100)
GLUCOSE SERPL-MCNC: 105 MG/DL (ref 65–100)
HCT VFR BLD AUTO: 29 % (ref 35–47)
HGB BLD-MCNC: 8.7 G/DL (ref 11.5–16)
MAGNESIUM SERPL-MCNC: 2.1 MG/DL (ref 1.6–2.4)
MCH RBC QN AUTO: 34.4 PG (ref 26–34)
MCHC RBC AUTO-ENTMCNC: 30 G/DL (ref 30–36.5)
MCV RBC AUTO: 114.6 FL (ref 80–99)
NRBC # BLD: 0.12 K/UL (ref 0–0.01)
NRBC BLD-RTO: 0.3 PER 100 WBC
PLATELET # BLD AUTO: 145 K/UL (ref 150–400)
PMV BLD AUTO: 9.9 FL (ref 8.9–12.9)
POTASSIUM SERPL-SCNC: 3.6 MMOL/L (ref 3.5–5.1)
RBC # BLD AUTO: 2.53 M/UL (ref 3.8–5.2)
SARS-COV-2, COV2: NOT DETECTED
SERVICE CMNT-IMP: ABNORMAL
SODIUM SERPL-SCNC: 141 MMOL/L (ref 136–145)
SOURCE, COVRS: NORMAL
SPECIMEN EXP DATE BLD: NORMAL
SPECIMEN SOURCE, FCOV2M: NORMAL
STATUS OF UNIT,%ST: NORMAL
UNIT DIVISION, %UDIV: 0
WBC # BLD AUTO: 42.6 K/UL (ref 3.6–11)

## 2020-07-03 PROCEDURE — 74011250637 HC RX REV CODE- 250/637: Performed by: INTERNAL MEDICINE

## 2020-07-03 PROCEDURE — 80048 BASIC METABOLIC PNL TOTAL CA: CPT

## 2020-07-03 PROCEDURE — 36415 COLL VENOUS BLD VENIPUNCTURE: CPT

## 2020-07-03 PROCEDURE — 92526 ORAL FUNCTION THERAPY: CPT | Performed by: SPEECH-LANGUAGE PATHOLOGIST

## 2020-07-03 PROCEDURE — 83735 ASSAY OF MAGNESIUM: CPT

## 2020-07-03 PROCEDURE — 74011250636 HC RX REV CODE- 250/636: Performed by: INTERNAL MEDICINE

## 2020-07-03 PROCEDURE — 74011250637 HC RX REV CODE- 250/637: Performed by: OTOLARYNGOLOGY

## 2020-07-03 PROCEDURE — 74011000258 HC RX REV CODE- 258: Performed by: INTERNAL MEDICINE

## 2020-07-03 PROCEDURE — 77010033678 HC OXYGEN DAILY

## 2020-07-03 PROCEDURE — 82962 GLUCOSE BLOOD TEST: CPT

## 2020-07-03 PROCEDURE — 85027 COMPLETE CBC AUTOMATED: CPT

## 2020-07-03 PROCEDURE — 65270000032 HC RM SEMIPRIVATE

## 2020-07-03 PROCEDURE — 74011000250 HC RX REV CODE- 250: Performed by: INTERNAL MEDICINE

## 2020-07-03 PROCEDURE — C9113 INJ PANTOPRAZOLE SODIUM, VIA: HCPCS | Performed by: INTERNAL MEDICINE

## 2020-07-03 RX ORDER — DIPHENOXYLATE HYDROCHLORIDE AND ATROPINE SULFATE 2.5; .025 MG/1; MG/1
1 TABLET ORAL 2 TIMES DAILY
Status: DISCONTINUED | OUTPATIENT
Start: 2020-07-04 | End: 2020-07-05

## 2020-07-03 RX ADMIN — PIPERACILLIN AND TAZOBACTAM 3.38 G: 3; .375 INJECTION, POWDER, LYOPHILIZED, FOR SOLUTION INTRAVENOUS at 14:07

## 2020-07-03 RX ADMIN — FOLIC ACID 1 MG: 1 TABLET ORAL at 08:52

## 2020-07-03 RX ADMIN — DILTIAZEM HYDROCHLORIDE 45 MG: 30 TABLET, FILM COATED ORAL at 08:51

## 2020-07-03 RX ADMIN — DILTIAZEM HYDROCHLORIDE 45 MG: 30 TABLET, FILM COATED ORAL at 14:05

## 2020-07-03 RX ADMIN — ALPRAZOLAM 0.25 MG: 0.25 TABLET ORAL at 21:39

## 2020-07-03 RX ADMIN — ACETAMINOPHEN 500 MG: 650 SUSPENSION ORAL at 18:15

## 2020-07-03 RX ADMIN — Medication 10 ML: at 05:04

## 2020-07-03 RX ADMIN — ALPRAZOLAM 0.25 MG: 0.25 TABLET ORAL at 08:51

## 2020-07-03 RX ADMIN — SODIUM CHLORIDE 40 MG: 9 INJECTION INTRAMUSCULAR; INTRAVENOUS; SUBCUTANEOUS at 08:52

## 2020-07-03 RX ADMIN — ACETAMINOPHEN 500 MG: 650 SUSPENSION ORAL at 14:06

## 2020-07-03 RX ADMIN — HYDROXYUREA 500 MG: 500 CAPSULE ORAL at 10:29

## 2020-07-03 RX ADMIN — DULOXETINE 30 MG: 30 CAPSULE, DELAYED RELEASE ORAL at 08:52

## 2020-07-03 RX ADMIN — HYDROXYUREA 500 MG: 500 CAPSULE ORAL at 18:02

## 2020-07-03 RX ADMIN — Medication 10 ML: at 21:39

## 2020-07-03 RX ADMIN — PIPERACILLIN AND TAZOBACTAM 3.38 G: 3; .375 INJECTION, POWDER, LYOPHILIZED, FOR SOLUTION INTRAVENOUS at 21:39

## 2020-07-03 RX ADMIN — Medication 10 ML: at 14:07

## 2020-07-03 RX ADMIN — ACETAMINOPHEN 500 MG: 650 SUSPENSION ORAL at 05:05

## 2020-07-03 RX ADMIN — DILTIAZEM HYDROCHLORIDE 45 MG: 30 TABLET, FILM COATED ORAL at 18:11

## 2020-07-03 RX ADMIN — ALPRAZOLAM 0.25 MG: 0.25 TABLET ORAL at 15:51

## 2020-07-03 RX ADMIN — PIPERACILLIN AND TAZOBACTAM 3.38 G: 3; .375 INJECTION, POWDER, LYOPHILIZED, FOR SOLUTION INTRAVENOUS at 05:04

## 2020-07-03 RX ADMIN — DILTIAZEM HYDROCHLORIDE 45 MG: 30 TABLET, FILM COATED ORAL at 21:39

## 2020-07-03 NOTE — PROGRESS NOTES
Hospitalist Progress Note  Lars Velasco MD  Answering service: [phone] from in house phone      Date of Service:  2020  NAME:  Erica VERGARA:  1935  MRN:  [id number]    Admission Summary:   84F p/w acute resp distress- s/p emergency trach. Interval history / Subjective:   Patient seen and examined at bedside,   Trach removed, keep dobhoff tube for now  Plans for swallow study today. Assessment & Plan:     1. Supraglottic Mass  a. Post Trach and biopsy - pathology benign, hemorrhage only. b. Patient returned to OR  for bleeding control and had cautery done. i. Bleeding is significantly improved  c. Continue perioperative antibiotics per ENT - to be continued for 1-2 weeks per discussion with Dr. Sadaf Graham  d. off steroids  e. Continue ATC and wean oxygen as tolerated  f. Downsize tube, capping trials- aim for removing trach ?. g. PT/OT eval- recommending SNF. 2. COPD  a. Not in exacerbation  b. Duonebs PRN  3. Anemia - Hb low- transfusion ordered  4. HTN/Afib  a. Continue Cardizem - controlled  5. CML  a. Continue Hydroxyurea  6. Mood disorder  a. Continue Xanax and Cymbalta  7. Dysphagia- swallow eval today, cont tube feeds for now  8. Diarrhea  a. Likely secondary to feeds  b. Plan to have speech evaluate for possible oral feeds.    c. Switched feeds, if no improvement will start benefiber      Code status: Full  DVT prophylaxis: SCD  Care Plan discussed with: Patient/Family and Nurse  Disposition: TBD     Hospital Problems  Date Reviewed: 2020          Codes Class Noted POA    PUD (peptic ulcer disease) (Chronic) ICD-10-CM: K27.9  ICD-9-CM: 533.90  2020 Yes        Status post trachelectomy ICD-10-CM: Z90.710  ICD-9-CM: V88.01  2020 Unknown        Myelodysplastic syndrome ([facility] [address] ICD-10-CM: D46.9  ICD-9-CM: 238.75  2020 Unknown        Mass of epiglottis ICD-10-CM: J38.7  ICD-9-CM: 478.79 6/26/2020 Unknown            Review of Systems:   Pertinent items are mentioned in interval history. Vital Signs:    Last 24hrs VS reviewed since prior progress note. Most recent are:  Visit Vitals  /54 (BP 1 Location: Right arm, BP Patient Position: Lying left side; At rest)   Pulse 70   Temp 98.3 °F (36.8 °C)   Resp 16   Ht 5' 5\" (1.651 m)   Wt 85.7 kg (188 lb 15 oz)   SpO2 95%   BMI 31.44 kg/m²         Intake/Output Summary (Last 24 hours) at 7/2/2020 2345  Last data filed at 7/2/2020 2158  Gross per 24 hour   Intake 496.3 ml   Output    Net 496.3 ml        Physical Examination:   General:  Alert, oriented, No acute distress  ENT: can't speak with Trach in situ. No bleeding. Resp:  No accessory muscle use, Good AE, no wheezes, no rhonchi  Abd:  Soft, non-tender, non-distended  Extremities:  No cyanosis or clubbing, no significant edema  Neuro:  Grossly normal, no focal neuro deficits, follows commands     Data Review:    Review and/or order of clinical lab test  Review and/or order of tests in the radiology section of CPT  Review and/or order of tests in the medicine section of CPT  Labs:     Recent Labs     07/02/20  1618 07/02/20  0348  07/01/20  0351 06/30/20  0443   WBC  --   --   --  31.4*  --  26.2*   HGB 6.2* 7.4*   < > 7.5*   < > 7.8*   HCT 21.6* 25.6*   < > 26.2*   < > 27.1*   PLT  --   --   --  151  --  141*    < > = values in this interval not displayed. Recent Labs     07/01/20  0351 06/30/20  0443    141   K 3.8 4.2    108   CO2 28 27   BUN 27* 22*   CREA 0.58 0.67   * 164*   CA 7.9* 8.1*     No results for input(s): ALT, AP, TBIL, TBILI, TP, ALB, GLOB, GGT, AML, LPSE in the last 72 hours. No lab exists for component: SGOT, GPT, AMYP, HLPSE  No results for input(s): INR, PTP, APTT, INREXT, INREXT in the last 72 hours. No results for input(s): FE, TIBC, PSAT, FERR in the last 72 hours.    Lab Results   Component Value Date/Time    Folate 6.4 07/01/2020 03:51 AM No results for input(s): PH, PCO2, PO2 in the last 72 hours. No results for input(s): CPK, CKNDX, TROIQ in the last 72 hours.     No lab exists for component: CPKMB  Lab Results   Component Value Date/Time    Cholesterol, total 136 10/30/2019 12:14 AM    HDL Cholesterol 39 10/30/2019 12:14 AM    LDL, calculated 78.2 10/30/2019 12:14 AM    Triglyceride 94 10/30/2019 12:14 AM    CHOL/HDL Ratio 3.5 10/30/2019 12:14 AM     Lab Results   Component Value Date/Time    Glucose (POC) 123 (H) 07/02/2020 11:25 PM    Glucose (POC) 159 (H) 06/26/2020 09:53 PM    Glucose (POC) 120 (H) 06/26/2020 08:31 PM    Glucose (POC) 127 (H) 11/04/2019 05:15 AM    Glucose (POC) 96 11/16/2016 07:44 AM     Lab Results   Component Value Date/Time    Color YELLOW/STRAW 06/26/2020 02:19 PM    Appearance CLEAR 06/26/2020 02:19 PM    Specific gravity 1.016 06/26/2020 02:19 PM    Specific gravity 1.010 02/11/2020 03:12 AM    pH (UA) 6.0 06/26/2020 02:19 PM    Protein Negative 06/26/2020 02:19 PM    Glucose Negative 06/26/2020 02:19 PM    Ketone Negative 06/26/2020 02:19 PM    Bilirubin Negative 06/26/2020 02:19 PM    Urobilinogen 0.2 06/26/2020 02:19 PM    Nitrites Negative 06/26/2020 02:19 PM    Leukocyte Esterase SMALL (A) 06/26/2020 02:19 PM    Epithelial cells FEW 06/26/2020 02:19 PM    Bacteria Negative 06/26/2020 02:19 PM    WBC 0-4 06/26/2020 02:19 PM    RBC 0-5 06/26/2020 02:19 PM     Medications Reviewed:     Current Facility-Administered Medications   Medication Dose Route Frequency    0.9% sodium chloride infusion 250 mL  250 mL IntraVENous PRN    folic acid (FOLVITE) tablet 1 mg  1 mg Oral DAILY    polyvinyl alcohol-povidone (NATURAL TEARS) 0.5-0.6 % ophthalmic solution 1 Drop  1 Drop Both Eyes PRN    dilTIAZem IR (CARDIZEM) tablet 45 mg  45 mg Oral QID    acetaminophen (TYLENOL) solution 500 mg  500 mg Per NG tube Q6H    DULoxetine (CYMBALTA) capsule 30 mg  30 mg Oral DAILY    pantoprazole (PROTONIX) 40 mg in 0.9% sodium chloride 10 mL injection  40 mg IntraVENous DAILY    piperacillin-tazobactam (ZOSYN) 3.375 g in 0.9% sodium chloride (MBP/ADV) 100 mL  3.375 g IntraVENous Q8H    sodium chloride (NS) flush 5-40 mL  5-40 mL IntraVENous Q8H    sodium chloride (NS) flush 5-40 mL  5-40 mL IntraVENous PRN    ondansetron (ZOFRAN) injection 4 mg  4 mg IntraVENous Q4H PRN    [Held by provider] enoxaparin (LOVENOX) injection 40 mg  40 mg SubCUTAneous Q24H    LORazepam (ATIVAN) injection 2 mg  2 mg IntraVENous Q6H PRN    morphine injection 1 mg  1 mg IntraVENous Q1H PRN    ALPRAZolam (XANAX) tablet 0.25 mg  0.25 mg Oral TID    hydroxyurea (HYDREA) 100 mg/mL chemo suspension (compound) 500 mg  500 mg Per NG tube BID   ______________________________________________________________________  EXPECTED LENGTH OF STAY: 11d 0h  ACTUAL LENGTH OF STAY:          6               Mata Guerra MD

## 2020-07-03 NOTE — PROGRESS NOTES
Problem: Dysphagia (Adult)  Goal: *Acute Goals and Plan of Care (Insert Text)  Description: Speech therapy goals  Initiated 7/2/2020   1. Patient will tolerate full liquid diet without s/s of aspiration within 7 days   2. Patient will tolerate solid trials with SLP without s/s of aspiration to determine safety for diet upgrade within 7 days   Initiated 6/30/2020   1. Patient will participate in re-evaluation of swallow function within 7 days MET 7/2/2020   2. Patient will participate in [address] study within 7 days  MEt 7/2/2020    Outcome: Progressing Towards Goal   SPEECH LANGUAGE PATHOLOGY DYSPHAGIA TREATMENT  Patient: Claudeen Roan (89 y.o. 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 Patient with intermittent coughing in absence of PO. This did not increase with trials of solids, puree, or thin liquid via straw. Patient self-feeding with appropriate pacing, allowing for rest breaks as needed. She is able to provide external pressure to her stoma dressing to aid in volume and reduce air loss with speech. Educated on importance of safe swallow precautions, upright for PO and afterwards, small bites and sips, and rest breaks as needed. She was able to teach back and agree. She appears ready for safe diet advancement and requests soft food. PLAN:  Recommendations and Planned Interventions:  Dental soft, thin liquids  SLP to follow for tolerance  Patient continues to benefit from skilled intervention to address the above impairments. Continue treatment per established plan of care. Discharge Recommendations: To Be Determined     SUBJECTIVE:   Patient stated Am I still going today?  .    OBJECTIVE:   Cognitive and Communication Status:  Neurologic State: Alert  Orientation Level: Oriented X4  Cognition: Follows commands  Perception: Appears intact  Perseveration: No perseveration noted  Safety/Judgement: Awareness of environment  Dysphagia Treatment:  Oral Assessment:  Oral Assessment  Labial: No impairment  Oral Hygiene: moist, clean  Lingual: No impairment  Velum: No impairment  Mandible: No impairment  P.O. Trials:  Patient Position: upright in bed  Vocal quality prior to P.O.: No impairment(patient independently providing external pressure to stoma dressing)  Consistency Presented: Thin liquid; Solid;Puree  How Presented: Self-fed/presented;Straw;Spoon     Bolus Acceptance: No impairment  Bolus Formation/Control: No impairment     Propulsion: No impairment  Oral Residue: None  Initiation of Swallow: No impairment  Laryngeal Elevation: Decreased  Aspiration Signs/Symptoms: (intermittent cough prior to PO which did not increase with PO)                Oral Phase Severity: Mild  Pharyngeal Phase Severity : Mild                                                                                                                                  Pain:  Pain Scale 1: Numeric (0 - 10)  Pain Intensity 1: 0       After treatment:   Patient left in no apparent distress in bed, Call bell within reach, and Nursing notified, Nursing present    COMMUNICATION/EDUCATION:   Patient was educated regarding safe swallow strategies as noted above    The patient's plan of care including recommendations, planned interventions, and recommended diet changes were discussed with: Registered nurse.      Gail Estrada SLP  Time Calculation: 15 mins

## 2020-07-03 NOTE — PROGRESS NOTES
ANDREW  RUR: 28%    10a  CM received call from [address] [facility] at [facility]. Facility had patients testing positive for COVID 19. All admissions halted until further notice. CM informing patient, MD and assigned RN (Karma ). [facility] will be placed on Will Call. Rachel WelchHao, CRM  [phone]    CM sent perfect serve message to Dr. Sandee Rogers. CM spoke with patient and she gives choice for [facility]. She is on wait list for [facility] with no bed availability until mid week. 10:34a CM still awaiting COVID test results. 1:05. CM spoke with assigned nurse Mason Otto, DAINA. She called the lab and was told covid results were negative. 12:40p [facility] declined referral.    CM met with patient and reviewed list with her. Other choices selected were [facility] and [facility].  Referrals sent via [facility].

## 2020-07-04 PROCEDURE — 74011250637 HC RX REV CODE- 250/637: Performed by: OTOLARYNGOLOGY

## 2020-07-04 PROCEDURE — 65270000032 HC RM SEMIPRIVATE

## 2020-07-04 PROCEDURE — 74011250637 HC RX REV CODE- 250/637: Performed by: INTERNAL MEDICINE

## 2020-07-04 PROCEDURE — 74011250636 HC RX REV CODE- 250/636: Performed by: INTERNAL MEDICINE

## 2020-07-04 PROCEDURE — 74011000258 HC RX REV CODE- 258: Performed by: INTERNAL MEDICINE

## 2020-07-04 PROCEDURE — 74011000250 HC RX REV CODE- 250: Performed by: INTERNAL MEDICINE

## 2020-07-04 PROCEDURE — C9113 INJ PANTOPRAZOLE SODIUM, VIA: HCPCS | Performed by: INTERNAL MEDICINE

## 2020-07-04 PROCEDURE — 74011250637 HC RX REV CODE- 250/637: Performed by: FAMILY MEDICINE

## 2020-07-04 RX ADMIN — DILTIAZEM HYDROCHLORIDE 45 MG: 30 TABLET, FILM COATED ORAL at 21:26

## 2020-07-04 RX ADMIN — Medication 10 ML: at 06:36

## 2020-07-04 RX ADMIN — ACETAMINOPHEN 500 MG: 650 SUSPENSION ORAL at 00:14

## 2020-07-04 RX ADMIN — ALPRAZOLAM 0.25 MG: 0.25 TABLET ORAL at 10:07

## 2020-07-04 RX ADMIN — DILTIAZEM HYDROCHLORIDE 45 MG: 30 TABLET, FILM COATED ORAL at 13:58

## 2020-07-04 RX ADMIN — ALPRAZOLAM 0.25 MG: 0.25 TABLET ORAL at 16:43

## 2020-07-04 RX ADMIN — ALPRAZOLAM 0.25 MG: 0.25 TABLET ORAL at 21:26

## 2020-07-04 RX ADMIN — PIPERACILLIN AND TAZOBACTAM 3.38 G: 3; .375 INJECTION, POWDER, LYOPHILIZED, FOR SOLUTION INTRAVENOUS at 21:26

## 2020-07-04 RX ADMIN — ACETAMINOPHEN 500 MG: 650 SUSPENSION ORAL at 23:59

## 2020-07-04 RX ADMIN — DILTIAZEM HYDROCHLORIDE 45 MG: 30 TABLET, FILM COATED ORAL at 17:00

## 2020-07-04 RX ADMIN — SODIUM CHLORIDE 40 MG: 9 INJECTION INTRAMUSCULAR; INTRAVENOUS; SUBCUTANEOUS at 10:10

## 2020-07-04 RX ADMIN — DILTIAZEM HYDROCHLORIDE 45 MG: 30 TABLET, FILM COATED ORAL at 10:05

## 2020-07-04 RX ADMIN — DIPHENOXYLATE HYDROCHLORIDE AND ATROPINE SULFATE 1 TABLET: 2.5; .025 TABLET ORAL at 17:00

## 2020-07-04 RX ADMIN — DIPHENOXYLATE HYDROCHLORIDE AND ATROPINE SULFATE 1 TABLET: 2.5; .025 TABLET ORAL at 10:07

## 2020-07-04 RX ADMIN — PIPERACILLIN AND TAZOBACTAM 3.38 G: 3; .375 INJECTION, POWDER, LYOPHILIZED, FOR SOLUTION INTRAVENOUS at 06:35

## 2020-07-04 RX ADMIN — PIPERACILLIN AND TAZOBACTAM 3.38 G: 3; .375 INJECTION, POWDER, LYOPHILIZED, FOR SOLUTION INTRAVENOUS at 14:01

## 2020-07-04 RX ADMIN — ACETAMINOPHEN 500 MG: 650 SUSPENSION ORAL at 14:00

## 2020-07-04 RX ADMIN — Medication 10 ML: at 14:01

## 2020-07-04 RX ADMIN — FOLIC ACID 1 MG: 1 TABLET ORAL at 10:05

## 2020-07-04 RX ADMIN — HYDROXYUREA 500 MG: 500 CAPSULE ORAL at 09:32

## 2020-07-04 RX ADMIN — Medication 10 ML: at 21:26

## 2020-07-04 RX ADMIN — ACETAMINOPHEN 500 MG: 650 SUSPENSION ORAL at 06:35

## 2020-07-04 RX ADMIN — DULOXETINE 30 MG: 30 CAPSULE, DELAYED RELEASE ORAL at 10:05

## 2020-07-04 RX ADMIN — ACETAMINOPHEN 500 MG: 650 SUSPENSION ORAL at 17:00

## 2020-07-04 RX ADMIN — HYDROXYUREA 500 MG: 500 CAPSULE ORAL at 18:43

## 2020-07-04 NOTE — PROGRESS NOTES
Problem: Falls - Risk of  Goal: *Absence of Falls  Description: Document [place] Fall Risk and appropriate interventions in the flowsheet. Outcome: Progressing Towards Goal  Note: Fall Risk Interventions:  Mobility Interventions: Communicate number of staff needed for ambulation/transfer         Medication Interventions: Patient to call before getting OOB, Evaluate medications/consider consulting pharmacy    Elimination Interventions: Call light in reach, Toileting schedule/hourly rounds              Problem: Patient Education: Go to Patient Education Activity  Goal: Patient/Family Education  Outcome: Progressing Towards Goal     Problem: Pressure Injury - Risk of  Goal: *Prevention of pressure injury  Description: Document Nikolas Scale and appropriate interventions in the flowsheet.   Outcome: Progressing Towards Goal  Note: Pressure Injury Interventions:  Sensory Interventions: Assess changes in LOC    Moisture Interventions: Absorbent underpads, Apply protective barrier, creams and emollients    Activity Interventions: Increase time out of bed, Pressure redistribution bed/mattress(bed type)    Mobility Interventions: Float heels, HOB 30 degrees or less, Pressure redistribution bed/mattress (bed type)    Nutrition Interventions: Document food/fluid/supplement intake    Friction and Shear Interventions: Apply protective barrier, creams and emollients                Problem: Patient Education: Go to Patient Education Activity  Goal: Patient/Family Education  Outcome: Progressing Towards Goal     Problem: Pain  Goal: *Control of Pain  Outcome: Progressing Towards Goal     Problem: Infection - Risk of, Surgical Site Infection  Goal: *Absence of surgical site infection signs and symptoms  Outcome: Progressing Towards Goal     Problem: Patient Education: Go to Patient Education Activity  Goal: Patient/Family Education  Outcome: Progressing Towards Goal     Problem: Infection - Risk of, Central Venous Catheter-Associated Bloodstream Infection  Goal: *Absence of infection signs and symptoms  Outcome: Progressing Towards Goal     Problem: Patient Education: Go to Patient Education Activity  Goal: Patient/Family Education  Outcome: Progressing Towards Goal     Problem: Patient Education: Go to Patient Education Activity  Goal: Patient/Family Education  Outcome: Progressing Towards Goal

## 2020-07-04 NOTE — PROGRESS NOTES
The patient's fecal management system had come out at shift change per prior RN. This RN paged NP Fiordaliza Ramires and he agreed to keep it out and dc the order. Patient is resting comfortably in room with call bell in reach.

## 2020-07-04 NOTE — PROGRESS NOTES
Hospitalist Progress Note  Liv Gaxiola MD  Answering service: [phone] from in house phone      Date of Service:  7/3/2020  NAME:  Christine Curry  :  1935  MRN:  [id number]    Admission Summary:   84F p/w acute resp distress- s/p emergency trach. Interval history / Subjective:   Patient seen and examined at bedside,   Trach removed,  dobhoff to be removed today  On dysphagia diet     Assessment & Plan:     1. Supraglottic Mass  a. Post Trach and biopsy - pathology benign, hemorrhage only. b. Patient returned to OR  for bleeding control and had cautery done. i. Bleeding is significantly improved  c. Continue perioperative antibiotics per ENT - to be continued for 1-2 weeks per discussion with Dr. Oumar parker. off steroids  e. Trach removed 20.  f. PT/OT eval- recommending SNF. 2. COPD  a. Not in exacerbation  b. Duonebs PRN  3. Anemia - Hb low- transfusion ordered for Hb 6.2, Hb improved to 8 today  4. HTN/Afib  a. Continue Cardizem - controlled  5. CML  a. Continue Hydroxyurea  6. Mood disorder  a. Continue Xanax and Cymbalta  7. Dysphagia- speech following, on dysphagia diet  8. Diarrhea  a. Likely secondary to feeds  b. Plan to have speech evaluate for possible oral feeds.    c. Switched feeds, if no improvement will start benefiber      Code status: Full  DVT prophylaxis: SCD  Care Plan discussed with: Patient/Family and Nurse  Disposition: TBD     Hospital Problems  Date Reviewed: 2020          Codes Class Noted POA    PUD (peptic ulcer disease) (Chronic) ICD-10-CM: K27.9  ICD-9-CM: 533.90  2020 Yes        Status post trachelectomy ICD-10-CM: Z90.710  ICD-9-CM: V88.01  2020 Unknown        Myelodysplastic syndrome ([facility] [address] ICD-10-CM: D46.9  ICD-9-CM: 238.75  2020 Unknown        Mass of epiglottis ICD-10-CM: J38.7  ICD-9-CM: 478.79  2020 Unknown            Review of Systems:   Pertinent items are mentioned in interval history. Vital Signs:    Last 24hrs VS reviewed since prior progress note. Most recent are:  Visit Vitals  /65   Pulse 71   Temp 98.8 °F (37.1 °C)   Resp 18   Ht 5' 5\" (1.651 m)   Wt 88.5 kg (195 lb)   SpO2 94%   BMI 32.45 kg/m²         Intake/Output Summary (Last 24 hours) at 7/3/2020 2316  Last data filed at 7/3/2020 1007  Gross per 24 hour   Intake    Output 700 ml   Net -700 ml        Physical Examination:   General:  Alert, oriented, No acute distress  ENT: can't speak with Trach in situ. No bleeding. Resp:  No accessory muscle use, Good AE, no wheezes, no rhonchi  Abd:  Soft, non-tender, non-distended  Extremities:  No cyanosis or clubbing, no significant edema  Neuro:  Grossly normal, no focal neuro deficits, follows commands     Data Review:    Review and/or order of clinical lab test  Review and/or order of tests in the radiology section of CPT  Review and/or order of tests in the medicine section of CPT  Labs:     Recent Labs     07/03/20  0502 07/02/20  1618  07/01/20  0351   WBC 42.6*  --   --  31.4*   HGB 8.7* 6.2*   < > 7.5*   HCT 29.0* 21.6*   < > 26.2*   *  --   --  151    < > = values in this interval not displayed. Recent Labs     07/03/20  0502 07/01/20  0351    138   K 3.6 3.8   * 106   CO2 26 28   BUN 20 27*   CREA 0.55 0.58   * 149*   CA 7.8* 7.9*   MG 2.1  --      No results for input(s): ALT, AP, TBIL, TBILI, TP, ALB, GLOB, GGT, AML, LPSE in the last 72 hours. No lab exists for component: SGOT, GPT, AMYP, HLPSE  No results for input(s): INR, PTP, APTT, INREXT, INREXT in the last 72 hours. No results for input(s): FE, TIBC, PSAT, FERR in the last 72 hours. Lab Results   Component Value Date/Time    Folate 6.4 07/01/2020 03:51 AM      No results for input(s): PH, PCO2, PO2 in the last 72 hours. No results for input(s): CPK, CKNDX, TROIQ in the last 72 hours.     No lab exists for component: CPKMB  Lab Results   Component Value Date/Time    Cholesterol, total 136 10/30/2019 12:14 AM    HDL Cholesterol 39 10/30/2019 12:14 AM    LDL, calculated 78.2 10/30/2019 12:14 AM    Triglyceride 94 10/30/2019 12:14 AM    CHOL/HDL Ratio 3.5 10/30/2019 12:14 AM     Lab Results   Component Value Date/Time    Glucose (POC) 132 (H) 07/03/2020 06:36 AM    Glucose (POC) 123 (H) 07/02/2020 11:25 PM    Glucose (POC) 159 (H) 06/26/2020 09:53 PM    Glucose (POC) 120 (H) 06/26/2020 08:31 PM    Glucose (POC) 127 (H) 11/04/2019 05:15 AM     Lab Results   Component Value Date/Time    Color YELLOW/STRAW 06/26/2020 02:19 PM    Appearance CLEAR 06/26/2020 02:19 PM    Specific gravity 1.016 06/26/2020 02:19 PM    Specific gravity 1.010 02/11/2020 03:12 AM    pH (UA) 6.0 06/26/2020 02:19 PM    Protein Negative 06/26/2020 02:19 PM    Glucose Negative 06/26/2020 02:19 PM    Ketone Negative 06/26/2020 02:19 PM    Bilirubin Negative 06/26/2020 02:19 PM    Urobilinogen 0.2 06/26/2020 02:19 PM    Nitrites Negative 06/26/2020 02:19 PM    Leukocyte Esterase SMALL (A) 06/26/2020 02:19 PM    Epithelial cells FEW 06/26/2020 02:19 PM    Bacteria Negative 06/26/2020 02:19 PM    WBC 0-4 06/26/2020 02:19 PM    RBC 0-5 06/26/2020 02:19 PM     Medications Reviewed:     Current Facility-Administered Medications   Medication Dose Route Frequency    0.9% sodium chloride infusion 250 mL  250 mL IntraVENous PRN    folic acid (FOLVITE) tablet 1 mg  1 mg Oral DAILY    polyvinyl alcohol-povidone (NATURAL TEARS) 0.5-0.6 % ophthalmic solution 1 Drop  1 Drop Both Eyes PRN    dilTIAZem IR (CARDIZEM) tablet 45 mg  45 mg Oral QID    acetaminophen (TYLENOL) solution 500 mg  500 mg Per NG tube Q6H    DULoxetine (CYMBALTA) capsule 30 mg  30 mg Oral DAILY    pantoprazole (PROTONIX) 40 mg in 0.9% sodium chloride 10 mL injection  40 mg IntraVENous DAILY    piperacillin-tazobactam (ZOSYN) 3.375 g in 0.9% sodium chloride (MBP/ADV) 100 mL  3.375 g IntraVENous Q8H    sodium chloride (NS) flush 5-40 mL  5-40 mL IntraVENous Q8H    sodium chloride (NS) flush 5-40 mL  5-40 mL IntraVENous PRN    ondansetron (ZOFRAN) injection 4 mg  4 mg IntraVENous Q4H PRN    [Held by provider] enoxaparin (LOVENOX) injection 40 mg  40 mg SubCUTAneous Q24H    LORazepam (ATIVAN) injection 2 mg  2 mg IntraVENous Q6H PRN    morphine injection 1 mg  1 mg IntraVENous Q1H PRN    ALPRAZolam (XANAX) tablet 0.25 mg  0.25 mg Oral TID    hydroxyurea (HYDREA) 100 mg/mL chemo suspension (compound) 500 mg  500 mg Per NG tube BID   ______________________________________________________________________  EXPECTED LENGTH OF STAY: 11d 0h  ACTUAL LENGTH OF STAY:          7               Carson Saucedo MD

## 2020-07-05 LAB
ANION GAP SERPL CALC-SCNC: 6 MMOL/L (ref 5–15)
BASOPHILS # BLD: 0 K/UL (ref 0–0.1)
BASOPHILS NFR BLD: 0 % (ref 0–1)
BUN SERPL-MCNC: 14 MG/DL (ref 6–20)
BUN/CREAT SERPL: 21 (ref 12–20)
CALCIUM SERPL-MCNC: 8.1 MG/DL (ref 8.5–10.1)
CHLORIDE SERPL-SCNC: 109 MMOL/L (ref 97–108)
CO2 SERPL-SCNC: 25 MMOL/L (ref 21–32)
CREAT SERPL-MCNC: 0.68 MG/DL (ref 0.55–1.02)
DIFFERENTIAL METHOD BLD: ABNORMAL
EOSINOPHIL # BLD: 2.4 K/UL (ref 0–0.4)
EOSINOPHIL NFR BLD: 4 % (ref 0–7)
ERYTHROCYTE [DISTWIDTH] IN BLOOD BY AUTOMATED COUNT: 20.4 % (ref 11.5–14.5)
GLUCOSE SERPL-MCNC: 102 MG/DL (ref 65–100)
HCT VFR BLD AUTO: 28.1 % (ref 35–47)
HGB BLD-MCNC: 8.2 G/DL (ref 11.5–16)
IMM GRANULOCYTES # BLD AUTO: 0 K/UL
IMM GRANULOCYTES NFR BLD AUTO: 0 %
LYMPHOCYTES # BLD: 4.2 K/UL (ref 0.8–3.5)
LYMPHOCYTES NFR BLD: 7 % (ref 12–49)
MCH RBC QN AUTO: 33.5 PG (ref 26–34)
MCHC RBC AUTO-ENTMCNC: 29.2 G/DL (ref 30–36.5)
MCV RBC AUTO: 114.7 FL (ref 80–99)
METAMYELOCYTES NFR BLD MANUAL: 4 %
MONOCYTES # BLD: 13.9 K/UL (ref 0–1)
MONOCYTES NFR BLD: 23 % (ref 5–13)
MYELOCYTES NFR BLD MANUAL: 11 %
NEUTS BAND NFR BLD MANUAL: 11 % (ref 0–6)
NEUTS SEG # BLD: 30.9 K/UL (ref 1.8–8)
NEUTS SEG NFR BLD: 40 % (ref 32–75)
NRBC # BLD: 0.1 K/UL (ref 0–0.01)
NRBC BLD-RTO: 0.2 PER 100 WBC
PATH REV BLD -IMP: ABNORMAL
PLATELET # BLD AUTO: 186 K/UL (ref 150–400)
PMV BLD AUTO: 9.8 FL (ref 8.9–12.9)
POTASSIUM SERPL-SCNC: 3.7 MMOL/L (ref 3.5–5.1)
RBC # BLD AUTO: 2.45 M/UL (ref 3.8–5.2)
RBC MORPH BLD: ABNORMAL
SODIUM SERPL-SCNC: 140 MMOL/L (ref 136–145)
WBC # BLD AUTO: 60.6 K/UL (ref 3.6–11)

## 2020-07-05 PROCEDURE — 74011000258 HC RX REV CODE- 258: Performed by: INTERNAL MEDICINE

## 2020-07-05 PROCEDURE — 74011250637 HC RX REV CODE- 250/637: Performed by: INTERNAL MEDICINE

## 2020-07-05 PROCEDURE — 36591 DRAW BLOOD OFF VENOUS DEVICE: CPT

## 2020-07-05 PROCEDURE — 80048 BASIC METABOLIC PNL TOTAL CA: CPT

## 2020-07-05 PROCEDURE — 74011250637 HC RX REV CODE- 250/637: Performed by: OTOLARYNGOLOGY

## 2020-07-05 PROCEDURE — 65270000032 HC RM SEMIPRIVATE

## 2020-07-05 PROCEDURE — 74011000250 HC RX REV CODE- 250: Performed by: INTERNAL MEDICINE

## 2020-07-05 PROCEDURE — 85025 COMPLETE CBC W/AUTO DIFF WBC: CPT

## 2020-07-05 PROCEDURE — C9113 INJ PANTOPRAZOLE SODIUM, VIA: HCPCS | Performed by: INTERNAL MEDICINE

## 2020-07-05 PROCEDURE — 74011250636 HC RX REV CODE- 250/636: Performed by: INTERNAL MEDICINE

## 2020-07-05 PROCEDURE — 74011250637 HC RX REV CODE- 250/637: Performed by: FAMILY MEDICINE

## 2020-07-05 PROCEDURE — 94760 N-INVAS EAR/PLS OXIMETRY 1: CPT

## 2020-07-05 PROCEDURE — 77010033678 HC OXYGEN DAILY

## 2020-07-05 RX ORDER — DIPHENOXYLATE HYDROCHLORIDE AND ATROPINE SULFATE 2.5; .025 MG/1; MG/1
2 TABLET ORAL 3 TIMES DAILY
Status: DISCONTINUED | OUTPATIENT
Start: 2020-07-05 | End: 2020-07-07 | Stop reason: HOSPADM

## 2020-07-05 RX ADMIN — Medication 10 ML: at 21:40

## 2020-07-05 RX ADMIN — PIPERACILLIN AND TAZOBACTAM 3.38 G: 3; .375 INJECTION, POWDER, LYOPHILIZED, FOR SOLUTION INTRAVENOUS at 13:11

## 2020-07-05 RX ADMIN — DIPHENOXYLATE HYDROCHLORIDE AND ATROPINE SULFATE 2 TABLET: 2.5; .025 TABLET ORAL at 21:40

## 2020-07-05 RX ADMIN — SODIUM CHLORIDE 40 MG: 9 INJECTION INTRAMUSCULAR; INTRAVENOUS; SUBCUTANEOUS at 08:26

## 2020-07-05 RX ADMIN — ALPRAZOLAM 0.25 MG: 0.25 TABLET ORAL at 08:27

## 2020-07-05 RX ADMIN — ACETAMINOPHEN 500 MG: 650 SUSPENSION ORAL at 17:30

## 2020-07-05 RX ADMIN — DILTIAZEM HYDROCHLORIDE 45 MG: 30 TABLET, FILM COATED ORAL at 13:07

## 2020-07-05 RX ADMIN — DILTIAZEM HYDROCHLORIDE 45 MG: 30 TABLET, FILM COATED ORAL at 08:26

## 2020-07-05 RX ADMIN — DILTIAZEM HYDROCHLORIDE 45 MG: 30 TABLET, FILM COATED ORAL at 17:30

## 2020-07-05 RX ADMIN — PIPERACILLIN AND TAZOBACTAM 3.38 G: 3; .375 INJECTION, POWDER, LYOPHILIZED, FOR SOLUTION INTRAVENOUS at 06:23

## 2020-07-05 RX ADMIN — DILTIAZEM HYDROCHLORIDE 45 MG: 30 TABLET, FILM COATED ORAL at 21:40

## 2020-07-05 RX ADMIN — FOLIC ACID 1 MG: 1 TABLET ORAL at 08:27

## 2020-07-05 RX ADMIN — PIPERACILLIN AND TAZOBACTAM 3.38 G: 3; .375 INJECTION, POWDER, LYOPHILIZED, FOR SOLUTION INTRAVENOUS at 21:39

## 2020-07-05 RX ADMIN — DULOXETINE 30 MG: 30 CAPSULE, DELAYED RELEASE ORAL at 08:27

## 2020-07-05 RX ADMIN — ALPRAZOLAM 0.25 MG: 0.25 TABLET ORAL at 21:40

## 2020-07-05 RX ADMIN — Medication 10 ML: at 13:14

## 2020-07-05 RX ADMIN — DIPHENOXYLATE HYDROCHLORIDE AND ATROPINE SULFATE 2 TABLET: 2.5; .025 TABLET ORAL at 17:31

## 2020-07-05 RX ADMIN — Medication 10 ML: at 06:23

## 2020-07-05 RX ADMIN — ACETAMINOPHEN 500 MG: 650 SUSPENSION ORAL at 06:23

## 2020-07-05 RX ADMIN — HYDROXYUREA 1770 MG: 500 CAPSULE ORAL at 17:32

## 2020-07-05 RX ADMIN — DIPHENOXYLATE HYDROCHLORIDE AND ATROPINE SULFATE 1 TABLET: 2.5; .025 TABLET ORAL at 08:27

## 2020-07-05 RX ADMIN — ALPRAZOLAM 0.25 MG: 0.25 TABLET ORAL at 17:30

## 2020-07-05 RX ADMIN — ACETAMINOPHEN 500 MG: 650 SUSPENSION ORAL at 13:07

## 2020-07-05 NOTE — PROGRESS NOTES
Hospitalist Progress Note  Mearl Goodpasture, MD  Answering service: [phone] OR [phone] from in house phone      Date of Service:  2020  NAME:  Randall Mon  :  1935  MRN:  [id number]    Admission Summary: This is a very pleasant 79-year-old lady with more than 60-pack-year smoking history quit 1 year ago and multiple medical problems including myelodysplastic syndrome, history of breast cancer 6 years ago treated with lumpectomy and radiation therapy, COPD with occasional supplemental oxygen use, GERD and esophageal ulcer disease [seems to be healing as indicated and recent endoscopy] and other medical issue who was transferred today from [facility] with 1 day history of progressive sore throat neck pain and purulent taste on swallowing. CAT scan was done at that hospital showed marked edema and thickening of the right aryepiglottic fold extending inferiorly to the level of the glottis with soft tissue infiltration extended into the right paraglottic space. ENT evaluated the patient in the ER and performed flexible nasopharyngoscopy showing \"No mass in NP. Fossae of Rosenmüller are free of tumor.  Bilateral eustachian tube orifices are well defined with no overlying mass. Base of tongue symmetric. Epiglottis is crisp. Large violaceous mass protruding from right hypopharynx/oropharynx extending past midline completely obscuring a view of any supraglottic or glottic structures. \". Patient was taken immediately to the OR where a cuffed Shiley size 6 tracheostomy was placed without complication. Operative finding were \" diffuse edema more on the right supraglottis than left.  Diffuse bruising as well.  No purulence or mucosal lesions.  Biopsy taken of right AE fold. \"  Interval history / Subjective:   Patient seen and examined at bedside,   Trach and  dobhoff since removed  On dysphagia diet- looks and feels better today  WBC significantly elevated this am     Assessment & Plan:     1. Supraglottic Mass  a. Post Trach and biopsy - pathology benign, hemorrhage only. b. Patient returned to OR 6/29 for bleeding control and had cautery done. i. Bleeding is significantly improved  c. Continue perioperative antibiotics per ENT - to be continued for 1-2 weeks per discussion with Dr. Marcelina peters off steroids  e. Trach removed 7/2/20.  f. PT/OT eval- recommending SNF. 2. COPD  a. Not in exacerbation  b. Duonebs PRN  3. Anemia - Hb low- transfusion ordered for Hb 6.2, Hb improved to 8.7 today  4. HTN/Afib  a. 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 Increased Hydroxyurea to treatment dose 20mg/kg BID  6. Mood disorder  a. Continue Xanax and Cymbalta  7. Dysphagia- speech following, on dysphagia diet  8. Diarrhea  a. Likely secondary to tube feeds  b. Lomotil added  c. removed flexiseal       Code status: Full  DVT prophylaxis: SCD  Care Plan discussed with: Patient/Family and Nurse  Disposition: TBD     Hospital Problems  Date Reviewed: 6/29/2020          Codes Class Noted POA    PUD (peptic ulcer disease) (Chronic) ICD-10-CM: K27.9  ICD-9-CM: 533.90  1/30/2020 Yes        Status post trachelectomy ICD-10-CM: Z90.710  ICD-9-CM: V88.01  6/27/2020 Unknown        Myelodysplastic syndrome ([facility] ICD-10-CM: D46.9  ICD-9-CM: 238.75  6/27/2020 Unknown        Mass of epiglottis ICD-10-CM: J38.7  ICD-9-CM: 478.79  6/26/2020 Unknown            Review of Systems:   Pertinent items are mentioned in interval history. Vital Signs:    Last 24hrs VS reviewed since prior progress note.  Most recent are:  Visit Vitals  /63 (BP 1 Location: Left arm)   Pulse 80   Temp 98.2 °F (36.8 °C)   Resp 16   Ht 5' 5\" (1.651 m)   Wt 88.5 kg (195 lb)   SpO2 95%   BMI 32.45 kg/m²         Intake/Output Summary (Last 24 hours) at 7/5/2020 1825  Last data filed at 7/4/2020 2122  Gross per 24 hour   Intake 217.92 ml   Output    Net 217.92 ml Physical Examination:   General:  Alert, oriented, No acute distress  ENT: can't speak with Trach in situ. No bleeding. Resp:  No accessory muscle use, Good AE, no wheezes, no rhonchi  Abd:  Soft, non-tender, non-distended  Extremities:  No cyanosis or clubbing, no significant edema  Neuro:  Grossly normal, no focal neuro deficits, follows commands     Data Review:    Review and/or order of clinical lab test  Review and/or order of tests in the radiology section of CPT  Review and/or order of tests in the medicine section of CPT  Labs:     Recent Labs     07/05/20 0318 07/03/20  0502   WBC 60.6* 42.6*   HGB 8.2* 8.7*   HCT 28.1* 29.0*    145*     Recent Labs     07/05/20 0318 07/03/20  0502    141   K 3.7 3.6   * 109*   CO2 25 26   BUN 14 20   CREA 0.68 0.55   * 105*   CA 8.1* 7.8*   MG  --  2.1     No results for input(s): ALT, AP, TBIL, TBILI, TP, ALB, GLOB, GGT, AML, LPSE in the last 72 hours. No lab exists for component: SGOT, GPT, AMYP, HLPSE  No results for input(s): INR, PTP, APTT, INREXT, INREXT in the last 72 hours. No results for input(s): FE, TIBC, PSAT, FERR in the last 72 hours. Lab Results   Component Value Date/Time    Folate 6.4 07/01/2020 03:51 AM      No results for input(s): PH, PCO2, PO2 in the last 72 hours. No results for input(s): CPK, CKNDX, TROIQ in the last 72 hours.     No lab exists for component: CPKMB  Lab Results   Component Value Date/Time    Cholesterol, total 136 10/30/2019 12:14 AM    HDL Cholesterol 39 10/30/2019 12:14 AM    LDL, calculated 78.2 10/30/2019 12:14 AM    Triglyceride 94 10/30/2019 12:14 AM    CHOL/HDL Ratio 3.5 10/30/2019 12:14 AM     Lab Results   Component Value Date/Time    Glucose (POC) 132 (H) 07/03/2020 06:36 AM    Glucose (POC) 123 (H) 07/02/2020 11:25 PM    Glucose (POC) 159 (H) 06/26/2020 09:53 PM    Glucose (POC) 120 (H) 06/26/2020 08:31 PM    Glucose (POC) 127 (H) 11/04/2019 05:15 AM     Lab Results   Component Value Date/Time    Color YELLOW/STRAW 06/26/2020 02:19 PM    Appearance CLEAR 06/26/2020 02:19 PM    Specific gravity 1.016 06/26/2020 02:19 PM    Specific gravity 1.010 02/11/2020 03:12 AM    pH (UA) 6.0 06/26/2020 02:19 PM    Protein Negative 06/26/2020 02:19 PM    Glucose Negative 06/26/2020 02:19 PM    Ketone Negative 06/26/2020 02:19 PM    Bilirubin Negative 06/26/2020 02:19 PM    Urobilinogen 0.2 06/26/2020 02:19 PM    Nitrites Negative 06/26/2020 02:19 PM    Leukocyte Esterase SMALL (A) 06/26/2020 02:19 PM    Epithelial cells FEW 06/26/2020 02:19 PM    Bacteria Negative 06/26/2020 02:19 PM    WBC 0-4 06/26/2020 02:19 PM    RBC 0-5 06/26/2020 02:19 PM     Medications Reviewed:     Current Facility-Administered Medications   Medication Dose Route Frequency    hydroxyurea (HYDREA) 100 mg/mL chemo suspension (compound) 1,770 mg  20 mg/kg Oral BID    diphenoxylate-atropine (LOMOTIL) tablet 2 Tab  2 Tab Oral TID    0.9% sodium chloride infusion 250 mL  250 mL IntraVENous PRN    folic acid (FOLVITE) tablet 1 mg  1 mg Oral DAILY    polyvinyl alcohol-povidone (NATURAL TEARS) 0.5-0.6 % ophthalmic solution 1 Drop  1 Drop Both Eyes PRN    dilTIAZem IR (CARDIZEM) tablet 45 mg  45 mg Oral QID    acetaminophen (TYLENOL) solution 500 mg  500 mg Per NG tube Q6H    DULoxetine (CYMBALTA) capsule 30 mg  30 mg Oral DAILY    pantoprazole (PROTONIX) 40 mg in 0.9% sodium chloride 10 mL injection  40 mg IntraVENous DAILY    piperacillin-tazobactam (ZOSYN) 3.375 g in 0.9% sodium chloride (MBP/ADV) 100 mL  3.375 g IntraVENous Q8H    sodium chloride (NS) flush 5-40 mL  5-40 mL IntraVENous Q8H    sodium chloride (NS) flush 5-40 mL  5-40 mL IntraVENous PRN    ondansetron (ZOFRAN) injection 4 mg  4 mg IntraVENous Q4H PRN    LORazepam (ATIVAN) injection 2 mg  2 mg IntraVENous Q6H PRN    morphine injection 1 mg  1 mg IntraVENous Q1H PRN    ALPRAZolam (XANAX) tablet 0.25 mg  0.25 mg Oral TID ______________________________________________________________________  EXPECTED LENGTH OF STAY: 11d 0h  ACTUAL LENGTH OF STAY:          9               Thuyl Goodpasture, MD

## 2020-07-05 NOTE — PROGRESS NOTES
Hospitalist Progress Note  Zelalem Kramer MD  Answering service: [phone] OR 36 from in house phone      Date of Service:  2020  NAME:  Hammad Buenrostro  :  1935  MRN:  [id number]    Admission Summary:   84F p/w acute resp distress- s/p emergency trach. Interval history / Subjective:   Patient seen and examined at bedside,   Trach and  dobhoff since removed  On dysphagia diet- looks and feels better today     Assessment & Plan:     1. Supraglottic Mass  a. Post Trach and biopsy - pathology benign, hemorrhage only. b. Patient returned to OR  for bleeding control and had cautery done. i. Bleeding is significantly improved  c. Continue perioperative antibiotics per ENT - to be continued for 1-2 weeks per discussion with Dr. Xiang peters off steroids  e. Trach removed 20.  f. PT/OT eval- recommending SNF. 2. COPD  a. Not in exacerbation  b. Duonebs PRN  3. Anemia - Hb low- transfusion ordered for Hb 6.2, Hb improved to 8.7 today  4. HTN/Afib  a. Continue Cardizem - controlled  5. CML  a. Continue Hydroxyurea  6. Mood disorder  a. Continue Xanax and Cymbalta  7. Dysphagia- speech following, on dysphagia diet  8. Diarrhea  a. Likely secondary to feeds  b. Lomotil added  c. Plans to remove flexiseal tomorrow      Code status: Full  DVT prophylaxis: SCD  Care Plan discussed with: Patient/Family and Nurse  Disposition: TBD     Hospital Problems  Date Reviewed: 2020          Codes Class Noted POA    PUD (peptic ulcer disease) (Chronic) ICD-10-CM: K27.9  ICD-9-CM: 533.90  2020 Yes        Status post trachelectomy ICD-10-CM: Z90.710  ICD-9-CM: V88.01  2020 Unknown        Myelodysplastic syndrome ([facility] [address] ICD-10-CM: D46.9  ICD-9-CM: 238.75  2020 Unknown        Mass of epiglottis ICD-10-CM: J38.7  ICD-9-CM: 478.79  2020 Unknown            Review of Systems:   Pertinent items are mentioned in interval history.     Vital Signs:    Last 24hrs VS reviewed since prior progress note. Most recent are:  Visit Vitals  /64 (BP 1 Location: Left arm, BP Patient Position: At rest)   Pulse 68   Temp 98.4 °F (36.9 °C)   Resp 18   Ht 5' 5\" (1.651 m)   Wt 88.5 kg (195 lb)   SpO2 95%   BMI 32.45 kg/m²         Intake/Output Summary (Last 24 hours) at 7/4/2020 2228  Last data filed at 7/4/2020 2129  Gross per 24 hour   Intake 1624.92 ml   Output    Net 1624.92 ml        Physical Examination:   General:  Alert, oriented, No acute distress  ENT: can't speak with Trach in situ. No bleeding. Resp:  No accessory muscle use, Good AE, no wheezes, no rhonchi  Abd:  Soft, non-tender, non-distended  Extremities:  No cyanosis or clubbing, no significant edema  Neuro:  Grossly normal, no focal neuro deficits, follows commands     Data Review:    Review and/or order of clinical lab test  Review and/or order of tests in the radiology section of CPT  Review and/or order of tests in the medicine section of CPT  Labs:     Recent Labs     07/03/20  0502 07/02/20  1618   WBC 42.6*  --    HGB 8.7* 6.2*   HCT 29.0* 21.6*   *  --      Recent Labs     07/03/20  0502      K 3.6   *   CO2 26   BUN 20   CREA 0.55   *   CA 7.8*   MG 2.1     No results for input(s): ALT, AP, TBIL, TBILI, TP, ALB, GLOB, GGT, AML, LPSE in the last 72 hours. No lab exists for component: SGOT, GPT, AMYP, HLPSE  No results for input(s): INR, PTP, APTT, INREXT, INREXT in the last 72 hours. No results for input(s): FE, TIBC, PSAT, FERR in the last 72 hours. Lab Results   Component Value Date/Time    Folate 6.4 07/01/2020 03:51 AM      No results for input(s): PH, PCO2, PO2 in the last 72 hours. No results for input(s): CPK, CKNDX, TROIQ in the last 72 hours.     No lab exists for component: CPKMB  Lab Results   Component Value Date/Time    Cholesterol, total 136 10/30/2019 12:14 AM    HDL Cholesterol 39 10/30/2019 12:14 AM    LDL, calculated 78.2 10/30/2019 12:14 AM    Triglyceride 94 10/30/2019 12:14 AM    CHOL/HDL Ratio 3.5 10/30/2019 12:14 AM     Lab Results   Component Value Date/Time    Glucose (POC) 132 (H) 07/03/2020 06:36 AM    Glucose (POC) 123 (H) 07/02/2020 11:25 PM    Glucose (POC) 159 (H) 06/26/2020 09:53 PM    Glucose (POC) 120 (H) 06/26/2020 08:31 PM    Glucose (POC) 127 (H) 11/04/2019 05:15 AM     Lab Results   Component Value Date/Time    Color YELLOW/STRAW 06/26/2020 02:19 PM    Appearance CLEAR 06/26/2020 02:19 PM    Specific gravity 1.016 06/26/2020 02:19 PM    Specific gravity 1.010 02/11/2020 03:12 AM    pH (UA) 6.0 06/26/2020 02:19 PM    Protein Negative 06/26/2020 02:19 PM    Glucose Negative 06/26/2020 02:19 PM    Ketone Negative 06/26/2020 02:19 PM    Bilirubin Negative 06/26/2020 02:19 PM    Urobilinogen 0.2 06/26/2020 02:19 PM    Nitrites Negative 06/26/2020 02:19 PM    Leukocyte Esterase SMALL (A) 06/26/2020 02:19 PM    Epithelial cells FEW 06/26/2020 02:19 PM    Bacteria Negative 06/26/2020 02:19 PM    WBC 0-4 06/26/2020 02:19 PM    RBC 0-5 06/26/2020 02:19 PM     Medications Reviewed:     Current Facility-Administered Medications   Medication Dose Route Frequency    diphenoxylate-atropine (LOMOTIL) tablet 1 Tab  1 Tab Oral BID    0.9% sodium chloride infusion 250 mL  250 mL IntraVENous PRN    folic acid (FOLVITE) tablet 1 mg  1 mg Oral DAILY    polyvinyl alcohol-povidone (NATURAL TEARS) 0.5-0.6 % ophthalmic solution 1 Drop  1 Drop Both Eyes PRN    dilTIAZem IR (CARDIZEM) tablet 45 mg  45 mg Oral QID    acetaminophen (TYLENOL) solution 500 mg  500 mg Per NG tube Q6H    DULoxetine (CYMBALTA) capsule 30 mg  30 mg Oral DAILY    pantoprazole (PROTONIX) 40 mg in 0.9% sodium chloride 10 mL injection  40 mg IntraVENous DAILY    piperacillin-tazobactam (ZOSYN) 3.375 g in 0.9% sodium chloride (MBP/ADV) 100 mL  3.375 g IntraVENous Q8H    sodium chloride (NS) flush 5-40 mL  5-40 mL IntraVENous Q8H    sodium chloride (NS) flush 5-40 mL 5-40 mL IntraVENous PRN    ondansetron (ZOFRAN) injection 4 mg  4 mg IntraVENous Q4H PRN    LORazepam (ATIVAN) injection 2 mg  2 mg IntraVENous Q6H PRN    morphine injection 1 mg  1 mg IntraVENous Q1H PRN    ALPRAZolam (XANAX) tablet 0.25 mg  0.25 mg Oral TID    hydroxyurea (HYDREA) 100 mg/mL chemo suspension (compound) 500 mg  500 mg Per NG tube BID   ______________________________________________________________________  EXPECTED LENGTH OF STAY: 11d 0h  ACTUAL LENGTH OF STAY:          8               Johnnie Negrete MD

## 2020-07-05 NOTE — PROGRESS NOTES
Problem: Falls - Risk of  Goal: *Absence of Falls  Description: Document Tomeka Alberto Fall Risk and appropriate interventions in the flowsheet. Outcome: Progressing Towards Goal  Note: Fall Risk Interventions:  Mobility Interventions: Communicate number of staff needed for ambulation/transfer         Medication Interventions: Patient to call before getting OOB    Elimination Interventions: Call light in reach, Toileting schedule/hourly rounds              Problem: Patient Education: Go to Patient Education Activity  Goal: Patient/Family Education  Outcome: Progressing Towards Goal     Problem: Pressure Injury - Risk of  Goal: *Prevention of pressure injury  Description: Document Nikolas Scale and appropriate interventions in the flowsheet.   Outcome: Progressing Towards Goal  Note: Pressure Injury Interventions:  Sensory Interventions: Assess changes in LOC    Moisture Interventions: Absorbent underpads, Apply protective barrier, creams and emollients    Activity Interventions: Increase time out of bed    Mobility Interventions: Float heels, HOB 30 degrees or less, Pressure redistribution bed/mattress (bed type)    Nutrition Interventions: Document food/fluid/supplement intake    Friction and Shear Interventions: Apply protective barrier, creams and emollients                Problem: Patient Education: Go to Patient Education Activity  Goal: Patient/Family Education  Outcome: Progressing Towards Goal     Problem: Pain  Goal: *Control of Pain  Outcome: Progressing Towards Goal     Problem: Infection - Risk of, Surgical Site Infection  Goal: *Absence of surgical site infection signs and symptoms  Outcome: Progressing Towards Goal     Problem: Patient Education: Go to Patient Education Activity  Goal: Patient/Family Education  Outcome: Progressing Towards Goal     Problem: Infection - Risk of, Central Venous Catheter-Associated Bloodstream Infection  Goal: *Absence of infection signs and symptoms  Outcome: Progressing Towards Goal     Problem: Patient Education: Go to Patient Education Activity  Goal: Patient/Family Education  Outcome: Progressing Towards Goal

## 2020-07-05 NOTE — PROGRESS NOTES
Critical WBC count of 60.6 this AM. Paged RENATO May of result and he verbalized to discuss with Dr Hugo Booth in AM.

## 2020-07-06 LAB
ANION GAP SERPL CALC-SCNC: 8 MMOL/L (ref 5–15)
BASOPHILS # BLD: 0 K/UL (ref 0–0.1)
BASOPHILS NFR BLD: 0 % (ref 0–1)
BUN SERPL-MCNC: 14 MG/DL (ref 6–20)
BUN/CREAT SERPL: 22 (ref 12–20)
CALCIUM SERPL-MCNC: 8.2 MG/DL (ref 8.5–10.1)
CHLORIDE SERPL-SCNC: 107 MMOL/L (ref 97–108)
CO2 SERPL-SCNC: 24 MMOL/L (ref 21–32)
CREAT SERPL-MCNC: 0.64 MG/DL (ref 0.55–1.02)
DIFFERENTIAL METHOD BLD: ABNORMAL
EOSINOPHIL # BLD: 3.7 K/UL (ref 0–0.4)
EOSINOPHIL NFR BLD: 6 % (ref 0–7)
ERYTHROCYTE [DISTWIDTH] IN BLOOD BY AUTOMATED COUNT: 19.9 % (ref 11.5–14.5)
GLUCOSE SERPL-MCNC: 103 MG/DL (ref 65–100)
HCT VFR BLD AUTO: 27.3 % (ref 35–47)
HGB BLD-MCNC: 8 G/DL (ref 11.5–16)
IMM GRANULOCYTES # BLD AUTO: 0 K/UL
IMM GRANULOCYTES NFR BLD AUTO: 0 %
LDH SERPL L TO P-CCNC: 445 U/L (ref 81–246)
LYMPHOCYTES # BLD: 4.9 K/UL (ref 0.8–3.5)
LYMPHOCYTES NFR BLD: 8 % (ref 12–49)
MCH RBC QN AUTO: 33.9 PG (ref 26–34)
MCHC RBC AUTO-ENTMCNC: 29.3 G/DL (ref 30–36.5)
MCV RBC AUTO: 115.7 FL (ref 80–99)
METAMYELOCYTES NFR BLD MANUAL: 8 %
MONOCYTES # BLD: 17.7 K/UL (ref 0–1)
MONOCYTES NFR BLD: 29 % (ref 5–13)
MYELOCYTES NFR BLD MANUAL: 5 %
NEUTS BAND NFR BLD MANUAL: 8 % (ref 0–6)
NEUTS SEG # BLD: 26.8 K/UL (ref 1.8–8)
NEUTS SEG NFR BLD: 36 % (ref 32–75)
NRBC # BLD: 0.1 K/UL (ref 0–0.01)
NRBC BLD-RTO: 0.2 PER 100 WBC
PLATELET # BLD AUTO: 181 K/UL (ref 150–400)
PMV BLD AUTO: 10 FL (ref 8.9–12.9)
POTASSIUM SERPL-SCNC: 3.8 MMOL/L (ref 3.5–5.1)
RBC # BLD AUTO: 2.36 M/UL (ref 3.8–5.2)
RBC MORPH BLD: ABNORMAL
RBC MORPH BLD: ABNORMAL
SODIUM SERPL-SCNC: 139 MMOL/L (ref 136–145)
WBC # BLD AUTO: 60.9 K/UL (ref 3.6–11)

## 2020-07-06 PROCEDURE — 83615 LACTATE (LD) (LDH) ENZYME: CPT

## 2020-07-06 PROCEDURE — 85025 COMPLETE CBC W/AUTO DIFF WBC: CPT

## 2020-07-06 PROCEDURE — 74011250636 HC RX REV CODE- 250/636: Performed by: INTERNAL MEDICINE

## 2020-07-06 PROCEDURE — 74011250637 HC RX REV CODE- 250/637: Performed by: INTERNAL MEDICINE

## 2020-07-06 PROCEDURE — 97535 SELF CARE MNGMENT TRAINING: CPT

## 2020-07-06 PROCEDURE — C9113 INJ PANTOPRAZOLE SODIUM, VIA: HCPCS | Performed by: INTERNAL MEDICINE

## 2020-07-06 PROCEDURE — 80048 BASIC METABOLIC PNL TOTAL CA: CPT

## 2020-07-06 PROCEDURE — 97116 GAIT TRAINING THERAPY: CPT

## 2020-07-06 PROCEDURE — 36415 COLL VENOUS BLD VENIPUNCTURE: CPT

## 2020-07-06 PROCEDURE — 65270000032 HC RM SEMIPRIVATE

## 2020-07-06 PROCEDURE — 92526 ORAL FUNCTION THERAPY: CPT | Performed by: SPEECH-LANGUAGE PATHOLOGIST

## 2020-07-06 PROCEDURE — 74011000258 HC RX REV CODE- 258: Performed by: INTERNAL MEDICINE

## 2020-07-06 PROCEDURE — 74011250637 HC RX REV CODE- 250/637: Performed by: FAMILY MEDICINE

## 2020-07-06 PROCEDURE — 74011000250 HC RX REV CODE- 250: Performed by: INTERNAL MEDICINE

## 2020-07-06 PROCEDURE — 74011250637 HC RX REV CODE- 250/637: Performed by: OTOLARYNGOLOGY

## 2020-07-06 RX ADMIN — ALPRAZOLAM 0.25 MG: 0.25 TABLET ORAL at 08:56

## 2020-07-06 RX ADMIN — ACETAMINOPHEN 500 MG: 650 SUSPENSION ORAL at 17:15

## 2020-07-06 RX ADMIN — HYDROXYUREA 1770 MG: 500 CAPSULE ORAL at 09:32

## 2020-07-06 RX ADMIN — PIPERACILLIN AND TAZOBACTAM 3.38 G: 3; .375 INJECTION, POWDER, LYOPHILIZED, FOR SOLUTION INTRAVENOUS at 05:56

## 2020-07-06 RX ADMIN — DILTIAZEM HYDROCHLORIDE 45 MG: 30 TABLET, FILM COATED ORAL at 08:56

## 2020-07-06 RX ADMIN — ACETAMINOPHEN 500 MG: 650 SUSPENSION ORAL at 11:57

## 2020-07-06 RX ADMIN — Medication 10 ML: at 15:05

## 2020-07-06 RX ADMIN — ACETAMINOPHEN 500 MG: 650 SUSPENSION ORAL at 05:56

## 2020-07-06 RX ADMIN — Medication 10 ML: at 05:57

## 2020-07-06 RX ADMIN — Medication 10 ML: at 22:02

## 2020-07-06 RX ADMIN — DIPHENOXYLATE HYDROCHLORIDE AND ATROPINE SULFATE 2 TABLET: 2.5; .025 TABLET ORAL at 22:01

## 2020-07-06 RX ADMIN — SODIUM CHLORIDE 40 MG: 9 INJECTION INTRAMUSCULAR; INTRAVENOUS; SUBCUTANEOUS at 08:56

## 2020-07-06 RX ADMIN — HYDROXYUREA 1250 MG: 500 CAPSULE ORAL at 17:18

## 2020-07-06 RX ADMIN — ALPRAZOLAM 0.25 MG: 0.25 TABLET ORAL at 15:06

## 2020-07-06 RX ADMIN — DULOXETINE 30 MG: 30 CAPSULE, DELAYED RELEASE ORAL at 08:56

## 2020-07-06 RX ADMIN — FOLIC ACID 1 MG: 1 TABLET ORAL at 08:56

## 2020-07-06 RX ADMIN — DILTIAZEM HYDROCHLORIDE 45 MG: 30 TABLET, FILM COATED ORAL at 22:01

## 2020-07-06 RX ADMIN — PIPERACILLIN AND TAZOBACTAM 3.38 G: 3; .375 INJECTION, POWDER, LYOPHILIZED, FOR SOLUTION INTRAVENOUS at 15:05

## 2020-07-06 RX ADMIN — DILTIAZEM HYDROCHLORIDE 45 MG: 30 TABLET, FILM COATED ORAL at 17:15

## 2020-07-06 RX ADMIN — DIPHENOXYLATE HYDROCHLORIDE AND ATROPINE SULFATE 2 TABLET: 2.5; .025 TABLET ORAL at 08:56

## 2020-07-06 RX ADMIN — ALPRAZOLAM 0.25 MG: 0.25 TABLET ORAL at 22:01

## 2020-07-06 RX ADMIN — PIPERACILLIN AND TAZOBACTAM 3.38 G: 3; .375 INJECTION, POWDER, LYOPHILIZED, FOR SOLUTION INTRAVENOUS at 22:02

## 2020-07-06 RX ADMIN — DILTIAZEM HYDROCHLORIDE 45 MG: 30 TABLET, FILM COATED ORAL at 12:00

## 2020-07-06 RX ADMIN — DIPHENOXYLATE HYDROCHLORIDE AND ATROPINE SULFATE 2 TABLET: 2.5; .025 TABLET ORAL at 15:06

## 2020-07-06 NOTE — PROGRESS NOTES
Problem: Self Care Deficits Care Plan (Adult)  Goal: *Acute Goals and Plan of Care (Insert Text)  Description:   FUNCTIONAL STATUS PRIOR TO ADMISSION: Patient was modified independent using a single point cane for functional mobility. HOME SUPPORT: The patient lived with , driving, no falls. Occupational Therapy Goals  Initiated 6/30/2020  1. Patient will perform standing ADLs at sink with least restrictive DME with supervision/set-up within 7 day(s). 2.  Patient will perform upper body dressing and lower body dressing with supervision/set-up within 7 day(s). 3.  Patient will perform bathing with supervision/set-up within 7 day(s). 4.  Patient will perform toilet transfers using least restrictive DME with supervision/set-up within 7 day(s). 5.  Patient will perform all aspects of toileting with supervision/set-up within 7 day(s). Outcome: Progressing Towards Goal   OCCUPATIONAL THERAPY TREATMENT  Patient: Soren Emerson (48 y.o. female)  Date: 7/6/2020  Diagnosis: Mass of epiglottis [J38.7]   <principal problem not specified>  Procedure(s) (LRB):  TRACH REVISION (N/A) 7 Days Post-Op  Precautions:    Chart, occupational therapy assessment, plan of care, and goals were reviewed. ASSESSMENT  Patient continues with skilled OT services and is progressing towards goals. Patient progressing in all areas, desiring to discharge home now with  assist. ADLs limited by standing tolerance, functional reach to lower body due to baseline back pain, and overall endurance. Current Level of Function Impacting Discharge (ADLs): setup upper body ADLs, moderate A overall lower body ADLs, bed mobility supervision IV management, standing tolerance 6 mins total, [facility] use    Other factors to consider for discharge: spouse and son can assist          PLAN :  Patient continues to benefit from skilled intervention to address the above impairments. Continue treatment per established plan of care.   to address goals. Recommend with staff: BSC use, OOB to chair 3x/day, completing ADLs with setup only    Recommend next OT session: bathroom ADLs    Recommendation for discharge: (in order for the patient to meet his/her long term goals)  Occupational therapy at least 2 days/week in the home AND ensure assist and/or supervision for safety with lower body ADLs, standing ADLs, cues for pacing patient and spouse stating they will have ambulance transport into the home (has 2 steps to enter). This discharge recommendation:  Has been made in collaboration with the attending provider and/or case management    IF patient discharges home will need the following DME: AE: long handled bathing and AE: long handled dressing (has all other DME needs already met-RW, w/c, shower chair)       SUBJECTIVE:   Patient stated I would like to go home, how can we make that happen?     OBJECTIVE DATA SUMMARY:   Cognitive/Behavioral Status:  Neurologic State: Alert  Orientation Level: Oriented X4  Cognition: Appropriate decision making; Appropriate safety awareness; Appropriate for age attention/concentration  Perception: Appears intact  Perseveration: No perseveration noted  Safety/Judgement: Awareness of environment;Good awareness of safety precautions    Functional Mobility and Transfers for ADLs:  Bed Mobility:  Supine to Sit: Modified independent  Sit to Supine: Modified independent    Transfers:  Sit to Stand: Supervision(instruction hand placement on bed not RW)          Balance:  Sitting: Intact; Without support  Standing: Impaired; Without support(one hand on supportive surface intermittently)  Standing - Static: Fair  Standing - Dynamic : Fair    ADL Intervention:       Grooming  Grooming Assistance: Set-up(setup on beside tray, standing 4 mins at bed)    CHG wipes: assist back side  Upper Body Bathing  Bathing Assistance: Set-up  Position Performed: Standing(at beside)  Adaptive Equipment: Walker    Lower Body Bathing  Lower Body : Maximum assistance  Position Performed: Standing  Adaptive Equipment: [id number] Gown: Set-up    Lower Body Dressing Assistance  Socks: Moderate assistance     Patient standing 4 mins for above with RW, seated rest break 3 mins and then standing again 2 mins with supervision no RW. Patient instructed and indicated understanding the benefits of maintaining activity tolerance, functional mobility, and independence with self care tasks during acute stay  to ensure safe return home and to baseline. Encouraged patient to increase frequency and duration OOB, be out of bed for all meals, perform daily ADLs (as approved by RN/MD regarding bathing etc), and performing functional mobility to/from bathroom. Patient instructed and indicated understanding energy conservation techniques to increase independence and safety during ADLs with visual handout provided. Instruction on pacing, how to complete safely at home pacing and increasing independence. Spouse present, all three of us collaborated on what safe discharge plan home would look like. Cognitive Retraining  Safety/Judgement: Awareness of environment;Good awareness of safety precautions    Therapeutic Exercises:       Pain:      Activity Tolerance:   requires rest breaks  Please refer to the flowsheet for vital signs taken during this treatment. After treatment patient left in no apparent distress:   Supine in bed, Call bell within reach, Caregiver / family present, and Side rails x 3    COMMUNICATION/COLLABORATION:   The patients plan of care was discussed with: Physical therapist and Registered nurse.      Kwabena Mann  Time Calculation: 33 mins

## 2020-07-06 NOTE — PROGRESS NOTES
Problem: Mobility Impaired (Adult and Pediatric)  Goal: *Acute Goals and Plan of Care (Insert Text)  Description: FUNCTIONAL STATUS PRIOR TO ADMISSION: Patient was modified independent using a single point cane for functional mobility. HOME SUPPORT PRIOR TO ADMISSION: The patient lived with her . Physical Therapy Goals  Initiated 6/30/2020  1. Patient will move from supine to sit and sit to supine, scoot up and down and roll side to side in bed with minimal assistance/contact guard assist within 7 day(s). 2.  Patient will transfer from bed to chair and chair to bed with minimal assistance/contact guard assist using the least restrictive device within 7 day(s). 3.  Patient will perform sit to stand with minimal assistance/contact guard assist within 7 day(s). 4.  Patient will ambulate with minimal assistance/contact guard assist for  feet with the least restrictive device within 7 day(s). 5.  Patient will ascend/descend 4 stairs with bilateral handrail(s) with moderate assistance within 7 day(s). Outcome: Progressing Towards Goal  PHYSICAL THERAPY TREATMENT  Patient: Mak Smith (40 y.o. female)  Date: 7/6/2020  Diagnosis: Mass of epiglottis [J38.7]   <principal problem not specified>  Procedure(s) (LRB):  TRACH REVISION (N/A) 7 Days Post-Op  Precautions:    Chart, physical therapy assessment, plan of care and goals were reviewed. ASSESSMENT  Patient continues with skilled PT services and is progressing towards goals. Functional transfers improved this session, requiring only Supervision- CGA and minimal verbal cues for hand placement. She was able to tolerate short gait just outside room door and then amb to chair near window where she sat up for lunch. Further gait limited by decreased endurance and strength. Encouraged pt to sit up in chair at least 3x/day and to amb to/from bathroom w/RW and NSG assist to increase activity tolerance.  Feel pt would benefit from [address] w/ husbands assist when she returns home. She owns all necessary DME for home use. Will attempt stair training (x2 steps) if appropriate tomorrow. Pt and pt  claims she has 2 steps into den but does not necessarily have to go into that room. Current Level of Function Impacting Discharge (mobility/balance): Supervision- CGA overall for functional mobility and short gait w/ RW. Other factors to consider for discharge: none         PLAN :  Patient continues to benefit from skilled intervention to address the above impairments. Continue treatment per established plan of care. to address goals. Recommendation for discharge: (in order for the patient to meet his/her long term goals)  Physical therapy at least 2 days/week in the home AND ensure assist and/or supervision for safety with mobility     This discharge recommendation:  Has been made in collaboration with the attending provider and/or case management    IF patient discharges home will need the following DME: patient owns DME required for discharge       SUBJECTIVE:   Patient stated Guicho Justice, I'll turn around here.     OBJECTIVE DATA SUMMARY:   Critical Behavior:  Neurologic State: Alert  Orientation Level: Oriented X4  Cognition: Appropriate for age attention/concentration, Appropriate safety awareness  Safety/Judgement: Awareness of environment, Good awareness of safety precautions, Insight into deficits  Functional Mobility Training:  Bed Mobility:     Supine to Sit: Supervision;Bed Modified  Sit to Supine: (remained OOB in chair)           Transfers:  Sit to Stand: Contact guard assistance;Assist x1(pt able to verabilze and demo proper hand placement)  Stand to Sit: Contact guard assistance;Assist x1                             Balance:  Sitting: Intact  Standing: Impaired; Without support  Standing - Static: Constant support;Good(RW)  Standing - Dynamic : Constant support;Good(RW)  Ambulation/Gait Training:  Distance (ft): 40 Feet (ft)  Assistive Device: Gait belt;Walker, rolling  Ambulation - Level of Assistance: Contact guard assistance;Minimal assistance;Assist x1(Min A for verbal cuing w/ backing up to chair)        Gait Abnormalities: Decreased step clearance        Base of Support: Widened     Speed/Emmanuelle: Pace decreased (<100 feet/min); Shuffled  Step Length: Left shortened;Right shortened             Therapeutic Exercises:   Seated: LAQ's, hip flexion (marches), ankle pumps  Pain Rating:  No c/o pain     Activity Tolerance:   Fair and SpO2 stable on RA  Please refer to the flowsheet for vital signs taken during this treatment. After treatment patient left in no apparent distress:   Sitting in chair, Call bell within reach, and Caregiver / family present    COMMUNICATION/COLLABORATION:   The patients plan of care was discussed with: Occupational therapist, Registered nurse, and Case management.      Chantel Russell PTA   Time Calculation: 22 mins

## 2020-07-06 NOTE — PROGRESS NOTES
Problem: Falls - Risk of  Goal: *Absence of Falls  Description: Document Jonathan Zaheer Fall Risk and appropriate interventions in the flowsheet. Outcome: Progressing Towards Goal  Note: Fall Risk Interventions:  Mobility Interventions: Communicate number of staff needed for ambulation/transfer         Medication Interventions: Patient to call before getting OOB    Elimination Interventions: Call light in reach, Toileting schedule/hourly rounds              Problem: Patient Education: Go to Patient Education Activity  Goal: Patient/Family Education  Outcome: Progressing Towards Goal     Problem: Pressure Injury - Risk of  Goal: *Prevention of pressure injury  Description: Document Nikolas Scale and appropriate interventions in the flowsheet.   Outcome: Progressing Towards Goal  Note: Pressure Injury Interventions:  Sensory Interventions: Assess changes in LOC    Moisture Interventions: Absorbent underpads, Apply protective barrier, creams and emollients    Activity Interventions: Increase time out of bed, Pressure redistribution bed/mattress(bed type)    Mobility Interventions: Float heels, HOB 30 degrees or less, Pressure redistribution bed/mattress (bed type)    Nutrition Interventions: Document food/fluid/supplement intake    Friction and Shear Interventions: Apply protective barrier, creams and emollients, Lift sheet                Problem: Patient Education: Go to Patient Education Activity  Goal: Patient/Family Education  Outcome: Progressing Towards Goal     Problem: Pain  Goal: *Control of Pain  Outcome: Progressing Towards Goal     Problem: Breathing Pattern - Ineffective  Goal: *Absence of hypoxia  Outcome: Progressing Towards Goal  Goal: *Use of effective breathing techniques  Outcome: Progressing Towards Goal     Problem: Infection - Risk of, Surgical Site Infection  Goal: *Absence of surgical site infection signs and symptoms  Outcome: Progressing Towards Goal     Problem: Patient Education: Go to Patient Education Activity  Goal: Patient/Family Education  Outcome: Progressing Towards Goal     Problem: Infection - Risk of, Central Venous Catheter-Associated Bloodstream Infection  Goal: *Absence of infection signs and symptoms  Outcome: Progressing Towards Goal     Problem: Patient Education: Go to Patient Education Activity  Goal: Patient/Family Education  Outcome: Progressing Towards Goal

## 2020-07-06 NOTE — PROGRESS NOTES
NUTRITION COMPLETE ASSESSMENT    RECOMMENDATIONS:   Continue current diet     Interventions/Plan:   Food/Nutrient Delivery: regular diet + Ensure High Protein TID with meals; d/c'd Magic Cup; preferences updated    Assessment:   Reason for Assessment: Provider Consult-reassessment    Diet: Regular   Supplements: Ensure High Protein and Magic Cup TID with meals  Nutritionally Significant Medications: [x] Reviewed & Includes: lomotil, folic acid, protonix, zosyn    Subjective:   I like the Ensure High Protein, but not that other one. Prefer chocolate.  lb. I feel a little bloated today. Diarrhea finally gone. Rice feels like it is getting caught in throat. Objective:  Ms Dea Rodriguez is an 82year old female admitted with Mass of epiglottis. PMHx: COPD, myelodysplastic syndrome, Breast CA, COPD, GERD, esophageal ulcer disease. Noted: 6/26 Emergent trach d/t UAO and supraglottic enema; temporary DHT placed for nutrition support. Decannulated on 7/2 and had MBS same day. Diet advanced, did well and DHT pulled over weekend. Today pt reports a good appetite. Tolerating everything but rice. Taking Ensure High Protein TID (480 kcal, 48 gm pro). Diarrhea improved. Likely combination of TF cessation as well as addition of lomotil. BG better now that off steroids. Estimated Nutrition Needs:   Kcals/day: 1600 Kcals/day([id number] (MSJ x 1.2-1.3)  Protein: 87 g( (1.0-1.2g/kg)  Fluid: (1 ml/kcal)  Based On: [facility]  Weight Used: Actual wt(87 kg)    Pt expected to meet estimated nutrient needs:  [x]   Yes     []  No  [] Unable to predict at this time  Nutrition Diagnosis:   1. Inadequate oral intake related to upper airway obstruction as evidenced by NPO s/p emergent trach; enteral nutrition support. - RESOLVED    2.  No nutritional diagnosis at this time    Goals:     continued PO >50% of meals with at least 2 supplements daily over next 5-7 days     Monitoring & Evaluation:    - Enteral/parenteral nutrition intake   - Electrolyte and renal profile, Weight/weight change, GI, CV-pulmonary     Previous Nutrition Goals Met: Yes  Previous Recommendations: Yes    Education & Discharge Needs:   [x] None Identified   [] Identified and addressed    [x] Participated in care plan, discharge planning, and/or interdisciplinary rounds        Cultural, Ep[employer]opalian and ethnic food preferences identified:  None    Skin Integrity: [x]Intact other than incisions  Edema: [x]None []Other  Last BM: 7/5  Food Allergies: [x]None []Other    Anthropometrics:    Weight Loss Metrics 7/6/2020 6/26/2020 6/26/2020 6/26/2020 6/25/2020 6/23/2020 4/28/2020   Today's Wt 195 lb - 186 lb 1.1 oz - 191 lb 192 lb 189 lb   BMI - 32.45 kg/m2 - 30.96 kg/m2 31.78 kg/m2 31.95 kg/m2 31.45 kg/m2      Last 3 Recorded Weights in this Encounter    06/30/20 0400 07/03/20 0725 07/06/20 1510   Weight: 85.7 kg (188 lb 15 oz) 88.5 kg (195 lb) 88.5 kg (195 lb)      Weight Source: Bed  Height: 5' 5\" (165.1 cm),    Body mass index is 32.45 kg/m². IBW : 56.7 kg (125 lb), % IBW (Calculated): 153.26 %  Usual Body Weight: 85.3 kg (188 lb)(pt stated),      Labs:    Recent Labs     07/06/20 0235 07/05/20  0318   * 102*   BUN 14 14   CREA 0.64 0.68    140   K 3.8 3.7    109*   CO2 24 25   CA 8.2* 8.1*       No results for input(s): ALT, AP, TBIL, TBILI, TP, ALB, GLOB, GGT, AML, LPSE in the last 72 hours. No lab exists for component: SGOT, GPT, AMYP, HLPSE    No results for input(s): LAC in the last 72 hours. Recent Labs     07/06/20 0235 07/05/20  0318   WBC 60.9* 60.6*   HGB 8.0* 8.2*   HCT 27.3* 28.1*    186       No results for input(s): PREALB in the last 72 hours. No results for input(s): TRIGL in the last 72 hours. No results for input(s): GLUCPOC in the last 72 hours.     Lab Results   Component Value Date/Time    Hemoglobin A1c 4.6 (L) 07/08/2019 09:21 AM       Roger Cline RD  Available via [employer]sam  Or Pager# [phone]

## 2020-07-06 NOTE — PROGRESS NOTES
Problem: Falls - Risk of  Goal: *Absence of Falls  Description: Document Jonathan Zaheer Fall Risk and appropriate interventions in the flowsheet. Outcome: Progressing Towards Goal  Note: Fall Risk Interventions:  Mobility Interventions: Communicate number of staff needed for ambulation/transfer         Medication Interventions: Patient to call before getting OOB    Elimination Interventions: Call light in reach              Problem: Patient Education: Go to Patient Education Activity  Goal: Patient/Family Education  Outcome: Progressing Towards Goal     Problem: Pressure Injury - Risk of  Goal: *Prevention of pressure injury  Description: Document Nikolas Scale and appropriate interventions in the flowsheet.   Outcome: Progressing Towards Goal  Note: Pressure Injury Interventions:  Sensory Interventions: Assess changes in LOC    Moisture Interventions: Absorbent underpads    Activity Interventions: Increase time out of bed, Pressure redistribution bed/mattress(bed type)    Mobility Interventions: Float heels, HOB 30 degrees or less, Pressure redistribution bed/mattress (bed type)    Nutrition Interventions: Document food/fluid/supplement intake    Friction and Shear Interventions: Apply protective barrier, creams and emollients                Problem: Patient Education: Go to Patient Education Activity  Goal: Patient/Family Education  Outcome: Progressing Towards Goal     Problem: Pain  Goal: *Control of Pain  Outcome: Progressing Towards Goal  Goal: *PALLIATIVE CARE:  Alleviation of Pain  Outcome: Progressing Towards Goal     Problem: Patient Education: Go to Patient Education Activity  Goal: Patient/Family Education  Outcome: Progressing Towards Goal     Problem: Breathing Pattern - Ineffective  Goal: *Absence of hypoxia  Outcome: Progressing Towards Goal  Goal: *Use of effective breathing techniques  Outcome: Progressing Towards Goal  Goal: *PALLIATIVE CARE:  Alleviation of Dyspnea  Outcome: Progressing Towards Goal Problem: Patient Education: Go to Patient Education Activity  Goal: Patient/Family Education  Outcome: Progressing Towards Goal     Problem: Infection - Risk of, Surgical Site Infection  Goal: *Absence of surgical site infection signs and symptoms  Outcome: Progressing Towards Goal     Problem: Patient Education: Go to Patient Education Activity  Goal: Patient/Family Education  Outcome: Progressing Towards Goal     Problem: Infection - Risk of, Central Venous Catheter-Associated Bloodstream Infection  Goal: *Absence of infection signs and symptoms  Outcome: Progressing Towards Goal     Problem: Patient Education: Go to Patient Education Activity  Goal: Patient/Family Education  Outcome: Progressing Towards Goal     Problem: Infection - Risk of, Urinary Catheter-Associated Urinary Tract Infection  Goal: *Absence of infection signs and symptoms  Outcome: Progressing Towards Goal     Problem: Patient Education: Go to Patient Education Activity  Goal: Patient/Family Education  Outcome: Progressing Towards Goal     Problem: Chronic Obstructive Pulmonary Disease (COPD)  Goal: *Oxygen saturation during activity within specified parameters  Outcome: Progressing Towards Goal  Goal: *Able to remain out of bed as prescribed  Outcome: Progressing Towards Goal  Goal: *Absence of hypoxia  Outcome: Progressing Towards Goal  Goal: *Optimize nutritional status  Outcome: Progressing Towards Goal     Problem: Patient Education: Go to Patient Education Activity  Goal: Patient/Family Education  Outcome: Progressing Towards Goal     Problem: Nutrition Deficit  Goal: *Optimize nutritional status  Outcome: Progressing Towards Goal     Problem: Patient Education: Go to Patient Education Activity  Goal: Patient/Family Education  Outcome: Progressing Towards Goal     Problem: Patient Education: Go to Patient Education Activity  Goal: Patient/Family Education  Outcome: Progressing Towards Goal     Problem: Patient Education: Go to Patient Education Activity  Goal: Patient/Family Education  Outcome: Progressing Towards Goal     Problem: Patient Education: Go to Patient Education Activity  Goal: Patient/Family Education  Outcome: Progressing Towards Goal

## 2020-07-06 NOTE — PROGRESS NOTES
ANDREW  RUR: 28%    CM met with patient and spouse today. She desires to have therapy in her home (HHPT/OT). Per IDR her wbc (60.9) up. Plan:  Discharge to home with [facility] PT/OT. Patient and spouse in agreement with plans. Per chart review, patient was known to [facility] in the past.    [facility] transport to be arranged.      Rachel BergerHao, [address] [place] [address]

## 2020-07-06 NOTE — PROGRESS NOTES
Problem: Dysphagia (Adult)  Goal: *Acute Goals and Plan of Care (Insert Text)  Description: Speech therapy goals  Initiated 7/2/2020   1. Patient will tolerate full liquid diet without s/s of aspiration within 7 days MET 7/6/2020   2. Patient will tolerate solid trials with SLP without s/s of aspiration to determine safety for diet upgrade within 7 days MET 7/6/2020   Initiated 6/30/2020   1. Patient will participate in re-evaluation of swallow function within 7 days MET 7/2/2020   2. Patient will participate in [facility] study within 7 days  MEt 7/2/2020     Outcome: Resolved/Met     SPEECH LANGUAGE PATHOLOGY DYSPHAGIA TREATMENT/DISCHARGE  Patient: Fredo Bee (60 y.o. female)  Date: 7/6/2020  Diagnosis: Mass of epiglottis [J38.7]   <principal problem not specified>  Procedure(s) (LRB):  TRACH REVISION (N/A) 7 Days Post-Op  Precautions: fall      ASSESSMENT:  Re-consult received due to patient requesting advanced diet, however, patient was upgraded to dental soft on Friday (only things this restricts are foods such a raw vegetables and very chewy meats). Patient reports she has been independently selecting foods such as jello and pudding and just today feels comfortable with ordering solids. She ordered a cheeseburger for lunch (ok on diet ordered last week) and had just received tray on SLP entry. She tolerated with timely and complete mastication, swallow initiation appearing timely, and functional hyolaryngeal elevation/excursion via palpation. No s/s of aspiration noted and patient has good self awareness of foods she does and does not feel comfortable with. Reports continued preference for slightly softer foods, but harder foods ok as long as she has extra liquids. Suspect this may  be related to observed esophageal retention on MBS study. PLAN:  --Will liberalize to regular diet and allow patient to choose preferred foods.   Suspect she will continue to choose softer foods with extra liquids as she reports improved comfort with this. --no further SLP needs identified at this time as patient is independent with PO intake and compensatory strategies. Would recommend advancing consistencies per her preference/comfort upon discharge home. Patient will be discharged from acute skilled speech therapy at this time. Rationale for discharge:  Goals achieved    Discharge Recommendations:  None     SUBJECTIVE:   Patient stated I've been asking for only pudding and soups until today so I didn't try anything. This will be the test. Referring to the burger on her lunch tray. Despite being cleared for dental soft diet Friday by SLP, she has been requesting only full liquid consistencies for meals and reports being tired of liquids only     OBJECTIVE:   Cognitive and Communication Status:  Neurologic State: Alert  Orientation Level: Oriented X4  Cognition: Appropriate for age attention/concentration, Appropriate safety awareness    Perception: Appears intact    Perseveration: No perseveration noted    Safety/Judgement: Awareness of environment, Good awareness of safety precautions, Insight into deficits  Dysphagia Treatment:  Oral Assessment:  Oral Assessment  Labial: No impairment  Dentition: Natural  Oral Hygiene: moist mucosa   Lingual: No impairment  Mandible: No impairment  P.O. Trials:  Patient Position: upright in chair   Vocal quality prior to P.O.: Other (comment)(occasional air escape heard through stoma but voice strong )  Consistency Presented: Thin liquid; Solid(dental soft lunch tray (cheeseburger))  How Presented: Self-fed/presented;Cup/sip     Bolus Acceptance: No impairment  Bolus Formation/Control: No impairment     Propulsion: No impairment  Oral Residue: None  Initiation of Swallow: No impairment  Laryngeal Elevation: Functional  Aspiration Signs/Symptoms: None  Pharyngeal Phase Characteristics: No impairment, issues, or problems            Oral Phase Severity: No impairment  Pharyngeal Phase Severity : Other (comment)(functional )  Exercises:  Laryngeal Exercises:                                                                                                                                     NOMS:   The NOMS functional outcome measure was used to quantify this patient's level of swallowing impairment. Based on the NOMS, the patient was determined to be at level 7 for swallow function     NOMS Swallowing Levels:  Level 1 (CN): NPO  Level 2 (CM): NPO but takes consistency in therapy  Level 3 (CL): Takes less than 50% of nutrition p.o. and continues with nonoral feedings; and/or safe with mod cues; and/or max diet restriction  Level 4 (CK): Safe swallow but needs mod cues; and/or mod diet restriction; and/or still requires some nonoral feeding/supplements  Level 5 (CJ): Safe swallow with min diet restriction; and/or needs min cues  Level 6 (CI): Independent with p.o.; rare cues; usually self cues; may need to avoid some foods or needs extra time  Level 7 ([address] [place] [address]): Independent for all p.o.  BRADEN. (2003). National Outcomes Measurement System (NOMS): Adult Speech-Language Pathology User's Guide. Pain:  Pain Scale 1: Numeric (0 - 10)  Pain Intensity 1: 0       After treatment:   Patient left in no apparent distress sitting up in chair, Call bell within reach, Nursing notified, and Caregiver / family present    COMMUNICATION/EDUCATION:   The patient's plan of care including recommendations, planned interventions, and recommended diet changes were discussed with: Registered nurse. Manuel Sal M.CD.  CCC-SLP   Time Calculation: 15 mins

## 2020-07-07 ENCOUNTER — HOME HEALTH ADMISSION (OUTPATIENT)
Dept: HOME HEALTH SERVICES | Facility: HOME HEALTH | Age: 85
End: 2020-07-07
Payer: MEDICARE

## 2020-07-07 VITALS
TEMPERATURE: 97.5 F | RESPIRATION RATE: 16 BRPM | HEART RATE: 83 BPM | OXYGEN SATURATION: 96 % | HEIGHT: 65 IN | WEIGHT: 195 LBS | BODY MASS INDEX: 32.49 KG/M2 | DIASTOLIC BLOOD PRESSURE: 68 MMHG | SYSTOLIC BLOOD PRESSURE: 145 MMHG

## 2020-07-07 LAB
ANION GAP SERPL CALC-SCNC: 9 MMOL/L (ref 5–15)
BUN SERPL-MCNC: 13 MG/DL (ref 6–20)
BUN/CREAT SERPL: 21 (ref 12–20)
CALCIUM SERPL-MCNC: 8 MG/DL (ref 8.5–10.1)
CHLORIDE SERPL-SCNC: 108 MMOL/L (ref 97–108)
CO2 SERPL-SCNC: 22 MMOL/L (ref 21–32)
CREAT SERPL-MCNC: 0.62 MG/DL (ref 0.55–1.02)
ERYTHROCYTE [DISTWIDTH] IN BLOOD BY AUTOMATED COUNT: 19.9 % (ref 11.5–14.5)
GLUCOSE SERPL-MCNC: 118 MG/DL (ref 65–100)
HCT VFR BLD AUTO: 28 % (ref 35–47)
HGB BLD-MCNC: 8 G/DL (ref 11.5–16)
MCH RBC QN AUTO: 33.2 PG (ref 26–34)
MCHC RBC AUTO-ENTMCNC: 28.6 G/DL (ref 30–36.5)
MCV RBC AUTO: 116.2 FL (ref 80–99)
NRBC # BLD: 0.07 K/UL (ref 0–0.01)
NRBC BLD-RTO: 0.1 PER 100 WBC
PLATELET # BLD AUTO: 176 K/UL (ref 150–400)
PMV BLD AUTO: 10 FL (ref 8.9–12.9)
POTASSIUM SERPL-SCNC: 3.7 MMOL/L (ref 3.5–5.1)
RBC # BLD AUTO: 2.41 M/UL (ref 3.8–5.2)
SODIUM SERPL-SCNC: 139 MMOL/L (ref 136–145)
WBC # BLD AUTO: 48.6 K/UL (ref 3.6–11)

## 2020-07-07 PROCEDURE — 74011250637 HC RX REV CODE- 250/637: Performed by: FAMILY MEDICINE

## 2020-07-07 PROCEDURE — 85027 COMPLETE CBC AUTOMATED: CPT

## 2020-07-07 PROCEDURE — 80048 BASIC METABOLIC PNL TOTAL CA: CPT

## 2020-07-07 PROCEDURE — 97116 GAIT TRAINING THERAPY: CPT

## 2020-07-07 PROCEDURE — 74011250637 HC RX REV CODE- 250/637: Performed by: INTERNAL MEDICINE

## 2020-07-07 PROCEDURE — 74011250636 HC RX REV CODE- 250/636: Performed by: INTERNAL MEDICINE

## 2020-07-07 PROCEDURE — 36591 DRAW BLOOD OFF VENOUS DEVICE: CPT

## 2020-07-07 PROCEDURE — 74011250637 HC RX REV CODE- 250/637: Performed by: OTOLARYNGOLOGY

## 2020-07-07 PROCEDURE — 74011000258 HC RX REV CODE- 258: Performed by: INTERNAL MEDICINE

## 2020-07-07 RX ORDER — FOLIC ACID 1 MG/1
1 TABLET ORAL DAILY
Qty: 30 TAB | Refills: 2 | Status: SHIPPED | OUTPATIENT
Start: 2020-07-08

## 2020-07-07 RX ORDER — AMOXICILLIN AND CLAVULANATE POTASSIUM 875; 125 MG/1; MG/1
1 TABLET, FILM COATED ORAL 2 TIMES DAILY WITH MEALS
Qty: 12 TAB | Refills: 0 | Status: SHIPPED | OUTPATIENT
Start: 2020-07-07 | End: 2020-07-14 | Stop reason: ALTCHOICE

## 2020-07-07 RX ORDER — HYDROXYUREA 500 MG/1
CAPSULE ORAL
Qty: 75 CAP | Refills: 0 | Status: ON HOLD | OUTPATIENT
Start: 2020-07-07 | End: 2020-07-21

## 2020-07-07 RX ORDER — DILTIAZEM HYDROCHLORIDE 180 MG/1
180 CAPSULE, COATED, EXTENDED RELEASE ORAL DAILY
Status: DISCONTINUED | OUTPATIENT
Start: 2020-07-07 | End: 2020-07-07 | Stop reason: HOSPADM

## 2020-07-07 RX ORDER — PANTOPRAZOLE SODIUM 40 MG/1
40 TABLET, DELAYED RELEASE ORAL
Status: DISCONTINUED | OUTPATIENT
Start: 2020-07-07 | End: 2020-07-07 | Stop reason: HOSPADM

## 2020-07-07 RX ORDER — DIPHENOXYLATE HYDROCHLORIDE AND ATROPINE SULFATE 2.5; .025 MG/1; MG/1
1 TABLET ORAL 3 TIMES DAILY
Qty: 45 TAB | Refills: 0 | Status: SHIPPED | OUTPATIENT
Start: 2020-07-07 | End: 2020-08-31 | Stop reason: ALTCHOICE

## 2020-07-07 RX ORDER — AMOXICILLIN AND CLAVULANATE POTASSIUM 875; 125 MG/1; MG/1
1 TABLET, FILM COATED ORAL 2 TIMES DAILY WITH MEALS
Status: DISCONTINUED | OUTPATIENT
Start: 2020-07-07 | End: 2020-07-07 | Stop reason: HOSPADM

## 2020-07-07 RX ADMIN — HYDROXYUREA 1250 MG: 500 CAPSULE ORAL at 09:27

## 2020-07-07 RX ADMIN — ALPRAZOLAM 0.25 MG: 0.25 TABLET ORAL at 15:54

## 2020-07-07 RX ADMIN — AMOXICILLIN AND CLAVULANATE POTASSIUM 1 TABLET: 875; 125 TABLET, FILM COATED ORAL at 09:29

## 2020-07-07 RX ADMIN — PIPERACILLIN AND TAZOBACTAM 3.38 G: 3; .375 INJECTION, POWDER, LYOPHILIZED, FOR SOLUTION INTRAVENOUS at 05:58

## 2020-07-07 RX ADMIN — ACETAMINOPHEN 500 MG: 650 SUSPENSION ORAL at 13:09

## 2020-07-07 RX ADMIN — ALPRAZOLAM 0.25 MG: 0.25 TABLET ORAL at 09:29

## 2020-07-07 RX ADMIN — Medication 10 ML: at 13:09

## 2020-07-07 RX ADMIN — DIPHENOXYLATE HYDROCHLORIDE AND ATROPINE SULFATE 2 TABLET: 2.5; .025 TABLET ORAL at 09:29

## 2020-07-07 RX ADMIN — ACETAMINOPHEN 500 MG: 650 SUSPENSION ORAL at 17:26

## 2020-07-07 RX ADMIN — AMOXICILLIN AND CLAVULANATE POTASSIUM 1 TABLET: 875; 125 TABLET, FILM COATED ORAL at 16:00

## 2020-07-07 RX ADMIN — PANTOPRAZOLE SODIUM 40 MG: 40 TABLET, DELAYED RELEASE ORAL at 15:54

## 2020-07-07 RX ADMIN — ACETAMINOPHEN 500 MG: 650 SUSPENSION ORAL at 05:57

## 2020-07-07 RX ADMIN — DILTIAZEM HYDROCHLORIDE 180 MG: 180 CAPSULE, COATED, EXTENDED RELEASE ORAL at 09:29

## 2020-07-07 RX ADMIN — FOLIC ACID 1 MG: 1 TABLET ORAL at 09:29

## 2020-07-07 RX ADMIN — Medication 10 ML: at 05:58

## 2020-07-07 RX ADMIN — DIPHENOXYLATE HYDROCHLORIDE AND ATROPINE SULFATE 2 TABLET: 2.5; .025 TABLET ORAL at 15:54

## 2020-07-07 RX ADMIN — PANTOPRAZOLE SODIUM 40 MG: 40 TABLET, DELAYED RELEASE ORAL at 06:51

## 2020-07-07 NOTE — PROGRESS NOTES
Problem: Falls - Risk of  Goal: *Absence of Falls  Description: Document Jessica Ayers Fall Risk and appropriate interventions in the flowsheet. Outcome: Progressing Towards Goal  Note: Fall Risk Interventions:  Mobility Interventions: Communicate number of staff needed for ambulation/transfer         Medication Interventions: Evaluate medications/consider consulting pharmacy    Elimination Interventions: Call light in reach              Problem: Patient Education: Go to Patient Education Activity  Goal: Patient/Family Education  Outcome: Progressing Towards Goal     Problem: Pressure Injury - Risk of  Goal: *Prevention of pressure injury  Description: Document Nikolas Scale and appropriate interventions in the flowsheet.   Outcome: Progressing Towards Goal  Note: Pressure Injury Interventions:  Sensory Interventions: Assess changes in LOC    Moisture Interventions: Absorbent underpads    Activity Interventions: Increase time out of bed, Pressure redistribution bed/mattress(bed type)    Mobility Interventions: Float heels, HOB 30 degrees or less, Pressure redistribution bed/mattress (bed type)    Nutrition Interventions: Document food/fluid/supplement intake    Friction and Shear Interventions: Apply protective barrier, creams and emollients                Problem: Patient Education: Go to Patient Education Activity  Goal: Patient/Family Education  Outcome: Progressing Towards Goal     Problem: Pain  Goal: *Control of Pain  Outcome: Progressing Towards Goal  Goal: *PALLIATIVE CARE:  Alleviation of Pain  Outcome: Progressing Towards Goal     Problem: Patient Education: Go to Patient Education Activity  Goal: Patient/Family Education  Outcome: Progressing Towards Goal     Problem: Breathing Pattern - Ineffective  Goal: *Absence of hypoxia  Outcome: Progressing Towards Goal  Goal: *Use of effective breathing techniques  Outcome: Progressing Towards Goal  Goal: *PALLIATIVE CARE:  Alleviation of Dyspnea  Outcome: Progressing Towards Goal     Problem: Patient Education: Go to Patient Education Activity  Goal: Patient/Family Education  Outcome: Progressing Towards Goal     Problem: Infection - Risk of, Surgical Site Infection  Goal: *Absence of surgical site infection signs and symptoms  Outcome: Progressing Towards Goal     Problem: Patient Education: Go to Patient Education Activity  Goal: Patient/Family Education  Outcome: Progressing Towards Goal     Problem: Infection - Risk of, Central Venous Catheter-Associated Bloodstream Infection  Goal: *Absence of infection signs and symptoms  Outcome: Progressing Towards Goal     Problem: Patient Education: Go to Patient Education Activity  Goal: Patient/Family Education  Outcome: Progressing Towards Goal     Problem: Infection - Risk of, Urinary Catheter-Associated Urinary Tract Infection  Goal: *Absence of infection signs and symptoms  Outcome: Progressing Towards Goal     Problem: Patient Education: Go to Patient Education Activity  Goal: Patient/Family Education  Outcome: Progressing Towards Goal     Problem: Chronic Obstructive Pulmonary Disease (COPD)  Goal: *Oxygen saturation during activity within specified parameters  Outcome: Progressing Towards Goal  Goal: *Able to remain out of bed as prescribed  Outcome: Progressing Towards Goal  Goal: *Absence of hypoxia  Outcome: Progressing Towards Goal  Goal: *Optimize nutritional status  Outcome: Progressing Towards Goal     Problem: Patient Education: Go to Patient Education Activity  Goal: Patient/Family Education  Outcome: Progressing Towards Goal     Problem: Nutrition Deficit  Goal: *Optimize nutritional status  Outcome: Progressing Towards Goal     Problem: Patient Education: Go to Patient Education Activity  Goal: Patient/Family Education  Outcome: Progressing Towards Goal     Problem: Patient Education: Go to Patient Education Activity  Goal: Patient/Family Education  Outcome: Progressing Towards Goal     Problem: Patient Education: Go to Patient Education Activity  Goal: Patient/Family Education  Outcome: Progressing Towards Goal     Problem: Patient Education: Go to Patient Education Activity  Goal: Patient/Family Education  Outcome: Progressing Towards Goal

## 2020-07-07 NOTE — PROGRESS NOTES
Hospital follow-up PCP transitional care appointment has been scheduled with Dr. Tonny Sheridan for Tuesday, 7/14/20 at 4:00 p.m. Pending patient discharge.   Sophia Alan, Care Management Specialist.

## 2020-07-07 NOTE — PROGRESS NOTES
Hospitalist Progress Note  Mimi Roblero MD  Answering service: [phone] OR [phone] from in house phone      NAME:  Cynthia Mills  :  1935  MRN:  [id number]    Admission Summary: This is a very pleasant 28-year-old lady with more than 60-pack-year smoking history quit 1 year ago and multiple medical problems including myelodysplastic syndrome, history of breast cancer 6 years ago treated with lumpectomy and radiation therapy, COPD with occasional supplemental oxygen use, GERD and esophageal ulcer disease [seems to be healing as indicated and recent endoscopy] and other medical issue who was transferred today from [facility] with 1 day history of progressive sore throat neck pain and purulent taste on swallowing. CAT scan was done at that hospital showed marked edema and thickening of the right aryepiglottic fold extending inferiorly to the level of the glottis with soft tissue infiltration extended into the right paraglottic space. ENT evaluated the patient in the ER and performed flexible nasopharyngoscopy showing \"No mass in NP. Fossae of Rosenmüller are free of tumor.  Bilateral eustachian tube orifices are well defined with no overlying mass. Base of tongue symmetric. Epiglottis is crisp. Large violaceous mass protruding from right hypopharynx/oropharynx extending past midline completely obscuring a view of any supraglottic or glottic structures. \". Patient was taken immediately to the OR where a cuffed Shiley size 6 tracheostomy was placed without complication. Operative finding were \" diffuse edema more on the right supraglottis than left.  Diffuse bruising as well.  No purulence or mucosal lesions.  Biopsy taken of right AE fold. \"  Interval history / Subjective:   Patient seen and examined at bedside,   Trach and  dobhoff since removed  On dysphagia diet- looks and feels better today  WBC significantly elevated this am Assessment & Plan:     1. Supraglottic Mass  a. Post Trach and biopsy - pathology benign, hemorrhage only. b. Patient returned to OR 6/29 for bleeding control and had cautery done. i. Bleeding is significantly improved  c. Continue perioperative antibiotics per ENT - to be continued for 1-2 weeks per discussion with Dr. Maxwell peters off steroids  e. Trach removed 7/2/20.  f. PT/OT eval- recommending SNF. 2. COPD  a. Not in exacerbation  b. Duonebs PRN  3. Anemia - Hb low- transfusion ordered for Hb 6.2, Hb improved to 8.7 today  4. HTN/Afib  a. 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 Increased Hydroxyurea to treatment dose 20mg/kg BID  6. Mood disorder  a. Continue Xanax and Cymbalta  7. Dysphagia- speech following, on dysphagia diet  8. Diarrhea  a. Likely secondary to tube feeds  b. Lomotil added  c. removed flexiseal       Code status: Full  DVT prophylaxis: SCD  Care Plan discussed with: Patient/Family and Nurse  Disposition: TBD     Hospital Problems  Date Reviewed: 6/29/2020          Codes Class Noted POA    PUD (peptic ulcer disease) (Chronic) ICD-10-CM: K27.9  ICD-9-CM: 533.90  1/30/2020 Yes        Status post trachelectomy ICD-10-CM: Z90.710  ICD-9-CM: V88.01  6/27/2020 Unknown        [facility] ICD-10-CM: D46.9  ICD-9-CM: 238.75  6/27/2020 Unknown        Mass of epiglottis ICD-10-CM: J38.7  ICD-9-CM: 478.79  6/26/2020 Unknown            Review of Systems:   Pertinent items are mentioned in interval history. Vital Signs:    Last 24hrs VS reviewed since prior progress note.  Most recent are:  Visit Vitals  /62 (BP 1 Location: Right arm, BP Patient Position: At rest)   Pulse 68   Temp 98.1 °F (36.7 °C)   Resp 16   Ht 5' 5\" (1.651 m)   Wt 88.5 kg (195 lb)   SpO2 94%   BMI 32.45 kg/m²         Intake/Output Summary (Last 24 hours) at 7/7/2020 0633  Last data filed at 7/6/2020 0852  Gross per 24 hour   Intake 240 ml   Output    Net 240 ml Physical Examination:   General:  Alert, oriented, No acute distress  ENT: can't speak with Trach in situ. No bleeding. Resp:  No accessory muscle use, Good AE, no wheezes, no rhonchi  Abd:  Soft, non-tender, non-distended  Extremities:  No cyanosis or clubbing, no significant edema  Neuro:  Grossly normal, no focal neuro deficits, follows commands     Data Review:    Review and/or order of clinical lab test  Review and/or order of tests in the radiology section of CPT  Review and/or order of tests in the medicine section of CPT  Labs:     Recent Labs     07/06/20 0235 07/05/20 0318   WBC 60.9* 60.6*   HGB 8.0* 8.2*   HCT 27.3* 28.1*    186     Recent Labs     07/06/20 0235 07/05/20 0318    140   K 3.8 3.7    109*   CO2 24 25   BUN 14 14   CREA 0.64 0.68   * 102*   CA 8.2* 8.1*     No results for input(s): ALT, AP, TBIL, TBILI, TP, ALB, GLOB, GGT, AML, LPSE in the last 72 hours. No lab exists for component: SGOT, GPT, AMYP, HLPSE  No results for input(s): INR, PTP, APTT, INREXT, INREXT in the last 72 hours. No results for input(s): FE, TIBC, PSAT, FERR in the last 72 hours. Lab Results   Component Value Date/Time    Folate 6.4 07/01/2020 03:51 AM      No results for input(s): PH, PCO2, PO2 in the last 72 hours. No results for input(s): CPK, CKNDX, TROIQ in the last 72 hours.     No lab exists for component: CPKMB  Lab Results   Component Value Date/Time    Cholesterol, total 136 10/30/2019 12:14 AM    HDL Cholesterol 39 10/30/2019 12:14 AM    LDL, calculated 78.2 10/30/2019 12:14 AM    Triglyceride 94 10/30/2019 12:14 AM    CHOL/HDL Ratio 3.5 10/30/2019 12:14 AM     Lab Results   Component Value Date/Time    Glucose (POC) 132 (H) 07/03/2020 06:36 AM    Glucose (POC) 123 (H) 07/02/2020 11:25 PM    Glucose (POC) 159 (H) 06/26/2020 09:53 PM    Glucose (POC) 120 (H) 06/26/2020 08:31 PM    Glucose (POC) 127 (H) 11/04/2019 05:15 AM     Lab Results   Component Value Date/Time Color YELLOW/STRAW 06/26/2020 02:19 PM    Appearance CLEAR 06/26/2020 02:19 PM    Specific gravity 1.016 06/26/2020 02:19 PM    Specific gravity 1.010 02/11/2020 03:12 AM    pH (UA) 6.0 06/26/2020 02:19 PM    Protein Negative 06/26/2020 02:19 PM    Glucose Negative 06/26/2020 02:19 PM    Ketone Negative 06/26/2020 02:19 PM    Bilirubin Negative 06/26/2020 02:19 PM    Urobilinogen 0.2 06/26/2020 02:19 PM    Nitrites Negative 06/26/2020 02:19 PM    Leukocyte Esterase SMALL (A) 06/26/2020 02:19 PM    Epithelial cells FEW 06/26/2020 02:19 PM    Bacteria Negative 06/26/2020 02:19 PM    WBC 0-4 06/26/2020 02:19 PM    RBC 0-5 06/26/2020 02:19 PM     Medications Reviewed:     Current Facility-Administered Medications   Medication Dose Route Frequency    hydroxyurea (HYDREA) 100 mg/mL chemo suspension (compound) 1,250 mg  1,250 mg Oral BID    diphenoxylate-atropine (LOMOTIL) tablet 2 Tab  2 Tab Oral TID    0.9% sodium chloride infusion 250 mL  250 mL IntraVENous PRN    folic acid (FOLVITE) tablet 1 mg  1 mg Oral DAILY    polyvinyl alcohol-povidone (NATURAL TEARS) 0.5-0.6 % ophthalmic solution 1 Drop  1 Drop Both Eyes PRN    dilTIAZem IR (CARDIZEM) tablet 45 mg  45 mg Oral QID    acetaminophen (TYLENOL) solution 500 mg  500 mg Per NG tube Q6H    DULoxetine (CYMBALTA) capsule 30 mg  30 mg Oral DAILY    pantoprazole (PROTONIX) 40 mg in 0.9% sodium chloride 10 mL injection  40 mg IntraVENous DAILY    piperacillin-tazobactam (ZOSYN) 3.375 g in 0.9% sodium chloride (MBP/ADV) 100 mL  3.375 g IntraVENous Q8H    sodium chloride (NS) flush 5-40 mL  5-40 mL IntraVENous Q8H    sodium chloride (NS) flush 5-40 mL  5-40 mL IntraVENous PRN    ondansetron (ZOFRAN) injection 4 mg  4 mg IntraVENous Q4H PRN    LORazepam (ATIVAN) injection 2 mg  2 mg IntraVENous Q6H PRN    morphine injection 1 mg  1 mg IntraVENous Q1H PRN    ALPRAZolam (XANAX) tablet 0.25 mg  0.25 mg Oral TID ______________________________________________________________________  EXPECTED LENGTH OF STAY: 11d 0h  ACTUAL LENGTH OF STAY:          Daren Calvin MD

## 2020-07-07 NOTE — DISCHARGE SUMMARY
Discharge Summary       PATIENT ID: Mak Smith  MRN: [id number]   YOB: 1935    DATE OF ADMISSION: 6/26/2020  5:14 PM    DATE OF DISCHARGE: 7/7/20 3pm  PRIMARY CARE PROVIDER: Lalit Mike MD     ATTENDING PHYSICIAN:EMETERIO  DISCHARGING PROVIDER: Carson Saucedo MD    To contact this individual call [phone] and ask the  to page. If unavailable ask to be transferred the Adult Hospitalist Department. CONSULTATIONS: IP CONSULT TO OTOLARYNGOLOGY    PROCEDURES/SURGERIES: Procedure(s):  Nurme 49 COURSE:   This is a very pleasant 72-year-old lady with more than 60-pack-year smoking history quit 1 year ago and multiple medical problems including myelodysplastic syndrome, history of breast cancer 6 years ago treated with lumpectomy and radiation therapy, COPD with occasional supplemental oxygen use, GERD and esophageal ulcer disease [seems to be healing as indicated and recent endoscopy] and other medical issue who was transferred today from [facility] with 1 day history of progressive sore throat neck pain and purulent taste on swallowing.  CAT scan was done at that hospital showed marked edema and thickening of the right aryepiglottic fold extending inferiorly to the level of the glottis with soft tissue infiltration extended into the right paraglottic space.  ENT evaluated the patient in the ER and performed flexible nasopharyngoscopy showing \"No mass in NP. Fossae of Rosenmüller are free of tumor.  Bilateral eustachian tube orifices are well defined with no overlying mass. Base of tongue symmetric. Epiglottis is crisp. Large violaceous mass protruding from right hypopharynx/oropharynx extending past midline completely obscuring a view of any supraglottic or glottic structures. \".  Patient was taken immediately to the OR where a cuffed Shiley size 6 tracheostomy was placed without complication.  Operative finding were \" diffuse edema more on the right supraglottis than left.  Diffuse bruising as well.  No purulence or mucosal lesions.  Biopsy taken of right AE fold. \"    DISCHARGE DIAGNOSES / PLAN:      1. Supraglottic Mass  a. Post Trach and biopsy - pathology benign, hemorrhage only. b. Patient returned to OR 6/29 for bleeding control and had cautery done. i. Bleeding is significantly improved  c. Continue perioperative antibiotics per ENT - to be continued for 1-2 weeks per discussion with Dr. Abida peters off steroids  e. Saranya Bynum removed 7/2/20.  f. PT/OT eval- home with home health. 2. COPD  a. Not in exacerbation  b. Duonebs PRN  3. Anemia - Hb low- transfusion ordered for Hb 6.2, Hb improved to 8.0 today  4. HTN/Afib  a. Continue Cardizem - controlled  5. CML- wbc acutely elevated but coming down to 48.6 from 60  a. Increased Hydroxyurea to treatment dose -   See below for DC instructions  6. Mood disorder  a. Continue Xanax and Cymbalta  7. Dysphagia- speech following, on dysphagia diet  8. Low folate levels- supplements started  9. Diarrhea  a. Likely secondary to antibiotics  b.  Lomotil added  c. removed flexiseal        ADDITIONAL CARE RECOMMENDATIONS:    to set up Home health for nursing- vitals, meds, wound care; Home OT, home PT  Lab draw- Monday 7/13/20  CBC w differential  Fax results to patient's hematologist Dr Lennox Raygoza [facility]   Fax# [phone]  Office phone [phone]    Wound care- to tracheostomy site, every 48-72 hrs  Remove old dressings, clear around wound with betadine or chlorohexidine  Place xeroform gauze over trach site, cover with 2 folded 4x4's, cover with open 4x4 with tape    Follow up with Dr Jae Walters- hematology  Regarding elevated white blood cell counts (CML)  Call his office next Tuesday for advice regarding hydroxyurea dose change  We increased your hydroxyurea dose from 500mg twice daily to 1000mg twice daily at discharge  Continue this increased dose for 1 week; your white cell count on DC was 48.6    Follow up with Dr Sadfa Graham- ENT; 1 week after discharge for wound check- call for appt  Continue oral antibiotics twice daily for 6 more days to complete a 2 week course sice surgery  Liang Graham, [facility]   [address] [facility], [address] [place] [address], [address] [place] [address]   o) [phone]  (L) [phone]     For loose stool, take probiotics or yogurt daily  Use lomotil or loperamide as needed for loose stool    You were started on folic acid supplements  Your folate levels were in the normal range but near the bottom of that range     PENDING TEST RESULTS:   At the time of discharge the following test results are still pending: none    FOLLOW UP APPOINTMENTS:    Follow-up Information     Follow up With Specialties Details Why Contact Info    Angela Cardenas MD Internal Medicine   [facility]. [phone]           DIET: regular    ACTIVITY: Activity as tolerated    WOUND CARE: see above    EQUIPMENT needed: none      DISCHARGE MEDICATIONS:  Current Discharge Medication List      START taking these medications    Details   diphenoxylate-atropine (LOMOTIL) 2.5-0.025 mg per tablet Take 1 Tab by mouth three (3) times daily. Max Daily Amount: 3 Tabs. Take 1-2 tabs as needed up to 3 times daily to reduce loose stool  Qty: 45 Tab, Refills: 0    Associated Diagnoses: Diarrhea, unspecified type      amoxicillin-clavulanate (AUGMENTIN) 875-125 mg per tablet Take 1 Tab by mouth two (2) times daily (with meals). Qty: 12 Tab, Refills: 0      folic acid (FOLVITE) 1 mg tablet Take 1 Tab by mouth daily. Qty: 30 Tab, Refills: 2         CONTINUE these medications which have CHANGED    Details   hydroxyurea (HYDREA) 500 mg capsule Take 2 Caps by mouth two (2) times a day for 7 days, THEN 1 Cap two (2) times a day for 24 days.  Indications: chronic myelocytic leukemia  Qty: 75 Cap, Refills: 0         CONTINUE these medications which have NOT CHANGED    Details   DULoxetine (CYMBALTA) 30 mg capsule Take 1 Cap by mouth daily. Qty: 30 Cap, Refills: 5      cycloSPORINE (Restasis MultiDose) 0.05 % drop ophthalmic drops Administer 1 Drop to both eyes two (2) times a day. Oxygen nightly as needed. 1-2 liter nasal cannula qhs and prn if needed      bismuth subsalicylate (PEPTO-BISMOL PO) Take  by mouth daily as needed. As ordered on the bottle      ergocalciferol (ERGOCALCIFEROL) 1,250 mcg (50,000 unit) capsule TAKE ONE CAPSULE BY MOUTH EVERY 7 DAYS  Qty: 12 Cap, Refills: 1      acetaminophen (TYLENOL) 325 mg tablet Take 2 Tabs by mouth every four (4) hours as needed for Pain. Qty: 20 Tab, Refills: 0      dilTIAZem CD (CARDIZEM CD) 180 mg ER capsule Take 1 Cap by mouth daily. Qty: 30 Cap, Refills: 1      pantoprazole (PROTONIX) 40 mg tablet Take 1 Tab by mouth Before breakfast and dinner. Qty: 60 Tab, Refills: 1           NOTIFY YOUR PHYSICIAN FOR ANY OF THE FOLLOWING:   Fever over 101 degrees for 24 hours. Chest pain, shortness of breath, fever, chills, nausea, vomiting, diarrhea, change in mentation, falling, weakness, bleeding. Severe pain or pain not relieved by medications. Or, any other signs or symptoms that you may have questions about.     DISPOSITION:    Home With:  X OT X PT X HH X RN       Long term SNF/Inpatient Rehab    Independent/assisted living    Hospice   X Other:Home with home health       PATIENT CONDITION AT DISCHARGE:     Functional status    Poor    X Deconditioned     Independent      Cognition   X  Lucid     Forgetful     Dementia      Catheters/lines (plus indication)    Mahmood     PICC     PEG    X None      Code status   X  Full code     DNR      PHYSICAL EXAMINATION AT DISCHARGE:  Patient Vitals for the past 24 hrs:   Temp Pulse Resp BP SpO2   07/07/20 0830 98.3 °F (36.8 °C) 80 16 132/56 96 %   07/07/20 0223 98.1 °F (36.7 °C) 68 16 114/62 94 %   07/06/20 1951 98.2 °F (36.8 °C) 68 18 125/61 94 %     General:  Alert, oriented, No acute distress  ENT: trach removed, trach site healing well  Resp:  No accessory muscle use, Good AE, no wheezes, no rhonchi  Abd:  Soft, non-tender, non-distended  Extremities:  No cyanosis or clubbing, no significant edema  Neuro:  Grossly normal, no focal neuro deficits, follows commands      Data Review:    Review and/or order of clinical lab test  Review and/or order of tests in the radiology section of CPT  Review and/or order of tests in the medicine section of CPT  Labs:           Recent Labs     07/06/20 0235 07/05/20 0318   WBC 60.9* 60.6*   HGB 8.0* 8.2*   HCT 27.3* 28.1*    186           Recent Labs     07/06/20 0235 07/05/20 0318    140   K 3.8 3.7    109*   CO2 24 25   BUN 14 14   CREA 0.64 0.68   * 102*   CA 8.2* 8.1*      Recent Results (from the past 24 hour(s))   CBC W/O DIFF    Collection Time: 07/07/20  6:46 AM   Result Value Ref Range    WBC 48.6 (H) 3.6 - 11.0 K/uL    RBC 2.41 (L) 3.80 - 5.20 M/uL    HGB 8.0 (L) 11.5 - 16.0 g/dL    HCT 28.0 (L) 35.0 - 47.0 %    .2 (H) 80.0 - 99.0 FL    MCH 33.2 26.0 - 34.0 PG    MCHC 28.6 (L) 30.0 - 36.5 g/dL    RDW 19.9 (H) 11.5 - 14.5 %    PLATELET 394 771 - 254 K/uL    MPV 10.0 8.9 - 12.9 FL    NRBC 0.1 (H) 0  WBC    ABSOLUTE NRBC 0.07 (H) 0.00 - 4.48 K/uL   METABOLIC PANEL, BASIC    Collection Time: 07/07/20  6:46 AM   Result Value Ref Range    Sodium 139 136 - 145 mmol/L    Potassium 3.7 3.5 - 5.1 mmol/L    Chloride 108 97 - 108 mmol/L    CO2 22 21 - 32 mmol/L    Anion gap 9 5 - 15 mmol/L    Glucose 118 (H) 65 - 100 mg/dL    BUN 13 6 - 20 MG/DL    Creatinine 0.62 0.55 - 1.02 MG/DL    BUN/Creatinine ratio 21 (H) 12 - 20      GFR est AA >60 >60 ml/min/1.73m2    GFR est non-AA >60 >60 ml/min/1.73m2    Calcium 8.0 (L) 8.5 - 10.1 MG/DL         CHRONIC MEDICAL DIAGNOSES:  Problem List as of 7/7/2020 Date Reviewed: 6/29/2020          Codes Class Noted - Resolved    Paroxysmal atrial fibrillation [facility]) ICD-10-CM: I48.0  ICD-9-CM: 427.31  4/28/2020 - Present        PUD (peptic ulcer disease) (Chronic) ICD-10-CM: K27.9  ICD-9-CM: 533.90  1/30/2020 - Present        Atrial fibrillation with rapid ventricular response ([facility] ICD-10-CM: I48.91  ICD-9-CM: 427.31  11/7/2019 - Present        Diverticulitis ICD-10-CM: K57.92  ICD-9-CM: 562.11  6/29/2018 - Present    Overview Signed 6/29/2018  7:07 AM by Tevin Orlando MD     Recurrent             Dry eye syndrome ICD-10-CM: W52.117  ICD-9-CM: 375.15  6/29/2018 - Present        Fibromyalgia ICD-10-CM: M79.7  ICD-9-CM: 729.1  6/29/2018 - Present        GERD without esophagitis (Chronic) ICD-10-CM: K21.9  ICD-9-CM: 530.81  6/29/2018 - Present        Hypercholesterolemia (Chronic) ICD-10-CM: E78.00  ICD-9-CM: 272.0  6/29/2018 - Present        Osteoporosis ICD-10-CM: M81.0  ICD-9-CM: 733.00  6/29/2018 - Present        Peripheral neuropathy (Chronic) ICD-10-CM: G62.9  ICD-9-CM: 356.9  6/29/2018 - Present        Prediabetes (Chronic) ICD-10-CM: R73.03  ICD-9-CM: 790.29  6/29/2018 - Present        Rosacea ICD-10-CM: L71.9  ICD-9-CM: 695.3  6/29/2018 - Present        Status post trachelectomy ICD-10-CM: Z90.710  ICD-9-CM: V88.01  6/27/2020 - Present        Myelodysplastic syndrome ([facility] ICD-10-CM: D46.9  ICD-9-CM: 238.75  6/27/2020 - Present        Mass of epiglottis ICD-10-CM: J38.7  ICD-9-CM: 478.79  6/26/2020 - Present        Leukemia, acute ([facility].) ICD-10-CM: C95.00  ICD-9-CM: 208.00  2/10/2020 - Present        MDS (myelodysplastic syndrome) (HCC) (Chronic) ICD-10-CM: D46.9  ICD-9-CM: 238.75  11/27/2019 - Present        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[facility].) ICD-10-CM: C93.10  ICD-9-CM: 205.10  11/1/2019 - Present        Anemia associated with bone marrow infiltration (HCC) ICD-10-CM: Y58.79  ICD-9-CM: 284.2  11/1/2019 - Present        COPD (chronic obstructive pulmonary disease) ([facility].) ICD-10-CM: J44.9  ICD-9-CM: 496  10/29/2019 - Present        SIRS (systemic inflammatory response syndrome) ([facility].) ICD-10-CM: R65.10  ICD-9-CM: 995.90  10/29/2019 - Present        Iron deficiency anemia ICD-10-CM: D50.9  ICD-9-CM: 280.9  2/15/2019 - Present        Anemia, unspecified ICD-10-CM: D64.9  ICD-9-CM: 285.9  1/29/2019 - Present        Thrombocytopenia ([facility].) ICD-10-CM: D69.6  ICD-9-CM: 287.5  1/29/2019 - Present        Acute bronchitis due to other specified organisms ICD-10-CM: J20.8  ICD-9-CM: 466.0  9/30/2018 - Present        Acute respiratory failure with hypoxia ([place] [address].) ICD-10-CM: J96.01  ICD-9-CM: 518.81  9/29/2018 - Present        S/P lumpectomy, left breast ICD-10-CM: Z98.890  ICD-9-CM: V45.89  8/25/2017 - Present        Costochondritis ICD-10-CM: M94.0  ICD-9-CM: 733.6  8/25/2017 - Present        Syncope and collapse ICD-10-CM: R55  ICD-9-CM: 780.2  11/15/2016 - Present        Stenosis of both internal carotid arteries ICD-10-CM: I65.23  ICD-9-CM: 433.10, 433.30  11/14/2016 - Present        Syncope ICD-10-CM: R55  ICD-9-CM: 780.2  11/13/2016 - Present        Spinal stenosis of lumbar region with neurogenic claudication (Chronic) ICD-10-CM: H45.394  ICD-9-CM: 724.03  8/23/2016 - Present        Lumbar back pain with radiculopathy affecting left lower extremity ICD-10-CM: M54.16  ICD-9-CM: 724.4  2/24/2016 - Present        Lumbar back pain with radiculopathy affecting right lower extremity ICD-10-CM: M54.16  ICD-9-CM: 724.4  2/24/2016 - Present        Idiopathic small and large fiber sensory neuropathy ICD-10-CM: G60.8  ICD-9-CM: 356.4  2/24/2016 - Present        Lumbar post-laminectomy syndrome (Chronic) ICD-10-CM: M96.1  ICD-9-CM: 722.83  2/24/2016 - Present        Low back pain ICD-10-CM: M54.5  ICD-9-CM: 724.2  2/24/2016 - Present        Vitamin D deficiency (Chronic) ICD-10-CM: E55.9  ICD-9-CM: 268.9  12/16/2013 - Present        RESOLVED: Seizure-like activity ([facility] ICD-10-CM: R56.9  ICD-9-CM: 780.39  11/13/2016 - 3/5/2018        RESOLVED: Diabetic peripheral neuropathy associated with type 2 diabetes mellitus ([facility].) ICD-10-CM: E11.42  ICD-9-CM: 250.60, 357.2  2/24/2016 - 3/5/2018        RESOLVED: Bunion of right foot ICD-10-CM: M21.611  ICD-9-CM: 727.1  8/20/2015 - 8/20/2015        RESOLVED: Xu Fernández, right foot ICD-10-CM: M21.611  ICD-9-CM: 727.1  8/20/2015 - 8/20/2015        RESOLVED: Breast cancer, left breast ([facility].) (Chronic) ICD-10-CM: Z04.994  ICD-9-CM: 174.9  12/3/2013 - 7/24/2019    Overview Signed 6/29/2018  7:06 AM by Pilo Corbett MD     2013.   Status post lumpectomy and radiation therapy                   Greater than 45 minutes were spent with the patient on counseling and coordination of care    Signed:   Rani Rubin MD  7/7/2020  2:54 PM

## 2020-07-07 NOTE — PROGRESS NOTES
ANDREW  RUR: 28%  Plan:  Discharge to Home with HHPT/OT and new orders for SN. CM met with Dr. Sandee Rogers earlier today. Discussed plans for patient d/c to home with HHPT/OT in addition to new orders for [place] SN wound care to trach site in addition to labs. Patient had been approved for servicing with [facility] for prior orders (HHPT/OT). Plan:    Obtain additional [place] SN orders. Arrange BLS transport. Aron Welch, [facility], [facility]  [phone]    3:02p  Additional SN HH orders received. CM called DAINA Rao with [facility] to advise of additional orders received. 3:34p  CM contacted [facility] via [employer]. Requested 4:30pm BLS transport. Patient to d/c to home with [facility] SN/PT/OT.

## 2020-07-07 NOTE — PROGRESS NOTES
Problem: Mobility Impaired (Adult and Pediatric)  Goal: *Acute Goals and Plan of Care (Insert Text)  Description: FUNCTIONAL STATUS PRIOR TO ADMISSION: Patient was modified independent using a single point cane for functional mobility. HOME SUPPORT PRIOR TO ADMISSION: The patient lived with her . Physical Therapy Goals  Initiated 6/30/2020  1. Patient will move from supine to sit and sit to supine, scoot up and down and roll side to side in bed with minimal assistance/contact guard assist within 7 day(s). 2.  Patient will transfer from bed to chair and chair to bed with minimal assistance/contact guard assist using the least restrictive device within 7 day(s). 3.  Patient will perform sit to stand with minimal assistance/contact guard assist within 7 day(s). 4.  Patient will ambulate with minimal assistance/contact guard assist for  feet with the least restrictive device within 7 day(s). 5.  Patient will ascend/descend 4 stairs with bilateral handrail(s) with moderate assistance within 7 day(s). Outcome: Progressing Towards Goal   PHYSICAL THERAPY TREATMENT  Patient: Fredo Bee (89 y.o. 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 ASSESSMENT  Patient continues with skilled PT services and is progressing towards goals. Moving well today and did assess on the steps. Requiring contact guard/min assist for safety using one rail with both hands. Fatigued easily with this and would be concerned for ability to manage at discharge with walking distance to steps from car and up steps therefore recommend transport home. Gait is improving but not baseline. Anticipate home today with home health.      Current Level of Function Impacting Discharge (mobility/balance): contact guard/min assisst    Other factors to consider for discharge:          PLAN :  Patient continues to benefit from skilled intervention to address the above impairments. Continue treatment per established plan of care. to address goals. Recommendation for discharge: (in order for the patient to meet his/her long term goals)  Physical therapy at least 2 days/week in the home AND ensure assist and/or supervision for safety with mobility     This discharge recommendation:  Has been made in collaboration with the attending provider and/or case management    IF patient discharges home will need the following DME: none       SUBJECTIVE:   Patient stated That was harder than I thought it would be.  regarding the steps    OBJECTIVE DATA SUMMARY:   Critical Behavior:  Neurologic State: Alert  Orientation Level: Oriented X4  Cognition: Follows commands, Appropriate decision making, Appropriate safety awareness, Appropriate for age attention/concentration  Safety/Judgement: Awareness of environment, Good awareness of safety precautions, Insight into deficits  Functional Mobility Training:  Bed Mobility:     Supine to Sit: Supervision;Modified independent  Sit to Supine: Supervision           Transfers:  Sit to Stand: Contact guard assistance  Stand to Sit: Contact guard assistance                             Balance:  Sitting: Intact  Standing: Impaired; With support  Standing - Static: Good  Standing - Dynamic : Good  Ambulation/Gait Training:  Distance (ft): 50 Feet (ft)  Assistive Device: Gait belt;Walker, rolling  Ambulation - Level of Assistance: Setup;Contact guard assistance        Gait Abnormalities: Decreased step clearance        Base of Support: Widened     Speed/Emmanuelle: Pace decreased (<100 feet/min)  Step Length: Right shortened;Left shortened      Stairs:  Number of Stairs Trained: 3  Stairs - Level of Assistance: Contact guard assistance   Rail Use: Right   After treatment patient left in no apparent distress: Supine in bed, Call bell within reach, and Side rails x 3    COMMUNICATION/COLLABORATION:   The patients plan of care was discussed with: Registered nurse, Physician, and Case management.      Omer Ewing, PT   Time Calculation: 19 mins

## 2020-07-07 NOTE — DISCHARGE INSTRUCTIONS
to set up Home health for nursing- vitals, meds, wound care; Home OT, home PT  Lab draw- Monday 7/13/20  CBC w differential  Fax results to patient's hematologist Dr Nesha Lozano Hematology   Fax# [phone]  Office phone [phone]    Wound care- to tracheostomy site, every 48-72 hrs  Remove old dressings, clear around wound with betadine or chlorohexidine  Place xeroform gauze over trach site, cover with 2 folded 4x4's, cover with open 4x4 with tape    Follow up with Dr Lionel Young- hematology  Regarding elevated white blood cell counts (CML)  Call his office next Tuesday for advice regarding hydroxyurea dose change  We increased your hydroxyurea dose from 500mg twice daily to 1000mg twice daily at discharge  Continue this increased dose for 1 week; your white cell count on DC was 48.6    Follow up with Dr Radha Roberts- ENT; 1 week after discharge for wound check- call for appt  Continue oral antibiotics twice daily for 6 more days to complete a 2 week course sice surgery  Liagn Roberts, [address] [facility]  [address] [facility], [address] [place] [address], [address] [place] [address]   o) [phone]  (L) [phone]     For loose stool, take probiotics or yogurt daily  Use lomotil or loperamide as needed for loose stool    You were started on folic acid supplements  Your folate levels were in the normal range but near the bottom of that range

## 2020-07-08 ENCOUNTER — HOME CARE VISIT (OUTPATIENT)
Dept: SCHEDULING | Facility: HOME HEALTH | Age: 85
End: 2020-07-08
Payer: MEDICARE

## 2020-07-08 ENCOUNTER — PATIENT OUTREACH (OUTPATIENT)
Dept: CASE MANAGEMENT | Age: 85
End: 2020-07-08

## 2020-07-08 ENCOUNTER — HOME CARE VISIT (OUTPATIENT)
Dept: HOME HEALTH SERVICES | Facility: HOME HEALTH | Age: 85
End: 2020-07-08
Payer: MEDICARE

## 2020-07-08 VITALS
SYSTOLIC BLOOD PRESSURE: 122 MMHG | TEMPERATURE: 98.2 F | WEIGHT: 189 LBS | HEART RATE: 90 BPM | DIASTOLIC BLOOD PRESSURE: 58 MMHG | OXYGEN SATURATION: 95 % | RESPIRATION RATE: 18 BRPM | BODY MASS INDEX: 31.45 KG/M2

## 2020-07-08 PROCEDURE — G0299 HHS/HOSPICE OF RN EA 15 MIN: HCPCS

## 2020-07-08 PROCEDURE — G0151 HHCP-SERV OF PT,EA 15 MIN: HCPCS

## 2020-07-08 PROCEDURE — 400013 HH SOC

## 2020-07-08 PROCEDURE — 3331090002 HH PPS REVENUE DEBIT

## 2020-07-08 PROCEDURE — 3331090001 HH PPS REVENUE CREDIT

## 2020-07-08 NOTE — PROGRESS NOTES
Patient was admitted to [facility] on 2020 and discharged on 2020 for supraglottic mass. Patient was contacted within 1 business days of discharge. Top Discharge Challenges to be reviewed by the provider   Additional needs identified to be addressed with provider yes  home health care-PT/SN  Discussed COVID-19 related testing which was available at this time. Test results were negative. Patient informed of results, if available? no   Method of communication with provider : none       Advance Care Planning:   Does patient have an Advance Directive:  reviewed and current     Inpatient Readmission Risk score:   Was this a readmission? no   Patient stated reason for the admission: sore throat/ neck pain  Patients top risk factors for readmission: medical condition  Interventions to address risk factors: educated on importance of attending follow up appointments and taking medications as prescribed. Care Transition Nurse (CTN) contacted the patient by telephone to perform post hospital discharge assessment. Verified name and  with patient as identifiers. Provided introduction to self, and explanation of the CTN role. CTN reviewed discharge instructions, medical action plan and red flags with patient who verbalized understanding. Patient given an opportunity to ask questions and does not have any further questions or concerns at this time. The patient agrees to contact the PCP office for questions related to their healthcare. Medication reconciliation was performed with patient, who verbalizes understanding of administration of home medications. Advised obtaining a 90-day supply of all daily and as-needed medications.    Referral to Pharm D needed: no     Home Health/Outpatient orders at discharge: Shan [facility]J[address] [facility]: [facility]  Date of initial visit: 2020    Durable Medical Equipment ordered at discharge: none  [address] received: n/a    Covid Risk Education    Patient has following risk factors of: COPD. Education provided regarding infection prevention, and signs and symptoms of COVID-19 and when to seek medical attention with patient who verbalized understanding. Discussed exposure protocols and quarantine From CDC: Are you at higher risk for severe illness?  and given an opportunity for questions and concerns. The patient agrees to contact the COVID-19 hotline [phone] or PCP office for questions related to COVID-19. For more information on steps you can take to protect yourself, see CDC's How to Protect Yourself     Patient/family/caregiver given information for [employer] and agrees to enroll no  Patient's preferred e-mail: declines  Patient's preferred phone number: declines    Discussed follow-up appointments. If no appointment was previously scheduled, appointment scheduling offered: [facility] follow up appointment(s):   Future Appointments   Date Time Provider Anju Dickson   7/8/2020  3:00 PM Ke Wiley [facility]   7/14/2020  4:00 PM Hennie Soulier, MD [address]   12/10/2020  3:40 PM Brenda Mcneil,  [place] [address] Orly Munoz follow up appointment(s):   Plan for follow-up call in 7-10 days based on severity of symptoms and risk factors. CTN provided contact information for future needs. Goals Addressed                 This Visit's Progress     Prevent complications post hospitalization. 7/8/2020 Patient reports HH and PT will start today. PCP and Dr. Amanda Baltazar appointment are schedule. Denies chest pain, SOB, fever, N/V. Still having some diarrhea but better than in hospital. Patient  is picking up new prescriptions today. Patient will report attendance of appointment and taking all medications at next outreach.  GIORGIO

## 2020-07-09 VITALS
TEMPERATURE: 98.7 F | HEART RATE: 88 BPM | DIASTOLIC BLOOD PRESSURE: 72 MMHG | SYSTOLIC BLOOD PRESSURE: 124 MMHG | OXYGEN SATURATION: 96 % | RESPIRATION RATE: 20 BRPM

## 2020-07-09 PROCEDURE — A4450 NON-WATERPROOF TAPE: HCPCS

## 2020-07-09 PROCEDURE — 3331090002 HH PPS REVENUE DEBIT

## 2020-07-09 PROCEDURE — A6216 NON-STERILE GAUZE<=16 SQ IN: HCPCS

## 2020-07-09 PROCEDURE — A4452 WATERPROOF TAPE: HCPCS

## 2020-07-09 PROCEDURE — 3331090001 HH PPS REVENUE CREDIT

## 2020-07-09 PROCEDURE — A6223 GAUZE >16<=48 NO W/SAL W/O B: HCPCS

## 2020-07-09 PROCEDURE — A4247 BETADINE/IODINE SWABS/WIPES: HCPCS

## 2020-07-10 ENCOUNTER — HOME CARE VISIT (OUTPATIENT)
Dept: SCHEDULING | Facility: HOME HEALTH | Age: 85
End: 2020-07-10
Payer: MEDICARE

## 2020-07-10 ENCOUNTER — HOME CARE VISIT (OUTPATIENT)
Dept: HOME HEALTH SERVICES | Facility: HOME HEALTH | Age: 85
End: 2020-07-10
Payer: MEDICARE

## 2020-07-10 VITALS
SYSTOLIC BLOOD PRESSURE: 140 MMHG | TEMPERATURE: 98.2 F | DIASTOLIC BLOOD PRESSURE: 65 MMHG | RESPIRATION RATE: 18 BRPM | OXYGEN SATURATION: 98 % | HEART RATE: 81 BPM

## 2020-07-10 VITALS
HEART RATE: 100 BPM | SYSTOLIC BLOOD PRESSURE: 140 MMHG | OXYGEN SATURATION: 94 % | TEMPERATURE: 98.3 F | DIASTOLIC BLOOD PRESSURE: 65 MMHG

## 2020-07-10 PROCEDURE — 3331090001 HH PPS REVENUE CREDIT

## 2020-07-10 PROCEDURE — G0299 HHS/HOSPICE OF RN EA 15 MIN: HCPCS

## 2020-07-10 PROCEDURE — 3331090002 HH PPS REVENUE DEBIT

## 2020-07-10 PROCEDURE — G0152 HHCP-SERV OF OT,EA 15 MIN: HCPCS

## 2020-07-11 PROCEDURE — 3331090001 HH PPS REVENUE CREDIT

## 2020-07-11 PROCEDURE — 3331090002 HH PPS REVENUE DEBIT

## 2020-07-12 PROCEDURE — 3331090001 HH PPS REVENUE CREDIT

## 2020-07-12 PROCEDURE — 3331090002 HH PPS REVENUE DEBIT

## 2020-07-13 ENCOUNTER — HOME CARE VISIT (OUTPATIENT)
Dept: SCHEDULING | Facility: HOME HEALTH | Age: 85
End: 2020-07-13
Payer: MEDICARE

## 2020-07-13 ENCOUNTER — HOME CARE VISIT (OUTPATIENT)
Dept: HOME HEALTH SERVICES | Facility: HOME HEALTH | Age: 85
End: 2020-07-13
Payer: MEDICARE

## 2020-07-13 VITALS
RESPIRATION RATE: 18 BRPM | OXYGEN SATURATION: 100 % | DIASTOLIC BLOOD PRESSURE: 70 MMHG | HEART RATE: 80 BPM | TEMPERATURE: 97.8 F | SYSTOLIC BLOOD PRESSURE: 118 MMHG

## 2020-07-13 VITALS
SYSTOLIC BLOOD PRESSURE: 132 MMHG | OXYGEN SATURATION: 99 % | RESPIRATION RATE: 18 BRPM | TEMPERATURE: 98.4 F | HEART RATE: 87 BPM | DIASTOLIC BLOOD PRESSURE: 60 MMHG

## 2020-07-13 PROCEDURE — G0151 HHCP-SERV OF PT,EA 15 MIN: HCPCS

## 2020-07-13 PROCEDURE — G0300 HHS/HOSPICE OF LPN EA 15 MIN: HCPCS

## 2020-07-13 PROCEDURE — 3331090001 HH PPS REVENUE CREDIT

## 2020-07-13 PROCEDURE — 3331090002 HH PPS REVENUE DEBIT

## 2020-07-14 ENCOUNTER — OFFICE VISIT (OUTPATIENT)
Dept: INTERNAL MEDICINE CLINIC | Age: 85
End: 2020-07-14

## 2020-07-14 VITALS
TEMPERATURE: 98.3 F | SYSTOLIC BLOOD PRESSURE: 118 MMHG | HEIGHT: 65 IN | OXYGEN SATURATION: 95 % | BODY MASS INDEX: 30.99 KG/M2 | HEART RATE: 75 BPM | DIASTOLIC BLOOD PRESSURE: 62 MMHG | RESPIRATION RATE: 18 BRPM | WEIGHT: 186 LBS

## 2020-07-14 DIAGNOSIS — D50.0 BLOOD LOSS ANEMIA: ICD-10-CM

## 2020-07-14 DIAGNOSIS — C93.10 CHRONIC MYELOMONOCYTIC LEUKEMIA NOT HAVING ACHIEVED REMISSION (HCC): ICD-10-CM

## 2020-07-14 DIAGNOSIS — D46.9 MYELODYSPLASTIC SYNDROME (HCC): ICD-10-CM

## 2020-07-14 DIAGNOSIS — J38.7 MASS OF EPIGLOTTIS: Primary | ICD-10-CM

## 2020-07-14 DIAGNOSIS — Z90.710 STATUS POST TRACHELECTOMY: ICD-10-CM

## 2020-07-14 DIAGNOSIS — J43.9 PULMONARY EMPHYSEMA, UNSPECIFIED EMPHYSEMA TYPE (HCC): ICD-10-CM

## 2020-07-14 PROBLEM — C95.00 LEUKEMIA, ACUTE (HCC): Status: RESOLVED | Noted: 2020-02-10 | Resolved: 2020-07-14

## 2020-07-14 PROCEDURE — 3331090002 HH PPS REVENUE DEBIT

## 2020-07-14 PROCEDURE — 3331090001 HH PPS REVENUE CREDIT

## 2020-07-14 RX ORDER — ONDANSETRON 4 MG/1
4 TABLET, FILM COATED ORAL
COMMUNITY

## 2020-07-14 NOTE — PROGRESS NOTES
This note will not be viewable in [address] Paulina Cutler is a 82 y.o. female and presents with Transitions Of Care  . Subjective:  Pelon Norman presents to the office today and transition of care subsequent to a hospitalization from 6/26 until 7/7 at [facility] when she presented with 1 days history of progressive sore throat and neck pain, purulent taste and difficulty with breathing. The patient has underlying multiple medical problems including myelodysplastic syndrome, CML, history of breast cancer treated with lumpectomy and radiation, COPD on supplemental oxygen, GERD and esophageal ulcer disease. A CT scan done at [facility] prior to her transfer revealed market edema and thickening of the epiglottic region. ENT evaluation in the emergency room found a large violaceous mass protruding from the right hypopharynx/oropharynx extending past midline completely obscuring any view of the supraglottic or glottic structures. The patient was subsequently taken directly to the emergency room where a tracheostomy was performed, biopsy was obtained and she was started on IV antibiotics. Post tracheostomy and biopsy the pathology returned benign showing hemorrhage only and her course was complicated by needing to return to the OR on 6/29 to have slow bleed repair with cautery done. She did get anemic during the course of her hospitalization and did require transfusion. She continued to improve and tracheostomy was removed on 7/2. Her blood pressure was quite elevated due to her CML and her hydroxyurea was increased prior to discharge. She was placed on a dysphasia diet and her tracheostomy was removed when she was tolerating this with continued follow-up as an outpatient. Patient was sent home on Augmentin which she finished today. Since then the patient notes that her diet has been much improved and she is eating without difficulty. Her tracheostomy site is slowly healing.   She has had no fevers or chills and has had no difficulty with swallowing. She is currently being followed by home health nursing and home physical therapy. She did have laboratory studies drawn by home health yesterday which are pending.     Past Medical History:   Diagnosis Date    Anemia     Arthritis     Breast cancer ([facility] [address]     left    Bunion of right foot 8/20/2015    Chronic obstructive pulmonary disease (HCC)     mild    Chronic pain     back--uses tens unit    Diverticulitis 6/29/2018    Recurrent    Diverticulitis large intestine     Dry eye syndrome 6/29/2018    Fibromyalgia 6/29/2018    GERD (gastroesophageal reflux disease)     History of left breast cancer 2013    lumpectomy radiation    Hypercholesterolemia 6/29/2018    Ill-defined condition     blood transfusion hx    Leukemia (HCC)     MDS (myelodysplastic syndrome) ([facility] [address] [facility]     Osteoporosis 6/29/2018    Oxygen dependent     at night    Peripheral neuropathy 6/29/2018    Prediabetes 6/29/2018    PUD (peptic ulcer disease)     Radiation therapy complication 9648    Lt breast-No Chemo    Rosacea 6/29/2018    Trouble in sleeping      Past Surgical History:   Procedure Laterality Date    COLONOSCOPY N/A 2/28/2020    COLONOS[facility] by Hank Gallego [facility] at [facility]  2/28/2020         HX APPENDECTOMY      HX BREAST LUMPECTOMY  11/21/2013    LEFT BREAST LUMPECTOMY W/LEFT BREAST SENTINEL NODE BIOPSY,AND NEEDLE LOCALIZATION performed by Ponce Blanco MD at [facility] MAIN OR    HX CATARACT REMOVAL      bilateral    HX HYSTERECTOMY      ovarian cyst    HX MOHS PROCEDURES      right    HX ORTHOPAEDIC      back surgery x2    HX OTHER SURGICAL      colonoscopy    HX TONSILLECTOMY      HX VASCULAR ACCESS  02/2020    echo cath right shoulder    IR INSERT TUNL CVC W PORT OVER 5 YEARS  12/3/2019    TOTAL KNEE ARTHROPLASTY      left    TOTAL KNEE ARTHROPLASTY      right    UPPER GI ENDOSCOPY,BIOPSY  1/24/2020  UPPER GI ENDOSCOPY,BIOPSY  6/23/2020         UPPER GI ENDOSCOPY,DILATN W GUIDE  6/23/2020         US GUIDED CORE BREAST BIOPSY Left 2013    Breast Ca     Allergies   Allergen Reactions    Latex Rash    Hydrocodone Nausea Only and Vertigo    Gabapentin Diarrhea, Nausea Only and Other (comments)     weakness    Lyrica [Pregabalin] Nausea Only    Oxycodone Nausea and Vomiting and Vertigo     Current Outpatient Medications   Medication Sig Dispense Refill    ondansetron hcl (Zofran) 4 mg tablet Take 4 mg by mouth every eight (8) hours as needed for Nausea or Vomiting.  diphenoxylate-atropine (LOMOTIL) 2.5-0.025 mg per tablet Take 1 Tab by mouth three (3) times daily. Max Daily Amount: 3 Tabs. Take 1-2 tabs as needed up to 3 times daily to reduce loose stool 45 Tab 0    hydroxyurea (HYDREA) 500 mg capsule Take 2 Caps by mouth two (2) times a day for 7 days, THEN 1 Cap two (2) times a day for 24 days. Indications: chronic myelocytic leukemia 75 Cap 0    folic acid (FOLVITE) 1 mg tablet Take 1 Tab by mouth daily. 30 Tab 2    Oxygen nightly as needed. 1-2 liter nasal cannula qhs and prn if needed      bismuth subsalicylate (PEPTO-BISMOL PO) Take  by mouth daily as needed. As ordered on the bottle      ergocalciferol (ERGOCALCIFEROL) 1,250 mcg (50,000 unit) capsule TAKE ONE CAPSULE BY MOUTH EVERY 7 DAYS 12 Cap 1    acetaminophen (TYLENOL) 325 mg tablet Take 2 Tabs by mouth every four (4) hours as needed for Pain. 20 Tab 0    dilTIAZem CD (CARDIZEM CD) 180 mg ER capsule Take 1 Cap by mouth daily. (Patient taking differently: Take 240 mg by mouth daily.) 30 Cap 1    pantoprazole (PROTONIX) 40 mg tablet Take 1 Tab by mouth Before breakfast and dinner.  61 Tab 1     Social History     Socioeconomic History    Marital status:      Spouse name: Not on file    Number of children: Not on file    Years of education: Not on file    Highest education level: Not on file   Tobacco Use    Smoking status: Former Smoker     Packs/day: 0.50     Years: 50.00     Pack years: 25.00     Last attempt to quit: 2012     Years since quittin.4    Smokeless tobacco: Never Used   Substance and Sexual Activity    Alcohol use:  Yes     Alcohol/week: 2.0 standard drinks     Types: 2 Glasses of wine per week    Drug use: No    Sexual activity: Not Currently     Family History   Problem Relation Age of Onset    Cancer Mother         bladder    Cancer Father     Breast Cancer Paternal Grandmother        Health Maintenance   Topic Date Due    GLAUCOMA SCREENING Q2Y  02/15/2020    Medicare Yearly Exam  2020    Influenza Age 5 to Adult  2020    DTaP/Tdap/Td series (3 - Td) 2029    Bone Densitometry (Dexa) Screening  Completed    Shingrix Vaccine Age 50>  Completed    Pneumococcal 65+ years  Completed    Pneumococcal 65+ yrs at Risk Vaccine  Completed        Review of Systems  Constitutional: negative for fevers, chills, anorexia and weight loss  Eyes:   negative for visual disturbance and irritation  ENT:   negative for tinnitus,sore throat,nasal congestion,ear pain,hoarseness  Respiratory:  negative for cough, hemoptysis, dyspnea,wheezing  CV:   negative for chest pain, palpitations, lower extremity edema  GI:   negative for nausea, vomiting, diarrhea, abdominal pain,melena  Endo:               negative for polyuria,polydipsia,polyphagia,heat intolerance  Genitourinary: negative for frequency, dysuria and hematuria  Integumentary: negative for rash and pruritus  Hematologic:  negative for easy bruising and gum/nose bleeding  Musculoskel: negative for myalgias, arthralgias, back pain, muscle weakness, joint pain  Neurological:  negative for headaches, dizziness, vertigo, memory problems and gait   Behavl/Psych: negative for feelings of anxiety, depression, mood changes  ROS otherwise negative      Objective:  Visit Vitals  /62 (BP 1 Location: Left arm, BP Patient Position: Sitting) Pulse 75   Temp 98.3 °F (36.8 °C) (Oral)   Resp 18   Ht 5' 5\" (1.651 m)   Wt 186 lb (84.4 kg)   SpO2 95%   BMI 30.95 kg/m²     Body mass index is 30.95 kg/m². Physical Exam:   General appearance - alert, well appearing, and in no distress  Mental status - alert, oriented to person, place, and time  EYE-HOWARD, EOMI,conjunctiva normal bilaterally, lids normal  ENT-ENT exam normal, no neck nodes or sinus tenderness  Nose - normal and patent, no erythema,  Or discharge   Mouth - mucous membranes moist, pharynx normal without lesions  Neck - supple, no significant adenopathy or bruit. Tracheostomy site appears normal and is healing well  Chest - clear to auscultation, no wheezes, rales or rhonchi. Heart - normal rate, regular rhythm, normal S1, S2, no murmurs, rubs, clicks or gallops   Abdomen - soft, nontender, nondistended, no masses or organomegaly  Lymph- no adenopathy palpable  Ext-peripheral pulses normal, no pedal edema, no clubbing or cyanosis  Skin-Warm and dry. 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 No change in her current medications will be made.     Continue dysphasia diet    Continued home health nursing and home health physical therapy    Await laboratory studies drawn on 7/13    Hospital records from 6/26 until 7/7 were reviewed during the course of this office visit including progress notes, laboratory studies, imaging studies      Continued follow-up with ENT and heme-onc    Follow-up with us as previously appointed  Follow-up and Dispositions    · Return for As previously scheduled. I have reviewed with the patient details of the assessment and plan and all questions were answered. Relevent patient education was performed. The most recent lab findings were reviewed with the patient. An After Visit Summary was printed and given to the patient. Branden Ordoñez MD    Please note that this dictation was completed with [employer], the computer voice recognition software. Quite often unanticipated grammatical, syntax, homophones, and other interpretive errors are inadvertently transcribed by the computer software. Please disregard these errors. Please excuse any errors that have escaped final proofreading.

## 2020-07-14 NOTE — PATIENT INSTRUCTIONS
Anemia From Heavy Bleeding: Care Instructions  Your Care Instructions     Anemia means that your body does not have enough red blood cells. Red blood cells carry oxygen around the body. When you have anemia, you may feel dizzy, tired, and weak. You may also feel your heart pounding. For some people, it's hard to focus and think clearly. One common cause of anemia is bleeding. Bleeding from ulcers, hemorrhoids, cancer, or other problems can cause anemia. It may also be caused by heavy menstrual periods. Your treatment may include iron pills. Iron helps your body make hemoglobin. Hemoglobin is the part of the red blood cell that carries oxygen. If you have severe anemia, you may need a blood transfusion to give you red blood cells as quickly as possible. Sometimes it takes several months to get iron levels back to normal.  Follow-up care is a key part of your treatment and safety. Be sure to make and go to all appointments, and call your doctor if you are having problems. It's also a good idea to know your test results and keep a list of the medicines you take. How can you care for yourself at home? · Be safe with medicines. Take your medicines exactly as prescribed. Call your doctor if you think you are having a problem with your medicine. · Follow your doctor's advice about eating foods that have a lot of iron in them. These include red meat, shellfish, poultry, and eggs. They also include beans, raisins, whole-grain bread, and leafy green vegetables. · Steam your vegetables. This is the best way to prepare them if you want to get as much iron as possible. · Iron pills can cause constipation. If you take them, there are things you can do to avoid constipation. Drink plenty of fluids, eat foods with a lot of fiber, and exercise every day. When should you call for help? [id number] anytime you think you may need emergency care. For example, call if:  · You passed out (lost consciousness).   · Your stools are maroon or very bloody. Call your doctor now or seek immediate medical care if:  · You are short of breath. · You have new or worse bleeding. · You are dizzy or light-headed, or you feel like you may faint. Watch closely for changes in your health, and be sure to contact your doctor if:  · You feel weaker or more tired than usual.  · You do not get better as expected. Where can you learn more? Go to http://sumit-arpit.info/  Enter I435 in the search box to learn more about \"Anemia From Heavy Bleeding: Care Instructions. \"  Current as of: November 8, 2019               Content Version: 12.5  © 8360-2992 Healthwise, Incorporated. Care instructions adapted under license by [employer] (which disclaims liability or warranty for this information). If you have questions about a medical condition or this instruction, always ask your healthcare professional. [address] any warranty or liability for your use of this information.

## 2020-07-14 NOTE — PROGRESS NOTES
Zee Velasquez is a 82 y.o. female presenting for Transitions Of Care  . 1. Have you been to the ER, urgent care clinic since your last visit? Hospitalized since your last visit? DATE OF ADMISSION: 6/26/2020  5:14 PM    DATE OF DISCHARGE: 7/7/20 3pm  .     Fall Risk Assessment, last 12 mths 1/9/2020   Able to walk? Yes   Fall in past 12 months? Yes   Fall with injury? -   Number of falls in past 12 months 1   Fall Risk Score -         Abuse Screening Questionnaire 1/9/2020   Do you ever feel afraid of your partner? N   Are you in a relationship with someone who physically or mentally threatens you? N   Is it safe for you to go home? Y       3 most recent PHQ Screens 6/25/2020   Little interest or pleasure in doing things Not at all   Feeling down, depressed, irritable, or hopeless Not at all   Total Score PHQ 2 0       There are no discontinued medications.

## 2020-07-15 ENCOUNTER — TELEPHONE (OUTPATIENT)
Dept: INTERNAL MEDICINE CLINIC | Age: 85
End: 2020-07-15

## 2020-07-15 ENCOUNTER — HOME CARE VISIT (OUTPATIENT)
Dept: HOME HEALTH SERVICES | Facility: HOME HEALTH | Age: 85
End: 2020-07-15
Payer: MEDICARE

## 2020-07-15 LAB
BASOPHILS # BLD AUTO: 0.2 X10E3/UL (ref 0–0.2)
BASOPHILS NFR BLD AUTO: 1 %
EOSINOPHIL # BLD AUTO: 0.8 X10E3/UL (ref 0–0.4)
EOSINOPHIL NFR BLD AUTO: 4 %
ERYTHROCYTE [DISTWIDTH] IN BLOOD BY AUTOMATED COUNT: 19.7 % (ref 11.7–15.4)
HCT VFR BLD AUTO: 28 % (ref 34–46.6)
HGB BLD-MCNC: 9 G/DL (ref 11.1–15.9)
IMMATURE CELLS, 115398: ABNORMAL
LYMPHOCYTES # BLD AUTO: 4.3 X10E3/UL (ref 0.7–3.1)
LYMPHOCYTES NFR BLD AUTO: 23 %
MCH RBC QN AUTO: 34.5 PG (ref 26.6–33)
MCHC RBC AUTO-ENTMCNC: 32.1 G/DL (ref 31.5–35.7)
MCV RBC AUTO: 107 FL (ref 79–97)
MEGAKARYOCYTES NFR BLD: 1 % (ref 0–0)
MONOCYTES # BLD AUTO: 7.1 X10E3/UL (ref 0.1–0.9)
MONOCYTES NFR BLD AUTO: 38 %
MORPHOLOGY BLD-IMP: ABNORMAL
NEUTROPHILS # BLD AUTO: 6.4 X10E3/UL (ref 1.4–7)
NEUTROPHILS NFR BLD AUTO: 34 %
NRBC BLD AUTO-RTO: 1 % (ref 0–0)
PLATELET # BLD AUTO: 138 X10E3/UL (ref 150–450)
RBC # BLD AUTO: 2.61 X10E6/UL (ref 3.77–5.28)
WBC # BLD AUTO: 18.8 X10E3/UL (ref 3.4–10.8)

## 2020-07-15 PROCEDURE — 3331090001 HH PPS REVENUE CREDIT

## 2020-07-15 PROCEDURE — 3331090002 HH PPS REVENUE DEBIT

## 2020-07-15 NOTE — TELEPHONE ENCOUNTER
Please call Jaylan Guadarrama from [facility] needs nurse to call regarding labs and instructions for Pt

## 2020-07-16 ENCOUNTER — HOME CARE VISIT (OUTPATIENT)
Dept: SCHEDULING | Facility: HOME HEALTH | Age: 85
End: 2020-07-16
Payer: MEDICARE

## 2020-07-16 VITALS
RESPIRATION RATE: 18 BRPM | TEMPERATURE: 97.8 F | HEART RATE: 85 BPM | SYSTOLIC BLOOD PRESSURE: 120 MMHG | DIASTOLIC BLOOD PRESSURE: 68 MMHG | OXYGEN SATURATION: 98 %

## 2020-07-16 PROCEDURE — G0151 HHCP-SERV OF PT,EA 15 MIN: HCPCS

## 2020-07-16 PROCEDURE — 3331090002 HH PPS REVENUE DEBIT

## 2020-07-16 PROCEDURE — G0300 HHS/HOSPICE OF LPN EA 15 MIN: HCPCS

## 2020-07-16 PROCEDURE — 3331090001 HH PPS REVENUE CREDIT

## 2020-07-16 RX ORDER — CLOTRIMAZOLE AND BETAMETHASONE DIPROPIONATE 10; .64 MG/G; MG/G
CREAM TOPICAL 2 TIMES DAILY
Qty: 45 G | Refills: 0 | Status: SHIPPED | OUTPATIENT
Start: 2020-07-16 | End: 2020-08-31 | Stop reason: ALTCHOICE

## 2020-07-16 NOTE — TELEPHONE ENCOUNTER
Sterling Perez with home care notified of recommendations and will notify patient Rx will be sent to her pharmacy    Requested Prescriptions     Pending Prescriptions Disp Refills    clotrimazole-betamethasone (LOTRISONE) topical cream 45 g 0     Sig: Apply  to affected area two (2) times a day.

## 2020-07-16 NOTE — TELEPHONE ENCOUNTER
She should use Imodium to stop the watery stools    Can prescribe Lotrisone cream applied twice daily number 45 g

## 2020-07-16 NOTE — TELEPHONE ENCOUNTER
States patient has a really bad rash in the groin area. States it looks a little yeasty. Also she has been having watery stools.     [facility]t [facility] - [phone]

## 2020-07-17 VITALS
TEMPERATURE: 98.8 F | SYSTOLIC BLOOD PRESSURE: 126 MMHG | OXYGEN SATURATION: 98 % | RESPIRATION RATE: 18 BRPM | HEART RATE: 87 BPM | DIASTOLIC BLOOD PRESSURE: 60 MMHG

## 2020-07-17 PROCEDURE — 3331090001 HH PPS REVENUE CREDIT

## 2020-07-17 PROCEDURE — 3331090002 HH PPS REVENUE DEBIT

## 2020-07-18 PROCEDURE — 3331090002 HH PPS REVENUE DEBIT

## 2020-07-18 PROCEDURE — 3331090001 HH PPS REVENUE CREDIT

## 2020-07-19 PROCEDURE — 3331090001 HH PPS REVENUE CREDIT

## 2020-07-19 PROCEDURE — 3331090002 HH PPS REVENUE DEBIT

## 2020-07-20 ENCOUNTER — APPOINTMENT (OUTPATIENT)
Dept: CT IMAGING | Age: 85
DRG: 378 | End: 2020-07-20
Attending: EMERGENCY MEDICINE
Payer: MEDICARE

## 2020-07-20 ENCOUNTER — HOME CARE VISIT (OUTPATIENT)
Dept: SCHEDULING | Facility: HOME HEALTH | Age: 85
End: 2020-07-20
Payer: MEDICARE

## 2020-07-20 ENCOUNTER — TELEPHONE (OUTPATIENT)
Dept: INTERNAL MEDICINE CLINIC | Age: 85
End: 2020-07-20

## 2020-07-20 ENCOUNTER — HOSPITAL ENCOUNTER (INPATIENT)
Age: 85
LOS: 3 days | Discharge: HOME HEALTH | DRG: 378 | End: 2020-07-23
Attending: EMERGENCY MEDICINE | Admitting: INTERNAL MEDICINE
Payer: MEDICARE

## 2020-07-20 ENCOUNTER — HOME CARE VISIT (OUTPATIENT)
Dept: HOME HEALTH SERVICES | Facility: HOME HEALTH | Age: 85
End: 2020-07-20
Payer: MEDICARE

## 2020-07-20 VITALS
RESPIRATION RATE: 18 BRPM | TEMPERATURE: 99.1 F | OXYGEN SATURATION: 98 % | SYSTOLIC BLOOD PRESSURE: 120 MMHG | DIASTOLIC BLOOD PRESSURE: 70 MMHG | HEART RATE: 80 BPM

## 2020-07-20 DIAGNOSIS — K92.2 GASTROINTESTINAL HEMORRHAGE, UNSPECIFIED GASTROINTESTINAL HEMORRHAGE TYPE: Primary | ICD-10-CM

## 2020-07-20 DIAGNOSIS — D69.6 THROMBOCYTOPENIA (HCC): ICD-10-CM

## 2020-07-20 DIAGNOSIS — D62 ANEMIA DUE TO ACUTE BLOOD LOSS: ICD-10-CM

## 2020-07-20 DIAGNOSIS — K57.90 DIVERTICULOSIS: ICD-10-CM

## 2020-07-20 LAB
ALBUMIN SERPL-MCNC: 3.6 G/DL (ref 3.5–5)
ALBUMIN/GLOB SERPL: 0.9 {RATIO} (ref 1.1–2.2)
ALP SERPL-CCNC: 61 U/L (ref 45–117)
ALT SERPL-CCNC: 27 U/L (ref 12–78)
ANION GAP SERPL CALC-SCNC: 6 MMOL/L (ref 5–15)
AST SERPL-CCNC: 14 U/L (ref 15–37)
BASOPHILS # BLD: 0.1 K/UL (ref 0–0.1)
BASOPHILS NFR BLD: 1 % (ref 0–1)
BILIRUB SERPL-MCNC: 0.8 MG/DL (ref 0.2–1)
BUN SERPL-MCNC: 14 MG/DL (ref 6–20)
BUN/CREAT SERPL: 21 (ref 12–20)
CALCIUM SERPL-MCNC: 8.1 MG/DL (ref 8.5–10.1)
CHLORIDE SERPL-SCNC: 113 MMOL/L (ref 97–108)
CO2 SERPL-SCNC: 22 MMOL/L (ref 21–32)
COMMENT, HOLDF: NORMAL
CREAT SERPL-MCNC: 0.67 MG/DL (ref 0.55–1.02)
DIFFERENTIAL METHOD BLD: ABNORMAL
EOSINOPHIL # BLD: 0.3 K/UL (ref 0–0.4)
EOSINOPHIL NFR BLD: 4 % (ref 0–7)
ERYTHROCYTE [DISTWIDTH] IN BLOOD BY AUTOMATED COUNT: 24.8 % (ref 11.5–14.5)
GLOBULIN SER CALC-MCNC: 3.8 G/DL (ref 2–4)
GLUCOSE SERPL-MCNC: 116 MG/DL (ref 65–100)
HCT VFR BLD AUTO: 23 % (ref 35–47)
HEMOCCULT STL QL: POSITIVE
HGB BLD-MCNC: 6.6 G/DL (ref 11.5–16)
IMM GRANULOCYTES # BLD AUTO: 0.5 K/UL (ref 0–0.04)
IMM GRANULOCYTES NFR BLD AUTO: 6 % (ref 0–0.5)
INR PPP: 1 (ref 0.9–1.1)
LYMPHOCYTES # BLD: 2.7 K/UL (ref 0.8–3.5)
LYMPHOCYTES NFR BLD: 32 % (ref 12–49)
MCH RBC QN AUTO: 34.9 PG (ref 26–34)
MCHC RBC AUTO-ENTMCNC: 28.7 G/DL (ref 30–36.5)
MCV RBC AUTO: 121.7 FL (ref 80–99)
MONOCYTES # BLD: 2.1 K/UL (ref 0–1)
MONOCYTES NFR BLD: 25 % (ref 5–13)
NEUTS SEG # BLD: 2.7 K/UL (ref 1.8–8)
NEUTS SEG NFR BLD: 32 % (ref 32–75)
NRBC # BLD: 0.04 K/UL (ref 0–0.01)
NRBC BLD-RTO: 0.5 PER 100 WBC
PLATELET # BLD AUTO: 79 K/UL (ref 150–400)
PMV BLD AUTO: 10.7 FL (ref 8.9–12.9)
POTASSIUM SERPL-SCNC: 4 MMOL/L (ref 3.5–5.1)
PROT SERPL-MCNC: 7.4 G/DL (ref 6.4–8.2)
PROTHROMBIN TIME: 10.5 SEC (ref 9–11.1)
RBC # BLD AUTO: 1.89 M/UL (ref 3.8–5.2)
RBC MORPH BLD: ABNORMAL
SAMPLES BEING HELD,HOLD: NORMAL
SODIUM SERPL-SCNC: 141 MMOL/L (ref 136–145)
WBC # BLD AUTO: 8.4 K/UL (ref 3.6–11)

## 2020-07-20 PROCEDURE — 82272 OCCULT BLD FECES 1-3 TESTS: CPT

## 2020-07-20 PROCEDURE — 85025 COMPLETE CBC W/AUTO DIFF WBC: CPT

## 2020-07-20 PROCEDURE — 36430 TRANSFUSION BLD/BLD COMPNT: CPT

## 2020-07-20 PROCEDURE — 74011000250 HC RX REV CODE- 250: Performed by: EMERGENCY MEDICINE

## 2020-07-20 PROCEDURE — 86900 BLOOD TYPING SEROLOGIC ABO: CPT

## 2020-07-20 PROCEDURE — P9016 RBC LEUKOCYTES REDUCED: HCPCS

## 2020-07-20 PROCEDURE — 30233N1 TRANSFUSION OF NONAUTOLOGOUS RED BLOOD CELLS INTO PERIPHERAL VEIN, PERCUTANEOUS APPROACH: ICD-10-PCS | Performed by: INTERNAL MEDICINE

## 2020-07-20 PROCEDURE — 99285 EMERGENCY DEPT VISIT HI MDM: CPT

## 2020-07-20 PROCEDURE — 3331090002 HH PPS REVENUE DEBIT

## 2020-07-20 PROCEDURE — G0299 HHS/HOSPICE OF RN EA 15 MIN: HCPCS

## 2020-07-20 PROCEDURE — 74011000258 HC RX REV CODE- 258: Performed by: RADIOLOGY

## 2020-07-20 PROCEDURE — C9113 INJ PANTOPRAZOLE SODIUM, VIA: HCPCS | Performed by: EMERGENCY MEDICINE

## 2020-07-20 PROCEDURE — 65660000000 HC RM CCU STEPDOWN

## 2020-07-20 PROCEDURE — 85610 PROTHROMBIN TIME: CPT

## 2020-07-20 PROCEDURE — 74011636320 HC RX REV CODE- 636/320: Performed by: RADIOLOGY

## 2020-07-20 PROCEDURE — 96374 THER/PROPH/DIAG INJ IV PUSH: CPT

## 2020-07-20 PROCEDURE — 3331090001 HH PPS REVENUE CREDIT

## 2020-07-20 PROCEDURE — 36415 COLL VENOUS BLD VENIPUNCTURE: CPT

## 2020-07-20 PROCEDURE — 80053 COMPREHEN METABOLIC PANEL: CPT

## 2020-07-20 PROCEDURE — 86923 COMPATIBILITY TEST ELECTRIC: CPT

## 2020-07-20 PROCEDURE — 74178 CT ABD&PLV WO CNTR FLWD CNTR: CPT

## 2020-07-20 PROCEDURE — 96361 HYDRATE IV INFUSION ADD-ON: CPT

## 2020-07-20 PROCEDURE — 74011250636 HC RX REV CODE- 250/636: Performed by: EMERGENCY MEDICINE

## 2020-07-20 RX ORDER — IPRATROPIUM BROMIDE AND ALBUTEROL SULFATE 2.5; .5 MG/3ML; MG/3ML
3 SOLUTION RESPIRATORY (INHALATION)
Status: DISCONTINUED | OUTPATIENT
Start: 2020-07-20 | End: 2020-07-23 | Stop reason: HOSPADM

## 2020-07-20 RX ORDER — SODIUM CHLORIDE 0.9 % (FLUSH) 0.9 %
5-40 SYRINGE (ML) INJECTION EVERY 8 HOURS
Status: DISCONTINUED | OUTPATIENT
Start: 2020-07-20 | End: 2020-07-23 | Stop reason: HOSPADM

## 2020-07-20 RX ORDER — ACETAMINOPHEN 325 MG/1
650 TABLET ORAL
Status: DISCONTINUED | OUTPATIENT
Start: 2020-07-20 | End: 2020-07-23 | Stop reason: HOSPADM

## 2020-07-20 RX ORDER — SODIUM CHLORIDE 0.9 % (FLUSH) 0.9 %
5-40 SYRINGE (ML) INJECTION AS NEEDED
Status: DISCONTINUED | OUTPATIENT
Start: 2020-07-20 | End: 2020-07-23 | Stop reason: HOSPADM

## 2020-07-20 RX ORDER — SODIUM CHLORIDE 9 MG/ML
250 INJECTION, SOLUTION INTRAVENOUS AS NEEDED
Status: DISCONTINUED | OUTPATIENT
Start: 2020-07-20 | End: 2020-07-23 | Stop reason: HOSPADM

## 2020-07-20 RX ORDER — SODIUM CHLORIDE 0.9 % (FLUSH) 0.9 %
10 SYRINGE (ML) INJECTION
Status: COMPLETED | OUTPATIENT
Start: 2020-07-20 | End: 2020-07-20

## 2020-07-20 RX ORDER — ONDANSETRON 2 MG/ML
4 INJECTION INTRAMUSCULAR; INTRAVENOUS
Status: DISCONTINUED | OUTPATIENT
Start: 2020-07-20 | End: 2020-07-23 | Stop reason: HOSPADM

## 2020-07-20 RX ORDER — FOLIC ACID 1 MG/1
1 TABLET ORAL DAILY
Status: DISCONTINUED | OUTPATIENT
Start: 2020-07-21 | End: 2020-07-23 | Stop reason: HOSPADM

## 2020-07-20 RX ORDER — DILTIAZEM HYDROCHLORIDE 240 MG/1
240 CAPSULE, COATED, EXTENDED RELEASE ORAL DAILY
Status: DISCONTINUED | OUTPATIENT
Start: 2020-07-21 | End: 2020-07-21

## 2020-07-20 RX ADMIN — Medication 10 ML: at 19:23

## 2020-07-20 RX ADMIN — SODIUM CHLORIDE 1000 ML: 900 INJECTION, SOLUTION INTRAVENOUS at 19:47

## 2020-07-20 RX ADMIN — SODIUM CHLORIDE 40 MG: 9 INJECTION INTRAMUSCULAR; INTRAVENOUS; SUBCUTANEOUS at 19:54

## 2020-07-20 RX ADMIN — IOPAMIDOL 100 ML: 755 INJECTION, SOLUTION INTRAVENOUS at 19:23

## 2020-07-20 RX ADMIN — SODIUM CHLORIDE 100 ML: 900 INJECTION, SOLUTION INTRAVENOUS at 19:24

## 2020-07-20 NOTE — TELEPHONE ENCOUNTER
Home Health nurse called- Patient has had 8 stools since 3 am with bright red blood. No history of hemorrhoids. Also rash has not improved with Lotrisone.

## 2020-07-21 ENCOUNTER — APPOINTMENT (OUTPATIENT)
Dept: GENERAL RADIOLOGY | Age: 85
DRG: 378 | End: 2020-07-21
Attending: INTERNAL MEDICINE
Payer: MEDICARE

## 2020-07-21 ENCOUNTER — ANESTHESIA EVENT (OUTPATIENT)
Dept: ENDOSCOPY | Age: 85
DRG: 378 | End: 2020-07-21
Payer: MEDICARE

## 2020-07-21 ENCOUNTER — ANESTHESIA (OUTPATIENT)
Dept: ENDOSCOPY | Age: 85
DRG: 378 | End: 2020-07-21
Payer: MEDICARE

## 2020-07-21 LAB
ALBUMIN SERPL-MCNC: 3 G/DL (ref 3.5–5)
ALBUMIN/GLOB SERPL: 0.9 {RATIO} (ref 1.1–2.2)
ALP SERPL-CCNC: 47 U/L (ref 45–117)
ALT SERPL-CCNC: 19 U/L (ref 12–78)
ANION GAP SERPL CALC-SCNC: 7 MMOL/L (ref 5–15)
AST SERPL-CCNC: 9 U/L (ref 15–37)
BASOPHILS # BLD: 0 K/UL (ref 0–0.1)
BASOPHILS NFR BLD: 0 % (ref 0–1)
BILIRUB SERPL-MCNC: 1.1 MG/DL (ref 0.2–1)
BUN SERPL-MCNC: 10 MG/DL (ref 6–20)
BUN/CREAT SERPL: 20 (ref 12–20)
CALCIUM SERPL-MCNC: 7.6 MG/DL (ref 8.5–10.1)
CHLORIDE SERPL-SCNC: 113 MMOL/L (ref 97–108)
CHOLEST SERPL-MCNC: 94 MG/DL
CO2 SERPL-SCNC: 23 MMOL/L (ref 21–32)
CREAT SERPL-MCNC: 0.51 MG/DL (ref 0.55–1.02)
DIFFERENTIAL METHOD BLD: ABNORMAL
EOSINOPHIL # BLD: 0.1 K/UL (ref 0–0.4)
EOSINOPHIL NFR BLD: 2 % (ref 0–7)
ERYTHROCYTE [DISTWIDTH] IN BLOOD BY AUTOMATED COUNT: 24.3 % (ref 11.5–14.5)
FERRITIN SERPL-MCNC: 153 NG/ML (ref 26–388)
FOLATE SERPL-MCNC: 22.8 NG/ML (ref 5–21)
GLOBULIN SER CALC-MCNC: 3.2 G/DL (ref 2–4)
GLUCOSE SERPL-MCNC: 92 MG/DL (ref 65–100)
HCT VFR BLD AUTO: 21.2 % (ref 35–47)
HCT VFR BLD AUTO: 28.3 % (ref 35–47)
HDLC SERPL-MCNC: 37 MG/DL
HDLC SERPL: 2.5 {RATIO} (ref 0–5)
HGB BLD-MCNC: 6.5 G/DL (ref 11.5–16)
HGB BLD-MCNC: 8.6 G/DL (ref 11.5–16)
IMM GRANULOCYTES # BLD AUTO: 0 K/UL
IMM GRANULOCYTES NFR BLD AUTO: 0 %
IRON SERPL-MCNC: 157 UG/DL (ref 35–150)
LDLC SERPL CALC-MCNC: 43 MG/DL (ref 0–100)
LIPASE SERPL-CCNC: 86 U/L (ref 73–393)
LIPID PROFILE,FLP: NORMAL
LYMPHOCYTES # BLD: 2 K/UL (ref 0.8–3.5)
LYMPHOCYTES NFR BLD: 28 % (ref 12–49)
MAGNESIUM SERPL-MCNC: 1.8 MG/DL (ref 1.6–2.4)
MCH RBC QN AUTO: 34.9 PG (ref 26–34)
MCHC RBC AUTO-ENTMCNC: 30.7 G/DL (ref 30–36.5)
MCV RBC AUTO: 114 FL (ref 80–99)
MONOCYTES # BLD: 1.8 K/UL (ref 0–1)
MONOCYTES NFR BLD: 24 % (ref 5–13)
NEUTS BAND NFR BLD MANUAL: 1 % (ref 0–6)
NEUTS SEG # BLD: 3.4 K/UL (ref 1.8–8)
NEUTS SEG NFR BLD: 45 % (ref 32–75)
NRBC # BLD: 0.02 K/UL (ref 0–0.01)
NRBC BLD-RTO: 0.3 PER 100 WBC
PHOSPHATE SERPL-MCNC: 2.7 MG/DL (ref 2.6–4.7)
PLATELET # BLD AUTO: 64 K/UL (ref 150–400)
PMV BLD AUTO: 9.8 FL (ref 8.9–12.9)
POTASSIUM SERPL-SCNC: 3.5 MMOL/L (ref 3.5–5.1)
PROT SERPL-MCNC: 6.2 G/DL (ref 6.4–8.2)
RBC # BLD AUTO: 1.86 M/UL (ref 3.8–5.2)
RBC MORPH BLD: ABNORMAL
RBC MORPH BLD: ABNORMAL
SODIUM SERPL-SCNC: 143 MMOL/L (ref 136–145)
TRIGL SERPL-MCNC: 70 MG/DL (ref ?–150)
TROPONIN I SERPL-MCNC: <0.05 NG/ML
TSH SERPL DL<=0.05 MIU/L-ACNC: 2.26 UIU/ML (ref 0.36–3.74)
VIT B12 SERPL-MCNC: 1301 PG/ML (ref 193–986)
VLDLC SERPL CALC-MCNC: 14 MG/DL
WBC # BLD AUTO: 7.3 K/UL (ref 3.6–11)

## 2020-07-21 PROCEDURE — 36430 TRANSFUSION BLD/BLD COMPNT: CPT

## 2020-07-21 PROCEDURE — 84484 ASSAY OF TROPONIN QUANT: CPT

## 2020-07-21 PROCEDURE — 82728 ASSAY OF FERRITIN: CPT

## 2020-07-21 PROCEDURE — 74011250636 HC RX REV CODE- 250/636: Performed by: INTERNAL MEDICINE

## 2020-07-21 PROCEDURE — 3331090002 HH PPS REVENUE DEBIT

## 2020-07-21 PROCEDURE — 85018 HEMOGLOBIN: CPT

## 2020-07-21 PROCEDURE — 85025 COMPLETE CBC W/AUTO DIFF WBC: CPT

## 2020-07-21 PROCEDURE — 83735 ASSAY OF MAGNESIUM: CPT

## 2020-07-21 PROCEDURE — C9113 INJ PANTOPRAZOLE SODIUM, VIA: HCPCS | Performed by: INTERNAL MEDICINE

## 2020-07-21 PROCEDURE — 82607 VITAMIN B-12: CPT

## 2020-07-21 PROCEDURE — 71045 X-RAY EXAM CHEST 1 VIEW: CPT

## 2020-07-21 PROCEDURE — 80061 LIPID PANEL: CPT

## 2020-07-21 PROCEDURE — 65660000000 HC RM CCU STEPDOWN

## 2020-07-21 PROCEDURE — 82746 ASSAY OF FOLIC ACID SERUM: CPT

## 2020-07-21 PROCEDURE — 83690 ASSAY OF LIPASE: CPT

## 2020-07-21 PROCEDURE — 36415 COLL VENOUS BLD VENIPUNCTURE: CPT

## 2020-07-21 PROCEDURE — 84443 ASSAY THYROID STIM HORMONE: CPT

## 2020-07-21 PROCEDURE — 84100 ASSAY OF PHOSPHORUS: CPT

## 2020-07-21 PROCEDURE — 3331090001 HH PPS REVENUE CREDIT

## 2020-07-21 PROCEDURE — 74011000250 HC RX REV CODE- 250: Performed by: INTERNAL MEDICINE

## 2020-07-21 PROCEDURE — 74011250637 HC RX REV CODE- 250/637: Performed by: INTERNAL MEDICINE

## 2020-07-21 PROCEDURE — 83540 ASSAY OF IRON: CPT

## 2020-07-21 PROCEDURE — 80053 COMPREHEN METABOLIC PANEL: CPT

## 2020-07-21 PROCEDURE — P9016 RBC LEUKOCYTES REDUCED: HCPCS

## 2020-07-21 RX ORDER — VIT A/VIT C/VIT E/ZINC/COPPER 4296-226
1 CAPSULE ORAL DAILY
COMMUNITY
End: 2021-02-20

## 2020-07-21 RX ORDER — SODIUM CHLORIDE 9 MG/ML
INJECTION, SOLUTION INTRAVENOUS
Status: SHIPPED | OUTPATIENT
Start: 2020-07-21

## 2020-07-21 RX ORDER — SENNOSIDES 25 MG/1
TABLET, FILM COATED ORAL
COMMUNITY

## 2020-07-21 RX ORDER — DILTIAZEM HYDROCHLORIDE EXTENDED-RELEASE TABLETS 240 MG/1
240 TABLET, EXTENDED RELEASE ORAL DAILY
COMMUNITY

## 2020-07-21 RX ORDER — SODIUM CHLORIDE 9 MG/ML
250 INJECTION, SOLUTION INTRAVENOUS AS NEEDED
Status: DISCONTINUED | OUTPATIENT
Start: 2020-07-21 | End: 2020-07-23 | Stop reason: HOSPADM

## 2020-07-21 RX ORDER — SODIUM CHLORIDE, SODIUM LACTATE, POTASSIUM CHLORIDE, CALCIUM CHLORIDE 600; 310; 30; 20 MG/100ML; MG/100ML; MG/100ML; MG/100ML
75 INJECTION, SOLUTION INTRAVENOUS CONTINUOUS
Status: DISCONTINUED | OUTPATIENT
Start: 2020-07-21 | End: 2020-07-22

## 2020-07-21 RX ORDER — HYDRALAZINE HYDROCHLORIDE 20 MG/ML
10 INJECTION INTRAMUSCULAR; INTRAVENOUS
Status: DISCONTINUED | OUTPATIENT
Start: 2020-07-21 | End: 2020-07-23 | Stop reason: HOSPADM

## 2020-07-21 RX ADMIN — SODIUM CHLORIDE: 9 INJECTION, SOLUTION INTRAVENOUS at 12:22

## 2020-07-21 RX ADMIN — SODIUM CHLORIDE, SODIUM LACTATE, POTASSIUM CHLORIDE, AND CALCIUM CHLORIDE 75 ML/HR: 600; 310; 30; 20 INJECTION, SOLUTION INTRAVENOUS at 16:19

## 2020-07-21 RX ADMIN — SODIUM CHLORIDE 40 MG: 9 INJECTION INTRAMUSCULAR; INTRAVENOUS; SUBCUTANEOUS at 20:22

## 2020-07-21 RX ADMIN — Medication 10 ML: at 14:59

## 2020-07-21 RX ADMIN — FOLIC ACID 1 MG: 1 TABLET ORAL at 14:58

## 2020-07-21 RX ADMIN — SODIUM CHLORIDE 40 MG: 9 INJECTION INTRAMUSCULAR; INTRAVENOUS; SUBCUTANEOUS at 14:59

## 2020-07-21 RX ADMIN — SODIUM CHLORIDE, SODIUM LACTATE, POTASSIUM CHLORIDE, AND CALCIUM CHLORIDE 75 ML/HR: 600; 310; 30; 20 INJECTION, SOLUTION INTRAVENOUS at 05:22

## 2020-07-21 NOTE — ANESTHESIA PREPROCEDURE EVALUATION
Relevant Problems   No relevant active problems       Anesthetic History   No history of anesthetic complications            Review of Systems / Medical History  Patient summary reviewed, nursing notes reviewed and pertinent labs reviewed    Pulmonary    COPD ( home O2 at night prn)               Neuro/Psych   Within defined limits           Cardiovascular            Dysrhythmias : atrial fibrillation      Exercise tolerance: >4 METS     GI/Hepatic/Renal     GERD: well controlled      PUD     Endo/Other        Obesity and cancer     Other Findings                 Anesthesia Plan

## 2020-07-21 NOTE — PROGRESS NOTES
Admission Medication Reconciliation:    Information obtained from:  patient, patient's , rx query, Anju Onofre   Address: [place], [address]   Phone: [phone]  RxQuery data available¹:  YES    Comments/Recommendations: Updated PTA meds/reviewed patient's allergies. 1)  Spoke with patient and her . Patient was unsure of diltiazem dose. Called [facility] which is the only pharmacy she fills her medications at. Last fill of diltiazem was in November 2019 for a 90 day supply of Diltiazem 240 mg ER once daily. 2)  Medication changes (since last review): Added  - preservision, lidocaine cream    Adjusted  - diltiazem from 180 to 240 - please see corresponding notes above    Removed  - hydroxyurea     ¹RxQuery pharmacy benefit data reflects medications filled and processed through the patient's insurance, however   this data does NOT capture whether the medication was picked up or is currently being taken by the patient. Allergies:  Latex;  Hydrocodone; Gabapentin; Lyrica [pregabalin]; and Oxycodone    Significant PMH/Disease States:   Past Medical History:   Diagnosis Date    Anemia     Arthritis     Breast cancer ([facility] [address]     left    Bunion of right foot 8/20/2015    Chronic obstructive pulmonary disease (HCC)     mild    Chronic pain     back--uses tens unit    Diverticulitis 6/29/2018    Recurrent    Diverticulitis large intestine     Dry eye syndrome 6/29/2018    Fibromyalgia 6/29/2018    GERD (gastroesophageal reflux disease)     History of left breast cancer 2013    lumpectomy radiation    Hypercholesterolemia 6/29/2018    Ill-defined condition     blood transfusion hx    Leukemia (HCC)     MDS (myelodysplastic syndrome) ([address]     Osteoporosis 6/29/2018    Oxygen dependent     at night    Peripheral neuropathy 6/29/2018    Prediabetes 6/29/2018    PUD (peptic ulcer disease)     Radiation therapy complication 4647    Lt breast-No Chemo    Rosacea 6/29/2018    Trouble in sleeping      Chief Complaint for this Admission:    Chief Complaint   Patient presents with    Melena     Prior to Admission Medications:   Prior to Admission Medications   Prescriptions Last Dose Informant Taking?   acetaminophen (TYLENOL) 325 mg tablet  Self Yes   Sig: Take 2 Tabs by mouth every four (4) hours as needed for Pain. bismuth subsalicylate (PEPTO-BISMOL PO)   Yes   Sig: Take 30 mL by mouth daily as needed for Diarrhea. take one dose every hour as needed   clotrimazole-betamethasone (LOTRISONE) topical cream   Yes   Sig: Apply  to affected area two (2) times a day. diphenoxylate-atropine (LOMOTIL) 2.5-0.025 mg per tablet   Yes   Sig: Take 1 Tab by mouth three (3) times daily. Max Daily Amount: 3 Tabs. Take 1-2 tabs as needed up to 3 times daily to reduce loose stool   ergocalciferol (ERGOCALCIFEROL) 1,250 mcg (50,000 unit) capsule 7/19/2020 Self Yes   Sig: TAKE ONE CAPSULE BY MOUTH EVERY 7 DAYS   folic acid (FOLVITE) 1 mg tablet   Yes   Sig: Take 1 Tab by mouth daily. ondansetron hcl (Zofran) 4 mg tablet   Yes   Sig: Take 4 mg by mouth every eight (8) hours as needed for Nausea or Vomiting. pantoprazole (PROTONIX) 40 mg tablet  Self Yes   Sig: Take 1 Tab by mouth Before breakfast and dinner. Facility-Administered Medications: None       Please contact the main inpatient pharmacy with any questions or concerns at [phone] and we will direct you to the clinical pharmacist covering this patient's care while in-house.    Ene Ordonez

## 2020-07-21 NOTE — H&P
[address] [facility]  HISTORY AND PHYSICAL    Name:  Cassius Granados  MR#:  [id number]  :  1935  ACCOUNT #:  [de-identified]  ADMIT DATE:  2020      The patient was seen, evaluated and admitted by me on 2020. PRIMARY CARE PHYSICIAN:  Maru Landrum MD    SOURCE OF INFORMATION:  Patient. CHIEF COMPLAINT:  Passing blood in the stool. HISTORY OF PRESENT ILLNESS:  This is an 28-year-old woman with past medical history significant for COPD, hypertension, paroxysmal atrial fibrillation, myelodysplastic syndrome, supraglottic mass, status post tracheostomy with biopsy was in her usual state of health until yesterday when the patient started passing blood in stool. The patient described her stool as dark surrounded by bright red blood. The bleeding was significant enough to fill the toilet bowl. No associated abdominal pain. No nausea or vomiting. The patient was recently admitted to this hospital from 2020 to 2020. She was admitted with a supraglottic mass, seen by ENT surgeon, and the patient subsequently underwent tracheostomy with biopsy. It was stated that the supraglottic mass was benign. The patient was discharged home on oral      antibiotics. During that hospitalization, the patient developed diarrhea attributed to the antibiotic therapy and was seen by gastroenterologist during the hospitalization. Also, the patient underwent EGD in 2020 that shows ulcer at the gastroesophageal junction. The patient denies taking over-the-counter nonsteroidal anti-inflammatory drugs. The patient is not on aspirin therapy. The patient has history of anemia for which the patient has been transfused in the past because of myelodysplastic syndrome. When the patient arrived at the emergency room, the patient was found to have hemoglobin of 6.6. The CT scan of the abdomen and pelvis did not show any evidence of active hemorrhage. The patient was started on Protonix. The emergency room physician consulted gastroenterologist on-call. Admission to the hospital was advised for possible EGD the following day. The patient was then referred to the hospitalist service for that purpose. The patient denies fever, rigors and chills. PAST MEDICAL HISTORY:  COPD, hypertension, paroxysmal atrial fibrillation, myelodysplastic syndrome, supraglottic mass, status post tracheostomy. ALLERGIES:  THE PATIENT IS ALLERGIC TO LATEX, HYDROCODONE, LYRICA, OXYCODONE, AND GABAPENTIN. CURRENT MEDICATIONS:  1. Tylenol 650 mg every 4 hours as needed for pain. 2.  Pepto-Bismol 30 mL as needed for diarrhea. 3.  Cardizem  mg daily. 4.  Lomotil 1 tablet three times daily. 5.  Folic acid 1 mg daily. 6.  Hydrea 500 mg 2 capsules two times daily. 7.  Zofran 4 mg every 8 hours as needed for nausea and vomiting. 8.  Protonix 40 mg daily. FAMILY HISTORY:  This was reviewed. Mother had bladder cancer. Father had cancer, the type of cancer is not known. PAST SURGICAL HISTORY:  This is significant for left breast lumpectomy, appendectomy, and hysterectomy. SOCIAL HISTORY:  The patient is a former smoker. The patient admits to social consumption of alcohol. REVIEW OF SYSTEMS:  HEAD, EYES, EARS, NOSE AND THROAT:  No headache, no dizziness, no blurring of vision, and no photophobia. RESPIRATORY SYSTEM:  No cough. No shortness of breath. No hemoptysis. CARDIOVASCULAR SYSTEM:  No chest pain. No orthopnea. No palpitation. GASTROINTESTINAL SYSTEM:  This is positive for blood in the stool. No abdominal pain. No diarrhea. No constipation. GENITOURINARY SYSTEM:  No dysuria. No urgency. No frequency. All other systems are reviewed and they are negative. PHYSICAL EXAMINATION:  GENERAL APPEARANCE:  The patient appeared ill and in moderate distress.   VITAL SIGNS:  On arrival at the emergency room; temperature 98.4, pulse 90, respiratory rate 20, blood pressure 126/58, and oxygen saturation 97% on room air. HEAD:  Normocephalic, atraumatic. EYES:  Normal eye movements. No redness. No drainage. No discharge. EARS:  Normal external ears with no obvious drainage. NOSE:  No deformity. No drainage. MOUTH AND THROAT:  No visible oral lesion. Dry oral mucosa. NECK:  Neck is supple. No JVD. No thyromegaly. CHEST:  Clear breath sounds. No wheezing. No crackles. HEART:  Normal S1 and S2, regular. No clinically appreciable murmur. ABDOMEN:  Soft and nontender. Normal bowel sounds. CNS:  Alert and oriented x3. No gross focal neurological deficit. EXTREMITIES:  No edema. Pulses 2+ bilaterally. MUSCULOSKELETAL SYSTEM:  No obvious joint deformity and swelling. SKIN:  Tracheostomy stoma noted with intact dressing in the neck. PSYCHIATRY:  Normal mood and affect. LYMPHATIC SYSTEM:  No cervical lymphadenopathy. DIAGNOSTIC DATA:  The CT scan of the abdomen and pelvis, no evidence of active GI hemorrhage, diverticulosis without evidence of diverticulitis, unchanged left adrenal adenoma. LABORATORY DATA:  Chemistry; sodium 141, potassium 4.0, chloride 113, CO2 is 22, glucose 116, BUN 14, creatinine 0.67, calcium 8.1, total bilirubin 0.8, ALT 27, AST 14, alkaline phosphatase 61, total protein 7.4, albumin level 3.6, globulin at 3.8. Hematology; WBC 8.4, hemoglobin 6.6, hematocrit 23.0, and platelets 79. Coagulation profile; INR 1.0, PT 10.5. Stool occult blood positive. ASSESSMENT:  1. Acute upper gastrointestinal bleed. 2.  Acute blood loss anemia. 3.  Chronic obstructive pulmonary disease. 4.  Hypertension. 5.  Paroxysmal atrial fibrillation. 6.  Thrombocytopenia. 7.  Left adrenal adenoma. 8.  Myelodysplastic syndrome. 9.  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 PLAN:  1. Acute upper GI bleed. We will admit the patient for further evaluation and treatment. We will start the patient on Protonix.   We will await further recommendation from the gastroenterologist consulted by the emergency room physician. The patient will be n.p.o. in anticipation of EGD. 2.  Acute blood loss anemia. This is most likely as a result of the upper GI bleed. The patient's myelodysplastic syndrome could also be contributing to the anemia. We will continue with transfusion started in the emergency room. We will also carry out anemia workup. We will monitor the patient's hemoglobin and hematocrit closely. 3.  COPD. We will place the patient on DuoNeb as needed. We will place the patient on DuoNeb as needed as well as supplemental oxygen. 4.  Hypertension. We will resume preadmission medication and monitor the patient's blood pressure closely. 5.  Paroxysmal atrial fibrillation. The patient appeared to be in normal sinus rhythm. We will obtain EKG. We will check TSH level. 6.  Thrombocytopenia. This is most likely as a result of the patient's myelodysplastic syndrome. We will continue to monitor. 7.  Left adrenal adenoma. This is unchanged according to the CT scan of the abdomen and pelvis. The patient will require the followup by primary care physician. 8.  Myelodysplastic syndrome. We will continue supportive therapy. The patient will follow up with her hematologist upon discharge from the hospital.  9.  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 The patient had biopsy of the supraglottic mass. It was stated that the biopsy was benign. Appointment with the ENT surgeon is tomorrow. ENT consult will be requested so that the patient can be seen during this hospitalization. 10.  Other issues. Code status, the patient is a full code. We will request SCD for DVT prophylaxis. FUNCTIONAL STATUS PRIOR TO ADMISSION:  The patient came from home. The patient is ambulatory with a cane. COVID PRECAUTION:  The patient was wearing a face mask. I was wearing a face mask, cap, and gloves for this patient's encounter.       Brenda ZAZUETA Priscila Earl MD      [id number]  D:  07/21/2020 4:08  T:  07/21/2020 5:43  JOB #:  [id number]  CC:  Bisi Ventura MD

## 2020-07-21 NOTE — PROGRESS NOTES
[address] [facility]  Hospitalist Group                                                                                          Hospitalist Progress Note  Bronwyn Arnold NP  Answering service: [phone] or [phone] from in house phone        Date of Service:  2020  NAME:  Dev Acevedo  :  1935  MRN:  [id number]      Admission Summary: This is an 56-year-old woman with past medical history significant for COPD, hypertension, paroxysmal atrial fibrillation, myelodysplastic syndrome, supraglottic mass, status post tracheostomy with biopsy was in her usual state of health until yesterday when the patient started passing blood in stool. The patient described her stool as dark surrounded by bright red blood. The bleeding was significant enough to fill the toilet bowl. No associated abdominal pain. No nausea or vomiting. The patient was recently admitted to this hospital from 2020 to 2020. She was admitted with a supraglottic mass, seen by ENT surgeon, and the patient subsequently underwent tracheostomy with biopsy. It was stated that the supraglottic mass was benign. The patient was discharged home on oral      antibiotics. During that hospitalization, the patient developed diarrhea attributed to the antibiotic therapy and was seen by gastroenterologist during the hospitalization. Also, the patient underwent EGD in 2020 that shows ulcer at the gastroesophageal junction. The patient denies taking over-the-counter nonsteroidal anti-inflammatory drugs. The patient is not on aspirin therapy. The patient has history of anemia for which the patient has been transfused in the past because of myelodysplastic syndrome. When the patient arrived at the emergency room, the patient was found to have hemoglobin of 6.6. The CT scan of the abdomen and pelvis did not show any evidence of active hemorrhage. The patient was started on Protonix.   The emergency room physician consulted gastroenterologist on-call. Admission to the hospital was advised for possible EGD the following day. The patient was then referred to the hospitalist service for that purpose. The patient denies fever, rigors and chills. Interval history / Subjective:    Seen and examined patient for follow up of GI bleed.  at bedside. States that she is feeling good. Had one bowel movement in the ED last night, bright red blood noted. Has not had once since then. She denies any abdominal pain, nausea, or vomiting. Would like to have something to eat or drink. EGD was planned for today- Anesthesia requesting ENT consult for patient clearance. No acute distress noted. Denies any dizziness, syncope, shortness of breath, chest pain or tightness. Assessment & Plan:     Acute Upper GI bleed   - GI following, plans for EGD   - ENT consulted to clearance due to old trach site   - Monitor for bleeding     Acute blood loss anemia    - likely secondary to upper GI bleed  - Hgb 6.6 on arrival to ED   - Received 2 units of pRBCs in the ED   - Monitor H&H  - Will transfuse for hgb <7.0     Thrombocytopenia  - Likely seconday to myelodysplastic syndrome   - Platelets 64    - Monitor labs   - Monitor for bleeding     Paroxysmal atrial fibrillation   - Controlled, NSR on monitor.  - TSH - 2.26  - Will hold PO Cardizem.    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   - Old trach area- healing.   - ENT consulted     COPD  -Stable   - PRN Nebs       Code status: Full   DVT prophylaxis: SCDs    Care Plan discussed with: Patient/Family and Nurse  Anticipated Disposition: Home w/Family  Anticipated Discharge: 24 hours to 48 hours     Hospital Problems  Date Reviewed: 7/20/2020          Codes Class Noted POA    * (Principal) Acute upper GI bleed ICD-10-CM: K92.2  ICD-9-CM: 578.9  7/20/2020 Yes                Review of Systems:   A comprehensive review of systems was negative except for that written in the HPI. Vital Signs:    Last 24hrs VS reviewed since prior progress note. Most recent are:  Visit Vitals  /73 (BP 1 Location: Left arm, BP Patient Position: At rest)   Pulse 80   Temp 97.9 °F (36.6 °C)   Resp 16   Ht 5' 5\" (1.651 m)   Wt 83.9 kg (185 lb)   SpO2 96%   BMI 30.79 kg/m²         Intake/Output Summary (Last 24 hours) at 7/21/2020 1736  Last data filed at 7/21/2020 0945  Gross per 24 hour   Intake 1730.8 ml   Output    Net 1730.8 ml        Physical Examination:             Constitutional:  No acute distress, cooperative, pleasant    ENT:  Oral mucosa moist, oropharynx benign. Resp:  CTA bilaterally. No wheezing/rhonchi/rales. No accessory muscle use   CV:  Regular rhythm, normal rate, no murmurs, gallops, rubs    GI:  Soft, non distended, non tender. normoactive bowel sounds, no hepatosplenomegaly     Musculoskeletal:  No edema, warm, 2+ pulses throughout    Neurologic:  Moves all extremities. AAOx3, CN II-XII reviewed     Psych:  Good insight, Not anxious nor agitated.   Skin:  Good turgor, no rashes or ulcers and dry and warm to touch        Data Review:    Review and/or order of clinical lab test  Review and/or order of tests in the radiology section of CPT  Review and/or order of tests in the medicine section of CPT      Labs:     Recent Labs     07/21/20  0517 07/20/20  1701   WBC 7.3 8.4   HGB 6.5* 6.6*   HCT 21.2* 23.0*   PLT 64* 79*     Recent Labs     07/21/20  0517 07/20/20  1701    141   K 3.5 4.0   * 113*   CO2 23 22   BUN 10 14   CREA 0.51* 0.67   GLU 92 116*   CA 7.6* 8.1*   MG 1.8  --    PHOS 2.7  --      Recent Labs     07/21/20  0517 07/20/20  1701   ALT 19 27   AP 47 61 TBILI 1.1* 0.8   TP 6.2* 7.4   ALB 3.0* 3.6   GLOB 3.2 3.8   LPSE 86  --      Recent Labs     07/20/20  1701   INR 1.0   PTP 10.5      Recent Labs     07/21/20  0517   FERR 153      Lab Results   Component Value Date/Time    Folate 22.8 (H) 07/21/2020 05:17 AM      No results for input(s): PH, PCO2, PO2 in the last 72 hours.   Recent Labs     07/21/20  0517   TROIQ <0.05     Lab Results   Component Value Date/Time    Cholesterol, total 94 07/21/2020 05:17 AM    HDL Cholesterol 37 07/21/2020 05:17 AM    LDL, calculated 43 07/21/2020 05:17 AM    Triglyceride 70 07/21/2020 05:17 AM    CHOL/HDL Ratio 2.5 07/21/2020 05:17 AM     Lab Results   Component Value Date/Time    Glucose (POC) 132 (H) 07/03/2020 06:36 AM    Glucose (POC) 123 (H) 07/02/2020 11:25 PM    Glucose (POC) 159 (H) 06/26/2020 09:53 PM    Glucose (POC) 120 (H) 06/26/2020 08:31 PM    Glucose (POC) 127 (H) 11/04/2019 05:15 AM     Lab Results   Component Value Date/Time    Color YELLOW/STRAW 06/26/2020 02:19 PM    Appearance CLEAR 06/26/2020 02:19 PM    Specific gravity 1.016 06/26/2020 02:19 PM    Specific gravity 1.010 02/11/2020 03:12 AM    pH (UA) 6.0 06/26/2020 02:19 PM    Protein Negative 06/26/2020 02:19 PM    Glucose Negative 06/26/2020 02:19 PM    Ketone Negative 06/26/2020 02:19 PM    Bilirubin Negative 06/26/2020 02:19 PM    Urobilinogen 0.2 06/26/2020 02:19 PM    Nitrites Negative 06/26/2020 02:19 PM    Leukocyte Esterase SMALL (A) 06/26/2020 02:19 PM    Epithelial cells FEW 06/26/2020 02:19 PM    Bacteria Negative 06/26/2020 02:19 PM    WBC 0-4 06/26/2020 02:19 PM    RBC 0-5 06/26/2020 02:19 PM         Medications Reviewed:     Current Facility-Administered Medications   Medication Dose Route Frequency    lactated Ringers infusion  75 mL/hr IntraVENous CONTINUOUS    0.9% sodium chloride infusion 250 mL  250 mL IntraVENous PRN    0.9% sodium chloride infusion 250 mL  250 mL IntraVENous PRN    folic acid (FOLVITE) tablet 1 mg  1 mg Oral DAILY  sodium chloride (NS) flush 5-40 mL  5-40 mL IntraVENous Q8H    sodium chloride (NS) flush 5-40 mL  5-40 mL IntraVENous PRN    acetaminophen (TYLENOL) tablet 650 mg  650 mg Oral Q4H PRN    ondansetron (ZOFRAN) injection 4 mg  4 mg IntraVENous Q4H PRN    pantoprazole (PROTONIX) 40 mg in 0.9% sodium chloride 10 mL injection  40 mg IntraVENous Q12H    albuterol-ipratropium (DUO-NEB) 2.5 MG-0.5 MG/3 ML  3 mL Nebulization Q4H PRN     Facility-Administered Medications Ordered in Other Encounters   Medication Dose Route Frequency    0.9% sodium chloride infusion   IntraVENous CONTINUOUS     ______________________________________________________________________  EXPECTED LENGTH OF STAY: - - -  ACTUAL LENGTH OF STAY:          [id number] Sarina Riggins, NP

## 2020-07-21 NOTE — ED NOTES
Dr. Nghia Viramontes paged regarding h&h results. Spoke with Dr. Nghia Viramontes regarding h&h with blood transfusion ended at [phone] and lab work repeated at [place] [address]. Per MD, will place an order for 1 unit.

## 2020-07-21 NOTE — ED NOTES
Ambulatory to the restroom with cane and accompanied by RN. Reports episode of blood in stool report. Provided with clean depends.  Denies any cp or dizziness during ambulation

## 2020-07-21 NOTE — H&P
OTOLARYNGOLOGY - HEAD AND NECK SURGERY HISTORY AND PHYSICAL    Requesting Physician:    Effie Bee MD     CC:   Blood per rectum    HPI:     Lionel Mckeon is a 82 y.o. female seen today in consultation at the request of Dr. Vandana Young for airway eval.  Admitted earlier this morning for emergent tracheostomy following upper airway hemorrhage and acute edema. Her deep neck space infection and edema resolved with steroids and antibiotics. I removed her trach prior to discharge. Unfortunately back in hospital with GI bleed. Plan for colonoscopy and EGD. I was called to eval her pharynx and airway and give recommendations.     Past Medical History:   Diagnosis Date    Anemia     Arthritis     Breast cancer ([address]     left    Bunion of right foot 8/20/2015    Chronic obstructive pulmonary disease (HCC)     mild    Chronic pain     back--uses tens unit    Diverticulitis 6/29/2018    Recurrent    Diverticulitis large intestine     Dry eye syndrome 6/29/2018    Fibromyalgia 6/29/2018    GERD (gastroesophageal reflux disease)     History of left breast cancer 2013    lumpectomy radiation    Hypercholesterolemia 6/29/2018    Ill-defined condition     blood transfusion hx    Leukemia ([address]     MDS (myelodysplastic syndrome) ([address]     Osteoporosis 6/29/2018    Oxygen dependent     at night    Peripheral neuropathy 6/29/2018    Prediabetes 6/29/2018    PUD (peptic ulcer disease)     Radiation therapy complication 1249    Lt breast-No Chemo    Rosacea 6/29/2018    Trouble in sleeping      Past Surgical History:   Procedure Laterality Date    COLONOSCOPY N/A 2/28/2020    COL[facility] performed [facility] at [facility]  2/28/2020         HX APPENDECTOMY      HX BREAST LUMPECTOMY  11/21/2013    LEFT BREAST LUMPECTOMY W/LEFT BREAST SENTINEL NODE BIOPSY,AND NEEDLE LOCALIZATION performed by Baljit Diallo MD at [facility] MAIN OR    HX CATARACT REMOVAL      bilateral    HX HYSTERECTOMY      ovarian cyst    HX MOHS PROCEDURES      right    HX ORTHOPAEDIC      back surgery x2    HX OTHER SURGICAL      colonoscopy    HX TONSILLECTOMY      HX VASCULAR ACCESS  02/2020    echo cath right shoulder    IR INSERT TUNL CVC W PORT OVER 5 YEARS  12/3/2019    TOTAL KNEE ARTHROPLASTY      left    TOTAL KNEE ARTHROPLASTY      right    UPPER GI ENDOSCOPY,BIOPSY  1/24/2020         UPPER GI ENDOSCOPY,BIOPSY  6/23/2020         UPPER GI ENDOSCOPY,DILATN W GUIDE  6/23/2020         US GUIDED CORE BREAST BIOPSY Left 2013    Breast Ca     Current Facility-Administered Medications   Medication Dose Route Frequency    lactated Ringers infusion  75 mL/hr IntraVENous CONTINUOUS    0.9% sodium chloride infusion 250 mL  250 mL IntraVENous PRN    0.9% sodium chloride infusion 250 mL  250 mL IntraVENous PRN    folic acid (FOLVITE) tablet 1 mg  1 mg Oral DAILY    sodium chloride (NS) flush 5-40 mL  5-40 mL IntraVENous Q8H    sodium chloride (NS) flush 5-40 mL  5-40 mL IntraVENous PRN    acetaminophen (TYLENOL) tablet 650 mg  650 mg Oral Q4H PRN    ondansetron (ZOFRAN) injection 4 mg  4 mg IntraVENous Q4H PRN    pantoprazole (PROTONIX) 40 mg in 0.9% sodium chloride 10 mL injection  40 mg IntraVENous Q12H    albuterol-ipratropium (DUO-NEB) 2.5 MG-0.5 MG/3 ML  3 mL Nebulization Q4H PRN     Facility-Administered Medications Ordered in Other Encounters   Medication Dose Route Frequency    0.9% sodium chloride infusion   IntraVENous CONTINUOUS      Allergies   Allergen Reactions    Latex Rash    Hydrocodone Nausea Only and Vertigo    Gabapentin Diarrhea, Nausea Only and Other (comments)     weakness    Lyrica [Pregabalin] Nausea Only    Oxycodone Nausea and Vomiting and Vertigo     Family History   Problem Relation Age of Onset    Cancer Mother         bladder    Cancer Father     Breast Cancer Paternal Grandmother      Social History     Tobacco Use    Smoking status: Former Smoker Packs/day: 0.50     Years: 50.00     Pack years: 25.00     Last attempt to quit: 2012     Years since quittin.4    Smokeless tobacco: Never Used   Substance Use Topics    Alcohol use: Yes     Alcohol/week: 2.0 standard drinks     Types: 2 Glasses of wine per week    Drug use: No         REVIEW OF SYSTEMS  A full 10 point review of systems was performed today. The review was non-pertinent other than the details already listed in the history of present illness. Visit Vitals  /73 (BP 1 Location: Left arm, BP Patient Position: At rest)   Pulse 80   Temp 97.9 °F (36.6 °C)   Resp 16   Ht 5' 5\" (1.651 m)   Wt 83.9 kg (185 lb)   SpO2 96%   BMI 30.79 kg/m²        PHYSICAL EXAM  General:  No acute distress. Alert and oriented x 3. MSK:   Normal muscle bulk  Psych:  Mood and affect appropriate. Neuro:   CN II - XII grossly intact bilaterally. Eyes:  PERRL/EOMI, no nystagmus. ENT:   EACs are patent, clean and dry. TMs clear and intact with normal anatomic landmarks. No middle ear fluid. Anterior rhinoscopy without mucopurulence or polyps. OC/OP clear without masses or lesions. Lymph:  Neck soft and supple without lymphadenopathy. Resp:   No audible stridor or wheezing. Trach wound dressing changed. Almost closed  Skin:   Head and neck skin is without suspicious lesions. PROCEDURE:    Flexible Nasolaryngoscopy  Indication: history of trach and recent upper airway edema  Findings: No mass in NP. Fossae of Rosenmüller are free of tumor. Bilateral eustachian tube orifices are well defined with no overlying mass. Base of tongue symmetric. Epiglottis is crisp. No masses in pyriform sinuses. AE folds are symmetric without mucosal swelling. Vocal cords move symmetrically. A timeout was performed in which the patient's name and procedure were verified. A flexible laryngoscope was then inserted into the right nostril and advanced posteriorly to the nasopharynx. Findings noted above. ASSESSMENT/PLAN:  81 yo F with acute GI bleed. Laryngeal edema and bruising have resolved. Trach site nearly closed. Last EGD reportedly was uncomplicated but resulted in massive edema, ecchymosis and deep neck space infection causing emergent tracheostomy. If medically necessary to perform another EGD, please proceed with caution and consider prophylactic antibiotics, dose of periop steroids and close observation after the procedure for at least 24 hours. As able, correct any coagulopathy/transfuse as needed. She should be safe for MAC and could be easily intubated. Tracheostomy site has for all intents and purposes closed and should not be considered an airway. Notify me if there's an airway problem. Jairo Sainz [facility], [address] [facility] [address], [facility] [address], [facility] [address]   (G) [phone]  (A) [phone]  (J) [phone]

## 2020-07-21 NOTE — ED NOTES
SN extern assisted patient off the monitor and to the bathroom via ambulation using cane. Patient used the toilet and once finished, SN extern noticed the toilet bowl to be filled with large amount of liquid bright red blood. Patient did not complain of pain. Assisted patient back to room and provided pt with hospital bed to rest in. Pt hooked up to monitor and now resting.

## 2020-07-21 NOTE — ED PROVIDER NOTES
22-year-old female with a history of mild dysplastic syndrome, on hydroxyurea, COPD, anemia requiring prior blood transfusions, presents to the emergency department noting the onset of blood in stool last night. She states that her stool is very dark maroon color with surrounding bright red blood and was significant enough to fill the toilet bowl. Denies any history of prior hemorrhoids. Does have a history of prior diverticulosis and diverticulitis. She denies any abdominal pain, nausea, vomiting, constipation, dysuria, hematuria, fever, chills, cough, shortness of breath, chest pain, or any other medical concerns. Does not take any blood thinning medications. No history of prior GI bleeds and does not see a GI doctor. She also recently had a emergent tracheostomy placed which has since been removed and has been healing. She has been taking Augmentin for this and has not had any significant issues with this.            Past Medical History:   Diagnosis Date    Anemia     Arthritis     Breast cancer ([address]     left    Bunion of right foot 8/20/2015    Chronic obstructive pulmonary disease (HCC)     mild    Chronic pain     back--uses tens unit    Diverticulitis 6/29/2018    Recurrent    Diverticulitis large intestine     Dry eye syndrome 6/29/2018    Fibromyalgia 6/29/2018    GERD (gastroesophageal reflux disease)     History of left breast cancer 2013    lumpectomy radiation    Hypercholesterolemia 6/29/2018    Ill-defined condition     blood transfusion hx    Leukemia ([address]     MDS (myelodysplastic syndrome) ([address]     Osteoporosis 6/29/2018    Oxygen dependent     at night    Peripheral neuropathy 6/29/2018    Prediabetes 6/29/2018    PUD (peptic ulcer disease)     Radiation therapy complication 4154    Lt breast-No Chemo    Rosacea 6/29/2018    Trouble in sleeping        Past Surgical History:   Procedure Laterality Date    COLONOSCOPY N/A 2/28/2020    COLONOSCOPY performed by [facility] Sahu [facility] at [facility] ENDOSCOPY    COLONOSCOPY,DIAGNOSTIC  2020         HX APPENDECTOMY      HX BREAST LUMPECTOMY  2013    LEFT BREAST LUMPECTOMY W/LEFT BREAST SENTINEL NODE BIOPSY,AND NEEDLE LOCALIZATION performed by Brandi Mendez MD at [facility] MAIN OR    HX CATARACT REMOVAL      bilateral    HX HYSTERECTOMY      ovarian cyst    HX MOHS PROCEDURES      right    HX ORTHOPAEDIC      back surgery x2    HX OTHER SURGICAL      colonoscopy    HX TONSILLECTOMY      HX VASCULAR ACCESS  2020    echo cath right shoulder    IR INSERT TUNL CVC W PORT OVER 5 YEARS  12/3/2019    TOTAL KNEE ARTHROPLASTY      left    TOTAL KNEE ARTHROPLASTY      right    UPPER GI ENDOSCOPY,BIOPSY  2020         UPPER GI ENDOSCOPY,BIOPSY  2020         UPPER GI ENDOSCOPY,DILATN W GUIDE  2020         US GUIDED CORE BREAST BIOPSY Left     Breast Ca         Family History:   Problem Relation Age of Onset    Cancer Mother         bladder    Cancer Father     Breast Cancer Paternal Grandmother        Social History     Socioeconomic History    Marital status:      Spouse name: Not on file    Number of children: Not on file    Years of education: Not on file    Highest education level: Not on file   Occupational History    Not on file   Social Needs    Financial resource strain: Not on file    Food insecurity     Worry: Not on file     Inability: Not on file    Transportation needs     Medical: Not on file     Non-medical: Not on file   Tobacco Use    Smoking status: Former Smoker     Packs/day: 0.50     Years: 50.00     Pack years: 25.00     Last attempt to quit: 2012     Years since quittin.4    Smokeless tobacco: Never Used   Substance and Sexual Activity    Alcohol use:  Yes     Alcohol/week: 2.0 standard drinks     Types: 2 Glasses of wine per week    Drug use: No    Sexual activity: Not Currently   Lifestyle    Physical activity     Days per week: Not on file Minutes per session: Not on file    Stress: Not on file   Relationships    Social connections     Talks on phone: Not on file     Gets together: Not on file     Attends Baptism service: Not on file     Active member of club or organization: Not on file     Attends meetings of clubs or organizations: Not on file     Relationship status: Not on file    Intimate partner violence     Fear of current or ex partner: Not on file     Emotionally abused: Not on file     Physically abused: Not on file     Forced sexual activity: Not on file   Other Topics Concern    Not on file   Social History Narrative    Not on file         ALLERGIES: Latex; Hydrocodone; Gabapentin; Lyrica [pregabalin]; and Oxycodone    Review of Systems   Constitutional: Negative for activity change, appetite change, chills and fever. HENT: Negative for congestion, rhinorrhea, sinus pressure, sneezing and sore throat. Eyes: Negative for photophobia and visual disturbance. Respiratory: Negative for cough and shortness of breath. Cardiovascular: Negative for chest pain. Gastrointestinal: Positive for blood in stool. Negative for abdominal pain, constipation, diarrhea, nausea and vomiting. Genitourinary: Negative for difficulty urinating, dysuria, flank pain, frequency, hematuria, menstrual problem, urgency, vaginal bleeding and vaginal discharge. Musculoskeletal: Negative for arthralgias, back pain, myalgias and neck pain. Skin: Negative for rash and wound. Neurological: Negative for syncope, weakness, numbness and headaches. Psychiatric/Behavioral: Negative for self-injury and suicidal ideas. All other systems reviewed and are negative. Vitals:    07/20/20 1626   BP: 126/58   Pulse: 90   Resp: 20   Temp: 98.4 °F (36.9 °C)   SpO2: 97%   Weight: 84 kg (185 lb 3 oz)   Height: 5' 5\" (1.651 m)            Physical Exam  Vitals signs and nursing note reviewed. Constitutional:       General: She is not in acute distress. Appearance: Normal appearance. She is well-developed. She is not diaphoretic. HENT:      Head: Normocephalic and atraumatic. Nose: Nose normal.      Mouth/Throat:      Comments: Healing wound from prior tracheostomy, with dressings C/D/I, with no evidence of surrounding infection. Eyes:      Extraocular Movements: Extraocular movements intact. Conjunctiva/sclera: Conjunctivae normal.      Pupils: Pupils are equal, round, and reactive to light. Neck:      Musculoskeletal: Neck supple. Cardiovascular:      Rate and Rhythm: Normal rate and regular rhythm. Heart sounds: Normal heart sounds. Pulmonary:      Effort: Pulmonary effort is normal.      Breath sounds: Normal breath sounds. Abdominal:      General: There is no distension. Palpations: Abdomen is soft. Tenderness: There is no abdominal tenderness. Genitourinary:     Rectum: Guaiac result positive Joshua Manger melena on exam.). Musculoskeletal:         General: No tenderness. Skin:     General: Skin is warm and dry. Coloration: Skin is pale. Neurological:      General: No focal deficit present. Mental Status: She is alert and oriented to person, place, and time. Cranial Nerves: No cranial nerve deficit. Sensory: No sensory deficit. Motor: No weakness. Coordination: Coordination normal.          MDM   82-year-old female presents with acute GI bleed. Patient is afebrile vital signs stable no acute distress. Labs significant for Hgb of 6.6, also with thrombocytopenia with PLT of 79, normal WBC, and INR. Guaiac positive. CT abdomen pelvis shows no evidence of active GI hemorrhage but does show diverticulosis without evidence of diverticulitis. She was given IV fluid bolus, IV Protonix and 1 unit PRBCs.     GI was consulted    Procedures  Total critical care time spent exclusive of procedures:  45 minutes    Hospitalist Perfect Serve for Admission  8:17 PM    ED Room Number: ER07/07  Patient Name and age:  Fredo Bee 82 y.o.  female  Working Diagnosis:   1. Gastrointestinal hemorrhage, unspecified gastrointestinal hemorrhage type    2. Diverticulosis    3. Anemia due to acute blood loss    4. Thrombocytopenia ([address]        COVID-19 Suspicion:  no    Code Status:  Full Code  Readmission: no  Isolation Requirements:  no  Recommended Level of Care:  telemetry  Department:[facility] Adult ED - 21   Other: 72-year-old female on hydroxyurea with GI bleeding. She is afebrile with vital signs stable. Hg 6.6, getting 1 unit PRBCs, and IV Protonix.    8:20pm discussed case with Dr. José Miguel Antunez, GI, who recommends making her n.p.o. for scope in the a.m.

## 2020-07-21 NOTE — ED NOTES
Received report from [facility]. Packed RBCs infusing at this time. Pt has no complaints. No s/s of a reaction noted at this time.

## 2020-07-21 NOTE — ED NOTES
Verbal shift change report given to Charlie Hurd (oncoming nurse) by Armando Cee RN (offgoing nurse). Report included the following information SBAR, Kardex, ED Summary, [facility] and Recent Results.

## 2020-07-21 NOTE — ROUTINE PROCESS
TRANSFER - OUT REPORT:    Verbal report given to [facility] (name) on Dev Acevedo  being transferred to [facility](unit) for routine progression of care       Report consisted of patients Situation, Background, Assessment and   Recommendations(SBAR). Information from the following report(s) SBAR and MAR was reviewed with the receiving nurse. Lines:   Peripheral IV 07/20/20 Left Arm (Active)       Peripheral IV 07/20/20 Right Antecubital (Active)        Opportunity for questions and clarification was provided. Patient will be transferred after endo procedure, endo RN made aware of room number.            \

## 2020-07-21 NOTE — CONSULTS
[facility].   [address]  [address] [id number]        GASTROENTEROLOGY CONSULTATION NOTE  Will Rome Quintana  [phone] office  [phone] NP/PA in-hospital cell phone M-F until 4:30PM  After 5PM or on weekends, please call  for physician on call        NAME:  Cynthia Mills   :   1935   MRN:   [id number]       Referring Physician: Dr. Lorenz Britta Date: 2020 8:48 AM    Chief Complaint: blood in stool     History of Present Illness:  Patient is a 82 y.o. who is seen in consultation at the request of Dr. Regan Mckinney for GI bleed. Patient has a past medical history significant for hypertension, atrial fibrillation, myelodysplastic syndrome, COPD, supraglottic mass, and status post tracheostomy with biopsy. She presented to the ED with complaints of blood in the stool. In the ED, hemoglobin was 6.6 and CT abdomen/pelvis showed no evidence of active GI bleeding. Patient was admitted to the hospital on 20 for GI bleed and acute blood loss anemia. Patient reports dark, maroon-colored stools that started on  evening. She reports approximately 10 loose to watery bowel movements yesterday with dark, maroon-colored blood. She reports some nausea. History of intermittent reflux that is managed with an occasional over the counter antacid. No vomiting. No dysphagia or odynophagia. No abdominal pain. She reports increased flatus. No history of GI bleed. No NSAID use. No anticoagulation.  + Alcohol use. No tobacco use. History of hysterectomy and appendectomy. EGD (20) by Dr. Jocelyn Heredia for dysphagia/odynophagia/GERD showed minimal irregularity at the GE junction with no further ulceration noted (biopsied), followed by dilatation with 54FR Savary dilator; normal stomach; normal duodenum.   EGD (20) by Dr. Jocelyn Heredia for dysphagia/odynophagia/iron deficiency anemia/melena showed a single 4 mm shallow round ulceration at the GE junction; normal stomach; normal duodenum. Colonoscopy (2/28/20) by Dr. Suad Cruz for diverticulitis showed normal terminal ileum; sigmoid diverticulosis; small grade 1 internal hemorrhoids; no masses, polyps, or inflammation noted. I have reviewed the emergency room note, hospital admission note, notes by all other clinicians who have seen the patient during this hospitalization to date. I have reviewed the problem list and the reason for this hospitalization. I have reviewed the allergies and the medications the patient was taking at home prior to this hospitalization.       PMH:  Past Medical History:   Diagnosis Date    Anemia     Arthritis     Breast cancer ([facility] [address]     left    Bunion of right foot 8/20/2015    Chronic obstructive pulmonary disease (HCC)     mild    Chronic pain     back--uses tens unit    Diverticulitis 6/29/2018    Recurrent    Diverticulitis large intestine     Dry eye syndrome 6/29/2018    Fibromyalgia 6/29/2018    GERD (gastroesophageal reflux disease)     History of left breast cancer 2013    lumpectomy radiation    Hypercholesterolemia 6/29/2018    Ill-defined condition     blood transfusion hx    Leukemia ([facility] [address]     MDS (myelodysplastic syndrome) ([facility] [address]     Osteoporosis 6/29/2018    Oxygen dependent     at night    Peripheral neuropathy 6/29/2018    Prediabetes 6/29/2018    PUD (peptic ulcer disease)     Radiation therapy complication 5646    Lt breast-No Chemo    Rosacea 6/29/2018    Trouble in sleeping        PSH:  Past Surgical History:   Procedure Laterality Date    COLONOSCOPY N/A 2/28/2020    COL[facility]COPY performed by [facility]eno [facility] at [facility]  2/28/2020         HX APPENDECTOMY      HX BREAST LUMPECTOMY  11/21/2013    LEFT BREAST LUMPECTOMY W/LEFT BREAST SENTINEL NODE BIOPSY,AND NEEDLE LOCALIZATION performed by Maria Luisa Guevara MD at [facility] MAIN OR    HX CATARACT REMOVAL      bilateral    HX HYSTERECTOMY      ovarian cyst    HX MOHS PROCEDURES right    HX ORTHOPAEDIC      back surgery x2    HX OTHER SURGICAL      colonoscopy    HX TONSILLECTOMY      HX VASCULAR ACCESS  02/2020    echo cath right shoulder    IR INSERT TUNL CVC W PORT OVER 5 YEARS  12/3/2019    TOTAL KNEE ARTHROPLASTY      left    TOTAL KNEE ARTHROPLASTY      right    UPPER GI ENDOSCOPY,BIOPSY  1/24/2020         UPPER GI ENDOSCOPY,BIOPSY  6/23/2020         UPPER GI ENDOSCOPY,DILATN W GUIDE  6/23/2020         US GUIDED CORE BREAST BIOPSY Left 2013    Breast Ca       Allergies: Allergies   Allergen Reactions    Latex Rash    Hydrocodone Nausea Only and Vertigo    Gabapentin Diarrhea, Nausea Only and Other (comments)     weakness    Lyrica [Pregabalin] Nausea Only    Oxycodone Nausea and Vomiting and Vertigo       Home Medications:  Prior to Admission Medications   Prescriptions Last Dose Informant Patient Reported? Taking? Oxygen   Yes No   Sig: nightly as needed. 1-2 liter nasal cannula qhs and prn if needed   acetaminophen (TYLENOL) 325 mg tablet  Self No No   Sig: Take 2 Tabs by mouth every four (4) hours as needed for Pain. bismuth subsalicylate (PEPTO-BISMOL PO)   Yes No   Sig: Take 30 mL by mouth daily as needed for Diarrhea. take one dose every hour as needed   clotrimazole-betamethasone (LOTRISONE) topical cream   No No   Sig: Apply  to affected area two (2) times a day. dilTIAZem CD (CARDIZEM CD) 180 mg ER capsule  Self No No   Sig: Take 1 Cap by mouth daily. Patient taking differently: Take 240 mg by mouth daily. diphenoxylate-atropine (LOMOTIL) 2.5-0.025 mg per tablet   No No   Sig: Take 1 Tab by mouth three (3) times daily. Max Daily Amount: 3 Tabs. Take 1-2 tabs as needed up to 3 times daily to reduce loose stool   ergocalciferol (ERGOCALCIFEROL) 1,250 mcg (50,000 unit) capsule  Self No No   Sig: TAKE ONE CAPSULE BY MOUTH EVERY 7 DAYS   folic acid (FOLVITE) 1 mg tablet   No No   Sig: Take 1 Tab by mouth daily.    hydroxyurea (HYDREA) 500 mg capsule No No   Sig: Take 2 Caps by mouth two (2) times a day for 7 days, THEN 1 Cap two (2) times a day for 24 days. Indications: chronic myelocytic leukemia   ondansetron hcl (Zofran) 4 mg tablet   Yes No   Sig: Take 4 mg by mouth every eight (8) hours as needed for Nausea or Vomiting. pantoprazole (PROTONIX) 40 mg tablet  Self No No   Sig: Take 1 Tab by mouth Before breakfast and dinner. Facility-Administered Medications: None       Hospital Medications:  Current Facility-Administered Medications   Medication Dose Route Frequency    lactated Ringers infusion  75 mL/hr IntraVENous CONTINUOUS    0.9% sodium chloride infusion 250 mL  250 mL IntraVENous PRN    0.9% sodium chloride infusion 250 mL  250 mL IntraVENous PRN    dilTIAZem ER (CARDIZEM CD) capsule 240 mg  240 mg Oral DAILY    folic acid (FOLVITE) tablet 1 mg  1 mg Oral DAILY    sodium chloride (NS) flush 5-40 mL  5-40 mL IntraVENous Q8H    sodium chloride (NS) flush 5-40 mL  5-40 mL IntraVENous PRN    acetaminophen (TYLENOL) tablet 650 mg  650 mg Oral Q4H PRN    ondansetron (ZOFRAN) injection 4 mg  4 mg IntraVENous Q4H PRN    pantoprazole (PROTONIX) 40 mg in 0.9% sodium chloride 10 mL injection  40 mg IntraVENous Q12H    albuterol-ipratropium (DUO-NEB) 2.5 MG-0.5 MG/3 ML  3 mL Nebulization Q4H PRN     Current Outpatient Medications   Medication Sig    clotrimazole-betamethasone (LOTRISONE) topical cream Apply  to affected area two (2) times a day.  ondansetron hcl (Zofran) 4 mg tablet Take 4 mg by mouth every eight (8) hours as needed for Nausea or Vomiting.  diphenoxylate-atropine (LOMOTIL) 2.5-0.025 mg per tablet Take 1 Tab by mouth three (3) times daily. Max Daily Amount: 3 Tabs. Take 1-2 tabs as needed up to 3 times daily to reduce loose stool    hydroxyurea (HYDREA) 500 mg capsule Take 2 Caps by mouth two (2) times a day for 7 days, THEN 1 Cap two (2) times a day for 24 days.  Indications: chronic myelocytic leukemia    folic acid (FOLVITE) 1 mg tablet Take 1 Tab by mouth daily.  Oxygen nightly as needed. 1-2 liter nasal cannula qhs and prn if needed    bismuth subsalicylate (PEPTO-BISMOL PO) Take 30 mL by mouth daily as needed for Diarrhea. take one dose every hour as needed    ergocalciferol (ERGOCALCIFEROL) 1,250 mcg (50,000 unit) capsule TAKE ONE CAPSULE BY MOUTH EVERY 7 DAYS    acetaminophen (TYLENOL) 325 mg tablet Take 2 Tabs by mouth every four (4) hours as needed for Pain.  dilTIAZem CD (CARDIZEM CD) 180 mg ER capsule Take 1 Cap by mouth daily. (Patient taking differently: Take 240 mg by mouth daily.)    pantoprazole (PROTONIX) 40 mg tablet Take 1 Tab by mouth Before breakfast and dinner. Social History:  Social History     Tobacco Use    Smoking status: Former Smoker     Packs/day: 0.50     Years: 50.00     Pack years: 25.00     Last attempt to quit: 2012     Years since quittin.4    Smokeless tobacco: Never Used   Substance Use Topics    Alcohol use:  Yes     Alcohol/week: 2.0 standard drinks     Types: 2 Glasses of wine per week       Family History:  Family History   Problem Relation Age of Onset    Cancer Mother         bladder    Cancer Father     Breast Cancer Paternal Grandmother        Review of Systems:  Constitutional: negative fever, negative chills, negative weight loss  Eyes:   negative visual changes  ENT:   negative sore throat, tongue or lip swelling  Respiratory:  negative cough, + dyspnea  Cards:  negative for chest pain, negative palpitations, + lower extremity edema  GI:   See HPI  :  negative for frequency, dysuria  Integument:  negative for rash and pruritus  Heme:  negative for easy bruising and gum/nose bleeding  Musculoskeletal:negative for myalgias, back pain and muscle weakness  Neuro:  negative for headaches, dizziness  Psych: negative for feelings of anxiety, depression     Objective:     Patient Vitals for the past 8 hrs:   BP Temp Pulse Resp SpO2   20 0745 138/53  76 19 95 %   07/21/20 0720 131/81 98.8 °F (37.1 °C) 79 19 95 %   07/21/20 0700 155/61 98.3 °F (36.8 °C) (!) 108 24 94 %   07/21/20 0600 132/61  77 17 96 %   07/21/20 0520 139/53 98.7 °F (37.1 °C) 84 28 95 %   07/21/20 0410   84 16 99 %   07/21/20 0345 115/63  84 21 92 %   07/21/20 0315 146/51  84 18 94 %   07/21/20 0225 124/43 98.9 °F (37.2 °C) 85 20 97 %   07/21/20 0122 143/51 98.6 °F (37 °C) 82 23 97 %     No intake/output data recorded. 07/19 1901 - 07/21 0700  In: 1410 [I.V.:1100]  Out: -     EXAM:     CONST:  Pleasant female lying in bed, no acute distress   NEURO:  Alert and oriented   HEENT: EOMI, no scleral icterus   LUNGS: No respiratory distress   CARD:  S1 S2   ABD:  Soft, mildly distended, no tenderness, no rebound, no guarding. + Bowel sounds. EXT:  Warm   PSYCH: Full, not anxious or agitated     Data Review     Recent Labs     07/21/20  0517 07/20/20  1701   WBC 7.3 8.4   HGB 6.5* 6.6*   HCT 21.2* 23.0*   PLT 64* 79*     Recent Labs     07/21/20  0517 07/20/20  1701    141   K 3.5 4.0   * 113*   CO2 23 22   BUN 10 14   CREA 0.51* 0.67   GLU 92 116*   PHOS 2.7  --    CA 7.6* 8.1*     Recent Labs     07/21/20  0517 07/20/20  1701   AP 47 61   TP 6.2* 7.4   ALB 3.0* 3.6   GLOB 3.2 3.8   LPSE 86  --      Recent Labs     07/20/20  1701   INR 1.0   PTP 10.5     EGD (6/23/20) by  [facility] for dysphagia/odynophagia/GERD showed minimal irregularity at the GE junction with no further ulceration noted (biopsied), followed by dilatation with 54FR Savary dilator; normal stomach; normal duodenum. EGD (1/24/20) by  [facility] for dysphagia/odynophagia/iron deficiency anemia/melena showed a single 4 mm shallow round ulceration at the GE junction; normal stomach; normal duodenum. Colonoscopy (2/28/20) by  [facility] for diverticulitis showed normal terminal ileum; sigmoid diverticulosis; small grade 1 internal hemorrhoids; no masses, polyps, or inflammation noted.         EXAM: CT ABD PELV W WO CONT     INDICATION: melena     COMPARISON: 2/10/2020.     CONTRAST: 100 mL of Isovue-370.     TECHNIQUE:    CT of the abdomen and pelvis was performed before and after the administration  of contrast. Images were obtained noncontrast, arterial phase, 90 second delay,  and further delay. Oral contrast was not administered. Contiguous 5 mm axial  images were reconstructed and lung and soft tissue windows were generated. Coronal and sagittal reformations were generated. CT dose reduction was  achieved through use of a standardized protocol tailored for this examination  and automatic exposure control for dose modulation.       FINDINGS:   LOWER THORAX: No significant abnormality in the incidentally imaged lower chest.  LIVER: The liver is hypodense related to hepatic steatosis. No mass. BILIARY TREE: Gallbladder is within normal limits. CBD is not dilated. SPLEEN: within normal limits. PANCREAS: No mass or ductal dilatation. Several pancreatic calcifications likely  due to prior pancreatitis. ADRENALS: There is a 1.2 cm nodule in the left adrenal gland with attenuation of  5 Hounsfield on precontrast imaging consistent with an adrenal adenoma. The  adrenal glands are otherwise unremarkable. KIDNEYS: There is a 2.6 cm cyst in the mid right kidney. There is a 1.3 cm  hyperdense or hemorrhagic cyst in the lateral left kidney. There is 3.6 cm cyst  in the lower left kidney. Several small hypodensities in the bilateral kidneys  are too small to fully catheterize, but likely represent small cysts as well. There is a small nonobstructive stone in the posterior mid left kidney. No  hydronephrosis. STOMACH: Unremarkable. SMALL BOWEL: No dilatation or wall thickening. COLON: No dilatation or wall thickening. Multiple diverticula are present. No  evidence of diverticulitis. APPENDIX: Unremarkable. PERITONEUM: No ascites or pneumoperitoneum. RETROPERITONEUM: No lymphadenopathy or aortic aneurysm.   REPRODUCTIVE ORGANS: Status post hysterectomy. URINARY BLADDER: No mass or calculus. BONES: No destructive bone lesion. Status post posterior fusion from L2 to S1.  ABDOMINAL WALL: No mass or hernia. ADDITIONAL COMMENTS: N/A     IMPRESSION  IMPRESSION:     1. No evidence of active GI hemorrhage. 2. Diverticulosis coli without evidence of diverticulitis. 3. Hepatic steatosis. 4. Unchanged left adrenal adenoma. 5. Small nonobstructive stone in the left kidney. No hydronephrosis. 6. Status post hysterectomy. Assessment:   · GI bleed: dark, maroon-colored stools and anemia with hemoccult positive stool. Hgb 6.5 (baseline 7-8), platelets 64, INR 1.0. Receiving 2nd unit PRBCs. CT abdomen/pelvis with and without contrast (7/20/20): no evidence of active GI hemorrhage; diverticulosis without evidence of diverticulitis. No anticoagulation. EGD and colonoscopy with above findings. Differential includes peptic ulcer disease and AVMs versus diverticular.   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    Lumbar post-laminectomy syndrome M96.1    Spinal stenosis of lumbar region with neurogenic claudication M48.062    Syncope R55    Stenosis of both internal carotid arteries I65.23    Syncope and collapse R55    S/P lumpectomy, left breast Z98.890    Costochondritis M94.0    Diverticulitis K57.92    Dry eye syndrome H04.129    Fibromyalgia M79.7    GERD without esophagitis K21.9    Hypercholesterolemia E78.00    Osteoporosis M81.0    Peripheral neuropathy G62.9    Prediabetes R73.03    Rosacea L71.9    Acute respiratory failure with hypoxia (HCC) J96.01    Acute bronchitis due to other specified organisms J20.8    Blood loss anemia D50.0    Thrombocytopenia (HCC) D69.6    Iron deficiency anemia D50.9    COPD (chronic obstructive pulmonary disease) (HCC) J44.9    SIRS (systemic inflammatory response syndrome) (HCC) R65.10    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 Tonia Blair. Details and risks of the procedure to include (but not limited to) anesthesia, bleeding, infection, and perforation were discussed. Patient understands and is in agreement with the plan. Please verify consent has been obtained. · Patient was discussed with and will be seen by Dr. Tonia Blair  · Thank you for allowing me to participate in care of Zelalem Hamilton     Signed By: Christian Ward     7/21/2020  8:48 AM         Gastroenterology Attending Physician attestation statement and comments. This patient was seen and examined by me in a face-to-face visit today. I reviewed the medical record including lab work, imaging and other provider notes. I confirmed the history as described above. I spoke to the patient, reviewed the medical record including lab work, imaging and other provider notes. I discussed this case in detail with Christian Morse. I formulated an  assessment of this patient and developed a treatment plan.       I agree with the above consultation note. I agree with the history, exam and assessment and plan as outlined in the note. I would like to add the following: Patient seen and examined. I have reviewed findings from her recent endoscopies performed by Dr. Shankar Schmidt. We will plan for diagnostic endoscopy today as above. CT reviewed and no evidence of active GI bleeding seen. Clint Seay MD     Addendum: Anesthesia team is requesting ENT consultation prior to endoscopy procedure. We will hold off on EGD for now until ENT has seen the patient.  JPT

## 2020-07-21 NOTE — ED NOTES
Hearing eyes stored in dental box and lexmark placed on dental box. All pt belongings stored in a patient belongings bag. Lights dimmed for comfort.

## 2020-07-21 NOTE — ED NOTES
Pt noted to drop her saturations into the 70's while sleeping. Self-resolves before RN enters the room. Pt denies sleep apnea, but reports having a recent trach. Pt noted to be clearing her throat often. Placed on 2l of oxygen via NC while she is sleeping. Will continue to monitor.

## 2020-07-21 NOTE — ED NOTES
Bedside verbal shift change report received from [facility]. Reviewed patient status, lab results and medical plan. Will continue to monitor patient.

## 2020-07-21 NOTE — PROGRESS NOTES
Bedside shift change report given to [facility] (oncoming nurse) by Doe (offgoing nurse). Report included the following information SBAR, Kardex, Procedure Summary, MAR and Recent Results.

## 2020-07-21 NOTE — PERIOP NOTES
TRANSFER - OUT REPORT:    Verbal report given to Olya Jorge RN(name) / pt nurse at lunch on Garret Osler  being transferred to [facility](unit) for routine progression of care       Report consisted of patients Situation, Background, Assessment and   Recommendations(SBAR). Information from the following report(s) Procedure Summary was reviewed with the receiving nurse. Lines:   Peripheral IV 07/20/20 Left Arm (Active)       Peripheral IV 07/20/20 Right Antecubital (Active)        Opportunity for questions and clarification was provided. Patient transported with:   Tech    Procedure postponed until sign off from ENT d/t risk of anaesthesia with pt condition.

## 2020-07-22 LAB
ABO + RH BLD: NORMAL
ANION GAP SERPL CALC-SCNC: 7 MMOL/L (ref 5–15)
ATRIAL RATE: 77 BPM
BASOPHILS # BLD: 0 K/UL (ref 0–0.1)
BASOPHILS NFR BLD: 0 % (ref 0–1)
BLD PROD TYP BPU: NORMAL
BLD PROD TYP BPU: NORMAL
BLOOD GROUP ANTIBODIES SERPL: NORMAL
BPU ID: NORMAL
BPU ID: NORMAL
BUN SERPL-MCNC: 6 MG/DL (ref 6–20)
BUN/CREAT SERPL: 13 (ref 12–20)
CALCIUM SERPL-MCNC: 8 MG/DL (ref 8.5–10.1)
CALCULATED P AXIS, ECG09: 38 DEGREES
CALCULATED R AXIS, ECG10: -12 DEGREES
CALCULATED T AXIS, ECG11: 56 DEGREES
CHLORIDE SERPL-SCNC: 110 MMOL/L (ref 97–108)
CO2 SERPL-SCNC: 24 MMOL/L (ref 21–32)
CREAT SERPL-MCNC: 0.48 MG/DL (ref 0.55–1.02)
CROSSMATCH RESULT,%XM: NORMAL
CROSSMATCH RESULT,%XM: NORMAL
DIAGNOSIS, 93000: NORMAL
DIFFERENTIAL METHOD BLD: ABNORMAL
EOSINOPHIL # BLD: 0.3 K/UL (ref 0–0.4)
EOSINOPHIL NFR BLD: 3 % (ref 0–7)
ERYTHROCYTE [DISTWIDTH] IN BLOOD BY AUTOMATED COUNT: 24 % (ref 11.5–14.5)
GLUCOSE BLD STRIP.AUTO-MCNC: 105 MG/DL (ref 65–100)
GLUCOSE SERPL-MCNC: 93 MG/DL (ref 65–100)
HCT VFR BLD AUTO: 25.8 % (ref 35–47)
HCT VFR BLD AUTO: 29 % (ref 35–47)
HGB BLD-MCNC: 8.1 G/DL (ref 11.5–16)
HGB BLD-MCNC: 9 G/DL (ref 11.5–16)
IMM GRANULOCYTES # BLD AUTO: 0 K/UL
IMM GRANULOCYTES NFR BLD AUTO: 0 %
LYMPHOCYTES # BLD: 1.8 K/UL (ref 0.8–3.5)
LYMPHOCYTES NFR BLD: 21 % (ref 12–49)
MCH RBC QN AUTO: 33.9 PG (ref 26–34)
MCHC RBC AUTO-ENTMCNC: 31.4 G/DL (ref 30–36.5)
MCV RBC AUTO: 107.9 FL (ref 80–99)
METAMYELOCYTES NFR BLD MANUAL: 2 %
MONOCYTES # BLD: 3.2 K/UL (ref 0–1)
MONOCYTES NFR BLD: 38 % (ref 5–13)
MYELOCYTES NFR BLD MANUAL: 4 %
NEUTS BAND NFR BLD MANUAL: 5 % (ref 0–6)
NEUTS SEG # BLD: 2.7 K/UL (ref 1.8–8)
NEUTS SEG NFR BLD: 27 % (ref 32–75)
NRBC # BLD: 0.04 K/UL (ref 0–0.01)
NRBC BLD-RTO: 0.5 PER 100 WBC
P-R INTERVAL, ECG05: 224 MS
PLATELET # BLD AUTO: 68 K/UL (ref 150–400)
PLATELET COMMENTS,PCOM: ABNORMAL
PMV BLD AUTO: 9.9 FL (ref 8.9–12.9)
POTASSIUM SERPL-SCNC: 3.4 MMOL/L (ref 3.5–5.1)
Q-T INTERVAL, ECG07: 402 MS
QRS DURATION, ECG06: 88 MS
QTC CALCULATION (BEZET), ECG08: 454 MS
RBC # BLD AUTO: 2.39 M/UL (ref 3.8–5.2)
RBC MORPH BLD: ABNORMAL
SERVICE CMNT-IMP: ABNORMAL
SODIUM SERPL-SCNC: 141 MMOL/L (ref 136–145)
SPECIMEN EXP DATE BLD: NORMAL
STATUS OF UNIT,%ST: NORMAL
STATUS OF UNIT,%ST: NORMAL
UNIT DIVISION, %UDIV: 0
UNIT DIVISION, %UDIV: 0
VENTRICULAR RATE, ECG03: 77 BPM
WBC # BLD AUTO: 8.4 K/UL (ref 3.6–11)

## 2020-07-22 PROCEDURE — 77030027138 HC INCENT SPIROMETER -A

## 2020-07-22 PROCEDURE — 93005 ELECTROCARDIOGRAM TRACING: CPT

## 2020-07-22 PROCEDURE — 74011250636 HC RX REV CODE- 250/636: Performed by: INTERNAL MEDICINE

## 2020-07-22 PROCEDURE — 36415 COLL VENOUS BLD VENIPUNCTURE: CPT

## 2020-07-22 PROCEDURE — 74011000250 HC RX REV CODE- 250: Performed by: INTERNAL MEDICINE

## 2020-07-22 PROCEDURE — 65270000029 HC RM PRIVATE

## 2020-07-22 PROCEDURE — 3331090002 HH PPS REVENUE DEBIT

## 2020-07-22 PROCEDURE — C9113 INJ PANTOPRAZOLE SODIUM, VIA: HCPCS | Performed by: INTERNAL MEDICINE

## 2020-07-22 PROCEDURE — 74011250637 HC RX REV CODE- 250/637: Performed by: INTERNAL MEDICINE

## 2020-07-22 PROCEDURE — 85018 HEMOGLOBIN: CPT

## 2020-07-22 PROCEDURE — 3331090001 HH PPS REVENUE CREDIT

## 2020-07-22 PROCEDURE — 80048 BASIC METABOLIC PNL TOTAL CA: CPT

## 2020-07-22 PROCEDURE — 85025 COMPLETE CBC W/AUTO DIFF WBC: CPT

## 2020-07-22 PROCEDURE — 82962 GLUCOSE BLOOD TEST: CPT

## 2020-07-22 RX ADMIN — SODIUM CHLORIDE, SODIUM LACTATE, POTASSIUM CHLORIDE, AND CALCIUM CHLORIDE 75 ML/HR: 600; 310; 30; 20 INJECTION, SOLUTION INTRAVENOUS at 06:28

## 2020-07-22 RX ADMIN — Medication 10 ML: at 15:43

## 2020-07-22 RX ADMIN — SODIUM CHLORIDE 40 MG: 9 INJECTION INTRAMUSCULAR; INTRAVENOUS; SUBCUTANEOUS at 19:24

## 2020-07-22 RX ADMIN — FOLIC ACID 1 MG: 1 TABLET ORAL at 09:11

## 2020-07-22 RX ADMIN — Medication 10 ML: at 06:26

## 2020-07-22 RX ADMIN — SODIUM CHLORIDE 40 MG: 9 INJECTION INTRAMUSCULAR; INTRAVENOUS; SUBCUTANEOUS at 09:11

## 2020-07-22 NOTE — PROGRESS NOTES
[facility].  [facility], [address]       GI PROGRESS NOTE  Will Ann Singleton  [phone] office  [phone] NP/PA in-hospital cell phone M-F until 4:30PM  After 5PM or on weekends, please call  for physician on call      NAME: Carolyn Callejas   :  1935   MRN:  [id number]       Subjective:   Patient reports that she feels better. No nausea, vomiting, or abdominal pain. She reports abdominal bloating and gas. Patient had a bowel movement today that was watery with some loose material.  No hematochezia or melena. Objective:     VITALS:   Last 24hrs VS reviewed since prior progress note. Most recent are:  Visit Vitals  /84 (BP 1 Location: Left arm, BP Patient Position: At rest)   Pulse 76   Temp 98.1 °F (36.7 °C)   Resp 18   Ht 5' 5\" (1.651 m)   Wt 83.3 kg (183 lb 10.3 oz)   SpO2 97%   BMI 30.56 kg/m²       PHYSICAL EXAM:  General: Cooperative, no acute distress    Neurologic:  Alert and oriented  HEENT: EOMI, no scleral icterus   Lungs:  No respiratory distress  Heart:  S1 S2  Abdomen: Soft, mildly distended, no tenderness, no guarding, no rebound.  +Bowel sounds  Extremities: Warm  Psych:   Not anxious or agitated    Lab Data Reviewed:     Recent Results (from the past 24 hour(s))   HGB & HCT    Collection Time: 20  3:11 PM   Result Value Ref Range    HGB 8.6 (L) 11.5 - 16.0 g/dL    HCT 28.3 (L) 35.0 - 47.0 %   CBC WITH AUTOMATED DIFF    Collection Time: 20  3:18 AM   Result Value Ref Range    WBC 8.4 3.6 - 11.0 K/uL    RBC 2.39 (L) 3.80 - 5.20 M/uL    HGB 8.1 (L) 11.5 - 16.0 g/dL    HCT 25.8 (L) 35.0 - 47.0 %    .9 (H) 80.0 - 99.0 FL    MCH 33.9 26.0 - 34.0 PG    MCHC 31.4 30.0 - 36.5 g/dL    RDW 24.0 (H) 11.5 - 14.5 %    PLATELET 68 (L) 665 - 400 K/uL    MPV 9.9 8.9 - 12.9 FL    NRBC 0.5 (H) 0  WBC    ABSOLUTE NRBC 0.04 (H) 0.00 - 0.01 K/uL    NEUTROPHILS 27 (L) 32 - 75 %    BAND NEUTROPHILS 5 0 - 6 %    LYMPHOCYTES 21 12 - 49 % MONOCYTES 38 (H) 5 - 13 %    EOSINOPHILS 3 0 - 7 %    BASOPHILS 0 0 - 1 %    METAMYELOCYTES 2 (H) 0 %    MYELOCYTES 4 (H) 0 %    IMMATURE GRANULOCYTES 0 %    ABS. NEUTROPHILS 2.7 1.8 - 8.0 K/UL    ABS. LYMPHOCYTES 1.8 0.8 - 3.5 K/UL    ABS. MONOCYTES 3.2 (H) 0.0 - 1.0 K/UL    ABS. EOSINOPHILS 0.3 0.0 - 0.4 K/UL    ABS. BASOPHILS 0.0 0.0 - 0.1 K/UL    ABS. IMM.  GRANS. 0.0 K/UL    DF MANUAL      PLATELET COMMENTS Large Platelets      RBC COMMENTS ANISOCYTOSIS  3+        RBC COMMENTS MACROCYTOSIS  1+        RBC COMMENTS MICROCYTOSIS  1+        RBC COMMENTS HYPOCHROMIA  1+       METABOLIC PANEL, BASIC    Collection Time: 07/22/20  3:18 AM   Result Value Ref Range    Sodium 141 136 - 145 mmol/L    Potassium 3.4 (L) 3.5 - 5.1 mmol/L    Chloride 110 (H) 97 - 108 mmol/L    CO2 24 21 - 32 mmol/L    Anion gap 7 5 - 15 mmol/L    Glucose 93 65 - 100 mg/dL    BUN 6 6 - 20 MG/DL    Creatinine 0.48 (L) 0.55 - 1.02 MG/DL    BUN/Creatinine ratio 13 12 - 20      GFR est AA >60 >60 ml/min/1.73m2    GFR est non-AA >60 >60 ml/min/1.73m2    Calcium 8.0 (L) 8.5 - 10.1 MG/DL   EKG, 12 LEAD, INITIAL    Collection Time: 07/22/20  6:23 AM   Result Value Ref Range    Ventricular Rate 77 BPM    Atrial Rate 77 BPM    P-R Interval 224 ms    QRS Duration 88 ms    Q-T Interval 402 ms    QTC Calculation (Bezet) 454 ms    Calculated P Axis 38 degrees    Calculated R Axis -12 degrees    Calculated T Axis 56 degrees    Diagnosis       Sinus rhythm with 1st degree AV block  When compared with ECG of 26-JUN-2020 13:03,  No significant change was found     GLUCOSE, POC    Collection Time: 07/22/20 11:40 AM   Result Value Ref Range    Glucose (POC) 105 (H) 65 - 100 mg/dL    Performed by Mague Santiago      EGD (6/23/20) by Dr. Maida Rose for dysphagia/odynophagia/GERD showed minimal irregularity at the GE junction with no further ulceration noted (biopsied), followed by dilatation with 54FR Savary dilator; normal stomach; normal duodenum.       EGD (1/24/20) by Dr. Danitza Morelos for dysphagia/odynophagia/iron deficiency anemia/melena showed a single 4 mm shallow round ulceration at the GE junction; normal stomach; normal duodenum.       Colonoscopy (2/28/20) by Dr. Danitza Morelos for diverticulitis showed normal terminal ileum; sigmoid diverticulosis; small grade 1 internal hemorrhoids; no masses, polyps, or inflammation noted. Assessment:   · GI bleed: dark, maroon-colored stools and anemia with hemoccult positive stool. CT abdomen/pelvis with and without contrast (7/20/20): no evidence of active GI hemorrhage; diverticulosis without evidence of diverticulitis. No anticoagulation. EGD and colonoscopy with above findings. Hgb 8.1. Received 2 units PRBCs (last unit on 7/21).    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    Lumbar post-laminectomy syndrome M96.1    Spinal stenosis of lumbar region with neurogenic claudication M48.062    Syncope R55    Stenosis of both internal carotid arteries I65.23    Syncope and collapse R55    S/P lumpectomy, left breast Z98.890    Costochondritis M94.0    Diverticulitis K57.92    Dry eye syndrome H04.129    Fibromyalgia M79.7    GERD without esophagitis K21.9    Hypercholesterolemia E78.00    Osteoporosis M81.0    Peripheral neuropathy G62.9    Prediabetes R73.03    Rosacea L71.9    Acute respiratory failure with hypoxia (HCC) J96.01    Acute bronchitis due to other specified organisms J20.8    Blood loss anemia D50.0    Thrombocytopenia (HCC) D69.6  Iron deficiency anemia D50.9    COPD (chronic obstructive pulmonary disease) (HCC) J44.9    SIRS (systemic inflammatory response syndrome) (HCC) R65.10    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 If EGD is necessary, ENT recommended clindamycin 900 mg once and Decadron 10 mg once prior to the procedure. Signed By: MINH Tate     7/22/2020  12:56 PM       GI Attending: Agree with above plan. Discussed case with hospitalist. She is not having overt GI bleeding and Hgb is 8.1. Will defer EGD at this time given her high risk for anesthesia. Will follow along with you. Clint Dunlap MD

## 2020-07-22 NOTE — PROGRESS NOTES
7/22/2020; 10:15 -   TRANSITIONS OF CARE PLAN:   1. DESTINATION: Own Home  2. TRANSPORT: Spouse  3. ADDITIONAL SUPPORT: Spouse  4. DME: Has Access to Most DME; Actively Uses: Cane, Shower Stool, O2 Concentrator, 2x Hearing Aids  5. HOME HEALTH: Currently open to [facility]; will need [facility] Orders for SN and PT  6. CODE STATUS/AMD STATUS: Full Code; on file  7. FOLLOW UP APPOINTMENTS: PCP, GI, ENT  8. STILL NEEDS: Monitor Hemoglobin, Potential ABX and Steroids, EGD    Reason for Readmission:     Acute Upper GI Bleed         RUR Score/Risk Level:     30%; High    PCP: First and Last name:  Dr. Dea Rodriguez   [facility] of [facility]: [facility]   Are you a current patient: Yes/No: YES   Approximate date of last visit: Last Week   Can you participate in a virtual visit with your PCP: NO    Is a Care Conference indicated: Not at this time      Did you attend your follow up appointment (s): If not, why not:Yes         Resources/supports as identified by patient/family:   Family support is good; patient is independent at baseline; currently open to [address] [place] [address] Strategic Funding Source facing patient (as identified by patient/family and CM): None identified    Finances/Medication cost?    Medicare and BCBS PPO; [place] [facility] [place] [id number] at baseline; spouse at discharge  Support system or lack thereof? Spouse and son    Living arrangements? With spouse in single story home with 3 exterior steps       Self-care/ADLs/Cognition? Alert and oriented x 4; hard of hearing with 2x hearing aids; independent in ADLs including driving         Current Advanced Directive/Advance Care Plan:  Full Code; AMD is on file; [facility] updated           Plan for utilizing home health:   Currently open to [facility]; will need [facility] Orders for SN and PT             Transition of Care Plan:    Based on readmission, the patient's previous Plan of Care   has been evaluated and/or modified.  The current Transition of Care Plan is:    CM met with patient at bedside, with patient alert and oriented x4. Patient identified that she lives with her  in a single story home with 3 exterior steps to enter. At baseline, patient is fully independent, including driving. Patient's adult son lives in the general area. Patient identified access to most DME but actively uses: cane, shower stool, 2x hearing aids (not present at bedside), and O2 Concentrator (serviced by REQQI Amina). Patient had a recent admission 6/26-7/7 for Mass of Epiglottis resulting in a tracheostomy. Patient was discharged to home with [facility] via [facility]. Last visit was the day prior to admission. Per patient, she was nearing the end of SN needs but was open to the agency for  for wound care and PT services. Patient will require [facility] Orders prior to discharge. Patient has hx of rehab at [facility]. [facility]acy preference is [facility] at [address] [place] [address]. Patient is currently NPO for pending EGD. Patient is anticipated to discharge within 24-48 hours. Likely disposition is for discharge to own home with transport via spouse and [facility]  SN and PT via [facility]. Medicare pt has received, reviewed, and signed 2nd IM letter informing them of their right to appeal the discharge. Signed copy has been placed on pt bedside chart. Care Management Interventions  PCP Verified by CM: Yes(seen by Dr. Gabino Tang last week)  Palliative Care Criteria Met (RRAT>21 & CHF Dx)?: No  Mode of Transport at Discharge:  Other (see comment)(spouse to transport)  Transition of Care Consult (CM Consult): Discharge Planning, [address]: Yes  MyChart Signup: Yes  Discharge Durable Medical Equipment: No(has access to most dme but uses: cane, shower stool, O2 Concentrator, 2x hearing aids)  Health Maintenance Reviewed: Yes(cm met with patient, with patient alert and oriented x 4)  Physical Therapy Consult: No  Occupational Therapy Consult: No  Speech Therapy Consult: No  Current Support Network: Own Home, Lives with Spouse, Family Lives Nearby  Confirm Follow Up Transport: Self(independent in adls, to include driving)  The Plan for Transition of Care is Related to the Following Treatment Goals : Home with [facility] SN and PT via [facility]  The Patient and/or Patient Representative was Provided with a Choice of Provider and Agrees with the Discharge Plan?: Yes  Name of the Patient Representative Who was Provided with a Choice of Provider and Agrees with the Discharge Plan: patient; self  Freedom of Choice List was Provided with Basic Dialogue that Supports the Patient's Individualized Plan of Care/Goals, Treatment Preferences and Shares the Quality Data Associated with the Providers?: Yes   Resource Information Provided?: No  Discharge Location  Discharge Placement: Home with home health(lives with spouse in a single story home, 3 exterior steps)  CRM: Zora Welch, MPH, [address] [place] [address]; Z: [phone]

## 2020-07-22 NOTE — PROGRESS NOTES
Physical Therapy Screening:    An [facility] screening referral was triggered for physical therapy based on results obtained during the nursing admission assessment. The patients chart was reviewed and the patient is appropriate for a skilled therapy evaluation if there is a decline in functional mobility from baseline. Please order a consult for physical therapy if you are in agreement and would like an evaluation to be completed. Thank you. Patient was ambulatory at home and receiving home health PT/OT after last admission earlier this month. May need orders to resume if indicated.     Ursula Morelos, PT

## 2020-07-22 NOTE — ROUTINE PROCESS
Bedside shift change report given to Radha Arvizu (oncoming nurse) by Concepcion Snow (offgoing nurse). Report included the following information SBAR, Intake/Output, MAR and Recent Results.

## 2020-07-22 NOTE — PROGRESS NOTES
Problem: Falls - Risk of  Goal: *Absence of Falls  Description: Document Storm Dotson Fall Risk and appropriate interventions in the flowsheet.   Outcome: Progressing Towards Goal  Note: Fall Risk Interventions:  Mobility Interventions: Communicate number of staff needed for ambulation/transfer, Utilize walker, cane, or other assistive device              Elimination Interventions: Call light in reach

## 2020-07-22 NOTE — WOUND CARE
Consulted for trach site by RN. Followed by Dr. Oumar Alford, otolaryngology. Will sign off.   KARUNA Bowman

## 2020-07-22 NOTE — PROGRESS NOTES
ANDREW;   notes that   patient has been followed by VANE haskins with ENT. Patient had home health with [facility] prior to this admission. It has been recommended that she resume her services with PT and SN.

## 2020-07-22 NOTE — PROGRESS NOTES
[facility] Adult  Hospitalist Group                                                                                          Hospitalist Progress Note  Angie Ponce NP  Answering service: [phone] or [phone] from in house phone        Date of Service:  2020  NAME:  Krista Ramires YOB: 1935  MRN:  [id number]      Admission Summary:    This is an 46-year-old woman with past medical history significant for COPD, hypertension, paroxysmal atrial fibrillation, myelodysplastic syndrome, supraglottic mass, status post tracheostomy with biopsy was in her usual state of health until yesterday when the patient started passing blood in stool.  The patient described her stool as dark surrounded by bright red blood.  The bleeding was significant enough to fill the toilet bowl.  No associated abdominal pain.  No nausea or vomiting.  The patient was recently admitted to this hospital from 2020 to 2020. Honorio Denson was admitted with a supraglottic mass, seen by ENT surgeon, and the patient subsequently underwent tracheostomy with biopsy.  It was stated that the supraglottic mass was benign.  The patient was discharged home on oral      antibiotics.  During that hospitalization, the patient developed diarrhea attributed to the antibiotic therapy and was seen by gastroenterologist during the hospitalization. Sharon Godwin, the patient underwent EGD in 2020 that shows ulcer at the gastroesophageal junction.  The patient denies taking over-the-counter nonsteroidal anti-inflammatory drugs.  The patient is not on aspirin therapy.  The patient has history of anemia for which the patient has been transfused in the past because of myelodysplastic syndrome.  When the patient arrived at the emergency room, the patient was found to have hemoglobin of 6.6.  The CT scan of the abdomen and pelvis did not show any evidence of active hemorrhage.  The patient was started on Protonix.  The emergency room physician consulted gastroenterologist on-call. Magali Service to the hospital was advised for possible EGD the following day.  The patient was then referred to the hospitalist service for that purpose.  The patient denies fever, rigors and chills. Interval history / Subjective:   Seen and examined patient, sitting in bedside chair. States that she is feeling good this morning. Shehad one bowel movement this am- it was formed and brown. No blood noted. Has not had abdominal pain and denies nausea or vomiting. No over night events noted. No acute distress noted. Denies any dizziness, syncope, shortness of breath chest pain or tightness. Assessment & Plan:     Acute Upper GI bleed   - GI following   - ENT consulted, recommendations noted.    - Monitor for bleeding   - Trend H&H      Acute blood loss anemia    - likely secondary to upper GI bleed  - Hgb 6.6 on arrival to ED   - Received 2 units of pRBCs in the ED   - Monitor H&H  - Will transfuse for hgb <7.0  - Hemoglobin  8.1 today   - Repeat H&H at 3pm      Thrombocytopenia  - Likely seconday to myelodysplastic syndrome   - Platelets 68  - Monitor labs   - Monitor for bleeding      Paroxysmal atrial fibrillation   - Controlled, NSR on monitor.  - TSH - 2.26  - Per pharmacy- Pt has not filled Cardizem since November 2019.   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   - Old trach area- healing.   - ENT following      COPD  -Stable   - PRN Nebs      Code status: Full  DVT prophylaxis: SCDs    Care Plan discussed with: Patient/Family and Nurse  Anticipated Disposition: Home w/Family  Anticipated Discharge: 24 hours to 48 hours     Hospital Problems  Date Reviewed: 7/20/2020 Codes Class Noted POA    * (Principal) Acute upper GI bleed ICD-10-CM: K92.2  ICD-9-CM: 578.9  7/20/2020 Yes                Review of Systems:   A comprehensive review of systems was negative except for that written in the HPI. Vital Signs:    Last 24hrs VS reviewed since prior progress note. Most recent are:  Visit Vitals  /67 (BP 1 Location: Left arm, BP Patient Position: At rest)   Pulse 75   Temp 97.7 °F (36.5 °C)   Resp 16   Ht 5' 5\" (1.651 m)   Wt 83.3 kg (183 lb 10.3 oz)   SpO2 92%   BMI 30.56 kg/m²         Intake/Output Summary (Last 24 hours) at 7/22/2020 0920  Last data filed at 7/21/2020 2315  Gross per 24 hour   Intake 1842. 05 ml   Output    Net 1842. 05 ml        Physical Examination:             Constitutional:  No acute distress, cooperative, pleasant, answers all questions appropriately. Appears stated age    ENT:  Oral mucosa moist, oropharynx benign. Resp:  CTA bilaterally. No wheezing/rhonchi/rales. No accessory muscle use   CV:  Regular rhythm, normal rate, no murmurs, gallops, rubs    GI:  Soft, non distended, non tender. normoactive bowel sounds, no hepatosplenomegaly     Musculoskeletal:  No edema, warm, 2+ pulses throughout    Neurologic:  Moves all extremities. AAOx3, CN II-XII reviewed, follows commands     Psych:  Good insight, Not anxious nor agitated.   Skin:  Good turgor, no rashes or ulcers and skin is warm and dry to touch        Data Review:    Review and/or order of clinical lab test      Labs:     Recent Labs     07/22/20  0318 07/21/20  1511 07/21/20  0517   WBC 8.4  --  7.3   HGB 8.1* 8.6* 6.5*   HCT 25.8* 28.3* 21.2*   PLT 68*  --  64*     Recent Labs     07/22/20  0318 07/21/20  0517 07/20/20  1701    143 141   K 3.4* 3.5 4.0   * 113* 113*   CO2 24 23 22   BUN 6 10 14   CREA 0.48* 0.51* 0.67   GLU 93 92 116*   CA 8.0* 7.6* 8.1*   MG  --  1.8  --    PHOS  --  2.7  --      Recent Labs     07/21/20  0517 07/20/20  1701   ALT 19 27   AP 47 61   TBILI 1.1* 0.8   TP 6.2* 7.4   ALB 3.0* 3.6   GLOB 3.2 3.8   LPSE 86  --      Recent Labs     07/20/20  1701   INR 1.0   PTP 10.5      Recent Labs     07/21/20  0517   FERR 153      Lab Results   Component Value Date/Time    Folate 22.8 (H) 07/21/2020 05:17 AM      No results for input(s): PH, PCO2, PO2 in the last 72 hours.   Recent Labs     07/21/20  0517   TROIQ <0.05     Lab Results   Component Value Date/Time    Cholesterol, total 94 07/21/2020 05:17 AM    HDL Cholesterol 37 07/21/2020 05:17 AM    LDL, calculated 43 07/21/2020 05:17 AM    Triglyceride 70 07/21/2020 05:17 AM    CHOL/HDL Ratio 2.5 07/21/2020 05:17 AM     Lab Results   Component Value Date/Time    Glucose (POC) 132 (H) 07/03/2020 06:36 AM    Glucose (POC) 123 (H) 07/02/2020 11:25 PM    Glucose (POC) 159 (H) 06/26/2020 09:53 PM    Glucose (POC) 120 (H) 06/26/2020 08:31 PM    Glucose (POC) 127 (H) 11/04/2019 05:15 AM     Lab Results   Component Value Date/Time    Color YELLOW/STRAW 06/26/2020 02:19 PM    Appearance CLEAR 06/26/2020 02:19 PM    Specific gravity 1.016 06/26/2020 02:19 PM    Specific gravity 1.010 02/11/2020 03:12 AM    pH (UA) 6.0 06/26/2020 02:19 PM    Protein Negative 06/26/2020 02:19 PM    Glucose Negative 06/26/2020 02:19 PM    Ketone Negative 06/26/2020 02:19 PM    Bilirubin Negative 06/26/2020 02:19 PM    Urobilinogen 0.2 06/26/2020 02:19 PM    Nitrites Negative 06/26/2020 02:19 PM    Leukocyte Esterase SMALL (A) 06/26/2020 02:19 PM    Epithelial cells FEW 06/26/2020 02:19 PM    Bacteria Negative 06/26/2020 02:19 PM    WBC 0-4 06/26/2020 02:19 PM    RBC 0-5 06/26/2020 02:19 PM         Medications Reviewed:     Current Facility-Administered Medications   Medication Dose Route Frequency    lactated Ringers infusion  75 mL/hr IntraVENous CONTINUOUS    0.9% sodium chloride infusion 250 mL  250 mL IntraVENous PRN    hydrALAZINE (APRESOLINE) 20 mg/mL injection 10 mg  10 mg IntraVENous Q6H PRN    0.9% sodium chloride infusion 250 mL  250 mL IntraVENous PRN    folic acid (FOLVITE) tablet 1 mg  1 mg Oral DAILY    sodium chloride (NS) flush 5-40 mL  5-40 mL IntraVENous Q8H    sodium chloride (NS) flush 5-40 mL  5-40 mL IntraVENous PRN    acetaminophen (TYLENOL) tablet 650 mg  650 mg Oral Q4H PRN    ondansetron (ZOFRAN) injection 4 mg  4 mg IntraVENous Q4H PRN    pantoprazole (PROTONIX) 40 mg in 0.9% sodium chloride 10 mL injection  40 mg IntraVENous Q12H    albuterol-ipratropium (DUO-NEB) 2.5 MG-0.5 MG/3 ML  3 mL Nebulization Q4H PRN     Facility-Administered Medications Ordered in Other Encounters   Medication Dose Route Frequency    0.9% sodium chloride infusion   IntraVENous CONTINUOUS     ______________________________________________________________________  EXPECTED LENGTH OF STAY: - - -  ACTUAL LENGTH OF STAY:          2                 Angie Ponce NP

## 2020-07-22 NOTE — PROGRESS NOTES
Bedside shift change report given to [address] [place] [address] (oncoming nurse) by Quin Rodriguez (offgoing nurse). Report included the following information SBAR, Kardex, Intake/Output and MAR.

## 2020-07-23 ENCOUNTER — HOME CARE VISIT (OUTPATIENT)
Dept: SCHEDULING | Facility: HOME HEALTH | Age: 85
End: 2020-07-23
Payer: MEDICARE

## 2020-07-23 VITALS
BODY MASS INDEX: 30.6 KG/M2 | OXYGEN SATURATION: 93 % | HEIGHT: 65 IN | TEMPERATURE: 98.4 F | HEART RATE: 73 BPM | WEIGHT: 183.64 LBS | RESPIRATION RATE: 16 BRPM | DIASTOLIC BLOOD PRESSURE: 61 MMHG | SYSTOLIC BLOOD PRESSURE: 144 MMHG

## 2020-07-23 LAB
ANION GAP SERPL CALC-SCNC: 7 MMOL/L (ref 5–15)
BASOPHILS # BLD: 0.1 K/UL (ref 0–0.1)
BASOPHILS NFR BLD: 1 % (ref 0–1)
BUN SERPL-MCNC: 5 MG/DL (ref 6–20)
BUN/CREAT SERPL: 9 (ref 12–20)
CALCIUM SERPL-MCNC: 8.5 MG/DL (ref 8.5–10.1)
CHLORIDE SERPL-SCNC: 109 MMOL/L (ref 97–108)
CO2 SERPL-SCNC: 25 MMOL/L (ref 21–32)
CREAT SERPL-MCNC: 0.54 MG/DL (ref 0.55–1.02)
DIFFERENTIAL METHOD BLD: ABNORMAL
EOSINOPHIL # BLD: 0.4 K/UL (ref 0–0.4)
EOSINOPHIL NFR BLD: 4 % (ref 0–7)
ERYTHROCYTE [DISTWIDTH] IN BLOOD BY AUTOMATED COUNT: 24.3 % (ref 11.5–14.5)
GLUCOSE SERPL-MCNC: 99 MG/DL (ref 65–100)
HCT VFR BLD AUTO: 28.1 % (ref 35–47)
HGB BLD-MCNC: 8.8 G/DL (ref 11.5–16)
IMM GRANULOCYTES # BLD AUTO: 0 K/UL
IMM GRANULOCYTES NFR BLD AUTO: 0 %
LYMPHOCYTES # BLD: 2.7 K/UL (ref 0.8–3.5)
LYMPHOCYTES NFR BLD: 24 % (ref 12–49)
MCH RBC QN AUTO: 34.2 PG (ref 26–34)
MCHC RBC AUTO-ENTMCNC: 31.3 G/DL (ref 30–36.5)
MCV RBC AUTO: 109.3 FL (ref 80–99)
METAMYELOCYTES NFR BLD MANUAL: 12 %
MONOCYTES # BLD: 2.8 K/UL (ref 0–1)
MONOCYTES NFR BLD: 25 % (ref 5–13)
MYELOCYTES NFR BLD MANUAL: 3 %
NEUTS SEG # BLD: 3.5 K/UL (ref 1.8–8)
NEUTS SEG NFR BLD: 31 % (ref 32–75)
NRBC # BLD: 0.03 K/UL (ref 0–0.01)
NRBC BLD-RTO: 0.3 PER 100 WBC
PLATELET # BLD AUTO: 73 K/UL (ref 150–400)
PMV BLD AUTO: 10.3 FL (ref 8.9–12.9)
POTASSIUM SERPL-SCNC: 3.3 MMOL/L (ref 3.5–5.1)
RBC # BLD AUTO: 2.57 M/UL (ref 3.8–5.2)
RBC MORPH BLD: ABNORMAL
SODIUM SERPL-SCNC: 141 MMOL/L (ref 136–145)
WBC # BLD AUTO: 11.2 K/UL (ref 3.6–11)

## 2020-07-23 PROCEDURE — 74011250637 HC RX REV CODE- 250/637: Performed by: NURSE PRACTITIONER

## 2020-07-23 PROCEDURE — 80048 BASIC METABOLIC PNL TOTAL CA: CPT

## 2020-07-23 PROCEDURE — 3331090002 HH PPS REVENUE DEBIT

## 2020-07-23 PROCEDURE — 74011250636 HC RX REV CODE- 250/636: Performed by: INTERNAL MEDICINE

## 2020-07-23 PROCEDURE — 74011250637 HC RX REV CODE- 250/637: Performed by: INTERNAL MEDICINE

## 2020-07-23 PROCEDURE — C9113 INJ PANTOPRAZOLE SODIUM, VIA: HCPCS | Performed by: INTERNAL MEDICINE

## 2020-07-23 PROCEDURE — 36415 COLL VENOUS BLD VENIPUNCTURE: CPT

## 2020-07-23 PROCEDURE — 74011000250 HC RX REV CODE- 250: Performed by: INTERNAL MEDICINE

## 2020-07-23 PROCEDURE — 85025 COMPLETE CBC W/AUTO DIFF WBC: CPT

## 2020-07-23 PROCEDURE — 3331090001 HH PPS REVENUE CREDIT

## 2020-07-23 RX ORDER — POTASSIUM CHLORIDE 750 MG/1
20 TABLET, FILM COATED, EXTENDED RELEASE ORAL
Status: COMPLETED | OUTPATIENT
Start: 2020-07-23 | End: 2020-07-23

## 2020-07-23 RX ORDER — ACETAMINOPHEN 325 MG/1
650 TABLET ORAL
Qty: 30 TAB | Refills: 0 | Status: ON HOLD | OUTPATIENT
Start: 2020-07-23 | End: 2021-02-11 | Stop reason: CLARIF

## 2020-07-23 RX ADMIN — FOLIC ACID 1 MG: 1 TABLET ORAL at 08:58

## 2020-07-23 RX ADMIN — SODIUM CHLORIDE 40 MG: 9 INJECTION INTRAMUSCULAR; INTRAVENOUS; SUBCUTANEOUS at 07:01

## 2020-07-23 RX ADMIN — POTASSIUM CHLORIDE 20 MEQ: 750 TABLET, FILM COATED, EXTENDED RELEASE ORAL at 11:04

## 2020-07-23 NOTE — PROGRESS NOTES
[address]  174 [facility], [address]       GI PROGRESS NOTE  Will Ismael Rodríguez  [phone] office  [phone] NP/PA in-hospital cell phone M-F until 4:30PM  After 5PM or on weekends, please call  for physician on call      NAME: Bree Howell   :  1935   MRN:  [id number]       Subjective:   Patient is sitting dressed in the chair. No nausea, vomiting, or abdominal pain. She reports some abdominal bloating and gas. Patient reports having a bowel movement this morning. No hematochezia or melena. Objective:     VITALS:   Last 24hrs VS reviewed since prior progress note. Most recent are:  Visit Vitals  /61 (BP 1 Location: Left arm, BP Patient Position: At rest)   Pulse 73   Temp 98.4 °F (36.9 °C)   Resp 16   Ht 5' 5\" (1.651 m)   Wt 83.3 kg (183 lb 10.3 oz)   SpO2 93%   BMI 30.56 kg/m²       PHYSICAL EXAM:  General:          Cooperative, no acute distress    Neurologic:      Alert and oriented  HEENT:           EOMI, no scleral icterus   Lungs:             No respiratory distress  Heart:              S1 S2  Abdomen:        Soft, mildly distended, no tenderness, no guarding, no rebound. +Bowel sounds.   Extremities:     Warm  Psych:             Not anxious or agitated      Lab Data Reviewed:     Recent Results (from the past 24 hour(s))   GLUCOSE, POC    Collection Time: 20 11:40 AM   Result Value Ref Range    Glucose (POC) 105 (H) 65 - 100 mg/dL    Performed by Boy Barbour    HGB & HCT    Collection Time: 20  4:20 PM   Result Value Ref Range    HGB 9.0 (L) 11.5 - 16.0 g/dL    HCT 29.0 (L) 35.0 - 47.0 %   CBC WITH AUTOMATED DIFF    Collection Time: 20  5:15 AM   Result Value Ref Range    WBC 11.2 (H) 3.6 - 11.0 K/uL    RBC 2.57 (L) 3.80 - 5.20 M/uL    HGB 8.8 (L) 11.5 - 16.0 g/dL    HCT 28.1 (L) 35.0 - 47.0 %    .3 (H) 80.0 - 99.0 FL    MCH 34.2 (H) 26.0 - 34.0 PG    MCHC 31.3 30.0 - 36.5 g/dL    RDW 24.3 (H) 11.5 - 14.5 % PLATELET 73 (L) 849 - 400 K/uL    MPV 10.3 8.9 - 12.9 FL    NRBC 0.3 (H) 0  WBC    ABSOLUTE NRBC 0.03 (H) 0.00 - 0.01 K/uL    NEUTROPHILS 31 (L) 32 - 75 %    LYMPHOCYTES 24 12 - 49 %    MONOCYTES 25 (H) 5 - 13 %    EOSINOPHILS 4 0 - 7 %    BASOPHILS 1 0 - 1 %    METAMYELOCYTES 12 (H) 0 %    MYELOCYTES 3 (H) 0 %    IMMATURE GRANULOCYTES 0 %    ABS. NEUTROPHILS 3.5 1.8 - 8.0 K/UL    ABS. LYMPHOCYTES 2.7 0.8 - 3.5 K/UL    ABS. MONOCYTES 2.8 (H) 0.0 - 1.0 K/UL    ABS. EOSINOPHILS 0.4 0.0 - 0.4 K/UL    ABS. BASOPHILS 0.1 0.0 - 0.1 K/UL    ABS. IMM. GRANS. 0.0 K/UL    DF MANUAL      RBC COMMENTS ANISOCYTOSIS  3+        RBC COMMENTS MACROCYTOSIS  1+        RBC COMMENTS POLYCHROMASIA  PRESENT       METABOLIC PANEL, BASIC    Collection Time: 07/23/20  5:15 AM   Result Value Ref Range    Sodium 141 136 - 145 mmol/L    Potassium 3.3 (L) 3.5 - 5.1 mmol/L    Chloride 109 (H) 97 - 108 mmol/L    CO2 25 21 - 32 mmol/L    Anion gap 7 5 - 15 mmol/L    Glucose 99 65 - 100 mg/dL    BUN 5 (L) 6 - 20 MG/DL    Creatinine 0.54 (L) 0.55 - 1.02 MG/DL    BUN/Creatinine ratio 9 (L) 12 - 20      GFR est AA >60 >60 ml/min/1.73m2    GFR est non-AA >60 >60 ml/min/1.73m2    Calcium 8.5 8.5 - 10.1 MG/DL     EGD (6/23/20) by Dr. Kelli Rodríguez for dysphagia/odynophagia/GERD showed minimal irregularity at the GE junction with no further ulceration noted (biopsied), followed by dilatation with 54FR Savary dilator; normal stomach; normal duodenum.       EGD (1/24/20) by Dr. Kelli Rodríguez for dysphagia/odynophagia/iron deficiency anemia/melena showed a single 4 mm shallow round ulceration at the GE junction; normal stomach; normal duodenum.       Colonoscopy (2/28/20) by Dr. Kelli Rodríguez for diverticulitis showed normal terminal ileum; sigmoid diverticulosis; small grade 1 internal hemorrhoids; no masses, polyps, or inflammation noted. Assessment:   · GI bleed: dark, maroon-colored stools and anemia with hemoccult positive stool.  CT abdomen/pelvis with and without contrast (7/20/20): no evidence of active GI hemorrhage; diverticulosis without evidence of diverticulitis. No anticoagulation. EGD and colonoscopy with above findings. Hgb 8.8 (8.1 yesterday AM). Received 2 units PRBCs (last unit on 7/21).    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    Lumbar post-laminectomy syndrome M96.1    Spinal stenosis of lumbar region with neurogenic claudication M48.062    Syncope R55    Stenosis of both internal carotid arteries I65.23    Syncope and collapse R55    S/P lumpectomy, left breast Z98.890    Costochondritis M94.0    Diverticulitis K57.92    Dry eye syndrome H04.129    Fibromyalgia M79.7    GERD without esophagitis K21.9    Hypercholesterolemia E78.00    Osteoporosis M81.0    Peripheral neuropathy G62.9    Prediabetes R73.03    Rosacea L71.9    Acute respiratory failure with hypoxia ([facility]) J96.01    Acute bronchitis due to other specified organisms J20.8    Blood loss anemia D50.0    Thrombocytopenia ([facility]) D69.6    Iron deficiency anemia D50.9    COPD (chronic obstructive pulmonary disease) ([facility]) J44.9    SIRS (systemic inflammatory response syndrome) ([facility]) R65.10    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 Trend CBC and transfuse as necessary. · Will defer EGD at this time given high risk for anesthesia     Signed By: MINH Gross     7/23/2020  11:10 AM       GI Attending: Agree with above plan. We will sign off for now. Please call with any questions. Clint Hahn, MD

## 2020-07-23 NOTE — PROGRESS NOTES
Hospital follow-up PCP transitional care appointment has been scheduled with Dr. Deejay Saldana for Wednesday, 7/29/20 at 10:30 a.m. Pending patient discharge.   Brody Singletary, Care Management Specialist.

## 2020-07-23 NOTE — DISCHARGE SUMMARY
Discharge Summary       PATIENT ID: Randall Mon  MRN: [id number]   YOB: 1935    DATE OF ADMISSION: 7/20/2020  6:32 PM    DATE OF DISCHARGE: 7/23/2020     PRIMARY CARE PROVIDER: Tammy Liao MD     ATTENDING PHYSICIAN: Shelley Bradley MD  DISCHARGING PROVIDER: Felipa Bowser NP    To contact this individual call [phone] and ask the  to page. If unavailable ask to be transferred the Adult Hospitalist Department.     CONSULTATIONS: IP CONSULT TO GASTROENTEROLOGY    PROCEDURES/SURGERIES: Procedure(s) with comments:  PROCEDURE CANCELLED - NOT PERFORMED - pt needs sign off from ENT    [address]:   Acute Upper GIB, Acute Blood Loss Anemia, A-fib, COPD, Left Adrenal Adenoma, MDS, s/p tracheostomy 2/2 Supraglottic Mass, HTN, Thrombocytopenia.     This is an 28-year-old woman with past medical history significant for COPD, hypertension, paroxysmal atrial fibrillation, myelodysplastic syndrome, supraglottic mass, status post tracheostomy with biopsy was in her usual state of health until yesterday when the patient started passing blood in stool.  The patient described her stool as dark surrounded by bright red blood.  The bleeding was significant enough to fill the toilet bowl.  No associated abdominal pain.  No nausea or vomiting.  The patient was recently admitted to this hospital from 06/26/2020 to 07/07/2020.  She was admitted with a supraglottic mass, seen by ENT surgeon, and the patient subsequently underwent tracheostomy with biopsy.  It was stated that the supraglottic mass was benign.  The patient was discharged home on oral      antibiotics.  During that hospitalization, the patient developed diarrhea attributed to the antibiotic therapy and was seen by gastroenterologist during the hospitalization.  Also, the patient underwent EGD in 01/2020 that shows ulcer at the gastroesophageal junction.  The patient denies taking over-the-counter nonsteroidal anti-inflammatory drugs.  The patient is not on aspirin therapy.  The patient has history of anemia for which the patient has been transfused in the past because of myelodysplastic syndrome.  When the patient arrived at the emergency room, the patient was found to have hemoglobin of 6.6.  The CT scan of the abdomen and pelvis did not show any evidence of active hemorrhage.  The patient was started on Protonix.  The emergency room physician consulted gastroenterologist on-call. Leslie Nicole to the hospital was advised for possible EGD the following day.  The patient was then referred to the hospitalist service for that purpose.  The patient denies fever, rigors and chills. DISCHARGE DIAGNOSES / PLAN:      Acute Upper GI bleed   - GI following   - ENT consulted, recommendations noted.    - Monitor for bleeding   - f/u GI     Acute blood loss anemia    - likely secondary to upper GI bleed  - Hgb 6.6 on arrival to ED   - Received 2 units of pRBCs in the ED   - Monitor H&H  - Will transfuse for hgb <7.0  - Hemoglobin  8.8   - f/u PCP     Thrombocytopenia  - Likely seconday to myelodysplastic syndrome   - Platelets 68  - Monitor labs   - Monitor for bleeding      Paroxysmal atrial fibrillation   - Controlled, NSR on monitor.  - TSH - 2.26  - Per pharmacy- Pt has not filled Cardizem since November 2019.   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   - Old trach area- healing.   - ENT following      COPD  -Stable   - PRN Nebs      Code status: Full  DVT prophylaxis: SCDs     Care Plan discussed with: Patient and Nurse  Anticipated Disposition: Home w/Family  Anticipated Discharge: <24 hours     ADDITIONAL CARE RECOMMENDATIONS:   You have been deemed stable for discharge and are being discharged with HH,PT/OT. If you develop any abnormal or prolonged bleeding- seek care at nearest ER.     Should you need medication refills beyond what we have prescribed for you, you will need to contact your primary care provider for refills. PENDING TEST RESULTS:   At the time of discharge the following test results are still pending: none    FOLLOW UP APPOINTMENTS:    Follow-up Information     Follow up With Specialties Details Why Contact Info    Bianca White MD Internal Medicine On 7/29/2020 Hospital follow up PCP appointment Wednesday, 7/29/20 @ 10:30 a.m.  [address]      Sylvia Engel MD Otolaryngology In 2 weeks Otolaryngology  [address] [facility]  [address] [facility]      Boston Brady MD Gastroenterology In 2 weeks Gastroenterology  [phone] [facility]      [facility] 191  for home health [address] [place] [address]  [phone]             DIET: Full Liquid, advance as tolerated     ACTIVITY: Activity as tolerated     WOUND CARE: Keep trach site clean and dry, cover with clean dressing.        DISCHARGE MEDICATIONS:  Discharge Medication List as of 7/23/2020 11:37 AM      CONTINUE these medications which have CHANGED    Details   acetaminophen (TYLENOL) 325 mg tablet Take 2 Tabs by mouth every four (4) hours as needed for Pain or Fever., Normal, Disp-30 Tab,R-0         CONTINUE these medications which have NOT CHANGED    Details   dilTIAZem ER (CARDIZEM LA) 240 mg tablet Take 240 mg by mouth daily. , Historical Med      vit A,C,E-zinc-copper (PreserVision AREDS) cap capsule Take 1 Cap by mouth daily. , Historical Med      lidocaine 5 % topical cream Apply  to affected area two (2) times daily as needed (port access). Port access, Historical Med      clotrimazole-betamethasone (LOTRISONE) topical cream Apply  to affected area two (2) times a day., Normal, Disp-45 g,R-0      ondansetron hcl (Zofran) 4 mg tablet Take 4 mg by mouth every eight (8) hours as needed for Nausea or Vomiting., Historical Med      diphenoxylate-atropine (LOMOTIL) 2.5-0.025 mg per tablet Take 1 Tab by mouth three (3) times daily. Max Daily Amount: 3 Tabs. Take 1-2 tabs as needed up to 3 times daily to reduce loose stool, Print, Disp-45 Tab, R-0      folic acid (FOLVITE) 1 mg tablet Take 1 Tab by mouth daily. , Print, Disp-30 Tab, R-2      bismuth subsalicylate (PEPTO-BISMOL PO) Take 30 mL by mouth daily as needed for Diarrhea. take one dose every hour as needed, Historical Med      ergocalciferol (ERGOCALCIFEROL) 1,250 mcg (50,000 unit) capsule TAKE ONE CAPSULE BY MOUTH EVERY 7 DAYS, Normal, Disp-12 Cap, R-1      pantoprazole (PROTONIX) 40 mg tablet Take 1 Tab by mouth Before breakfast and dinner., Print, Disp-60 Tab, R-1               NOTIFY YOUR PHYSICIAN FOR ANY OF THE FOLLOWING:   Fever over 101 degrees for 24 hours. Chest pain, shortness of breath, fever, chills, nausea, vomiting, diarrhea, change in mentation, falling, weakness, bleeding. Severe pain or pain not relieved by medications. Or, any other signs or symptoms that you may have questions about. DISPOSITION:   X Home With:  X OT X PT X HH X RN       Long term SNF/Inpatient Rehab    Independent/assisted living    Hospice    Other:       PATIENT CONDITION AT DISCHARGE:     Functional status    Poor    X Deconditioned     Independent      Cognition   X  Lucid     Forgetful     Dementia      Catheters/lines (plus indication)    Mahmood     PICC     PEG    X None      Code status    X Full code     DNR      PHYSICAL EXAMINATION AT DISCHARGE:  General:          Alert, cooperative, no distress, appears stated age.      HEENT:           Atraumatic, anicteric sclerae, mucosa moist. Dry dressing to neck/ trach site. Lungs:             Clear to auscultation bilaterally. No Wheezing or Rhonchi. No rales. Chest wall:      No tenderness  No Accessory muscle use. Heart:              Regular  rhythm,  No  murmur   No edema. Abdomen:        Soft, non-tender. Not distended. Bowel sounds present. Extremities:     No cyanosis. No clubbing, Skin turgor normal, Capillary refill normal.  Skin:                Not pale. Not Jaundiced  No rashes. Psych:             Not anxious or agitated.   Neurologic:      Alert, moves all extremities, answers questions appropriately and responds to commands       CHRONIC MEDICAL DIAGNOSES:  Problem List as of 7/23/2020 Date Reviewed: 7/20/2020          Codes Class Noted - Resolved    Paroxysmal atrial fibrillation ([facility].) ICD-10-CM: I48.0  ICD-9-CM: 427.31  4/28/2020 - Present        PUD (peptic ulcer disease) (Chronic) ICD-10-CM: K27.9  ICD-9-CM: 533.90  1/30/2020 - Present        Atrial fibrillation with rapid ventricular response ([facility].) ICD-10-CM: I48.91  ICD-9-CM: 427.31  11/7/2019 - Present        Diverticulitis ICD-10-CM: K57.92  ICD-9-CM: 562.11  6/29/2018 - Present    Overview Signed 6/29/2018  7:07 AM by Priscilla Severance, MD     Recurrent             Dry eye syndrome ICD-10-CM: R34.077  ICD-9-CM: 375.15  6/29/2018 - Present        Fibromyalgia ICD-10-CM: M79.7  ICD-9-CM: 729.1  6/29/2018 - Present        GERD without esophagitis (Chronic) ICD-10-CM: K21.9  ICD-9-CM: 530.81  6/29/2018 - Present        Hypercholesterolemia (Chronic) ICD-10-CM: E78.00  ICD-9-CM: 272.0  6/29/2018 - Present        Osteoporosis ICD-10-CM: M81.0  ICD-9-CM: 733.00  6/29/2018 - Present        Peripheral neuropathy (Chronic) ICD-10-CM: G62.9  ICD-9-CM: 356.9  6/29/2018 - Present        Prediabetes (Chronic) ICD-10-CM: R73.03  ICD-9-CM: 790.29  6/29/2018 - Present        Rosacea ICD-10-CM: L71.9  ICD-9-CM: 695.3  6/29/2018 - Present        * (Principal) Acute upper GI bleed ICD-10-CM: K92.2  ICD-9-CM: 578.9  7/20/2020 - Present        Status post trachelectomy ICD-10-CM: Z90.710  ICD-9-CM: V88.01  6/27/2020 - Present        Myelodysplastic syndrome ([facility].) ICD-10-CM: D46.9  ICD-9-CM: 238.75  6/27/2020 - Present        Mass of epiglottis ICD-10-CM: J38.7  ICD-9-CM: 478.79  6/26/2020 - Present        MDS (myelodysplastic syndrome) (HCC) (Chronic) ICD-10-CM: D46.9  ICD-9-CM: 238.75  11/27/2019 - Present        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[place] [address].) ICD-10-CM: C93.10  ICD-9-CM: 205.10  11/1/2019 - Present        Anemia associated with bone marrow infiltration (HCC) ICD-10-CM: L95.47  ICD-9-CM: 284.2  11/1/2019 - Present        COPD (chronic obstructive pulmonary disease) ([facility].) ICD-10-CM: J44.9  ICD-9-CM: 496  10/29/2019 - Present        SIRS (systemic inflammatory response syndrome) ([facility]) ICD-10-CM: R65.10  ICD-9-CM: 995.90  10/29/2019 - Present        Iron deficiency anemia ICD-10-CM: D50.9  ICD-9-CM: 280.9  2/15/2019 - Present        Blood loss anemia ICD-10-CM: D50.0  ICD-9-CM: 280.0  1/29/2019 - Present        Thrombocytopenia ([facility].) ICD-10-CM: D69.6  ICD-9-CM: 287.5  1/29/2019 - Present        Acute bronchitis due to other specified organisms ICD-10-CM: J20.8  ICD-9-CM: 466.0  9/30/2018 - Present        Acute respiratory failure with hypoxia ([facility].) ICD-10-CM: J96.01  ICD-9-CM: 518.81  9/29/2018 - Present        S/P lumpectomy, left breast ICD-10-CM: P29.272  ICD-9-CM: V45.89  8/25/2017 - Present        Costochondritis ICD-10-CM: M94.0  ICD-9-CM: 733.6  8/25/2017 - Present        Syncope and collapse ICD-10-CM: R55  ICD-9-CM: 780.2  11/15/2016 - Present        Stenosis of both internal carotid arteries ICD-10-CM: I65.23  ICD-9-CM: 433.10, 433.30  11/14/2016 - Present        Syncope ICD-10-CM: R55  ICD-9-CM: 780.2  11/13/2016 - Present        Spinal stenosis of lumbar region with neurogenic claudication (Chronic) ICD-10-CM: U13.323  ICD-9-CM: 724.03  8/23/2016 - Present        Lumbar back pain with radiculopathy affecting left lower extremity ICD-10-CM: M54.16  ICD-9-CM: 724.4  2/24/2016 - Present        Lumbar back pain with radiculopathy affecting right lower extremity ICD-10-CM: M54.16  ICD-9-CM: 724.4  2/24/2016 - Present        Idiopathic small and large fiber sensory neuropathy ICD-10-CM: G60.8  ICD-9-CM: 356.4  2/24/2016 - Present        Lumbar post-laminectomy syndrome (Chronic) ICD-10-CM: M96.1  ICD-9-CM: 722.83  2/24/2016 - Present        Low back pain ICD-10-CM: M54.5  ICD-9-CM: 724.2  2/24/2016 - Present        Vitamin D deficiency (Chronic) ICD-10-CM: E55.9  ICD-9-CM: 268.9  12/16/2013 - Present        RESOLVED: Leukemia, acute ([facility].) ICD-10-CM: C95.00  ICD-9-CM: 208.00  2/10/2020 - 7/14/2020        RESOLVED: Seizure-like activity ([facility] ICD-10-CM: R56.9  ICD-9-CM: 780.39  11/13/2016 - 3/5/2018        RESOLVED: Diabetic peripheral neuropathy associated with type 2 diabetes mellitus ([facility] ICD-10-CM: E11.42  ICD-9-CM: 250.60, 357.2  2/24/2016 - 3/5/2018        RESOLVED: Bunion of right foot ICD-10-CM: M21.611  ICD-9-CM: 727.1  8/20/2015 - 8/20/2015        RESOLVED: Bunion, right foot ICD-10-CM: M21.611  ICD-9-CM: 727.1  8/20/2015 - 8/20/2015        RESOLVED: Breast cancer, left breast ([facility] (Chronic) ICD-10-CM: T70.723  ICD-9-CM: 174.9  12/3/2013 - 7/24/2019    Overview Signed 6/29/2018  7:06 AM by Angela Cardenas MD     2013.   Status post lumpectomy and radiation therapy                   Greater than 30 minutes were spent with the patient on counseling and coordination of care    Signed:   Kolton Blunt NP  7/23/2020  4:09 PM

## 2020-07-23 NOTE — PROGRESS NOTES
I have reviewed discharge instructions with the patient and spouse. The patient and spouse verbalized understanding and had the opportunity to ask questions. Patient left with all belongings including cell phone and personal cane.

## 2020-07-23 NOTE — PROGRESS NOTES
Problem: Falls - Risk of  Goal: *Absence of Falls  Description: Document Jorge Fisher Fall Risk and appropriate interventions in the flowsheet.   Outcome: Progressing Towards Goal  Note: Fall Risk Interventions:  Mobility Interventions: Communicate number of staff needed for ambulation/transfer, Patient to call before getting OOB, PT Consult for mobility concerns, PT Consult for assist device competence, Utilize walker, cane, or other assistive device, Strengthening exercises (ROM-active/passive)         Medication Interventions: Evaluate medications/consider consulting pharmacy, Patient to call before getting OOB, Teach patient to arise slowly    Elimination Interventions: Call light in reach, Patient to call for help with toileting needs, Urinal in reach

## 2020-07-23 NOTE — DISCHARGE INSTRUCTIONS
Discharge Instructions       PATIENT ID: Ashley Pisano  MRN: [id number]   YOB: 1935    DATE OF ADMISSION: 7/20/2020  6:32 PM    DATE OF DISCHARGE: 7/23/2020    PRIMARY CARE PROVIDER: Alfred Tinoco MD     ATTENDING PHYSICIAN: Chi Cha MD  DISCHARGING PROVIDER: Louis Pritchett NP    To contact this individual call [phone] and ask the  to page. If unavailable ask to be transferred the Adult Hospitalist Department. 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, HTN, Thrombocytopenia    CONSULTATIONS: IP CONSULT TO GASTROENTEROLOGY  IP CONSULT TO OTOLARYNGOLOGY    PROCEDURES/SURGERIES: Procedure(s) with comments:  PROCEDURE CANCELLED - NOT PERFORMED - pt needs sign off from ENT    PENDING TEST RESULTS:   At the time of discharge the following test results are still pending: none    FOLLOW UP APPOINTMENTS:   Follow-up Information     Follow up With Specialties Details Why Contact Info    Alfred Tinoco MD Internal Medicine   [address]      Shahzad Brown MD Otolaryngology In 2 weeks Otolaryngology  200 [facility]  [address] [place] [address]      Dana Kennedy MD Gastroenterology In 2 weeks Gastroenterology  [id number] [place] [phone]             ADDITIONAL CARE RECOMMENDATIONS:   You have been deemed stable for discharge and are being discharged with HH,PT/OT. If you develop any abnormal or prolonged bleeding- seek care at nearest ER. Should you need medication refills beyond what we have prescribed for you, you will need to contact your primary care provider for refills. DIET: Full Liquid, advance as tolerated    ACTIVITY: Activity as tolerated    WOUND CARE: Keep trach site clean and dry, cover with clean dressing. DISCHARGE MEDICATIONS:   See Medication Reconciliation Form    · It is important that you take the medication exactly as they are prescribed. · Keep your medication in the bottles provided by the pharmacist and keep a list of the medication names, dosages, and times to be taken in your wallet. · Do not take other medications without consulting your doctor. NOTIFY YOUR PHYSICIAN FOR ANY OF THE FOLLOWING:   Fever over 101 degrees for 24 hours. Chest pain, shortness of breath, fever, chills, nausea, vomiting, diarrhea, change in mentation, falling, weakness, bleeding. Severe pain or pain not relieved by medications. Or, any other signs or symptoms that you may have questions about. DISPOSITION:    Home With:  X OT X PT X HH X RN       SNF/Inpatient Rehab/LTAC    Independent/assisted living    Hospice    Other:     Patient Education        Upper Gastrointestinal Bleeding: Care Instructions  Your Care Instructions     The digestive or gastrointestinal tract goes from the mouth to the anus. It is often called the GI tract. Bleeding in the upper GI tract can happen anywhere from the esophagus to the first part of the small intestine. Sometimes it's caused by an ulcer in your stomach. Or it may be caused by blood vessels in your esophagus. Your esophagus is the tube that carries food from your throat to your stomach. Light bleeding may not cause any symptoms at first. But if you continue to bleed for a while, you may feel very weak or tired. Sudden, heavy bleeding means you need to see a doctor right away. This kind of bleeding can be very dangerous. But it can usually be cured or controlled. The doctor may do some tests to find the cause of your bleeding. Follow-up care is a key part of your treatment and safety. Be sure to make and go to all appointments, and call your doctor if you are having problems.  It's also a good idea to know your test results and keep a list of the medicines you take.  How can you care for yourself at home? · Be safe with medicines. Take your medicines exactly as prescribed. Call your doctor if you think you are having a problem with your medicine. You will get more details on the specific medicines your doctor prescribes. · Do not take aspirin or other anti-inflammatory medicines, such as naproxen (Aleve) or ibuprofen (Advil, Motrin), without talking to your doctor first. Ask your doctor if it is okay to use acetaminophen (Tylenol). · Do not drink alcohol. · The bleeding may make you lose iron. So it's important to eat foods that have a lot of iron. These include red meat, shellfish, poultry, and eggs. They also include beans, raisins, whole-grain breads, and leafy green vegetables. If you want help planning meals, you can meet with a dietitian. When should you call for help? [id number] anytime you think you may need emergency care. For example, call if:  · You have sudden, severe belly pain. · You vomit blood or what looks like coffee grounds. · You passed out (lost consciousness). · Your stools are maroon or very bloody. Call your doctor now or seek immediate medical care if:  · You are dizzy or lightheaded, or you feel like you may faint. · Your stools are black and look like tar. · You have belly pain. · You vomit or have nausea. · You have trouble swallowing, or it hurts when you swallow. Watch closely for changes in your health, and be sure to contact your doctor if you do not get better as expected. Where can you learn more? Go to http://sumit-arpit.info/  Enter C259 in the search box to learn more about \"Upper Gastrointestinal Bleeding: Care Instructions. \"  Current as of: June 26, 2019               Content Version: 12.5  © 1491-7113 Healthwise, Incorporated. Care instructions adapted under license by [employer] (which disclaims liability or warranty for this information).  If you have questions about a medical condition or this instruction, always ask your healthcare professional. [place] [address] any warranty or liability for your use of this information.       Signed:   Yusuf Harrell NP  7/23/2020  10:58 AM

## 2020-07-23 NOTE — HOME CARE
Please note that this patient was open to [address] [place] [address] services at the time of hospital admission. If services will be needed at discharge, please order to resume them.  Shara Belle RN, [phone]

## 2020-07-23 NOTE — PROGRESS NOTES
Problem: Falls - Risk of  Goal: *Absence of Falls  Description: Document Jessica Ayers Fall Risk and appropriate interventions in the flowsheet.   Outcome: Progressing Towards Goal  Note: Fall Risk Interventions:  Mobility Interventions: Utilize walker, cane, or other assistive device         Medication Interventions: Teach patient to arise slowly    Elimination Interventions: Call light in reach, Patient to call for help with toileting needs, Urinal in reach              Problem: Patient Education: Go to Patient Education Activity  Goal: Patient/Family Education  Outcome: Progressing Towards Goal     Problem: Discharge Planning  Goal: *Discharge to safe environment  Description: See CM Notes  CRM: Seven Arnett, MPH, [facility]ES; Z: [phone]    Outcome: Progressing Towards Goal  Goal: *Knowledge of medication management  Outcome: Progressing Towards Goal  Goal: *Knowledge of discharge instructions  Outcome: Progressing Towards Goal     Problem: Patient Education: Go to Patient Education Activity  Goal: Patient/Family Education  Outcome: Progressing Towards Goal

## 2020-07-23 NOTE — PROGRESS NOTES
RUR: 27%    ANDREW: Home with home health. [facility] will resume home care. Family will transport patient home via car. Chart reviewed. CM sent referral to [facility] to resume home health. CM sent message to CM specialist to schedule PCP follow-up appointment. IM letter was delivered yesterday.     RUDDY Bolanos/CRM

## 2020-07-23 NOTE — ROUTINE PROCESS
Bedside and Verbal shift change report given to DAINA Barrera (oncoming nurse) by [address], RN (offgoing nurse). Report included the following information SBAR, Kardex, Intake/Output, MAR and Recent Results.

## 2020-07-23 NOTE — PROGRESS NOTES
Bedside, Verbal and Written shift change report given to Oneida Nguyen RN (oncoming nurse) by Noelle Short RN (offgoing nurse). Report included the following information SBAR, Kardex, Procedure Summary, Intake/Output, MAR and Recent Results.

## 2020-07-24 ENCOUNTER — PATIENT OUTREACH (OUTPATIENT)
Dept: CASE MANAGEMENT | Age: 85
End: 2020-07-24

## 2020-07-24 PROCEDURE — 3331090001 HH PPS REVENUE CREDIT

## 2020-07-24 PROCEDURE — 3331090002 HH PPS REVENUE DEBIT

## 2020-07-25 ENCOUNTER — HOME CARE VISIT (OUTPATIENT)
Dept: HOME HEALTH SERVICES | Facility: HOME HEALTH | Age: 85
End: 2020-07-25
Payer: MEDICARE

## 2020-07-25 ENCOUNTER — HOME CARE VISIT (OUTPATIENT)
Dept: SCHEDULING | Facility: HOME HEALTH | Age: 85
End: 2020-07-25
Payer: MEDICARE

## 2020-07-25 PROCEDURE — 3331090002 HH PPS REVENUE DEBIT

## 2020-07-25 PROCEDURE — G0493 RN CARE EA 15 MIN HH/HOSPICE: HCPCS

## 2020-07-25 PROCEDURE — 3331090001 HH PPS REVENUE CREDIT

## 2020-07-26 ENCOUNTER — HOME CARE VISIT (OUTPATIENT)
Dept: SCHEDULING | Facility: HOME HEALTH | Age: 85
End: 2020-07-26
Payer: MEDICARE

## 2020-07-26 VITALS
SYSTOLIC BLOOD PRESSURE: 120 MMHG | OXYGEN SATURATION: 96 % | RESPIRATION RATE: 18 BRPM | TEMPERATURE: 98.7 F | DIASTOLIC BLOOD PRESSURE: 68 MMHG | HEART RATE: 84 BPM

## 2020-07-26 PROCEDURE — 3331090002 HH PPS REVENUE DEBIT

## 2020-07-26 PROCEDURE — 3331090001 HH PPS REVENUE CREDIT

## 2020-07-26 PROCEDURE — G0151 HHCP-SERV OF PT,EA 15 MIN: HCPCS

## 2020-07-27 VITALS
RESPIRATION RATE: 18 BRPM | OXYGEN SATURATION: 96 % | TEMPERATURE: 97.2 F | HEART RATE: 78 BPM | DIASTOLIC BLOOD PRESSURE: 80 MMHG | SYSTOLIC BLOOD PRESSURE: 160 MMHG

## 2020-07-27 PROCEDURE — 3331090002 HH PPS REVENUE DEBIT

## 2020-07-27 PROCEDURE — 3331090001 HH PPS REVENUE CREDIT

## 2020-07-28 ENCOUNTER — HOME CARE VISIT (OUTPATIENT)
Dept: SCHEDULING | Facility: HOME HEALTH | Age: 85
End: 2020-07-28
Payer: MEDICARE

## 2020-07-28 PROCEDURE — 3331090001 HH PPS REVENUE CREDIT

## 2020-07-28 PROCEDURE — 3331090002 HH PPS REVENUE DEBIT

## 2020-07-28 NOTE — PROGRESS NOTES
Patient was admitted to [facility] on 2020 and discharged on 2020 for GI bleeed. Patient was contacted within 1 business days of discharge. Top Discharge Challenges to be reviewed by the provider   Additional needs identified to be addressed with provider yes  Lab Results   Component Value Date/Time    WBC 11.2 (H) 2020 05:15 AM    HGB (POC) 10.7 (A) 2019 09:52 AM    HGB 8.8 (L) 2020 05:15 AM    HCT (POC) 32.0 (A) 2019 09:52 AM    HCT 28.1 (L) 2020 05:15 AM    PLATELET 73 (L)  05:15 AM    .3 (H) 2020 05:15 AM       [facility]  Discussed COVID-19 related testing which was available at this time. Test results were negative. Patient informed of results, if available? no   Method of communication with provider : chart routing       Advance Care Planning:   Does patient have an Advance Directive:  reviewed and current     Inpatient Readmission Risk score:   Was this a readmission? yes   Patient stated reason for the admission: blood in stool  Patients top risk factors for readmission: medical condition  Interventions to address risk factors: continuing with [facility], attending appointments, and taking medications as prescribed. Care Transition Nurse (CTN) contacted the patient by telephone to perform post hospital discharge assessment. Verified name and  with patient as identifiers. Provided introduction to self, and explanation of the CTN role. CTN reviewed discharge instructions, medical action plan and red flags with patient who verbalized understanding. Patient given an opportunity to ask questions and does not have any further questions or concerns at this time. The patient agrees to contact the PCP office for questions related to their healthcare. Medication reconciliation was performed with patient, who verbalizes understanding of administration of home medications.  Advised obtaining a 90-day supply of all daily and as-needed medications. Referral to Pharm D needed: no     Home Health/Outpatient orders at discharge: PT and [address]: BS [facility]  Date of initial visit: 7/25/2020    Durable Medical Equipment ordered at discharge: none  [address] received: n/a    Covid Risk Education    Patient has following risk factors of: COPD. Education provided regarding infection prevention, and signs and symptoms of COVID-19 and when to seek medical attention with patient who verbalized understanding. Discussed exposure protocols and quarantine From CDC: Are you at higher risk for severe illness?  and given an opportunity for questions and concerns. The patient agrees to contact the COVID-19 hotline [phone] or PCP office for questions related to COVID-19. For more information on steps you can take to protect yourself, see CDC's How to Protect Yourself     Patient/family/caregiver given information for Jena Perez and agrees to enroll no  Patient's preferred e-mail: declines  Patient's preferred phone number: declines    Discussed follow-up appointments.  If no appointment was previously scheduled, appointment scheduling offered: [facility] follow up appointment(s):   Future Appointments   Date Time Provider Department Center   7/28/2020 To Be Determined Waqar Hernández LPN [place]   7/29/2020 10:30 AM Darling Floyd MD [address]   7/30/2020 To Be Determined [place] [address]   7/31/2020 To Be Determined Adenike Billy LPN [place]   8/3/2020 To Be Determined [place] [address] Medical [facility]   8/3/2020 To Be Determined Adenike Billy LPN [address] [facility]   8/6/2020 To Be Determined Elstefan Billy LPN [address] [facility]   8/6/2020 To Be Determined [place] [address] Medical [facility]   8/10/2020 To Be Determined Meenu Caro[address]   8/10/2020 To Be Determined Adenike Billy LPN [address] 8/13/2020 To Be Determined Marvel Fuentes [place] [address]   8/13/2020 To Be Determined Clarise Bumps, LPN [address] [facility]   8/19/2020 To Be Determined Clarise Bumps, LPN [address] [facility]   8/26/2020 To Be Determined Clarise Bumps, LPN [address] [facility]   9/2/2020 To Be Determined [facility] [facility] [place] [facility]   12/10/2020  3:40 PM Jessee Mcneil DO NEUM [facility] AMB   1/14/2021  9:00 AM Pina Timmons MD PCAM BS AMB     Non-BS follow up appointment(s):   Plan for follow-up call in 7-10 days based on severity of symptoms and risk factors. CTN provided contact information for future needs. Goals Addressed                 This Visit's Progress     Prevent complications post hospitalization. 7/28/2020 Patient reports she is participating with [place]. Has 3 upcoming appointments. Denies chest pain, SOB, fever,N/V/D. Patient will report attendance of appointments and changes in plan of care at next outreach.  GIORGIO

## 2020-07-29 ENCOUNTER — OFFICE VISIT (OUTPATIENT)
Dept: INTERNAL MEDICINE CLINIC | Age: 85
End: 2020-07-29

## 2020-07-29 VITALS
DIASTOLIC BLOOD PRESSURE: 70 MMHG | WEIGHT: 184 LBS | HEART RATE: 75 BPM | TEMPERATURE: 98.2 F | RESPIRATION RATE: 20 BRPM | HEIGHT: 65 IN | BODY MASS INDEX: 30.66 KG/M2 | OXYGEN SATURATION: 95 % | SYSTOLIC BLOOD PRESSURE: 120 MMHG

## 2020-07-29 DIAGNOSIS — D46.9 MDS (MYELODYSPLASTIC SYNDROME) (HCC): Chronic | ICD-10-CM

## 2020-07-29 DIAGNOSIS — I48.0 PAROXYSMAL ATRIAL FIBRILLATION (HCC): ICD-10-CM

## 2020-07-29 DIAGNOSIS — D50.0 BLOOD LOSS ANEMIA: ICD-10-CM

## 2020-07-29 DIAGNOSIS — K21.9 GERD WITHOUT ESOPHAGITIS: Chronic | ICD-10-CM

## 2020-07-29 DIAGNOSIS — K92.2 ACUTE UPPER GI BLEED: Primary | ICD-10-CM

## 2020-07-29 PROCEDURE — 3331090002 HH PPS REVENUE DEBIT

## 2020-07-29 PROCEDURE — 3331090001 HH PPS REVENUE CREDIT

## 2020-07-29 NOTE — PROGRESS NOTES
This note will not be viewable in [address] Augie Morelos is a 82 y.o. female and presents with Transitions Of Care  . Subjective:  Mrs. Lizabeth Polk returns to the office today and transition of care subsequent to a hospitalization from 7/20 until 7/23 when she was admitted to [facility] due to a GI bleed and blood loss anemia. 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 On the day prior to admission the patient began to note the passage of dark stool along with bright red blood. The bleeding was significant enough to fill the toilet bowl. She was admitted with a hemoglobin of 6.6 and required 2 units of packed red blood cells. Her bleeding stopped and no etiology of the bleeding was found but was felt likely to be an upper GI bleed. She was seen by GI at that time and was also noted to have thrombocytopenia related to her myelodysplastic syndrome. Patient also has a history of atrial fibrillation and respiratory failure but these problems remained stable during the course of her hospitalization. Further GI procedures was not anticipated and she was subsequently discharged home and denies any further bleeding. She was seen at [address] [place] [address] by her hematologist yesterday and her hemoglobin was stable at 8.2. The patient denies exertional chest pain, shortness of breath, cough or wheezing. She has had no orthostatic dizziness. She denies any abdominal pain.     Past Medical History:   Diagnosis Date    Anemia     Arthritis     Breast cancer ([address]     left    Bunion of right foot 8/20/2015    Chronic obstructive pulmonary disease (HCC)     mild    Chronic pain     back--uses tens unit    Diverticulitis 6/29/2018    Recurrent    Diverticulitis large intestine     Dry eye syndrome 6/29/2018    Fibromyalgia 6/29/2018    GERD (gastroesophageal reflux disease)     History of left breast cancer 2013    lumpectomy radiation    Hypercholesterolemia 6/29/2018    Ill-defined condition     blood transfusion hx    Leukemia (HCC)     MDS [facility]elodysplastic syndrome) [facility]     Osteoporosis 6/29/2018    Oxygen dependent     at night    Peripheral neuropathy 6/29/2018    Prediabetes 6/29/2018    PUD (peptic ulcer disease)     Radiation therapy complication 3059    Lt breast-No Chemo    Rosacea 6/29/2018    Trouble in sleeping      Past Surgical History:   Procedure Laterality Date    COLONOSCOPY N/A 2/28/2020    COLONOSCOPY performed by Teo Pena MD at [facility] ENDOSCOPY    COLONOSCOPY,DIAGNOSTIC  2/28/2020         HX APPENDECTOMY      HX BREAST LUMPECTOMY  11/21/2013    LEFT BREAST LUMPECTOMY W/LEFT BREAST SENTINEL NODE BIOPSY,AND NEEDLE LOCALIZATION performed by Ede Goddard MD at [facility] MAIN OR    HX CATARACT REMOVAL      bilateral    HX HYSTERECTOMY      ovarian cyst    HX MOHS PROCEDURES      right    HX ORTHOPAEDIC      back surgery x2    HX OTHER SURGICAL      colonoscopy    HX TONSILLECTOMY      HX VASCULAR ACCESS  02/2020    echo cath right shoulder    IR INSERT TUNL CVC W PORT OVER 5 YEARS  12/3/2019    TOTAL KNEE ARTHROPLASTY      left    TOTAL KNEE ARTHROPLASTY      right    UPPER GI ENDOSCOPY,BIOPSY  1/24/2020         UPPER GI ENDOSCOPY,BIOPSY  6/23/2020         UPPER GI ENDOSCOPY,DILATN W GUIDE  6/23/2020         US GUIDED CORE BREAST BIOPSY Left 2013    Breast Ca     Allergies   Allergen Reactions    Latex Rash    Hydrocodone Nausea Only and Vertigo    Gabapentin Diarrhea, Nausea Only and Other (comments)     weakness    Hydroxyurea Unknown (comments)     patient states she feels this medication is what caused the gi bleed    Lyrica [Pregabalin] Nausea Only    Oxycodone Nausea and Vomiting and Vertigo     Current Outpatient Medications   Medication Sig Dispense Refill    OXYGEN-AIR DELIVERY SYSTEMS 1 L by IntraNASal route daily as needed for Other (shortness of breath ).  ketotifen fumarate (REFRESH EYE ITCH RELIEF OP) Administer 1 Drop to both eyes daily as needed for Other (dry eyes ).  acetaminophen (TYLENOL) 325 mg tablet Take 2 Tabs by mouth every four (4) hours as needed for Pain or Fever. 30 Tab 0    dilTIAZem ER (CARDIZEM LA) 240 mg tablet Take 240 mg by mouth daily.  vit A,C,E-zinc-copper (PreserVision AREDS) cap capsule Take 1 Cap by mouth daily.  lidocaine 5 % topical cream Apply  to affected area two (2) times daily as needed (port access). Port access      clotrimazole-betamethasone (LOTRISONE) topical cream Apply  to affected area two (2) times a day. (Patient taking differently: Apply 1 Applicator to affected area two (2) times a day. thin layer to skin irritation perineal area ) 45 g 0    ondansetron hcl (Zofran) 4 mg tablet Take 4 mg by mouth every eight (8) hours as needed for Nausea or Vomiting.  diphenoxylate-atropine (LOMOTIL) 2.5-0.025 mg per tablet Take 1 Tab by mouth three (3) times daily. Max Daily Amount: 3 Tabs. Take 1-2 tabs as needed up to 3 times daily to reduce loose stool 45 Tab 0    folic acid (FOLVITE) 1 mg tablet Take 1 Tab by mouth daily. 30 Tab 2    bismuth subsalicylate (PEPTO-BISMOL PO) Take 30 mL by mouth daily as needed for Diarrhea. take one dose every hour as needed      ergocalciferol (ERGOCALCIFEROL) 1,250 mcg (50,000 unit) capsule TAKE ONE CAPSULE BY MOUTH EVERY 7 DAYS 12 Cap 1    pantoprazole (PROTONIX) 40 mg tablet Take 1 Tab by mouth Before breakfast and dinner.  60 Tab 1     Facility-Administered Medications Ordered in Other Visits   Medication Dose Route Frequency Provider Last Rate Last Dose    0.9% sodium chloride infusion   IntraVENous CONTINUOUS Early, Christianne Dunlap CRNA         Social History     Socioeconomic History    Marital status:      Spouse name: Not on file    Number of children: Not on file    Years of education: Not on file    Highest education level: Not on file   Tobacco Use    Smoking status: Former Smoker     Packs/day: 0.50     Years: 50.00     Pack years: 25.00     Last attempt to quit: 2012     Years since quittin.4    Smokeless tobacco: Never Used   Substance and Sexual Activity    Alcohol use:  Yes     Alcohol/week: 2.0 standard drinks     Types: 2 Glasses of wine per week    Drug use: No    Sexual activity: Not Currently     Family History   Problem Relation Age of Onset    Cancer Mother         bladder    Cancer Father     Breast Cancer Paternal Grandmother        Health Maintenance   Topic Date Due    GLAUCOMA SCREENING Q2Y  02/15/2020    Medicare Yearly Exam  2020    Influenza Age 5 to Adult  2020    DTaP/Tdap/Td series (3 - Td) 2029    Bone Densitometry (Dexa) Screening  Completed    Shingrix Vaccine Age 50>  Completed    Pneumococcal 65+ years  Completed    Pneumococcal 65+ yrs at Risk Vaccine  Completed        Review of Systems  Constitutional: negative for fevers, chills, anorexia and weight loss  Eyes:   negative for visual disturbance and irritation  ENT:   negative for tinnitus,sore throat,nasal congestion,ear pain,hoarseness  Respiratory:  negative for cough, hemoptysis, dyspnea,wheezing  CV:   negative for chest pain, palpitations, lower extremity edema  GI:   negative for nausea, vomiting, diarrhea, abdominal pain,melena  Endo:               negative for polyuria,polydipsia,polyphagia,heat intolerance  Genitourinary: negative for frequency, dysuria and hematuria  Integumentary: negative for rash and pruritus  Hematologic:  negative for easy bruising and gum/nose bleeding  Musculoskel: negative for myalgias, arthralgias, back pain, muscle weakness, joint pain  Neurological:  negative for headaches, dizziness, vertigo, memory problems and gait   Behavl/Psych: negative for feelings of anxiety, depression, mood changes  ROS otherwise negative      Objective:  Visit Vitals  /70 (BP 1 Location: Left arm, BP Patient Position: Sitting)   Pulse 75   Temp 98.2 °F (36.8 °C) (Oral)   Resp 20   Ht 5' 5\" (1.651 m)   Wt 184 lb (83.5 kg)   SpO2 95%   BMI 30.62 kg/m²     Body mass index is 30.62 kg/m². Physical Exam:   General appearance - alert, pale appearing, and in no distress  Mental status - alert, oriented to person, place, and time  EYE-HOWARD, EOMI,conjunctiva normal bilaterally, lids normal  ENT-ENT exam normal, no neck nodes or sinus tenderness  Nose - normal and patent, no erythema,  Or discharge   Mouth - mucous membranes moist, pharynx normal without lesions  Neck - supple, tracheostomy slowly closing  Chest - clear to auscultation, no wheezes, rales or rhonchi. Heart - normal rate, regular rhythm, normal S1, S2, no murmurs, rubs, clicks or gallops   Abdomen - soft, nontender, nondistended, no masses or organomegaly  Lymph- no adenopathy palpable  Ext-peripheral pulses normal, no pedal edema, no clubbing or cyanosis  Skin-Warm and dry. no hyperpigmentation, vitiligo, or suspicious lesions  Neuro -alert, oriented, normal speech, no focal findings or movement disorder noted      Assessment/Plan:  Diagnoses and all orders for this visit:    Acute upper GI bleed    Blood loss anemia    MDS (myelodysplastic syndrome) (HCC)    GERD without esophagitis    Paroxysmal atrial fibrillation (HCC)        Other instructions: The patient's medications were reviewed and reconciled. No change in her current medical regimen will be made. Diet as tolerated    Continued follow-up by hematology and gastroenterology    Medical records from 7/20 until 7/23 were reviewed during the course of this office visit including progress notes, labs and imaging studies.     Close follow-up with her hematologist is already been made for a follow-up CBC in 2 weeks and follow-up with the hematologist in 4 weeks    Follow-up with me as previously scheduled    Follow-up and Dispositions    · Return for As previously scheduled. I have reviewed with the patient details of the assessment and plan and all questions were answered. Relevent patient education was performed. The most recent lab findings were reviewed with the patient. An After Visit Summary was printed and given to the patient. Quincy Colin MD    Please note that this dictation was completed with [employer], the computer voice recognition software. Quite often unanticipated grammatical, syntax, homophones, and other interpretive errors are inadvertently transcribed by the computer software. Please disregard these errors. Please excuse any errors that have escaped final proofreading.

## 2020-07-29 NOTE — PROGRESS NOTES
Steve Azevedo is a 82 y.o. female presenting for Transitions Of Care  . 1. Have you been to the ER, urgent care clinic since your last visit? Hospitalized since your last visit? [facility]:  DATE OF ADMISSION: 7/20/2020  6:32 PM    DATE OF DISCHARGE: 7/23/2020     2. Have you seen or consulted any other health care providers outside of the [address] [place] [address] since your last visit? Include any pap smears or colon screening. Dr Reilly Adler, last 12 mths 1/9/2020   Able to walk? Yes   Fall in past 12 months? Yes   Fall with injury? -   Number of falls in past 12 months 1   Fall Risk Score -         Abuse Screening Questionnaire 1/9/2020   Do you ever feel afraid of your partner? N   Are you in a relationship with someone who physically or mentally threatens you? N   Is it safe for you to go home? Y       3 most recent PHQ Screens 6/25/2020   Little interest or pleasure in doing things Not at all   Feeling down, depressed, irritable, or hopeless Not at all   Total Score PHQ 2 0       There are no discontinued medications.

## 2020-07-29 NOTE — PATIENT INSTRUCTIONS
Anemia From Heavy Bleeding: Care Instructions Your Care Instructions Anemia means that your body does not have enough red blood cells. Red blood cells carry oxygen around the body. When you have anemia, you may feel dizzy, tired, and weak. You may also feel your heart pounding. For some people, it's hard to focus and think clearly. One common cause of anemia is bleeding. Bleeding from ulcers, hemorrhoids, cancer, or other problems can cause anemia. It may also be caused by heavy menstrual periods. Your treatment may include iron pills. Iron helps your body make hemoglobin. Hemoglobin is the part of the red blood cell that carries oxygen. If you have severe anemia, you may need a blood transfusion to give you red blood cells as quickly as possible. Sometimes it takes several months to get iron levels back to normal. 
Follow-up care is a key part of your treatment and safety. Be sure to make and go to all appointments, and call your doctor if you are having problems. It's also a good idea to know your test results and keep a list of the medicines you take. How can you care for yourself at home? · Be safe with medicines. Take your medicines exactly as prescribed. Call your doctor if you think you are having a problem with your medicine. · Follow your doctor's advice about eating foods that have a lot of iron in them. These include red meat, shellfish, poultry, and eggs. They also include beans, raisins, whole-grain bread, and leafy green vegetables. · Steam your vegetables. This is the best way to prepare them if you want to get as much iron as possible. · Iron pills can cause constipation. If you take them, there are things you can do to avoid constipation. Drink plenty of fluids, eat foods with a lot of fiber, and exercise every day. When should you call for help? [id number] anytime you think you may need emergency care. For example, call if: 
· You passed out (lost consciousness). · Your stools are maroon or very bloody. Call your doctor now or seek immediate medical care if: 
· You are short of breath. · You have new or worse bleeding. · You are dizzy or light-headed, or you feel like you may faint. Watch closely for changes in your health, and be sure to contact your doctor if: 
· You feel weaker or more tired than usual. 
· You do not get better as expected. Where can you learn more? Go to http://www.gray.com/ Enter X005 in the search box to learn more about \"Anemia From Heavy Bleeding: Care Instructions. \" Current as of: November 8, 2019               Content Version: 12.5 © 8879-3208 [employer]. Care instructions adapted under license by [employer] (which disclaims liability or warranty for this information). If you have questions about a medical condition or this instruction, always ask your healthcare professional. [address] any warranty or liability for your use of this information.

## 2020-07-30 ENCOUNTER — HOME CARE VISIT (OUTPATIENT)
Dept: SCHEDULING | Facility: HOME HEALTH | Age: 85
End: 2020-07-30
Payer: MEDICARE

## 2020-07-30 PROCEDURE — 3331090002 HH PPS REVENUE DEBIT

## 2020-07-30 PROCEDURE — G0151 HHCP-SERV OF PT,EA 15 MIN: HCPCS

## 2020-07-30 PROCEDURE — 3331090001 HH PPS REVENUE CREDIT

## 2020-07-31 ENCOUNTER — HOME CARE VISIT (OUTPATIENT)
Dept: HOME HEALTH SERVICES | Facility: HOME HEALTH | Age: 85
End: 2020-07-31
Payer: MEDICARE

## 2020-07-31 VITALS
RESPIRATION RATE: 18 BRPM | DIASTOLIC BLOOD PRESSURE: 78 MMHG | OXYGEN SATURATION: 96 % | HEART RATE: 90 BPM | TEMPERATURE: 98.9 F | SYSTOLIC BLOOD PRESSURE: 140 MMHG

## 2020-07-31 PROCEDURE — 3331090001 HH PPS REVENUE CREDIT

## 2020-07-31 PROCEDURE — 3331090002 HH PPS REVENUE DEBIT

## 2020-08-01 PROCEDURE — 3331090002 HH PPS REVENUE DEBIT

## 2020-08-01 PROCEDURE — 3331090001 HH PPS REVENUE CREDIT

## 2020-08-02 PROCEDURE — 3331090002 HH PPS REVENUE DEBIT

## 2020-08-02 PROCEDURE — 3331090001 HH PPS REVENUE CREDIT

## 2020-08-03 ENCOUNTER — HOME CARE VISIT (OUTPATIENT)
Dept: SCHEDULING | Facility: HOME HEALTH | Age: 85
End: 2020-08-03
Payer: MEDICARE

## 2020-08-03 VITALS
SYSTOLIC BLOOD PRESSURE: 128 MMHG | OXYGEN SATURATION: 96 % | HEART RATE: 80 BPM | TEMPERATURE: 97.3 F | RESPIRATION RATE: 18 BRPM | DIASTOLIC BLOOD PRESSURE: 64 MMHG

## 2020-08-03 PROCEDURE — 3331090001 HH PPS REVENUE CREDIT

## 2020-08-03 PROCEDURE — 3331090002 HH PPS REVENUE DEBIT

## 2020-08-03 PROCEDURE — G0151 HHCP-SERV OF PT,EA 15 MIN: HCPCS

## 2020-08-04 ENCOUNTER — HOME CARE VISIT (OUTPATIENT)
Dept: SCHEDULING | Facility: HOME HEALTH | Age: 85
End: 2020-08-04
Payer: MEDICARE

## 2020-08-04 PROCEDURE — 3331090002 HH PPS REVENUE DEBIT

## 2020-08-04 PROCEDURE — G0299 HHS/HOSPICE OF RN EA 15 MIN: HCPCS

## 2020-08-04 PROCEDURE — 3331090001 HH PPS REVENUE CREDIT

## 2020-08-05 PROCEDURE — 3331090001 HH PPS REVENUE CREDIT

## 2020-08-05 PROCEDURE — 3331090002 HH PPS REVENUE DEBIT

## 2020-08-06 ENCOUNTER — HOME CARE VISIT (OUTPATIENT)
Dept: SCHEDULING | Facility: HOME HEALTH | Age: 85
End: 2020-08-06
Payer: MEDICARE

## 2020-08-06 VITALS
RESPIRATION RATE: 18 BRPM | DIASTOLIC BLOOD PRESSURE: 68 MMHG | HEART RATE: 66 BPM | SYSTOLIC BLOOD PRESSURE: 138 MMHG | TEMPERATURE: 96.9 F | OXYGEN SATURATION: 94 %

## 2020-08-06 PROCEDURE — 3331090001 HH PPS REVENUE CREDIT

## 2020-08-06 PROCEDURE — G0151 HHCP-SERV OF PT,EA 15 MIN: HCPCS

## 2020-08-06 PROCEDURE — 3331090002 HH PPS REVENUE DEBIT

## 2020-08-07 ENCOUNTER — HOME CARE VISIT (OUTPATIENT)
Dept: SCHEDULING | Facility: HOME HEALTH | Age: 85
End: 2020-08-07
Payer: MEDICARE

## 2020-08-07 VITALS
HEART RATE: 92 BPM | SYSTOLIC BLOOD PRESSURE: 140 MMHG | TEMPERATURE: 98.1 F | OXYGEN SATURATION: 98 % | RESPIRATION RATE: 18 BRPM | DIASTOLIC BLOOD PRESSURE: 70 MMHG

## 2020-08-07 PROCEDURE — 3331090001 HH PPS REVENUE CREDIT

## 2020-08-07 PROCEDURE — G0300 HHS/HOSPICE OF LPN EA 15 MIN: HCPCS

## 2020-08-07 PROCEDURE — 400013 HH SOC

## 2020-08-07 PROCEDURE — 3331090002 HH PPS REVENUE DEBIT

## 2020-08-08 PROCEDURE — 3331090002 HH PPS REVENUE DEBIT

## 2020-08-08 PROCEDURE — 3331090001 HH PPS REVENUE CREDIT

## 2020-08-09 PROCEDURE — 3331090002 HH PPS REVENUE DEBIT

## 2020-08-09 PROCEDURE — 3331090001 HH PPS REVENUE CREDIT

## 2020-08-10 ENCOUNTER — HOME CARE VISIT (OUTPATIENT)
Dept: HOME HEALTH SERVICES | Facility: HOME HEALTH | Age: 85
End: 2020-08-10
Payer: MEDICARE

## 2020-08-10 ENCOUNTER — HOME CARE VISIT (OUTPATIENT)
Dept: SCHEDULING | Facility: HOME HEALTH | Age: 85
End: 2020-08-10
Payer: MEDICARE

## 2020-08-10 PROCEDURE — 3331090002 HH PPS REVENUE DEBIT

## 2020-08-10 PROCEDURE — 3331090001 HH PPS REVENUE CREDIT

## 2020-08-10 PROCEDURE — G0151 HHCP-SERV OF PT,EA 15 MIN: HCPCS

## 2020-08-11 PROCEDURE — 3331090001 HH PPS REVENUE CREDIT

## 2020-08-11 PROCEDURE — 3331090002 HH PPS REVENUE DEBIT

## 2020-08-12 VITALS
DIASTOLIC BLOOD PRESSURE: 56 MMHG | RESPIRATION RATE: 18 BRPM | OXYGEN SATURATION: 98 % | TEMPERATURE: 98.4 F | SYSTOLIC BLOOD PRESSURE: 120 MMHG | HEART RATE: 70 BPM

## 2020-08-12 PROCEDURE — 3331090002 HH PPS REVENUE DEBIT

## 2020-08-12 PROCEDURE — 3331090001 HH PPS REVENUE CREDIT

## 2020-08-13 ENCOUNTER — HOME CARE VISIT (OUTPATIENT)
Dept: SCHEDULING | Facility: HOME HEALTH | Age: 85
End: 2020-08-13
Payer: MEDICARE

## 2020-08-13 ENCOUNTER — HOME CARE VISIT (OUTPATIENT)
Dept: HOME HEALTH SERVICES | Facility: HOME HEALTH | Age: 85
End: 2020-08-13
Payer: MEDICARE

## 2020-08-13 PROCEDURE — G0151 HHCP-SERV OF PT,EA 15 MIN: HCPCS

## 2020-08-13 PROCEDURE — 3331090001 HH PPS REVENUE CREDIT

## 2020-08-13 PROCEDURE — 3331090002 HH PPS REVENUE DEBIT

## 2020-08-14 VITALS
DIASTOLIC BLOOD PRESSURE: 50 MMHG | SYSTOLIC BLOOD PRESSURE: 118 MMHG | OXYGEN SATURATION: 93 % | HEART RATE: 77 BPM | RESPIRATION RATE: 18 BRPM | TEMPERATURE: 98.4 F

## 2020-08-14 PROCEDURE — 3331090001 HH PPS REVENUE CREDIT

## 2020-08-14 PROCEDURE — 3331090002 HH PPS REVENUE DEBIT

## 2020-08-15 PROCEDURE — 3331090002 HH PPS REVENUE DEBIT

## 2020-08-15 PROCEDURE — 3331090001 HH PPS REVENUE CREDIT

## 2020-08-16 PROCEDURE — 3331090001 HH PPS REVENUE CREDIT

## 2020-08-16 PROCEDURE — 3331090002 HH PPS REVENUE DEBIT

## 2020-08-17 VITALS — OXYGEN SATURATION: 96 % | HEART RATE: 83 BPM | RESPIRATION RATE: 18 BRPM

## 2020-08-17 PROCEDURE — 3331090001 HH PPS REVENUE CREDIT

## 2020-08-17 PROCEDURE — 3331090002 HH PPS REVENUE DEBIT

## 2020-08-18 PROCEDURE — 3331090001 HH PPS REVENUE CREDIT

## 2020-08-18 PROCEDURE — 3331090002 HH PPS REVENUE DEBIT

## 2020-08-19 ENCOUNTER — HOME CARE VISIT (OUTPATIENT)
Dept: HOME HEALTH SERVICES | Facility: HOME HEALTH | Age: 85
End: 2020-08-19
Payer: MEDICARE

## 2020-08-19 PROCEDURE — 3331090001 HH PPS REVENUE CREDIT

## 2020-08-19 PROCEDURE — 3331090002 HH PPS REVENUE DEBIT

## 2020-08-20 PROCEDURE — 3331090002 HH PPS REVENUE DEBIT

## 2020-08-20 PROCEDURE — 3331090001 HH PPS REVENUE CREDIT

## 2020-08-21 PROCEDURE — 3331090001 HH PPS REVENUE CREDIT

## 2020-08-21 PROCEDURE — 3331090002 HH PPS REVENUE DEBIT

## 2020-08-22 PROCEDURE — 3331090002 HH PPS REVENUE DEBIT

## 2020-08-22 PROCEDURE — 3331090001 HH PPS REVENUE CREDIT

## 2020-08-23 PROCEDURE — 3331090001 HH PPS REVENUE CREDIT

## 2020-08-23 PROCEDURE — 3331090002 HH PPS REVENUE DEBIT

## 2020-08-24 PROCEDURE — 3331090001 HH PPS REVENUE CREDIT

## 2020-08-24 PROCEDURE — 3331090002 HH PPS REVENUE DEBIT

## 2020-08-25 PROCEDURE — 3331090002 HH PPS REVENUE DEBIT

## 2020-08-25 PROCEDURE — 3331090001 HH PPS REVENUE CREDIT

## 2020-08-26 PROCEDURE — 3331090001 HH PPS REVENUE CREDIT

## 2020-08-26 PROCEDURE — 3331090002 HH PPS REVENUE DEBIT

## 2020-08-27 PROCEDURE — 3331090002 HH PPS REVENUE DEBIT

## 2020-08-27 PROCEDURE — 3331090001 HH PPS REVENUE CREDIT

## 2020-08-28 ENCOUNTER — HOME CARE VISIT (OUTPATIENT)
Dept: HOME HEALTH SERVICES | Facility: HOME HEALTH | Age: 85
End: 2020-08-28
Payer: MEDICARE

## 2020-08-28 PROCEDURE — 3331090002 HH PPS REVENUE DEBIT

## 2020-08-28 PROCEDURE — 3331090001 HH PPS REVENUE CREDIT

## 2020-08-29 PROCEDURE — 3331090002 HH PPS REVENUE DEBIT

## 2020-08-29 PROCEDURE — 3331090001 HH PPS REVENUE CREDIT

## 2020-08-30 PROCEDURE — 3331090001 HH PPS REVENUE CREDIT

## 2020-08-30 PROCEDURE — 3331090002 HH PPS REVENUE DEBIT

## 2020-08-31 ENCOUNTER — OFFICE VISIT (OUTPATIENT)
Dept: INTERNAL MEDICINE CLINIC | Age: 85
End: 2020-08-31
Payer: MEDICARE

## 2020-08-31 VITALS
RESPIRATION RATE: 20 BRPM | DIASTOLIC BLOOD PRESSURE: 76 MMHG | SYSTOLIC BLOOD PRESSURE: 126 MMHG | HEIGHT: 65 IN | BODY MASS INDEX: 30.69 KG/M2 | OXYGEN SATURATION: 97 % | TEMPERATURE: 97.8 F | WEIGHT: 184.2 LBS | HEART RATE: 96 BPM

## 2020-08-31 DIAGNOSIS — K92.2 ACUTE UPPER GI BLEED: ICD-10-CM

## 2020-08-31 DIAGNOSIS — D50.0 BLOOD LOSS ANEMIA: ICD-10-CM

## 2020-08-31 DIAGNOSIS — Z90.710 STATUS POST TRACHELECTOMY: ICD-10-CM

## 2020-08-31 DIAGNOSIS — Z00.00 MEDICARE ANNUAL WELLNESS VISIT, SUBSEQUENT: Primary | ICD-10-CM

## 2020-08-31 DIAGNOSIS — D46.9 MDS (MYELODYSPLASTIC SYNDROME) (HCC): Chronic | ICD-10-CM

## 2020-08-31 DIAGNOSIS — J43.9 PULMONARY EMPHYSEMA, UNSPECIFIED EMPHYSEMA TYPE (HCC): ICD-10-CM

## 2020-08-31 PROCEDURE — 3288F FALL RISK ASSESSMENT DOCD: CPT | Performed by: INTERNAL MEDICINE

## 2020-08-31 PROCEDURE — G8536 NO DOC ELDER MAL SCRN: HCPCS | Performed by: INTERNAL MEDICINE

## 2020-08-31 PROCEDURE — G8417 CALC BMI ABV UP PARAM F/U: HCPCS | Performed by: INTERNAL MEDICINE

## 2020-08-31 PROCEDURE — G8427 DOCREV CUR MEDS BY ELIG CLIN: HCPCS | Performed by: INTERNAL MEDICINE

## 2020-08-31 PROCEDURE — 1090F PRES/ABSN URINE INCON ASSESS: CPT | Performed by: INTERNAL MEDICINE

## 2020-08-31 PROCEDURE — 99214 OFFICE O/P EST MOD 30 MIN: CPT | Performed by: INTERNAL MEDICINE

## 2020-08-31 PROCEDURE — G8432 DEP SCR NOT DOC, RNG: HCPCS | Performed by: INTERNAL MEDICINE

## 2020-08-31 PROCEDURE — G0439 PPPS, SUBSEQ VISIT: HCPCS | Performed by: INTERNAL MEDICINE

## 2020-08-31 PROCEDURE — 1100F PTFALLS ASSESS-DOCD GE2>/YR: CPT | Performed by: INTERNAL MEDICINE

## 2020-08-31 PROCEDURE — G0444 DEPRESSION SCREEN ANNUAL: HCPCS | Performed by: INTERNAL MEDICINE

## 2020-08-31 RX ORDER — VITAMIN A 2400 MCG
CAPSULE ORAL AS NEEDED
COMMUNITY
End: 2021-02-20

## 2020-08-31 RX ORDER — UREA 10 %
3 LOTION (ML) TOPICAL
COMMUNITY

## 2020-08-31 RX ORDER — ALLOPURINOL 300 MG/1
300 TABLET ORAL DAILY
COMMUNITY
Start: 2020-08-21

## 2020-08-31 NOTE — PROGRESS NOTES
This note will not be viewable in [address] Tomy Britton is a 82 y.o. female and presents with Follow Up Chronic Condition (4 week fu) and Annual Wellness Visit  . Subjective:  Mrs. Rika Tian presents to the office today for Medicare wellness check and follow-up of multiple medical problems. Patient is continuing to recover from an upper GI bleed with subsequent blood loss anemia complicating underlying myelodysplastic syndrome. Her hemoglobin had improved to 8.8 however she was seen at [address] [place] [address] earlier today at which time her hemoglobin has dropped down to 6.7. She is not having any active bleeding and is due to go back for follow-up blood work later this week and may have a transfusion as a result. The patient notes generalized weakness, malaise and shortness of breath. She does have COPD and is chronically on oxygen at 1 L/min due to chronic respiratory failure. 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 She subsequently recovered in the tracheostomy site has covered and healed over and she is now breathing without any signs of aspiration. The patient denies any chest pain or dizzy spells and has had no syncope. Her weight has been stable and she denies any lower extremity edema at the present time.     Past Medical History:   Diagnosis Date    Anemia     Arthritis     Breast cancer ([address]     left    Bunion of right foot 8/20/2015    Chronic obstructive pulmonary disease (HCC)     mild    Chronic pain     back--uses tens unit    Diverticulitis 6/29/2018    Recurrent    Diverticulitis large intestine     Dry eye syndrome 6/29/2018    Fibromyalgia 6/29/2018    GERD (gastroesophageal reflux disease)     History of left breast cancer 2013    lumpectomy radiation    Hypercholesterolemia 6/29/2018    Ill-defined condition     blood transfusion hx    Leukemia (HCC)     MDS (myelodysplastic syndrome) ([address]     Osteoporosis 6/29/2018    Oxygen dependent     at night    Peripheral neuropathy 6/29/2018    Prediabetes 6/29/2018    PUD (peptic ulcer disease)     Radiation therapy complication 8095    Lt breast-No Chemo    Rosacea 6/29/2018    Trouble in sleeping      Past Surgical History:   Procedure Laterality Date    COLONOSCOPY N/A 2/28/2020    COLONOSCOPY performed [facility] Chaya Lee [facility] at [facility] ENDOSCOPY    COLONOSCOPY,DIAGNOSTIC  2/28/2020         HX APPENDECTOMY      HX BREAST LUMPECTOMY  11/21/2013    LEFT BREAST LUMPECTOMY W/LEFT BREAST SENTINEL NODE BIOPSY,AND NEEDLE LOCALIZATION performed by Harrison Pulido MD at [facility] MAIN OR    HX CATARACT REMOVAL      bilateral    HX HYSTERECTOMY      ovarian cyst    HX MOHS PROCEDURES      right    HX ORTHOPAEDIC      back surgery x2    HX OTHER SURGICAL      colonoscopy    HX TONSILLECTOMY      HX VASCULAR ACCESS  02/2020    echo cath right shoulder    IR INSERT TUNL CVC W PORT OVER 5 YEARS  12/3/2019    TOTAL KNEE ARTHROPLASTY      left    TOTAL KNEE ARTHROPLASTY      right    UPPER GI ENDOSCOPY,BIOPSY  1/24/2020         UPPER GI ENDOSCOPY,BIOPSY  6/23/2020         UPPER GI ENDOSCOPY,DILATN W GUIDE  6/23/2020         US GUIDED CORE BREAST BIOPSY Left 2013    Breast Ca     Allergies   Allergen Reactions    Latex Rash    Hydrocodone Nausea Only and Vertigo    Gabapentin Diarrhea, Nausea Only and Other (comments)     weakness    Hydroxyurea Unknown (comments)     patient states she feels this medication is what caused the gi bleed    Lyrica [Pregabalin] Nausea Only    Oxycodone Nausea and Vomiting and Vertigo     Current Outpatient Medications   Medication Sig Dispense Refill    OTHER Dicitabine- Chemo infusion      allopurinoL (ZYLOPRIM) 300 mg tablet TK 1 T PO D      melatonin 1 mg tablet Take  by mouth nightly.  ginger, Zingiber officinalis, (ginger extract) 250 mg cap Take  by mouth as needed.  polyethylene glycol 400 (BLINK TEARS OP) Apply  to eye.  DULoxetine (CYMBALTA) 30 mg capsule Take 30 mg by mouth daily. take for neuropathy      OXYGEN-AIR DELIVERY SYSTEMS 1 L by IntraNASal route daily as needed for Other (shortness of breath ).  acetaminophen (TYLENOL) 325 mg tablet Take 2 Tabs by mouth every four (4) hours as needed for Pain or Fever. 30 Tab 0    dilTIAZem ER (CARDIZEM LA) 240 mg tablet Take 240 mg by mouth daily.  vit A,C,E-zinc-copper (PreserVision AREDS) cap capsule Take 1 Cap by mouth daily.  lidocaine 5 % topical cream Apply  to affected area two (2) times daily as needed (port access). Port access      ondansetron hcl (Zofran) 4 mg tablet Take 4 mg by mouth every eight (8) hours as needed for Nausea or Vomiting.  folic acid (FOLVITE) 1 mg tablet Take 1 Tab by mouth daily. 30 Tab 2    bismuth subsalicylate (PEPTO-BISMOL PO) Take 30 mL by mouth daily as needed for Diarrhea. take one dose every hour as needed      ergocalciferol (ERGOCALCIFEROL) 1,250 mcg (50,000 unit) capsule TAKE ONE CAPSULE BY MOUTH EVERY 7 DAYS 12 Cap 1    pantoprazole (PROTONIX) 40 mg tablet Take 1 Tab by mouth Before breakfast and dinner. 60 Tab 1     Facility-Administered Medications Ordered in Other Visits   Medication Dose Route Frequency Provider Last Rate Last Dose    0.9% sodium chloride infusion   IntraVENous CONTINUOUS Early, Romeo Quesada CRNA         Social History     Socioeconomic History    Marital status:      Spouse name: Not on file    Number of children: Not on file    Years of education: Not on file    Highest education level: Not on file   Tobacco Use    Smoking status: Former Smoker     Packs/day: 0.50     Years: 50.00     Pack years: 25.00     Last attempt to quit: 2012     Years since quittin.5    Smokeless tobacco: Never Used   Substance and Sexual Activity    Alcohol use:  Yes     Alcohol/week: 2.0 standard drinks     Types: 2 Glasses of wine per week    Drug use: No    Sexual activity: Not Currently     Family History   Problem Relation Age of Onset    Cancer Mother         bladder    Cancer Father     Breast Cancer Paternal Grandmother        Health Maintenance   Topic Date Due    Medicare Yearly Exam  07/08/2020    GLAUCOMA SCREENING Q2Y  08/27/2022    DTaP/Tdap/Td series (3 - Td) 04/03/2029    Bone Densitometry (Dexa) Screening  Completed    Shingrix Vaccine Age 50>  Completed    Pneumococcal 65+ years  Completed    Pneumococcal 65+ yrs at Risk Vaccine  Completed        Review of Systems  Constitutional: negative for fevers, chills, anorexia and weight loss  Eyes:   negative for visual disturbance and irritation  ENT:   negative for tinnitus,sore throat,nasal congestion,ear pain,hoarseness  Respiratory:  negative for cough, hemoptysis, dyspnea,wheezing  CV:   negative for chest pain, palpitations, lower extremity edema  GI:   negative for nausea, vomiting, diarrhea, abdominal pain,melena  Endo:               negative for polyuria,polydipsia,polyphagia,heat intolerance  Genitourinary: negative for frequency, dysuria and hematuria  Integumentary: negative for rash and pruritus  Hematologic:  negative for easy bruising and gum/nose bleeding  Musculoskel: negative for myalgias, arthralgias, back pain, muscle weakness, joint pain  Neurological:  negative for headaches, dizziness, vertigo, memory problems and gait   Behavl/Psych: negative for feelings of anxiety, depression, mood changes  ROS otherwise negative      Objective:  Visit Vitals  /76 (BP 1 Location: Left arm, BP Patient Position: Sitting)   Pulse 96   Temp 97.8 °F (36.6 °C) (Oral)   Resp 20   Ht 5' 5\" (1.651 m)   Wt 184 lb 3.2 oz (83.6 kg)   SpO2 97%   BMI 30.65 kg/m²     Body mass index is 30.65 kg/m².     Physical Exam:   General appearance - alert, well appearing, and in no distress  Mental status - alert, oriented to person, place, and time  EYE-HOWARD, EOMI,conjunctiva normal bilaterally, lids normal  ENT-ENT exam normal, no neck nodes or sinus tenderness  Nose - normal and patent, no erythema,  Or discharge   Mouth - mucous membranes moist, pharynx normal without lesions  Neck - supple, no significant adenopathy or bruit  Chest - clear to auscultation, no wheezes, rales or rhonchi. Heart - normal rate, regular rhythm, normal S1, S2, no murmurs, rubs, clicks or gallops   Abdomen - soft, nontender, nondistended, no masses or organomegaly  Lymph- no adenopathy palpable  Ext-peripheral pulses normal, no pedal edema, no clubbing or cyanosis  Skin-Warm and dry. no hyperpigmentation, vitiligo, or suspicious lesions  Neuro -alert, oriented, normal speech, no focal findings or movement disorder noted    In addition this patient is seen for AWV  as detailed below: This is a Subsequent Medicare Annual Wellness Exam (AWV) (Performed 12 months after IPPE or effective date of Medicare Part B enrollment)    I have reviewed the patient's medical history in detail and updated the computerized patient record. Problem list reviewed with patient and risk factors discussed. PSH, SH, FH, Medications and HM issues also reviewed and discussed. Depression screen, fall risk assessment, functional abilities and ACP also reviewed and discussed as above and below. Depression Risk Factor Screening:     3 most recent PHQ Screens 6/25/2020   Little interest or pleasure in doing things Not at all   Feeling down, depressed, irritable, or hopeless Not at all   Total Score PHQ 2 0     Alcohol Risk Factor Screening:     Alcohol Risk Factor Screening:   Do you average 1 drink per night or more than 7 drinks a week:  No    On any one occasion in the past three months have you have had more than 3 drinks containing alcohol:  No    Functional Ability and Level of Safety:   Hearing Loss  The patient wears hearing aids.     Activities of Daily Living  The home contains: handrails and grab bars  Patient does total self care    Fall Risk  Fall Risk Assessment, last 12 mths 1/9/2020   Able to walk? Yes   Fall in past 12 months? Yes   Fall with injury? -   Number of falls in past 12 months 1   Fall Risk Score -       Abuse Screen  Patient is not abused    Cognitive Screening   Evaluation of Cognitive Function:  Has your family/caregiver stated any concerns about your memory: no  Normal    Patient Care Team   Patient Care Team:  Osiel Nuñez MD as PCP - General (Internal Medicine)  Osiel Nuñez MD as PCP - [facility] Arbaella Munoz [facility] Provider  Giuseppe Cantor MD as Physician (Hematology and Oncology)  Silvana Campbell MD (Breast Surgery)  Isauro Walden MD (Orthopedic Surgery)    Assessment/Plan   Education and counseling provided:  Are appropriate based on today's review and evaluation    Assessment/Plan:   Impressions:      ICD-10-CM ICD-9-CM    1. Medicare annual wellness visit, subsequent  Z00.00 V70.0    2. Acute upper GI bleed  K92.2 578.9    3. Blood loss anemia  D50.0 280.0    4. MDS (myelodysplastic syndrome) ([facility])  D46.9 238.75    5. Pulmonary emphysema, unspecified emphysema type ([facility]  J43.9 492.8    6. Status post trachelectomy  Z90.710 V88.01         Plan:  1. Continue present meds  2. Lifestyle modifications including Na restriction, low carb/fat diet, weight reduction and exercise (at least a walking program). Follow-up and Dispositions    · Return in about 3 months (around 11/30/2020). No orders of the defined types were placed in this encounter. Rod Kovacs MD   Assessment/Plan:  Diagnoses and all orders for this visit:    Medicare annual wellness visit, subsequent    Acute upper GI bleed    Blood loss anemia    MDS (myelodysplastic syndrome) ([facility]    Pulmonary emphysema, unspecified emphysema type ([facility]    Status post trachelectomy        Health Maintenance Due   Topic Date Due    Medicare Yearly Exam  07/08/2020       Other instructions: The patient's medications were reviewed and reconciled.   No change in her current medical regimen will be made. A no added salt, prudent diet is encouraged. Continued follow-up at [address] [place] [address] in regards to her myelodysplastic syndrome and ongoing anemia and thrombocytopenia. Continue oxygen at 1 L/min. Health maintenance issues were reviewed and are up-to-date    Age-appropriate vaccinations were reviewed and are up-to-date and she will receive an influenza vaccination at the end of September or in early October    Follow-up in 3 months    Follow-up and Dispositions    · Return in about 3 months (around 11/30/2020). I have reviewed with the patient details of the assessment and plan and all questions were answered. Relevent patient education was performed. The most recent lab findings were reviewed with the patient. An After Visit Summary was printed and given to the patient. Subha Oneill MD    Please note that this dictation was completed with GMEX, the computer voice recognition software. Quite often unanticipated grammatical, syntax, homophones, and other interpretive errors are inadvertently transcribed by the computer software. Please disregard these errors. Please excuse any errors that have escaped final proofreading.

## 2020-08-31 NOTE — PROGRESS NOTES
Chief Complaint   Patient presents with    Follow Up Chronic Condition     4 week fu    Annual Wellness Visit       Depression Risk Factor Screening:     3 most recent PHQ Screens 6/25/2020   Little interest or pleasure in doing things Not at all   Feeling down, depressed, irritable, or hopeless Not at all   Total Score PHQ 2 0       Functional Ability and Level of Safety:     Activities of Daily Living  ADL Assessment 8/31/2020   Feeding yourself No Help Needed   Getting from bed to chair No Help Needed   Getting dressed No Help Needed   Bathing or showering No Help Needed   Walk across the room (includes cane/walker) No Help Needed   Using the telphone No Help Needed   Taking your medications No Help Needed   Preparing meals No Help Needed   Managing money (expenses/bills) No Help Needed   Moderately strenuous housework (laundry) No Help Needed   Shopping for personal items (toiletries/medicines) No Help Needed   Shopping for groceries No Help Needed   Driving No Help Needed   Climbing a flight of stairs No Help Needed   Getting to places beyond walking distances No Help Needed       Fall Risk  Fall Risk Assessment, last 12 mths 1/9/2020   Able to walk? Yes   Fall in past 12 months? Yes   Fall with injury? -   Number of falls in past 12 months 1   Fall Risk Score -       Abuse Screen  Abuse Screening Questionnaire 1/9/2020   Do you ever feel afraid of your partner? N   Are you in a relationship with someone who physically or mentally threatens you? N   Is it safe for you to go home?  Y         Patient Care Team   Patient Care Team:  Brenden Lee MD as PCP - General (Internal Medicine)  Brenden Lee MD as PCP - [facility] Empaneled Provider  Theodora Sheffield MD as Physician (Hematology and Oncology)  Lakesha Shields MD (Breast Surgery)  Dane Koo MD (Orthopedic Surgery)

## 2020-08-31 NOTE — PATIENT INSTRUCTIONS
Iron Deficiency Anemia: Care Instructions Your Care Instructions Anemia means that you don't have enough red blood cells. Red blood cells carry oxygen around your body. When you have anemia, it can make you pale, weak, and tired. Many things can cause anemia. The most common cause is loss of blood. This can happen if you have heavy menstrual periods. It can also happen if you have bleeding in your stomach or bowel. You can also get anemia if you don't have enough iron in your diet or if it's hard for your body to absorb iron. In some cases, pregnancy causes anemia. That's because a pregnant woman needs more iron. Your doctor may do more tests to find the cause of your anemia. If a disease or other health problem is causing it, your doctor will treat that problem. It's important to follow up with your doctor to make sure that your iron level returns to normal. 
Follow-up care is a key part of your treatment and safety. Be sure to make and go to all appointments, and call your doctor if you are having problems. It's also a good idea to know your test results and keep a list of the medicines you take. How can you care for yourself at home? · If your doctor recommended iron pills, take them as directed. ? Try to take the pills on an empty stomach. You can do this about 1 hour before or 2 hours after meals. But you may need to take iron with food to avoid an upset stomach. ? Do not take antacids or drink milk or anything with caffeine within 2 hours of when you take your iron. They can keep your body from absorbing the iron well. ? Vitamin C helps your body absorb iron. You may want to take iron pills with a glass of orange juice or some other food high in vitamin C. 
? Iron pills may cause stomach problems. These include heartburn, nausea, diarrhea, constipation, and cramps. It can help to drink plenty of fluids and include fruits, vegetables, and fiber in your diet. ? It's normal for iron pills to make your stool a greenish or grayish black. But internal bleeding can also cause dark stool. So it's important to tell your doctor about any color changes. ? Call your doctor if you think you are having a problem with your iron pills. Even after you start to feel better, it will take several months for your body to build up its supply of iron. ? If you miss a pill, don't take a double dose. ? Keep iron pills out of the reach of small children. Too much iron can be very dangerous. · Eat foods with a lot of iron. These include red meat, shellfish, poultry, and eggs. They also include beans, raisins, whole-grain bread, and leafy green vegetables. · Steam your vegetables. This is the best way to prepare them if you want to get as much iron as possible. · Be safe with medicines. Do not take nonsteroidal anti-inflammatory pain relievers unless your doctor tells you to. These include aspirin, naproxen (Aleve), and ibuprofen (Advil, Motrin). · Liquid iron can stain your teeth. But you can mix it with water or juice and drink it with a straw. Then it won't get on your teeth. When should you call for help? [id number] anytime you think you may need emergency care. For example, call if: 
· You passed out (lost consciousness). Call your doctor now or seek immediate medical care if: 
· You are short of breath. · You are dizzy or light-headed, or you feel like you may faint. · You have new or worse bleeding. Watch closely for changes in your health, and be sure to contact your doctor if: 
· You feel weaker or more tired than usual. 
· You do not get better as expected. Where can you learn more? Go to http://sumit-arpit.info/ Enter O499 in the search box to learn more about \"Iron Deficiency Anemia: Care Instructions. \" Current as of: November 8, 2019               Content Version: 12.5 © 3650-8005 [employer], [employer]orporated. Care instructions adapted under license by [employer] (which disclaims liability or warranty for this information). If you have questions about a medical condition or this instruction, always ask your healthcare professional. [address] any warranty or liability for your use of this information. The best way to stay healthy is to live a healthy lifestyle. A healthy lifestyle includes regular exercise, eating a well-balanced diet, keeping a healthy weight and not smoking. Regular physical exams and screening tests are another important way to take care of yourself. Preventive exams provided by health care providers can find health problems early when treatment works best and can keep you from getting certain diseases or illnesses. Preventive services include exams, lab tests, screenings, shots, monitoring and information to help you take care of your own health. All people over 65 should have a pneumonia shot. Pneumonia shots are usually only needed once in a lifetime unless your doctor decides differently. In addition to your physical exam, some screening tests are recommended: 
 
All people over 65 should have a yearly flu shot. People over 65 are at medium to high risk for Hepatitis B. Three shots are needed for complete protection. Bone mass measurement (dexa scan) is recommended every two years. Diabetes Mellitus screening is recommended every year. Glaucoma is an eye disease caused by high pressure in the eye. An eye exam is recommended every year. Cardiovascular screening tests that check your cholesterol and other blood fat (lipid) levels are recommended every five years. Colorectal Cancer screening tests help to find pre-cancerous polyps (growths in the colon) so they can be removed before they turn into cancer. Tests ordered for screening depend on your personal and family history risk factors. Prostate Cancer Screening (annually up to age 76) Screening for breast cancer is recommended yearly with a Mammogram. 
 
Screening for cervical and vaginal cancer is recommended with a pelvic and Pap test every two years. However if you have had an abnormal pap in the past  three years or at high risk for cervical or vaginal cancer Medicare will cover a pap test and a pelvic exam every year. Here is a list of your current Health Maintenance items with a due date: 
Health Maintenance Due Topic Date Due  Glaucoma Screening   02/15/2020 Governor Maldonado Annual Well Visit  07/08/2020

## 2020-10-01 ENCOUNTER — TRANSCRIBE ORDER (OUTPATIENT)
Dept: SCHEDULING | Age: 85
End: 2020-10-01

## 2020-10-01 DIAGNOSIS — H90.5 SENSORINEURAL HEARING LOSS: Primary | ICD-10-CM

## 2020-10-13 ENCOUNTER — TRANSCRIBE ORDER (OUTPATIENT)
Dept: SCHEDULING | Age: 85
End: 2020-10-13

## 2020-10-13 DIAGNOSIS — Z12.31 VISIT FOR SCREENING MAMMOGRAM: Primary | ICD-10-CM

## 2020-10-15 ENCOUNTER — OFFICE VISIT (OUTPATIENT)
Dept: INTERNAL MEDICINE CLINIC | Age: 85
End: 2020-10-15
Payer: MEDICARE

## 2020-10-15 VITALS
TEMPERATURE: 98.1 F | WEIGHT: 176 LBS | RESPIRATION RATE: 14 BRPM | SYSTOLIC BLOOD PRESSURE: 118 MMHG | BODY MASS INDEX: 29.32 KG/M2 | DIASTOLIC BLOOD PRESSURE: 70 MMHG | HEIGHT: 65 IN | HEART RATE: 66 BPM | OXYGEN SATURATION: 98 %

## 2020-10-15 DIAGNOSIS — E79.0 HYPERURICEMIA: ICD-10-CM

## 2020-10-15 DIAGNOSIS — D46.9 MDS (MYELODYSPLASTIC SYNDROME) (HCC): Chronic | ICD-10-CM

## 2020-10-15 DIAGNOSIS — D50.0 BLOOD LOSS ANEMIA: ICD-10-CM

## 2020-10-15 DIAGNOSIS — C93.10 CHRONIC MYELOMONOCYTIC LEUKEMIA NOT HAVING ACHIEVED REMISSION (HCC): ICD-10-CM

## 2020-10-15 DIAGNOSIS — M10.272 ACUTE DRUG-INDUCED GOUT OF LEFT FOOT: Primary | ICD-10-CM

## 2020-10-15 PROBLEM — M10.9 ACUTE GOUT OF LEFT FOOT: Status: ACTIVE | Noted: 2020-10-15

## 2020-10-15 PROCEDURE — G8427 DOCREV CUR MEDS BY ELIG CLIN: HCPCS | Performed by: INTERNAL MEDICINE

## 2020-10-15 PROCEDURE — 99213 OFFICE O/P EST LOW 20 MIN: CPT | Performed by: INTERNAL MEDICINE

## 2020-10-15 RX ORDER — DICYCLOMINE HYDROCHLORIDE 10 MG/1
10 CAPSULE ORAL 2 TIMES DAILY
COMMUNITY
Start: 2020-08-31

## 2020-10-15 RX ORDER — COLCHICINE 0.6 MG/1
0.6 TABLET ORAL DAILY
COMMUNITY
Start: 2020-10-07 | End: 2021-02-20

## 2020-10-15 NOTE — PROGRESS NOTES
This note will not be viewable in [address] Jeremy Thrasher is a 82 y.o. female and presents with Transitions Of Care  . Subjective:  Is  Returns to the office today and transition of care subsequent to a hospitalization at [address] [place] [address] from 10/5 until 10/9 when she was admitted with acute gout of both feet left greater than right. The patient has underlying myelodysplastic syndrome and chronic myelomonocytic leukemia for which she has been receiving chemotherapy. Uric acid levels were markedly elevated. We do not have a full discharge summary but evidently she was treated with steroids without improvement and started on colchicine. Then during the course of the attack she was started on allopurinol with quick increase in dose and slight exacerbation of symptoms. She notes that the right foot has improved but the left foot continues to be tender and swollen. She did have a uric acid level done yesterday which had gone up but she does not know the level. She continues to take her colchicine but she is noted some GI upset related to this. She is due to return to [address] [facility]. The patient denies any other peripheral joint involvement. She is chronically anemic and does require transfusions. They are due to restart chemotherapy next week.     Past Medical History:   Diagnosis Date    Anemia     Arthritis     Breast cancer ([facility]     left    Bunion of right foot 8/20/2015    Chronic obstructive pulmonary disease (HCC)     mild    Chronic pain     back--uses tens unit    Diverticulitis 6/29/2018    Recurrent    Diverticulitis large intestine     Dry eye syndrome 6/29/2018    Fibromyalgia 6/29/2018    GERD (gastroesophageal reflux disease)     History of left breast cancer 2013    lumpectomy radiation    Hypercholesterolemia 6/29/2018    Ill-defined condition     blood transfusion hx    Leukemia (HCC)     MDS [facility]myelodysplastic syndrome) ([facility]     Osteoporosis 6/29/2018    Oxygen dependent     at night    Peripheral neuropathy 6/29/2018    Prediabetes 6/29/2018    PUD (peptic ulcer disease)     Radiation therapy complication 2268    Lt breast-No Chemo    Rosacea 6/29/2018    Trouble in sleeping      Past Surgical History:   Procedure Laterality Date    COLONOSCOPY N/A 2/28/2020    COLONOSCOPY performed by Raza Fernandes MD at [facility] ENDOSCOPY    COLONOSCOPY,DIAGNOSTIC  2/28/2020         HX APPENDECTOMY      HX BREAST LUMPECTOMY  11/21/2013    LEFT BREAST LUMPECTOMY W/LEFT BREAST SENTINEL NODE BIOPSY,AND NEEDLE LOCALIZATION performed by Monica Lindquist MD at [facility] MAIN OR    HX CATARACT REMOVAL      bilateral    HX HYSTERECTOMY      ovarian cyst    HX MOHS PROCEDURES      right    HX ORTHOPAEDIC      back surgery x2    HX OTHER SURGICAL      colonoscopy    HX TONSILLECTOMY      HX VASCULAR ACCESS  02/2020    echo cath right shoulder    IR INSERT TUNL CVC W PORT OVER 5 YEARS  12/3/2019    TOTAL KNEE ARTHROPLASTY      left    TOTAL KNEE ARTHROPLASTY      right    UPPER GI ENDOSCOPY,BIOPSY  1/24/2020         UPPER GI ENDOSCOPY,BIOPSY  6/23/2020         UPPER GI ENDOSCOPY,DILATN W GUIDE  6/23/2020         US GUIDED CORE BREAST BIOPSY Left 2013    Breast Ca     Allergies   Allergen Reactions    Latex Rash    Hydrocodone Nausea Only and Vertigo    Gabapentin Diarrhea, Nausea Only and Other (comments)     weakness    Hydroxyurea Unknown (comments)     patient states she feels this medication is what caused the gi bleed    Lyrica [Pregabalin] Nausea Only    Oxycodone Nausea and Vomiting and Vertigo     Current Outpatient Medications   Medication Sig Dispense Refill    colchicine 0.6 mg tablet TK 1 T PO D  TK WITH WATER. UNTIL THE FLAIR RESOLVED      dicyclomine (BENTYL) 10 mg capsule TK 1 C PO BID      OTHER Dicitabine- Chemo infusion      allopurinoL (ZYLOPRIM) 300 mg tablet TK 1 T PO D      melatonin 1 mg tablet Take  by mouth nightly.       reece, Zingiber officinalis, (ginger extract) 250 mg cap Take  by mouth as needed.  polyethylene glycol 400 (BLINK TEARS OP) Apply  to eye.  DULoxetine (CYMBALTA) 30 mg capsule Take 30 mg by mouth daily. take for neuropathy      OXYGEN-AIR DELIVERY SYSTEMS 1 L by IntraNASal route daily as needed for Other (shortness of breath ).  acetaminophen (TYLENOL) 325 mg tablet Take 2 Tabs by mouth every four (4) hours as needed for Pain or Fever. 30 Tab 0    dilTIAZem ER (CARDIZEM LA) 240 mg tablet Take 240 mg by mouth daily.  vit A,C,E-zinc-copper (PreserVision AREDS) cap capsule Take 1 Cap by mouth daily.  lidocaine 5 % topical cream Apply  to affected area two (2) times daily as needed (port access). Port access      ondansetron hcl (Zofran) 4 mg tablet Take 4 mg by mouth every eight (8) hours as needed for Nausea or Vomiting.  folic acid (FOLVITE) 1 mg tablet Take 1 Tab by mouth daily. 30 Tab 2    bismuth subsalicylate (PEPTO-BISMOL PO) Take 30 mL by mouth daily as needed for Diarrhea. take one dose every hour as needed      ergocalciferol (ERGOCALCIFEROL) 1,250 mcg (50,000 unit) capsule TAKE ONE CAPSULE BY MOUTH EVERY 7 DAYS 12 Cap 1    pantoprazole (PROTONIX) 40 mg tablet Take 1 Tab by mouth Before breakfast and dinner.  60 Tab 1     Facility-Administered Medications Ordered in Other Visits   Medication Dose Route Frequency Provider Last Rate Last Dose    0.9% sodium chloride infusion   IntraVENous CONTINUOUS Early, Laila Gray CRNA         Social History     Socioeconomic History    Marital status:      Spouse name: Not on file    Number of children: Not on file    Years of education: Not on file    Highest education level: Not on file   Tobacco Use    Smoking status: Former Smoker     Packs/day: 0.50     Years: 50.00     Pack years: 25.00     Last attempt to quit: 2012     Years since quittin.6    Smokeless tobacco: Never Used   Substance and Sexual Activity    Alcohol use: Yes     Alcohol/week: 2.0 standard drinks     Types: 2 Glasses of wine per week    Drug use: No    Sexual activity: Not Currently     Family History   Problem Relation Age of Onset    Cancer Mother         bladder    Cancer Father     Breast Cancer Paternal Grandmother        Health Maintenance   Topic Date Due    Medicare Yearly Exam  09/01/2021    GLAUCOMA SCREENING Q2Y  08/27/2022    DTaP/Tdap/Td series (3 - Td) 04/03/2029    Bone Densitometry (Dexa) Screening  Completed    Shingrix Vaccine Age 50>  Completed    Flu Vaccine  Completed    Pneumococcal 65+ years  Completed    Pneumococcal 65+ yrs at Risk Vaccine  Completed        Review of Systems  Constitutional: negative for fevers, chills, anorexia and weight loss  Eyes:   negative for visual disturbance and irritation  ENT:   Positive for reduction in hearing by about 50%  Respiratory:  negative for cough, hemoptysis, dyspnea,wheezing  CV:   negative for chest pain, palpitations, lower extremity edema  GI:   negative for nausea, vomiting, diarrhea, abdominal pain,melena  Endo:               negative for polyuria,polydipsia,polyphagia,heat intolerance  Genitourinary: negative for frequency, dysuria and hematuria  Integumentary: negative for rash and pruritus  Hematologic:  negative for easy bruising and gum/nose bleeding  Musculoskel: Positive for left foot pain mostly localized in the metatarsal phalangeal joint of the great toe  Neurological:  negative for headaches, dizziness, vertigo, memory problems and gait   Behavl/Psych: negative for feelings of anxiety, depression, mood changes  ROS otherwise negative      Objective:  Visit Vitals  /70 (BP 1 Location: Left arm, BP Patient Position: Sitting)   Pulse 66   Temp 98.1 °F (36.7 °C) (Oral)   Resp 14   Ht 5' 5\" (1.651 m)   Wt 176 lb (79.8 kg)   SpO2 98%   BMI 29.29 kg/m²     Body mass index is 29.29 kg/m².     Physical Exam:   General appearance - alert, well appearing, and in no distress  Mental status - alert, oriented to person, place, and time  EYE-HOWARD, EOMI,conjunctiva normal bilaterally, lids normal  ENT-ENT exam normal, no neck nodes or sinus tenderness  Nose - normal and patent, no erythema,  Or discharge   Mouth - mucous membranes moist, pharynx normal without lesions  Neck - supple, no significant adenopathy or bruit  Chest - clear to auscultation, no wheezes, rales or rhonchi. Heart - normal rate, regular rhythm, normal S1, S2, no murmurs, rubs, clicks or gallops   Abdomen - soft, nontender, nondistended, no masses or organomegaly  Lymph- no adenopathy palpable  Ext-distal left foot was swollen and she was tender over the great toe metatarsal phalangeal joint. Palpable warmth is present but no erythematous is evident. Skin-Warm and dry. no hyperpigmentation, vitiligo, or suspicious lesions  Neuro -alert, oriented, normal speech, no focal findings or movement disorder noted      Assessment/Plan:  Diagnoses and all orders for this visit:    Acute drug-induced gout of left foot    Hyperuricemia    MDS (myelodysplastic syndrome) (HCC)    Chronic myelomonocytic leukemia not having achieved remission (HCC)    Blood loss anemia        Other instructions: The patient's medications were reviewed and reconciled. No change in her current medical regimen will be made. We have received some records from [address] [place] [address] in the form of a and after visit summary. She will continue her colchicine and her allopurinol under their care. She does have chemotherapy scheduled for next week. The after visit summary from her hospitalization was reviewed. She does have follow-up in regards to her gout at [address] [place] [address] and will allow them to continue to manage this problem. Follow-up here as previously scheduled    Follow-up and Dispositions    · Return for As previously scheduled. I have reviewed with the patient details of the assessment and plan and all questions were answered.  Relevent patient education was performed. The most recent lab findings were reviewed with the patient. An After Visit Summary was printed and given to the patient. Ami Peterson MD    Please note that this dictation was completed with [employer], the computer voice recognition software. Quite often unanticipated grammatical, syntax, homophones, and other interpretive errors are inadvertently transcribed by the computer software. Please disregard these errors. Please excuse any errors that have escaped final proofreading.

## 2020-10-15 NOTE — PATIENT INSTRUCTIONS
Gout: Care Instructions Your Care Instructions Gout is a form of arthritis caused by a buildup of uric acid crystals in a joint. It causes sudden attacks of pain, swelling, redness, and stiffness, usually in one joint, especially the big toe. Gout usually comes on without a cause. But it can be brought on by drinking alcohol (especially beer) or eating seafood and red meat. Taking certain medicines, such as diuretics or aspirin, also can bring on an attack of gout. Taking your medicines as prescribed and following up with your doctor regularly can help you avoid gout attacks in the future. Follow-up care is a key part of your treatment and safety. Be sure to make and go to all appointments, and call your doctor if you are having problems. It's also a good idea to know your test results and keep a list of the medicines you take. How can you care for yourself at home? · If the joint is swollen, put ice or a cold pack on the area for 10 to 20 minutes at a time. Put a thin cloth between the ice and your skin. · Prop up the sore limb on a pillow when you ice it or anytime you sit or lie down during the next 3 days. Try to keep it above the level of your heart. This will help reduce swelling. · Rest sore joints. Avoid activities that put weight or strain on the joints for a few days. Take short rest breaks from your regular activities during the day. · Take your medicines exactly as prescribed. Call your doctor if you think you are having a problem with your medicine. · Take pain medicines exactly as directed. ? If the doctor gave you a prescription medicine for pain, take it as prescribed. ? If you are not taking a prescription pain medicine, ask your doctor if you can take an over-the-counter medicine. · Eat less seafood and red meat. · Check with your doctor before drinking alcohol. · Losing weight, if you are overweight, may help reduce attacks of gout. But do not go on a [employer]. \" Losing a lot of weight in a short amount of time can cause a gout attack. When should you call for help? Call your doctor now or seek immediate medical care if: 
  · You have a fever.  
  · The joint is so painful you cannot use it.  
  · You have sudden, unexplained swelling, redness, warmth, or severe pain in one or more joints. Watch closely for changes in your health, and be sure to contact your doctor if: 
  · You have joint pain.  
  · Your symptoms get worse or are not improving after 2 or 3 days. Where can you learn more? Go to http://www.gray.com/ Enter S001 in the search box to learn more about \"Gout: Care Instructions. \" Current as of: December 9, 2019               Content Version: 12.6 © 7391-5472 [employer]. Care instructions adapted under license by [employer] (which disclaims liability or warranty for this information). If you have questions about a medical condition or this instruction, always ask your healthcare professional. [address] any warranty or liability for your use of this information.

## 2020-10-15 NOTE — PROGRESS NOTES
Yong Lewis is a 82 y.o. female presenting for Transitions Of Care  . 1. Have you been to the ER, urgent care clinic since your last visit? Hospitalized since your last visit? [facility] 10/5/20-10/9/20    2. Have you seen or consulted any other health care providers outside of the [address] [place] [address] since your last visit? Include any pap smears or colon screening. Dr Amy Feng, last 12 mths 1/9/2020   Able to walk? Yes   Fall in past 12 months? Yes   Fall with injury? -   Number of falls in past 12 months 1   Fall Risk Score -         Abuse Screening Questionnaire 1/9/2020   Do you ever feel afraid of your partner? N   Are you in a relationship with someone who physically or mentally threatens you? N   Is it safe for you to go home? Y       3 most recent PHQ Screens 6/25/2020   Little interest or pleasure in doing things Not at all   Feeling down, depressed, irritable, or hopeless Not at all   Total Score PHQ 2 0       There are no discontinued medications.

## 2020-10-16 ENCOUNTER — HOSPITAL ENCOUNTER (OUTPATIENT)
Dept: MRI IMAGING | Age: 85
Discharge: HOME | End: 2020-10-16
Attending: OTOLARYNGOLOGY
Payer: MEDICARE

## 2020-10-16 DIAGNOSIS — H90.5 SENSORINEURAL HEARING LOSS: ICD-10-CM

## 2020-10-16 PROCEDURE — A9575 INJ GADOTERATE MEGLUMI 0.1ML: HCPCS | Performed by: OTOLARYNGOLOGY

## 2020-10-16 PROCEDURE — 70553 MRI BRAIN STEM W/O & W/DYE: CPT

## 2020-10-16 PROCEDURE — 74011250636 HC RX REV CODE- 250/636: Performed by: OTOLARYNGOLOGY

## 2020-10-16 RX ORDER — GADOTERATE MEGLUMINE 376.9 MG/ML
15 INJECTION INTRAVENOUS
Status: COMPLETED | OUTPATIENT
Start: 2020-10-16 | End: 2020-10-16

## 2020-10-16 RX ADMIN — GADOTERATE MEGLUMINE 15 ML: 376.9 INJECTION INTRAVENOUS at 13:37

## 2020-12-04 ENCOUNTER — HOSPITAL ENCOUNTER (OUTPATIENT)
Dept: MAMMOGRAPHY | Age: 85
Discharge: HOME | End: 2020-12-04
Attending: INTERNAL MEDICINE
Payer: MEDICARE

## 2020-12-04 DIAGNOSIS — Z12.31 VISIT FOR SCREENING MAMMOGRAM: ICD-10-CM

## 2020-12-04 PROCEDURE — 77063 BREAST TOMOSYNTHESIS BI: CPT

## 2020-12-07 ENCOUNTER — DOCUMENTATION ONLY (OUTPATIENT)
Dept: ONCOLOGY | Age: 85
End: 2020-12-07

## 2020-12-07 NOTE — PROGRESS NOTES
[employer]  Social Work Navigator Encounter     Patient Name:  Isabell Chairez. Lonzell Dose  Medical History:     Advance Directives:    Narrative: SW asked to obtain last notes from Dr. Curt Licea at [address] [place] [address]. SW printed/MD reviewed online as pt had been hospitalized also. SW placed notes in scan pile.      Barriers to Care:     Plan:    Referral:

## 2021-01-18 ENCOUNTER — TELEPHONE (OUTPATIENT)
Dept: INTERNAL MEDICINE CLINIC | Age: 86
End: 2021-01-18

## 2021-01-18 NOTE — TELEPHONE ENCOUNTER
Patient wanted to move up appt. She was scheduled on 2/2 and Emma Giles had an appt on 2/1. They switched appt. He would still like a sooner appt for her for neuropathy if one comes available.      [phone]

## 2021-02-02 ENCOUNTER — OFFICE VISIT (OUTPATIENT)
Dept: INTERNAL MEDICINE CLINIC | Age: 86
End: 2021-02-02
Payer: MEDICARE

## 2021-02-02 VITALS
HEART RATE: 79 BPM | DIASTOLIC BLOOD PRESSURE: 66 MMHG | WEIGHT: 174.6 LBS | OXYGEN SATURATION: 99 % | RESPIRATION RATE: 18 BRPM | TEMPERATURE: 97.8 F | HEIGHT: 65 IN | SYSTOLIC BLOOD PRESSURE: 120 MMHG | BODY MASS INDEX: 29.09 KG/M2

## 2021-02-02 DIAGNOSIS — M48.062 SPINAL STENOSIS OF LUMBAR REGION WITH NEUROGENIC CLAUDICATION: Chronic | ICD-10-CM

## 2021-02-02 DIAGNOSIS — E78.00 HYPERCHOLESTEROLEMIA: Chronic | ICD-10-CM

## 2021-02-02 DIAGNOSIS — R32 URINARY INCONTINENCE, UNSPECIFIED TYPE: ICD-10-CM

## 2021-02-02 DIAGNOSIS — Z00.00 MEDICARE ANNUAL WELLNESS VISIT, SUBSEQUENT: Primary | ICD-10-CM

## 2021-02-02 DIAGNOSIS — E55.9 VITAMIN D DEFICIENCY: Chronic | ICD-10-CM

## 2021-02-02 DIAGNOSIS — G60.9 IDIOPATHIC PERIPHERAL NEUROPATHY: Chronic | ICD-10-CM

## 2021-02-02 DIAGNOSIS — D46.9 MDS (MYELODYSPLASTIC SYNDROME) (HCC): Chronic | ICD-10-CM

## 2021-02-02 DIAGNOSIS — R73.03 PREDIABETES: Chronic | ICD-10-CM

## 2021-02-02 LAB
BACTERIA,BACTU: ABNORMAL
BILIRUB UR QL: NEGATIVE
CLARITY: CLEAR
COLOR UR: ABNORMAL
GLUCOSE 24H UR-MRATE: NEGATIVE G/(24.H)
HGB UR QL STRIP: ABNORMAL
KETONES UR QL STRIP.AUTO: NEGATIVE
LEUKOCYTE ESTERASE: ABNORMAL
NITRITE UR QL STRIP.AUTO: NEGATIVE
PH UR STRIP: 6 [PH] (ref 5–7)
PROT UR STRIP-MCNC: ABNORMAL MG/DL
RBC #/AREA URNS HPF: ABNORMAL #/HPF
SP GR UR REFRACTOMETRY: 1.01 (ref 1–1.03)
UROBILINOGEN UR QL STRIP.AUTO: NEGATIVE
WBC URNS QL MICRO: ABNORMAL #/HPF

## 2021-02-02 PROCEDURE — 1090F PRES/ABSN URINE INCON ASSESS: CPT | Performed by: INTERNAL MEDICINE

## 2021-02-02 PROCEDURE — 99214 OFFICE O/P EST MOD 30 MIN: CPT | Performed by: INTERNAL MEDICINE

## 2021-02-02 PROCEDURE — G0439 PPPS, SUBSEQ VISIT: HCPCS | Performed by: INTERNAL MEDICINE

## 2021-02-02 PROCEDURE — G8510 SCR DEP NEG, NO PLAN REQD: HCPCS | Performed by: INTERNAL MEDICINE

## 2021-02-02 PROCEDURE — G8536 NO DOC ELDER MAL SCRN: HCPCS | Performed by: INTERNAL MEDICINE

## 2021-02-02 PROCEDURE — G8417 CALC BMI ABV UP PARAM F/U: HCPCS | Performed by: INTERNAL MEDICINE

## 2021-02-02 PROCEDURE — G8427 DOCREV CUR MEDS BY ELIG CLIN: HCPCS | Performed by: INTERNAL MEDICINE

## 2021-02-02 PROCEDURE — 81001 URINALYSIS AUTO W/SCOPE: CPT | Performed by: INTERNAL MEDICINE

## 2021-02-02 PROCEDURE — 1101F PT FALLS ASSESS-DOCD LE1/YR: CPT | Performed by: INTERNAL MEDICINE

## 2021-02-02 RX ORDER — ERGOCALCIFEROL 1.25 MG/1
CAPSULE ORAL
Qty: 12 CAP | Refills: 1 | OUTPATIENT
Start: 2021-02-02

## 2021-02-02 RX ORDER — SODIUM CHLORIDE/ALOE VERA
GEL (GRAM) NASAL
COMMUNITY

## 2021-02-02 NOTE — PROGRESS NOTES
Robbie Mccall is a 82 y.o. female presenting for Follow Up Chronic Condition (6 mo fu)  . 1. Have you been to the ER, urgent care clinic since your last visit? Hospitalized since your last visit? No    2. Have you seen or consulted any other health care providers outside of the [address] [place] [address] since your last visit? Include any pap smears or colon screening. Dr Dylon Johsnton, Dr Noemi Partida, Dr Ernestina Moody, Dr Josef Russell, last 12 mths 2/2/2021   Able to walk? Yes   Fall in past 12 months? 0   Do you feel unsteady? 0   Are you worried about falling 0   Number of falls in past 12 months -   Fall with injury? -         Abuse Screening Questionnaire 2/2/2021   Do you ever feel afraid of your partner? N   Are you in a relationship with someone who physically or mentally threatens you? N   Is it safe for you to go home? Y       3 most recent PHQ Screens 2/2/2021   Little interest or pleasure in doing things Not at all   Feeling down, depressed, irritable, or hopeless Not at all   Total Score PHQ 2 0       There are no discontinued medications.

## 2021-02-02 NOTE — PROGRESS NOTES
Palmira Bolden is a 82 y.o. female and presents with Follow Up Chronic Condition (6 mo fu)  . Subjective: Mrs. Wally Bell returns to the office today for a Medicare wellness exam and follow-up of multiple medical problems. The patient has a history of prediabetes. She is following a prudent diet but does not check her blood sugars. She denies polyuria, polydipsia or blurred vision. There is been no unexplained weight loss. Patient continues to be followed at [facility] for her myelodysplastic syndrome/CMML. She has been receiving treatments down there and is recently been pancytopenic for which she has been having transfusions and platelet infusions. The patient denies any fevers, chills or signs of secondary infection at the present time. She has a peripheral neuropathy which is progressively worsened over time. She has been intolerant of gabapentin and Lyrica. She is on low-dose duloxetine at the present time but notes that the neuropathy is now starting to affect her balance and she is fearful of falling. She has hypercholesterolemia currently off cholesterol medication. She has no history of coronary artery disease and denies exertional chest pains or claudication. She has vitamin D deficiency and has been supplementing this. Patient also has spinal stenosis of the lumbar region but has not been bothered by back pain recently. The patient has noted problems with urinary incontinence. This is a recent problem. She denies any hematuria and has had no dysuria, lower abdominal pain or fever.     Past Medical History:   Diagnosis Date    Anemia     Arthritis     Breast cancer ([address]     left    Bunion of right foot 8/20/2015    Chronic obstructive pulmonary disease (HCC)     mild    Chronic pain     back--uses tens unit    Diverticulitis 6/29/2018    Recurrent    Diverticulitis large intestine     Dry eye syndrome 6/29/2018    Fibromyalgia 6/29/2018    GERD (gastroesophageal reflux disease)     History of left breast cancer 2013    lumpectomy radiation    Hypercholesterolemia 6/29/2018    Ill-defined condition     blood transfusion hx    Leukemia (HCC)     [facility] syndrome) [facility] [address]     Osteoporosis 6/29/2018    Oxygen dependent     at night    Peripheral neuropathy 6/29/2018    Prediabetes 6/29/2018    PUD (peptic ulcer disease)     Radiation therapy complication 8332    Lt breast-No Chemo    Rosacea 6/29/2018    Trouble in sleeping      Past Surgical History:   Procedure Laterality Date    COLONOSCOPY N/A 2/28/2020    COLONOSCOPY performed by Desmond Magana MD at [facility] ENDOSCOPY    COLONOSCOPY,DIAGNOSTIC  2/28/2020         HX APPENDECTOMY      HX BREAST LUMPECTOMY  11/21/2013    LEFT BREAST LUMPECTOMY W/LEFT BREAST SENTINEL NODE BIOPSY,AND NEEDLE LOCALIZATION performed by Giuseppe Parnell MD at [facility] MAIN OR    HX CATARACT REMOVAL      bilateral    HX HYSTERECTOMY      ovarian cyst    HX MOHS PROCEDURES      right    HX ORTHOPAEDIC      back surgery x2    HX OTHER SURGICAL      colonoscopy    HX TONSILLECTOMY      HX VASCULAR ACCESS  02/2020    echo cath right shoulder    IR INSERT TUNL CVC W PORT OVER 5 YEARS  12/3/2019    MO TOTAL KNEE ARTHROPLASTY      left    MO TOTAL KNEE ARTHROPLASTY      right    UPPER GI ENDOSCOPY,BIOPSY  1/24/2020         UPPER GI ENDOSCOPY,BIOPSY  6/23/2020         UPPER GI ENDOSCOPY,DILATN W GUIDE  6/23/2020         US GUIDED CORE BREAST BIOPSY Left 2013    Breast Ca     Allergies   Allergen Reactions    Latex Rash    Hydrocodone Nausea Only and Vertigo    Gabapentin Diarrhea, Nausea Only and Other (comments)     weakness    Hydrocodone-Acetaminophen Nausea Only    Hydroxyurea Unknown (comments)     patient states she feels this medication is what caused the gi bleed    Lidocaine Rash     Tegarderm dressing caused skin rash after removal, please use foam dressing with this patient.   Tegarderm dressing caused skin rash after removal, please use foam dressing with this patient.  Lyrica [Pregabalin] Nausea Only    Oxycodone Nausea and Vomiting and Vertigo     Current Outpatient Medications   Medication Sig Dispense Refill    sodium chloride-aloe vera (Ayr Saline) topical gel Apply  to affected area four (4) times daily as needed.  colchicine 0.6 mg tablet TK 1 T PO D  TK WITH WATER. UNTIL THE FLAIR RESOLVED      OTHER Dicitabine- Chemo infusion      allopurinoL (ZYLOPRIM) 300 mg tablet TK 1 T PO D      melatonin 1 mg tablet Take  by mouth nightly.  ginger, Zingiber officinalis, (ginger extract) 250 mg cap Take  by mouth as needed.  polyethylene glycol 400 (BLINK TEARS OP) Apply  to eye.  OXYGEN-AIR DELIVERY SYSTEMS 1 L by IntraNASal route daily as needed for Other (shortness of breath ).  acetaminophen (TYLENOL) 325 mg tablet Take 2 Tabs by mouth every four (4) hours as needed for Pain or Fever. 30 Tab 0    vit A,C,E-zinc-copper (PreserVision AREDS) cap capsule Take 1 Cap by mouth daily.  lidocaine 5 % topical cream Apply  to affected area two (2) times daily as needed (port access). Port access      folic acid (FOLVITE) 1 mg tablet Take 1 Tab by mouth daily. 30 Tab 2    bismuth subsalicylate (PEPTO-BISMOL PO) Take 30 mL by mouth daily as needed for Diarrhea. take one dose every hour as needed      ergocalciferol (ERGOCALCIFEROL) 1,250 mcg (50,000 unit) capsule TAKE ONE CAPSULE BY MOUTH EVERY 7 DAYS 12 Cap 1    pantoprazole (PROTONIX) 40 mg tablet Take 1 Tab by mouth Before breakfast and dinner. 60 Tab 1    dicyclomine (BENTYL) 10 mg capsule TK 1 C PO BID      DULoxetine (CYMBALTA) 30 mg capsule Take 30 mg by mouth daily. take for neuropathy      dilTIAZem ER (CARDIZEM LA) 240 mg tablet Take 240 mg by mouth daily.  ondansetron hcl (Zofran) 4 mg tablet Take 4 mg by mouth every eight (8) hours as needed for Nausea or Vomiting.        Facility-Administered Medications Ordered in Other Visits   Medication Dose Route Frequency Provider Last Rate Last Admin    0.9% sodium chloride infusion   IntraVENous CONTINUOUS Early, Stefanie Sifuentes CRNA   New Bag at 20 1222     Social History     Socioeconomic History    Marital status:      Spouse name: Not on file    Number of children: Not on file    Years of education: Not on file    Highest education level: Not on file   Tobacco Use    Smoking status: Former Smoker     Packs/day: 0.50     Years: 50.00     Pack years: 25.00     Quit date: 2012     Years since quittin.9    Smokeless tobacco: Never Used   Substance and Sexual Activity    Alcohol use:  Yes     Alcohol/week: 2.0 standard drinks     Types: 2 Glasses of wine per week    Drug use: No    Sexual activity: Not Currently     Family History   Problem Relation Age of Onset    Cancer Mother         bladder    Cancer Father     Breast Cancer Paternal Grandmother        Health Maintenance   Topic Date Due    COVID-19 Vaccine (1 of 2) 1951    Medicare Yearly Exam  2022    GLAUCOMA SCREENING Q2Y  2022    DTaP/Tdap/Td series (3 - Td) 2029    Bone Densitometry (Dexa) Screening  Completed    Shingrix Vaccine Age 50>  Completed    Flu Vaccine  Completed    Pneumococcal 65+ years  Completed    Pneumococcal 65+ yrs at Risk Vaccine  Completed        Review of Systems  Constitutional: negative for fevers, chills, anorexia and weight loss  Eyes:   negative for visual disturbance and irritation  ENT:   Positive for progressive hearing loss  Respiratory:  negative for cough, hemoptysis, dyspnea,wheezing  CV:   negative for chest pain, palpitations, lower extremity edema  GI:   negative for nausea, vomiting, diarrhea, abdominal pain,melena  Endo:               negative for polyuria,polydipsia,polyphagia,heat intolerance  Genitourinary: Positive for urinary frequency and incontinence  Integumentary: negative for rash and pruritus  Hematologic:  negative for easy bruising and gum/nose bleeding  Musculoskel: negative for myalgias, arthralgias, back pain, muscle weakness, joint pain  Neurological:  Positive for worsening lower extremity peripheral neuropathy and feelings of imbalance   Behavl/Psych: negative for feelings of anxiety, depression, mood changes  ROS otherwise negative      Objective:  Visit Vitals  /66 (BP 1 Location: Left upper arm, BP Patient Position: Sitting)   Pulse 79   Temp 97.8 °F (36.6 °C) (Oral)   Resp 18   Ht 5' 5\" (1.651 m)   Wt 174 lb 9.6 oz (79.2 kg)   SpO2 99%   BMI 29.05 kg/m²     Body mass index is 29.05 kg/m². Physical Exam:   General appearance - alert, well appearing, and in no distress  Mental status - alert, oriented to person, place, and time  EYE-HOWARD, EOMI,conjunctiva normal bilaterally, lids normal  ENT-ENT exam normal, no neck nodes or sinus tenderness  Nose - normal and patent, no erythema,  Or discharge   Mouth - mucous membranes moist, pharynx normal without lesions  Neck - supple, no significant adenopathy or bruit  Chest - clear to auscultation, no wheezes, rales or rhonchi. Heart - normal rate, regular rhythm, normal S1, S2, no murmurs, rubs, clicks or gallops   Abdomen - soft, nontender, nondistended, no masses or organomegaly  Lymph- no adenopathy palpable  Ext-peripheral pulses normal, no pedal edema, no clubbing or cyanosis  Skin-Warm and dry. no hyperpigmentation, vitiligo, or suspicious lesions  Neuro -alert, oriented, normal speech, no focal findings or movement disorder noted    In addition this patient is seen for AWV  as detailed below: This is a Subsequent Medicare Annual Wellness Exam (AWV) (Performed 12 months after IPPE or effective date of Medicare Part B enrollment)    I have reviewed the patient's medical history in detail and updated the computerized patient record. Problem list reviewed with patient and risk factors discussed.   PSH, SH, FH, Medications and HM issues also reviewed and discussed. Depression screen, fall risk assessment, functional abilities and ACP also reviewed and discussed as above and below. Depression Risk Factor Screening:     3 most recent PHQ Screens 2/2/2021   Little interest or pleasure in doing things Not at all   Feeling down, depressed, irritable, or hopeless Not at all   Total Score PHQ 2 0     Alcohol Risk Factor Screening:   Do you average more than 1 drink per night or more than 7 drinks a week:  No    On any one occasion in the past three months have you have had more than 3 drinks containing alcohol:  No    Functional Ability and Level of Safety:   Hearing Loss  The patient needs further evaluation. Activities of Daily Living  The home contains: handrails and grab bars  Patient does total self care    Fall Risk  Fall Risk Assessment, last 12 mths 2/2/2021   Able to walk? Yes   Fall in past 12 months? 0   Do you feel unsteady? 0   Are you worried about falling 0   Number of falls in past 12 months -   Fall with injury? -       Abuse Screen  Patient is not abused    Cognitive Screening   Evaluation of Cognitive Function:  Has your family/caregiver stated any concerns about your memory: no  Normal    Patient Care Team   Patient Care Team:  Roslyn Ware MD as PCP - General (Internal Medicine)  Roslyn Ware MD as PCP - [address] [place] [address] Provider  Delphine Chaney MD as Physician (Hematology and Oncology)  Alyssia Dixon MD (Breast Surgery)  Manuel Salinas MD (Orthopedic Surgery)    Assessment/Plan   Education and counseling provided:  Are appropriate based on today's review and evaluation    Assessment/Plan:   Impressions:      ICD-10-CM ICD-9-CM    1. Medicare annual wellness visit, subsequent  Z00.00 V70.0    2. Prediabetes  R73.03 790.29    3. Hypercholesterolemia  E78.00 272.0    4. Spinal stenosis of lumbar region with neurogenic claudication  M48.062 724.03    5. Vitamin D deficiency  E55.9 268.9    6.  Idiopathic peripheral neuropathy G60.9 356.9    7. MDS (myelodysplastic syndrome) ([facility])  D46.9 238.75    8. Urinary incontinence, unspecified type  R32 788.30 CULTURE, URINE      URINALYSIS W/MICROSCOPIC        Plan:  1. Continue present meds  2. Lifestyle modifications including Na restriction, low carb/fat diet, weight reduction and exercise (at least a walking program). Follow-up and Dispositions    · Return in about 6 months (around 8/2/2021). Orders Placed This Encounter    CULTURE, URINE    URINALYSIS W/MICROSCOPIC ([facility])       Tiffani Pandey MD   Assessment/Plan:  Diagnoses and all orders for this visit:    Medicare annual wellness visit, subsequent    Prediabetes    Hypercholesterolemia    Spinal stenosis of lumbar region with neurogenic claudication    Vitamin D deficiency    Idiopathic peripheral neuropathy    MDS (myelodysplastic syndrome) ([facility] [address]    Urinary incontinence, unspecified type  -     CULTURE, URINE  -     URINALYSIS W/MICROSCOPIC        Health Maintenance Due   Topic Date Due    COVID-19 Vaccine (1 of 2) 09/22/1951       Other instructions: The patient's medications were reviewed and reconciled. No change in her current medical regimen will be made. A prudent diet is encouraged    Health maintenance issues were reviewed and Covid vaccine has been recommended. Patient brought with her recent labs from [address] [place] [address] which are reviewed today with the patient and we will enter these into her medical chart. Continued follow-up at [address] [place] [address] in regards to her myelodysplastic syndrome/CMML. In regards to her peripheral neuropathy I have recommended increasing her Cymbalta to 60 mg daily to see if this gives her symptomatic relief of the discomfort    Urinalysis and urine culture will be obtained in order to rule out underlying infection    Follow-up in 6 months    Follow-up and Dispositions    · Return in about 6 months (around 8/2/2021).          I have reviewed with the patient details of the assessment and plan and all questions were answered. Relevent patient education was performed. The most recent lab findings were reviewed with the patient. An After Visit Summary was printed and given to the patient. Arsen French MD    Please note that this dictation was completed with [employer], the computer voice recognition software. Quite often unanticipated grammatical, syntax, homophones, and other interpretive errors are inadvertently transcribed by the computer software. Please disregard these errors. Please excuse any errors that have escaped final proofreading.

## 2021-02-04 LAB — BACTERIA UR CULT: NORMAL

## 2021-02-10 ENCOUNTER — HOSPITAL ENCOUNTER (INPATIENT)
Age: 86
LOS: 9 days | Discharge: HOME HEALTH | DRG: 871 | End: 2021-02-20
Attending: STUDENT IN AN ORGANIZED HEALTH CARE EDUCATION/TRAINING PROGRAM | Admitting: GENERAL ACUTE CARE HOSPITAL
Payer: MEDICARE

## 2021-02-10 ENCOUNTER — APPOINTMENT (OUTPATIENT)
Dept: CT IMAGING | Age: 86
DRG: 871 | End: 2021-02-10
Attending: STUDENT IN AN ORGANIZED HEALTH CARE EDUCATION/TRAINING PROGRAM
Payer: MEDICARE

## 2021-02-10 ENCOUNTER — APPOINTMENT (OUTPATIENT)
Dept: GENERAL RADIOLOGY | Age: 86
DRG: 871 | End: 2021-02-10
Attending: STUDENT IN AN ORGANIZED HEALTH CARE EDUCATION/TRAINING PROGRAM
Payer: MEDICARE

## 2021-02-10 DIAGNOSIS — J81.0 ACUTE PULMONARY EDEMA (HCC): ICD-10-CM

## 2021-02-10 DIAGNOSIS — K57.92 DIVERTICULITIS: ICD-10-CM

## 2021-02-10 DIAGNOSIS — J96.01 ACUTE RESPIRATORY FAILURE WITH HYPOXIA (HCC): ICD-10-CM

## 2021-02-10 DIAGNOSIS — A41.9 NEUTROPENIC SEPSIS (HCC): ICD-10-CM

## 2021-02-10 DIAGNOSIS — D70.9 NEUTROPENIC SEPSIS (HCC): ICD-10-CM

## 2021-02-10 DIAGNOSIS — D70.9 NEUTROPENIC FEVER (HCC): Primary | ICD-10-CM

## 2021-02-10 DIAGNOSIS — D64.81 ANTINEOPLASTIC CHEMOTHERAPY INDUCED ANEMIA: ICD-10-CM

## 2021-02-10 DIAGNOSIS — R50.81 NEUTROPENIC FEVER (HCC): Primary | ICD-10-CM

## 2021-02-10 DIAGNOSIS — T45.1X5A ANTINEOPLASTIC CHEMOTHERAPY INDUCED ANEMIA: ICD-10-CM

## 2021-02-10 DIAGNOSIS — E87.71 TACO (TRANSFUSION ASSOCIATED CIRCULATORY OVERLOAD): ICD-10-CM

## 2021-02-10 DIAGNOSIS — T45.1X5A CHEMOTHERAPY-INDUCED THROMBOCYTOPENIA: ICD-10-CM

## 2021-02-10 DIAGNOSIS — D69.59 CHEMOTHERAPY-INDUCED THROMBOCYTOPENIA: ICD-10-CM

## 2021-02-10 LAB
ALBUMIN SERPL-MCNC: 3.1 G/DL (ref 3.5–5)
ALBUMIN/GLOB SERPL: 0.7 {RATIO} (ref 1.1–2.2)
ALP SERPL-CCNC: 91 U/L (ref 45–117)
ALT SERPL-CCNC: 14 U/L (ref 12–78)
ANION GAP SERPL CALC-SCNC: 8 MMOL/L (ref 5–15)
APPEARANCE UR: CLEAR
APTT PPP: 27.5 SEC (ref 22.1–31)
ARTERIAL PATENCY WRIST A: ABNORMAL
AST SERPL-CCNC: 5 U/L (ref 15–37)
BACTERIA URNS QL MICRO: NEGATIVE /HPF
BASE DEFICIT BLD-SCNC: 1 MMOL/L
BASOPHILS # BLD: 0 K/UL (ref 0–0.1)
BASOPHILS NFR BLD: 0 % (ref 0–1)
BDY SITE: ABNORMAL
BILIRUB SERPL-MCNC: 1.1 MG/DL (ref 0.2–1)
BILIRUB UR QL: NEGATIVE
BUN SERPL-MCNC: 17 MG/DL (ref 6–20)
BUN/CREAT SERPL: 25 (ref 12–20)
CA-I BLD-SCNC: 1.06 MMOL/L (ref 1.12–1.32)
CALCIUM SERPL-MCNC: 7.9 MG/DL (ref 8.5–10.1)
CHLORIDE SERPL-SCNC: 104 MMOL/L (ref 97–108)
CO2 SERPL-SCNC: 24 MMOL/L (ref 21–32)
COLOR UR: ABNORMAL
COVID-19 RAPID TEST, COVR: NOT DETECTED
CREAT SERPL-MCNC: 0.69 MG/DL (ref 0.55–1.02)
DIFFERENTIAL METHOD BLD: ABNORMAL
EOSINOPHIL # BLD: 0 K/UL (ref 0–0.4)
EOSINOPHIL NFR BLD: 0 % (ref 0–7)
EPITH CASTS URNS QL MICRO: ABNORMAL /LPF
ERYTHROCYTE [DISTWIDTH] IN BLOOD BY AUTOMATED COUNT: 12.9 % (ref 11.5–14.5)
GAS FLOW.O2 O2 DELIVERY SYS: ABNORMAL L/MIN
GLOBULIN SER CALC-MCNC: 4.2 G/DL (ref 2–4)
GLUCOSE SERPL-MCNC: 109 MG/DL (ref 65–100)
GLUCOSE UR STRIP.AUTO-MCNC: NEGATIVE MG/DL
HCO3 BLD-SCNC: 23.1 MMOL/L (ref 22–26)
HCT VFR BLD AUTO: 20.2 % (ref 35–47)
HGB BLD-MCNC: 6.8 G/DL (ref 11.5–16)
HGB UR QL STRIP: NEGATIVE
HISTORY CHECKED?,CKHIST: NORMAL
IMM GRANULOCYTES # BLD AUTO: 0 K/UL (ref 0–0.04)
IMM GRANULOCYTES NFR BLD AUTO: 0 % (ref 0–0.5)
INR PPP: 1.2 (ref 0.9–1.1)
KETONES UR QL STRIP.AUTO: NEGATIVE MG/DL
LACTATE BLD-SCNC: 0.6 MMOL/L (ref 0.4–2)
LEUKOCYTE ESTERASE UR QL STRIP.AUTO: NEGATIVE
LIPASE SERPL-CCNC: 52 U/L (ref 73–393)
LYMPHOCYTES # BLD: 0.2 K/UL (ref 0.8–3.5)
LYMPHOCYTES NFR BLD: 89 % (ref 12–49)
MCH RBC QN AUTO: 29.3 PG (ref 26–34)
MCHC RBC AUTO-ENTMCNC: 33.7 G/DL (ref 30–36.5)
MCV RBC AUTO: 87.1 FL (ref 80–99)
MONOCYTES # BLD: 0 K/UL (ref 0–1)
MONOCYTES NFR BLD: 10 % (ref 5–13)
NEUTS SEG # BLD: 0 K/UL (ref 1.8–8)
NEUTS SEG NFR BLD: 1 % (ref 32–75)
NITRITE UR QL STRIP.AUTO: NEGATIVE
NRBC # BLD: 0 K/UL (ref 0–0.01)
NRBC BLD-RTO: 0 PER 100 WBC
O2/TOTAL GAS SETTING VFR VENT: 21 %
PCO2 BLD: 34.9 MMHG (ref 35–45)
PH BLD: 7.43 [PH] (ref 7.35–7.45)
PH UR STRIP: 6.5 [PH] (ref 5–8)
PLATELET # BLD AUTO: 10 K/UL (ref 150–400)
PLATELET COMMENTS,PCOM: ABNORMAL
PMV BLD AUTO: 10.7 FL (ref 8.9–12.9)
PO2 BLD: 38 MMHG (ref 80–100)
POTASSIUM SERPL-SCNC: 3.7 MMOL/L (ref 3.5–5.1)
PROT SERPL-MCNC: 7.3 G/DL (ref 6.4–8.2)
PROT UR STRIP-MCNC: 30 MG/DL
PROTHROMBIN TIME: 12.3 SEC (ref 9–11.1)
RBC # BLD AUTO: 2.32 M/UL (ref 3.8–5.2)
RBC #/AREA URNS HPF: ABNORMAL /HPF (ref 0–5)
RBC MORPH BLD: ABNORMAL
SAO2 % BLD: 74 % (ref 92–97)
SARS-COV-2, COV2: NORMAL
SODIUM SERPL-SCNC: 136 MMOL/L (ref 136–145)
SOURCE, COVRS: NORMAL
SP GR UR REFRACTOMETRY: 1.02 (ref 1–1.03)
SPECIMEN TYPE: ABNORMAL
THERAPEUTIC RANGE,PTTT: NORMAL SECS (ref 58–77)
UA: UC IF INDICATED,UAUC: ABNORMAL
UROBILINOGEN UR QL STRIP.AUTO: 1 EU/DL (ref 0.2–1)
WBC # BLD AUTO: 0.2 K/UL (ref 3.6–11)
WBC MORPH BLD: ABNORMAL
WBC URNS QL MICRO: ABNORMAL /HPF (ref 0–4)

## 2021-02-10 PROCEDURE — 96375 TX/PRO/DX INJ NEW DRUG ADDON: CPT

## 2021-02-10 PROCEDURE — 82803 BLOOD GASES ANY COMBINATION: CPT

## 2021-02-10 PROCEDURE — 86923 COMPATIBILITY TEST ELECTRIC: CPT

## 2021-02-10 PROCEDURE — 87186 SC STD MICRODIL/AGAR DIL: CPT

## 2021-02-10 PROCEDURE — 87040 BLOOD CULTURE FOR BACTERIA: CPT

## 2021-02-10 PROCEDURE — 71045 X-RAY EXAM CHEST 1 VIEW: CPT

## 2021-02-10 PROCEDURE — 83690 ASSAY OF LIPASE: CPT

## 2021-02-10 PROCEDURE — 87635 SARS-COV-2 COVID-19 AMP PRB: CPT

## 2021-02-10 PROCEDURE — 74177 CT ABD & PELVIS W/CONTRAST: CPT

## 2021-02-10 PROCEDURE — 81001 URINALYSIS AUTO W/SCOPE: CPT

## 2021-02-10 PROCEDURE — 86901 BLOOD TYPING SEROLOGIC RH(D): CPT

## 2021-02-10 PROCEDURE — 96366 THER/PROPH/DIAG IV INF ADDON: CPT

## 2021-02-10 PROCEDURE — 5A09357 ASSISTANCE WITH RESPIRATORY VENTILATION, LESS THAN 24 CONSECUTIVE HOURS, CONTINUOUS POSITIVE AIRWAY PRESSURE: ICD-10-PCS | Performed by: STUDENT IN AN ORGANIZED HEALTH CARE EDUCATION/TRAINING PROGRAM

## 2021-02-10 PROCEDURE — P9016 RBC LEUKOCYTES REDUCED: HCPCS

## 2021-02-10 PROCEDURE — 96365 THER/PROPH/DIAG IV INF INIT: CPT

## 2021-02-10 PROCEDURE — 36415 COLL VENOUS BLD VENIPUNCTURE: CPT

## 2021-02-10 PROCEDURE — 74011000258 HC RX REV CODE- 258: Performed by: STUDENT IN AN ORGANIZED HEALTH CARE EDUCATION/TRAINING PROGRAM

## 2021-02-10 PROCEDURE — 36600 WITHDRAWAL OF ARTERIAL BLOOD: CPT

## 2021-02-10 PROCEDURE — 2709999900 HC NON-CHARGEABLE SUPPLY

## 2021-02-10 PROCEDURE — 94660 CPAP INITIATION&MGMT: CPT

## 2021-02-10 PROCEDURE — 87077 CULTURE AEROBIC IDENTIFY: CPT

## 2021-02-10 PROCEDURE — 85730 THROMBOPLASTIN TIME PARTIAL: CPT

## 2021-02-10 PROCEDURE — 93005 ELECTROCARDIOGRAM TRACING: CPT

## 2021-02-10 PROCEDURE — 96368 THER/DIAG CONCURRENT INF: CPT

## 2021-02-10 PROCEDURE — 74011250636 HC RX REV CODE- 250/636: Performed by: STUDENT IN AN ORGANIZED HEALTH CARE EDUCATION/TRAINING PROGRAM

## 2021-02-10 PROCEDURE — 85610 PROTHROMBIN TIME: CPT

## 2021-02-10 PROCEDURE — 80053 COMPREHEN METABOLIC PANEL: CPT

## 2021-02-10 PROCEDURE — 85025 COMPLETE CBC W/AUTO DIFF WBC: CPT

## 2021-02-10 PROCEDURE — 83605 ASSAY OF LACTIC ACID: CPT

## 2021-02-10 PROCEDURE — 36430 TRANSFUSION BLD/BLD COMPNT: CPT

## 2021-02-10 PROCEDURE — 96361 HYDRATE IV INFUSION ADD-ON: CPT

## 2021-02-10 PROCEDURE — 99285 EMERGENCY DEPT VISIT HI MDM: CPT

## 2021-02-10 PROCEDURE — 74011000636 HC RX REV CODE- 636: Performed by: STUDENT IN AN ORGANIZED HEALTH CARE EDUCATION/TRAINING PROGRAM

## 2021-02-10 PROCEDURE — 71275 CT ANGIOGRAPHY CHEST: CPT

## 2021-02-10 PROCEDURE — 94762 N-INVAS EAR/PLS OXIMTRY CONT: CPT

## 2021-02-10 PROCEDURE — 74011250637 HC RX REV CODE- 250/637: Performed by: STUDENT IN AN ORGANIZED HEALTH CARE EDUCATION/TRAINING PROGRAM

## 2021-02-10 RX ORDER — FUROSEMIDE 10 MG/ML
60 INJECTION INTRAMUSCULAR; INTRAVENOUS
Status: COMPLETED | OUTPATIENT
Start: 2021-02-10 | End: 2021-02-10

## 2021-02-10 RX ORDER — ONDANSETRON 2 MG/ML
4 INJECTION INTRAMUSCULAR; INTRAVENOUS
Status: DISCONTINUED | OUTPATIENT
Start: 2021-02-10 | End: 2021-02-11 | Stop reason: SDUPTHER

## 2021-02-10 RX ORDER — ACETAMINOPHEN 500 MG
1000 TABLET ORAL
Status: COMPLETED | OUTPATIENT
Start: 2021-02-10 | End: 2021-02-10

## 2021-02-10 RX ORDER — VANCOMYCIN/0.9 % SOD CHLORIDE 1.5G/250ML
1500 PLASTIC BAG, INJECTION (ML) INTRAVENOUS
Status: COMPLETED | OUTPATIENT
Start: 2021-02-10 | End: 2021-02-10

## 2021-02-10 RX ORDER — ACETAMINOPHEN 500 MG
1000 TABLET ORAL
Status: DISCONTINUED | OUTPATIENT
Start: 2021-02-10 | End: 2021-02-11

## 2021-02-10 RX ORDER — SODIUM CHLORIDE 9 MG/ML
250 INJECTION, SOLUTION INTRAVENOUS AS NEEDED
Status: DISCONTINUED | OUTPATIENT
Start: 2021-02-10 | End: 2021-02-11

## 2021-02-10 RX ORDER — ACETAMINOPHEN 325 MG/1
650 TABLET ORAL
Status: DISCONTINUED | OUTPATIENT
Start: 2021-02-10 | End: 2021-02-18 | Stop reason: SDUPTHER

## 2021-02-10 RX ORDER — LORAZEPAM 2 MG/ML
0.5 INJECTION INTRAMUSCULAR
Status: COMPLETED | OUTPATIENT
Start: 2021-02-10 | End: 2021-02-10

## 2021-02-10 RX ADMIN — VANCOMYCIN HYDROCHLORIDE 1500 MG: 10 INJECTION, POWDER, LYOPHILIZED, FOR SOLUTION INTRAVENOUS at 17:28

## 2021-02-10 RX ADMIN — NITROGLYCERIN 1 INCH: 20 OINTMENT TOPICAL at 23:43

## 2021-02-10 RX ADMIN — PIPERACILLIN AND TAZOBACTAM 3.38 G: 3; .375 INJECTION, POWDER, LYOPHILIZED, FOR SOLUTION INTRAVENOUS at 17:06

## 2021-02-10 RX ADMIN — IOPAMIDOL 100 ML: 755 INJECTION, SOLUTION INTRAVENOUS at 18:16

## 2021-02-10 RX ADMIN — FUROSEMIDE 60 MG: 10 INJECTION, SOLUTION INTRAVENOUS at 23:39

## 2021-02-10 RX ADMIN — SODIUM CHLORIDE 1000 ML: 9 INJECTION, SOLUTION INTRAVENOUS at 17:08

## 2021-02-10 RX ADMIN — LORAZEPAM 0.5 MG: 2 INJECTION INTRAMUSCULAR at 23:41

## 2021-02-10 RX ADMIN — SODIUM CHLORIDE 1000 ML: 9 INJECTION, SOLUTION INTRAVENOUS at 16:09

## 2021-02-10 RX ADMIN — ACETAMINOPHEN 1000 MG: 500 TABLET ORAL at 15:18

## 2021-02-10 NOTE — ED PROVIDER NOTES
EMERGENCY DEPARTMENT HISTORY AND PHYSICAL EXAM      Date: 2/10/2021  Patient Name: Chance Gresham    History of Presenting Illness         History Provided By: Patient    HPI: Chance Gresham, 82 y.o. 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 Guero Taveras at [facility] for this), breast cancer in remission, presents to the ED with cc of dizziness (described as lightheadedness) associated with nausea since this morning. She endorses associated diffuse abdominal discomfort. Denies any emesis, diarrhea, urinary sxs. In addition to the above, patient also complains of shortness of breath that has been present \"for a while. \"  States that she has been told in the past that her shortness of breath was secondary to chemo effects. No lower extremity edema, orthopnea, unexpected weight gain. No previous hx of COPD or CHF (echo from 2020 with normal EF of 55%). No calf swelling or tenderness. No hx of DVT/PEs. Denies cough, URI symptoms, or known sick contacts. Patient denies chest pain. She denies fever, but was incidentally found to be febrile in triage to 103.2F (which surprised patient), she denies chills. There are no other complaints, changes, or physical findings at this time. PCP: Oscar Montoya MD    Current Facility-Administered Medications on File Prior to Encounter   Medication Dose Route Frequency Provider Last Rate Last Admin    0.9% sodium chloride infusion   IntraVENous CONTINUOUS Early, Marian Luo CRNA   New Bag at 07/21/20 1222     Current Outpatient Medications on File Prior to Encounter   Medication Sig Dispense Refill    sodium chloride-aloe vera (Ayr Saline) topical gel Apply  to affected area four (4) times daily as needed.  colchicine 0.6 mg tablet TK 1 T PO D  TK WITH WATER. UNTIL THE FLAIR RESOLVED      dicyclomine (BENTYL) 10 mg capsule TK 1 C PO BID      OTHER Dicitabine- Chemo infusion      allopurinoL (ZYLOPRIM) 300 mg tablet TK 1 T PO D      melatonin 1 mg tablet Take  by mouth nightly.  ginger, Zingiber officinalis, (ginger extract) 250 mg cap Take  by mouth as needed.  polyethylene glycol 400 (BLINK TEARS OP) Apply  to eye.  DULoxetine (CYMBALTA) 30 mg capsule Take 30 mg by mouth daily. take for neuropathy      OXYGEN-AIR DELIVERY SYSTEMS 1 L by IntraNASal route daily as needed for Other (shortness of breath ).  acetaminophen (TYLENOL) 325 mg tablet Take 2 Tabs by mouth every four (4) hours as needed for Pain or Fever. 30 Tab 0    dilTIAZem ER (CARDIZEM LA) 240 mg tablet Take 240 mg by mouth daily.  vit A,C,E-zinc-copper (PreserVision AREDS) cap capsule Take 1 Cap by mouth daily.  lidocaine 5 % topical cream Apply  to affected area two (2) times daily as needed (port access). Port access      ondansetron hcl (Zofran) 4 mg tablet Take 4 mg by mouth every eight (8) hours as needed for Nausea or Vomiting.  folic acid (FOLVITE) 1 mg tablet Take 1 Tab by mouth daily. 30 Tab 2    bismuth subsalicylate (PEPTO-BISMOL PO) Take 30 mL by mouth daily as needed for Diarrhea. take one dose every hour as needed      ergocalciferol (ERGOCALCIFEROL) 1,250 mcg (50,000 unit) capsule TAKE ONE CAPSULE BY MOUTH EVERY 7 DAYS 12 Cap 1    pantoprazole (PROTONIX) 40 mg tablet Take 1 Tab by mouth Before breakfast and dinner.  61 Tab 1       Past History     Past Medical History:  Past Medical History:   Diagnosis Date    Anemia     Arthritis     Breast cancer ([address]     left    Bunion of right foot 8/20/2015    Chronic obstructive pulmonary disease (HCC)     mild    Chronic pain     back--uses tens unit    Diverticulitis 6/29/2018    Recurrent    Diverticulitis large intestine     Dry eye syndrome 6/29/2018    Fibromyalgia 6/29/2018    GERD (gastroesophageal reflux disease)     History of left breast cancer 2013    lumpectomy radiation    Hypercholesterolemia 2018    Ill-defined condition     blood transfusion hx    [facility]     MDS (myelodysplastic syndrome) ([facility] [facility]     Osteoporosis 2018    Oxygen dependent     at night    Peripheral neuropathy 2018    Prediabetes 2018    PUD (peptic ulcer disease)     Radiation therapy complication 2365    Lt breast-No Chemo    Rosacea 2018    Trouble in sleeping        Past Surgical History:  Past Surgical History:   Procedure Laterality Date    COLONOSCOPY N/A 2020    COLONOSCOPY performed by Roxanne Molina MD at [facility] ENDOSCOPY    COLONOSCOPY,DIAGNOSTIC  2020         HX APPENDECTOMY      HX BREAST LUMPECTOMY  2013    LEFT BREAST LUMPECTOMY W/LEFT BREAST SENTINEL NODE BIOPSY,AND NEEDLE LOCALIZATION performed by Devon Romero MD at [facility] MAIN OR    HX CATARACT REMOVAL      bilateral    HX HYSTERECTOMY      ovarian cyst    HX MOHS PROCEDURES      right    HX ORTHOPAEDIC      back surgery x2    HX OTHER SURGICAL      colonoscopy    HX TONSILLECTOMY      HX VASCULAR ACCESS  2020    echo cath right shoulder    IR INSERT TUNL CVC W PORT OVER 5 YEARS  12/3/2019    ID TOTAL KNEE ARTHROPLASTY      left    ID TOTAL KNEE ARTHROPLASTY      right    UPPER GI ENDOSCOPY,BIOPSY  2020         UPPER GI ENDOSCOPY,BIOPSY  2020         UPPER GI ENDOSCOPY,DILATN W GUIDE  2020         US GUIDED CORE BREAST BIOPSY Left     Breast Ca       Family History:  Family History   Problem Relation Age of Onset    Cancer Mother         bladder    Cancer Father     Breast Cancer Paternal Grandmother        Social History:  Social History     Tobacco Use    Smoking status: Former Smoker     Packs/day: 0.50     Years: 50.00     Pack years: 25.00     Quit date: 2012     Years since quittin.0    Smokeless tobacco: Never Used   Substance Use Topics    Alcohol use:  Yes     Alcohol/week: 2.0 standard drinks     Types: 2 Glasses of wine per week  Drug use: No       Allergies: Allergies   Allergen Reactions    Latex Rash    Hydrocodone Nausea Only and Vertigo    Gabapentin Diarrhea, Nausea Only and Other (comments)     weakness    Hydrocodone-Acetaminophen Nausea Only    Hydroxyurea Unknown (comments)     patient states she feels this medication is what caused the gi bleed    Lidocaine Rash     Tegarderm dressing caused skin rash after removal, please use foam dressing with this patient. Tegarderm dressing caused skin rash after removal, please use foam dressing with this patient.  Lyrica [Pregabalin] Nausea Only    Oxycodone Nausea and Vomiting and Vertigo         Review of Systems   Review of Systems   Constitutional: Negative for chills and fever. HENT: Negative for congestion and rhinorrhea. Eyes: Negative for visual disturbance. Respiratory: Positive for shortness of breath. Negative for chest tightness. Cardiovascular: Negative for chest pain, palpitations and leg swelling. Gastrointestinal: Positive for nausea. Negative for abdominal pain, diarrhea and vomiting. Abdominal discomfort   Genitourinary: Negative for dysuria, flank pain and hematuria. Musculoskeletal: Negative for back pain and neck pain. Skin: Negative for rash. Allergic/Immunologic: Negative for immunocompromised state. Neurological: Positive for light-headedness. Negative for dizziness, speech difficulty, weakness and headaches. Hematological: Negative for adenopathy. Psychiatric/Behavioral: Negative for dysphoric mood and suicidal ideas. Physical Exam   Physical Exam  Vitals signs and nursing note reviewed. Constitutional:       General: She is not in acute distress. Appearance: She is not ill-appearing or toxic-appearing. HENT:      Head: Normocephalic and atraumatic. Nose: Nose normal.      Mouth/Throat:      Mouth: Mucous membranes are moist.   Eyes:      Extraocular Movements: Extraocular movements intact. Pupils: Pupils are equal, round, and reactive to light. Neck:      Musculoskeletal: Normal range of motion and neck supple. Cardiovascular:      Rate and Rhythm: Regular rhythm. Tachycardia present. Pulses: Normal pulses. Pulmonary:      Effort: Pulmonary effort is normal.      Breath sounds: No stridor. No wheezing or rhonchi. Abdominal:      General: Abdomen is flat. There is no distension. Tenderness: There is no abdominal tenderness. Musculoskeletal: Normal range of motion. Skin:     General: Skin is warm and dry. Neurological:      General: No focal deficit present. Mental Status: She is alert and oriented to person, place, and time. Cranial Nerves: Cranial nerves are intact. No cranial nerve deficit. Sensory: Sensation is intact. No sensory deficit. Motor: Motor function is intact. No weakness.       Coordination: Coordination normal.      Gait: Gait normal.   Psychiatric:         Judgment: Judgment normal.         Diagnostic Study Results     Labs -     Recent Results (from the past 12 hour(s))   EKG, 12 LEAD, INITIAL    Collection Time: 02/10/21  3:02 PM   Result Value Ref Range    Ventricular Rate 93 BPM    Atrial Rate 93 BPM    P-R Interval 210 ms    QRS Duration 86 ms    Q-T Interval 370 ms    QTC Calculation (Bezet) 460 ms    Calculated P Axis 29 degrees    Calculated R Axis -20 degrees    Calculated T Axis 34 degrees    Diagnosis       Sinus rhythm with 1st degree AV block with fusion complexes  Moderate voltage criteria for LVH, may be normal variant  When compared with ECG of 22-JUL-2020 06:23,  fusion complexes are now present     CBC WITH AUTOMATED DIFF    Collection Time: 02/10/21  3:41 PM   Result Value Ref Range    WBC 0.2 (LL) 3.6 - 11.0 K/uL    RBC 2.32 (L) 3.80 - 5.20 M/uL    HGB 6.8 (L) 11.5 - 16.0 g/dL    HCT 20.2 (L) 35.0 - 47.0 %    MCV 87.1 80.0 - 99.0 FL    MCH 29.3 26.0 - 34.0 PG    MCHC 33.7 30.0 - 36.5 g/dL    RDW 12.9 11.5 - 14.5 % PLATELET 10 (LL) 374 - 400 K/uL    MPV 10.7 8.9 - 12.9 FL    NRBC 0.0 0  WBC    ABSOLUTE NRBC 0.00 0.00 - 0.01 K/uL    NEUTROPHILS 1 (L) 32 - 75 %    LYMPHOCYTES 89 (H) 12 - 49 %    MONOCYTES 10 5 - 13 %    EOSINOPHILS 0 0 - 7 %    BASOPHILS 0 0 - 1 %    IMMATURE GRANULOCYTES 0 0.0 - 0.5 %    ABS. NEUTROPHILS 0.0 (L) 1.8 - 8.0 K/UL    ABS. LYMPHOCYTES 0.2 (L) 0.8 - 3.5 K/UL    ABS. MONOCYTES 0.0 0.0 - 1.0 K/UL    ABS. EOSINOPHILS 0.0 0.0 - 0.4 K/UL    ABS. BASOPHILS 0.0 0.0 - 0.1 K/UL    ABS. IMM. GRANS. 0.0 0.00 - 0.04 K/UL    DF MANUAL      PLATELET COMMENTS DECREASED PLATELETS      RBC COMMENTS HYPOCHROMIA  PRESENT        WBC COMMENTS Differential performed on buffy coat smear. METABOLIC PANEL, COMPREHENSIVE    Collection Time: 02/10/21  3:41 PM   Result Value Ref Range    Sodium 136 136 - 145 mmol/L    Potassium 3.7 3.5 - 5.1 mmol/L    Chloride 104 97 - 108 mmol/L    CO2 24 21 - 32 mmol/L    Anion gap 8 5 - 15 mmol/L    Glucose 109 (H) 65 - 100 mg/dL    BUN 17 6 - 20 MG/DL    Creatinine 0.69 0.55 - 1.02 MG/DL    BUN/Creatinine ratio 25 (H) 12 - 20      GFR est AA >60 >60 ml/min/1.73m2    GFR est non-AA >60 >60 ml/min/1.73m2    Calcium 7.9 (L) 8.5 - 10.1 MG/DL    Bilirubin, total 1.1 (H) 0.2 - 1.0 MG/DL    ALT (SGPT) 14 12 - 78 U/L    AST (SGOT) 5 (L) 15 - 37 U/L    Alk.  phosphatase 91 45 - 117 U/L    Protein, total 7.3 6.4 - 8.2 g/dL    Albumin 3.1 (L) 3.5 - 5.0 g/dL    Globulin 4.2 (H) 2.0 - 4.0 g/dL    A-G Ratio 0.7 (L) 1.1 - 2.2     CULTURE, BLOOD, PAIRED    Collection Time: 02/10/21  3:41 PM    Specimen: Blood   Result Value Ref Range    Special Requests: NO SPECIAL REQUESTS      Culture result: NO GROWTH AFTER 4 HOURS     LIPASE    Collection Time: 02/10/21  3:41 PM   Result Value Ref Range    Lipase 52 (L) 73 - 393 U/L   POC LACTIC ACID    Collection Time: 02/10/21  3:51 PM   Result Value Ref Range    Lactic Acid (POC) 0.60 0.40 - 2.00 mmol/L   SARS-COV-2    Collection Time: 02/10/21  4:14 PM Result Value Ref Range    SARS-CoV-2 Please find results under separate order     COVID-19 RAPID TEST    Collection Time: 02/10/21  4:14 PM   Result Value Ref Range    Specimen source Please find results under separate order      COVID-19 rapid test Not detected NOTD     POC EG7    Collection Time: 02/10/21  5:18 PM   Result Value Ref Range    Calcium, ionized (POC) 1.06 (L) 1.12 - 1.32 mmol/L    FIO2 (POC) 21 %    pH (POC) 7.43 7.35 - 7.45      pCO2 (POC) 34.9 (L) 35.0 - 45.0 MMHG    pO2 (POC) 38 (LL) 80 - 100 MMHG    HCO3 (POC) 23.1 22 - 26 MMOL/L    Base deficit (POC) 1 mmol/L    sO2 (POC) 74 (L) 92 - 97 %    Site OTHER      Device: ROOM AIR      Allens test (POC) N/A      Specimen type (POC) VENOUS BLOOD     URINALYSIS W/ REFLEX CULTURE    Collection Time: 02/10/21  5:23 PM    Specimen: Urine   Result Value Ref Range    Color YELLOW/STRAW      Appearance CLEAR CLEAR      Specific gravity 1.020 1.003 - 1.030      pH (UA) 6.5 5.0 - 8.0      Protein 30 (A) NEG mg/dL    Glucose Negative NEG mg/dL    Ketone Negative NEG mg/dL    Bilirubin Negative NEG      Blood Negative NEG      Urobilinogen 1.0 0.2 - 1.0 EU/dL    Nitrites Negative NEG      Leukocyte Esterase Negative NEG      WBC 0-4 0 - 4 /hpf    RBC 0-5 0 - 5 /hpf    Epithelial cells FEW FEW /lpf    Bacteria Negative NEG /hpf    UA:UC IF INDICATED CULTURE NOT INDICATED BY UA RESULT CNI     RBC, ALLOCATE    Collection Time: 02/10/21  5:30 PM   Result Value Ref Range    HISTORY CHECKED?  Historical check performed    TYPE & SCREEN    Collection Time: 02/10/21  6:32 PM   Result Value Ref Range    Crossmatch Expiration 02/13/2021,2359     ABO/Rh(D) Rud[employer] Porch POSITIVE     Antibody screen NEG     Unit number [id number]     Blood component type RC LR     Unit division 00     Status of unit ISSUED     Crossmatch result Compatible    PROTHROMBIN TIME + INR    Collection Time: 02/10/21  6:32 PM   Result Value Ref Range    INR 1.2 (H) 0.9 - 1.1      Prothrombin time 12.3 (H) 9.0 - 11.1 sec   PTT    Collection Time: 02/10/21  6:32 PM   Result Value Ref Range    aPTT 27.5 22.1 - 31.0 sec    aPTT, therapeutic range     58.0 - 77.0 SECS       Radiologic Studies -   CT ABD PELV W CONT   Final Result   1. Findings consistent with acute diverticulitis involving the sigmoid colon. No evidence of abscess or perforation. Possible second focus of acute   diverticulitis in the distal descending colon. 2.  Questionable mild inflammatory changes around the distal esophagus at the GE   junction. This may represent esophagitis. 3.  Changes suggestive of chronic pancreatitis. Questionable small gallstone   within gallbladder without signs of acute cholecystitis      CTA CHEST W OR W WO CONT   Final Result      1. No evidence of pulmonary embolus. 2.  Borderline size mediastinal lymph nodes. Recommend correlation with prior   imaging available      3. See dedicated abdominal CT for additional findings         XR CHEST PORT   Final Result   1. No acute process and no change when compared to previous examination. CT Results  (Last 48 hours)    None        CXR Results  (Last 48 hours)               02/10/21 1517  XR CHEST PORT Final result    Impression:  1. No acute process and no change when compared to previous examination. Narrative:  EXAM: XR CHEST PORT       INDICATION: Shortness of breath       COMPARISON: 7/21/2020       FINDINGS: A portable AP radiograph of the chest was obtained at 1506 hours. The   patient is on a cardiac monitor. The Port-A-Cath overlies the SVC. Heart size is   at the upper limits of normal. The lungs are clear of an acute process and   unchanged appearance when compared to previous examination. No adenopathy or pleural effusions. Medical Decision Making   I am the first provider for this patient.     I reviewed the vital signs, available nursing notes, past medical history, past surgical history, family history and social history. Vital Signs-Reviewed the patient's vital signs. Patient Vitals for the past 12 hrs:   Temp Pulse Resp BP SpO2   02/10/21 2331     98 %   02/10/21 2315 98.9 °F (37.2 °C)  (!) 40 (!) 172/93    02/10/21 2300  (!) 119 (!) 37 (!) 147/118 (!) 76 %   02/10/21 2245  (!) 130 30 132/65 92 %   02/10/21 2230 99.2 °F (37.3 °C) (!) 126 28 (!) 127/104 97 %   02/10/21 2215 98.9 °F (37.2 °C) (!) 125 (!) 31 (!) 163/65 96 %   02/10/21 2155 98.5 °F (36.9 °C) (!) 110 18 (!) 126/90 97 %   02/10/21 2130 98.1 °F (36.7 °C) (!) 108 29 (!) 133/94 100 %   02/10/21 1601 99.2 °F (37.3 °C) 79 21 90/69 96 %   02/10/21 1550     94 %   02/10/21 1515  (!) 112 25 (!) 126/54 91 %   02/10/21 1431 (!) 103.2 °F (39.6 °C) (!) 103 16 (!) 144/79 94 %       EKG interpretation: (Preliminary)  Sinus rhythm, rate of 93, first-degree AV block, no acute ST changes concerning for ischemia. EKG interpretation by Chin Shaw MD      Records Reviewed: Nursing Notes and Old Medical Records    Provider Notes (Medical Decision Making):   Patient's presentation is concerning for neutropenic fever. We will treat with antipyretics, IV fluids, and obtain sepsis work-up. Labs notable for leukopenia of 0.2. Absolute neutrophil count of 0. She is anemic with hemoglobin of 6.8. She has severe thrombocytopenia with platelet count of 10. I discussed patient's CBC findings with her hematologist, Dr. Juan Amezcua at [facility] via telephone. He states that she recently just received 2 units of platelets, and that her previous platelet count was 3. So current thrombocytopenia of 10 is actually a slight improvement from her prior. States to hold off on further platelet transfusion unless patient has evidence of active bleeding. Discussed giving patient 1 unit of packed red blood cells here for anemia as she comes in complaining of lightheadedness. Dr. Malone Reason agreed with this. Patient is typed and crossed for 1 unit of PRBCs.   She signed consent form to receive blood transfusion. Patient states she has received many blood transfusions in the past, without any adverse reactions. Patient reports that she received 1 unit of PRBCs in addition to her 2 units of platelets recently via orders from her hematologist in the outpatient setting. Due to patient's severe neutropenia, will start empiric antibiotics with IV vancomycin and IV Zosyn to cover patient broadly despite remainder of work-up currently still being in process and the source of infxn not currently identified. Rapid COVID-19 swab is negative. CTA chest negative for pulmonary embolus. Borderline size mediastinal lymph nodes. No pneumonia or other acute intrathoracic process. CT abdomen pelvis with findings consistent with acute diverticulitis involving the sigmoid colon. No evidence of abscess or perforation. Possible second focus of acute diverticulitis in the distal descending colon. Possible esophagitis as well as possible chronic pancreatitis. Lipase is within normal limits here. Patient already received IV Zosyn, which should adequately cover for acute diverticulitis. This is likely the source of her sepsis/neutropenic fever. Patient will require admission for further treatment. Discussed the patient with the hospitalist, who has accepted the patient for admission at approximately 7:45PM.    ------------------------  Update to patient's ED course:    ED Course as of Feb 11 0205   Wed Feb 10, 2021   2315 Approximately 3-1/2 hours after patient has been accepted for admission-the patient was seen by the hospitalist, now found to be tachycardic, hypertensive, tachypneic breathing in the 30s, and hypoxic satting only 76% on room air. Patient is approximately 1 hour into her blood transfusion process when symptoms began per RN.   Blood transfusion has already been stopped by the hospitalist.  Dilia Portillo by the hospitalist that due to patient's current worsening clinical status, he no longer feels comfortable taking this patient to the floor, and would like for ICU to be involved in patient's care. I evaluated the patient at bedside. Agree with stopping transfusion immediately. Patient's lungs now sound wet with rales bilaterally. Patient placed on nonrebreather immediately. RT paged stat for BiPAP. Chest x-ray ordered stat-confirms interval development of new pulmonary edema. Suspect transfusion associated circulatory overload versus fluid overload. Plan for IV diuresis, 1 inch Nitropaste. Will check BNP. Will page ICU for consultation, although, if patient's respiratory status is stabilized on BiPAP-she may still be appropriate for the floor. Patient's last echocardiogram from 2020 reviewed-normal EF of 71% with diastolic function unable to be commented on secondary to A. fib    [JM]   2345 Patient on FiO2 of 50% on bipap, breathing comfortably, satting 100%, able to speak in full sentences while wearing bipap mask. Endorses feeling better. Actively diuresing at this time. Vital signs improving. Awaiting ICU evaluation, though doubt will need if continues to improve. [JM]   Thu Feb 11, 2021   0200 Upon ICU evaluation- felt patient was stable for admission to hospitalist service.      [JM]      ED Course User Index  [JM] Jefry Liao MD       CRITICAL CARE NOTE:    Authorized and Performed by: Britton Alpers, MD    IMPENDING DETERIORATION -Airway, Respiratory, Cardiovascular and Metabolic  ASSOCIATED RISK FACTORS - Hypotension, Shock, Hypoxia, Bleeding, Dysrhythmia, Metabolic changes, Dehydration and Vascular Compromise  MANAGEMENT- Bedside Assessment  INTERPRETATION -  Xrays, CT Scan, Blood Gases, ECG, Blood Pressure and Cardiac Output Measures   INTERVENTIONS - hemodynamic mngmt, bipap mngmt, vascular control and Metobolic interventions  CASE REVIEW - Hospitalist/Intensivist, Medical Sub-Specialist and Nursing  TREATMENT RESPONSE -Improved and Stable  PERFORMED BY - Self    I have spent 200 minutes of critical care time involved in obtaining a history, examining the patient, lab review, arranging urgent treatment with development of a management plan, consultations with other providers, family decision- making, bedside attention and documentation. During this entire length of time I was immediately available to the patient . Critical Care: The reason for providing this level of medical care for this critically ill patient was due to a critical illness that impaired one or more vital organ systems, such that there was a high probability of imminent or life threatening deterioration in the patient's condition. This care involved high complexity decision making to assess, manipulate, and support vital system functions, to treat this degree of vital organ system failure, and to prevent further life threatening deterioration of the patients condition. Critical care time was exclusive of separately billable procedures. Valentín Hubbard MD      Disposition:  Admit    Diagnosis     Clinical Impression:   1. Neutropenic fever ([address]    2. Diverticulitis    3. Antineoplastic chemotherapy induced anemia    4. Chemotherapy-induced thrombocytopenia    5. Neutropenic sepsis ([address]    6. TACO (transfusion associated circulatory overload)    7. Acute respiratory failure with hypoxia (HCC)    8. Acute pulmonary edema (HCC)        Attestations:    Valentín Hubbard MD    Please note that this dictation was completed with KeyCAPTCHA, the computer voice recognition software. Quite often unanticipated grammatical, syntax, homophones, and other interpretive errors are inadvertently transcribed by the computer software. Please disregard these errors. Please excuse any errors that have escaped final proofreading. Thank you.

## 2021-02-10 NOTE — ED NOTES
Medicated pt per orders. Pt resting on stretcher in POC with call bell in reach. Pt remains on monitor x 3. VSS at this time. 1740: Pt off the floor to CT.    1916: Bedside and Verbal shift change report given to Tarsha Esparza (oncoming nurse) by Fiona Valle (offgoing nurse). Report included the following information SBAR, ED Summary and Recent Results.

## 2021-02-11 PROBLEM — J96.01 ACUTE RESPIRATORY FAILURE WITH HYPOXEMIA (HCC): Status: ACTIVE | Noted: 2021-02-11

## 2021-02-11 PROBLEM — D70.9 NEUTROPENIA, FEBRILE (HCC): Status: ACTIVE | Noted: 2021-02-11

## 2021-02-11 PROBLEM — R50.81 NEUTROPENIA, FEBRILE (HCC): Status: ACTIVE | Noted: 2021-02-11

## 2021-02-11 LAB
ARTERIAL PATENCY WRIST A: YES
ATRIAL RATE: 93 BPM
BASE EXCESS BLD CALC-SCNC: 0 MMOL/L
BASOPHILS # BLD: 0 K/UL (ref 0–0.1)
BASOPHILS NFR BLD: 0 % (ref 0–1)
BDY SITE: ABNORMAL
BNP SERPL-MCNC: 3546 PG/ML
CA-I BLD-SCNC: 1.13 MMOL/L (ref 1.12–1.32)
CALCULATED P AXIS, ECG09: 29 DEGREES
CALCULATED R AXIS, ECG10: -20 DEGREES
CALCULATED T AXIS, ECG11: 34 DEGREES
COMMENT, HOLDF: NORMAL
DIAGNOSIS, 93000: NORMAL
DIFFERENTIAL METHOD BLD: ABNORMAL
EOSINOPHIL # BLD: 0 K/UL (ref 0–0.4)
EOSINOPHIL NFR BLD: 0 % (ref 0–7)
ERYTHROCYTE [DISTWIDTH] IN BLOOD BY AUTOMATED COUNT: 13.1 % (ref 11.5–14.5)
GAS FLOW.O2 O2 DELIVERY SYS: ABNORMAL L/MIN
HCO3 BLD-SCNC: 23.5 MMOL/L (ref 22–26)
HCT VFR BLD AUTO: 22.9 % (ref 35–47)
HGB BLD-MCNC: 7.7 G/DL (ref 11.5–16)
IMM GRANULOCYTES # BLD AUTO: 0 K/UL (ref 0–0.04)
IMM GRANULOCYTES NFR BLD AUTO: 0 % (ref 0–0.5)
LYMPHOCYTES # BLD: 0 K/UL (ref 0.8–3.5)
LYMPHOCYTES NFR BLD: 0 % (ref 12–49)
MCH RBC QN AUTO: 29.1 PG (ref 26–34)
MCHC RBC AUTO-ENTMCNC: 33.6 G/DL (ref 30–36.5)
MCV RBC AUTO: 86.4 FL (ref 80–99)
MONOCYTES # BLD: 0 K/UL (ref 0–1)
MONOCYTES NFR BLD: 0 % (ref 5–13)
NEUTS SEG # BLD: 0 K/UL (ref 1.8–8)
NEUTS SEG NFR BLD: 0 % (ref 32–75)
NRBC # BLD: 0 K/UL (ref 0–0.01)
NRBC BLD-RTO: 0 PER 100 WBC
O2/TOTAL GAS SETTING VFR VENT: 50 %
P-R INTERVAL, ECG05: 210 MS
PCO2 BLD: 32.6 MMHG (ref 35–45)
PEEP RESPIRATORY: 6 CMH2O
PH BLD: 7.47 [PH] (ref 7.35–7.45)
PIP ISTAT,IPIP: 12
PLATELET # BLD AUTO: 7 K/UL (ref 150–400)
PMV BLD AUTO: 10.3 FL (ref 8.9–12.9)
PO2 BLD: 157 MMHG (ref 80–100)
Q-T INTERVAL, ECG07: 370 MS
QRS DURATION, ECG06: 86 MS
QTC CALCULATION (BEZET), ECG08: 460 MS
RBC # BLD AUTO: 2.65 M/UL (ref 3.8–5.2)
RBC MORPH BLD: ABNORMAL
SAMPLES BEING HELD,HOLD: NORMAL
SAO2 % BLD: 100 % (ref 92–97)
SPECIMEN TYPE: ABNORMAL
TOTAL CELLS COUNTED SPEC: 0
TOTAL RESP. RATE, ITRR: 203
VENTRICULAR RATE, ECG03: 93 BPM
WBC # BLD AUTO: 0.2 K/UL (ref 3.6–11)
WBC MORPH BLD: ABNORMAL

## 2021-02-11 PROCEDURE — 82803 BLOOD GASES ANY COMBINATION: CPT

## 2021-02-11 PROCEDURE — 74011250636 HC RX REV CODE- 250/636: Performed by: GENERAL ACUTE CARE HOSPITAL

## 2021-02-11 PROCEDURE — 86078 PHYS BLOOD BANK SERV REACTJ: CPT

## 2021-02-11 PROCEDURE — 36415 COLL VENOUS BLD VENIPUNCTURE: CPT

## 2021-02-11 PROCEDURE — 30233R1 TRANSFUSION OF NONAUTOLOGOUS PLATELETS INTO PERIPHERAL VEIN, PERCUTANEOUS APPROACH: ICD-10-PCS | Performed by: GENERAL ACUTE CARE HOSPITAL

## 2021-02-11 PROCEDURE — 65660000000 HC RM CCU STEPDOWN

## 2021-02-11 PROCEDURE — 36430 TRANSFUSION BLD/BLD COMPNT: CPT

## 2021-02-11 PROCEDURE — 85025 COMPLETE CBC W/AUTO DIFF WBC: CPT

## 2021-02-11 PROCEDURE — 74011250637 HC RX REV CODE- 250/637: Performed by: GENERAL ACUTE CARE HOSPITAL

## 2021-02-11 PROCEDURE — 86923 COMPATIBILITY TEST ELECTRIC: CPT

## 2021-02-11 PROCEDURE — 77010033678 HC OXYGEN DAILY

## 2021-02-11 PROCEDURE — 36600 WITHDRAWAL OF ARTERIAL BLOOD: CPT

## 2021-02-11 PROCEDURE — 83880 ASSAY OF NATRIURETIC PEPTIDE: CPT

## 2021-02-11 PROCEDURE — 86901 BLOOD TYPING SEROLOGIC RH(D): CPT

## 2021-02-11 PROCEDURE — 74011000258 HC RX REV CODE- 258: Performed by: GENERAL ACUTE CARE HOSPITAL

## 2021-02-11 PROCEDURE — P9035 PLATELET PHERES LEUKOREDUCED: HCPCS

## 2021-02-11 RX ORDER — FUROSEMIDE 10 MG/ML
40 INJECTION INTRAMUSCULAR; INTRAVENOUS DAILY
Status: DISCONTINUED | OUTPATIENT
Start: 2021-02-11 | End: 2021-02-15

## 2021-02-11 RX ORDER — SODIUM CHLORIDE 0.9 % (FLUSH) 0.9 %
5-40 SYRINGE (ML) INJECTION EVERY 8 HOURS
Status: DISCONTINUED | OUTPATIENT
Start: 2021-02-11 | End: 2021-02-20 | Stop reason: HOSPADM

## 2021-02-11 RX ORDER — POLYETHYLENE GLYCOL 3350 17 G/17G
17 POWDER, FOR SOLUTION ORAL DAILY PRN
Status: DISCONTINUED | OUTPATIENT
Start: 2021-02-11 | End: 2021-02-20 | Stop reason: HOSPADM

## 2021-02-11 RX ORDER — ERGOCALCIFEROL 1.25 MG/1
50000 CAPSULE ORAL
COMMUNITY

## 2021-02-11 RX ORDER — FOLIC ACID 1 MG/1
1 TABLET ORAL DAILY
Status: DISCONTINUED | OUTPATIENT
Start: 2021-02-11 | End: 2021-02-20 | Stop reason: HOSPADM

## 2021-02-11 RX ORDER — ACETAMINOPHEN 325 MG/1
650 TABLET ORAL
Status: DISCONTINUED | OUTPATIENT
Start: 2021-02-11 | End: 2021-02-20 | Stop reason: HOSPADM

## 2021-02-11 RX ORDER — SODIUM CHLORIDE 0.9 % (FLUSH) 0.9 %
5-40 SYRINGE (ML) INJECTION AS NEEDED
Status: DISCONTINUED | OUTPATIENT
Start: 2021-02-11 | End: 2021-02-20 | Stop reason: HOSPADM

## 2021-02-11 RX ORDER — VANCOMYCIN HYDROCHLORIDE
1250 EVERY 12 HOURS
Status: DISCONTINUED | OUTPATIENT
Start: 2021-02-11 | End: 2021-02-11

## 2021-02-11 RX ORDER — ONDANSETRON 2 MG/ML
4 INJECTION INTRAMUSCULAR; INTRAVENOUS
Status: DISCONTINUED | OUTPATIENT
Start: 2021-02-11 | End: 2021-02-20 | Stop reason: HOSPADM

## 2021-02-11 RX ORDER — DULOXETIN HYDROCHLORIDE 30 MG/1
30 CAPSULE, DELAYED RELEASE ORAL DAILY
Status: DISCONTINUED | OUTPATIENT
Start: 2021-02-11 | End: 2021-02-20 | Stop reason: HOSPADM

## 2021-02-11 RX ORDER — PROMETHAZINE HYDROCHLORIDE 25 MG/1
12.5 TABLET ORAL
Status: DISCONTINUED | OUTPATIENT
Start: 2021-02-11 | End: 2021-02-20 | Stop reason: HOSPADM

## 2021-02-11 RX ORDER — LANOLIN ALCOHOL/MO/W.PET/CERES
1.5 CREAM (GRAM) TOPICAL
Status: DISCONTINUED | OUTPATIENT
Start: 2021-02-11 | End: 2021-02-20 | Stop reason: HOSPADM

## 2021-02-11 RX ORDER — ACETAMINOPHEN 325 MG/1
975 TABLET ORAL
Status: ON HOLD | COMMUNITY
End: 2021-02-20 | Stop reason: SDUPTHER

## 2021-02-11 RX ORDER — SODIUM CHLORIDE 9 MG/ML
250 INJECTION, SOLUTION INTRAVENOUS AS NEEDED
Status: DISCONTINUED | OUTPATIENT
Start: 2021-02-11 | End: 2021-02-13

## 2021-02-11 RX ORDER — ACETAMINOPHEN 650 MG/1
650 SUPPOSITORY RECTAL
Status: DISCONTINUED | OUTPATIENT
Start: 2021-02-11 | End: 2021-02-20 | Stop reason: HOSPADM

## 2021-02-11 RX ORDER — CYCLOSPORINE 0.5 MG/ML
1 EMULSION OPHTHALMIC EVERY 12 HOURS
COMMUNITY

## 2021-02-11 RX ORDER — ALLOPURINOL 100 MG/1
300 TABLET ORAL DAILY
Status: DISCONTINUED | OUTPATIENT
Start: 2021-02-11 | End: 2021-02-20 | Stop reason: HOSPADM

## 2021-02-11 RX ORDER — DILTIAZEM HYDROCHLORIDE 120 MG/1
240 CAPSULE, COATED, EXTENDED RELEASE ORAL DAILY
Status: DISCONTINUED | OUTPATIENT
Start: 2021-02-11 | End: 2021-02-20 | Stop reason: HOSPADM

## 2021-02-11 RX ORDER — DICYCLOMINE HYDROCHLORIDE 10 MG/1
10 CAPSULE ORAL 2 TIMES DAILY
Status: DISCONTINUED | OUTPATIENT
Start: 2021-02-11 | End: 2021-02-20 | Stop reason: HOSPADM

## 2021-02-11 RX ORDER — PANTOPRAZOLE SODIUM 40 MG/1
40 TABLET, DELAYED RELEASE ORAL
Status: DISCONTINUED | OUTPATIENT
Start: 2021-02-11 | End: 2021-02-20 | Stop reason: HOSPADM

## 2021-02-11 RX ADMIN — PANTOPRAZOLE SODIUM 40 MG: 40 TABLET, DELAYED RELEASE ORAL at 16:03

## 2021-02-11 RX ADMIN — MULTIPLE VITAMINS W/ MINERALS TAB 1 TABLET: TAB at 09:48

## 2021-02-11 RX ADMIN — VANCOMYCIN HYDROCHLORIDE 1000 MG: 1 INJECTION, POWDER, LYOPHILIZED, FOR SOLUTION INTRAVENOUS at 17:01

## 2021-02-11 RX ADMIN — DICYCLOMINE HYDROCHLORIDE 10 MG: 10 CAPSULE ORAL at 17:02

## 2021-02-11 RX ADMIN — PIPERACILLIN AND TAZOBACTAM 3.38 G: 3; .375 INJECTION, POWDER, LYOPHILIZED, FOR SOLUTION INTRAVENOUS at 07:51

## 2021-02-11 RX ADMIN — ACETAMINOPHEN 650 MG: 325 TABLET ORAL at 22:52

## 2021-02-11 RX ADMIN — PANTOPRAZOLE SODIUM 40 MG: 40 TABLET, DELAYED RELEASE ORAL at 09:48

## 2021-02-11 RX ADMIN — PIPERACILLIN AND TAZOBACTAM 3.38 G: 3; .375 INJECTION, POWDER, LYOPHILIZED, FOR SOLUTION INTRAVENOUS at 22:53

## 2021-02-11 RX ADMIN — ACETAMINOPHEN 650 MG: 325 TABLET ORAL at 15:19

## 2021-02-11 RX ADMIN — Medication 1.5 MG: at 21:28

## 2021-02-11 RX ADMIN — DICYCLOMINE HYDROCHLORIDE 10 MG: 10 CAPSULE ORAL at 09:48

## 2021-02-11 RX ADMIN — DULOXETINE HYDROCHLORIDE 30 MG: 30 CAPSULE, DELAYED RELEASE ORAL at 09:48

## 2021-02-11 RX ADMIN — DILTIAZEM HYDROCHLORIDE 240 MG: 240 CAPSULE, COATED, EXTENDED RELEASE ORAL at 09:49

## 2021-02-11 RX ADMIN — PIPERACILLIN AND TAZOBACTAM 3.38 G: 3; .375 INJECTION, POWDER, LYOPHILIZED, FOR SOLUTION INTRAVENOUS at 16:13

## 2021-02-11 RX ADMIN — FOLIC ACID 1 MG: 1 TABLET ORAL at 09:49

## 2021-02-11 RX ADMIN — ALLOPURINOL 300 MG: 300 TABLET ORAL at 09:48

## 2021-02-11 RX ADMIN — Medication 10 ML: at 21:30

## 2021-02-11 RX ADMIN — Medication 10 ML: at 15:21

## 2021-02-11 RX ADMIN — FUROSEMIDE 40 MG: 10 INJECTION, SOLUTION INTRAVENOUS at 09:48

## 2021-02-11 RX ADMIN — ONDANSETRON 4 MG: 2 INJECTION INTRAMUSCULAR; INTRAVENOUS at 17:02

## 2021-02-11 NOTE — PROGRESS NOTES
ANDREW Plan    *Disposition: Home with spouse  *OP F/U: PCP  *Transport at d/c: Spouse to transport  *2nd IMM Letter    Reason for Admission:   Lower abdominal pain, nausea, dizziness and fever               RUR Score:  29%    PCP: First and Last name:  Dr. Jesse Jesus   Name of Practice: [facility]   Are you a current patient: Yes/No: Yes   Approximate date of last visit:  Last week   Can you do a virtual visit with your PCP:  Yes             Resources/supports as identified by patient/family:   Spouse is very supportive and involved                Top Challenges facing patient (as identified by patient/family and CM):                       Finances/Medication cost? Pt and pt's spouse voice no concerns regarding finances or medication cost                Transportation?  Spouse transport pt to her medical appointments but if pt needs to drive herself she can              Support system or lack thereof?  Spouse is very supportive                     Living arrangements?  Lives with spouse           Self-care/ADLs/Cognition?  Pt is alert, verbal and oriented. Pt is [place]. She is independent in her ADLs and IADLs          Current Advanced Directive/Advance Care Plan:                          Advance Care Planning     General Advance Care Planning (ACP) Conversation      Date of Conversation: 02/11/2021  Conducted with: Mague Melendez and Mckay Melendez    Healthcare Decision Maker:     Primary Decision Maker: Mckay Melendez - Spouse - [phone]    Secondary Decision Maker: Taj Melendez \"Hima\" - Son - [phone]  Click here to complete HealthCare Decision Makers including selection of the Healthcare Decision Maker Relationship (ie \"Primary\")  Today we documented Decision Maker(s) consistent with ACP documents on file.    Content/Action Overview:   Has ACP document(s) on file - reflects the patient's care preferences  Reviewed DNR/DNI and patient elects Full Code (Attempt  Resuscitation)         Length of Voluntary ACP Conversation in minutes:  <16 minutes (Non-Billable)    [facility] for utilizing home health:   TBD but pt has a hx of using home health services with [facility]                 Transition of Care Plan:                  Pt is an 82 y.o. female who was admitted to [facility] with a dx of lower abdominal pain, nausea, dizziness and fever. PMHx includes anemia, arthritis, breast cancer, bunion of right foot, COPD, and etc. CM confirmed demographics with pt and pt's . Pt lives with her spouse in an one story home with three steps to enter. She has a rolling walker, cane, wheelchair, shower chair, BSC, and [facility]. She has a hx of using home health services with [facility], outpatient rehab at [address] [place] [address] and inpatient rehab at [address] [place] [address]. No hx of being admitted to a SNF. Pt is independent in her ADLs and IADLs. Pt's spouse drives her to her medical appointments but pt is able to drive herself if needed. At the time of d/c pt's spouse will transport. CM will continue to follow and assist with d/c planning. Care Management Interventions  PCP Verified by CM: Yes(Dr. Smiley Alex. César Damon)  Mode of Transport at Discharge: Other (see comment)(Spouse to transport at the time of d/c)  Transition of Care Consult (CM Consult): Discharge Planning, Home Health(Home with spouse and possible needing home health)  Discharge Durable Medical Equipment: No(Rolling walker, cane, wheelchair, shower chair, BSC, O2)  Physical Therapy Consult: No  Occupational Therapy Consult: No  Speech Therapy Consult: No  Current Support Network: Lives with Spouse(Spouse is very supportive and involved)  Confirm Follow Up Transport: Family(Spouse transport pt to her medical appointments but pt can drive herself to her medical appointment if needed)  Discharge Location  Discharge Placement: Unable to determine at this time    Gaynell Najjar, [place].   Care Manager ED [facility]  [phone]

## 2021-02-11 NOTE — PROGRESS NOTES
Pharmacy Automatic Renal Dosing Protocol - Antimicrobials    Indication for Antimicrobials:  Sepsis, Febrile Neutropenia    Current Regimen of Each Antimicrobial:   Vancomycin pharmacy to dose (start: 2/10, day 2)  Zosyn 3.375g q 8hr (start: 2/10, day 2)    Previous Antimicrobial Therapy:      Goal Level: VANCOMYCIN TROUGH GOAL RANGE    Vancomycin Trough: 15 - 20 mcg/mL  (AUC: 400 - 600 mg/hr/Liter/day)     Date Dose & Interval Measured (mcg/mL) Extrapolated (mcg/mL)                       Date & time of next level:  at 1700, might want to push to  as  (1st maintenance dose hasn't been given)    Significant Cultures:   2/10:blood - NGTD -prelim  2/10: rapid covid - neg    Radiology / Imaging results: (X-ray, CT scan or MRI):   2/10: CTA chest: no acute process    Paralysis, amputations, malnutrition: none    Labs:  Recent Labs     21  0439 02/10/21  1541   CREA  --  0.69   BUN  --  17   WBC 0.2* 0.2*     Temp (24hrs), Av.5 °F (37.5 °C), Min:98.1 °F (36.7 °C), Max:103.2 °F (39.6 °C)      Is the Patient on Dialysis? No    Creatinine Clearance (mL/min):   CrCl (Actual Body Weight): 85.0  CrCl (Adjusted Body Weight): 68.2  CrCl (Ideal Body Weight): 57.0    Impression/Plan:   Zosyn dosing adjusted to 3.375 gram iv q8h. Vancomycin dosing started at 1500 mg now then 1000 mg iv q12h to achieve an estimated peak/trough of 32.5 and 18.11   Antimicrobial stop date      Pharmacy will follow daily and adjust medications as appropriate for renal function and/or serum levels. Thank you,  Prosper Cornejo    Recommended duration of therapy  [email or web address]/[place]/[facility]/Pharmacy/Clinical%20Companion/[email or web address]. docx    Renal Dosing  [email or web address]/[place]/[facility]/Pharmacy/Clinical%20Companion/Renal%[email or web address]. pdf

## 2021-02-11 NOTE — CONSULTS
GI CONSULTATION NOTE  Huang Briseno, NP  [phone] NP in-hospital cell phone M-F until 4:30  After 5pm or on weekends, please call  for physician on call    NAME: Neeta Foy   :  1935   MRN:  [id number]   Attending:  Dr. Kelsey Rios  Primary GI:  Dr. Angele Aschoff; Dr. Colton Carrillo covering   Date/Time:  2021 12:56 PM  Assessment:   Acute diverticulitis   · Patient denies any abdominal complaints; no pain or changes in stools, denies hematochezia/melena   · CT abd/pel shows findings consistent with acute diverticulitis involving the sigmoid colon. No evidence of abscess or perforation. Possible second focus of acute diverticulitis in the distal descending colon. · Zosyn started by primary team   · Hgb 7.7    GI history:  -EGD (20) by Dr. Angele Aschoff for dysphagia/odynophagia/GERD showed minimal irregularity at the GE junction with no further ulceration noted (biopsied), followed by dilatation with 54FR Savary dilator; normal stomach; normal duodenum.     -Colonoscopy (20) by Dr. Angele Aschoff for diverticulitis showed normal terminal ileum; sigmoid diverticulosis; small grade 1 internal hemorrhoids; no masses, polyps, or inflammation noted. -EGD (20) by Dr. Angele Aschoff for dysphagia/odynophagia/iron deficiency anemia/melena showed a single 4 mm shallow round ulceration at the GE junction; normal stomach; normal duodenum.      Neutropenic fever  Sepsis  CML  Breast cancer  · Treatment per primary team     Plan:   · No direct plans for repeat colonoscopy at this time. · Continue IV Zoysn  · Monitor for s/s of bleeding. Goal for hgb >7.0; transfuse as clinically indicated   · Follow H&H  · Continue pantoprazole 40 mg PO   · Symptomatic care per primary team  Plan discussed with Dr. Colton Carrillo    Subjective:     HISTORY OF PRESENT ILLNESS:     Sam Hyde is a 82 y.o.    female with past medical history of CML undergoing chemo, myelodysplastic syndrome, anemia, breast cancer, recurrent diverticulitis, GERD, fibromyalgia, osteoporosis, oxygen dependent at night, and peripheral neuropathy who presents with lower abdominal pain, nausea, dizziness and fever. Patient with history of CML/mild dysplastic syndrome, received 2 units of platelets yesterday and 1 unit of PRBC without any issues. Patient started having weakness and dizziness for 2 days for which she decided to come to the emergency room for further management. Patient denies productive cough, UTI symptoms, skin rash, diarrhea, mouth ulcers and dysphagia. She also denies melena, bright red bleeding per rectum, and hematuria. At the emergency room noted to have fever with T-max of 103.2.     Past Medical History:   Diagnosis Date    Anemia     Arthritis     Breast cancer ([facility] [address]     left    Bunion of right foot 8/20/2015    Chronic obstructive pulmonary disease (HCC)     mild    Chronic pain     back--uses tens unit    Diverticulitis 6/29/2018    Recurrent    Diverticulitis large intestine     Dry eye syndrome 6/29/2018    Fibromyalgia 6/29/2018    GERD (gastroesophageal reflux disease)     History of left breast cancer 2013    lumpectomy radiation    Hypercholesterolemia 6/29/2018    Ill-defined condition     blood transfusion hx    Leukemia ([address]     MDS (myelodysplastic syndrome) ([facility] [address]     Osteoporosis 6/29/2018    Oxygen dependent     at night    Peripheral neuropathy 6/29/2018    Prediabetes 6/29/2018    PUD (peptic ulcer disease)     Radiation therapy complication 5251    Lt breast-No Chemo    Rosacea 6/29/2018    Trouble in sleeping       Past Surgical History:   Procedure Laterality Date    COLONOSCOPY N/A 2/28/2020    COLON[facility] performed by [facility]il [facility] at [facility]  2/28/2020         HX APPENDECTOMY      HX BREAST LUMPECTOMY  11/21/2013    LEFT BREAST LUMPECTOMY W/LEFT BREAST SENTINEL NODE BIOPSY,AND NEEDLE LOCALIZATION performed by Maribel Blankenship [facility] at [facility] MAIN OR    HX CATARACT REMOVAL      bilateral    HX HYSTERECTOMY      ovarian cyst    HX MOHS PROCEDURES      right    HX ORTHOPAEDIC      back surgery x2    HX OTHER SURGICAL      colonoscopy    HX TONSILLECTOMY      HX VASCULAR ACCESS  2020    echo cath right shoulder    IR INSERT TUNL CVC W PORT OVER 5 YEARS  12/3/2019    LA TOTAL KNEE ARTHROPLASTY      left    LA TOTAL KNEE ARTHROPLASTY      right    UPPER GI ENDOSCOPY,BIOPSY  2020         UPPER GI ENDOSCOPY,BIOPSY  2020         UPPER GI ENDOSCOPY,DILATN W GUIDE  2020         US GUIDED CORE BREAST BIOPSY Left 2013    Breast Ca     Social History     Tobacco Use    Smoking status: Former Smoker     Packs/day: 0.50     Years: 50.00     Pack years: 25.00     Quit date: 2012     Years since quittin.0    Smokeless tobacco: Never Used   Substance Use Topics    Alcohol use: Yes     Alcohol/week: 2.0 standard drinks     Types: 2 Glasses of wine per week      Family History   Problem Relation Age of Onset    Cancer Mother         bladder    Cancer Father     Breast Cancer Paternal Grandmother       Allergies   Allergen Reactions    Latex Rash    Hydrocodone Nausea Only and Vertigo    Gabapentin Diarrhea, Nausea Only and Other (comments)     weakness    Hydrocodone-Acetaminophen Nausea Only    Hydroxyurea Unknown (comments)     patient states she feels this medication is what caused the gi bleed    Lidocaine Rash     Tegarderm dressing caused skin rash after removal, please use foam dressing with this patient. Tegarderm dressing caused skin rash after removal, please use foam dressing with this patient.  Lyrica [Pregabalin] Nausea Only    Oxycodone Nausea and Vomiting and Vertigo      Prior to Admission medications    Medication Sig Start Date End Date Taking? Authorizing Provider   sodium chloride-aloe vera ([place]yr Saline) topical gel Apply  to affected area four (4) times daily as needed.     Provider, Historical colchicine 0.6 mg tablet TK 1 T PO D  TK WITH WATER. UNTIL THE FLAIR RESOLVED 10/7/20   Provider, Historical   dicyclomine (BENTYL) 10 mg capsule TK 1 C PO BID 8/31/20   Provider, Historical   OTHER Dicitabine- Chemo infusion    Provider, Historical   allopurinoL (ZYLOPRIM) 300 mg tablet TK 1 T PO D 8/21/20   Provider, Historical   melatonin 1 mg tablet Take  by mouth nightly. Provider, Historical   ginger, Zingiber officinalis, (ginger extract) 250 mg cap Take  by mouth as needed. Provider, Historical   polyethylene glycol 400 (BLINK TEARS OP) Apply  to eye. Provider, Historical   DULoxetine (CYMBALTA) 30 mg capsule Take 30 mg by mouth daily. take for neuropathy 8/3/20   Provider, Historical   OXYGEN-AIR DELIVERY SYSTEMS 1 L by IntraNASal route daily as needed for Other (shortness of breath ). Provider, Historical   acetaminophen (TYLENOL) 325 mg tablet Take 2 Tabs by mouth every four (4) hours as needed for Pain or Fever. 7/23/20   Mario Ghotra NP   dilTIAZem ER (CARDIZEM LA) 240 mg tablet Take 240 mg by mouth daily. Provider, Historical   vit A,C,E-zinc-copper (PreserVision AREDS) cap capsule Take 1 Cap by mouth daily. Provider, Historical   lidocaine 5 % topical cream Apply  to affected area two (2) times daily as needed (port access). Port access    Provider, Historical   ondansetron hcl (Zofran) 4 mg tablet Take 4 mg by mouth every eight (8) hours as needed for Nausea or Vomiting. Provider, Historical   folic acid (FOLVITE) 1 mg tablet Take 1 Tab by mouth daily. 7/8/20   Nakia Bal MD   bismuth subsalicylate (PEPTO-BISMOL PO) Take 30 mL by mouth daily as needed for Diarrhea. take one dose every hour as needed    Libby Berry MD   ergocalciferol (ERGOCALCIFEROL) 1,250 mcg (50,000 unit) capsule TAKE ONE CAPSULE BY MOUTH EVERY 7 DAYS 1/23/20   Linda Maya MD   pantoprazole (PROTONIX) 40 mg tablet Take 1 Tab by mouth Before breakfast and dinner.  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    Lumbar post-laminectomy syndrome M96.1    Spinal stenosis of lumbar region with neurogenic claudication M48.062    Syncope R55    Stenosis of both internal carotid arteries I65.23    Syncope and collapse R55    S/P lumpectomy, left breast Z98.890    Costochondritis M94.0    Diverticulitis K57.92    Dry eye syndrome H04.129    Fibromyalgia M79.7    GERD without esophagitis K21.9    Hypercholesterolemia E78.00    Osteoporosis M81.0    Peripheral neuropathy G62.9    Prediabetes R73.03    Rosacea L71.9    Acute respiratory failure with hypoxia ([facility]) J96.01    Acute bronchitis due to other specified organisms J20.8    Blood loss anemia D50.0    Thrombocytopenia ([facility]) D69.6    Iron deficiency anemia D50.9    COPD (chronic obstructive pulmonary disease) ([facility]) J44.9    SIRS (systemic inflammatory response syndrome) ([facility]) R65.10    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    Anemia associated with bone marrow infiltration ([facility]) D61.82    Atrial fibrillation with rapid ventricular response ([facility]) I48.91    MDS (myelodysplastic syndrome) ([facility]) D46.9    PUD (peptic ulcer disease) K27.9    Low back pain M54.5    Paroxysmal atrial fibrillation ([facility]) I48.0    Mass of epiglottis J38.7    Status post trachelectomy Z90.710    Myelodysplastic syndrome (HCC) D46.9    Acute upper GI bleed K92.2    Acute gout of left foot M10.9    Hyperuricemia E79.0    Urinary incontinence R32    Neutropenia, febrile ([facility]) D70.9, R50.81    Acute respiratory failure with hypoxemia ([facility]) J96.01       REVIEW OF SYSTEMS:    Constitutional: + fever, negative chills, negative weight loss  Eyes:   negative visual changes  ENT:   negative sore throat, tongue or lip swelling   Respiratory:  negative cough, negative dyspnea  Cards:  negative for chest pain, palpitations, lower extremity edema  GI:   See HPI  :  negative for frequency, dysuria  Integument:  negative for rash and pruritus  Heme:  negative for easy bruising and gum/nose bleeding  Musculoskel: negative for myalgias,  back pain + weakness  Neuro: negative for headaches, + dizziness, vertigo  Psych:  negative for feelings of anxiety, depression     Objective:   VITALS:    Visit Vitals  BP (!) 111/50 (BP 1 Location: Left upper arm)   Pulse 98   Temp 98.5 °F (36.9 °C)   Resp 19   Ht 5' 6\" (1.676 m)   Wt 88.5 kg (195 lb)   SpO2 100%   Breastfeeding No   BMI 31.47 kg/m²       PHYSICAL EXAM:   General:           Pleasant elderly  female. NAD    Head:               Normocephalic, without obvious abnormality, atraumatic. Eyes:               Conjunctivae clear and pale, anicteric sclerae. Pupils are equal  Nose:               Nares normal. No drainage or sinus tenderness. Throat:             Lips, mucosa, and tongue normal.  No Thrush  Neck:               Supple, symmetrical,  no adenopathy, thyroid: non tender  Back:               Symmetric,  No CVA tenderness. Lungs:             CTA bilaterally. No wheezing/rhonchi/rales. Chest wall:      No tenderness or deformity. No Accessory muscle use. Heart:              Regular rate and rhythm,  no murmur, rub or gallop. Abdomen:        Soft, non-tender. Not distended. Bowel sounds normal. No masses  Extremities:     Atraumatic, No cyanosis. No edema. No clubbing  Skin:                Texture, turgor normal. No rashes/lesions/jaundice  Psych:             Good insight. Not depressed. Not anxious or agitated. Neurologic:      EOMs intact. No facial asymmetry. No aphasia or slurred speech. Normal                         strength, A/O X 3.      LAB DATA REVIEWED:    Recent Results (from the past 24 hour(s)) EKG, 12 LEAD, INITIAL    Collection Time: 02/10/21  3:02 PM   Result Value Ref Range    Ventricular Rate 93 BPM    Atrial Rate 93 BPM    P-R Interval 210 ms    QRS Duration 86 ms    Q-T Interval 370 ms    QTC Calculation (Bezet) 460 ms    Calculated P Axis 29 degrees    Calculated R Axis -20 degrees    Calculated T Axis 34 degrees    Diagnosis       Sinus rhythm with 1st degree AV block  Confirmed by Chelsi Sutherland ([id number]) on 2/11/2021 9:06:25 AM     CBC WITH AUTOMATED DIFF    Collection Time: 02/10/21  3:41 PM   Result Value Ref Range    WBC 0.2 (LL) 3.6 - 11.0 K/uL    RBC 2.32 (L) 3.80 - 5.20 M/uL    HGB 6.8 (L) 11.5 - 16.0 g/dL    HCT 20.2 (L) 35.0 - 47.0 %    MCV 87.1 80.0 - 99.0 FL    MCH 29.3 26.0 - 34.0 PG    MCHC 33.7 30.0 - 36.5 g/dL    RDW 12.9 11.5 - 14.5 %    PLATELET 10 (LL) 098 - 400 K/uL    MPV 10.7 8.9 - 12.9 FL    NRBC 0.0 0  WBC    ABSOLUTE NRBC 0.00 0.00 - 0.01 K/uL    NEUTROPHILS 1 (L) 32 - 75 %    LYMPHOCYTES 89 (H) 12 - 49 %    MONOCYTES 10 5 - 13 %    EOSINOPHILS 0 0 - 7 %    BASOPHILS 0 0 - 1 %    IMMATURE GRANULOCYTES 0 0.0 - 0.5 %    ABS. NEUTROPHILS 0.0 (L) 1.8 - 8.0 K/UL    ABS. LYMPHOCYTES 0.2 (L) 0.8 - 3.5 K/UL    ABS. MONOCYTES 0.0 0.0 - 1.0 K/UL    ABS. EOSINOPHILS 0.0 0.0 - 0.4 K/UL    ABS. BASOPHILS 0.0 0.0 - 0.1 K/UL    ABS. IMM. GRANS. 0.0 0.00 - 0.04 K/UL    DF MANUAL      PLATELET COMMENTS DECREASED PLATELETS      RBC COMMENTS HYPOCHROMIA  PRESENT        WBC COMMENTS Differential performed on buffy coat smear.      METABOLIC PANEL, COMPREHENSIVE    Collection Time: 02/10/21  3:41 PM   Result Value Ref Range    Sodium 136 136 - 145 mmol/L    Potassium 3.7 3.5 - 5.1 mmol/L    Chloride 104 97 - 108 mmol/L    CO2 24 21 - 32 mmol/L    Anion gap 8 5 - 15 mmol/L    Glucose 109 (H) 65 - 100 mg/dL    BUN 17 6 - 20 MG/DL    Creatinine 0.69 0.55 - 1.02 MG/DL    BUN/Creatinine ratio 25 (H) 12 - 20      GFR est AA >60 >60 ml/min/1.73m2    GFR est non-AA >60 >60 ml/min/1.73m2 Calcium 7.9 (L) 8.5 - 10.1 MG/DL    Bilirubin, total 1.1 (H) 0.2 - 1.0 MG/DL    ALT (SGPT) 14 12 - 78 U/L    AST (SGOT) 5 (L) 15 - 37 U/L    Alk.  phosphatase 91 45 - 117 U/L    Protein, total 7.3 6.4 - 8.2 g/dL    Albumin 3.1 (L) 3.5 - 5.0 g/dL    Globulin 4.2 (H) 2.0 - 4.0 g/dL    A-G Ratio 0.7 (L) 1.1 - 2.2     CULTURE, BLOOD, PAIRED    Collection Time: 02/10/21  3:41 PM    Specimen: Blood   Result Value Ref Range    Special Requests: NO SPECIAL REQUESTS      Culture result: (A)       GRAM NEGATIVE RODS GROWING IN 1 OF 4 BOTTLES DRAWN (NO SITE INDICATED)    Culture result: REMAINING BOTTLE(S) HAS/HAVE NO GROWTH SO FAR     LIPASE    Collection Time: 02/10/21  3:41 PM   Result Value Ref Range    Lipase 52 (L) 73 - 393 U/L   POC LACTIC ACID    Collection Time: 02/10/21  3:51 PM   Result Value Ref Range    Lactic Acid (POC) 0.60 0.40 - 2.00 mmol/L   SARS-COV-2    Collection Time: 02/10/21  4:14 PM   Result Value Ref Range    SARS-CoV-2 Please find results under separate order     COVID-19 RAPID TEST    Collection Time: 02/10/21  4:14 PM   Result Value Ref Range    Specimen source Please find results under separate order      COVID-19 rapid test Not detected NOTD     POC EG7    Collection Time: 02/10/21  5:18 PM   Result Value Ref Range    Calcium, ionized (POC) 1.06 (L) 1.12 - 1.32 mmol/L    FIO2 (POC) 21 %    pH (POC) 7.43 7.35 - 7.45      pCO2 (POC) 34.9 (L) 35.0 - 45.0 MMHG    pO2 (POC) 38 (LL) 80 - 100 MMHG    HCO3 (POC) 23.1 22 - 26 MMOL/L    Base deficit (POC) 1 mmol/L    sO2 (POC) 74 (L) 92 - 97 %    Site OTHER      Device: ROOM AIR      Allens test (POC) N/A      Specimen type (POC) VENOUS BLOOD     URINALYSIS W/ REFLEX CULTURE    Collection Time: 02/10/21  5:23 PM    Specimen: Urine   Result Value Ref Range    Color YELLOW/STRAW      Appearance CLEAR CLEAR      Specific gravity 1.020 1.003 - 1.030      pH (UA) 6.5 5.0 - 8.0      Protein 30 (A) NEG mg/dL    Glucose Negative NEG mg/dL    Ketone Negative NEG mg/dL    Bilirubin Negative NEG      Blood Negative NEG      Urobilinogen 1.0 0.2 - 1.0 EU/dL    Nitrites Negative NEG      Leukocyte Esterase Negative NEG      WBC 0-4 0 - 4 /hpf    RBC 0-5 0 - 5 /hpf    Epithelial cells FEW FEW /lpf    Bacteria Negative NEG /hpf    UA:UC IF INDICATED CULTURE NOT INDICATED BY UA RESULT CNI     RBC, ALLOCATE    Collection Time: 02/10/21  5:30 PM   Result Value Ref Range    HISTORY CHECKED? Historical check performed    TYPE & SCREEN    Collection Time: 02/10/21  6:32 PM   Result Value Ref Range    Crossmatch Expiration 02/11/2021,2359     ABO/Rh(D) O POSITIVE     Antibody screen NEG     Unit number [id number]     Blood component type RC LR     Unit division 00     Status of unit ISSUED     Crossmatch result Compatible    PROTHROMBIN TIME + INR    Collection Time: 02/10/21  6:32 PM   Result Value Ref Range    INR 1.2 (H) 0.9 - 1.1      Prothrombin time 12.3 (H) 9.0 - 11.1 sec   PTT    Collection Time: 02/10/21  6:32 PM   Result Value Ref Range    aPTT 27.5 22.1 - 31.0 sec    aPTT, therapeutic range     58.0 - 77.0 SECS   POC EG7    Collection Time: 02/11/21 12:57 AM   Result Value Ref Range    Calcium, ionized (POC) 1.13 1.12 - 1.32 mmol/L    FIO2 (POC) 50 %    pH (POC) 7.47 (H) 7.35 - 7.45      pCO2 (POC) 32.6 (L) 35.0 - 45.0 MMHG    pO2 (POC) 157 (H) 80 - 100 MMHG    HCO3 (POC) 23.5 22 - 26 MMOL/L    Base excess (POC) 0 mmol/L    sO2 (POC) 100 (H) 92 - 97 %    Site RIGHT BRACHIAL      Device: BIPAP      PEEP/CPAP (POC) 6 cmH2O    PIP (POC) 12      Allens test (POC) YES      Specimen type (POC) ARTERIAL      Total resp.  rate 203     NT-PRO BNP    Collection Time: 02/11/21  1:38 AM   Result Value Ref Range    NT pro-BNP 3,546 (H) <450 PG/ML   TRANSFUSION REACTION    Collection Time: 02/11/21  1:38 AM   Result Value Ref Range    Unit Number [id number]     Clerical Errors None detected     Pre-txfusion hemolysis: NONE     Post-txfusion hemolysis: NONE     Blood bag hemolysis: NONE     Direct Gurdeep,pre-txfusion NEG     Direct Gurdeep,post-txfusion Not required     PATHOLOGIST INTERP:       NON HEMOLYTIC TRANSFUSION REACTION  CALL PATHOLOGIST FOR APPROVAL IF PRODUCTS NEEDED AS PER DR. KIRTI MD 02/11/2021 AT 0350 JSS      Component Type RED CELL GROUP     Blood type,post-txn O  POS      TYPE & SCREEN    Collection Time: 02/11/21  1:38 AM   Result Value Ref Range    Crossmatch Expiration 02/14/2021,2359     ABO/Rh(D) Yony Almendarez POSITIVE     Antibody screen NEG    CBC WITH AUTOMATED DIFF    Collection Time: 02/11/21  4:39 AM   Result Value Ref Range    WBC 0.2 (LL) 3.6 - 11.0 K/uL    RBC 2.65 (L) 3.80 - 5.20 M/uL    HGB 7.7 (L) 11.5 - 16.0 g/dL    HCT 22.9 (L) 35.0 - 47.0 %    MCV 86.4 80.0 - 99.0 FL    MCH 29.1 26.0 - 34.0 PG    MCHC 33.6 30.0 - 36.5 g/dL    RDW 13.1 11.5 - 14.5 %    PLATELET 7 (LL) 721 - 400 K/uL    MPV 10.3 8.9 - 12.9 FL    NRBC 0.0 0  WBC    ABSOLUTE NRBC 0.00 0.00 - 0.01 K/uL    NEUTROPHILS 0 (L) 32 - 75 %    LYMPHOCYTES 0 (L) 12 - 49 %    MONOCYTES 0 (L) 5 - 13 %    EOSINOPHILS 0 0 - 7 %    BASOPHILS 0 0 - 1 %    IMMATURE GRANULOCYTES 0 0.0 - 0.5 %    TOTAL CELLS COUNTED 0      ABS. NEUTROPHILS 0.0 (L) 1.8 - 8.0 K/UL    ABS. LYMPHOCYTES 0.0 (L) 0.8 - 3.5 K/UL    ABS. MONOCYTES 0.0 0.0 - 1.0 K/UL    ABS. EOSINOPHILS 0.0 0.0 - 0.4 K/UL    ABS. BASOPHILS 0.0 0.0 - 0.1 K/UL    ABS. IMM. GRANS. 0.0 0.00 - 0.04 K/UL    DF SMEAR SCANNED      RBC COMMENTS NORMOCYTIC, NORMOCHROMIC      WBC COMMENTS        WBC too low for accurate differential. Scan of smear shows   SAMPLES BEING HELD    Collection Time: 02/11/21  4:39 AM   Result Value Ref Range    SAMPLES BEING HELD  PST     COMMENT        Add-on orders for these samples will be processed based on acceptable specimen integrity and analyte stability, which may vary by analyte.        IMAGING RESULTS:  I have personally reviewed the imaging reports      Total time spent with patient: 50 minutes ________________________________________________________________________  Care Plan discussed with:  Patient x   Family     RN x              Consultant:       CT  2/11/2021:  ________________________________________________________________________    ___________________________________________________  Consulting Provider: Geoffrey Posada NP      2/11/2021  12:56 PM

## 2021-02-11 NOTE — PROGRESS NOTES
1210H TRANSFER - IN REPORT:    Verbal report received from ELSI (name) on Sheyla Haro  being received from ED (unit) for routine progression of care      Report consisted of patients Situation, Background, Assessment and   Recommendations(SBAR). Information from the following report(s) SBAR, Kardex, ED Summary, Procedure Summary, Intake/Output, MAR, Recent Results and Med Rec Status was reviewed with the receiving nurse. Opportunity for questions and clarification was provided. Assessment completed upon patients arrival to unit and care assumed. Primary Nurse John Paul Kenyon and Mcarthur Lefort, RN performed a dual skin assessment on this patient No impairment noted  Nikolas score is 18    1245H- Seen and examined by GI team no new orders was made. 1408H- ( + ) Consent. One unit of Plama Concentration properly typed and crossmatched at rate of 50ml/hour. No immediate reaction noted. 1605H- One unit platelet concentration consumed. Hemodynamically stable. No immediate reaction noted. 1850H- Initial VS taken and recorded. Hooked 2nd unit of PC at 50 ml/hr. No immediate reaction noted. End of Shift Note    Bedside shift change report given to AL (oncoming nurse) by John Paul Kenyon (offgoing nurse). Report included the following information SBAR, Kardex, ED Summary, Procedure Summary, Intake/Output, MAR, Recent Results and Med Rec Status    Shift worked:  0700- 1900     Shift summary and any significant changes:     NONE AT THIS TIME     Concerns for physician to address:  PLATELET COUNT IS LOW     Zone phone for oncoming shift:  XXX       Activity:  Activity Level:  Up with Assistance  Number times ambulated in hallways past shift: 1  Number of times OOB to chair past shift: 1    Cardiac:   Cardiac Monitoring: Yes      Cardiac Rhythm: Normal sinus rhythm    Access:   Current line(s): port     Genitourinary:   Urinary status: voiding and external catheter    Respiratory:   O2 Device: Nasal cannula  Chronic home O2 use?: N/A  Incentive spirometer at bedside: N/A     GI:  Last Bowel Movement Date: 02/10/21  Current diet:  DIET CARDIAC Regular  Passing flatus: YES  Tolerating current diet: YES  % Diet Eaten: 80 %    Pain Management:   Patient states pain is manageable on current regimen: N/A    Skin:  Nikolas Score: 18  Interventions: increase time out of bed and PT/OT consult    Patient Safety:  Fall Score:  Total Score: 3  Interventions: pt to call before getting OOB  High Fall Risk: Yes    Length of Stay:  Expected LOS: 4d 19h  Actual LOS: 0      Oskar Odor

## 2021-02-11 NOTE — ED NOTES
TRANSFER - OUT REPORT:    Verbal report given to Angy Melendez (name) on Yrn Hernandez  being transferred to Room 2264 PCU (unit) for routine progression of care       Report consisted of patients Situation, Background, Assessment and   Recommendations(SBAR). Information from the following report(s) SBAR, ED Summary, [facility] and Recent Results was reviewed with the receiving nurse. Lines:   Venous Access Device Upper chest (subclavicular area, right (Active)        Opportunity for questions and clarification was provided.       Patient transported with:   Monitor  O2 @ 2 liters  Registered Nurse

## 2021-02-11 NOTE — H&P
Hospitalist Admission Note    NAME: Disha Acosta   :  1935   MRN:  [id number]     Date/Time:  2021 2:10 AM    Patient PCP: Monse Santana MD  ______________________________________________________________________  Given the patient's current clinical presentation, I have a high level of concern for decompensation if discharged from the emergency department. Complex decision making was performed, which includes reviewing the patient's available past medical records, laboratory results, and x-ray films. My assessment of this patient's clinical condition and my plan of care is as follows. Assessment / Plan:    Neutropenic fever  Diverticulitis  Sepsis  CML/mild dysplastic syndrome  Thrombocytopenia  Anemia  Breast cancer  Acute hypoxic respiratory failure secondary to fluid overload versus TRALI      Patient weaned off BiPAP and satting well on nasal cannula with resolution of respiratory distress. Wean of oxygen as possible. Continue Lasix. IS with PRN Nebs. Continuous pulse ox. Intake/output and daily weight. Patient has significantly improved on BiPAP and diuretics, this goes more with fluid overload than TRALI, if she gets worse then consider pulmonology consult  Continue Zosyn and vancomycin for neutropenic fever. Follow-up cultures  As per discussion with oncology hold off platelet transfusion for now and reeval in a.m. Monitor CBC. Hold off further blood transfusion and IV fluids  Patient with history of recurrent diverticulitis, she needs GI eval when she is stable for further management. Close monitoring for signs and symptoms of bleeding  Oncology consult  Resume antihypertensive medications  Resume Protonix for GERD      Code Status: Full   DVT Prophylaxis: SCDs  GI Prophylaxis: already on PPI BID  Baseline: Independent      Subjective:   CHIEF COMPLAINT:  lower abdominal pain, nausea, dizziness and fever.     HISTORY OF PRESENT ILLNESS:     Sulma Rodriguez is a 82 y.o.  female with past medical history of CML undergoing chemo, myelodysplastic syndrome, anemia, breast cancer, recurrent diverticulitis, GERD, fibromyalgia, osteoporosis, oxygen dependent at night, and peripheral neuropathy who presents with lower abdominal pain, nausea, dizziness and fever. Patient with history of CML/mild dysplastic syndrome, received 2 units of platelets yesterday and 1 unit of PRBC without any issues. Patient started having lower abdominal pain, nausea and dizziness for 2 days for which she decided to come to the emergency room for further management. Patient denies productive cough, UTI symptoms, skin rash, diarrhea, mouth ulcers and dysphagia. She also denies melena, bright red bleeding per rectum, and hematuria. At the emergency room noted to have fever with T-max of 103.2  Blood work shows WBC of 0.2, hemoglobin 6.8, hematocrit 20, platelet 10, neutrophil 1. Urinalysis not that remarkable. Covid test negative. Chemistry with bilirubin 1.1, albumin 3.1, lipase 52. BNP 3500s. CT chest reported   1. No evidence of pulmonary embolus. 2.  Borderline size mediastinal lymph nodes. Recommend correlation with priorimaging available  CT abdomen pelvis reported  1. Findings consistent with acute diverticulitis involving the sigmoid colon. No evidence of abscess or perforation. Possible second focus of acutediverticulitis in the distal descending colon. 2.  Questionable mild inflammatory changes around the distal esophagus at the GE junction. This may represent esophagitis. 3.  Changes suggestive of chronic pancreatitis. Questionable small gallstone within gallbladder without signs of acute cholecystitis    Patient received IV fluids and monitor PRBC. While at the emergency room patient went into acute respiratory failure with hypoxia and was satting at 70% with tachypnea and respiratory rate in 40s. Chest x-ray reported new pulmonary edema.   Patient put on BiPAP and given Lasix with good urine output. ICU consult called and recommended to continue the same level of care at the stepdown unit. We were asked to admit for work up and evaluation of the above problems.      Past Medical History:   Diagnosis Date    Anemia     Arthritis     Breast cancer ([facility] [address]     left    Bunion of right foot 8/20/2015    Chronic obstructive pulmonary disease (HCC)     mild    Chronic pain     back--uses tens unit    Diverticulitis 6/29/2018    Recurrent    Diverticulitis large intestine     Dry eye syndrome 6/29/2018    Fibromyalgia 6/29/2018    GERD (gastroesophageal reflux disease)     History of left breast cancer 2013    lumpectomy radiation    Hypercholesterolemia 6/29/2018    Ill-defined condition     blood transfusion hx    Leukemia (HCC)     MDS (myelodysplastic syndrome) ([facility] [address]     Osteoporosis 6/29/2018    Oxygen dependent     at night    Peripheral neuropathy 6/29/2018    Prediabetes 6/29/2018    PUD (peptic ulcer disease)     Radiation therapy complication 6373    Lt breast-No Chemo    Rosacea 6/29/2018    Trouble in sleeping         Past Surgical History:   Procedure Laterality Date    COLONOSCOPY N/A 2/28/2020    COLONOSCOPY performed by Jonathan Frazier MD at [address] [place] [address]  2/28/2020         HX APPENDECTOMY      HX BREAST LUMPECTOMY  11/21/2013    LEFT BREAST LUMPECTOMY W/LEFT BREAST SENTINEL NODE BIOPSY,AND NEEDLE LOCALIZATION performed by Swetha Parikh MD at [facility] MAIN OR    HX CATARACT REMOVAL      bilateral    HX HYSTERECTOMY      ovarian cyst    HX MOHS PROCEDURES      right    HX ORTHOPAEDIC      back surgery x2    HX OTHER SURGICAL      colonoscopy    HX TONSILLECTOMY      HX VASCULAR ACCESS  02/2020    echo cath right shoulder    IR INSERT TUNL CVC W PORT OVER 5 YEARS  12/3/2019    UT TOTAL KNEE ARTHROPLASTY      left    UT TOTAL KNEE ARTHROPLASTY      right    UPPER GI ENDOSCOPY,BIOPSY  1/24/2020         UPPER GI ENDOSCOPY,BIOPSY  2020         UPPER GI ENDOSCOPY,DILATN W GUIDE  2020         US GUIDED CORE BREAST BIOPSY Left 2013    Breast Ca       Social History     Tobacco Use    Smoking status: Former Smoker     Packs/day: 0.50     Years: 50.00     Pack years: 25.00     Quit date: 2012     Years since quittin.0    Smokeless tobacco: Never Used   Substance Use Topics    Alcohol use: Yes     Alcohol/week: 2.0 standard drinks     Types: 2 Glasses of wine per week        Family History   Problem Relation Age of Onset    Cancer Mother         bladder    Cancer Father     Breast Cancer Paternal Grandmother      Allergies   Allergen Reactions    Latex Rash    Hydrocodone Nausea Only and Vertigo    Gabapentin Diarrhea, Nausea Only and Other (comments)     weakness    Hydrocodone-Acetaminophen Nausea Only    Hydroxyurea Unknown (comments)     patient states she feels this medication is what caused the gi bleed    Lidocaine Rash     Tegarderm dressing caused skin rash after removal, please use foam dressing with this patient. Tegarderm dressing caused skin rash after removal, please use foam dressing with this patient.  Lyrica [Pregabalin] Nausea Only    Oxycodone Nausea and Vomiting and Vertigo        Prior to Admission medications    Medication Sig Start Date End Date Taking? Authorizing Provider   sodium chloride-aloe vera (Ayr Saline) topical gel Apply  to affected area four (4) times daily as needed. Provider, Historical   colchicine 0.6 mg tablet TK 1 T PO D  TK WITH WATER. UNTIL THE FLAIR RESOLVED 10/7/20   Provider, Historical   dicyclomine (BENTYL) 10 mg capsule TK 1 C PO BID 20   Provider, Historical   OTHER Dicitabine- Chemo infusion    Provider, Historical   allopurinoL (ZYLOPRIM) 300 mg tablet TK 1 T PO D 20   Provider, Historical   melatonin 1 mg tablet Take  by mouth nightly.     Provider, Historical   ginger, Zingiber officinalis, (ginger extract) 250 mg cap Take by mouth as needed. Provider, Historical   polyethylene glycol 400 (BLINK TEARS OP) Apply  to eye. Provider, Historical   DULoxetine (CYMBALTA) 30 mg capsule Take 30 mg by mouth daily. take for neuropathy 8/3/20   Provider, Historical   OXYGEN-AIR DELIVERY SYSTEMS 1 L by IntraNASal route daily as needed for Other (shortness of breath ). Provider, Historical   acetaminophen (TYLENOL) 325 mg tablet Take 2 Tabs by mouth every four (4) hours as needed for Pain or Fever. 7/23/20   Chandrika Ghotra NP   dilTIAZem ER (CARDIZEM LA) 240 mg tablet Take 240 mg by mouth daily. Provider, Historical   vit A,C,E-zinc-copper (PreserVision AREDS) cap capsule Take 1 Cap by mouth daily. Provider, Historical   lidocaine 5 % topical cream Apply  to affected area two (2) times daily as needed (port access). Port access    Provider, Historical   ondansetron hcl (Zofran) 4 mg tablet Take 4 mg by mouth every eight (8) hours as needed for Nausea or Vomiting. Provider, Historical   folic acid (FOLVITE) 1 mg tablet Take 1 Tab by mouth daily. 7/8/20   Simona Cuellar MD   bismuth subsalicylate (PEPTO-BISMOL PO) Take 30 mL by mouth daily as needed for Diarrhea. take one dose every hour as needed    Kristi, MD Libby   ergocalciferol (ERGOCALCIFEROL) 1,250 mcg (50,000 unit) capsule TAKE ONE CAPSULE BY MOUTH EVERY 7 DAYS 1/23/20   Dino Acevedo MD   pantoprazole (PROTONIX) 40 mg tablet Take 1 Tab by mouth Before breakfast and dinner. 11/5/19   Cayden Lance MD       REVIEW OF SYSTEMS:     Total of 12 systems reviewed, negative except for the above      Objective:   VITALS:    Visit Vitals  BP (!) 149/70   Pulse (!) 112   Temp 98.9 °F (37.2 °C)   Resp 25   Ht 5' 6\" (1.676 m)   Wt 88.5 kg (195 lb)   SpO2 100%   BMI 31.47 kg/m²       PHYSICAL EXAM:    General:    Alert, cooperative, no distress, appears stated age.      HEENT: Atraumatic, anicteric sclerae, pink conjunctivae     No oral ulcers, no oral thrush, mucosa moist, throat clear, dentition fair  Neck:  Supple, symmetrical,  thyroid: non tender  Lungs:   No Wheezing or Rhonchi. bibasal rales. Chest wall:  No tenderness  No Accessory muscle use. Heart:   Regular  rhythm,  No  murmur   No edema  Abdomen:   Soft, lower abd tender. Not distended. Bowel sounds normal  Extremities: No cyanosis. No clubbing,      Skin turgor normal, Capillary refill normal, Radial dial pulse 2+  Skin:     Not pale. Not Jaundiced  No rashes   Psych:  Good insight. Not depressed. Not anxious or agitated. Neurologic: EOMs intact. No facial asymmetry. No aphasia or slurred speech. Symmetrical strength, Sensation grossly intact. Alert and oriented X 4.     _______________________________________________________________________  Care Plan discussed with:    Comments   Patient x    Family      RN     Care Manager                    Consultant:  x    _______________________________________________________________________  Expected  Disposition:   Home with Family x   HH/PT/OT/RN    SNF/LTC    ESTEE    ________________________________________________________________________  TOTAL TIME:  48 Minutes    Comments     Reviewed previous records   >50% of visit spent in counseling and coordination of care x Discussion with patient and/or family and questions answered       ________________________________________________________________________  Signed: Tu Mullins MD    Procedures: see electronic medical records for all procedures/Xrays and details which were not copied into this note but were reviewed prior to creation of Plan.     LAB DATA REVIEWED:    Recent Results (from the past 24 hour(s))   EKG, 12 LEAD, INITIAL    Collection Time: 02/10/21  3:02 PM   Result Value Ref Range    Ventricular Rate 93 BPM    Atrial Rate 93 BPM    P-R Interval 210 ms    QRS Duration 86 ms    Q-T Interval 370 ms    QTC Calculation (Bezet) 460 ms    Calculated P Axis 29 degrees    Calculated R Axis -20 degrees Calculated T Axis 34 degrees    Diagnosis       Sinus rhythm with 1st degree AV block with fusion complexes  Moderate voltage criteria for LVH, may be normal variant  When compared with ECG of 22-JUL-2020 06:23,  fusion complexes are now present     CBC WITH AUTOMATED DIFF    Collection Time: 02/10/21  3:41 PM   Result Value Ref Range    WBC 0.2 (LL) 3.6 - 11.0 K/uL    RBC 2.32 (L) 3.80 - 5.20 M/uL    HGB 6.8 (L) 11.5 - 16.0 g/dL    HCT 20.2 (L) 35.0 - 47.0 %    MCV 87.1 80.0 - 99.0 FL    MCH 29.3 26.0 - 34.0 PG    MCHC 33.7 30.0 - 36.5 g/dL    RDW 12.9 11.5 - 14.5 %    PLATELET 10 (LL) 765 - 400 K/uL    MPV 10.7 8.9 - 12.9 FL    NRBC 0.0 0  WBC    ABSOLUTE NRBC 0.00 0.00 - 0.01 K/uL    NEUTROPHILS 1 (L) 32 - 75 %    LYMPHOCYTES 89 (H) 12 - 49 %    MONOCYTES 10 5 - 13 %    EOSINOPHILS 0 0 - 7 %    BASOPHILS 0 0 - 1 %    IMMATURE GRANULOCYTES 0 0.0 - 0.5 %    ABS. NEUTROPHILS 0.0 (L) 1.8 - 8.0 K/UL    ABS. LYMPHOCYTES 0.2 (L) 0.8 - 3.5 K/UL    ABS. MONOCYTES 0.0 0.0 - 1.0 K/UL    ABS. EOSINOPHILS 0.0 0.0 - 0.4 K/UL    ABS. BASOPHILS 0.0 0.0 - 0.1 K/UL    ABS. IMM. GRANS. 0.0 0.00 - 0.04 K/UL    DF MANUAL      PLATELET COMMENTS DECREASED PLATELETS      RBC COMMENTS HYPOCHROMIA  PRESENT        WBC COMMENTS Differential performed on buffy coat smear. METABOLIC PANEL, COMPREHENSIVE    Collection Time: 02/10/21  3:41 PM   Result Value Ref Range    Sodium 136 136 - 145 mmol/L    Potassium 3.7 3.5 - 5.1 mmol/L    Chloride 104 97 - 108 mmol/L    CO2 24 21 - 32 mmol/L    Anion gap 8 5 - 15 mmol/L    Glucose 109 (H) 65 - 100 mg/dL    BUN 17 6 - 20 MG/DL    Creatinine 0.69 0.55 - 1.02 MG/DL    BUN/Creatinine ratio 25 (H) 12 - 20      GFR est AA >60 >60 ml/min/1.73m2    GFR est non-AA >60 >60 ml/min/1.73m2    Calcium 7.9 (L) 8.5 - 10.1 MG/DL    Bilirubin, total 1.1 (H) 0.2 - 1.0 MG/DL    ALT (SGPT) 14 12 - 78 U/L    AST (SGOT) 5 (L) 15 - 37 U/L    Alk.  phosphatase 91 45 - 117 U/L    Protein, total 7.3 6.4 - 8.2 g/dL Albumin 3.1 (L) 3.5 - 5.0 g/dL    Globulin 4.2 (H) 2.0 - 4.0 g/dL    A-G Ratio 0.7 (L) 1.1 - 2.2     CULTURE, BLOOD, PAIRED    Collection Time: 02/10/21  3:41 PM    Specimen: Blood   Result Value Ref Range    Special Requests: NO SPECIAL REQUESTS      Culture result: NO GROWTH AFTER 4 HOURS     LIPASE    Collection Time: 02/10/21  3:41 PM   Result Value Ref Range    Lipase 52 (L) 73 - 393 U/L   POC LACTIC ACID    Collection Time: 02/10/21  3:51 PM   Result Value Ref Range    Lactic Acid (POC) 0.60 0.40 - 2.00 mmol/L   SARS-COV-2    Collection Time: 02/10/21  4:14 PM   Result Value Ref Range    SARS-CoV-2 Please find results under separate order     COVID-19 RAPID TEST    Collection Time: 02/10/21  4:14 PM   Result Value Ref Range    Specimen source Please find results under separate order      COVID-19 rapid test Not detected NOTD     POC EG7    Collection Time: 02/10/21  5:18 PM   Result Value Ref Range    Calcium, ionized (POC) 1.06 (L) 1.12 - 1.32 mmol/L    FIO2 (POC) 21 %    pH (POC) 7.43 7.35 - 7.45      pCO2 (POC) 34.9 (L) 35.0 - 45.0 MMHG    pO2 (POC) 38 (LL) 80 - 100 MMHG    HCO3 (POC) 23.1 22 - 26 MMOL/L    Base deficit (POC) 1 mmol/L    sO2 (POC) 74 (L) 92 - 97 %    Site OTHER      Device: ROOM AIR      Allens test (POC) N/A      Specimen type (POC) VENOUS BLOOD     URINALYSIS W/ REFLEX CULTURE    Collection Time: 02/10/21  5:23 PM    Specimen: Urine   Result Value Ref Range    Color YELLOW/STRAW      Appearance CLEAR CLEAR      Specific gravity 1.020 1.003 - 1.030      pH (UA) 6.5 5.0 - 8.0      Protein 30 (A) NEG mg/dL    Glucose Negative NEG mg/dL    Ketone Negative NEG mg/dL    Bilirubin Negative NEG      Blood Negative NEG      Urobilinogen 1.0 0.2 - 1.0 EU/dL    Nitrites Negative NEG      Leukocyte Esterase Negative NEG      WBC 0-4 0 - 4 /hpf    RBC 0-5 0 - 5 /hpf    Epithelial cells FEW FEW /lpf    Bacteria Negative NEG /hpf    UA:UC IF INDICATED CULTURE NOT INDICATED BY UA RESULT CNI     RBC, ALLOCATE    Collection Time: 02/10/21  5:30 PM   Result Value Ref Range    HISTORY CHECKED? Historical check performed    TYPE & SCREEN    Collection Time: 02/10/21  6:32 PM   Result Value Ref Range    Crossmatch Expiration 02/13/2021,2359     ABO/Rh(D) Lorrie Cogan POSITIVE     Antibody screen NEG     Unit number [id number]     Blood component type RC LR     Unit division 00     Status of unit ISSUED     Crossmatch result Compatible    PROTHROMBIN TIME + INR    Collection Time: 02/10/21  6:32 PM   Result Value Ref Range    INR 1.2 (H) 0.9 - 1.1      Prothrombin time 12.3 (H) 9.0 - 11.1 sec   PTT    Collection Time: 02/10/21  6:32 PM   Result Value Ref Range    aPTT 27.5 22.1 - 31.0 sec    aPTT, therapeutic range     58.0 - 77.0 SECS   POC EG7    Collection Time: 02/11/21 12:57 AM   Result Value Ref Range    Calcium, ionized (POC) 1.13 1.12 - 1.32 mmol/L    FIO2 (POC) 50 %    pH (POC) 7.47 (H) 7.35 - 7.45      pCO2 (POC) 32.6 (L) 35.0 - 45.0 MMHG    pO2 (POC) 157 (H) 80 - 100 MMHG    HCO3 (POC) 23.5 22 - 26 MMOL/L    Base excess (POC) 0 mmol/L    sO2 (POC) 100 (H) 92 - 97 %    Site RIGHT BRACHIAL      Device: BIPAP      PEEP/CPAP (POC) 6 cmH2O    PIP (POC) 12      Allens test (POC) YES      Specimen type (POC) ARTERIAL      Total resp.  rate 203

## 2021-02-11 NOTE — PROGRESS NOTES
Pharmacy Clarification of the Prior to Admission Medication Regimen Retrospective to the Admission Medication Reconciliation    The patient was interviewed regarding clarification of the prior to admission medication regimen. She was questioned regarding use of any other inhalers, topical products, over the counter medications, herbal medications, vitamin products or ophthalmic/nasal/otic medication use. Information Obtained From: query, patient    Recommendations/Findings: The following amendments were made to the patient's active medication list on file at [facility]:     1) Additions:   Restasis      2) Removals:       3) Changes:      4) Pertinent Pharmacy Findings: Restasis has not been started yet, when she picks up RX she will stop Blink Tears. Dicitabine Chemo infusion is due to start back on 2/15/21. Will be infused daily for 10 days then 2 weeks off and repeat. Updated patients [facility] outpatient [facility]  Identified High Alert Medication Information    Current IV Chemotherapy Regimen: Dicitabine  Oncologist: Geoffrey Koroma  Location receiving chemotherapy: [facility]     PTA medication list was corrected to the following:     Prior to Admission Medications   Prescriptions Last Dose Informant Taking? DULoxetine (CYMBALTA) 30 mg capsule 2/9/2021 at Unknown time Self Yes   Sig: Take 30 mg by mouth daily. take for neuropathy   OTHER 1/11/2021 at Unknown time Self Yes   Sig: Dicitabine- Chemo infusion   acetaminophen (TylenoL) 325 mg tablet 2/4/2021 at Unknown time Self Yes   Sig: Take 975 mg by mouth every four (4) hours as needed for Pain. allopurinoL (ZYLOPRIM) 300 mg tablet 2/9/2021 at Unknown time Self Yes   Sig: Take 300 mg by mouth daily. bismuth subsalicylate (PEPTO-BISMOL PO) 2/4/2021 at Unknown time Self Yes   Sig: Take 30 mL by mouth daily as needed for Diarrhea.  take one dose every hour as needed   colchicine 0.6 mg tablet 1/11/2021 at Unknown time Self Yes   Sig: Take 0.6 mg by mouth daily. With water   cycloSPORINE (Restasis) 0.05 % dpet   Yes   Sig: Administer 1 Drop to both eyes every twelve (12) hours. dicyclomine (BENTYL) 10 mg capsule 2/9/2021 at Unknown time Self Yes   Sig: Take 10 mg by mouth two (2) times a day. dilTIAZem ER (CARDIZEM LA) 240 mg tablet 2/9/2021 at Unknown time Self Yes   Sig: Take 240 mg by mouth daily. ergocalciferol (Vitamin D2) 1,250 mcg (50,000 unit) capsule 2/10/2021 at Unknown time Self Yes   Sig: Take 50,000 Units by mouth every seven (7) days. On Wednesday   folic acid (FOLVITE) 1 mg tablet 2/9/2021 at Unknown time Self Yes   Sig: Take 1 Tab by mouth daily. ginger, Zingiber officinalis, (ginger extract) 250 mg cap 2/8/2021 at Unknown time Self Yes   Sig: Take  by mouth as needed. lidocaine 5 % topical cream 2/10/2021 at Unknown time Self Yes   Sig: Apply  to affected area two (2) times daily as needed (port access). Port access   melatonin 1 mg tablet 2/9/2021 at Unknown time Self Yes   Sig: Take  by mouth nightly. ondansetron hcl (Zofran) 4 mg tablet 2/9/2021 at Unknown time Self Yes   Sig: Take 4 mg by mouth every eight (8) hours as needed for Nausea or Vomiting. pantoprazole (PROTONIX) 40 mg tablet 2/9/2021 at Unknown time Self Yes   Sig: Take 1 Tab by mouth Before breakfast and dinner. polyethylene glycol 400 (BLINK TEARS OP) 2/8/2021 at Unknown time Self Yes   Sig: Apply 1 Drop to eye daily. Both eyes as needed   sodium chloride-aloe vera (Ayr Saline) topical gel 2/4/2021 at Unknown time Self Yes   Sig: Apply  to affected area four (4) times daily as needed. vit A,C,E-zinc-copper (PreserVision AREDS) cap capsule 2/9/2021 at Unknown time Self Yes   Sig: Take 1 Cap by mouth daily.       Facility-Administered Medications: None        Thank you,  Nasima Garg, [facility]  Medication History Pharmacy Technician

## 2021-02-11 NOTE — ED NOTES
Received report from [facility], [address] [place] [address]. This RN was informed of patient chief complaint, current status, orders completed (to include IV access/medications/radiology testing), outstanding orders that still need to be completed, and the treatment plan. All questions and concerns regarding the patient were addressed.

## 2021-02-11 NOTE — PROGRESS NOTES
Pt admitted this morning with abd pain , nausea  And dizziness prior to presentation . Pt known hx of breast cancer / CML . Pt seen and examined appears in NAD , plts 7 this morning , will transfuse to keep above 10 , hold all AC , monitor for any signs of acute bleeding . Hem/Onc and GI to see . Afebrile now will cont with ABX .            Non Billable time 25 minutes

## 2021-02-11 NOTE — ED NOTES
Bipap discontinued by Dr. Yanick Bingahm and vo for nasal cannula Pt placed on nasal oxygen at 4 L/min.

## 2021-02-11 NOTE — CONSULTS
[facility]    ICU TEAM Consult Note    Name: Ez Valverde   : 1935   MRN: [id number]   Date: 2021      Subjective:   Progress Note: 2021      83 y/o F pt with PMH of CML, currently undergoing chemo treatments p/w dizziness, nausea, and abd pain. In the ED found to have neutropenic fever with a fever of 103 and pancytopenia (WBC 0.2, Hg 6.8, Plt 10). Started on vanc and zosyn for neutropenic fever and given 2 L IVF. The ED physician spoke with the pt's oncologist at [facility] who recommended transfusing 1 U PRBC.  1 hour after starting blood transfusion pt developed acute SOB and hypoxia to the high 70's. CXR showed pulmonary edema. 60 of IV lasix given and pt placed on Bipap with improvement in SPO2. Hospitalist service initially consulted for admission but after episode of decompensation ICU consulted for possible upgrade. Upon my assessment pt A&O x 3, breathing comfortably on Bipap with 50% FIO2, hemodynamically stable. ABG done = 7.47/32.6/157.       Active Problem List:     Problem List  Date Reviewed: 2021          Codes Class    Urinary incontinence ICD-10-CM: R32  ICD-9-CM: 788.30         Acute gout of left foot ICD-10-CM: M10.9  ICD-9-CM: 274.01         Hyperuricemia ICD-10-CM: E79.0  ICD-9-CM: 790.6         Paroxysmal atrial fibrillation (HCC) ICD-10-CM: I48.0  ICD-9-CM: 427.31         PUD (peptic ulcer disease) (Chronic) ICD-10-CM: K27.9  ICD-9-CM: 533.90         Atrial fibrillation with rapid ventricular response (HCC) ICD-10-CM: I48.91  ICD-9-CM: 427.31         Diverticulitis ICD-10-CM: K57.92  ICD-9-CM: 562.11     Overview Signed 2018  7:07 AM by Scott Cote MD     Recurrent             Dry eye syndrome ICD-10-CM: I48.745  ICD-9-CM: 375.15         Fibromyalgia ICD-10-CM: M79.7  ICD-9-CM: 729.1         GERD without esophagitis (Chronic) ICD-10-CM: K21.9  ICD-9-CM: 530.81         Hypercholesterolemia (Chronic) ICD-10-CM: E78.00  ICD-9-CM: 272.0 Osteoporosis ICD-10-CM: M81.0  ICD-9-CM: 733.00         Peripheral neuropathy (Chronic) ICD-10-CM: G62.9  ICD-9-CM: 356.9         Prediabetes (Chronic) ICD-10-CM: R73.03  ICD-9-CM: 790.29         Rosacea ICD-10-CM: L71.9  ICD-9-CM: 695.3         Acute upper GI bleed ICD-10-CM: K92.2  ICD-9-CM: 578.9         Status post trachelectomy ICD-10-CM: Z90.710  ICD-9-CM: V88.01         Myelodysplastic syndrome (HCC) ICD-10-CM: D46.9  ICD-9-CM: 238.75         Mass of epiglottis ICD-10-CM: J38.7  ICD-9-CM: 478.79         MDS (myelodysplastic syndrome) (HCC) (Chronic) ICD-10-CM: D46.9  ICD-9-CM: 238.75         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HCC) ICD-10-CM: C93.10  ICD-9-CM: 205.10         Anemia associated with bone marrow infiltration (HCC) ICD-10-CM: T48.43  ICD-9-CM: 284.2         COPD (chronic obstructive pulmonary disease) (HCC) ICD-10-CM: J44.9  ICD-9-CM: 496         SIRS (systemic inflammatory response syndrome) (HCC) ICD-10-CM: R65.10  ICD-9-CM: 995.90         Iron deficiency anemia ICD-10-CM: D50.9  ICD-9-CM: 280.9         Blood loss anemia ICD-10-CM: D50.0  ICD-9-CM: 280.0         Thrombocytopenia (HCC) ICD-10-CM: D69.6  ICD-9-CM: 287.5         Acute bronchitis due to other specified organisms ICD-10-CM: J20.8  ICD-9-CM: 466.0         Acute respiratory failure with hypoxia (HCC) ICD-10-CM: J96.01  ICD-9-CM: 518.81         S/P lumpectomy, left breast ICD-10-CM: B74.854  ICD-9-CM: V45.89         Costochondritis ICD-10-CM: M94.0  ICD-9-CM: 733.6         Syncope and collapse ICD-10-CM: R55  ICD-9-CM: 780.2         Stenosis of both internal carotid arteries ICD-10-CM: I65.23  ICD-9-CM: 433.10, 433.30         Syncope ICD-10-CM: R55  ICD-9-CM: 780.2         Spinal stenosis of lumbar region with neurogenic claudication (Chronic) ICD-10-CM: G99.217  ICD-9-CM: 724.03         Lumbar back pain with radiculopathy affecting left lower extremity ICD-10-CM: M54.16  ICD-9-CM: 724.4         Lumbar back pain with radiculopathy affecting right lower extremity ICD-10-CM: M54.16  ICD-9-CM: 724.4         Idiopathic small and large fiber sensory neuropathy ICD-10-CM: G60.8  ICD-9-CM: 356.4         Lumbar post-laminectomy syndrome (Chronic) ICD-10-CM: M96.1  ICD-9-CM: 722.83         Low back pain ICD-10-CM: M54.5  ICD-9-CM: 724.2         Vitamin D deficiency (Chronic) ICD-10-CM: E55.9  ICD-9-CM: 268.9               Past Medical History:      has a past medical history of Anemia, Arthritis, Breast cancer ([facility] [address] Bunion of right foot (8/20/2015), Chronic obstructive pulmonary disease ([facility] [address] Chronic pain, Diverticulitis (6/29/2018), Diverticulitis large intestine, Dry eye syndrome (6/29/2018), Fibromyalgia (6/29/2018), GERD (gastroesophageal reflux disease), History of left breast cancer (2013), Hypercholesterolemia (6/29/2018), Ill-defined condition, Leukemia ([facility] [address] MDS (myelodysplastic syndrome) ([facility] [address] Osteoporosis (6/29/2018), Oxygen dependent, Peripheral neuropathy (6/29/2018), Prediabetes (6/29/2018), PUD (peptic ulcer disease), Radiation therapy complication (4214), Rosacea (6/29/2018), and Trouble in sleeping. Past Surgical History:      has a past surgical history that includes hx mohs procedure; us guided core breast biopsy (Left, 2013); hx breast lumpectomy (11/21/2013); ir insert tunl cvc w port over 5 years (12/3/2019); upper gi endoscopy,biopsy (1/24/2020); colonoscopy,diagnostic (2/28/2020); colonoscopy (N/A, 2/28/2020); hx vascular access (02/2020); hx appendectomy; hx tonsillectomy; hx cataract removal; hx orthopaedic; pr total knee arthroplasty; pr total knee arthroplasty; hx hysterectomy; hx other surgical; upper gi endoscopy,biopsy (6/23/2020); and upper gi endoscopy,dilatn w guide (6/23/2020). Home Medications:     Prior to Admission medications    Medication Sig Start Date End Date Taking? Authorizing Provider   sodium chloride-aloe vera ([place] Saline) topical gel Apply  to affected area four (4) times daily as needed. Provider, Historical   colchicine 0.6 mg tablet TK 1 T PO D  TK WITH WATER. UNTIL THE FLAIR RESOLVED 10/7/20   Provider, Historical   dicyclomine (BENTYL) 10 mg capsule TK 1 C PO BID 8/31/20   Provider, Historical   OTHER Dicitabine- Chemo infusion    Provider, Historical   allopurinoL (ZYLOPRIM) 300 mg tablet TK 1 T PO D 8/21/20   Provider, Historical   melatonin 1 mg tablet Take  by mouth nightly. Provider, Historical   ginger, Zingiber officinalis, (ginger extract) 250 mg cap Take  by mouth as needed. Provider, Historical   polyethylene glycol 400 (BLINK TEARS OP) Apply  to eye. Provider, Historical   DULoxetine (CYMBALTA) 30 mg capsule Take 30 mg by mouth daily. take for neuropathy 8/3/20   Provider, Historical   OXYGEN-AIR DELIVERY SYSTEMS 1 L by IntraNASal route daily as needed for Other (shortness of breath ). Provider, Historical   acetaminophen (TYLENOL) 325 mg tablet Take 2 Tabs by mouth every four (4) hours as needed for Pain or Fever. 7/23/20   Karmen Ghotra NP   dilTIAZem ER (CARDIZEM LA) 240 mg tablet Take 240 mg by mouth daily. Provider, Historical   vit A,C,E-zinc-copper (PreserVision AREDS) cap capsule Take 1 Cap by mouth daily. Provider, Historical   lidocaine 5 % topical cream Apply  to affected area two (2) times daily as needed (port access). Port access    Provider, Historical   ondansetron hcl (Zofran) 4 mg tablet Take 4 mg by mouth every eight (8) hours as needed for Nausea or Vomiting. Provider, Historical   folic acid (FOLVITE) 1 mg tablet Take 1 Tab by mouth daily. 7/8/20   Kerrie Rocha MD   bismuth subsalicylate (PEPTO-BISMOL PO) Take 30 mL by mouth daily as needed for Diarrhea. take one dose every hour as needed    Libby Berry MD   ergocalciferol (ERGOCALCIFEROL) 1,250 mcg (50,000 unit) capsule TAKE ONE CAPSULE BY MOUTH EVERY 7 DAYS 1/23/20   Huang Ya MD   pantoprazole (PROTONIX) 40 mg tablet Take 1 Tab by mouth Before breakfast and dinner.  11/5/19 Ahsan Avelar MD       Allergies/Social/Family History: Allergies   Allergen Reactions    Latex Rash    Hydrocodone Nausea Only and Vertigo    Gabapentin Diarrhea, Nausea Only and Other (comments)     weakness    Hydrocodone-Acetaminophen Nausea Only    Hydroxyurea Unknown (comments)     patient states she feels this medication is what caused the gi bleed    Lidocaine Rash     Tegarderm dressing caused skin rash after removal, please use foam dressing with this patient. Tegarderm dressing caused skin rash after removal, please use foam dressing with this patient.  Lyrica [Pregabalin] Nausea Only    Oxycodone Nausea and Vomiting and Vertigo      Social History     Tobacco Use    Smoking status: Former Smoker     Packs/day: 0.50     Years: 50.00     Pack years: 25.00     Quit date: 2012     Years since quittin.0    Smokeless tobacco: Never Used   Substance Use Topics    Alcohol use:  Yes     Alcohol/week: 2.0 standard drinks     Types: 2 Glasses of wine per week      Family History   Problem Relation Age of Onset    Cancer Mother         bladder    Cancer Father     Breast Cancer Paternal Grandmother        Review of Systems:     Constitutional: negative for fevers, chills and sweats  Eyes: negative for visual disturbance  Ears, nose, mouth, throat, and face: negative for nasal congestion  Respiratory: negative for cough, sputum or wheezing  Cardiovascular: negative for chest pain, palpitations  Gastrointestinal: positive for nausea  Genitourinary:negative for dysuria and urinary incontinence  Musculoskeletal:negative for back pain  Neurological: negative for headaches and weakness    Objective:   Vital Signs:  Visit Vitals  BP (!) 163/79   Pulse (!) 115   Temp 98.9 °F (37.2 °C)   Resp (!) 38   Ht 5' 6\" (1.676 m)   Wt 88.5 kg (195 lb)   SpO2 100%   BMI 31.47 kg/m²      O2 Device: BIPAP Temp (24hrs), Av.4 °F (37.4 °C), Min:98.1 °F (36.7 °C), Max:103.2 °F (39.6 °C) Intake/Output:     Intake/Output Summary (Last 24 hours) at 2/11/2021 0108  Last data filed at 2/10/2021 1908  Gross per 24 hour   Intake 1100 ml   Output    Net 1100 ml       Physical Exam:    General:  alert, cooperative, no distress, appears stated age  Eye:  conjunctivae/corneas clear. PERRL, EOM's intact  Neurologic:  oriented  Lymphatic:  Cervical, supraclavicular, and axillary nodes normal.   Neck:  normal and no erythema or exudates noted. Lungs:  rales bilaterally  Heart:  regular rate and rhythm, S1, S2 normal, no murmur, click, rub or gallop  Abdomen:  soft, non-tender. Bowel sounds normal. No masses,  no organomegaly  Cardiovascular:  Regular rate and rhythm, S1S2 present, without murmur or extra heart sounds, pedal pulses normal and no edema  Skin:  Normal.    LABS AND  DATA: Personally reviewed  Recent Labs     02/10/21  1541   WBC 0.2*   HGB 6.8*   HCT 20.2*   PLT 10*     Recent Labs     02/10/21  1541      K 3.7      CO2 24   BUN 17   CREA 0.69   *   CA 7.9*     Recent Labs     02/10/21  1541   AP 91   TP 7.3   ALB 3.1*   GLOB 4.2*   LPSE 52*     Recent Labs     02/10/21  1832   INR 1.2*   PTP 12.3*   APTT 27.5      Recent Labs     02/11/21  0057 02/10/21  1718   PHI 7.47* 7.43   PCO2I 32.6* 34.9*   PO2I 157* 38*   FIO2I 50 21     No results for input(s): CPK, CKMB, TROIQ, BNPP in the last 72 hours.     Hemodynamics:   PAP:   CO:     Wedge:   CI:     CVP:    SVR:       PVR:       Ventilator Settings:  Mode Rate Tidal Volume Pressure FiO2 PEEP            50 %       Peak airway pressure:      Minute ventilation: 31.3 l/min        MEDS: Reviewed    Chest X-Ray: personally reviewed and report checked        Assessment:     81 y/o F pt with PMH of CML, currently undergoing chemo treatments p/w dizziness, nausea, and abd pain, found to have neutropenic fever and pancytopenia, developed acute hypoxic respiratory failure 2/2 to pulmonary edema vs TRALI, doing well on Bipap.      PLAN    Acute hypoxic respiratory failure 2/2 to pulmonary edema vs TRALI  -s/p Lasix 60mg   -strict I/Os  -send BNP  -cont Bipap  -nursing transfusion rxn protocol    Neutropenic fever  -cont zosyn and vanc  -f/u BC        DISPOSITION  At this time pt is oxygenating well on Bipap 50%, A&Ox3, and HDS. Does not have any ICU needs at this time and should be managed in the SDU. Please reconsult ICU if pt's condition deteriorates. CRITICAL CARE CONSULTANT NOTE  I had a face to face encounter with the patient, reviewed and interpreted patient data including clinical events, labs, images, vital signs, I/O's, and examined patient. I have discussed the case and the plan and management of the patient's care with the consulting services, the bedside nurses and the respiratory therapist.      NOTE OF PERSONAL INVOLVEMENT IN CARE   This patient has a high probability of imminent, clinically significant deterioration, which requires the highest level of preparedness to intervene urgently. I participated in the decision-making and personally managed or directed the management of the following life and organ supporting interventions that required my frequent assessment to treat or prevent imminent deterioration. I personally spent 30 minutes of critical care time. This is time spent at this critically ill patient's bedside actively involved in patient care as well as the coordination of care and discussions with the patient's family. This does not include any procedural time which has been billed separately.     Donnie Nunez NP  [facility]  2/11/2021

## 2021-02-12 PROCEDURE — 99221 1ST HOSP IP/OBS SF/LOW 40: CPT | Performed by: INTERNAL MEDICINE

## 2021-02-12 PROCEDURE — 77010033678 HC OXYGEN DAILY

## 2021-02-12 PROCEDURE — 65660000000 HC RM CCU STEPDOWN

## 2021-02-12 PROCEDURE — 74011000258 HC RX REV CODE- 258: Performed by: GENERAL ACUTE CARE HOSPITAL

## 2021-02-12 PROCEDURE — 74011250637 HC RX REV CODE- 250/637: Performed by: GENERAL ACUTE CARE HOSPITAL

## 2021-02-12 PROCEDURE — 74011250636 HC RX REV CODE- 250/636: Performed by: GENERAL ACUTE CARE HOSPITAL

## 2021-02-12 RX ADMIN — FUROSEMIDE 40 MG: 10 INJECTION, SOLUTION INTRAVENOUS at 11:35

## 2021-02-12 RX ADMIN — PANTOPRAZOLE SODIUM 40 MG: 40 TABLET, DELAYED RELEASE ORAL at 11:34

## 2021-02-12 RX ADMIN — PROMETHAZINE HYDROCHLORIDE 12.5 MG: 25 TABLET ORAL at 17:51

## 2021-02-12 RX ADMIN — DULOXETINE HYDROCHLORIDE 30 MG: 30 CAPSULE, DELAYED RELEASE ORAL at 11:34

## 2021-02-12 RX ADMIN — MULTIPLE VITAMINS W/ MINERALS TAB 1 TABLET: TAB at 11:34

## 2021-02-12 RX ADMIN — PIPERACILLIN AND TAZOBACTAM 3.38 G: 3; .375 INJECTION, POWDER, LYOPHILIZED, FOR SOLUTION INTRAVENOUS at 12:03

## 2021-02-12 RX ADMIN — FOLIC ACID 1 MG: 1 TABLET ORAL at 11:34

## 2021-02-12 RX ADMIN — DICYCLOMINE HYDROCHLORIDE 10 MG: 10 CAPSULE ORAL at 11:34

## 2021-02-12 RX ADMIN — DICYCLOMINE HYDROCHLORIDE 10 MG: 10 CAPSULE ORAL at 17:51

## 2021-02-12 RX ADMIN — DILTIAZEM HYDROCHLORIDE 240 MG: 240 CAPSULE, COATED, EXTENDED RELEASE ORAL at 11:33

## 2021-02-12 RX ADMIN — ALLOPURINOL 300 MG: 300 TABLET ORAL at 11:34

## 2021-02-12 RX ADMIN — Medication 10 ML: at 14:00

## 2021-02-12 RX ADMIN — PIPERACILLIN AND TAZOBACTAM 3.38 G: 3; .375 INJECTION, POWDER, LYOPHILIZED, FOR SOLUTION INTRAVENOUS at 22:27

## 2021-02-12 RX ADMIN — VANCOMYCIN HYDROCHLORIDE 1000 MG: 1 INJECTION, POWDER, LYOPHILIZED, FOR SOLUTION INTRAVENOUS at 05:05

## 2021-02-12 RX ADMIN — Medication 10 ML: at 05:06

## 2021-02-12 RX ADMIN — Medication 1.5 MG: at 21:42

## 2021-02-12 RX ADMIN — VANCOMYCIN HYDROCHLORIDE 1000 MG: 1 INJECTION, POWDER, LYOPHILIZED, FOR SOLUTION INTRAVENOUS at 17:52

## 2021-02-12 RX ADMIN — Medication 10 ML: at 21:42

## 2021-02-12 RX ADMIN — PANTOPRAZOLE SODIUM 40 MG: 40 TABLET, DELAYED RELEASE ORAL at 17:51

## 2021-02-12 NOTE — PROGRESS NOTES
0700:  Bedside shift change report given to Valentina Duval RN (oncoming nurse) by Jason Montenegro (offgoing nurse). Report included the following information SBAR, Kardex, ED Summary, Procedure Summary, Intake/Output, MAR, Recent Results and Cardiac Rhythm NSR.     1210:  Pt expressed concern re: [facility] with [facility] Lala Lopez; should she cancel or postpone the appt?  300 from [address]. 1325:  DAINA bruce served MD Pace re: lab call at [phone] with gram positive cocci in 2nd of 4 bottles,  and gram negative rods in 1 of 4 bottles (no sites given). 1355:  DAINA bruce served MD Darin Ya re: pt question about chemo appointment on Monday to postpone or cancel. Rn also inquired re: new hematology consult. 1300:  Pt in chair per PT.      1600:  Pt returned to bed    1700:  Cleaned up pt from urine occurrence. 1800:  Evening meds given, including phenergen for nausea. 1900:End of Shift Note    Bedside shift change report given to Esequiel Ignacio RN (oncoming nurse) by Governor Durán (offgoing nurse). Report included the following information SBAR, Kardex, ED Summary, Intake/Output, MAR, Recent Results and Cardiac Rhythm NSR/Sinus Tach    Shift worked:  7a-7p     Shift summary and any significant changes:     No significant changes; pt complained of nausea in late afternoon. Concerns for physician to address:  Pt is scheduled to have chemo on Monday at [facility] per Dr. Lala Lopez. Can she have it here if she is still here? Zone phone for oncoming shift:   6102       Activity:  Activity Level:  Up with Assistance  Number times ambulated in hallways past shift: 0  Number of times OOB to chair past shift: 1    Cardiac:   Cardiac Monitoring: Yes      Cardiac Rhythm: Normal sinus rhythm, Sinus tachycardia    Access:   Current line(s): PIV     Genitourinary:   Urinary status: voiding and external catheter    Respiratory:   O2 Device: Nasal cannula  Chronic home O2 use?: YES  Incentive spirometer at bedside: NO     GI:  Last Bowel Movement Date: 02/10/21  Current diet:  DIET CARDIAC Regular  Passing flatus: YES  Tolerating current diet: YES  % Diet Eaten: 80 %    Pain Management:   Patient states pain is manageable on current regimen: YES    Skin:  Nikolas Score: 19  Interventions: increase time out of bed, PT/OT consult and internal/external urinary devices    Patient Safety:  Fall Score:  Total Score: 3  Interventions: bed/chair alarm, gripper socks and pt to call before getting OOB  High Fall Risk: Yes    Length of Stay:  Expected LOS: 4d 19h  Actual LOS: 1      [place]

## 2021-02-12 NOTE — ACP (ADVANCE CARE PLANNING)
Advance Care Planning Note      NAME: Palmira Bolden   :  1935   MRN:  [id number]     Date/Time:  2021 5:21 PM    Active Diagnoses:  Hospital Problems  Date Reviewed: 2021          Codes Class Noted POA    Neutropenia, febrile ([facility] ICD-10-CM: D70.9, R50.81  ICD-9-CM: 288.00, 780.61  2021 Yes        Acute respiratory failure with hypoxemia [facility]) ICD-10-CM: J96.01  ICD-9-CM: 518.81  2021 Unknown        Myelodysplastic syndrome ([facility] ICD-10-CM: D46.9  ICD-9-CM: 238.75  2020 Yes        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[facility] ICD-10-CM: C93.10  ICD-9-CM: 205.10  2019 Yes        Thrombocytopenia ([facility] ICD-10-CM: D69.6  ICD-9-CM: 287.5  2019 Yes        Acute respiratory failure with hypoxia [facility]) ICD-10-CM: J96.01  ICD-9-CM: 518.81  2018 Yes        S/P lumpectomy, left breast ICD-10-CM: R64.041  ICD-9-CM: V45.89  2017 Yes              These active diagnoses are of sufficient risk that focused discussion on advance care planning is indicated in order to allow the patient to thoughtfully consider personal goals of care, and if situations arise that prevent the ability to personally give input, to ensure appropriate representation of their personal desires for different levels and aggressiveness of care. Discussion:   Code status addressed and wants to be a Full Code. Patient wants central line and vasopressors if needed. Patient would also want a feeding tube, if needed, for nutritional support. Patient  would like to assign her   Nimesh Sanches ( [phone]) as the surrogate decision maker. Persons present and participating in discussion: Aaron Simmonds A. Amuquandoh, NP,       Time Spent:   Total time spent face-to-face in education and discussion:   16  minutes. Mayra Mendez NP   Hospitalist

## 2021-02-12 NOTE — PROGRESS NOTES
I reviewed pertinent labs and imaging, and discussed /agreed on the plan of care with Dr. Imani Jernigan. Hospitalist Progress Note    NAME: Ez Valverde   :  1935   MRN:  [id number]       Assessment / Plan:  Neutropenic fever  Receiving Chemo at [facility]   - afebrile today   - feels better   - BC + for GNR and GPC in 2/4 bottles   - cont  Zosyn / Vanc     Diverticulitis  - presented with Nausea , lower abd pain ,  - Hx of diverticulitis     - CT IMPRESSION  1. Findings consistent with acute diverticulitis involving the sigmoid colon. No evidence of abscess or perforation. Possible second focus of acute  diverticulitis in the distal descending colon. 2.  Questionable mild inflammatory changes around the distal esophagus at the GE  junction. This may represent esophagitis. 3.  Changes suggestive of chronic pancreatitis. Questionable small gallstone  within gallbladder without signs of acute cholecystitis     Sepsis - presented with Temp 103.2 ,  R 25  - Lactic 0.60 to 1.7     Acute hypoxic respiratory failure secondary to fluid overload versus TRALI  - required Bipap initially , received 40 mg Lasix   - on NC a 3 L  Baseline  O2 at night only       CML/mild dysplastic syndrome  Thrombocytopenia  Anemia  Breast cancer  Platelet 10 on arrival repeat 7   -s/p 2 units platelet yesterday   - monitor in am   - Hem /on to see          / Body mass index is 31.47 kg/m². Code status: Full  Prophylaxis: SCD's  Recommended Disposition: Home w/Family     Subjective:     Chief Complaint / Reason for Physician Visit  \"I am suppose to have Chemo on Monday \". Discussed with RN events overnight.    Discussed POC with pt in regards to Platelets and fever  Agree to current management     Review of Systems:  Symptom Y/N Comments  Symptom Y/N Comments   Fever/Chills n   Chest Pain n    Poor Appetite n   Edema n    Cough y   Abdominal Pain n    Sputum n   Joint Pain n    SOB/AL n   Pruritis/Rash n    Nausea/vomit n   Tolerating PT/OT n    Diarrhea n   Tolerating Diet n    Constipation    Other       Could NOT obtain due to:      Objective:     VITALS:   Last 24hrs VS reviewed since prior progress note. Most recent are:  Patient Vitals for the past 24 hrs:   Temp Pulse Resp BP SpO2   02/12/21 0734 98.5 °F (36.9 °C) 94 23 (!) 107/52 98 %   02/12/21 0446 98.3 °F (36.8 °C) 79 25 (!) 105/53 95 %   02/11/21 2247 100.4 °F (38 °C) (!) 110 30 (!) 117/55 93 %   02/11/21 2152 98.6 °F (37 °C) (!) 108 21 99/81 94 %   02/11/21 2126 98.5 °F (36.9 °C) (!) 119 17 100/71 96 %   02/11/21 2057  (!) 115 24  93 %   02/11/21 2056  (!) 140 19  95 %   02/11/21 2055 98.7 °F (37.1 °C) (!) 133 26 102/64 97 %   02/11/21 2054  (!) 147 25  95 %   02/11/21 2053  (!) 135 27 102/64 92 %   02/11/21 2011 98.7 °F (37.1 °C) (!) 113 22 111/61 94 %   02/11/21 1917 98.5 °F (36.9 °C) (!) 109 20 98/75 94 %   02/11/21 1851 98.4 °F (36.9 °C) (!) 114 25 (!) 106/59 98 %   02/11/21 1611 99.2 °F (37.3 °C) 95 16 (!) 117/54 98 %   02/11/21 1445 99.5 °F (37.5 °C) (!) 101 20 (!) 122/56 98 %   02/11/21 1430 99.7 °F (37.6 °C) 100 19 118/62 98 %   02/11/21 1407 98.6 °F (37 °C) 100 19 101/63 98 %   02/11/21 1213 98.5 °F (36.9 °C) 98 19 (!) 111/50 100 %   02/11/21 1000  (!) 104 18 (!) 95/39    02/11/21 0945  (!) 106 29 (!) 108/56    02/11/21 0930  (!) 103 24 98/68 98 %   02/11/21 0915  (!) 102 23 (!) 112/55 95 %   02/11/21 0900  95 23 (!) 102/54    02/11/21 0845  95 21 (!) 105/53    02/11/21 0830  95 28 (!) 105/51 90 %   02/11/21 0815  93 24 (!) 92/43 97 %       Intake/Output Summary (Last 24 hours) at 2/12/2021 5247  Last data filed at 2/11/2021 1834  Gross per 24 hour   Intake 1001.7 ml   Output 1375 ml   Net -373.3 ml        I had a face to face encounter and independently examined this patient on 2/12/2021, as outlined below:  PHYSICAL EXAM:  General: WD, WN. Alert, cooperative, no acute distress    EENT:  EOMI. Anicteric sclerae.  MMM  Resp:  CTA bilaterally, no wheezing or rales. No accessory muscle use, NC  CV:  Regular  rhythm,  No edema Pot in L chest wall   GI:  Soft, Non distended, Non tender. +Bowel sounds  Neurologic:  Alert and oriented X 3, normal speech,   Psych:   . Not anxious nor agitated  Skin:  No rashes. No jaundice bruising around L oribital    Reviewed most current lab test results and cultures  YES  Reviewed most current radiology test results   YES  Review and summation of old records today    NO  Reviewed patient's current orders and MAR    YES  PMH/SH reviewed - no change compared to H&P  ________________________________________________________________________  Care Plan discussed with:    Comments   Patient x    Family      RN x    Care Manager     Consultant                        Multidiciplinary team rounds were held today with , nursing, pharmacist and clinical coordinator. Patient's plan of care was discussed; medications were reviewed and discharge planning was addressed. ________________________________________________________________________  Total NON critical care TIME: 30   Minutes    Total CRITICAL CARE TIME Spent:   Minutes non procedure based      Comments   >50% of visit spent in counseling and coordination of care     ________________________________________________________________________  Kenneth Chou. Renetta Cha NP     Procedures: see electronic medical records for all procedures/Xrays and details which were not copied into this note but were reviewed prior to creation of Plan. LABS:  I reviewed today's most current labs and imaging studies.   Pertinent labs include:  Recent Labs     02/11/21  0439 02/10/21  1541   WBC 0.2* 0.2*   HGB 7.7* 6.8*   HCT 22.9* 20.2*   PLT 7* 10*     Recent Labs     02/10/21  1832 02/10/21  1541   NA  --  136   K  --  3.7   CL  --  104   CO2  --  24   GLU  --  109*   BUN  --  17   CREA  --  0.69   CA  --  7.9*   ALB  --  3.1*   TBILI  --  1.1*   ALT  --  14   INR 1.2*  --        Signed: Mayra Arellano, NP

## 2021-02-12 NOTE — CONSULTS
[address] [facility] [address] [facility] [place] [address] [facility]  [phone]      Oncology Consultation Note        Patient: Disha Acosta MRN: [id number]  SSN: [id number]    YOB: 1935  Age: 82 y.o. Sex: female        Reason for Consultation   Disha Acosta is a 82 y.o. female who is seen in consultation for pancytopenia secondary to chemotherapy and MDS    Diagnosis:      1. 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 Left breast carcinoma:   T1a N0 Mx (Stage IA) infiltrating ductal carcinoma , Tumor size 1 cm, LN -ve, grade 2, ki 67 17%, %, CT 90%, Her 2 unamplified. Treatment:      1. Dacogen, per Dr. Jack Clark  2. Vidaza - s/p 3 cycles  3. Discontinued Arimidex 1/2019 after 5 years of treatment  4. Completed radiation treatment to the breast   5. S/P left breast lumpectomy and sentinel LN excision on 11/21/2013    Subjective:      Sulma Rodriguez is a 70 y.o.  female with a history of stage I invasive ductal carcinoma and MDS. She completed the 5 years of AI. She is currently receiving Dacogen under the care of Dr. Jack Clark at [facility]. She is admitted with pancytopenia, neutropenic fever and diverticulitis. She is receiving transfusion support in the hospital. She feels tired.        Review of Systems:     Constitutional: fatigue  Eyes: negative   Ears, Nose, Mouth, Throat, and Face: negative   Respiratory: negative   Cardiovascular: negative   Gastrointestinal: constipation   Integument/Breast: negative  Hematologic/Lymphatic: negative   Musculoskeletal: joint pain  Neurological: negative      Past Medical History:   Diagnosis Date    Anemia     Arthritis     Breast cancer ([address]     left    Bunion of right foot 8/20/2015    Chronic obstructive pulmonary disease (HCC)     mild    Chronic pain     back--uses tens unit    Diverticulitis 6/29/2018    Recurrent    Diverticulitis large intestine     Dry eye syndrome 2018    Fibromyalgia 2018    GERD (gastroesophageal reflux disease)     History of left breast cancer     lumpectomy radiation    Hypercholesterolemia 2018    Ill-defined condition     blood transfusion hx    Leukemia (HCC)     MDS ([address]elodysplastic syndrome) [address] [address]     Osteoporosis 2018    Oxygen dependent     at night    Peripheral neuropathy 2018    Prediabetes 2018    PUD (peptic ulcer disease)     Radiation therapy complication 9632    Lt breast-No Chemo    Rosacea 2018    Trouble in sleeping      Past Surgical History:   Procedure Laterality Date    COLONOSCOPY N/A 2020    COLONOSCOPY performed by Alejo Maya MD at hospitals ENDOSCOPY    COLONOSCOPY,DIAGNOSTIC  2020         HX APPENDECTOMY      HX BREAST LUMPECTOMY  2013    LEFT BREAST LUMPECTOMY W/LEFT BREAST SENTINEL NODE BIOPSY,AND NEEDLE LOCALIZATION performed by Bev Butt MD at hospitals MAIN OR    HX CATARACT REMOVAL      bilateral    HX HYSTERECTOMY      ovarian cyst    HX MOHS PROCEDURES      right    HX ORTHOPAEDIC      back surgery x2    HX OTHER SURGICAL      colonoscopy    HX TONSILLECTOMY      HX VASCULAR ACCESS  2020    echo cath right shoulder    IR INSERT TUNL CVC W PORT OVER 5 YEARS  12/3/2019    MN TOTAL KNEE ARTHROPLASTY      left    MN TOTAL KNEE ARTHROPLASTY      right    UPPER GI ENDOSCOPY,BIOPSY  2020         UPPER GI ENDOSCOPY,BIOPSY  2020         UPPER GI ENDOSCOPY,DILATN W GUIDE  2020         US GUIDED CORE BREAST BIOPSY Left     Breast Ca      Family History   Problem Relation Age of Onset    Cancer Mother         bladder    Cancer Father     Breast Cancer Paternal Grandmother      Social History     Tobacco Use    Smoking status: Former Smoker     Packs/day: 0.50     Years: 50.00     Pack years: 25.00     Quit date: 2012     Years since quittin.0    Smokeless tobacco: Never Used   Substance Use Topics    Alcohol use: Yes     Alcohol/week: 2.0 standard drinks     Types: 2 Glasses of wine per week      Prior to Admission medications    Medication Sig Start Date End Date Taking? Authorizing Provider   acetaminophen (TylenoL) 325 mg tablet Take 975 mg by mouth every four (4) hours as needed for Pain. Yes Provider, Historical   ergocalciferol (Vitamin D2) 1,250 mcg (50,000 unit) capsule Take 50,000 Units by mouth every seven (7) days. On Wednesday   Yes Provider, Historical   cycloSPORINE (Restasis) 0.05 % dpet Administer 1 Drop to both eyes every twelve (12) hours. Yes Provider, Historical   sodium chloride-aloe vera ([place] Saline) topical gel Apply  to affected area four (4) times daily as needed. Yes Provider, Historical   colchicine 0.6 mg tablet Take 0.6 mg by mouth daily. With water 10/7/20  Yes Provider, Historical   dicyclomine (BENTYL) 10 mg capsule Take 10 mg by mouth two (2) times a day. 8/31/20  Yes Provider, Historical   OTHER Dicitabine- Chemo infusion   Yes Provider, Historical   allopurinoL (ZYLOPRIM) 300 mg tablet Take 300 mg by mouth daily. 8/21/20  Yes Provider, Historical   melatonin 1 mg tablet Take  by mouth nightly. Yes Provider, Historical   ginger, Zingiber officinalis, (ginger extract) 250 mg cap Take  by mouth as needed. Yes Provider, Historical   polyethylene glycol 400 (BLINK TEARS OP) Apply 1 Drop to eye daily. Both eyes as needed   Yes Provider, Historical   DULoxetine (CYMBALTA) 30 mg capsule Take 30 mg by mouth daily. take for neuropathy 8/3/20  Yes Provider, Historical   dilTIAZem ER (CARDIZEM LA) 240 mg tablet Take 240 mg by mouth daily. Yes Provider, Historical   vit A,C,E-zinc-copper (PreserVision AREDS) cap capsule Take 1 Cap by mouth daily. Yes Provider, Historical   lidocaine 5 % topical cream Apply  to affected area two (2) times daily as needed (port access).  Port access   Yes Provider, Historical   ondansetron hcl (Zofran) 4 mg tablet Take 4 mg by mouth every eight (8) hours as needed for Nausea or Vomiting. Yes Provider, Historical   folic acid (FOLVITE) 1 mg tablet Take 1 Tab by mouth daily. 7/8/20  Yes Tripp Villareal MD   bismuth subsalicylate (PEPTO-BISMOL PO) Take 30 mL by mouth daily as needed for Diarrhea. take one dose every hour as needed   Yes Other, MD Libby   pantoprazole (PROTONIX) 40 mg tablet Take 1 Tab by mouth Before breakfast and dinner. 11/5/19  Yes Marva Jade MD          Allergies   Allergen Reactions    Latex Rash    Hydrocodone Nausea Only and Vertigo    Gabapentin Diarrhea, Nausea Only and Other (comments)     weakness    Hydrocodone-Acetaminophen Nausea Only    Hydroxyurea Unknown (comments)     patient states she feels this medication is what caused the gi bleed    Lidocaine Rash     Tegarderm dressing caused skin rash after removal, please use foam dressing with this patient. Tegarderm dressing caused skin rash after removal, please use foam dressing with this patient.  Lyrica [Pregabalin] Nausea Only    Oxycodone Nausea and Vomiting and Vertigo         Objective:     Visit Vitals  BP (!) 110/43   Pulse 95   Temp 98.5 °F (36.9 °C)   Resp 23   Ht 5' 6\" (1.676 m)   Wt 195 lb (88.5 kg)   SpO2 98%   Breastfeeding No   BMI 31.47 kg/m²       Physical Exam:    GENERAL: alert, cooperative   EYE: PERRLA,  LYMPHATIC: Cervical, supraclavicular, and axillary nodes normal.   THROAT & NECK: normal and no erythema or exudates noted.    LUNG: clear to auscultation bilaterally   HEART: regular rate and rhythm   ABDOMEN: soft, non-tender   EXTREMITIES: extremities normal   SKIN: scattered bruises  NEUROLOGIC: negative       Physical exam and ROS has been modified from a prior visit to make it relevant and current      Lab Results   Component Value Date/Time    WBC 0.2 (LL) 02/11/2021 04:39 AM    HGB (POC) 10.7 (A) 01/07/2019 09:52 AM    HGB 7.7 (L) 02/11/2021 04:39 AM    HCT (POC) 32.0 (A) 01/07/2019 09:52 AM    HCT 22.9 (L) 02/11/2021 04:39 AM    PLATELET 7 (LL) 12/24/1469 04:39 AM    MCV 86.4 02/11/2021 04:39 AM       Lab Results   Component Value Date/Time    Iron 157 (H) 07/21/2020 05:17 AM    TIBC 403 02/06/2020 09:40 AM    Iron % saturation 8 (L) 02/06/2020 09:40 AM    Ferritin 153 07/21/2020 05:17 AM         Assessment:      1. History of Left breast carcinoma:     T1a N0 Mx (Stage IA) infiltrating ductal carcinoma , Tumor size 1 cm, LN -ve, grade 2, ki 67 17%, %, ID 90%, Her 2 unamplified. Dx: 10/28/2013    > S/P left breast lumpectomy and sentinel LN excision on 11/21/2013  > Completed radiation to the breast  > Discontinued Arimidex 1/2019 after 5 years  > In remission      2. 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 Akanksha Campuzano care at [facility] [address]  She is admitted with profound pancytopenia from chemotherapy  I recommend transfusion support        Plan:        1. She will return to Dr. Akanksha Campuzano at discharge  2. Transfuse to maintain Hgb above 7 and platelet count above 10K unless she experiences significant bleeding   3. If she remains febrile, I would consider Granix. However the benefit is questionable. Signed by: Brigette Dixon MD                     February 12, 2021      CCShawnee Vivar MD

## 2021-02-12 NOTE — ROUTINE PROCESS
End of Shift Note Bedside shift change report given to Jose Edwards (oncoming nurse) by Liane Latham (offgoing nurse). Report included the following information SBAR, Kardex, ED Summary, Intake/Output, MAR, Recent Results, Med Rec Status, Cardiac Rhythm NSR/S. Tachy, Alarm Parameters  and Quality Measures Shift worked:  Kary Salcedo Shift summary and any significant changes:  
  Routine progress care. Monitor Pt voiding or ask Pt to void. Concerns for physician to address:  . ... Zone phone for oncoming shift:   [phone] Activity: 
Activity Level: Up with Assistance Number times ambulated in hallways past shift: 0 Number of times OOB to chair past shift: 0 Cardiac:  
Cardiac Monitoring: Yes     
Cardiac Rhythm: Normal sinus rhythm Access:  
Current line(s): port Genitourinary:  
Urinary status: external catheter Respiratory:  
O2 Device: Nasal cannula Chronic home O2 use?: NO Incentive spirometer at bedside: NO 
  
GI: 
Last Bowel Movement Date: 02/10/21 Current diet:  DIET CARDIAC Regular Passing flatus: YES Tolerating current diet: YES 
% Diet Eaten: 80 % Pain Management:  
Patient states pain is manageable on current regimen: YES Skin: 
Nikolas Score: 18 Interventions: increase time out of bed and internal/external urinary devices Patient Safety: 
Fall Score: Total Score: 3 Interventions: bed/chair alarm, gripper socks and pt to call before getting OOB High Fall Risk: Yes Length of Stay: 
Expected LOS: 4d 19h Actual LOS: 1 Liane Latham

## 2021-02-13 LAB
ALBUMIN SERPL-MCNC: 2.7 G/DL (ref 3.5–5)
ALBUMIN/GLOB SERPL: 0.7 {RATIO} (ref 1.1–2.2)
ALP SERPL-CCNC: 74 U/L (ref 45–117)
ALT SERPL-CCNC: 12 U/L (ref 12–78)
ANION GAP SERPL CALC-SCNC: 5 MMOL/L (ref 5–15)
AST SERPL-CCNC: 20 U/L (ref 15–37)
BASOPHILS # BLD: 0 K/UL (ref 0–0.1)
BASOPHILS NFR BLD: 0 % (ref 0–1)
BILIRUB SERPL-MCNC: 1.3 MG/DL (ref 0.2–1)
BUN SERPL-MCNC: 26 MG/DL (ref 6–20)
BUN/CREAT SERPL: 36 (ref 12–20)
CALCIUM SERPL-MCNC: 7.9 MG/DL (ref 8.5–10.1)
CHLORIDE SERPL-SCNC: 105 MMOL/L (ref 97–108)
CO2 SERPL-SCNC: 28 MMOL/L (ref 21–32)
CREAT SERPL-MCNC: 0.73 MG/DL (ref 0.55–1.02)
DATE LAST DOSE: NORMAL
DIFFERENTIAL METHOD BLD: ABNORMAL
EOSINOPHIL # BLD: 0 K/UL (ref 0–0.4)
EOSINOPHIL NFR BLD: 0 % (ref 0–7)
ERYTHROCYTE [DISTWIDTH] IN BLOOD BY AUTOMATED COUNT: 13.5 % (ref 11.5–14.5)
GLOBULIN SER CALC-MCNC: 3.9 G/DL (ref 2–4)
GLUCOSE SERPL-MCNC: 102 MG/DL (ref 65–100)
HCT VFR BLD AUTO: 18.5 % (ref 35–47)
HGB BLD-MCNC: 6.2 G/DL (ref 11.5–16)
HISTORY CHECKED?,CKHIST: NORMAL
IMM GRANULOCYTES # BLD AUTO: 0 K/UL (ref 0–0.04)
IMM GRANULOCYTES NFR BLD AUTO: 0 % (ref 0–0.5)
LYMPHOCYTES # BLD: 0.2 K/UL (ref 0.8–3.5)
LYMPHOCYTES NFR BLD: 90 % (ref 12–49)
MAGNESIUM SERPL-MCNC: 1.7 MG/DL (ref 1.6–2.4)
MCH RBC QN AUTO: 28.7 PG (ref 26–34)
MCHC RBC AUTO-ENTMCNC: 33.5 G/DL (ref 30–36.5)
MCV RBC AUTO: 85.6 FL (ref 80–99)
MONOCYTES # BLD: 0 K/UL (ref 0–1)
MONOCYTES NFR BLD: 8 % (ref 5–13)
NEUTS SEG # BLD: 0 K/UL (ref 1.8–8)
NEUTS SEG NFR BLD: 2 % (ref 32–75)
NRBC # BLD: 0 K/UL (ref 0–0.01)
NRBC BLD-RTO: 0 PER 100 WBC
PLATELET # BLD AUTO: 6 K/UL (ref 150–400)
PMV BLD AUTO: 11.7 FL (ref 8.9–12.9)
POTASSIUM SERPL-SCNC: 3.5 MMOL/L (ref 3.5–5.1)
PROT SERPL-MCNC: 6.6 G/DL (ref 6.4–8.2)
RBC # BLD AUTO: 2.16 M/UL (ref 3.8–5.2)
RBC MORPH BLD: ABNORMAL
REPORTED DOSE,DOSE: NORMAL UNITS
REPORTED DOSE/TIME,TMG: NORMAL
SODIUM SERPL-SCNC: 138 MMOL/L (ref 136–145)
VANCOMYCIN TROUGH SERPL-MCNC: 8.8 UG/ML (ref 5–10)
WBC # BLD AUTO: 0.2 K/UL (ref 3.6–11)
WBC MORPH BLD: ABNORMAL

## 2021-02-13 PROCEDURE — 74011250636 HC RX REV CODE- 250/636: Performed by: GENERAL ACUTE CARE HOSPITAL

## 2021-02-13 PROCEDURE — 74011250636 HC RX REV CODE- 250/636: Performed by: NURSE PRACTITIONER

## 2021-02-13 PROCEDURE — 74011000258 HC RX REV CODE- 258: Performed by: GENERAL ACUTE CARE HOSPITAL

## 2021-02-13 PROCEDURE — 30233N1 TRANSFUSION OF NONAUTOLOGOUS RED BLOOD CELLS INTO PERIPHERAL VEIN, PERCUTANEOUS APPROACH: ICD-10-PCS | Performed by: INTERNAL MEDICINE

## 2021-02-13 PROCEDURE — 80202 ASSAY OF VANCOMYCIN: CPT

## 2021-02-13 PROCEDURE — 65660000000 HC RM CCU STEPDOWN

## 2021-02-13 PROCEDURE — 74011250637 HC RX REV CODE- 250/637: Performed by: NURSE PRACTITIONER

## 2021-02-13 PROCEDURE — 83735 ASSAY OF MAGNESIUM: CPT

## 2021-02-13 PROCEDURE — P9035 PLATELET PHERES LEUKOREDUCED: HCPCS

## 2021-02-13 PROCEDURE — P9016 RBC LEUKOCYTES REDUCED: HCPCS

## 2021-02-13 PROCEDURE — 74011250637 HC RX REV CODE- 250/637: Performed by: GENERAL ACUTE CARE HOSPITAL

## 2021-02-13 PROCEDURE — 80053 COMPREHEN METABOLIC PANEL: CPT

## 2021-02-13 PROCEDURE — 77010033678 HC OXYGEN DAILY

## 2021-02-13 PROCEDURE — 85025 COMPLETE CBC W/AUTO DIFF WBC: CPT

## 2021-02-13 PROCEDURE — 36430 TRANSFUSION BLD/BLD COMPNT: CPT

## 2021-02-13 PROCEDURE — 36415 COLL VENOUS BLD VENIPUNCTURE: CPT

## 2021-02-13 PROCEDURE — P9073 PLATELETS PHERESIS PATH REDU: HCPCS

## 2021-02-13 RX ORDER — SODIUM CHLORIDE 9 MG/ML
250 INJECTION, SOLUTION INTRAVENOUS AS NEEDED
Status: DISCONTINUED | OUTPATIENT
Start: 2021-02-13 | End: 2021-02-14

## 2021-02-13 RX ORDER — ACETAMINOPHEN 325 MG/1
650 TABLET ORAL ONCE
Status: COMPLETED | OUTPATIENT
Start: 2021-02-13 | End: 2021-02-13

## 2021-02-13 RX ORDER — MAGNESIUM SULFATE HEPTAHYDRATE 40 MG/ML
2 INJECTION, SOLUTION INTRAVENOUS ONCE
Status: COMPLETED | OUTPATIENT
Start: 2021-02-13 | End: 2021-02-13

## 2021-02-13 RX ORDER — DIPHENHYDRAMINE HYDROCHLORIDE 50 MG/ML
25 INJECTION, SOLUTION INTRAMUSCULAR; INTRAVENOUS ONCE
Status: COMPLETED | OUTPATIENT
Start: 2021-02-13 | End: 2021-02-13

## 2021-02-13 RX ADMIN — DIPHENHYDRAMINE HYDROCHLORIDE 25 MG: 50 INJECTION, SOLUTION INTRAMUSCULAR; INTRAVENOUS at 11:04

## 2021-02-13 RX ADMIN — PANTOPRAZOLE SODIUM 40 MG: 40 TABLET, DELAYED RELEASE ORAL at 17:42

## 2021-02-13 RX ADMIN — DICYCLOMINE HYDROCHLORIDE 10 MG: 10 CAPSULE ORAL at 17:41

## 2021-02-13 RX ADMIN — Medication 10 ML: at 14:00

## 2021-02-13 RX ADMIN — PIPERACILLIN AND TAZOBACTAM 3.38 G: 3; .375 INJECTION, POWDER, LYOPHILIZED, FOR SOLUTION INTRAVENOUS at 17:41

## 2021-02-13 RX ADMIN — DICYCLOMINE HYDROCHLORIDE 10 MG: 10 CAPSULE ORAL at 09:12

## 2021-02-13 RX ADMIN — PIPERACILLIN AND TAZOBACTAM 3.38 G: 3; .375 INJECTION, POWDER, LYOPHILIZED, FOR SOLUTION INTRAVENOUS at 06:53

## 2021-02-13 RX ADMIN — MULTIPLE VITAMINS W/ MINERALS TAB 1 TABLET: TAB at 09:15

## 2021-02-13 RX ADMIN — ALLOPURINOL 300 MG: 300 TABLET ORAL at 09:12

## 2021-02-13 RX ADMIN — ACETAMINOPHEN 650 MG: 325 TABLET ORAL at 11:05

## 2021-02-13 RX ADMIN — FOLIC ACID 1 MG: 1 TABLET ORAL at 09:12

## 2021-02-13 RX ADMIN — Medication 10 ML: at 21:16

## 2021-02-13 RX ADMIN — VANCOMYCIN HYDROCHLORIDE 1000 MG: 1 INJECTION, POWDER, LYOPHILIZED, FOR SOLUTION INTRAVENOUS at 04:50

## 2021-02-13 RX ADMIN — DULOXETINE HYDROCHLORIDE 30 MG: 30 CAPSULE, DELAYED RELEASE ORAL at 09:12

## 2021-02-13 RX ADMIN — Medication 10 ML: at 06:13

## 2021-02-13 RX ADMIN — Medication 1.5 MG: at 21:17

## 2021-02-13 RX ADMIN — PANTOPRAZOLE SODIUM 40 MG: 40 TABLET, DELAYED RELEASE ORAL at 09:12

## 2021-02-13 RX ADMIN — MAGNESIUM SULFATE HEPTAHYDRATE 2 G: 40 INJECTION, SOLUTION INTRAVENOUS at 09:59

## 2021-02-13 RX ADMIN — FUROSEMIDE 40 MG: 10 INJECTION, SOLUTION INTRAVENOUS at 09:11

## 2021-02-13 RX ADMIN — DILTIAZEM HYDROCHLORIDE 240 MG: 240 CAPSULE, COATED, EXTENDED RELEASE ORAL at 09:11

## 2021-02-13 NOTE — PROGRESS NOTES
Pharmacy Automatic Renal Dosing Protocol - Antimicrobials    Indication for Antimicrobials:  bacteremia , Febrile Neutropenia (on chemo at [facility])    Current Regimen of Each Antimicrobial:   Cefepime 2 g q 8 hours (21    Previous Antimicrobial Therapy:  Vancomycin pharmacy to dose (start: 2/10, day 4  Zosyn 3.375g q 8hr (start: 2/10, day 4    Goal Level: VANCOMYCIN TROUGH GOAL RANGE    Vancomycin Trough: 15 - 20 mcg/mL  (AUC: 400 - 600 mg/hr/Liter/day)     Date Dose & Interval Measured (mcg/mL) Extrapolated (mcg/mL)    0347 1G Q 12 h 8.8 DC vancomycin                  Date & time of next level:      Significant Cultures:   2/10:blood - NG - E. Coli  - prelim  2/10: rapid covid - neg    Radiology / Imaging results: (X-ray, CT scan or MRI):   2/10: CTA chest: no acute process    Paralysis, amputations, malnutrition: none    Labs:  Recent Labs     21  0347 21  0439 02/10/21  1541   CREA 0.73  --  0.69   BUN 26*  --  17   WBC 0.2* 0.2* 0.2*     Temp (24hrs), Av.8 °F (37.1 °C), Min:98.2 °F (36.8 °C), Max:99.9 °F (37.7 °C)      Is the Patient on Dialysis? No    Creatinine Clearance (mL/min):   CrCl (Actual Body Weight): 81.5  CrCl (Adjusted Body Weight): 65.4  CrCl (Ideal Body Weight): 54.7    Impression/Plan:   DC vancomycin and zosyn  Start Cefepime 2g q 8 hours  Discussed with MD  Low grade fever and neutropenic   Antimicrobial stop date - pending     Pharmacy will follow daily and adjust medications as appropriate for renal function and/or serum levels. Thank you,  SOLITARIO MaynardD    Recommended duration of therapy  [email or web address]/[facility]/Pharmacy/Clinical%20Companion/[email or web address]%[email or web address]. docx    Renal Dosing  [email or web address]/[facility]/Pharmacy/Clinical%20Companion/Renal%[email or web address]. pdf

## 2021-02-13 NOTE — PROGRESS NOTES
I reviewed pertinent labs and imaging, and discussed /agreed on the plan of care with Dr. Pace.      Hospitalist Progress Note    NAME: Mague Melendez   :  1935   MRN:  [id number]       Assessment / Plan:   notified by nurse that pt to recieve only 2 units platelets which is all that is available . Will re assess in the morning if more units are needed   Neutropenic fever  Receiving Chemo at [facility]   - afebrile today   - feels better   - BC + for GNR and GPC in 2/4 bottles   - cont  Zosyn / Vanc     Diverticulitis  - presented with Nausea , lower abd pain ,  - Hx of diverticulitis   -appreciate GI input - no intervention at this time     - CT IMPRESSION  1.  Findings consistent with acute diverticulitis involving the sigmoid colon.  No evidence of abscess or perforation. Possible second focus of acute  diverticulitis in the distal descending colon.  2.  Questionable mild inflammatory changes around the distal esophagus at the GE  junction. This may represent esophagitis.  3.  Changes suggestive of chronic pancreatitis. Questionable small gallstone  within gallbladder without signs of acute cholecystitis     Sepsis - presented with Temp 103.2 ,  R 25  - Lactic 0.60 to 1.7   - afebrile HR 80-90     Acute hypoxic respiratory failure secondary to fluid overload versus TRALI  - required Bipap initially , received 40 mg Lasix   - on NC a 3 L  Baseline  O2 at night only   - sats at 95%       CML/mild dysplastic syndrome  Thrombocytopenia  Anemia  Breast cancer  Platelet 10 on arrival repeat 7   -s/p 2 units platelet   - appreciate input by Hem/Onc team recc keep plts above 10 , Hgb greater than 7    - Hgb today 6.2 plts 6 will transfuse with 1 unit PRBC and 4 units platelets today   - no overt sign of bleeding noted   - monitor       / Body mass index is 31.47 kg/m².    Code status: Full  Prophylaxis: SCD's  Recommended Disposition: Home w/Family     Subjective:     Chief Complaint / Reason for Physician  Visit  \"I am suppose to have Chemo on Monday \". Discussed with RN events overnight. Discussed POC with pt in regards to Platelets and fever  Agree to current management     Review of Systems:  Symptom Y/N Comments  Symptom Y/N Comments   Fever/Chills n   Chest Pain n    Poor Appetite n   Edema n    Cough y   Abdominal Pain n    Sputum n   Joint Pain n    SOB/AL n   Pruritis/Rash n    Nausea/vomit n   Tolerating PT/OT n    Diarrhea n   Tolerating Diet n    Constipation    Other       Could NOT obtain due to:      Objective:     VITALS:   Last 24hrs VS reviewed since prior progress note. Most recent are:  Patient Vitals for the past 24 hrs:   Temp Pulse Resp BP SpO2   02/13/21 0740  96 20 (!) 107/58 97 %   02/13/21 0350 98.8 °F (37.1 °C) 85 17 (!) 108/46 96 %   02/12/21 2231 98.3 °F (36.8 °C) 83 23 100/63 94 %   02/12/21 2000 98.8 °F (37.1 °C) (!) 103 23 (!) 107/51    02/12/21 1600 99.9 °F (37.7 °C) 98 19 112/76 100 %   02/12/21 1133 98.2 °F (36.8 °C) 95 21 (!) 110/43 97 %       Intake/Output Summary (Last 24 hours) at 2/13/2021 3784  Last data filed at 2/12/2021 1600  Gross per 24 hour   Intake 100 ml   Output 650 ml   Net -550 ml        I had a face to face encounter and independently examined this patient on 2/13/2021, as outlined below:  PHYSICAL EXAM:  General: WD, WN. Alert, cooperative, no acute distress    EENT:  EOMI. Anicteric sclerae. MMM  Resp:  diminished  bilaterally, no wheezing or rales. No accessory muscle use, NC  CV:  S1S2,  No edema port  in L chest wall   GI:  Soft, Non distended, Non tender. +Bowel sounds  Neurologic:  Alert and oriented X 3, normal speech,   Psych:   . Not anxious nor agitated  Skin:  No rashes.   No jaundice bruising around L oribital    Reviewed most current lab test results and cultures  YES  Reviewed most current radiology test results   YES  Review and summation of old records today    NO  Reviewed patient's current orders and MAR    YES  PMH/SH reviewed - no change compared to H&P  ________________________________________________________________________  Care Plan discussed with:    Comments   Patient x    Family      RN x    Care Manager     Consultant                        Multidiciplinary team rounds were held today with , nursing, pharmacist and clinical coordinator. Patient's plan of care was discussed; medications were reviewed and discharge planning was addressed. ________________________________________________________________________  Total NON critical care TIME: 30   Minutes    Total CRITICAL CARE TIME Spent:   Minutes non procedure based      Comments   >50% of visit spent in counseling and coordination of care     ________________________________________________________________________  Briana Verduzco NP     Procedures: see electronic medical records for all procedures/Xrays and details which were not copied into this note but were reviewed prior to creation of Plan. LABS:  I reviewed today's most current labs and imaging studies. Pertinent labs include:  Recent Labs     02/13/21  0347 02/11/21  0439 02/10/21  1541   WBC 0.2* 0.2* 0.2*   HGB 6.2* 7.7* 6.8*   HCT 18.5* 22.9* 20.2*   PLT 6* 7* 10*     Recent Labs     02/13/21  0347 02/10/21  1832 02/10/21  1541     --  136   K 3.5  --  3.7     --  104   CO2 28  --  24   *  --  109*   BUN 26*  --  17   CREA 0.73  --  0.69   CA 7.9*  --  7.9*   MG 1.7  --   --    ALB 2.7*  --  3.1*   TBILI 1.3*  --  1.1*   ALT 12  --  14   INR  --  1.2*  --        Signed: Mayra Verduzco, NP

## 2021-02-13 NOTE — PROGRESS NOTES
Received message from patient's nurse Chidi Knight stating :    pt admitted 2/10. am labs in plt of 6 and hgb of 6.2. type and cross match good until 2/14 & consent in chart     Discussion / orders: Followed by hematologist and per his note: \"Transfuse to maintain Hgb above 7 and platelet count above 10K unless she experiences significant bleeding \"    · Ordered 2 units platelet transfusion and 1 unit packed red blood cell transfusion           Please note that this note was dictated using Dragon computer voice recognition software. Quite often unanticipated grammatical, syntax, homophones, and other interpretive errors are inadvertently transcribed by the computer software. Please disregard these errors. Please excuse any errors that have escaped final proofreading.

## 2021-02-13 NOTE — PROGRESS NOTES
0700 shift change report given to Ela Radford (oncoming nurse) by Nicolasa Murrell  (offgoing nurse). Report included the following information SBAR, Kardex and Intake/Output. 1113 1/1 PRBC started    1533 pt currently receiving first infusion of platelets. blood bank stated they only had one more bag of platelets available for the pt. if more needs to be infused they would need to be ordered from the [facility] and they aren't sure if they would be available today. NP made aware     1640 second platelet transfusion started     1752 second platelet finished     1819 NP made aware pt only could receive 2 units of platelets given due to availability  from blood bank.  NP said we will monitor numbers

## 2021-02-13 NOTE — PROGRESS NOTES
Bedside and Verbal shift change report given to palmer (oncoming nurse) by Enma Pepe (offgoing nurse). Report included the following information SBAR, Kardex, Intake/Output, MAR and Cardiac Rhythm nsr/st.   0537: pt plt 6 and hgb 6.2-np made aware, awaiting orders. No s/sx of bleeding at this time will cont to monitor     0634: 1 unit of RBC and 2 unit of platelets orders    6562: blood bank called to confirm orders    End of Shift Note    Bedside shift change report given to bree (oncoming nurse) by Dusty Santa (offgoing nurse). Report included the following information SBAR, Kardex, Intake/Output, MAR, Recent Results and Cardiac Rhythm nsr    Shift worked:  night     Shift summary and any significant changes:     see above     Concerns for physician to address: H&H     Zone phone for oncminda shift:   [phone]       Activity:  Activity Level: Up with Assistance  Number times ambulated in hallways past shift: 0  Number of times OOB to chair past shift: 0  x1 to bathroom   Cardiac:   Cardiac Monitoring: Yes      Cardiac Rhythm: Normal sinus rhythm, Sinus tachycardia    Access:   Current line(s): port     Genitourinary:   Urinary status: voiding    Respiratory:   O2 Device: Nasal cannula  Chronic home O2 use?: YES  Incentive spirometer at bedside: YES     GI:  Last Bowel Movement Date: 02/13/21  Current diet:  DIET CARDIAC Regular  Passing flatus: YES  Tolerating current diet: YES  % Diet Eaten: 80 %    Pain Management:   Patient states pain is manageable on current regimen: YES    Skin:  Nikolas Score: 18  Interventions: increase time out of bed, PT/OT consult and internal/external urinary devices    Patient Safety:  Fall Score:  Total Score: 3  Interventions: bed/chair alarm, assistive device (walker, cane, etc), gripper socks and pt to call before getting OOB  High Fall Risk: Yes    Length of Stay:  Expected LOS: 4d 19h  Actual LOS: [address]

## 2021-02-14 LAB
ALBUMIN SERPL-MCNC: 3 G/DL (ref 3.5–5)
ALBUMIN/GLOB SERPL: 0.7 {RATIO} (ref 1.1–2.2)
ALP SERPL-CCNC: 91 U/L (ref 45–117)
ALT SERPL-CCNC: 16 U/L (ref 12–78)
ANION GAP SERPL CALC-SCNC: 6 MMOL/L (ref 5–15)
ANION GAP SERPL CALC-SCNC: 9 MMOL/L (ref 5–15)
AST SERPL-CCNC: 10 U/L (ref 15–37)
BASOPHILS # BLD: 0 K/UL (ref 0–0.1)
BASOPHILS # BLD: 0 K/UL (ref 0–0.1)
BASOPHILS NFR BLD: 0 % (ref 0–1)
BASOPHILS NFR BLD: 0 % (ref 0–1)
BILIRUB SERPL-MCNC: 0.8 MG/DL (ref 0.2–1)
BLD PROD TYP BPU: NORMAL
BLD PROD TYP BPU: NORMAL
BPU ID: NORMAL
BPU ID: NORMAL
BUN SERPL-MCNC: 19 MG/DL (ref 6–20)
BUN SERPL-MCNC: 22 MG/DL (ref 6–20)
BUN/CREAT SERPL: 34 (ref 12–20)
BUN/CREAT SERPL: 70 (ref 12–20)
CALCIUM SERPL-MCNC: 5.9 MG/DL (ref 8.5–10.1)
CALCIUM SERPL-MCNC: 8.8 MG/DL (ref 8.5–10.1)
CHLORIDE SERPL-SCNC: 105 MMOL/L (ref 97–108)
CHLORIDE SERPL-SCNC: 115 MMOL/L (ref 97–108)
CO2 SERPL-SCNC: 20 MMOL/L (ref 21–32)
CO2 SERPL-SCNC: 27 MMOL/L (ref 21–32)
CREAT SERPL-MCNC: 0.27 MG/DL (ref 0.55–1.02)
CREAT SERPL-MCNC: 0.65 MG/DL (ref 0.55–1.02)
DIFFERENTIAL METHOD BLD: ABNORMAL
DIFFERENTIAL METHOD BLD: ABNORMAL
EOSINOPHIL # BLD: 0 K/UL (ref 0–0.4)
EOSINOPHIL # BLD: 0 K/UL (ref 0–0.4)
EOSINOPHIL NFR BLD: 0 % (ref 0–7)
EOSINOPHIL NFR BLD: 0 % (ref 0–7)
ERYTHROCYTE [DISTWIDTH] IN BLOOD BY AUTOMATED COUNT: 13.5 % (ref 11.5–14.5)
ERYTHROCYTE [DISTWIDTH] IN BLOOD BY AUTOMATED COUNT: 14.1 % (ref 11.5–14.5)
GLOBULIN SER CALC-MCNC: 4.6 G/DL (ref 2–4)
GLUCOSE SERPL-MCNC: 118 MG/DL (ref 65–100)
GLUCOSE SERPL-MCNC: 77 MG/DL (ref 65–100)
HCT VFR BLD AUTO: 17.6 % (ref 35–47)
HCT VFR BLD AUTO: 28.6 % (ref 35–47)
HGB BLD-MCNC: 5.6 G/DL (ref 11.5–16)
HGB BLD-MCNC: 9.6 G/DL (ref 11.5–16)
HISTORY CHECKED?,CKHIST: NORMAL
IMM GRANULOCYTES # BLD AUTO: 0 K/UL (ref 0–0.04)
IMM GRANULOCYTES # BLD AUTO: 0 K/UL (ref 0–0.04)
IMM GRANULOCYTES NFR BLD AUTO: 0 % (ref 0–0.5)
IMM GRANULOCYTES NFR BLD AUTO: 0 % (ref 0–0.5)
LACTATE SERPL-SCNC: 1.7 MMOL/L (ref 0.4–2)
LYMPHOCYTES # BLD: 0.2 K/UL (ref 0.8–3.5)
LYMPHOCYTES # BLD: 0.4 K/UL (ref 0.8–3.5)
LYMPHOCYTES NFR BLD: 86 % (ref 12–49)
LYMPHOCYTES NFR BLD: 94 % (ref 12–49)
MCH RBC QN AUTO: 28.8 PG (ref 26–34)
MCH RBC QN AUTO: 29.5 PG (ref 26–34)
MCHC RBC AUTO-ENTMCNC: 31.8 G/DL (ref 30–36.5)
MCHC RBC AUTO-ENTMCNC: 33.6 G/DL (ref 30–36.5)
MCV RBC AUTO: 85.9 FL (ref 80–99)
MCV RBC AUTO: 92.6 FL (ref 80–99)
MONOCYTES # BLD: 0 K/UL (ref 0–1)
MONOCYTES # BLD: 0 K/UL (ref 0–1)
MONOCYTES NFR BLD: 2 % (ref 5–13)
MONOCYTES NFR BLD: 5 % (ref 5–13)
NEUTS SEG # BLD: 0 K/UL (ref 1.8–8)
NEUTS SEG # BLD: 0 K/UL (ref 1.8–8)
NEUTS SEG NFR BLD: 2 % (ref 32–75)
NEUTS SEG NFR BLD: 9 % (ref 32–75)
NRBC # BLD: 0 K/UL (ref 0–0.01)
NRBC # BLD: 0 K/UL (ref 0–0.01)
NRBC BLD-RTO: 0 PER 100 WBC
NRBC BLD-RTO: 0 PER 100 WBC
OTHER CELLS NFR BLD MANUAL: 2 %
PLATELET # BLD AUTO: 10 K/UL (ref 150–400)
PLATELET # BLD AUTO: 14 K/UL (ref 150–400)
PLATELET COMMENTS,PCOM: ABNORMAL
PMV BLD AUTO: 11.1 FL (ref 8.9–12.9)
PMV BLD AUTO: 11.4 FL (ref 8.9–12.9)
POTASSIUM SERPL-SCNC: 2.7 MMOL/L (ref 3.5–5.1)
POTASSIUM SERPL-SCNC: 4 MMOL/L (ref 3.5–5.1)
PROT SERPL-MCNC: 7.6 G/DL (ref 6.4–8.2)
RBC # BLD AUTO: 1.9 M/UL (ref 3.8–5.2)
RBC # BLD AUTO: 3.33 M/UL (ref 3.8–5.2)
RBC MORPH BLD: ABNORMAL
RBC MORPH BLD: ABNORMAL
SODIUM SERPL-SCNC: 138 MMOL/L (ref 136–145)
SODIUM SERPL-SCNC: 144 MMOL/L (ref 136–145)
STATUS OF UNIT,%ST: NORMAL
STATUS OF UNIT,%ST: NORMAL
TOTAL CELLS COUNTED SPEC: 50
UNIT DIVISION, %UDIV: 0
UNIT DIVISION, %UDIV: 0
WBC # BLD AUTO: 0.2 K/UL (ref 3.6–11)
WBC # BLD AUTO: 0.4 K/UL (ref 3.6–11)

## 2021-02-14 PROCEDURE — 74011250637 HC RX REV CODE- 250/637: Performed by: GENERAL ACUTE CARE HOSPITAL

## 2021-02-14 PROCEDURE — P9016 RBC LEUKOCYTES REDUCED: HCPCS

## 2021-02-14 PROCEDURE — 74011250636 HC RX REV CODE- 250/636: Performed by: NURSE PRACTITIONER

## 2021-02-14 PROCEDURE — 74011250637 HC RX REV CODE- 250/637: Performed by: NURSE PRACTITIONER

## 2021-02-14 PROCEDURE — 80053 COMPREHEN METABOLIC PANEL: CPT

## 2021-02-14 PROCEDURE — 36430 TRANSFUSION BLD/BLD COMPNT: CPT

## 2021-02-14 PROCEDURE — 74011000258 HC RX REV CODE- 258: Performed by: GENERAL ACUTE CARE HOSPITAL

## 2021-02-14 PROCEDURE — 74011250636 HC RX REV CODE- 250/636: Performed by: GENERAL ACUTE CARE HOSPITAL

## 2021-02-14 PROCEDURE — 87040 BLOOD CULTURE FOR BACTERIA: CPT

## 2021-02-14 PROCEDURE — 85025 COMPLETE CBC W/AUTO DIFF WBC: CPT

## 2021-02-14 PROCEDURE — 74011000258 HC RX REV CODE- 258: Performed by: NURSE PRACTITIONER

## 2021-02-14 PROCEDURE — 36415 COLL VENOUS BLD VENIPUNCTURE: CPT

## 2021-02-14 PROCEDURE — 65660000000 HC RM CCU STEPDOWN

## 2021-02-14 PROCEDURE — 83605 ASSAY OF LACTIC ACID: CPT

## 2021-02-14 PROCEDURE — 77010033678 HC OXYGEN DAILY

## 2021-02-14 RX ORDER — POTASSIUM CHLORIDE 750 MG/1
40 TABLET, FILM COATED, EXTENDED RELEASE ORAL EVERY 4 HOURS
Status: COMPLETED | OUTPATIENT
Start: 2021-02-14 | End: 2021-02-14

## 2021-02-14 RX ORDER — DIPHENHYDRAMINE HYDROCHLORIDE 50 MG/ML
25 INJECTION, SOLUTION INTRAMUSCULAR; INTRAVENOUS ONCE
Status: ACTIVE | OUTPATIENT
Start: 2021-02-14 | End: 2021-02-14

## 2021-02-14 RX ORDER — SODIUM CHLORIDE 9 MG/ML
250 INJECTION, SOLUTION INTRAVENOUS AS NEEDED
Status: DISCONTINUED | OUTPATIENT
Start: 2021-02-14 | End: 2021-02-20 | Stop reason: HOSPADM

## 2021-02-14 RX ORDER — POTASSIUM CHLORIDE 7.45 MG/ML
10 INJECTION INTRAVENOUS
Status: DISCONTINUED | OUTPATIENT
Start: 2021-02-14 | End: 2021-02-14

## 2021-02-14 RX ORDER — ACETAMINOPHEN 325 MG/1
650 TABLET ORAL ONCE
Status: ACTIVE | OUTPATIENT
Start: 2021-02-14 | End: 2021-02-14

## 2021-02-14 RX ADMIN — POTASSIUM CHLORIDE 40 MEQ: 750 TABLET, FILM COATED, EXTENDED RELEASE ORAL at 12:09

## 2021-02-14 RX ADMIN — MULTIPLE VITAMINS W/ MINERALS TAB 1 TABLET: TAB at 09:16

## 2021-02-14 RX ADMIN — PANTOPRAZOLE SODIUM 40 MG: 40 TABLET, DELAYED RELEASE ORAL at 16:17

## 2021-02-14 RX ADMIN — PANTOPRAZOLE SODIUM 40 MG: 40 TABLET, DELAYED RELEASE ORAL at 08:12

## 2021-02-14 RX ADMIN — CALCIUM GLUCONATE 3 G: 98 INJECTION, SOLUTION INTRAVENOUS at 13:33

## 2021-02-14 RX ADMIN — DICYCLOMINE HYDROCHLORIDE 10 MG: 10 CAPSULE ORAL at 17:07

## 2021-02-14 RX ADMIN — FOLIC ACID 1 MG: 1 TABLET ORAL at 08:12

## 2021-02-14 RX ADMIN — DULOXETINE HYDROCHLORIDE 30 MG: 30 CAPSULE, DELAYED RELEASE ORAL at 08:13

## 2021-02-14 RX ADMIN — CEFEPIME HYDROCHLORIDE 2 G: 2 INJECTION, POWDER, FOR SOLUTION INTRAVENOUS at 12:51

## 2021-02-14 RX ADMIN — POTASSIUM CHLORIDE 40 MEQ: 750 TABLET, FILM COATED, EXTENDED RELEASE ORAL at 08:12

## 2021-02-14 RX ADMIN — ALLOPURINOL 300 MG: 300 TABLET ORAL at 08:12

## 2021-02-14 RX ADMIN — FUROSEMIDE 40 MG: 10 INJECTION, SOLUTION INTRAVENOUS at 08:12

## 2021-02-14 RX ADMIN — CEFEPIME HYDROCHLORIDE 2 G: 2 INJECTION, POWDER, FOR SOLUTION INTRAVENOUS at 21:08

## 2021-02-14 RX ADMIN — DILTIAZEM HYDROCHLORIDE 240 MG: 240 CAPSULE, COATED, EXTENDED RELEASE ORAL at 08:13

## 2021-02-14 RX ADMIN — ONDANSETRON 4 MG: 2 INJECTION INTRAMUSCULAR; INTRAVENOUS at 17:08

## 2021-02-14 RX ADMIN — PIPERACILLIN AND TAZOBACTAM 3.38 G: 3; .375 INJECTION, POWDER, LYOPHILIZED, FOR SOLUTION INTRAVENOUS at 03:00

## 2021-02-14 RX ADMIN — Medication 1.5 MG: at 22:28

## 2021-02-14 RX ADMIN — Medication 10 ML: at 22:29

## 2021-02-14 RX ADMIN — Medication 10 ML: at 13:33

## 2021-02-14 RX ADMIN — DICYCLOMINE HYDROCHLORIDE 10 MG: 10 CAPSULE ORAL at 08:12

## 2021-02-14 NOTE — PROGRESS NOTES
Problem: Falls - Risk of  Goal: *Absence of Falls  Description: Document Clydene Bone Fall Risk and appropriate interventions in the flowsheet. Outcome: Progressing Towards Goal  Note: Fall Risk Interventions:  Mobility Interventions: Assess mobility with egress test, Bed/chair exit alarm, Communicate number of staff needed for ambulation/transfer, Patient to call before getting OOB, Utilize walker, cane, or other assistive device         Medication Interventions: Assess postural VS orthostatic hypotension, Bed/chair exit alarm, Evaluate medications/consider consulting pharmacy, Patient to call before getting OOB, Teach patient to arise slowly    Elimination Interventions: Bed/chair exit alarm, Call light in reach, Patient to call for help with toileting needs, Stay With Me (per policy), Toileting schedule/hourly rounds              Problem: Patient Education: Go to Patient Education Activity  Goal: Patient/Family Education  Outcome: Progressing Towards Goal     Problem: Breathing Pattern - Ineffective  Goal: *Absence of hypoxia  Outcome: Progressing Towards Goal  Goal: *Use of effective breathing techniques  Outcome: Progressing Towards Goal  Goal: *PALLIATIVE CARE:  Alleviation of Dyspnea  Outcome: Progressing Towards Goal     Problem: Patient Education: Go to Patient Education Activity  Goal: Patient/Family Education  Outcome: Progressing Towards Goal     Problem: Pressure Injury - Risk of  Goal: *Prevention of pressure injury  Description: Document Nikolas Scale and appropriate interventions in the flowsheet.   Outcome: Progressing Towards Goal  Note: Pressure Injury Interventions:  Sensory Interventions: Assess changes in LOC, Avoid rigorous massage over bony prominences, Check visual cues for pain, Float heels, Keep linens dry and wrinkle-free, Maintain/enhance activity level, Minimize linen layers, Pressure redistribution bed/mattress (bed type)    Moisture Interventions: Absorbent underpads, Apply protective barrier, creams and emollients, Maintain skin hydration (lotion/cream), Limit adult briefs, Minimize layers, Offer toileting Q_hr    Activity Interventions: Assess need for specialty bed, Increase time out of bed, Pressure redistribution bed/mattress(bed type)    Mobility Interventions: Assess need for specialty bed, Float heels, HOB 30 degrees or less    Nutrition Interventions: Document food/fluid/supplement intake, Offer support with meals,snacks and hydration    Friction and Shear Interventions: Apply protective barrier, creams and emollients, HOB 30 degrees or less, Lift sheet                Problem: Patient Education: Go to Patient Education Activity  Goal: Patient/Family Education  Outcome: Progressing Towards Goal

## 2021-02-14 NOTE — PROGRESS NOTES
0700H- Bedside and Verbal shift change report given to LOLITA (oncoming nurse) by CELINA(offgoing nurse). Report included the following information SBAR, Kardex, ED Summary, Intake/Output, MAR and Recent Results. With ongoing one unit PRBC at 125 ml/hr infusing well. No reaction noted. 3281S- One unit of PRBC was consumed. No immediate reaction noted. Hemodynamically stable. 1017H- Hooked one unit PRBC properly typed and crossmatched. No immediate reaction noted. Hemodynamically stable. 1245h- One unit of PRBC was consumed. No immediate reaction noted. Hemodynamically stable. End of Shift Note    Bedside shift change report given to Jason Diogo Mobley (oncoming nurse) by Yanelis Brizuela (offgoing nurse). Report included the following information SBAR, Kardex, ED Summary, Procedure Summary, Intake/Output, MAR and Recent Results    Shift worked:  0700- 1900     Shift summary and any significant changes:     CALCIUM LOW     Concerns for physician to address:  XXX     Zone phone for oncoming shift:  XXX       Activity:  Activity Level: Bed Rest, Up with Assistance  Number times ambulated in hallways past shift: 1  Number of times OOB to chair past shift: 3    Cardiac:   Cardiac Monitoring: Yes      Cardiac Rhythm: Normal sinus rhythm    Access:   Current line(s): port     Genitourinary:   Urinary status: voiding    Respiratory:   O2 Device: Nasal cannula  Chronic home O2 use?: YES  Incentive spirometer at bedside: N/A     GI:  Last Bowel Movement Date: 02/14/21  Current diet:  DIET CARDIAC Regular  Passing flatus: YES  Tolerating current diet: YES  % Diet Eaten: 80 %    Pain Management:   Patient states pain is manageable on current regimen: YES    Skin:  Nikolas Score: 18  Interventions: increase time out of bed    Patient Safety:  Fall Score:  Total Score: 3  Interventions: pt to call before getting OOB  High Fall Risk: Yes    Length of Stay:  Expected LOS: 4d 19h  Actual LOS: [address] [facility] [address]  [address] [place]

## 2021-02-14 NOTE — PROGRESS NOTES
1900: Bedside and Verbal shift change report given to Jhonatan Strange RN (oncoming nurse) by Naheed Red RN (offgoing nurse). Report included the following information SBAR, Kardex, Intake/Output, MAR, Recent Results, Med Rec Status and Cardiac Rhythm NSR.     0515: CRITICAL LAB     WBC: 0.2   HGB: 5.6   HCT: 17.6   PLT: 14     NP Paged    0600: NP ordered 2 units of blood, 10mEq IV potassium,and calcium IV. NP said give blood first then in between give electrolytes. Will continue to monitor. 0730 02/14/2021: End of Shift Note  End of Shift Note    Bedside shift change report given to Dennis Joseph RN (oncoming nurse) by Jhonatan Strange (offgoing nurse). Report included the following information SBAR, Kardex, Intake/Output, MAR, Recent Results, Med Rec Status and Cardiac Rhythm NSR    Shift worked:  8711-4628     Shift summary and any significant changes:     WBC: 0.2   HGB: 5.6   HCT: 17.6   PLT: 14      Concerns for physician to address:  NONE     Zone phone for oncoming shift:   [phone]       Activity:  Activity Level: Up with Assistance  Number times ambulated in hallways past shift: 0  Number of times OOB to chair past shift: 2    Cardiac:   Cardiac Monitoring: Yes      Cardiac Rhythm: Normal sinus rhythm    Access:   Current line(s): port     Genitourinary:   Urinary status: voiding    Respiratory:   O2 Device: Nasal cannula  Chronic home O2 use?: YES  Incentive spirometer at bedside: YES     GI:  Last Bowel Movement Date: 02/13/21  Current diet:  DIET CARDIAC Regular  Passing flatus: YES  Tolerating current diet: YES  % Diet Eaten: 80 %    Pain Management:   Patient states pain is manageable on current regimen: YES    Skin:  Nikolas Score: 18  Interventions: float heels, increase time out of bed, PT/OT consult and nutritional support     Patient Safety:  Fall Score:  Total Score: 3  Interventions: assistive device (walker, cane, etc), gripper socks, pt to call before getting OOB and stay with me (per policy)  High Fall Risk: Yes    Length of Stay:  Expected LOS: 4d 19h  Actual LOS: [address]

## 2021-02-14 NOTE — PROGRESS NOTES
Received message from patient's nurse Prashanth Stone stating :    CRITICAL LAB    WBC: 0.2    HGB: 5.6    HCT: 17.6    PLT: 14    POTASSIUM 2.7     CALCIUM: 5.9       Discussion / orders:    · Transfuse 2 units PRBC- premedicate with tylenol and benadryl  · Potassium chloride 10 mEq IV x6  · Calcium gluconate 3 g IV x1 to be infused over 3 hours           Please note that this note was dictated using Dragon computer voice recognition software. Quite often unanticipated grammatical, syntax, homophones, and other interpretive errors are inadvertently transcribed by the computer software. Please disregard these errors. Please excuse any errors that have escaped final proofreading.

## 2021-02-14 NOTE — PROGRESS NOTES
I reviewed pertinent labs and imaging, and discussed /agreed on the plan of care with Dr. Clarisa Cuenca      Hospitalist Progress Note    NAME: Jone Zarate   :  1935   MRN:  [id number]       Assessment / Plan:  7924 notified by RN recent labs , reviewed        Hypokalemia  -K 2.7 will replete  - check labs at 1400    hypocalcemia  - calcuim 5.9   - will add Calcium gluconate 3 gm today   - labs q 12 hr     CML/mild dysplastic syndrome  Thrombocytopenia  Anemia  Breast cancer  -s/p 2 units platelet  again on    -Platelet 10 on arrival repeat 7 then 6 today at 14  - appreciate input by Hem/Onc team recc keep plts above 10 , Hgb greater than 7    - rec'd PRBC  for hgb 6.2 - today hgb 5.6 will get PRBC   - no overt sign of bleeding noted   - monitor q12 hrs    Neutropenic fever  Receiving Chemo at [facility]   - afebrile today x 2 days   - feels better   - BC + for GNR and GPC in 2/ bottles   - cont  Zosyn / Vanc   - will repeat BC     Diverticulitis  - presented with Nausea , lower abd pain ,  - Hx of diverticulitis   -appreciate GI input - no intervention at this time     - CT IMPRESSION  1. Findings consistent with acute diverticulitis involving the sigmoid colon. No evidence of abscess or perforation. Possible second focus of acute  diverticulitis in the distal descending colon. 2.  Questionable mild inflammatory changes around the distal esophagus at the GE  junction. This may represent esophagitis. 3.  Changes suggestive of chronic pancreatitis. Questionable small gallstone  within gallbladder without signs of acute cholecystitis     Sepsis - presented with Temp 103.2 ,  R 25  - Lactic 0.60 to 1.7   - afebrile HR 80-90   - BC E.coli noted     Acute hypoxic respiratory failure secondary to fluid overload versus TRALI  - required Bipap initially , received 40 mg Lasix   - on NC a 3 L  Baseline  O2 at night only   - sats at 95%            / Body mass index is 31.47 kg/m².     Code status: Full  Prophylaxis: SCD's  Recommended Disposition: Home w/Family     Subjective:     Chief Complaint / Reason for Physician Visit  \"I am suppose to have Chemo on Monday \". Discussed with RN events overnight. Discussed POC with pt in regards to Platelets and fever  Agree to current management     Review of Systems:  Symptom Y/N Comments  Symptom Y/N Comments   Fever/Chills n   Chest Pain n    Poor Appetite n   Edema n    Cough y   Abdominal Pain n    Sputum n   Joint Pain n    SOB/AL n   Pruritis/Rash n    Nausea/vomit n   Tolerating PT/OT n    Diarrhea n   Tolerating Diet n    Constipation    Other       Could NOT obtain due to:      Objective:     VITALS:   Last 24hrs VS reviewed since prior progress note.  Most recent are:  Patient Vitals for the past 24 hrs:   Temp Pulse Resp BP SpO2   02/14/21 1044 98.1 °F (36.7 °C) 72 20 (!) 91/54 96 %   02/14/21 1015 98.5 °F (36.9 °C) 72 20 (!) 112/57 94 %   02/14/21 0956 97.8 °F (36.6 °C) 73 15 110/78 96 %   02/14/21 0852 98.7 °F (37.1 °C) 82 16 (!) 114/41 96 %   02/14/21 0746 97.7 °F (36.5 °C) 82 15 (!) 113/93 96 %   02/14/21 0649 98.5 °F (36.9 °C) 77 13 (!) 121/43 99 %   02/14/21 0419 98.5 °F (36.9 °C) 83 28 (!) 128/41 94 %   02/14/21 0028 98.4 °F (36.9 °C) 74 24 (!) 113/46 96 %   02/13/21 1945 98.3 °F (36.8 °C) 83 24 116/70 100 %   02/13/21 1715 98.2 °F (36.8 °C) 88 20 125/62 (!) 89 %   02/13/21 1700 97.9 °F (36.6 °C) 93 18 112/88 (!) 82 %   02/13/21 1645  73 29 122/70    02/13/21 1637 97.9 °F (36.6 °C) 73 15 121/63 90 %   02/13/21 1630  70 21 (!) 113/56 92 %   02/13/21 1615  70 24 (!) 117/52 95 %   02/13/21 1600  70 25 (!) 107/46 95 %   02/13/21 1545 98.2 °F (36.8 °C) 70 23 98/63 93 %   02/13/21 1530 98.2 °F (36.8 °C) 68 19 (!) 103/49 100 %   02/13/21 1525 98.1 °F (36.7 °C) 67 20 (!) 98/51 100 %   02/13/21 1515  67 21 (!) 92/47 100 %   02/13/21 1500    (!) 113/47    02/13/21 1445  74 18  100 %   02/13/21 1430  74 19 (!) 110/48 100 %   02/13/21 1415  72 21  100 %   02/13/21 1400 97.8 °F (36.6 °C) 75 25 (!) 108/44 100 %   02/13/21 1345  76 20  100 %   02/13/21 1330 98.1 °F (36.7 °C) 82 21 (!) 97/55 100 %   02/13/21 1300 97.8 °F (36.6 °C) 72 20 (!) 106/55 100 %   02/13/21 1245  75 19 (!) 103/49 98 %   02/13/21 1230 97.8 °F (36.6 °C) 79 26 (!) 113/47 99 %   02/13/21 1215 98.1 °F (36.7 °C) 85 19 (!) 115/57 94 %   02/13/21 1200  91 24  100 %   02/13/21 1145 97.8 °F (36.6 °C) 74 24 110/70 97 %   02/13/21 1130 97.7 °F (36.5 °C) 78 24 108/80 97 %   02/13/21 1115  82 22  95 %   02/13/21 1113 97.6 °F (36.4 °C) 75 20 (!) 111/52 95 %       Intake/Output Summary (Last 24 hours) at 2/14/2021 1103  Last data filed at 2/14/2021 0956  Gross per 24 hour   Intake 987.8 ml   Output    Net 987.8 ml        I had a face to face encounter and independently examined this patient on 2/14/2021, as outlined below:  PHYSICAL EXAM:  General: WD, WN. Alert, cooperative, no acute distress    EENT:  EOMI. Anicteric sclerae. MMM  Resp:  diminished  bilaterally, no wheezing or rales. No accessory muscle use, NC  CV:  S1S2,  No edema port  in L chest wall   GI:  Soft, Non distended, Non tender. +Bowel sounds  Neurologic:  Alert and oriented X 3, normal speech,   Psych:   . Not anxious nor agitated  Skin:  No rashes. No jaundice bruising around L oribital    Reviewed most current lab test results and cultures  YES  Reviewed most current radiology test results   YES  Review and summation of old records today    NO  Reviewed patient's current orders and MAR    YES  PMH/SH reviewed - no change compared to H&P  ________________________________________________________________________  Care Plan discussed with:    Comments   Patient x    Family      RN x    Care Manager     Consultant                        Multidiciplinary team rounds were held today with , nursing, pharmacist and clinical coordinator.   Patient's plan of care was discussed; medications were reviewed and discharge planning was addressed. ________________________________________________________________________  Total NON critical care TIME: 30   Minutes    Total CRITICAL CARE TIME Spent:   Minutes non procedure based      Comments   >50% of visit spent in counseling and coordination of care     ________________________________________________________________________  Zeeland Lappuneet. Sabina Ochoa NP     Procedures: see electronic medical records for all procedures/Xrays and details which were not copied into this note but were reviewed prior to creation of Plan. LABS:  I reviewed today's most current labs and imaging studies. Pertinent labs include:  Recent Labs     02/14/21 0307 02/13/21 0347   WBC 0.2* 0.2*   HGB 5.6* 6.2*   HCT 17.6* 18.5*   PLT 14* 6*     Recent Labs     02/14/21 0307 02/13/21 0347    138   K 2.7* 3.5   * 105   CO2 20* 28   GLU 77 102*   BUN 19 26*   CREA 0.27* 0.73   CA 5.9* 7.9*   MG  --  1.7   ALB  --  2.7*   TBILI  --  1.3*   ALT  --  12       Signed: Mayra Ochoa, RENATO

## 2021-02-15 LAB
ABO + RH BLD: NORMAL
ALBUMIN SERPL-MCNC: 2.9 G/DL (ref 3.5–5)
ALBUMIN/GLOB SERPL: 0.7 {RATIO} (ref 1.1–2.2)
ALP SERPL-CCNC: 93 U/L (ref 45–117)
ALT SERPL-CCNC: 15 U/L (ref 12–78)
ANION GAP SERPL CALC-SCNC: 6 MMOL/L (ref 5–15)
AST SERPL-CCNC: 6 U/L (ref 15–37)
BILIRUB SERPL-MCNC: 1 MG/DL (ref 0.2–1)
BLD PROD TYP BPU: NORMAL
BLOOD GROUP ANTIBODIES SERPL: NORMAL
BNP SERPL-MCNC: 4337 PG/ML
BPU ID: NORMAL
BUN SERPL-MCNC: 20 MG/DL (ref 6–20)
BUN/CREAT SERPL: 28 (ref 12–20)
CALCIUM SERPL-MCNC: 8.4 MG/DL (ref 8.5–10.1)
CHLORIDE SERPL-SCNC: 105 MMOL/L (ref 97–108)
CO2 SERPL-SCNC: 27 MMOL/L (ref 21–32)
CREAT SERPL-MCNC: 0.72 MG/DL (ref 0.55–1.02)
CROSSMATCH RESULT,%XM: NORMAL
ERYTHROCYTE [DISTWIDTH] IN BLOOD BY AUTOMATED COUNT: 14 % (ref 11.5–14.5)
GLOBULIN SER CALC-MCNC: 4.2 G/DL (ref 2–4)
GLUCOSE SERPL-MCNC: 128 MG/DL (ref 65–100)
HCT VFR BLD AUTO: 26.6 % (ref 35–47)
HGB BLD-MCNC: 8.9 G/DL (ref 11.5–16)
MCH RBC QN AUTO: 28.8 PG (ref 26–34)
MCHC RBC AUTO-ENTMCNC: 33.5 G/DL (ref 30–36.5)
MCV RBC AUTO: 86.1 FL (ref 80–99)
NRBC # BLD: 0 K/UL (ref 0–0.01)
NRBC BLD-RTO: 0 PER 100 WBC
PLATELET # BLD AUTO: 6 K/UL (ref 150–400)
PMV BLD AUTO: 11 FL (ref 8.9–12.9)
POTASSIUM SERPL-SCNC: 3.9 MMOL/L (ref 3.5–5.1)
PROT SERPL-MCNC: 7.1 G/DL (ref 6.4–8.2)
RBC # BLD AUTO: 3.09 M/UL (ref 3.8–5.2)
SODIUM SERPL-SCNC: 138 MMOL/L (ref 136–145)
SPECIMEN EXP DATE BLD: NORMAL
STATUS OF UNIT,%ST: NORMAL
UNIT DIVISION, %UDIV: 0
WBC # BLD AUTO: 0.3 K/UL (ref 3.6–11)

## 2021-02-15 PROCEDURE — 83880 ASSAY OF NATRIURETIC PEPTIDE: CPT

## 2021-02-15 PROCEDURE — 74011250636 HC RX REV CODE- 250/636: Performed by: GENERAL ACUTE CARE HOSPITAL

## 2021-02-15 PROCEDURE — 74011250636 HC RX REV CODE- 250/636: Performed by: NURSE PRACTITIONER

## 2021-02-15 PROCEDURE — 65660000000 HC RM CCU STEPDOWN

## 2021-02-15 PROCEDURE — 77010033678 HC OXYGEN DAILY

## 2021-02-15 PROCEDURE — 74011000258 HC RX REV CODE- 258: Performed by: GENERAL ACUTE CARE HOSPITAL

## 2021-02-15 PROCEDURE — 80053 COMPREHEN METABOLIC PANEL: CPT

## 2021-02-15 PROCEDURE — 74011250637 HC RX REV CODE- 250/637: Performed by: GENERAL ACUTE CARE HOSPITAL

## 2021-02-15 PROCEDURE — 85027 COMPLETE CBC AUTOMATED: CPT

## 2021-02-15 PROCEDURE — 36415 COLL VENOUS BLD VENIPUNCTURE: CPT

## 2021-02-15 PROCEDURE — 2709999900 HC NON-CHARGEABLE SUPPLY

## 2021-02-15 PROCEDURE — P9035 PLATELET PHERES LEUKOREDUCED: HCPCS

## 2021-02-15 PROCEDURE — 74011250636 HC RX REV CODE- 250/636: Performed by: INTERNAL MEDICINE

## 2021-02-15 PROCEDURE — 36430 TRANSFUSION BLD/BLD COMPNT: CPT

## 2021-02-15 RX ORDER — FUROSEMIDE 10 MG/ML
60 INJECTION INTRAMUSCULAR; INTRAVENOUS ONCE
Status: COMPLETED | OUTPATIENT
Start: 2021-02-15 | End: 2021-02-15

## 2021-02-15 RX ORDER — FUROSEMIDE 10 MG/ML
40 INJECTION INTRAMUSCULAR; INTRAVENOUS 2 TIMES DAILY
Status: DISCONTINUED | OUTPATIENT
Start: 2021-02-15 | End: 2021-02-19

## 2021-02-15 RX ORDER — DIPHENHYDRAMINE HYDROCHLORIDE 50 MG/ML
25 INJECTION, SOLUTION INTRAMUSCULAR; INTRAVENOUS ONCE
Status: COMPLETED | OUTPATIENT
Start: 2021-02-15 | End: 2021-02-15

## 2021-02-15 RX ORDER — SODIUM CHLORIDE 9 MG/ML
250 INJECTION, SOLUTION INTRAVENOUS AS NEEDED
Status: DISCONTINUED | OUTPATIENT
Start: 2021-02-15 | End: 2021-02-19

## 2021-02-15 RX ADMIN — MULTIPLE VITAMINS W/ MINERALS TAB 1 TABLET: TAB at 08:27

## 2021-02-15 RX ADMIN — DILTIAZEM HYDROCHLORIDE 240 MG: 240 CAPSULE, COATED, EXTENDED RELEASE ORAL at 08:27

## 2021-02-15 RX ADMIN — DIPHENHYDRAMINE HYDROCHLORIDE 25 MG: 50 INJECTION, SOLUTION INTRAMUSCULAR; INTRAVENOUS at 17:06

## 2021-02-15 RX ADMIN — Medication 10 ML: at 21:48

## 2021-02-15 RX ADMIN — CEFEPIME HYDROCHLORIDE 2 G: 2 INJECTION, POWDER, FOR SOLUTION INTRAVENOUS at 12:17

## 2021-02-15 RX ADMIN — CEFEPIME HYDROCHLORIDE 2 G: 2 INJECTION, POWDER, FOR SOLUTION INTRAVENOUS at 21:45

## 2021-02-15 RX ADMIN — PANTOPRAZOLE SODIUM 40 MG: 40 TABLET, DELAYED RELEASE ORAL at 08:27

## 2021-02-15 RX ADMIN — ALLOPURINOL 300 MG: 300 TABLET ORAL at 08:27

## 2021-02-15 RX ADMIN — Medication 10 ML: at 14:00

## 2021-02-15 RX ADMIN — DICYCLOMINE HYDROCHLORIDE 10 MG: 10 CAPSULE ORAL at 17:15

## 2021-02-15 RX ADMIN — PANTOPRAZOLE SODIUM 40 MG: 40 TABLET, DELAYED RELEASE ORAL at 17:15

## 2021-02-15 RX ADMIN — CEFEPIME HYDROCHLORIDE 2 G: 2 INJECTION, POWDER, FOR SOLUTION INTRAVENOUS at 04:47

## 2021-02-15 RX ADMIN — FUROSEMIDE 60 MG: 10 INJECTION, SOLUTION INTRAMUSCULAR; INTRAVENOUS at 08:28

## 2021-02-15 RX ADMIN — DULOXETINE HYDROCHLORIDE 30 MG: 30 CAPSULE, DELAYED RELEASE ORAL at 08:27

## 2021-02-15 RX ADMIN — TBO-FILGRASTIM 480 MCG: 480 INJECTION, SOLUTION SUBCUTANEOUS at 17:48

## 2021-02-15 RX ADMIN — Medication 1.5 MG: at 21:47

## 2021-02-15 RX ADMIN — DICYCLOMINE HYDROCHLORIDE 10 MG: 10 CAPSULE ORAL at 08:27

## 2021-02-15 RX ADMIN — FOLIC ACID 1 MG: 1 TABLET ORAL at 08:28

## 2021-02-15 RX ADMIN — ONDANSETRON 4 MG: 2 INJECTION INTRAMUSCULAR; INTRAVENOUS at 10:47

## 2021-02-15 RX ADMIN — Medication 10 ML: at 06:19

## 2021-02-15 NOTE — PROGRESS NOTES
0700 shift change report given to Ellen Walker (oncoming nurse) by Goran Teixeira (offgoing nurse). Report included the following information SBAR, Kardex and Intake/Output. 1017 blood bank called regarding platelets. Blood bank stated they will need to order platelets from blood supplier and will call when the platelets are available.      [phone] pt taken to transferring floor

## 2021-02-15 NOTE — PROGRESS NOTES
I reviewed pertinent labs and imaging, and discussed /agreed on the plan of care with Dr. Ruth Newby      Hospitalist Progress Note    NAME: Stella Mcdonald   :  1935   MRN:  [id number]       Assessment / Plan:  1530 notified by RN recent labs , reviewed        Hypokalemia  -K 2.7 yesterday repeated labs 4.0 lat night  -     hypocalcemia  - calcuim 5.9 improved  8.8  - s/p calcium gluconate   -    CML/mild dysplastic syndrome  Thrombocytopenia  Anemia  Breast cancer  -s/p 2 units platelet 26/50 again on    -Platelet 10 on arrival repeat 7 then 6 today at 14  - appreciate input by Hem/Onc team recc keep plts above 10 , Hgb greater than 7    - rec'd PRBC  for hgb 6.2 - today hgb 5.6 will get PRBC   - no overt sign of bleeding noted   - monitor q12 hrs    Neutropenic fever  Receiving Chemo at [facility]   -  WBC  0.2 improving now 0.4   - neutropenic precautions in effect   - afebrile  x 3 days   - feels better   - BC + for GNR and GPC in 2/4 bottles initially   - cont  Zosyn / HAMAR   - repeat BC NGTD     Diverticulitis  - presented with Nausea , lower abd pain ,  - Hx of diverticulitis   -appreciate GI input - no intervention at this time   - pt tolerating diet     - CT IMPRESSION  1. Findings consistent with acute diverticulitis involving the sigmoid colon. No evidence of abscess or perforation. Possible second focus of acute  diverticulitis in the distal descending colon. 2.  Questionable mild inflammatory changes around the distal esophagus at the GE  junction. This may represent esophagitis. 3.  Changes suggestive of chronic pancreatitis.  Questionable small gallstone  within gallbladder without signs of acute cholecystitis     Sepsis - presented with Temp 103.2 ,  R 25  - Lactic 0.60 to 1.7   - afebrile HR 80-90   - BC E.coli noted   - repeat BC NGTD    Acute hypoxic respiratory failure secondary to fluid overload versus TRALI  - required Bipap initially , received 40 mg Lasix   - on NC a 3 L Baseline  O2 at night only   - developed SOB overnight has had multiple transfusion like fluid overload   - will give 60 mg Lasix this morning   - BS RLL few crackles noted  sats at 94%             / Body mass index is 31.47 kg/m². Code status: Full  Prophylaxis: SCD's  Recommended Disposition: Home w/Family     Subjective:     Chief Complaint / Reason for Physician Visit  \"I am feeling \". Discussed with RN events overnight. Eating breakfast offers no complaints   Will cont to check labs q 12  Review of Systems:  Symptom Y/N Comments  Symptom Y/N Comments   Fever/Chills n   Chest Pain n    Poor Appetite n   Edema n    Cough y   Abdominal Pain n    Sputum n   Joint Pain n    SOB/AL n   Pruritis/Rash n    Nausea/vomit n   Tolerating PT/OT n    Diarrhea n   Tolerating Diet n    Constipation    Other       Could NOT obtain due to:      Objective:     VITALS:   Last 24hrs VS reviewed since prior progress note. Most recent are:  Patient Vitals for the past 24 hrs:   Temp Pulse Resp BP SpO2   02/15/21 0725 98.7 °F (37.1 °C) 84 20 (!) 143/59    02/15/21 0318 98.8 °F (37.1 °C) 83 23 126/61 98 %   02/14/21 2312 98.6 °F (37 °C) 79 25 (!) 128/53    02/14/21 1913 98.9 °F (37.2 °C) 80 19 (!) 115/50 100 %   02/14/21 1451 98.8 °F (37.1 °C) 75 20 104/67 100 %   02/14/21 1249 97.6 °F (36.4 °C) 76 20 (!) 88/62 100 %   02/14/21 1044 98.1 °F (36.7 °C) 72 20 (!) 91/54 96 %   02/14/21 1015 98.5 °F (36.9 °C) 72 20 (!) 112/57 94 %   02/14/21 0956 97.8 °F (36.6 °C) 73 15 110/78 96 %   02/14/21 0852 98.7 °F (37.1 °C) 82 16 (!) 114/41 96 %       Intake/Output Summary (Last 24 hours) at 2/15/2021 0755  Last data filed at 2/14/2021 1744  Gross per 24 hour   Intake 1708.4 ml   Output    Net 1708.4 ml        I had a face to face encounter and independently examined this patient on 2/15/2021, as outlined below:  PHYSICAL EXAM:  General: WD, WN. Alert, cooperative, no acute distress    EENT:  EOMI. Anicteric sclerae.  MMM  Resp:  diminished bilaterally, no wheezing or rales. No accessory muscle use, NC  CV:  S1S2,  Tr edema  port  in L chest wall   GI:  Soft, Non distended, Non tender. +Bowel sounds  Neurologic:  Alert and oriented X 3, normal speech,   Psych:   . Not anxious nor agitated  Skin:  No rashes. No jaundice bruising around L oribital resolving    Reviewed most current lab test results and cultures  YES  Reviewed most current radiology test results   YES  Review and summation of old records today    NO  Reviewed patient's current orders and MAR    YES  PMH/SH reviewed - no change compared to H&P  ________________________________________________________________________  Care Plan discussed with:    Comments   Patient x    Family      RN x    Care Manager     Consultant                        Multidiciplinary team rounds were held today with , nursing, pharmacist and clinical coordinator. Patient's plan of care was discussed; medications were reviewed and discharge planning was addressed. ________________________________________________________________________  Total NON critical care TIME: 30   Minutes    Total CRITICAL CARE TIME Spent:   Minutes non procedure based      Comments   >50% of visit spent in counseling and coordination of care     ________________________________________________________________________  Kellen Martin. Robi Burnett NP     Procedures: see electronic medical records for all procedures/Xrays and details which were not copied into this note but were reviewed prior to creation of Plan. LABS:  I reviewed today's most current labs and imaging studies.   Pertinent labs include:  Recent Labs     02/14/21  1723 02/14/21  0307 02/13/21  0347   WBC 0.4* 0.2* 0.2*   HGB 9.6* 5.6* 6.2*   HCT 28.6* 17.6* 18.5*   PLT 10* 14* 6*     Recent Labs     02/14/21  1723 02/14/21  0307 02/13/21 0347    144 138   K 4.0 2.7* 3.5    115* 105   CO2 27 20* 28   * 77 102*   BUN 22* 19 26*   CREA 0.65 0.27* 0.73   CA 8.8 5.9* 7.9*   MG  --   --  1.7   ALB 3.0*  --  2.7*   TBILI 0.8  --  1.3*   ALT 16  --  12       Signed: Mayra Burnett, NP

## 2021-02-15 NOTE — PROGRESS NOTES
End of Shift Note    Bedside shift change report given to Eduard Alberto (oncoming nurse) by Nehal Boyce (offgoing nurse). Report included the following information SBAR, Kardex, Intake/Output and Recent Results    Shift worked:  7p-7a     Shift summary and any significant changes:     pt rested quietly all night; no complaints of pain; no signs of distress     Concerns for physician to address:    Zone phone for oncoming shift:       Activity:  Activity Level: Chair, Up with Assistance  Number times ambulated in hallways past shift: 0  Number of times OOB to chair past shift: 1    Cardiac:   Cardiac Monitoring: Yes      Cardiac Rhythm: Normal sinus rhythm    Access:   Current line(s): port     Genitourinary:   Urinary status: voiding and incontinent    Respiratory:   O2 Device: Nasal cannula  Chronic home O2 use?: YES  Incentive spirometer at bedside: NO     GI:  Last Bowel Movement Date: 02/14/21  Current diet:  DIET CARDIAC Regular  Passing flatus: YES  Tolerating current diet: YES  % Diet Eaten: 80 %    Pain Management:   Patient states pain is manageable on current regimen: YES    Skin:  Nikolas Score: 18  Interventions: increase time out of bed    Patient Safety:  Fall Score:  Total Score: 3  Interventions: pt to call before getting OOB  High Fall Risk: Yes    Length of Stay:  Expected LOS: 4d 19h  Actual LOS: [address] [address]

## 2021-02-15 NOTE — PROGRESS NOTES
ANDREW Plan     *Disposition: Home with spouse  *OP F/U: PCP  *Transport at d/c: Spouse to transport  *2nd IMM Letter     Pt is not medically stable for d/c due to needing to check labs q 12 and developed SOB overnight. CM will continue to follow and assist with d/c planning. Melinda Chandler.Sheyla.   Care Manager [facility]  [phone]

## 2021-02-16 LAB
ABO + RH BLD: NORMAL
ALBUMIN SERPL-MCNC: 3 G/DL (ref 3.5–5)
ALBUMIN/GLOB SERPL: 0.7 {RATIO} (ref 1.1–2.2)
ALP SERPL-CCNC: 91 U/L (ref 45–117)
ALT SERPL-CCNC: 14 U/L (ref 12–78)
ANION GAP SERPL CALC-SCNC: 6 MMOL/L (ref 5–15)
AST SERPL-CCNC: 10 U/L (ref 15–37)
BACTERIA SPEC CULT: ABNORMAL
BASOPHILS # BLD: 0 K/UL (ref 0–0.1)
BASOPHILS NFR BLD: 0 % (ref 0–1)
BILIRUB SERPL-MCNC: 0.9 MG/DL (ref 0.2–1)
BLD PROD TYP BPU: NORMAL
BLOOD BAG HEMOLYSIS,BLBAG: NORMAL
BPU ID: NORMAL
BPU ID: NORMAL
BUN SERPL-MCNC: 24 MG/DL (ref 6–20)
BUN/CREAT SERPL: 36 (ref 12–20)
CALCIUM SERPL-MCNC: 8.5 MG/DL (ref 8.5–10.1)
CHLORIDE SERPL-SCNC: 103 MMOL/L (ref 97–108)
CLERICAL ERRORS,CLERR: NORMAL
CO2 SERPL-SCNC: 30 MMOL/L (ref 21–32)
COMMENT, HOLDF: NORMAL
CREAT SERPL-MCNC: 0.67 MG/DL (ref 0.55–1.02)
DAT P TRANSF RBC QL: NORMAL
DAT RBC QL: NORMAL
DIFFERENTIAL METHOD BLD: ABNORMAL
EOSINOPHIL # BLD: 0 K/UL (ref 0–0.4)
EOSINOPHIL NFR BLD: 0 % (ref 0–7)
ERYTHROCYTE [DISTWIDTH] IN BLOOD BY AUTOMATED COUNT: 13.9 % (ref 11.5–14.5)
GLOBULIN SER CALC-MCNC: 4.4 G/DL (ref 2–4)
GLUCOSE SERPL-MCNC: 96 MG/DL (ref 65–100)
HCT VFR BLD AUTO: 26.1 % (ref 35–47)
HEMOLYSIS, POST TXN,PSTHE: NORMAL
HEMOLYSIS,PRE TXN,PREHE: NORMAL
HGB BLD-MCNC: 8.8 G/DL (ref 11.5–16)
IMM GRANULOCYTES # BLD AUTO: 0 K/UL (ref 0–0.04)
IMM GRANULOCYTES NFR BLD AUTO: 0 % (ref 0–0.5)
LYMPHOCYTES # BLD: 0.2 K/UL (ref 0.8–3.5)
LYMPHOCYTES NFR BLD: 76 % (ref 12–49)
MCH RBC QN AUTO: 29 PG (ref 26–34)
MCHC RBC AUTO-ENTMCNC: 33.7 G/DL (ref 30–36.5)
MCV RBC AUTO: 86.1 FL (ref 80–99)
MD INTERPRETATION: NORMAL
METAMYELOCYTES NFR BLD MANUAL: 4 %
MONOCYTES # BLD: 0 K/UL (ref 0–1)
MONOCYTES NFR BLD: 12 % (ref 5–13)
NEUTS SEG # BLD: 0 K/UL (ref 1.8–8)
NEUTS SEG NFR BLD: 4 % (ref 32–75)
NRBC # BLD: 0 K/UL (ref 0–0.01)
NRBC BLD-RTO: 0 PER 100 WBC
OTHER CELLS NFR BLD MANUAL: 4 %
PATH REV BLD -IMP: NORMAL
PLATELET # BLD AUTO: 20 K/UL (ref 150–400)
PMV BLD AUTO: 10.4 FL (ref 8.9–12.9)
POTASSIUM SERPL-SCNC: 3.9 MMOL/L (ref 3.5–5.1)
PROT SERPL-MCNC: 7.4 G/DL (ref 6.4–8.2)
RBC # BLD AUTO: 3.03 M/UL (ref 3.8–5.2)
RBC MORPH BLD: ABNORMAL
SAMPLES BEING HELD,HOLD: NORMAL
SERVICE CMNT-IMP: ABNORMAL
SODIUM SERPL-SCNC: 139 MMOL/L (ref 136–145)
STATUS OF UNIT,%ST: NORMAL
STATUS OF UNIT,%ST: NORMAL
TOTAL CELLS COUNTED SPEC: 25
UNIT DIVISION, %UDIV: 0
UNIT DIVISION, %UDIV: 0
UNIT NUMBER,UN: NORMAL
WBC # BLD AUTO: 0.3 K/UL (ref 3.6–11)

## 2021-02-16 PROCEDURE — 85025 COMPLETE CBC W/AUTO DIFF WBC: CPT

## 2021-02-16 PROCEDURE — 94760 N-INVAS EAR/PLS OXIMETRY 1: CPT

## 2021-02-16 PROCEDURE — 74011250637 HC RX REV CODE- 250/637: Performed by: GENERAL ACUTE CARE HOSPITAL

## 2021-02-16 PROCEDURE — 77010033711 HC HIGH FLOW OXYGEN

## 2021-02-16 PROCEDURE — 97161 PT EVAL LOW COMPLEX 20 MIN: CPT | Performed by: PHYSICAL THERAPIST

## 2021-02-16 PROCEDURE — 80053 COMPREHEN METABOLIC PANEL: CPT

## 2021-02-16 PROCEDURE — 77010033678 HC OXYGEN DAILY

## 2021-02-16 PROCEDURE — 97116 GAIT TRAINING THERAPY: CPT | Performed by: PHYSICAL THERAPIST

## 2021-02-16 PROCEDURE — 74011250636 HC RX REV CODE- 250/636: Performed by: INTERNAL MEDICINE

## 2021-02-16 PROCEDURE — 74011250636 HC RX REV CODE- 250/636: Performed by: GENERAL ACUTE CARE HOSPITAL

## 2021-02-16 PROCEDURE — 74011000258 HC RX REV CODE- 258: Performed by: GENERAL ACUTE CARE HOSPITAL

## 2021-02-16 PROCEDURE — 65660000000 HC RM CCU STEPDOWN

## 2021-02-16 PROCEDURE — 36415 COLL VENOUS BLD VENIPUNCTURE: CPT

## 2021-02-16 RX ADMIN — ACETAMINOPHEN 650 MG: 325 TABLET ORAL at 15:36

## 2021-02-16 RX ADMIN — DICYCLOMINE HYDROCHLORIDE 10 MG: 10 CAPSULE ORAL at 17:20

## 2021-02-16 RX ADMIN — CEFEPIME HYDROCHLORIDE 2 G: 2 INJECTION, POWDER, FOR SOLUTION INTRAVENOUS at 21:34

## 2021-02-16 RX ADMIN — MULTIPLE VITAMINS W/ MINERALS TAB 1 TABLET: TAB at 10:29

## 2021-02-16 RX ADMIN — DILTIAZEM HYDROCHLORIDE 240 MG: 240 CAPSULE, COATED, EXTENDED RELEASE ORAL at 08:47

## 2021-02-16 RX ADMIN — PANTOPRAZOLE SODIUM 40 MG: 40 TABLET, DELAYED RELEASE ORAL at 15:36

## 2021-02-16 RX ADMIN — Medication 1.5 MG: at 21:35

## 2021-02-16 RX ADMIN — DULOXETINE HYDROCHLORIDE 30 MG: 30 CAPSULE, DELAYED RELEASE ORAL at 08:47

## 2021-02-16 RX ADMIN — TBO-FILGRASTIM 480 MCG: 480 INJECTION, SOLUTION SUBCUTANEOUS at 18:36

## 2021-02-16 RX ADMIN — CEFEPIME HYDROCHLORIDE 2 G: 2 INJECTION, POWDER, FOR SOLUTION INTRAVENOUS at 05:44

## 2021-02-16 RX ADMIN — ALLOPURINOL 300 MG: 300 TABLET ORAL at 08:48

## 2021-02-16 RX ADMIN — DICYCLOMINE HYDROCHLORIDE 10 MG: 10 CAPSULE ORAL at 08:48

## 2021-02-16 RX ADMIN — Medication 10 ML: at 21:35

## 2021-02-16 RX ADMIN — Medication 10 ML: at 15:38

## 2021-02-16 RX ADMIN — FOLIC ACID 1 MG: 1 TABLET ORAL at 08:47

## 2021-02-16 RX ADMIN — PANTOPRAZOLE SODIUM 40 MG: 40 TABLET, DELAYED RELEASE ORAL at 08:47

## 2021-02-16 RX ADMIN — CEFEPIME HYDROCHLORIDE 2 G: 2 INJECTION, POWDER, FOR SOLUTION INTRAVENOUS at 12:20

## 2021-02-16 RX ADMIN — Medication 10 ML: at 06:01

## 2021-02-16 NOTE — PROGRESS NOTES
End of Shift Note    Bedside shift change report given to Reshma Browne (oncoming nurse) by Alina Rodrigues RN (offgoing nurse). Report included the following information SBAR, Kardex, Intake/Output and Recent Results    Shift worked: Days     Shift summary and any significant changes:    Labs (Daily BMP ordered)     Concerns for physician to address: None   Zone phone for oncoming shift:  [phone]     Activity:  Activity Level: Up with Assistance  Number times ambulated in hallways past shift: 0  Number of times OOB to chair past shift: 1    Cardiac:   Cardiac Monitoring: Yes      Cardiac Rhythm: Normal sinus rhythm    Access:   Current line(s): port     Genitourinary:   Urinary status: voiding and incontinent    Respiratory:   O2 Device: Nasal cannula  Chronic home O2 use?: YES  Incentive spirometer at bedside: NO     GI:  Last Bowel Movement Date: 02/14/21(Patient reported)  Current diet:  DIET CARDIAC Regular  Passing flatus: YES  Tolerating current diet: YES  % Diet Eaten: 80 %    Pain Management:   Patient states pain is manageable on current regimen: YES    Skin:  Nikolas Score: 19  Interventions: increase time out of bed    Patient Safety:  Fall Score:  Total Score: 3  Interventions: pt to call before getting OOB  High Fall Risk: Yes    Length of Stay:  Expected LOS: 4d 19h  Actual LOS: [address], RN

## 2021-02-16 NOTE — PROGRESS NOTES
ANDREW:    Therapy Consult  2nd IM Letter         CM: Dorene Nicole is currently working with pt in the Neuro Unit. CM informed during IDRs, that pt currently is not medically stable to d/c and there isnt a current d/c date. CM made aware that therapist will work with pt while on the unit to determine her baseline. CM will continue to follow.     Dorene Nicole, MSW, [address] [place] [address]

## 2021-02-16 NOTE — PROGRESS NOTES
I reviewed pertinent labs and imaging, and discussed /agreed on the plan of care with Dr. Verónica Cutler      Hospitalist Progress Note    NAME: Mono Andino   :  1935   MRN:  [id number]       Assessment / Plan:        Summary: Chris Barron is a 70 y.o.  female with a history of stage I invasive ductal carcinoma and MDS. She is currently receiving Dacogen at [facility]. She is admitted with pancytopenia, neutropenic fever and diverticulitis. She     CMML/MDS  Pancytopenia  Left Breast cancer  -s/p 2 units platelet 84/59 again on  and 2/15  -Platelet count at 20 today  - appreciate input by Hem/Onc team recc keep plts above 10 , Hgb greater than 7    - rec'd PRBC  for hgb 6.2. Hb stable at 8.8 today  - no overt sign of bleeding noted   - monitor CBC daily    Neutropenic fever  Receiving Chemo at [facility]   -  WBC  0.3  - neutropenic precautions in effect   - afebrile . 72 hours   - BC + for GNR and GPC in 2/4 bottles initially   - cont  cefepime   - repeat BC NGTD   -Continue granix     Diverticulitis  - presented with Nausea , lower abd pain   - Hx of diverticulitis   -appreciate GI input - no intervention at this time   - pt tolerating diet     - CT IMPRESSION  1. Findings consistent with acute diverticulitis involving the sigmoid colon. No evidence of abscess or perforation. Possible second focus of acute  diverticulitis in the distal descending colon. 2.  Questionable mild inflammatory changes around the distal esophagus at the GE  junction. This may represent esophagitis. 3.  Changes suggestive of chronic pancreatitis.  Questionable small gallstone  within gallbladder without signs of acute cholecystitis     Sepsis - presented with Temp 103.2 ,  R 25  E.coli bacteremia  - BC E.coli noted   - repeat BC NGTD  - ON cefepime (was on vanco + cefepime)    Acute hypoxic respiratory failure secondary to fluid overload versus TRALI  - required Bipap initially , received Lasix   - on NC a 3 L  Baseline  O2 at night only   - had multiple transfusion likely fluid overload   - SOB improving. Hypokalemia: resolved  Hypocalcemia: resolved         / Body mass index is 31.47 kg/m². Code status: Full  Prophylaxis: SCD's  Recommended Disposition: Home w/Family     Subjective:     Chief Complaint / Reason for Physician Visit  No acute events overnight  Says her breathing is better  Denies bleeding from anywhere    Review of Systems:  Symptom Y/N Comments  Symptom Y/N Comments   Fever/Chills n   Chest Pain n    Poor Appetite n   Edema n    Cough y   Abdominal Pain n    Sputum n   Joint Pain n    SOB/AL n   Pruritis/Rash n    Nausea/vomit n   Tolerating PT/OT n    Diarrhea n   Tolerating Diet n    Constipation    Other       Could NOT obtain due to:      Objective:     VITALS:   Last 24hrs VS reviewed since prior progress note. Most recent are:  Patient Vitals for the past 24 hrs:   Temp Pulse Resp BP SpO2   02/16/21 1513 98.8 °F (37.1 °C) 89 20 (!) 104/55 97 %   02/16/21 1105 98.9 °F (37.2 °C) 82 18 (!) 119/59 96 %   02/16/21 0749 98.5 °F (36.9 °C) 79 20 (!) 119/58 97 %   02/16/21 0310 98.2 °F (36.8 °C) 86 20 (!) 103/55 95 %   02/16/21 0015 98.1 °F (36.7 °C) 78 20 128/72 96 %   02/15/21 1845 98.3 °F (36.8 °C) 77 20 122/77 97 %   02/15/21 1825 98.6 °F (37 °C) 77 18 (!) 124/55 95 %     No intake or output data in the 24 hours ending 02/16/21 2348     I had a face to face encounter and independently examined this patient on 2/16/2021, as outlined below:  PHYSICAL EXAM:  General: WD, WN. Alert, cooperative, no acute distress    EENT:  EOMI. Anicteric sclerae. MMM  Resp:  diminished  bilaterally, no wheezing or rales. No accessory muscle use, NC  CV:  S1S2,  Tr edema  port  in L chest wall   GI:  Soft, Non distended, Non tender. +Bowel sounds  Neurologic:  Alert and oriented X 3, normal speech,   Psych:   . Not anxious nor agitated  Skin:  No rashes.   No jaundice bruising around L oribital resolving    Reviewed most current lab test results and cultures  YES  Reviewed most current radiology test results   YES  Review and summation of old records today    NO  Reviewed patient's current orders and MAR    YES  PMH/SH reviewed - no change compared to H&P  ________________________________________________________________________  Care Plan discussed with:    Comments   Patient x    Family      RN x    Care Manager     Consultant                        Multidiciplinary team rounds were held today with , nursing, pharmacist and clinical coordinator. Patient's plan of care was discussed; medications were reviewed and discharge planning was addressed. ________________________________________________________________________  Total NON critical care TIME: 30   Minutes    Total CRITICAL CARE TIME Spent:   Minutes non procedure based      Comments   >50% of visit spent in counseling and coordination of care     ________________________________________________________________________  Jose Daniel Hernandez MD     Procedures: see electronic medical records for all procedures/Xrays and details which were not copied into this note but were reviewed prior to creation of Plan. LABS:  I reviewed today's most current labs and imaging studies.   Pertinent labs include:  Recent Labs     02/16/21  0227 02/15/21  0823 02/14/21  1723   WBC 0.3* 0.3* 0.4*   HGB 8.8* 8.9* 9.6*   HCT 26.1* 26.6* 28.6*   PLT 20* 6* 10*     Recent Labs     02/16/21  0227 02/15/21  0823 02/14/21  1723    138 138   K 3.9 3.9 4.0    105 105   CO2 30 27 27   GLU 96 128* 118*   BUN 24* 20 22*   CREA 0.67 0.72 0.65   CA 8.5 8.4* 8.8   ALB 3.0* 2.9* 3.0*   TBILI 0.9 1.0 0.8   ALT 14 15 16       Signed: Jose Daniel Hernandez MD

## 2021-02-16 NOTE — PROGRESS NOTES
Spiritual Care Assessment/Progress Note  [facility]      NAME: Dana Camara      MRN: [id number]  AGE: 82 y.o. SEX: female  Mandaen Affiliation: Restoration   Language: English     2/16/2021     Total Time (in minutes): 5     Spiritual Assessment begun in [facility] through conversation with:         []Patient        [] Family    [] Friend(s)        Reason for Consult: Initial/Spiritual assessment, patient floor     Spiritual beliefs: (Please include comment if needed)     [] Identifies with a gladis tradition:         [] Supported by a gladis community:            [] Claims no spiritual orientation:           [] Seeking spiritual identity:                [] Adheres to an individual form of spirituality:           [x] Not able to assess:                           Identified resources for coping:      [] Prayer                               [] Music                  [] Guided Imagery     [] Family/friends                 [] Pet visits     [] Devotional reading                         [x] Unknown     [] Other:                                               Interventions offered during this visit: (See comments for more details)    Patient Interventions: Initial visit           Plan of Care:     [] Support spiritual and/or cultural needs    [] Support AMD and/or advance care planning process      [] Support grieving process   [] Coordinate Rites and/or Rituals    [] Coordination with community clergy   [] No spiritual needs identified at this time   [] Detailed Plan of Care below (See Comments)  [] Make referral to Music Therapy  [] Make referral to Pet Therapy     [] Make referral to Addiction services  [] Make referral to [facility]  [] Make referral to Spiritual Care Partner  [] No future visits requested        [x] Follow up upon further referrals     Comments:     Initial Spiritual Care Assessment attempt for Ms. Doug Kawasaki in 21 [phone]. Performed chart review before the visit.  Upon arrival, patient was appeared sleeping.  respected patient's privacy. Unable to assess.     [id number] [facility] Isidoro Javier, M.S., Th.M.  Spiritual Care Provider   Paging Service [phone])

## 2021-02-16 NOTE — PROGRESS NOTES
End of Shift Note    Bedside shift change report given to Leopold Das, RN (oncoming nurse) by Luis Mehta (offgoing nurse). Report included the following information SBAR, Kardex, Intake/Output and Recent Results    Shift worked:  days     Shift summary and any significant changes:    Pt had 2 units of platelets      Concerns for physician to address: none   Zone phone for oncoming shift:  [phone]     Activity:  Activity Level: Up with Assistance  Number times ambulated in hallways past shift: 0  Number of times OOB to chair past shift: 1    Cardiac:   Cardiac Monitoring: Yes      Cardiac Rhythm: Normal sinus rhythm    Access:   Current line(s): port     Genitourinary:   Urinary status: voiding and incontinent    Respiratory:   O2 Device: Nasal cannula  Chronic home O2 use?: YES  Incentive spirometer at bedside: NO     GI:  Last Bowel Movement Date: 02/14/21  Current diet:  DIET CARDIAC Regular  Passing flatus: YES  Tolerating current diet: YES  % Diet Eaten: 80 %    Pain Management:   Patient states pain is manageable on current regimen: YES    Skin:  Nikolas Score: 18  Interventions: increase time out of bed    Patient Safety:  Fall Score:  Total Score: 3  Interventions: pt to call before getting OOB  High Fall Risk: Yes    Length of Stay:  Expected LOS: 4d 19h  Actual LOS: [facility]

## 2021-02-16 NOTE — PROGRESS NOTES
Problem: Mobility Impaired (Adult and Pediatric)  Goal: *Acute Goals and Plan of Care (Insert Text)  Description: FUNCTIONAL STATUS PRIOR TO ADMISSION: Patient was modified independent using a single point cane for functional mobility. HOME SUPPORT PRIOR TO ADMISSION: The patient lived with  who assisted her as needed. Physical Therapy Goals  Initiated 2/16/2021  1. Patient will move from supine to sit and sit to supine  in bed with modified independence within 7 day(s). 2.  Patient will transfer from bed to chair and chair to bed with modified independence using the least restrictive device within 7 day(s). 3.  Patient will perform sit to stand with modified independence within 7 day(s). 4.  Patient will ambulate with modified independence for 200 feet with the least restrictive device within 7 day(s). 5.  Patient will ascend/descend 4 stairs with 1 handrail(s) with modified independence within 7 day(s). Outcome: Progressing Towards Goal   PHYSICAL THERAPY EVALUATION  Patient: Kwabena Galdamez (63 y.o. female)  Date: 2/16/2021  Primary Diagnosis: Neutropenia, febrile ([facility] [address] [D70.9, R50.81]  Acute respiratory failure with hypoxemia ([facility] [address] [J96.01]        Precautions:   Fall    ASSESSMENT  Based on the objective data described below, the patient presents with decreased functional mobility from baseline level of function. Patient currently limited by generalized weakness, impaired balance, gait instability and decreased activity tolerance. Currently needing supervision to come to EOB and CGA for transfers. Initially with fair balance and utilized RW for stability. Amb approx 25 feet with RW and CGA with no overt LOB but generally unstable. [facility] however, unsure if she would be agreeable to this or would be able to participate much given her 5 day per week chemo schedule.       Other factors to consider for discharge: at risk for falls, slightly below functional baseline     Patient will benefit from skilled therapy intervention to address the above noted impairments. PLAN :  Recommendations and Planned Interventions: bed mobility training, transfer training, gait training, therapeutic exercises, neuromuscular re-education, patient and family training/education, and therapeutic activities      Frequency/Duration: Patient will be followed by physical therapy:  3 times a week to address goals. Recommendation for discharge: (in order for the patient to meet his/her long term goals)  Physical therapy at least 2 days/week in the home       IF patient discharges home will need the following DME: patient owns DME required for discharge         SUBJECTIVE:   Patient stated I haven't really been out of this bed for 5 days.     OBJECTIVE DATA SUMMARY:   HISTORY:    Past Medical History:   Diagnosis Date    Anemia     Arthritis     Breast cancer ([facility] [address]     left    Bunion of right foot 8/20/2015    Chronic obstructive pulmonary disease (HCC)     mild    Chronic pain     back--uses tens unit    Diverticulitis 6/29/2018    Recurrent    Diverticulitis large intestine     Dry eye syndrome 6/29/2018    Fibromyalgia 6/29/2018    GERD (gastroesophageal reflux disease)     History of left breast cancer 2013    lumpectomy radiation    Hypercholesterolemia 6/29/2018    Ill-defined condition     blood transfusion hx    Leukemia (HCC)     MDS (myelodysplastic syndrome) ([facility] [address]     Osteoporosis 6/29/2018    Oxygen dependent     at night    Peripheral neuropathy 6/29/2018    Prediabetes 6/29/2018    PUD (peptic ulcer disease)     Radiation therapy complication 9857    Lt breast-No Chemo    Rosacea 6/29/2018    Trouble in sleeping      Past Surgical History:   Procedure Laterality Date    COLONOSCOPY N/A 2/28/2020    COLONOSCOPY performed by Opal Kelsey MD at [address]  2/28/2020         HX APPENDECTOMY      HX BREAST LUMPECTOMY  11/21/2013    LEFT BREAST LUMPECTOMY W/LEFT BREAST SENTINEL NODE BIOPSY,AND NEEDLE LOCALIZATION performed by Anam Carroll MD at [facility] OR    HX CATARACT REMOVAL      bilateral    HX HYSTERECTOMY      ovarian cyst    HX MOHS PROCEDURES      right    HX ORTHOPAEDIC      back surgery x2    HX OTHER SURGICAL      colonoscopy    HX TONSILLECTOMY      HX VASCULAR ACCESS  02/2020    echo cath right shoulder    IR INSERT TUNL CVC W PORT OVER 5 YEARS  12/3/2019    NV TOTAL KNEE ARTHROPLASTY      left    NV TOTAL KNEE ARTHROPLASTY      right    UPPER GI ENDOSCOPY,BIOPSY  1/24/2020         UPPER GI ENDOSCOPY,BIOPSY  6/23/2020         UPPER GI ENDOSCOPY,DILATN W GUIDE  6/23/2020         US GUIDED CORE BREAST BIOPSY Left 2013    Breast Ca       Personal factors and/or comorbidities impacting plan of care:     Home Situation  Home Environment: Private residence  # Steps to Enter: 3  Rails to Enter: Yes  One/Two Story Residence: One story  Living Alone: No  Support Systems: Spouse/Significant Other/Partner  Patient Expects to be Discharged to[de-identified] Private residence  Current DME Used/Available at Home: [employer], straight, Walker, rolling    EXAMINATION/PRESENTATION/DECISION MAKING:   Critical Behavior:  Neurologic State: Alert  Orientation Level: Oriented X4  Cognition: Poor safety awareness     Hearing: Auditory  Auditory Impairment: Hard of hearing, bilateral  Hearing Aids/Status: At home    Range Of Motion:  AROM: Generally decreased, functional                       Strength:    Strength: Generally decreased, functional          Functional Mobility:  Bed Mobility:  Rolling: Supervision  Supine to Sit: Supervision  Sit to Supine: Supervision  Scooting: Supervision  Transfers:  Sit to Stand: Contact guard assistance;Assist x1  Stand to Sit: Contact guard assistance                       Balance:   Sitting: Intact  Standing: Impaired  Standing - Static: Constant support;Good  Standing - Dynamic : Constant support; Fair  Ambulation/Gait Training:  Distance (ft): 25 Feet (ft)  Assistive Device: Gait belt;Walker, rolling  Ambulation - Level of Assistance: Contact guard assistance     Gait Description (WDL): Exceptions to WDL  Gait Abnormalities: Decreased step clearance;Shuffling gait        Base of Support: Widened     Speed/Emmanuelle: Pace decreased (<100 feet/min); Shuffled; Slow  Step Length: Left shortened;Right shortened          Pain Rating:  No c/o pain    Activity Tolerance:   Fair and requires rest breaks    After treatment patient left in no apparent distress:   Sitting in chair and Call bell within reach    COMMUNICATION/EDUCATION:   The patients plan of care was discussed with: Physical therapist, Occupational therapist, and Registered nurse. Fall prevention education was provided and the patient/caregiver indicated understanding., Patient/family have participated as able in goal setting and plan of care. , and Patient/family agree to work toward stated goals and plan of care.     Thank you for this referral.  Vickie Vasquez, PT, DPT   Time Calculation: 17 mins

## 2021-02-17 ENCOUNTER — APPOINTMENT (OUTPATIENT)
Dept: GENERAL RADIOLOGY | Age: 86
DRG: 871 | End: 2021-02-17
Attending: STUDENT IN AN ORGANIZED HEALTH CARE EDUCATION/TRAINING PROGRAM
Payer: MEDICARE

## 2021-02-17 LAB
ALBUMIN SERPL-MCNC: 2.7 G/DL (ref 3.5–5)
ALBUMIN/GLOB SERPL: 0.6 {RATIO} (ref 1.1–2.2)
ALP SERPL-CCNC: 84 U/L (ref 45–117)
ALT SERPL-CCNC: 14 U/L (ref 12–78)
ANION GAP SERPL CALC-SCNC: 6 MMOL/L (ref 5–15)
AST SERPL-CCNC: 6 U/L (ref 15–37)
BASOPHILS # BLD: 0 K/UL (ref 0–0.1)
BASOPHILS # BLD: 0 K/UL (ref 0–0.1)
BASOPHILS NFR BLD: 0 % (ref 0–1)
BASOPHILS NFR BLD: 0 % (ref 0–1)
BILIRUB SERPL-MCNC: 1.1 MG/DL (ref 0.2–1)
BUN SERPL-MCNC: 25 MG/DL (ref 6–20)
BUN/CREAT SERPL: 43 (ref 12–20)
CALCIUM SERPL-MCNC: 8.2 MG/DL (ref 8.5–10.1)
CHLORIDE SERPL-SCNC: 103 MMOL/L (ref 97–108)
CO2 SERPL-SCNC: 27 MMOL/L (ref 21–32)
CREAT SERPL-MCNC: 0.58 MG/DL (ref 0.55–1.02)
DIFFERENTIAL METHOD BLD: ABNORMAL
DIFFERENTIAL METHOD BLD: ABNORMAL
EOSINOPHIL # BLD: 0 K/UL (ref 0–0.4)
EOSINOPHIL # BLD: 0 K/UL (ref 0–0.4)
EOSINOPHIL NFR BLD: 0 % (ref 0–7)
EOSINOPHIL NFR BLD: 0 % (ref 0–7)
ERYTHROCYTE [DISTWIDTH] IN BLOOD BY AUTOMATED COUNT: 13.7 % (ref 11.5–14.5)
ERYTHROCYTE [DISTWIDTH] IN BLOOD BY AUTOMATED COUNT: 13.8 % (ref 11.5–14.5)
GLOBULIN SER CALC-MCNC: 4.3 G/DL (ref 2–4)
GLUCOSE SERPL-MCNC: 103 MG/DL (ref 65–100)
HCT VFR BLD AUTO: 23.1 % (ref 35–47)
HCT VFR BLD AUTO: 24.6 % (ref 35–47)
HGB BLD-MCNC: 7.6 G/DL (ref 11.5–16)
HGB BLD-MCNC: 8 G/DL (ref 11.5–16)
IMM GRANULOCYTES # BLD AUTO: 0 K/UL (ref 0–0.04)
IMM GRANULOCYTES # BLD AUTO: 0 K/UL (ref 0–0.04)
IMM GRANULOCYTES NFR BLD AUTO: 0 % (ref 0–0.5)
IMM GRANULOCYTES NFR BLD AUTO: 0 % (ref 0–0.5)
LYMPHOCYTES # BLD: 0.3 K/UL (ref 0.8–3.5)
LYMPHOCYTES # BLD: 0.3 K/UL (ref 0.8–3.5)
LYMPHOCYTES NFR BLD: 66 % (ref 12–49)
LYMPHOCYTES NFR BLD: 78 % (ref 12–49)
MCH RBC QN AUTO: 28.6 PG (ref 26–34)
MCH RBC QN AUTO: 28.9 PG (ref 26–34)
MCHC RBC AUTO-ENTMCNC: 32.5 G/DL (ref 30–36.5)
MCHC RBC AUTO-ENTMCNC: 32.9 G/DL (ref 30–36.5)
MCV RBC AUTO: 87.8 FL (ref 80–99)
MCV RBC AUTO: 87.9 FL (ref 80–99)
MONOCYTES # BLD: 0 K/UL (ref 0–1)
MONOCYTES # BLD: 0.1 K/UL (ref 0–1)
MONOCYTES NFR BLD: 13 % (ref 5–13)
MONOCYTES NFR BLD: 25 % (ref 5–13)
NEUTS BAND NFR BLD MANUAL: 5 %
NEUTS SEG # BLD: 0 K/UL (ref 1.8–8)
NEUTS SEG # BLD: 0 K/UL (ref 1.8–8)
NEUTS SEG NFR BLD: 1 % (ref 32–75)
NEUTS SEG NFR BLD: 4 % (ref 32–75)
NRBC # BLD: 0 K/UL (ref 0–0.01)
NRBC # BLD: 0 K/UL (ref 0–0.01)
NRBC BLD-RTO: 0 PER 100 WBC
NRBC BLD-RTO: 0 PER 100 WBC
OTHER CELLS NFR BLD MANUAL: 7 %
PLATELET # BLD AUTO: 14 K/UL (ref 150–400)
PLATELET # BLD AUTO: 8 K/UL (ref 150–400)
PMV BLD AUTO: 11.6 FL (ref 8.9–12.9)
PMV BLD AUTO: 9.8 FL (ref 8.9–12.9)
POTASSIUM SERPL-SCNC: 3.6 MMOL/L (ref 3.5–5.1)
PROMYELOCYTES NFR BLD MANUAL: 1 %
PROT SERPL-MCNC: 7 G/DL (ref 6.4–8.2)
RBC # BLD AUTO: 2.63 M/UL (ref 3.8–5.2)
RBC # BLD AUTO: 2.8 M/UL (ref 3.8–5.2)
RBC MORPH BLD: ABNORMAL
RBC MORPH BLD: ABNORMAL
SODIUM SERPL-SCNC: 136 MMOL/L (ref 136–145)
WBC # BLD AUTO: 0.3 K/UL (ref 3.6–11)
WBC # BLD AUTO: 0.4 K/UL (ref 3.6–11)
WBC MORPH BLD: ABNORMAL

## 2021-02-17 PROCEDURE — 94760 N-INVAS EAR/PLS OXIMETRY 1: CPT

## 2021-02-17 PROCEDURE — 71045 X-RAY EXAM CHEST 1 VIEW: CPT

## 2021-02-17 PROCEDURE — 74011000258 HC RX REV CODE- 258: Performed by: GENERAL ACUTE CARE HOSPITAL

## 2021-02-17 PROCEDURE — 80053 COMPREHEN METABOLIC PANEL: CPT

## 2021-02-17 PROCEDURE — P9035 PLATELET PHERES LEUKOREDUCED: HCPCS

## 2021-02-17 PROCEDURE — 97530 THERAPEUTIC ACTIVITIES: CPT | Performed by: OCCUPATIONAL THERAPIST

## 2021-02-17 PROCEDURE — 74011250636 HC RX REV CODE- 250/636: Performed by: STUDENT IN AN ORGANIZED HEALTH CARE EDUCATION/TRAINING PROGRAM

## 2021-02-17 PROCEDURE — 65660000000 HC RM CCU STEPDOWN

## 2021-02-17 PROCEDURE — 85025 COMPLETE CBC W/AUTO DIFF WBC: CPT

## 2021-02-17 PROCEDURE — 74011250637 HC RX REV CODE- 250/637: Performed by: GENERAL ACUTE CARE HOSPITAL

## 2021-02-17 PROCEDURE — 36430 TRANSFUSION BLD/BLD COMPNT: CPT

## 2021-02-17 PROCEDURE — 36415 COLL VENOUS BLD VENIPUNCTURE: CPT

## 2021-02-17 PROCEDURE — 74011250636 HC RX REV CODE- 250/636: Performed by: GENERAL ACUTE CARE HOSPITAL

## 2021-02-17 PROCEDURE — 97165 OT EVAL LOW COMPLEX 30 MIN: CPT | Performed by: OCCUPATIONAL THERAPIST

## 2021-02-17 PROCEDURE — 77010033678 HC OXYGEN DAILY

## 2021-02-17 RX ORDER — SODIUM CHLORIDE 9 MG/ML
250 INJECTION, SOLUTION INTRAVENOUS AS NEEDED
Status: DISCONTINUED | OUTPATIENT
Start: 2021-02-17 | End: 2021-02-19

## 2021-02-17 RX ORDER — ONDANSETRON 2 MG/ML
4 INJECTION INTRAMUSCULAR; INTRAVENOUS
Status: COMPLETED | OUTPATIENT
Start: 2021-02-17 | End: 2021-02-17

## 2021-02-17 RX ADMIN — CEFEPIME HYDROCHLORIDE 2 G: 2 INJECTION, POWDER, FOR SOLUTION INTRAVENOUS at 12:10

## 2021-02-17 RX ADMIN — DULOXETINE HYDROCHLORIDE 30 MG: 30 CAPSULE, DELAYED RELEASE ORAL at 08:52

## 2021-02-17 RX ADMIN — ONDANSETRON 4 MG: 2 INJECTION INTRAMUSCULAR; INTRAVENOUS at 15:37

## 2021-02-17 RX ADMIN — Medication 1.5 MG: at 23:24

## 2021-02-17 RX ADMIN — TBO-FILGRASTIM 480 MCG: 480 INJECTION, SOLUTION SUBCUTANEOUS at 17:44

## 2021-02-17 RX ADMIN — DILTIAZEM HYDROCHLORIDE 240 MG: 240 CAPSULE, COATED, EXTENDED RELEASE ORAL at 08:52

## 2021-02-17 RX ADMIN — MULTIPLE VITAMINS W/ MINERALS TAB 1 TABLET: TAB at 08:53

## 2021-02-17 RX ADMIN — PANTOPRAZOLE SODIUM 40 MG: 40 TABLET, DELAYED RELEASE ORAL at 15:37

## 2021-02-17 RX ADMIN — ALLOPURINOL 300 MG: 300 TABLET ORAL at 08:53

## 2021-02-17 RX ADMIN — Medication 10 ML: at 23:24

## 2021-02-17 RX ADMIN — DICYCLOMINE HYDROCHLORIDE 10 MG: 10 CAPSULE ORAL at 17:44

## 2021-02-17 RX ADMIN — CEFEPIME HYDROCHLORIDE 2 G: 2 INJECTION, POWDER, FOR SOLUTION INTRAVENOUS at 04:29

## 2021-02-17 RX ADMIN — CEFEPIME HYDROCHLORIDE 2 G: 2 INJECTION, POWDER, FOR SOLUTION INTRAVENOUS at 23:23

## 2021-02-17 RX ADMIN — ACETAMINOPHEN 650 MG: 325 TABLET ORAL at 08:56

## 2021-02-17 RX ADMIN — FOLIC ACID 1 MG: 1 TABLET ORAL at 08:53

## 2021-02-17 RX ADMIN — Medication 10 ML: at 15:37

## 2021-02-17 RX ADMIN — PROMETHAZINE HYDROCHLORIDE 12.5 MG: 25 TABLET ORAL at 12:48

## 2021-02-17 RX ADMIN — Medication 10 ML: at 06:14

## 2021-02-17 RX ADMIN — PANTOPRAZOLE SODIUM 40 MG: 40 TABLET, DELAYED RELEASE ORAL at 08:53

## 2021-02-17 RX ADMIN — DICYCLOMINE HYDROCHLORIDE 10 MG: 10 CAPSULE ORAL at 08:53

## 2021-02-17 NOTE — PROGRESS NOTES
End of Shift Note    Bedside shift change report given to Sofi Hernandez RN (oncoming nurse) by RADHA Esquivel (offgoing nurse). Report included the following information SBAR, Kardex, Intake/Output and Recent Results    Shift worked: Days     Shift summary and any significant changes:    Patient finished platelet transfusion     Concerns for physician to address: None   Zone phone for oncoming shift:  [phone]     Activity:  Activity Level: Up with Assistance  Number times ambulated in hallways past shift: 0  Number of times OOB to chair past shift: 1    Cardiac:   Cardiac Monitoring: Yes      Cardiac Rhythm: Normal sinus rhythm    Access:   Current line(s): port     Genitourinary:   Urinary status: voiding and incontinent    Respiratory:   O2 Device: Nasal cannula  Chronic home O2 use?: YES  Incentive spirometer at bedside: NO     GI:  Last Bowel Movement Date: 02/14/21  Current diet:  DIET CARDIAC Regular  DIET ONE TIME MESSAGE  Passing flatus: YES  Tolerating current diet: YES  % Diet Eaten: 80 %    Pain Management:   Patient states pain is manageable on current regimen: YES    Skin:  Nikolas Score: 19  Interventions: increase time out of bed    Patient Safety:  Fall Score:  Total Score: 3  Interventions: pt to call before getting OOB  High Fall Risk: Yes    Length of Stay:  Expected LOS: 4d 19h  Actual LOS: 79-25 RADHA Navarro

## 2021-02-17 NOTE — PROGRESS NOTES
Occupational Therapy  Nurse cleared pt for therapy. Upon arrival, pt reports that she cannot participate today as she is feeling very nauseated. Provided comfort measures and pt reports nurse is aware of her need for medication. Will defer initiating OT Evaluation at this time and continue to follow as appropriate.

## 2021-02-17 NOTE — PROGRESS NOTES
Received notification from bedside RN about patient with regards to: Platelet 8, WBC 0.2  VS: /58, HR 87, RR 16, O2 sat 93%    Intervention given: Transfuse 2 units platelet

## 2021-02-17 NOTE — PROGRESS NOTES
I reviewed pertinent labs and imaging, and discussed /agreed on the plan of care with Dr. Clarisa Cuenca      Hospitalist Progress Note    NAME: Jone Zarate   :  1935   MRN:  [id number]       Assessment / Plan:        Summary: Sourav Moraes is a 70 y.o.  female with a history of stage I invasive ductal carcinoma and MDS. She is currently receiving Dacogen at [address] [place] [address]. She is admitted with pancytopenia, neutropenic fever and diverticulitis. CMML/MDS  Pancytopenia  - s/p 2 units platelet  again on , 2/15,   - Platelet count droped to 8 this morning. 2 units ordered  - appreciate input by Hem/Onc team recc keep plts above 10, Hgb greater than 7   - rec'd PRBC  for hgb 6.2. Hb stable between 8 and 9  - no overt sign of bleeding noted   - monitor CBC twice daily  - Discussed with Dr. Nguyen Leader. Difficult situation. Patient is transfusion dependent. PT was getting platelet transfusion about three times per week outpatient. Patient and patient's  asking for patient to be discharged and follow up outpatient [facility] Dr Charline Foster. Dr Nguyen Leader to touch base with Dr Charline Foster. Appreciate [facility] help      Stage 1 breast cancer: S/P left breast lumpectomy and sentinel LN excision on 2013    Sepsis/E coli bacteremia: presented with Temp 103.2 ,    Neutropenic fever  Receiving Chemo at [facility]   - WBC  0.3. Afebrile >72 hours  - Bcx 2/10: E.coli . Repeat BC NGTD   - On cefepime (was on vanco + cefepime) - total antibiotic day 7  - Continue granix x 3 days. We will get last dose today  - neutropenic precautions in effect     Diverticulitis  - presented with Nausea , lower abd pain. Hx of diverticulitis   - CT abd/pelvis:  acute diverticulitis involving the sigmoid colon. Possible second focus of acute  diverticulitis in the distal descending colon.   - appreciate GI input - no intervention at this time   - pt tolerating diet     Acute hypoxic respiratory failure secondary to fluid overload versus TRALI  - required Bipap initially, received Lasix   - on NC a 3 L  Baseline  O2 at night only   - had multiple transfusion likely fluid overload   -Dyspnea improving.  Will get chest x-ray.  Will give Lasix if evidence of pulmonary edema.      Hypokalemia: resolved  Hypocalcemia: resolved     at bedside, updated     / Body mass index is 31.47 kg/m².    Code status: Full  Prophylaxis: SCD's  Recommended Disposition: Home w/Family     Subjective:     Chief Complaint / Reason for Physician Visit  No acute events overnight.  Denies bleeding from any site.  Platelet dropped to 8 this morning.    Review of Systems:  Symptom Y/N Comments  Symptom Y/N Comments   Fever/Chills n   Chest Pain n    Poor Appetite n   Edema n    Cough y   Abdominal Pain n    Sputum n   Joint Pain n    SOB/AL n   Pruritis/Rash n    Nausea/vomit n   Tolerating PT/OT n    Diarrhea n   Tolerating Diet n    Constipation    Other       Could NOT obtain due to:      Objective:     VITALS:   Last 24hrs VS reviewed since prior progress note. Most recent are:  Patient Vitals for the past 24 hrs:   Temp Pulse Resp BP SpO2   02/17/21 1545 98.3 °F (36.8 °C) 78 16 (!) 116/50 95 %   02/17/21 1100 97.9 °F (36.6 °C) 79 18 120/60 96 %   02/17/21 0952 98.7 °F (37.1 °C) 78 18 116/62 94 %   02/17/21 0727 98.5 °F (36.9 °C) 79 18 (!) 120/58 99 %   02/17/21 0425 98.6 °F (37 °C) 87 16 (!) 117/58 93 %   02/17/21 0011 98.2 °F (36.8 °C) 84 18 (!) 124/57 91 %   02/16/21 2047 98.7 °F (37.1 °C) 73 18 (!) 107/55 94 %     No intake or output data in the 24 hours ending 02/17/21 1647     I had a face to face encounter and independently examined this patient on 2/17/2021, as outlined below:  PHYSICAL EXAM:  General: WD, WN. Alert, cooperative, no acute distress    EENT:  EOMI. Anicteric sclerae. MMM  Resp:  diminished  bilaterally, no wheezing or rales.  No accessory muscle use, NC  CV:  S1S2,  Tr edema  port  in L chest wall   GI:  Soft, Non distended, Non tender.   +Bowel sounds  Neurologic:  Alert and oriented X 3, normal speech,   Psych:   . Not anxious nor agitated  Skin:  No rashes. No jaundice bruising around L oribital resolving    Reviewed most current lab test results and cultures  YES  Reviewed most current radiology test results   YES  Review and summation of old records today    NO  Reviewed patient's current orders and MAR    YES  PMH/SH reviewed - no change compared to H&P  ________________________________________________________________________  Care Plan discussed with:    Comments   Patient x    Family      RN x    Care Manager     Consultant                        Multidiciplinary team rounds were held today with , nursing, pharmacist and clinical coordinator. Patient's plan of care was discussed; medications were reviewed and discharge planning was addressed. ________________________________________________________________________  Total NON critical care TIME: 30   Minutes    Total CRITICAL CARE TIME Spent:   Minutes non procedure based      Comments   >50% of visit spent in counseling and coordination of care     ________________________________________________________________________  Florian MD Pop     Procedures: see electronic medical records for all procedures/Xrays and details which were not copied into this note but were reviewed prior to creation of Plan. LABS:  I reviewed today's most current labs and imaging studies.   Pertinent labs include:  Recent Labs     02/17/21  0111 02/16/21 0227 02/15/21  0823   WBC 0.3* 0.3* 0.3*   HGB 8.0* 8.8* 8.9*   HCT 24.6* 26.1* 26.6*   PLT 8* 20* 6*     Recent Labs     02/17/21  0111 02/16/21  0227 02/15/21  0823    139 138   K 3.6 3.9 3.9    103 105   CO2 27 30 27   * 96 128*   BUN 25* 24* 20   CREA 0.58 0.67 0.72   CA 8.2* 8.5 8.4*   ALB 2.7* 3.0* 2.9*   TBILI 1.1* 0.9 1.0   ALT 14 14 15       Signed: Florian MD Pop

## 2021-02-17 NOTE — PROGRESS NOTES
Physical Therapy    Pt cleared for session, however upon arrival pt stated she was unable to tolerate therapy this afternoon due to being extremely nauseous. Offered bed exercise but pt declined as well. Pt was able to sit to EOB independently to have some gingerale and crackers, ok'd by RN. Will defer this afternoon and follow as tolerated.     Navid Vieira, PT

## 2021-02-17 NOTE — PROGRESS NOTES
Problem: Falls - Risk of  Goal: *Absence of Falls  Description: Document Braeden Resendez Fall Risk and appropriate interventions in the flowsheet. Outcome: Progressing Towards Goal  Note: Fall Risk Interventions:  Mobility Interventions: Bed/chair exit alarm, Patient to call before getting OOB         Medication Interventions: Assess postural VS orthostatic hypotension, Patient to call before getting OOB    Elimination Interventions: Bed/chair exit alarm, Call light in reach              Problem: Breathing Pattern - Ineffective  Goal: *Absence of hypoxia  Outcome: Progressing Towards Goal     Problem: Pressure Injury - Risk of  Goal: *Prevention of pressure injury  Description: Document Nikolas Scale and appropriate interventions in the flowsheet. Outcome: Progressing Towards Goal  Note: Pressure Injury Interventions:  Sensory Interventions: Assess changes in LOC    Moisture Interventions: Absorbent underpads, Minimize layers    Activity Interventions: PT/OT evaluation    Mobility Interventions: Turn and reposition approx.  every two hours(pillow and wedges)    Nutrition Interventions: Document food/fluid/supplement intake    Friction and Shear Interventions: Minimize layers

## 2021-02-18 LAB
ALBUMIN SERPL-MCNC: 2.9 G/DL (ref 3.5–5)
ALBUMIN/GLOB SERPL: 0.6 {RATIO} (ref 1.1–2.2)
ALP SERPL-CCNC: 94 U/L (ref 45–117)
ALT SERPL-CCNC: 13 U/L (ref 12–78)
ANION GAP SERPL CALC-SCNC: 5 MMOL/L (ref 5–15)
AST SERPL-CCNC: 8 U/L (ref 15–37)
BASOPHILS # BLD: 0 K/UL (ref 0–0.1)
BASOPHILS NFR BLD: 0 % (ref 0–1)
BILIRUB SERPL-MCNC: 1.3 MG/DL (ref 0.2–1)
BLD PROD TYP BPU: NORMAL
BLD PROD TYP BPU: NORMAL
BPU ID: NORMAL
BPU ID: NORMAL
BUN SERPL-MCNC: 21 MG/DL (ref 6–20)
BUN/CREAT SERPL: 36 (ref 12–20)
CALCIUM SERPL-MCNC: 8.6 MG/DL (ref 8.5–10.1)
CHLORIDE SERPL-SCNC: 104 MMOL/L (ref 97–108)
CO2 SERPL-SCNC: 27 MMOL/L (ref 21–32)
CREAT SERPL-MCNC: 0.59 MG/DL (ref 0.55–1.02)
DIFFERENTIAL METHOD BLD: ABNORMAL
EOSINOPHIL # BLD: 0 K/UL (ref 0–0.4)
EOSINOPHIL NFR BLD: 0 % (ref 0–7)
ERYTHROCYTE [DISTWIDTH] IN BLOOD BY AUTOMATED COUNT: 13.8 % (ref 11.5–14.5)
GLOBULIN SER CALC-MCNC: 4.5 G/DL (ref 2–4)
GLUCOSE SERPL-MCNC: 104 MG/DL (ref 65–100)
HCT VFR BLD AUTO: 17.2 % (ref 35–47)
HGB BLD-MCNC: 5.6 G/DL (ref 11.5–16)
HISTORY CHECKED?,CKHIST: NORMAL
IMM GRANULOCYTES # BLD AUTO: 0 K/UL (ref 0–0.04)
IMM GRANULOCYTES NFR BLD AUTO: 0 % (ref 0–0.5)
LYMPHOCYTES # BLD: 0.2 K/UL (ref 0.8–3.5)
LYMPHOCYTES NFR BLD: 44 % (ref 12–49)
MCH RBC QN AUTO: 29 PG (ref 26–34)
MCHC RBC AUTO-ENTMCNC: 32.6 G/DL (ref 30–36.5)
MCV RBC AUTO: 89.1 FL (ref 80–99)
MONOCYTES # BLD: 0.1 K/UL (ref 0–1)
MONOCYTES NFR BLD: 28 % (ref 5–13)
MYELOCYTES NFR BLD MANUAL: 4 %
NEUTS SEG # BLD: 0.1 K/UL (ref 1.8–8)
NEUTS SEG NFR BLD: 24 % (ref 32–75)
NRBC # BLD: 0 K/UL (ref 0–0.01)
NRBC BLD-RTO: 0 PER 100 WBC
PATH REV BLD -IMP: NORMAL
PLATELET # BLD AUTO: 13 K/UL (ref 150–400)
PLATELET COMMENTS,PCOM: ABNORMAL
PMV BLD AUTO: 10.6 FL (ref 8.9–12.9)
POTASSIUM SERPL-SCNC: 3.9 MMOL/L (ref 3.5–5.1)
PROT SERPL-MCNC: 7.4 G/DL (ref 6.4–8.2)
RBC # BLD AUTO: 1.93 M/UL (ref 3.8–5.2)
RBC MORPH BLD: ABNORMAL
SODIUM SERPL-SCNC: 136 MMOL/L (ref 136–145)
STATUS OF UNIT,%ST: NORMAL
STATUS OF UNIT,%ST: NORMAL
TOTAL CELLS COUNTED SPEC: 25
UNIT DIVISION, %UDIV: 0
UNIT DIVISION, %UDIV: 0
WBC # BLD AUTO: 0.4 K/UL (ref 3.6–11)

## 2021-02-18 PROCEDURE — 36430 TRANSFUSION BLD/BLD COMPNT: CPT

## 2021-02-18 PROCEDURE — 86901 BLOOD TYPING SEROLOGIC RH(D): CPT

## 2021-02-18 PROCEDURE — 65660000000 HC RM CCU STEPDOWN

## 2021-02-18 PROCEDURE — 86923 COMPATIBILITY TEST ELECTRIC: CPT

## 2021-02-18 PROCEDURE — 80053 COMPREHEN METABOLIC PANEL: CPT

## 2021-02-18 PROCEDURE — 36415 COLL VENOUS BLD VENIPUNCTURE: CPT

## 2021-02-18 PROCEDURE — 74011000258 HC RX REV CODE- 258: Performed by: GENERAL ACUTE CARE HOSPITAL

## 2021-02-18 PROCEDURE — 74011250637 HC RX REV CODE- 250/637: Performed by: STUDENT IN AN ORGANIZED HEALTH CARE EDUCATION/TRAINING PROGRAM

## 2021-02-18 PROCEDURE — 74011250636 HC RX REV CODE- 250/636: Performed by: GENERAL ACUTE CARE HOSPITAL

## 2021-02-18 PROCEDURE — 94760 N-INVAS EAR/PLS OXIMETRY 1: CPT

## 2021-02-18 PROCEDURE — 74011250637 HC RX REV CODE- 250/637: Performed by: GENERAL ACUTE CARE HOSPITAL

## 2021-02-18 PROCEDURE — 74011250636 HC RX REV CODE- 250/636: Performed by: STUDENT IN AN ORGANIZED HEALTH CARE EDUCATION/TRAINING PROGRAM

## 2021-02-18 PROCEDURE — 77010033678 HC OXYGEN DAILY

## 2021-02-18 PROCEDURE — P9016 RBC LEUKOCYTES REDUCED: HCPCS

## 2021-02-18 PROCEDURE — 94762 N-INVAS EAR/PLS OXIMTRY CONT: CPT

## 2021-02-18 PROCEDURE — 85025 COMPLETE CBC W/AUTO DIFF WBC: CPT

## 2021-02-18 RX ORDER — FUROSEMIDE 10 MG/ML
40 INJECTION INTRAMUSCULAR; INTRAVENOUS ONCE
Status: COMPLETED | OUTPATIENT
Start: 2021-02-19 | End: 2021-02-19

## 2021-02-18 RX ORDER — BISACODYL 5 MG
10 TABLET, DELAYED RELEASE (ENTERIC COATED) ORAL DAILY PRN
Status: DISCONTINUED | OUTPATIENT
Start: 2021-02-18 | End: 2021-02-20 | Stop reason: HOSPADM

## 2021-02-18 RX ORDER — SODIUM CHLORIDE 9 MG/ML
250 INJECTION, SOLUTION INTRAVENOUS AS NEEDED
Status: DISCONTINUED | OUTPATIENT
Start: 2021-02-18 | End: 2021-02-19

## 2021-02-18 RX ADMIN — ONDANSETRON 4 MG: 2 INJECTION INTRAMUSCULAR; INTRAVENOUS at 11:18

## 2021-02-18 RX ADMIN — ALLOPURINOL 300 MG: 300 TABLET ORAL at 09:10

## 2021-02-18 RX ADMIN — Medication 1.5 MG: at 21:09

## 2021-02-18 RX ADMIN — DICYCLOMINE HYDROCHLORIDE 10 MG: 10 CAPSULE ORAL at 09:10

## 2021-02-18 RX ADMIN — CEFEPIME HYDROCHLORIDE 2 G: 2 INJECTION, POWDER, FOR SOLUTION INTRAVENOUS at 04:00

## 2021-02-18 RX ADMIN — Medication 10 ML: at 05:27

## 2021-02-18 RX ADMIN — DICYCLOMINE HYDROCHLORIDE 10 MG: 10 CAPSULE ORAL at 18:32

## 2021-02-18 RX ADMIN — Medication 10 ML: at 14:00

## 2021-02-18 RX ADMIN — BISACODYL 10 MG: 5 TABLET, COATED ORAL at 18:32

## 2021-02-18 RX ADMIN — PANTOPRAZOLE SODIUM 40 MG: 40 TABLET, DELAYED RELEASE ORAL at 18:32

## 2021-02-18 RX ADMIN — CEFEPIME HYDROCHLORIDE 2 G: 2 INJECTION, POWDER, FOR SOLUTION INTRAVENOUS at 11:18

## 2021-02-18 RX ADMIN — FOLIC ACID 1 MG: 1 TABLET ORAL at 09:10

## 2021-02-18 RX ADMIN — DILTIAZEM HYDROCHLORIDE 240 MG: 240 CAPSULE, COATED, EXTENDED RELEASE ORAL at 09:10

## 2021-02-18 RX ADMIN — CEFEPIME HYDROCHLORIDE 2 G: 2 INJECTION, POWDER, FOR SOLUTION INTRAVENOUS at 21:22

## 2021-02-18 RX ADMIN — TBO-FILGRASTIM 480 MCG: 480 INJECTION, SOLUTION SUBCUTANEOUS at 18:32

## 2021-02-18 RX ADMIN — PANTOPRAZOLE SODIUM 40 MG: 40 TABLET, DELAYED RELEASE ORAL at 09:10

## 2021-02-18 RX ADMIN — MULTIPLE VITAMINS W/ MINERALS TAB 1 TABLET: TAB at 09:10

## 2021-02-18 RX ADMIN — Medication 10 ML: at 21:21

## 2021-02-18 RX ADMIN — POLYETHYLENE GLYCOL 3350 17 G: 17 POWDER, FOR SOLUTION ORAL at 09:10

## 2021-02-18 RX ADMIN — DULOXETINE HYDROCHLORIDE 30 MG: 30 CAPSULE, DELAYED RELEASE ORAL at 09:10

## 2021-02-18 NOTE — PROGRESS NOTES
I reviewed pertinent labs and imaging, and discussed /agreed on the plan of care with Dr. Dior Rosenberg      Hospitalist Progress Note    NAME: Harvinder Weston   :  1935   MRN:  [id number]       Assessment / Plan:        Summary: Emelia Orlando is a 70 y.o.  female with a history of stage I invasive ductal carcinoma and MDS. She is currently receiving Dacogen at [facility]. She is admitted with pancytopenia, neutropenic fever and diverticulitis. CMML/MDS  Severe pancytopenia: due to underlying hematologic malignacy + chemotherapy  - s/p multiple units of platelet 96/32, 0/77, 215,   - Platelet count at 13 today  - Hb dropped to 5.6 today, 2u pRBC ordered. Received 2 units on  as well. - appreciate input by Hem/Onc team recc keep plts above 10, Hgb greater than 7   - no overt sign of bleeding noted   - monitor CBC twice daily  - Discussed with Dr. Tonya Escobar. Difficult situation. Patient is transfusion dependent. She was getting platelet transfusion about three times per week outpatient. Patient and patient's  asking for patient to be discharged and follow up outpatient [facility] Dr Renetta Izaguirre. Plan is to continue granix and discharge once ANC is >0.3. ANC at 0.1 today. Stage 1 breast cancer: S/P left breast lumpectomy and sentinel LN excision on 2013    Sepsis/E coli bacteremia: presented with Temp 103.2 ,    Neutropenic fever  Receiving Chemo at [facility]   - WBC  0.3. Afebrile >72 hours  - Bcx 2/10: E.coli . Repeat BC NGTD   - On cefepime (was on vanco + cefepime) - total antibiotic day 8. Plan to continue for 10 days  - neutropenic precautions in effect     Diverticulitis  - presented with Nausea , lower abd pain. Hx of diverticulitis   - CT abd/pelvis:  acute diverticulitis involving the sigmoid colon. Possible second focus of acute  diverticulitis in the distal descending colon.   - appreciate GI input - no intervention at this time   - pt tolerating diet     Acute hypoxic respiratory failure secondary to fluid overload versus TRALI  - required Bipap initially, received Lasix   - on NC a 3 L  Baseline  O2 at night only   - had multiple transfusion likely fluid overload   -Dyspnea improving. repeat chest x-ray showing interstitial edema. Will give Lasix as needed. Hypokalemia: resolved  Hypocalcemia: resolved     at bedside, updated     / Body mass index is 31.47 kg/m². Code status: Full  Prophylaxis: SCD's  Recommended Disposition: Home w/Family     Subjective:     Chief Complaint / Reason for Physician Visit  Hb dropped this morning and she is to get 2 more units today  No complaints. Review of Systems:  Symptom Y/N Comments  Symptom Y/N Comments   Fever/Chills n   Chest Pain n    Poor Appetite n   Edema n    Cough y   Abdominal Pain n    Sputum n   Joint Pain n    SOB/AL n   Pruritis/Rash n    Nausea/vomit n   Tolerating PT/OT n    Diarrhea n   Tolerating Diet n    Constipation    Other       Could NOT obtain due to:      Objective:     VITALS:   Last 24hrs VS reviewed since prior progress note. Most recent are:  Patient Vitals for the past 24 hrs:   Temp Pulse Resp BP SpO2   02/18/21 1558 98.7 °F (37.1 °C) 83 16 (!) 131/58 95 %   02/18/21 1528 98.6 °F (37 °C) 86 18 138/64 97 %   02/18/21 1358 99.3 °F (37.4 °C) 84 16 139/61 95 %   02/18/21 1330 99.6 °F (37.6 °C) 86 19 (!) 128/57 98 %   02/18/21 1315 99.1 °F (37.3 °C) 87 18 132/60 95 %   02/18/21 1255 99.7 °F (37.6 °C) 85 18 (!) 119/55 96 %   02/18/21 0746 98 °F (36.7 °C) 90  123/65 93 %   02/18/21 0025 99.6 °F (37.6 °C) 89 16 129/60 91 %   02/17/21 1945 98.7 °F (37.1 °C) 86 18 130/65 95 %       Intake/Output Summary (Last 24 hours) at 2/18/2021 1702  Last data filed at 2/18/2021 1259  Gross per 24 hour   Intake 781.7 ml   Output    Net 781.7 ml        I had a face to face encounter and independently examined this patient on 2/18/2021, as outlined below:  PHYSICAL EXAM:  General: WD, WN.  Alert, cooperative, no acute distress EENT:  EOMI. Anicteric sclerae. MMM  Resp:  diminished  bilaterally, no wheezing or rales. No accessory muscle use, NC  CV:  S1S2,  Tr edema  port  in L chest wall   GI:  Soft, Non distended, Non tender. +Bowel sounds  Neurologic:  Alert and oriented X 3, normal speech,   Psych:   . Not anxious nor agitated  Skin:  No rashes. No jaundice bruising around L oribital resolving    Reviewed most current lab test results and cultures  YES  Reviewed most current radiology test results   YES  Review and summation of old records today    NO  Reviewed patient's current orders and MAR    YES  PMH/SH reviewed - no change compared to H&P  ________________________________________________________________________  Care Plan discussed with:    Comments   Patient x    Family      RN x    Care Manager     Consultant                        Multidiciplinary team rounds were held today with , nursing, pharmacist and clinical coordinator. Patient's plan of care was discussed; medications were reviewed and discharge planning was addressed. ________________________________________________________________________  Total NON critical care TIME: 30   Minutes    Total CRITICAL CARE TIME Spent:   Minutes non procedure based      Comments   >50% of visit spent in counseling and coordination of care     ________________________________________________________________________  Laverna Lesch, MD     Procedures: see electronic medical records for all procedures/Xrays and details which were not copied into this note but were reviewed prior to creation of Plan. LABS:  I reviewed today's most current labs and imaging studies.   Pertinent labs include:  Recent Labs     02/18/21  0653 02/17/21 1954 02/17/21  0111   WBC 0.4* 0.4* 0.3*   HGB 5.6* 7.6* 8.0*   HCT 17.2* 23.1* 24.6*   PLT 13* 14* 8*     Recent Labs     02/18/21  0403 02/17/21  0111 02/16/21  0227    136 139   K 3.9 3.6 3.9    103 103   CO2 27 27 30   * 103* 96   BUN 21* 25* 24*   CREA 0.59 0.58 0.67   CA 8.6 8.2* 8.5   ALB 2.9* 2.7* 3.0*   TBILI 1.3* 1.1* 0.9   ALT 13 14 14       Signed: Tamanna Jaramillo MD

## 2021-02-18 NOTE — PROGRESS NOTES
Laboratory stated pt needs H&H checked prior to second unit of blood is given, MD notified.    -Clarified with Fredrick Ibanez, stated H&H drawn 1hr after second unit is given

## 2021-02-18 NOTE — PROGRESS NOTES
Laboratory called and stated pt WBC 0.4, HGB 5.6, HCT 17.2, platelets 13, MD notified     -MD ordered 2 units PRBC  -type and screen sent down to lab

## 2021-02-18 NOTE — PROGRESS NOTES
[facility] [employer] 
at [facility], [facility], [address] 
[phone] Oncology Consultation Note Patient: Ez Valverde MRN: [id number]  SSN: [id number] YOB: 1935  Age: 82 y.o. Sex: female Reason for Consultation Ez Valverde is a 82 y.o. female who is seen in consultation for pancytopenia secondary to chemotherapy and MDS Diagnosis: 1. 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 
 TET-2 and ASXL mutation present 2. Left breast carcinoma:  
T1a N0 Mx (Stage IA) infiltrating ductal carcinoma , Tumor size 1 cm, LN -ve, grade 2, ki 67 17%, %, [place] 90%, Her 2 unamplified. Treatment: 1. Dacogen, per Dr. Jf Lopez 2. Vidaza - s/p 3 cycles 3. Discontinued Arimidex 1/2019 after 5 years of treatment 4. Completed radiation treatment to the breast  
5. S/P left breast lumpectomy and sentinel LN excision on 11/21/2013 Subjective:   
 
Hayes Bazan is a 70 y.o.  female with a history of stage I invasive ductal carcinoma and MDS. She completed the 5 years of AI. She is currently receiving Dacogen under the care of Dr. fJ Lopez at [address] [place] [address]. She is admitted with pancytopenia, neutropenic fever and diverticulitis. She is receiving transfusion support in the hospital. She feels tired. Review of Systems:  
 
Constitutional: fatigue Eyes: negative Ears, Nose, Mouth, Throat, and Face: negative Respiratory: negative Cardiovascular: negative Gastrointestinal: constipation Integument/Breast: negative Hematologic/Lymphatic: negative Musculoskeletal: joint pain Neurological: negative Past Medical History:  
Diagnosis Date  Anemia  Arthritis  Breast cancer ([address]   
 left  Bunion of right foot 8/20/2015  Chronic obstructive pulmonary disease ([address] mild  Chronic pain   
 back--uses tens unit  Diverticulitis 6/29/2018 Recurrent  Diverticulitis large intestine  Dry eye syndrome 2018  Fibromyalgia 2018  GERD (gastroesophageal reflux disease)  History of left breast cancer 2013  
 lumpectomy radiation  Hypercholesterolemia 2018  Ill-defined condition   
 blood transfusion hx  Leukemia ([facility]  MDS (myelodysplastic syndrome) ([facility]  Osteoporosis 2018  Oxygen dependent   
 at night  Peripheral neuropathy 2018  Prediabetes 2018  PUD (peptic ulcer disease)  Radiation therapy complication 9[address] Lt breast-No Chemo  Rosacea 2018  Trouble in sleeping Past Surgical History:  
Procedure Laterality Date  COLONOSCOPY N/A 2020 COLONOSCOPY performed by Jil Motley MD at hospitals ENDOSCOPY  COLONOSCOPY,DIAGNOSTIC  2020  HX APPENDECTOMY  HX BREAST LUMPECTOMY  2013 LEFT BREAST LUMPECTOMY W/LEFT BREAST SENTINEL NODE BIOPSY,AND NEEDLE LOCALIZATION performed by Maribel Blankenship MD at hospitals MAIN OR  
 HX CATARACT REMOVAL    
 bilateral  
 HX HYSTERECTOMY    
 ovarian cyst  
 HX MOHS PROCEDURES    
 right  HX ORTHOPAEDIC    
 back surgery x2  HX OTHER SURGICAL    
 colonoscopy  HX TONSILLECTOMY  HX VASCULAR ACCESS  2020  
 echo cath right shoulder  IR INSERT TUNL CVC W PORT OVER 5 YEARS  12/3/2019  HI TOTAL KNEE ARTHROPLASTY    
 left  HI TOTAL KNEE ARTHROPLASTY    
 right  UPPER GI ENDOSCOPY,BIOPSY  2020  UPPER GI ENDOSCOPY,BIOPSY  2020  UPPER GI ENDOSCOPY,DILATN W GUIDE  2020 [facility] BREAST BIOPSY Left 2013 Breast Ca Family History Problem Relation Age of Onset  Cancer Mother   
     bladder  Cancer Father  Breast Cancer Paternal Grandmother Social History Tobacco Use  Smoking status: Former Smoker Packs/day: 0.50 Years: 50.00 Pack years: 25.00 Quit date: 2012 Years since quittin.0  Smokeless tobacco: Never Used Substance Use Topics  Alcohol use: Yes Alcohol/week: 2.0 standard drinks Types: 2 Glasses of wine per week Prior to Admission medications Medication Sig Start Date End Date Taking? Authorizing Provider  
acetaminophen (TylenoL) 325 mg tablet Take 975 mg by mouth every four (4) hours as needed for Pain. Yes Provider, Historical  
ergocalciferol (Vitamin D2) 1,250 mcg (50,000 unit) capsule Take 50,000 Units by mouth every seven (7) days. On Wednesday   Yes Provider, Historical  
cycloSPORINE (Restasis) 0.05 % dpet Administer 1 Drop to both eyes every twelve (12) hours. Yes Provider, Historical  
sodium chloride-aloe vera (Ayr Saline) topical gel Apply  to affected area four (4) times daily as needed. Yes Provider, Historical  
colchicine 0.6 mg tablet Take 0.6 mg by mouth daily. With water 10/7/20  Yes Provider, Historical  
dicyclomine (BENTYL) 10 mg capsule Take 10 mg by mouth two (2) times a day. 8/31/20  Yes Provider, Historical  
OTHER Dicitabine- Chemo infusion   Yes Provider, Historical  
allopurinoL (ZYLOPRIM) 300 mg tablet Take 300 mg by mouth daily. 8/21/20  Yes Provider, Historical  
melatonin 1 mg tablet Take  by mouth nightly. Yes Provider, Historical  
ginger, Zingiber officinalis, (ginger extract) 250 mg cap Take  by mouth as needed. Yes Provider, Historical  
polyethylene glycol 400 (BLINK TEARS OP) Apply 1 Drop to eye daily. Both eyes as needed   Yes Provider, Historical  
DULoxetine (CYMBALTA) 30 mg capsule Take 30 mg by mouth daily. take for neuropathy 8/3/20  Yes Provider, Historical  
dilTIAZem ER (CARDIZEM LA) 240 mg tablet Take 240 mg by mouth daily. Yes Provider, Historical  
vit A,C,E-zinc-copper (PreserVision AREDS) cap capsule Take 1 Cap by mouth daily. Yes Provider, Historical  
lidocaine 5 % topical cream Apply  to affected area two (2) times daily as needed (port access).  Port access   Yes Provider, Historical  
 ondansetron hcl (Zofran) 4 mg tablet Take 4 mg by mouth every eight (8) hours as needed for Nausea or Vomiting. Yes Provider, Willi  
folic acid (FOLVITE) 1 mg tablet Take 1 Tab by mouth daily. 7/8/20  Yes Radha Forte MD  
bismuth subsalicylate (PEPTO-BISMOL PO) Take 30 mL by mouth daily as needed for Diarrhea. take one dose every hour as needed   Yes Other, MD Libby  
pantoprazole (PROTONIX) 40 mg tablet Take 1 Tab by mouth Before breakfast and dinner. 11/5/19  Yes Anjana Harris MD  
  
 
 
Allergies Allergen Reactions  Latex Rash  Hydrocodone Nausea Only and Vertigo  Gabapentin Diarrhea, Nausea Only and Other (comments)  
  weakness  Hydrocodone-Acetaminophen Nausea Only  Hydroxyurea Unknown (comments)  
  patient states she feels this medication is what caused the gi bleed  Lidocaine Rash Tegarderm dressing caused skin rash after removal, please use foam dressing with this patient. Tegarderm dressing caused skin rash after removal, please use foam dressing with this patient.  Lyrica [Pregabalin] Nausea Only  Oxycodone Nausea and Vomiting and Vertigo Objective:  
 
Visit Vitals /61 (BP 1 Location: Left upper arm, BP Patient Position: At rest) Pulse 84 Temp 99.3 °F (37.4 °C) Resp 16 Ht 5' 6\" (1.676 m) Wt 195 lb (88.5 kg) SpO2 95% Breastfeeding No  
BMI 31.47 kg/m² Physical Exam: 
 
GENERAL: alert, cooperative EYE: PERRLA, 
LYMPHATIC: Cervical, supraclavicular, and axillary nodes normal.  
THROAT & NECK: normal and no erythema or exudates noted. LUNG: clear to auscultation bilaterally HEART: regular rate and rhythm ABDOMEN: soft, non-tender EXTREMITIES: extremities normal  
SKIN: scattered bruises NEUROLOGIC: negative Physical exam and ROS has been modified from a prior visit to make it relevant and current Lab Results Component Value Date/Time  WBC 0.4 (LL) 02/18/2021 06:53 AM  
 HGB (POC) 10.7 (A) 01/07/2019 09:52 AM  
 HGB 5.6 (LL) 02/18/2021 06:53 AM  
 HCT (POC) 32.0 (A) 01/07/2019 09:52 AM  
 HCT 17.2 (LL) 02/18/2021 06:53 AM  
 PLATELET 13 (LL) 34/66/5909 06:53 AM  
 MCV 89.1 02/18/2021 06:53 AM  
 
 
Lab Results Component Value Date/Time Iron 157 (H) 07/21/2020 05:17 AM  
 TIBC 403 02/06/2020 09:40 AM  
 Iron % saturation 8 (L) 02/06/2020 09:40 AM  
 Ferritin 153 07/21/2020 05:17 AM  
 
 
 
Assessment:   
 
1. History of Left breast carcinoma:  
 
T1a N0 Mx (Stage IA) infiltrating ductal carcinoma , Tumor size 1 cm, LN -ve, grade 2, ki 67 17%, %, [place] 90%, Her 2 unamplified. Dx: 10/28/2013 
 
> S/P left breast lumpectomy and sentinel LN excision on 11/21/2013 
> Completed radiation to the breast 
> Discontinued Arimidex 1/2019 after 5 years 
> In remission 2. 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 
 TET-2 and ASXL mutation present ECOG PS 1 Intent of Treatment - palliative Prognosis: kathie Has received Vidaza for 4 cycles Now she is under Dr. Aki Hooker care at [facility] She is admitted with profound pancytopenia from chemotherapy I recommend transfusion support Plan: 1. She will return to Dr. Aki Hooker at discharge 2. Transfuse to maintain Hgb above 7 and platelet count above 10K unless she experiences significant bleeding 3. If she remains febrile, I would consider Granix. However the benefit is questionable. Signed by: Claudetta Harrier, MD 
                   February 18, 2021 
 
 
CC.  Lydia Rodriguez MD

## 2021-02-18 NOTE — PROGRESS NOTES
ANDREW:    Medically  Stable  [facility]  Home with Family        CM: Costella Rinne is currently working with pt in the Neuro Unit. CM informed during IDRs, that pt hemo is currently 5.6. Pt will receive blood cell platelets on today. CM informed by therapy that pt could benefit from [facility] at the time of d/c. CM will continue to follow.     Costella Rinne, MSW, [address] [place] [address]

## 2021-02-18 NOTE — PROGRESS NOTES
End of Shift Note    Bedside shift change report given to Lev Hall (oncoming nurse) by Nalini Villanueva (offgoing nurse). Report included the following information SBAR, Kardex, Intake/Output and Recent Results    Shift worked: Nights     Shift summary and any significant changes:    Patient received another round of platelets. Awaiting lab results. Concerns for physician to address: None   Zone phone for oncoming shift:  1821     Activity:  Activity Level: Up with Assistance  Number times ambulated in hallways past shift: 0  Number of times OOB to chair past shift: 1    Cardiac:   Cardiac Monitoring: Yes      Cardiac Rhythm: Normal sinus rhythm    Access:   Current line(s): port     Genitourinary:   Urinary status: voiding and incontinent    Respiratory:   O2 Device: Nasal cannula  Chronic home O2 use?: YES  Incentive spirometer at bedside: NO     GI:  Last Bowel Movement Date: 02/14/21  Current diet:  DIET CARDIAC Regular  DIET ONE TIME MESSAGE  Passing flatus: YES  Tolerating current diet: YES  % Diet Eaten: 80 %    Pain Management:   Patient states pain is manageable on current regimen: YES    Skin:  Nikolas Score: 19  Interventions: increase time out of bed    Patient Safety:  Fall Score:  Total Score: 3  Interventions: pt to call before getting OOB  High Fall Risk: Yes    Length of Stay:  Expected LOS: 4d 19h  Actual LOS: [address]

## 2021-02-18 NOTE — PROGRESS NOTES
Chart reviewed and orders received. . Pt now with Hgb of 5.6. Will defer at this time and continue when medically stable and able to tolerate.

## 2021-02-18 NOTE — PROGRESS NOTES
Physical Therapy    Chart reviewed. Pt now with Hgb of 5.6. Will defer at this time and continue when medically stable and able to tolerate.     [employer], PT

## 2021-02-18 NOTE — PROGRESS NOTES
End of Shift Note    Bedside shift change report given to (oncoming nurse) by RADHA Clark (offgoing nurse). Report included the following information SBAR, Kardex, Intake/Output and Recent Results    Shift worked: days     Shift summary and any significant changes:    PRBC x2, pt reported constipation abdomen distended MD notified x-ray ordered     Concerns for physician to address: None   Zone phone for oncoming shift:  1821     Activity:  Activity Level: Up with Assistance  Number times ambulated in hallways past shift: 0  Number of times OOB to chair past shift: 1    Cardiac:   Cardiac Monitoring: Yes      Cardiac Rhythm: Normal sinus rhythm    Access:   Current line(s): port     Genitourinary:   Urinary status: voiding and incontinent    Respiratory:   O2 Device: Nasal cannula  Chronic home O2 use?: YES  Incentive spirometer at bedside: NO     GI:  Last Bowel Movement Date: 02/14/21  Current diet:  DIET CARDIAC Regular  DIET NUTRITIONAL SUPPLEMENTS Breakfast, Dinner; Ensure [employer]  DIET NUTRITIONAL SUPPLEMENTS Lunch; Magic Cups  DIET NUTRITIONAL SUPPLEMENTS Dinner, Breakfast; Ensure Pudding  Passing flatus: YES  Tolerating current diet: YES  % Diet Eaten: 80 %    Pain Management:   Patient states pain is manageable on current regimen: YES    Skin:  Nikolas Score: 19  Interventions: increase time out of bed    Patient Safety:  Fall Score:  Total Score: 3  Interventions: pt to call before getting OOB  High Fall Risk: Yes    Length of Stay:  Expected LOS: 4d 19h  Actual LOS: [address], [place]

## 2021-02-18 NOTE — PROGRESS NOTES
Comprehensive Nutrition Assessment    Type and Reason for Visit: Initial, RD nutrition re-screen/LOS    Nutrition Recommendations/Plan:  Continue current diet  Add ensure enlive and ensure pudding BID, magic cup daily     Nutrition Assessment:     Patient medically noted for neutropenia, acute respiratory failure, diverticulitis, and bacteremia. PMH Breast cancer, leukemia, MDS, GERD, and COPD. Chart reviewed for length of stay. Patient reports a poor appetite and that she dislikes the food. She repeatedly states that there is nothing on the menu that she likes. Asked multiple times what patient does like to eat and she was unable to answer; states she doesn't know. She states she eats fine at home; normally has a good appetite. Denies any recent weight changes. She had ensure enlive at bedside; reports she can drink these. Will add other supplements for patient to try as well. Encouraged intake of meals. Estimated Daily Nutrient Needs:  Energy (kcal): 1770 kcal (BMR 1362 x 1.3AF); Weight Used for Energy Requirements: Current  Protein (g): 107g (1.2 g/kg bw); Weight Used for Protein Requirements: Current  Fluid (ml/day): 1800 mL; Method Used for Fluid Requirements: 1 ml/kcal    Nutrition Related Findings:       BM 6/90  Cymbalta, Folic Acid, Melatonin, Protonix, I-urbano    Wounds:    None       Current Nutrition Therapies:  DIET CARDIAC Regular  DIET ONE TIME MESSAGE    Anthropometric Measures:  · Height:  5' 6\" (167.6 cm)  · Current Body Wt:  88.5 kg (195 lb 1.7 oz)    · BMI Category:  Obese class 1 (BMI 30.0-34. 9)       Nutrition Diagnosis:   · Inadequate protein-energy intake related to (dislike for food, reported poor appetite) as evidenced by intake 0-25%, intake 26-50%    Nutrition Interventions:   Food and/or Nutrient Delivery: Continue current diet, Start oral nutrition supplement  Nutrition Education and Counseling: No recommendations at this time  Coordination of Nutrition Care: No recommendation at this time    Goals:  PO intake >50% of meals/supplements next 3-5 days       Nutrition Monitoring and Evaluation:   Behavioral-Environmental Outcomes: None identified  Food/Nutrient Intake Outcomes: Food and nutrient intake, Supplement intake  Physical Signs/Symptoms Outcomes: Biochemical data, Weight    Discharge Planning:    Continue current diet     Electronically signed by Simin Peck RD on 2/18/2021 at 9:05 AM    Contact: ext [phone]

## 2021-02-18 NOTE — PROGRESS NOTES
gave pt Zofran @11:18 has provided no relief, pt abdomen distended miralax given this morning provided the pt no relief, pt still reporting nausea.  MD notified

## 2021-02-19 ENCOUNTER — APPOINTMENT (OUTPATIENT)
Dept: GENERAL RADIOLOGY | Age: 86
DRG: 871 | End: 2021-02-19
Attending: STUDENT IN AN ORGANIZED HEALTH CARE EDUCATION/TRAINING PROGRAM
Payer: MEDICARE

## 2021-02-19 ENCOUNTER — HOME HEALTH ADMISSION (OUTPATIENT)
Dept: HOME HEALTH SERVICES | Facility: HOME HEALTH | Age: 86
End: 2021-02-19
Payer: MEDICARE

## 2021-02-19 LAB
ABO + RH BLD: NORMAL
ALBUMIN SERPL-MCNC: 2.9 G/DL (ref 3.5–5)
ALBUMIN/GLOB SERPL: 0.6 {RATIO} (ref 1.1–2.2)
ALP SERPL-CCNC: 103 U/L (ref 45–117)
ALT SERPL-CCNC: 14 U/L (ref 12–78)
ANION GAP SERPL CALC-SCNC: 8 MMOL/L (ref 5–15)
AST SERPL-CCNC: 9 U/L (ref 15–37)
BACTERIA SPEC CULT: NORMAL
BASOPHILS # BLD: 0 K/UL (ref 0–0.1)
BASOPHILS NFR BLD: 0 % (ref 0–1)
BILIRUB SERPL-MCNC: 1.7 MG/DL (ref 0.2–1)
BLD PROD TYP BPU: NORMAL
BLD PROD TYP BPU: NORMAL
BLOOD GROUP ANTIBODIES SERPL: NORMAL
BPU ID: NORMAL
BPU ID: NORMAL
BUN SERPL-MCNC: 21 MG/DL (ref 6–20)
BUN/CREAT SERPL: 39 (ref 12–20)
CALCIUM SERPL-MCNC: 8.8 MG/DL (ref 8.5–10.1)
CHLORIDE SERPL-SCNC: 103 MMOL/L (ref 97–108)
CO2 SERPL-SCNC: 25 MMOL/L (ref 21–32)
CREAT SERPL-MCNC: 0.54 MG/DL (ref 0.55–1.02)
CROSSMATCH RESULT,%XM: NORMAL
CROSSMATCH RESULT,%XM: NORMAL
DIFFERENTIAL METHOD BLD: ABNORMAL
EOSINOPHIL # BLD: 0 K/UL (ref 0–0.4)
EOSINOPHIL NFR BLD: 0 % (ref 0–7)
ERYTHROCYTE [DISTWIDTH] IN BLOOD BY AUTOMATED COUNT: 13.7 % (ref 11.5–14.5)
GLOBULIN SER CALC-MCNC: 4.6 G/DL (ref 2–4)
GLUCOSE SERPL-MCNC: 107 MG/DL (ref 65–100)
HCT VFR BLD AUTO: 28 % (ref 35–47)
HGB BLD-MCNC: 9.3 G/DL (ref 11.5–16)
IMM GRANULOCYTES # BLD AUTO: 0 K/UL (ref 0–0.04)
IMM GRANULOCYTES NFR BLD AUTO: 0 % (ref 0–0.5)
LYMPHOCYTES # BLD: 0.2 K/UL (ref 0.8–3.5)
LYMPHOCYTES NFR BLD: 28 % (ref 12–49)
MCH RBC QN AUTO: 28.8 PG (ref 26–34)
MCHC RBC AUTO-ENTMCNC: 33.2 G/DL (ref 30–36.5)
MCV RBC AUTO: 86.7 FL (ref 80–99)
MONOCYTES # BLD: 0.3 K/UL (ref 0–1)
MONOCYTES NFR BLD: 36 % (ref 5–13)
NEUTS BAND NFR BLD MANUAL: 12 %
NEUTS SEG # BLD: 0.3 K/UL (ref 1.8–8)
NEUTS SEG NFR BLD: 24 % (ref 32–75)
NRBC # BLD: 0 K/UL (ref 0–0.01)
NRBC BLD-RTO: 0 PER 100 WBC
PLATELET # BLD AUTO: 8 K/UL (ref 150–400)
PLATELET COMMENTS,PCOM: ABNORMAL
PMV BLD AUTO: 10 FL (ref 8.9–12.9)
POTASSIUM SERPL-SCNC: 4 MMOL/L (ref 3.5–5.1)
PROT SERPL-MCNC: 7.5 G/DL (ref 6.4–8.2)
RBC # BLD AUTO: 3.23 M/UL (ref 3.8–5.2)
RBC MORPH BLD: ABNORMAL
SERVICE CMNT-IMP: NORMAL
SODIUM SERPL-SCNC: 136 MMOL/L (ref 136–145)
SPECIMEN EXP DATE BLD: NORMAL
STATUS OF UNIT,%ST: NORMAL
STATUS OF UNIT,%ST: NORMAL
TOTAL CELLS COUNTED SPEC: 25
UNIT DIVISION, %UDIV: 0
UNIT DIVISION, %UDIV: 0
WBC # BLD AUTO: 0.8 K/UL (ref 3.6–11)

## 2021-02-19 PROCEDURE — 74011250636 HC RX REV CODE- 250/636: Performed by: STUDENT IN AN ORGANIZED HEALTH CARE EDUCATION/TRAINING PROGRAM

## 2021-02-19 PROCEDURE — 97535 SELF CARE MNGMENT TRAINING: CPT

## 2021-02-19 PROCEDURE — 36430 TRANSFUSION BLD/BLD COMPNT: CPT

## 2021-02-19 PROCEDURE — 97530 THERAPEUTIC ACTIVITIES: CPT

## 2021-02-19 PROCEDURE — 74011250637 HC RX REV CODE- 250/637: Performed by: GENERAL ACUTE CARE HOSPITAL

## 2021-02-19 PROCEDURE — 74018 RADEX ABDOMEN 1 VIEW: CPT

## 2021-02-19 PROCEDURE — 85025 COMPLETE CBC W/AUTO DIFF WBC: CPT

## 2021-02-19 PROCEDURE — 94760 N-INVAS EAR/PLS OXIMETRY 1: CPT

## 2021-02-19 PROCEDURE — 80053 COMPREHEN METABOLIC PANEL: CPT

## 2021-02-19 PROCEDURE — P9035 PLATELET PHERES LEUKOREDUCED: HCPCS

## 2021-02-19 PROCEDURE — 74011000258 HC RX REV CODE- 258: Performed by: GENERAL ACUTE CARE HOSPITAL

## 2021-02-19 PROCEDURE — 77010033678 HC OXYGEN DAILY

## 2021-02-19 PROCEDURE — 97116 GAIT TRAINING THERAPY: CPT

## 2021-02-19 PROCEDURE — 36415 COLL VENOUS BLD VENIPUNCTURE: CPT

## 2021-02-19 PROCEDURE — 97165 OT EVAL LOW COMPLEX 30 MIN: CPT

## 2021-02-19 PROCEDURE — 74011250636 HC RX REV CODE- 250/636: Performed by: GENERAL ACUTE CARE HOSPITAL

## 2021-02-19 PROCEDURE — 65660000000 HC RM CCU STEPDOWN

## 2021-02-19 RX ORDER — SODIUM CHLORIDE 9 MG/ML
250 INJECTION, SOLUTION INTRAVENOUS AS NEEDED
Status: DISCONTINUED | OUTPATIENT
Start: 2021-02-19 | End: 2021-02-19

## 2021-02-19 RX ORDER — FUROSEMIDE 10 MG/ML
40 INJECTION INTRAMUSCULAR; INTRAVENOUS ONCE
Status: COMPLETED | OUTPATIENT
Start: 2021-02-19 | End: 2021-02-19

## 2021-02-19 RX ADMIN — FOLIC ACID 1 MG: 1 TABLET ORAL at 09:50

## 2021-02-19 RX ADMIN — DICYCLOMINE HYDROCHLORIDE 10 MG: 10 CAPSULE ORAL at 09:50

## 2021-02-19 RX ADMIN — PANTOPRAZOLE SODIUM 40 MG: 40 TABLET, DELAYED RELEASE ORAL at 18:36

## 2021-02-19 RX ADMIN — DILTIAZEM HYDROCHLORIDE 240 MG: 240 CAPSULE, COATED, EXTENDED RELEASE ORAL at 09:50

## 2021-02-19 RX ADMIN — FUROSEMIDE 40 MG: 10 INJECTION, SOLUTION INTRAMUSCULAR; INTRAVENOUS at 09:49

## 2021-02-19 RX ADMIN — TBO-FILGRASTIM 480 MCG: 480 INJECTION, SOLUTION SUBCUTANEOUS at 18:36

## 2021-02-19 RX ADMIN — DICYCLOMINE HYDROCHLORIDE 10 MG: 10 CAPSULE ORAL at 18:36

## 2021-02-19 RX ADMIN — Medication 10 ML: at 04:55

## 2021-02-19 RX ADMIN — CEFEPIME HYDROCHLORIDE 2 G: 2 INJECTION, POWDER, FOR SOLUTION INTRAVENOUS at 04:47

## 2021-02-19 RX ADMIN — ACETAMINOPHEN 650 MG: 325 TABLET ORAL at 09:49

## 2021-02-19 RX ADMIN — CEFEPIME HYDROCHLORIDE 2 G: 2 INJECTION, POWDER, FOR SOLUTION INTRAVENOUS at 12:47

## 2021-02-19 RX ADMIN — ALLOPURINOL 300 MG: 300 TABLET ORAL at 09:50

## 2021-02-19 RX ADMIN — PANTOPRAZOLE SODIUM 40 MG: 40 TABLET, DELAYED RELEASE ORAL at 09:49

## 2021-02-19 RX ADMIN — CEFEPIME HYDROCHLORIDE 2 G: 2 INJECTION, POWDER, FOR SOLUTION INTRAVENOUS at 21:25

## 2021-02-19 RX ADMIN — Medication 1.5 MG: at 21:26

## 2021-02-19 RX ADMIN — DULOXETINE HYDROCHLORIDE 30 MG: 30 CAPSULE, DELAYED RELEASE ORAL at 09:50

## 2021-02-19 RX ADMIN — MULTIPLE VITAMINS W/ MINERALS TAB 1 TABLET: TAB at 09:50

## 2021-02-19 RX ADMIN — FUROSEMIDE 40 MG: 10 INJECTION, SOLUTION INTRAMUSCULAR; INTRAVENOUS at 18:37

## 2021-02-19 NOTE — PROGRESS NOTES
Problem: Self Care Deficits Care Plan (Adult)  Goal: *Acute Goals and Plan of Care (Insert Text)  Description:   FUNCTIONAL STATUS PRIOR TO ADMISSION: Patient was modified independent using a rollator for functional mobility. HOME SUPPORT: The patient lived with family and stated that her  would  items off the floor and help with LB dressing due to her back problems . Occupational Therapy Goals  Initiated 2/19/2021  1. Patient will perform grooming standing at sink with supervision/set-up within 7 day(s). 2.  Patient will perform bathing at the sink with supervision/set-up within 7 day(s). 3.  Patient will perform lower body dressing with minimal assistance/contact guard assist within 7 day(s). 4.  Patient will perform toilet transfers with supervision/set-up within 7 day(s). 5.  Patient will perform all aspects of toileting with modified independence within 7 day(s). 6.  Patient will participate in upper extremity therapeutic exercise/activities with supervision/set-up for 5 minutes within 7 day(s). 7.  Patient will utilize energy conservation techniques during functional activities with verbal cues within 7 day(s). Outcome: Not Met   OCCUPATIONAL THERAPY EVALUATION  Patient: Disha Acosta (36 y.o. female)  Date: 2/19/2021  Primary Diagnosis: Neutropenia, febrile ([facility] [address] [D70.9, R50.81]  Acute respiratory failure with hypoxemia ([facility] [address] [J96.01]        Precautions:   Fall    ASSESSMENT  Based on the objective data described below, the patient presents with decreased endurance, strength, functional mobility, ADLs . Pt reports that she was living with family and due to her back problems she was not able to pick items up off floor and her  would assist her with this. Pt has functional range in BUE and she was SBA to CGA for bed mobility. Pt was able to stand with CGA and with RW, was CGA for tranfers to toilet.   Pt was able to clean self and was very tired and noted to be SOB.  She uses O2 at night and during tx session her O2% dropped to 88-89% but pt stated that she was having difficulty breathing, so pt was given her NC O2 of 2 liters that she uses at night. Pt was CGA for transfer to chair and left sitting up in recliner with bed alarm and call bell     Current Level of Function Impacting Discharge (ADLs/self-care): max to mod for ADLs     Functional Outcome Measure: The patient scored 40/100 on the Barthel outcome measure which is indicative of mod for ADLs . Patient will benefit from skilled therapy intervention to address the above noted impairments. PLAN :  Recommendations and Planned Interventions: self care training, functional mobility training, therapeutic exercise, balance training, therapeutic activities, endurance activities, patient education, and home safety training    Frequency/Duration: Patient will be followed by occupational therapy 4 times a week to address goals. Recommendation for discharge: (in order for the patient to meet his/her long term goals)  To be determined: Rehab VS home with home care    This discharge recommendation:  Has been made in collaboration with the attending provider and/or case management    IF patient discharges home will need the following DME: tbd       SUBJECTIVE:   Patient stated I just need to have a bowel movement.     OBJECTIVE DATA SUMMARY:   HISTORY:   Past Medical History:   Diagnosis Date    Anemia     Arthritis     Breast cancer ([facility] [address]     left    Bunion of right foot 8/20/2015    Chronic obstructive pulmonary disease (HCC)     mild    Chronic pain     back--uses tens unit    Diverticulitis 6/29/2018    Recurrent    Diverticulitis large intestine     Dry eye syndrome 6/29/2018    Fibromyalgia 6/29/2018    GERD (gastroesophageal reflux disease)     History of left breast cancer 2013    lumpectomy radiation    Hypercholesterolemia 6/29/2018    Ill-defined condition     blood transfusion hx    Leukemia ([facility] [address]     MDS (myelodysplastic syndrome) ([facility] [address]     Osteoporosis 2018    Oxygen dependent     at night    Peripheral neuropathy 2018    Prediabetes 2018    PUD (peptic ulcer disease)     Radiation therapy complication 8215    Lt breast-No Chemo    Rosacea 2018    Trouble in sleeping      Past Surgical History:   Procedure Laterality Date    COLONOSCOPY N/A 2020    COLONOSCOPY performed by Elvin Austin MD at [facility] ENDOSCOPY    COLONOSCOPY,DIAGNOSTIC  2020         HX APPENDECTOMY      HX BREAST LUMPECTOMY  2013    LEFT BREAST LUMPECTOMY W/LEFT BREAST SENTINEL NODE BIOPSY,AND NEEDLE LOCALIZATION performed by Stephan Ordaz MD at [facility] MAIN OR    HX CATARACT REMOVAL      bilateral    HX HYSTERECTOMY      ovarian cyst    HX MOHS PROCEDURES      right    HX ORTHOPAEDIC      back surgery x2    HX OTHER SURGICAL      colonoscopy    HX TONSILLECTOMY      HX VASCULAR ACCESS  2020    echo cath right shoulder    IR INSERT TUNL CVC W PORT OVER 5 YEARS  12/3/2019    CT TOTAL KNEE ARTHROPLASTY      left    CT TOTAL KNEE ARTHROPLASTY      right    UPPER GI ENDOSCOPY,BIOPSY  2020         UPPER GI ENDOSCOPY,BIOPSY  2020         UPPER GI ENDOSCOPY,DILATN W GUIDE  2020         US GUIDED CORE BREAST BIOPSY Left 2013    Breast Ca       Expanded or extensive additional review of patient history:     Home Situation  Home Environment: Private residence  # Steps to Enter: 3  Rails to Enter: Yes  Hand Rails : Right  One/Two Story Residence: One story  Living Alone: No  Support Systems: Spouse/Significant Other/Partner  Patient Expects to be Discharged to[de-identified] Private residence  Current DME Used/Available at Home: Grab bars, Oxygen, portable, Rochelle Mura, rollator, Shower chair, Raised toilet seat, Commode, bedside, Cane, straight  Tub or Shower Type: Shower    Hand dominance: Right    EXAMINATION OF PERFORMANCE DEFICITS:  Cognitive/Behavioral Status:  Neurologic State: Alert  Orientation Level: Oriented X4  Cognition: Follows commands  Perception: Appears intact  Perseveration: No perseveration noted  Safety/Judgement: Fall prevention    Skin: in fair health     Edema: none     Hearing: Auditory  Auditory Impairment: Hard of hearing, bilateral  Hearing Aids/Status: At home    Vision/Perceptual:                    Intact                  Range of Motion:    AROM: Generally decreased, functional  PROM: Generally decreased, functional                      Strength:    Strength: Generally decreased, functional                Coordination:  Coordination: Within functional limits  Fine Motor Skills-Upper: Left Intact; Right Intact    Gross Motor Skills-Upper: Left Intact; Right Intact    Tone & Sensation:    Tone: Normal  Sensation: Intact                      Balance:  Sitting: Intact  Standing: Impaired; Without support  Standing - Static: Fair;Constant support  Standing - Dynamic : Fair;Constant support    Functional Mobility and Transfers for ADLs:  Bed Mobility:  Rolling: Supervision  Supine to Sit: Supervision  Sit to Supine: Supervision  Scooting: Supervision    Transfers:  Sit to Stand: Contact guard assistance;Assist x1  Stand to Sit: Contact guard assistance;Assist x1  Bed to Chair: Contact guard assistance;Assist x1  Bathroom Mobility: Contact guard assistance  Toilet Transfer : Contact guard assistance;Assist x1    ADL Assessment:  Feeding: Setup    Oral Facial Hygiene/Grooming: Setup    Bathing:  Moderate assistance;Contact guard assistance    Upper Body Dressing: Setup    Lower Body Dressing: Maximum assistance;Minimum assistance    Toileting: Minimum assistance;Contact guard assistance(after BM)             Cognitive Retraining  Safety/Judgement: Fall prevention           Functional Measure:  Barthel Index:    Bathin  Bladder: 5  Bowels: 5  Groomin  Dressin  Feedin  Mobility: 5  Stairs: 0  Toilet Use: 5  Transfer (Bed to Chair and Back): 10  Total: 40/100        The Barthel ADL Index: Guidelines  1. The index should be used as a record of what a patient does, not as a record of what a patient could do. 2. The main aim is to establish degree of independence from any help, physical or verbal, however minor and for whatever reason. 3. The need for supervision renders the patient not independent. 4. A patient's performance should be established using the best available evidence. Asking the patient, friends/relatives and nurses are the usual sources, but direct observation and common sense are also important. However direct testing is not needed. 5. Usually the patient's performance over the preceding 24-48 hours is important, but occasionally longer periods will be relevant. 6. Middle categories imply that the patient supplies over 50 per cent of the effort. 7. Use of aids to be independent is allowed. Alfredo Arthur., Barthel, D.W. (6093). Functional evaluation: the Barthel Index. [address] [facility] (14)2. [facility] maxim KELLY Louie, Vickey Barber., Nicole Graf., [place], [address] [facility] (1999). Measuring the change indisability after inpatient rehabilitation; comparison of the responsiveness of the Barthel Index and Functional [place] Measure. Journal of Neurology, Neurosurgery, and Psychiatry, 66(4), 396-434. Wesly Kauffman, N.J.A, OMAR Garcia, & Jorge Ty M.A. (2004.) Assessment of post-stroke quality of life in cost-effectiveness studies: The usefulness of the Barthel Index and the EuroQoL-5D.  Quality of Life Research, 15, 929-35         Occupational Therapy Evaluation Charge Determination   History Examination Decision-Making   MEDIUM Complexity : Expanded review of history including physical, cognitive and psychosocial  history  MEDIUM Complexity : 3-5 performance deficits relating to physical, cognitive , or psychosocial skils that result in activity limitations and / or participation restrictions LOW Complexity : No comorbidities that affect functional and no verbal or physical assistance needed to complete eval tasks       Based on the above components, the patient evaluation is determined to be of the following complexity level: LOW   Pain Rating:  none    Activity Tolerance:   Fair    After treatment patient left in no apparent distress:    Sitting in chair and Call bell within reach    COMMUNICATION/EDUCATION:   The patients plan of care was discussed with: Physical therapist and Registered nurse. Home safety education was provided and the patient/caregiver indicated understanding. and Patient/family have participated as able in goal setting and plan of care. This patients plan of care is appropriate for delegation to [facility].     Thank you for this referral.  Raul Villafana OT  Time Calculation: 29 mins

## 2021-02-19 NOTE — PROGRESS NOTES
Problem: Mobility Impaired (Adult and Pediatric)  Goal: *Acute Goals and Plan of Care (Insert Text)  Description: FUNCTIONAL STATUS PRIOR TO ADMISSION: Patient was modified independent using a single point cane for functional mobility. HOME SUPPORT PRIOR TO ADMISSION: The patient lived with  who assisted her as needed. Physical Therapy Goals  Initiated 2/16/2021  1. Patient will move from supine to sit and sit to supine  in bed with modified independence within 7 day(s). 2.  Patient will transfer from bed to chair and chair to bed with modified independence using the least restrictive device within 7 day(s). 3.  Patient will perform sit to stand with modified independence within 7 day(s). 4.  Patient will ambulate with modified independence for 200 feet with the least restrictive device within 7 day(s). 5.  Patient will ascend/descend 4 stairs with 1 handrail(s) with modified independence within 7 day(s). Outcome: Progressing Towards Goal   PHYSICAL THERAPY TREATMENT  Patient: Gordo Zuniga (73 y.o. female)  Date: 2/19/2021  Diagnosis: Neutropenia, febrile ([facility] [address] [D70.9, R50.81]  Acute respiratory failure with hypoxemia ([facility] [address] [J96.01] <principal problem not specified>       Precautions: Fall  Chart, physical therapy assessment, plan of care and goals were reviewed. ASSESSMENT  Patient continues with skilled PT services and is progressing towards goals. Patient received in bed and agreeable to participate, able to come to EOB with supervision and sitting balance is intact. Patient ambulated into bathroom with RW and CGA on RA, note sats decreased to 89% and patient SOB so added 02 at 2 liters. Patient sat on toilet, complains of constipation, RN notified. Patient able to stand for clean up and then ambulated back to chair with RW. Patient left sitting up with call bell in reach, would benefit from HHPT.     Current Level of Function Impacting Discharge (mobility/balance): CGA to ambulate    Other factors to consider for discharge: will need family assist, falls risk         PLAN :  Patient continues to benefit from skilled intervention to address the above impairments. Continue treatment per established plan of care. to address goals. Recommendation for discharge: (in order for the patient to meet his/her long term goals)  Physical therapy at least 2 days/week in the home AND ensure assist and/or supervision for safety with mobility    This discharge recommendation:  A follow-up discussion with the attending provider and/or case management is planned    IF patient discharges home will need the following DME: patient owns DME required for discharge       SUBJECTIVE:   Patient stated I am so constipated.     OBJECTIVE DATA SUMMARY:   Critical Behavior:  Neurologic State: Alert  Orientation Level: Oriented X4  Cognition: Follows commands  Safety/Judgement: Fall prevention  Functional Mobility Training:  Bed Mobility:  Rolling: Supervision  Supine to Sit: Supervision  Sit to Supine: Supervision  Scooting: Supervision        Transfers:  Sit to Stand: Contact guard assistance;Assist x1  Stand to Sit: Contact guard assistance;Assist x1        Bed to Chair: Contact guard assistance;Assist x1                    Balance:  Sitting: Intact  Standing: Impaired; Without support  Standing - Static: Fair;Constant support  Standing - Dynamic : Fair;Constant support  Ambulation/Gait Training:  Distance (ft): 20 Feet (ft)  Assistive Device: Gait belt;Walker, rolling  Ambulation - Level of Assistance: Contact guard assistance        Gait Abnormalities: Decreased step clearance        Base of Support: Widened     Speed/Emmanuelle: Pace decreased (<100 feet/min)  Step Length: Left shortened;Right shortened                    Stairs:                 Activity Tolerance:   Fair and desaturates with exertion and requires oxygen    After treatment patient left in no apparent distress:   Sitting in chair and Call bell within reach    COMMUNICATION/COLLABORATION:   The patients plan of care was discussed with: Occupational therapist and Registered nurse.      Ching Obrien, PT   Time Calculation: 26 mins

## 2021-02-19 NOTE — PROGRESS NOTES
I reviewed pertinent labs and imaging, and discussed /agreed on the plan of care with Dr. Ward Alfonso      Hospitalist Progress Note    NAME: Dana Camara   :  1935   MRN:  [id number]       Assessment / Plan:        Summary: Shadia Johnson is a 70 y.o.  female with a history of stage I invasive ductal carcinoma and MDS. She is currently receiving Dacogen at [facility]. She is admitted with pancytopenia, neutropenic fever and diverticulitis. CMML/MDS  Severe pancytopenia: due to underlying hematologic malignacy + chemotherapy  - s/p multiple units of platelet /, , 2/15, ,  (2 units each time)  - Platelet count at 13 today  - S/p 2u pRBC transfusion .  - appreciate input by Hem/Onc team recc keep plts above 10, Hgb greater than 7   - no overt sign of bleeding noted   - monitor CBC twice daily  - Discussed with Dr. Jelena Bello. Patient is transfusion dependent. She was getting platelet transfusion about three times per week outpatient. Patient and patient's  asking for patient to be discharged and follow up outpatient [facility] Dr Paul Elliott. Plan is to continue granix and discharge once ANC is >0.3. ANC at 0.3 today. Plan for d/c in a.m      Stage 1 breast cancer: S/P left breast lumpectomy and sentinel LN excision on 2013    Sepsis/E coli bacteremia: presented with Temp 103.2 ,    Neutropenic fever  Receiving Chemo at [facility]   - WBC  0.3. Afebrile >72 hours  - Bcx 2/10: E.coli . Repeat BC NGTD   - On cefepime (was on vanco + cefepime) - total antibiotic day 8. Plan to continue for 10 days  - neutropenic precautions in effect     Diverticulitis  - presented with Nausea , lower abd pain. Hx of diverticulitis   - CT abd/pelvis:  acute diverticulitis involving the sigmoid colon. Possible second focus of acute  diverticulitis in the distal descending colon.   - appreciate GI input - no intervention at this time   - pt tolerating diet     Acute hypoxic respiratory failure secondary to fluid overload versus TRALI  - required Bipap initially, received Lasix   - on NC a 3 L  Baseline  O2 at night only   - had multiple transfusion likely fluid overload   - Dyspnea improving. repeat chest x-ray showing interstitial edema. Will give Lasix as needed. Hypokalemia: resolved  Hypocalcemia: resolved     at bedside, updated     / Body mass index is 31.47 kg/m². Code status: Full  Prophylaxis: SCD's  Recommended Disposition: Home w/Family     Subjective:     Chief Complaint / Reason for Physician Visit  Platelet dropped to 8. Will get 2 units today  NO active bleeding    Review of Systems:  Symptom Y/N Comments  Symptom Y/N Comments   Fever/Chills n   Chest Pain n    Poor Appetite n   Edema n    Cough y   Abdominal Pain n    Sputum n   Joint Pain n    SOB/AL n   Pruritis/Rash n    Nausea/vomit n   Tolerating PT/OT n    Diarrhea n   Tolerating Diet n    Constipation    Other       Could NOT obtain due to:      Objective:     VITALS:   Last 24hrs VS reviewed since prior progress note.  Most recent are:  Patient Vitals for the past 24 hrs:   Temp Pulse Resp BP SpO2   02/19/21 1451 98.3 °F (36.8 °C) 85 20 (!) 142/74 94 %   02/19/21 1220 98 °F (36.7 °C) 93 22 (!) 125/59 97 %   02/19/21 1154 98 °F (36.7 °C) 94 20 112/63 94 %   02/19/21 1119 98.5 °F (36.9 °C) 97 20 (!) 122/58 97 %   02/19/21 0951 99.3 °F (37.4 °C) 92 20 (!) 138/57 93 %   02/19/21 0404 98.9 °F (37.2 °C) (!) 104 22 (!) 123/58 94 %   02/18/21 2308 99.1 °F (37.3 °C) 96 22 (!) 146/77 93 %   02/18/21 1833 98.3 °F (36.8 °C) 87 17 136/62 93 %   02/18/21 1755 98.8 °F (37.1 °C) 83 18 (!) 140/65 93 %   02/18/21 1732 98.8 °F (37.1 °C) 82 16 136/61 94 %   02/18/21 1718 99 °F (37.2 °C) 80 18 132/60 94 %   02/18/21 1558 98.7 °F (37.1 °C) 83 16 (!) 131/58 95 %       Intake/Output Summary (Last 24 hours) at 2/19/2021 1533  Last data filed at 2/19/2021 1157  Gross per 24 hour   Intake 977 ml   Output    Net 977 ml        I had a face to face encounter and independently examined this patient on 2/19/2021, as outlined below:  PHYSICAL EXAM:  General: WD, WN. Alert, cooperative, no acute distress    EENT:  EOMI. Anicteric sclerae. MMM  Resp:  diminished  bilaterally, no wheezing or rales. No accessory muscle use, NC  CV:  S1S2,  Tr edema  port  in L chest wall   GI:  Soft, Non distended, Non tender. +Bowel sounds  Neurologic:  Alert and oriented X 3, normal speech,   Psych:   . Not anxious nor agitated  Skin:  No rashes. No jaundice bruising around L oribital resolving    Reviewed most current lab test results and cultures  YES  Reviewed most current radiology test results   YES  Review and summation of old records today    NO  Reviewed patient's current orders and MAR    YES  PMH/SH reviewed - no change compared to H&P  ________________________________________________________________________  Care Plan discussed with:    Comments   Patient x    Family      RN x    Care Manager     Consultant                        Multidiciplinary team rounds were held today with , nursing, pharmacist and clinical coordinator. Patient's plan of care was discussed; medications were reviewed and discharge planning was addressed. ________________________________________________________________________  Total NON critical care TIME: 30   Minutes    Total CRITICAL CARE TIME Spent:   Minutes non procedure based      Comments   >50% of visit spent in counseling and coordination of care     ________________________________________________________________________  Alyssa Pryor MD     Procedures: see electronic medical records for all procedures/Xrays and details which were not copied into this note but were reviewed prior to creation of Plan. LABS:  I reviewed today's most current labs and imaging studies.   Pertinent labs include:  Recent Labs     02/19/21  0419 02/18/21  0653 02/17/21 1954   WBC 0.8* 0.4* 0.4*   HGB 9.3* 5.6* 7.6*   HCT 28.0* 17.2* 23.1*   PLT 8* 13* 14*     Recent Labs     02/19/21  0419 02/18/21  0403 02/17/21  0111    136 136   K 4.0 3.9 3.6    104 103   CO2 25 27 27   * 104* 103*   BUN 21* 21* 25*   CREA 0.54* 0.59 0.58   CA 8.8 8.6 8.2*   ALB 2.9* 2.9* 2.7*   TBILI 1.7* 1.3* 1.1*   ALT 14 13 14       Signed: Dhruv Mcdonald MD

## 2021-02-19 NOTE — PROGRESS NOTES
ANDREW:    [facility]  2nd  Medicare Letter  Home with Family      UPDATE: 12:58PM      CM informed that pt was accepted by [facility] for [facility] services. CM will inform pt's son of the following. MARIA INES Schuster,         CM informed from MD during IDRs that pt will receive blood platelets today, and cam possibly d/c afterwards. CM informed by therapist that [facility] is recommended at this time. CM will make contact with pt's family to inform them of the following. CM contact pt's son: Olga Adair, via telephone regarding pt's d/c needs and plans. CM informed Olga Adair of therapist recommendations: [facility] at the time of d/c and explained what therapy will provide at home. Olga Adair reported that he is agreeable to [facility] at home for pt, and was informed of FOC (verbal consent) placed in chart. CM sent referral to [facility] and currently waiting for acceptance for referral,    CM will enter delay. CM will continue to follow.     MARIA INES Schuster, [facility]

## 2021-02-19 NOTE — PROGRESS NOTES
Received notification from bedside RN about patient with regards to: Platelet 8  VS: /72, , RR 22, O2 sat 94%    Intervention given: Transfuse 2 units of Platelets ordered

## 2021-02-19 NOTE — PROGRESS NOTES
End of Shift Note    Bedside shift change report given to Ashley Moraes (oncoming nurse) by Edwar Reid RN (offgoing nurse). Report included the following information SBAR, Kardex, Intake/Output and Recent Results    Shift worked: night     Shift summary and any significant changes:    Transfusion of platelets ordered   Concerns for physician to address: Labs   Zone phone for oncoming shift:  [phone]     Activity:  Activity Level: Up with Assistance  Number times ambulated in hallways past shift: 0  Number of times OOB to chair past shift: 1    Cardiac:   Cardiac Monitoring: Yes      Cardiac Rhythm: Normal sinus rhythm    Access:   Current line(s): port     Genitourinary:   Urinary status: voiding and incontinent    Respiratory:   O2 Device: Nasal cannula  Chronic home O2 use?: YES  Incentive spirometer at bedside: NO     GI:  Last Bowel Movement Date: 02/14/21  Current diet:  DIET CARDIAC Regular  DIET NUTRITIONAL SUPPLEMENTS Breakfast, Dinner; Ensure [employer]  DIET NUTRITIONAL SUPPLEMENTS Lunch; Magic Cups  DIET NUTRITIONAL SUPPLEMENTS Dinner, Breakfast; Ensure Pudding  Passing flatus: YES  Tolerating current diet: YES  % Diet Eaten: 80 %    Pain Management:   Patient states pain is manageable on current regimen: YES    Skin:  Nikolas Score: 18  Interventions: increase time out of bed    Patient Safety:  Fall Score:  Total Score: 3  Interventions: pt to call before getting OOB  High Fall Risk: Yes    Length of Stay:  Expected LOS: 4d 19h  Actual LOS: Matteo Diaz RN

## 2021-02-20 VITALS
RESPIRATION RATE: 20 BRPM | HEART RATE: 92 BPM | HEIGHT: 66 IN | OXYGEN SATURATION: 93 % | TEMPERATURE: 98.9 F | BODY MASS INDEX: 31.34 KG/M2 | SYSTOLIC BLOOD PRESSURE: 151 MMHG | DIASTOLIC BLOOD PRESSURE: 69 MMHG | WEIGHT: 195 LBS

## 2021-02-20 LAB
ANION GAP SERPL CALC-SCNC: 8 MMOL/L (ref 5–15)
BASOPHILS # BLD: 0 K/UL (ref 0–0.1)
BASOPHILS NFR BLD: 0 % (ref 0–1)
BUN SERPL-MCNC: 24 MG/DL (ref 6–20)
BUN/CREAT SERPL: 36 (ref 12–20)
CALCIUM SERPL-MCNC: 8.7 MG/DL (ref 8.5–10.1)
CHLORIDE SERPL-SCNC: 101 MMOL/L (ref 97–108)
CO2 SERPL-SCNC: 24 MMOL/L (ref 21–32)
CREAT SERPL-MCNC: 0.67 MG/DL (ref 0.55–1.02)
DIFFERENTIAL METHOD BLD: ABNORMAL
EOSINOPHIL # BLD: 0 K/UL (ref 0–0.4)
EOSINOPHIL NFR BLD: 0 % (ref 0–7)
ERYTHROCYTE [DISTWIDTH] IN BLOOD BY AUTOMATED COUNT: 13.9 % (ref 11.5–14.5)
GLUCOSE SERPL-MCNC: 97 MG/DL (ref 65–100)
HCT VFR BLD AUTO: 26.7 % (ref 35–47)
HGB BLD-MCNC: 8.8 G/DL (ref 11.5–16)
IMM GRANULOCYTES # BLD AUTO: 0 K/UL (ref 0–0.04)
IMM GRANULOCYTES NFR BLD AUTO: 0 % (ref 0–0.5)
LYMPHOCYTES # BLD: 0.5 K/UL (ref 0.8–3.5)
LYMPHOCYTES NFR BLD: 44 % (ref 12–49)
MCH RBC QN AUTO: 28.5 PG (ref 26–34)
MCHC RBC AUTO-ENTMCNC: 33 G/DL (ref 30–36.5)
MCV RBC AUTO: 86.4 FL (ref 80–99)
MONOCYTES # BLD: 0.5 K/UL (ref 0–1)
MONOCYTES NFR BLD: 48 % (ref 5–13)
NEUTS SEG # BLD: 0.1 K/UL (ref 1.8–8)
NEUTS SEG NFR BLD: 8 % (ref 32–75)
NRBC # BLD: 0 K/UL (ref 0–0.01)
NRBC BLD-RTO: 0 PER 100 WBC
PLATELET # BLD AUTO: 20 K/UL (ref 150–400)
PMV BLD AUTO: 9.4 FL (ref 8.9–12.9)
POTASSIUM SERPL-SCNC: 3.7 MMOL/L (ref 3.5–5.1)
RBC # BLD AUTO: 3.09 M/UL (ref 3.8–5.2)
RBC MORPH BLD: ABNORMAL
SODIUM SERPL-SCNC: 133 MMOL/L (ref 136–145)
TOTAL CELLS COUNTED SPEC: 25
WBC # BLD AUTO: 1.1 K/UL (ref 3.6–11)
WBC MORPH BLD: ABNORMAL

## 2021-02-20 PROCEDURE — 85025 COMPLETE CBC W/AUTO DIFF WBC: CPT

## 2021-02-20 PROCEDURE — 80048 BASIC METABOLIC PNL TOTAL CA: CPT

## 2021-02-20 PROCEDURE — 74011000258 HC RX REV CODE- 258: Performed by: GENERAL ACUTE CARE HOSPITAL

## 2021-02-20 PROCEDURE — 36415 COLL VENOUS BLD VENIPUNCTURE: CPT

## 2021-02-20 PROCEDURE — 74011250637 HC RX REV CODE- 250/637: Performed by: GENERAL ACUTE CARE HOSPITAL

## 2021-02-20 PROCEDURE — 74011250636 HC RX REV CODE- 250/636: Performed by: GENERAL ACUTE CARE HOSPITAL

## 2021-02-20 PROCEDURE — P9035 PLATELET PHERES LEUKOREDUCED: HCPCS

## 2021-02-20 PROCEDURE — 36430 TRANSFUSION BLD/BLD COMPNT: CPT

## 2021-02-20 RX ORDER — ACETAMINOPHEN 325 MG/1
975 TABLET ORAL
Qty: 30 TAB | Refills: 0 | Status: SHIPPED | OUTPATIENT
Start: 2021-02-20

## 2021-02-20 RX ADMIN — MULTIPLE VITAMINS W/ MINERALS TAB 1 TABLET: TAB at 08:40

## 2021-02-20 RX ADMIN — Medication 10 ML: at 01:49

## 2021-02-20 RX ADMIN — DULOXETINE HYDROCHLORIDE 30 MG: 30 CAPSULE, DELAYED RELEASE ORAL at 08:34

## 2021-02-20 RX ADMIN — DICYCLOMINE HYDROCHLORIDE 10 MG: 10 CAPSULE ORAL at 08:34

## 2021-02-20 RX ADMIN — DILTIAZEM HYDROCHLORIDE 240 MG: 240 CAPSULE, COATED, EXTENDED RELEASE ORAL at 08:34

## 2021-02-20 RX ADMIN — CEFEPIME HYDROCHLORIDE 2 G: 2 INJECTION, POWDER, FOR SOLUTION INTRAVENOUS at 04:47

## 2021-02-20 RX ADMIN — FOLIC ACID 1 MG: 1 TABLET ORAL at 08:34

## 2021-02-20 RX ADMIN — ALLOPURINOL 300 MG: 300 TABLET ORAL at 08:34

## 2021-02-20 RX ADMIN — PANTOPRAZOLE SODIUM 40 MG: 40 TABLET, DELAYED RELEASE ORAL at 08:34

## 2021-02-20 RX ADMIN — Medication 10 ML: at 04:48

## 2021-02-20 NOTE — PROGRESS NOTES
End of Shift Note    Bedside shift change report given to Valerio Pop (oncoming nurse) by Christie Pryor RN (offgoing nurse). Report included the following information SBAR, Kardex, Intake/Output and Recent Results    Shift worked: night     Shift summary and any significant changes:    Transfusion of platelets given-Platelet now 20. Concerns for physician to address: Labs   Zone phone for oncoming shift:  [phone]     Activity:  Activity Level: Up with Assistance  Number times ambulated in hallways past shift: 0  Number of times OOB to chair past shift: 1    Cardiac:   Cardiac Monitoring: Yes      Cardiac Rhythm: Normal sinus rhythm    Access:   Current line(s): port     Genitourinary:   Urinary status: voiding and incontinent    Respiratory:   O2 Device: Nasal cannula  Chronic home O2 use?: YES  Incentive spirometer at bedside: NO     GI:  Last Bowel Movement Date: 02/18/21  Current diet:  DIET CARDIAC Regular  DIET NUTRITIONAL SUPPLEMENTS Breakfast, Dinner; Ensure [employer]  DIET NUTRITIONAL SUPPLEMENTS Lunch; Magic Cups  DIET NUTRITIONAL SUPPLEMENTS Dinner, Breakfast; Ensure Pudding  Passing flatus: YES  Tolerating current diet: YES  % Diet Eaten: 80 %    Pain Management:   Patient states pain is manageable on current regimen: YES    Skin:  Nikolas Score: 18  Interventions: increase time out of bed    Patient Safety:  Fall Score:  Total Score: 3  Interventions: pt to call before getting OOB  High Fall Risk: Yes    Length of Stay:  Expected LOS: 4d 19h  Actual LOS: 9      Christie Pryor, DAINA

## 2021-02-20 NOTE — PROGRESS NOTES
Medicare pt has received, reviewed, and signed 2nd IM letter informing them of their right to appeal the discharge. Signed copied has been placed on pt bedside chart.     Jayro Erickson 422, [facility]

## 2021-02-20 NOTE — PROGRESS NOTES
Went to blood bank to  the 2 units of platelets ordered for pt. Was informed that this RN could only get 1 unit due to supply issue. And they would notify [employer] that another unit would be needed. [address]: called blood bank to check on platelets, stated they still do not have it yet but would call when its available for .    1920: Bedside and Verbal shift change report given to Aletha Green RN (oncoming nurse) by [facility] (offgoing nurse). Report given with SBAR, Kardex, Intake/Output, MAR and Recent Results.

## 2021-02-20 NOTE — PROGRESS NOTES
Pt received discharge instructions and prescriptions. Pt states understanding of follow-up care an side effects of medications. Pt given opportunity for questions and clarifications. IV removed. Pt has all belongings.  Ángela Hamilton RN

## 2021-02-20 NOTE — PROGRESS NOTES
CM spoke with the patient's son, Brittany Ordaz (phone: [phone]), via phone to discuss the patient's d/c today. The patient's son stated that the patient's  will be transporting the patient home and he will be meeting the patient and her  at the home to assist with getting her inside. CM contacted on-call liasion with [employer] Cordero (cell phone: [phone]), and alerted her that the patient will be discharging home today. CM ensured that the contact information for [facility] is on the patient's AVS.     From CM perspective, the patient can be discharged.      Jayro Erickson 697, 427 [facility]

## 2021-02-20 NOTE — DISCHARGE SUMMARY
Hospitalist Discharge Summary     Patient ID:  Zohreh Degroot  [id number]  77 y.o.  1935  2/10/2021    PCP on record: Magdy Mock MD    Admit date: 2/10/2021  Discharge date and time: 2/20/2021    DISCHARGE DIAGNOSIS:  · CMML/MDS  · Pancytopenia, severe   · Hx of Stage 1 breast cancer  · Sepsis/E coli bacteremia - completed abx  · Diverticulitis  · Acute hypoxic respiratory failure - resolved      CONSULTATIONS:  IP CONSULT TO GASTROENTEROLOGY  IP CONSULT TO HEMATOLOGY    Excerpted HPI from H&P of Magy Epperson MD:  Tino Richter is a 82 y.o.  female with past medical history of CML undergoing chemo, myelodysplastic syndrome, anemia, breast cancer, recurrent diverticulitis, GERD, fibromyalgia, osteoporosis, oxygen dependent at night, and peripheral neuropathy who presents with lower abdominal pain, nausea, dizziness and fever. Patient with history of CML/mild dysplastic syndrome, received 2 units of platelets yesterday and 1 unit of PRBC without any issues. Patient started having lower abdominal pain, nausea and dizziness for 2 days for which she decided to come to the emergency room for further management. Patient denies productive cough, UTI symptoms, skin rash, diarrhea, mouth ulcers and dysphagia. She also denies melena, bright red bleeding per rectum, and hematuria. At the emergency room noted to have fever with T-max of 103.2  Blood work shows WBC of 0.2, hemoglobin 6.8, hematocrit 20, platelet 10, neutrophil 1. Urinalysis not that remarkable. Covid test negative. Chemistry with bilirubin 1.1, albumin 3.1, lipase 52. BNP 3500s. Patient received IV fluids and monitor PRBC.      ______________________________________________________________________  DISCHARGE SUMMARY/HOSPITAL COURSE:  for full details see H&P, daily progress notes, labs, consult notes. Summary: Tino Richter is a 70 y.o.   female with a history of stage I invasive ductal carcinoma and MDS. She is currently receiving Dacogen at [address] is admitted with pancytopenia, neutropenic fever and diverticulitis.       CMML/MDS  Severe pancytopenia: due to underlying hematologic malignancy + chemotherapy/Dacogen  - patient required 10units of platelet transfusion and 4u pRBC transfusion to maintain her Hb above 10 and platelets above 29D. She didn't have evidence of active bleeding thorough out her hospital stay but was requiring platelet transfusion almost every other day. On the day of discharge her Hb was 8.8 and platelet was at 20.  - Hem/Onc was on board and her outpatient hematologist, Dr Lay Hansen was updated by Dr Marylen Juba. - For the severe neutropenia, patient was started on granix and received a total of 5 doses. Her total WBC count improved from 0.2 on admission to 1.1, however her [address] was at 0 for most of her hospital stay. Her ANC improved to 0.3 a day prior to her discharge. ANC was at 0.1 on day of discharge. Patient is transfusion dependent. Her  has scheduled appointment at [facility] in two days after discharge. She is to follow up with her hematologist/oncologist in 4 days.          Stage 1 breast cancer: S/P left breast lumpectomy and sentinel LN excision on 11/21/2013     Sepsis/E coli bacteremia in setting of severe neutropenia  - presented with Temp 103.2 ,     - WBC  0.3. Afebrile >72 hours  - Bcx 2/10: E.coli 1/4. Repeat BC NGTD 2/14  - was initially started on vanco + cefepime empirically, later antibiotic was changed to cefepime. She got a total of 8 days of antibiotic. Her vital signs remained stable and her fever resolved. Patient and patient's  were educated about neutropenic precaution and references provided.        Diverticulitis  - presented with Nausea , lower abd pain. Hx of diverticulitis   - CT abd/pelvis:  acute diverticulitis involving the sigmoid colon.  Possible second focus of acute diverticulitis in the distal descending colon.  - GI was consulted - no intervention at this time   - pt tolerated diet,. GI symptoms resolved.      Acute hypoxic respiratory failure secondary to fluid overload vs TRALI  -While at the emergency room patient went into acute respiratory failure with hypoxia and was satting at 70% with tachypnea and respiratory rate in 40s. Chest x-ray reported new pulmonary edema. Patient put on BiPAP and given Lasix with good urine output. - on NC a 3 L  Baseline  O2 at night only   - She was given lasix as needed with marked improvement in her respiratory status  - Follow up CXR showed improvement. .      Hypokalemia: replete  Hypocalcemia: replete    _______________________________________________________________________  Patient seen and examined by me on discharge day. Pertinent Findings:  Gen:    Not in distress  Chest: Clear lungs  CVS:   Regular rhythm. No edema  Abd:  Soft, not distended, not tender  Neuro:  Alert, oriented x 3  _______________________________________________________________________  DISCHARGE MEDICATIONS:   Discharge Medication List as of 2/20/2021 11:12 AM      CONTINUE these medications which have CHANGED    Details   acetaminophen (TylenoL) 325 mg tablet Take 3 Tabs by mouth every six (6) hours as needed for Pain., Normal, Disp-30 Tab, R-0         CONTINUE these medications which have NOT CHANGED    Details   ergocalciferol (Vitamin D2) 1,250 mcg (50,000 unit) capsule Take 50,000 Units by mouth every seven (7) days. On Wednesday, Historical Med      cycloSPORINE (Restasis) 0.05 % dpet Administer 1 Drop to both eyes every twelve (12) hours. , Historical Med      sodium chloride-aloe vera ([place] Saline) topical gel Apply  to affected area four (4) times daily as needed., Historical Med      dicyclomine (BENTYL) 10 mg capsule Take 10 mg by mouth two (2) times a day., Historical Med      allopurinoL (ZYLOPRIM) 300 mg tablet Take 300 mg by mouth daily. , Historical Med      melatonin 1 mg tablet Take  by mouth nightly., Historical Med      polyethylene glycol 400 (BLINK TEARS OP) Apply 1 Drop to eye daily. Both eyes as needed, Historical Med      DULoxetine (CYMBALTA) 30 mg capsule Take 30 mg by mouth daily. take for neuropathy, Historical Med      dilTIAZem ER (CARDIZEM LA) 240 mg tablet Take 240 mg by mouth daily. , Historical Med      lidocaine 5 % topical cream Apply  to affected area two (2) times daily as needed (port access). Port access, Historical Med      ondansetron hcl (Zofran) 4 mg tablet Take 4 mg by mouth every eight (8) hours as needed for Nausea or Vomiting., Historical Med      folic acid (FOLVITE) 1 mg tablet Take 1 Tab by mouth daily. , Print, Disp-30 Tab, R-2      bismuth subsalicylate (PEPTO-BISMOL PO) Take 30 mL by mouth daily as needed for Diarrhea. take one dose every hour as needed, Historical Med         STOP taking these medications       colchicine 0.6 mg tablet Comments:   Reason for Stopping:         OTHER Comments:   Reason for Stopping:         ginger, Zingiber officinalis, (ginger extract) 250 mg cap Comments:   Reason for Stopping:         vit A,C,E-zinc-copper (PreserVision AREDS) cap capsule Comments:   Reason for Stopping:         pantoprazole (PROTONIX) 40 mg tablet Comments:   Reason for Stopping:                 Patient Follow Up Instructions: Activity: Activity as tolerated  Diet: Regular Diet  Wound Care: None needed      Follow-up Information     Follow up With Specialties Details Why [address] Services On 2/19/2021 THIS IS [address] [place] [address].   IF YOU DO NOT HEAR FROM THEM WITHIN 24-48HRS, PLEASE CONTACT THEM DIRECTLY 1 [facility] [id number]  [address] [place] [address]  On 2/22/2021  [address]    Daxa Crawford MD Hematology and Oncology, Hematology, Oncology On 2/24/2021 Location may be different [address] [place] [address] [address], [facility] Blanca Rivera MD Internal Medicine   [address].  [phone]          ________________________________________________________________    Risk of deterioration: High    Condition at Discharge:  Stable  __________________________________________________________________    Disposition  Home with family and home health services    ____________________________________________________________________    Code Status: Full Code  ___________________________________________________________________      Total time in minutes spent coordinating this discharge (includes going over instructions, follow-up, prescriptions, and preparing report for sign off to her PCP) :  35 minutes    Signed:  Razia Arshad MD

## 2021-02-20 NOTE — PROGRESS NOTES
Informed Nadine Villela NP that her platelet count today was 20, up from 8 yesterday. No new orders at this time.

## 2021-02-21 LAB
BLD PROD TYP BPU: NORMAL
BPU ID: NORMAL
STATUS OF UNIT,%ST: NORMAL
UNIT DIVISION, %UDIV: 0

## 2021-02-22 ENCOUNTER — PATIENT OUTREACH (OUTPATIENT)
Dept: CASE MANAGEMENT | Age: 86
End: 2021-02-22

## 2021-02-22 ENCOUNTER — HOME CARE VISIT (OUTPATIENT)
Dept: SCHEDULING | Facility: HOME HEALTH | Age: 86
End: 2021-02-22
Payer: MEDICARE

## 2021-02-22 VITALS
RESPIRATION RATE: 18 BRPM | HEART RATE: 82 BPM | DIASTOLIC BLOOD PRESSURE: 70 MMHG | SYSTOLIC BLOOD PRESSURE: 136 MMHG | OXYGEN SATURATION: 93 % | TEMPERATURE: 96.2 F

## 2021-02-22 PROCEDURE — 400018 HH-NO PAY CLAIM PROCEDURE

## 2021-02-22 PROCEDURE — G0299 HHS/HOSPICE OF RN EA 15 MIN: HCPCS

## 2021-02-22 PROCEDURE — 3331090002 HH PPS REVENUE DEBIT

## 2021-02-22 PROCEDURE — 3331090001 HH PPS REVENUE CREDIT

## 2021-02-22 PROCEDURE — 400013 HH SOC

## 2021-02-22 PROCEDURE — G0151 HHCP-SERV OF PT,EA 15 MIN: HCPCS

## 2021-02-23 ENCOUNTER — HOME CARE VISIT (OUTPATIENT)
Dept: HOME HEALTH SERVICES | Facility: HOME HEALTH | Age: 86
End: 2021-02-23
Payer: MEDICARE

## 2021-02-23 PROCEDURE — 3331090002 HH PPS REVENUE DEBIT

## 2021-02-23 PROCEDURE — 3331090001 HH PPS REVENUE CREDIT

## 2021-02-24 VITALS
HEIGHT: 66 IN | OXYGEN SATURATION: 93 % | BODY MASS INDEX: 31.34 KG/M2 | RESPIRATION RATE: 18 BRPM | SYSTOLIC BLOOD PRESSURE: 136 MMHG | DIASTOLIC BLOOD PRESSURE: 70 MMHG | WEIGHT: 195 LBS | HEART RATE: 82 BPM | TEMPERATURE: 96.2 F

## 2021-02-24 PROCEDURE — 3331090001 HH PPS REVENUE CREDIT

## 2021-02-24 PROCEDURE — 3331090002 HH PPS REVENUE DEBIT

## 2021-02-25 ENCOUNTER — HOME CARE VISIT (OUTPATIENT)
Dept: HOME HEALTH SERVICES | Facility: HOME HEALTH | Age: 86
End: 2021-02-25
Payer: MEDICARE

## 2021-02-25 ENCOUNTER — HOME CARE VISIT (OUTPATIENT)
Dept: SCHEDULING | Facility: HOME HEALTH | Age: 86
End: 2021-02-25
Payer: MEDICARE

## 2021-02-25 PROCEDURE — 3331090001 HH PPS REVENUE CREDIT

## 2021-02-25 PROCEDURE — 3331090002 HH PPS REVENUE DEBIT

## 2021-02-26 PROCEDURE — 3331090002 HH PPS REVENUE DEBIT

## 2021-02-26 PROCEDURE — 3331090001 HH PPS REVENUE CREDIT

## 2021-02-27 PROCEDURE — 3331090002 HH PPS REVENUE DEBIT

## 2021-02-27 PROCEDURE — 3331090001 HH PPS REVENUE CREDIT

## 2021-02-28 PROCEDURE — 3331090001 HH PPS REVENUE CREDIT

## 2021-02-28 PROCEDURE — 3331090002 HH PPS REVENUE DEBIT

## 2021-03-01 PROCEDURE — 3331090001 HH PPS REVENUE CREDIT

## 2021-03-01 PROCEDURE — 3331090002 HH PPS REVENUE DEBIT

## 2021-03-02 ENCOUNTER — HOME CARE VISIT (OUTPATIENT)
Dept: SCHEDULING | Facility: HOME HEALTH | Age: 86
End: 2021-03-02
Payer: MEDICARE

## 2021-03-02 VITALS
TEMPERATURE: 98 F | SYSTOLIC BLOOD PRESSURE: 116 MMHG | RESPIRATION RATE: 18 BRPM | HEART RATE: 76 BPM | OXYGEN SATURATION: 98 % | DIASTOLIC BLOOD PRESSURE: 76 MMHG

## 2021-03-02 VITALS
DIASTOLIC BLOOD PRESSURE: 40 MMHG | RESPIRATION RATE: 16 BRPM | OXYGEN SATURATION: 94 % | SYSTOLIC BLOOD PRESSURE: 98 MMHG | HEART RATE: 113 BPM | TEMPERATURE: 97.2 F

## 2021-03-02 PROCEDURE — 3331090002 HH PPS REVENUE DEBIT

## 2021-03-02 PROCEDURE — G0299 HHS/HOSPICE OF RN EA 15 MIN: HCPCS

## 2021-03-02 PROCEDURE — 3331090001 HH PPS REVENUE CREDIT

## 2021-03-02 PROCEDURE — G0151 HHCP-SERV OF PT,EA 15 MIN: HCPCS

## 2021-03-03 PROCEDURE — 3331090002 HH PPS REVENUE DEBIT

## 2021-03-03 PROCEDURE — 3331090001 HH PPS REVENUE CREDIT

## 2021-03-04 ENCOUNTER — HOME CARE VISIT (OUTPATIENT)
Dept: SCHEDULING | Facility: HOME HEALTH | Age: 86
End: 2021-03-04
Payer: MEDICARE

## 2021-03-04 ENCOUNTER — HOME CARE VISIT (OUTPATIENT)
Dept: HOME HEALTH SERVICES | Facility: HOME HEALTH | Age: 86
End: 2021-03-04
Payer: MEDICARE

## 2021-03-04 PROCEDURE — 3331090002 HH PPS REVENUE DEBIT

## 2021-03-04 PROCEDURE — 3331090001 HH PPS REVENUE CREDIT

## 2021-03-05 ENCOUNTER — HOME CARE VISIT (OUTPATIENT)
Dept: HOME HEALTH SERVICES | Facility: HOME HEALTH | Age: 86
End: 2021-03-05
Payer: MEDICARE

## 2021-03-05 PROCEDURE — 3331090002 HH PPS REVENUE DEBIT

## 2021-03-05 PROCEDURE — 3331090001 HH PPS REVENUE CREDIT

## 2021-03-06 PROCEDURE — 3331090001 HH PPS REVENUE CREDIT

## 2021-03-06 PROCEDURE — 3331090002 HH PPS REVENUE DEBIT

## 2021-03-07 ENCOUNTER — HOME CARE VISIT (OUTPATIENT)
Dept: HOME HEALTH SERVICES | Facility: HOME HEALTH | Age: 86
End: 2021-03-07
Payer: MEDICARE

## 2021-03-07 PROCEDURE — 3331090002 HH PPS REVENUE DEBIT

## 2021-03-07 PROCEDURE — 3331090001 HH PPS REVENUE CREDIT

## 2021-03-08 PROCEDURE — 3331090002 HH PPS REVENUE DEBIT

## 2021-03-08 PROCEDURE — 3331090001 HH PPS REVENUE CREDIT

## 2021-03-09 PROCEDURE — 3331090001 HH PPS REVENUE CREDIT

## 2021-03-09 PROCEDURE — 3331090002 HH PPS REVENUE DEBIT

## 2021-03-10 PROCEDURE — 3331090001 HH PPS REVENUE CREDIT

## 2021-03-10 PROCEDURE — 3331090002 HH PPS REVENUE DEBIT

## 2021-03-11 PROCEDURE — 3331090002 HH PPS REVENUE DEBIT

## 2021-03-11 PROCEDURE — 3331090001 HH PPS REVENUE CREDIT

## 2021-03-12 PROCEDURE — 3331090001 HH PPS REVENUE CREDIT

## 2021-03-12 PROCEDURE — 3331090002 HH PPS REVENUE DEBIT

## 2021-03-13 PROCEDURE — 3331090001 HH PPS REVENUE CREDIT

## 2021-03-13 PROCEDURE — 3331090002 HH PPS REVENUE DEBIT

## 2021-03-14 PROCEDURE — 3331090001 HH PPS REVENUE CREDIT

## 2021-03-14 PROCEDURE — 3331090002 HH PPS REVENUE DEBIT

## 2021-03-15 PROCEDURE — 3331090002 HH PPS REVENUE DEBIT

## 2021-03-15 PROCEDURE — 3331090001 HH PPS REVENUE CREDIT

## 2021-03-16 PROCEDURE — 3331090001 HH PPS REVENUE CREDIT

## 2021-03-16 PROCEDURE — 3331090002 HH PPS REVENUE DEBIT

## 2021-03-17 PROCEDURE — 3331090002 HH PPS REVENUE DEBIT

## 2021-03-17 PROCEDURE — 3331090001 HH PPS REVENUE CREDIT

## 2021-03-18 PROCEDURE — 3331090002 HH PPS REVENUE DEBIT

## 2021-03-18 PROCEDURE — 3331090001 HH PPS REVENUE CREDIT

## 2021-03-19 PROCEDURE — 3331090001 HH PPS REVENUE CREDIT

## 2021-03-19 PROCEDURE — 3331090002 HH PPS REVENUE DEBIT

## 2021-03-20 PROCEDURE — 3331090002 HH PPS REVENUE DEBIT

## 2021-03-20 PROCEDURE — 3331090001 HH PPS REVENUE CREDIT

## 2021-03-21 PROCEDURE — 3331090001 HH PPS REVENUE CREDIT

## 2021-03-21 PROCEDURE — 3331090002 HH PPS REVENUE DEBIT

## 2021-03-22 PROCEDURE — 3331090002 HH PPS REVENUE DEBIT

## 2021-03-22 PROCEDURE — 3331090001 HH PPS REVENUE CREDIT

## 2021-03-23 PROCEDURE — 3331090001 HH PPS REVENUE CREDIT

## 2021-03-23 PROCEDURE — 3331090002 HH PPS REVENUE DEBIT

## 2021-03-24 PROCEDURE — 400018 HH-NO PAY CLAIM PROCEDURE

## 2021-03-25 ENCOUNTER — TELEPHONE (OUTPATIENT)
Dept: ONCOLOGY | Age: 86
End: 2021-03-25

## 2021-11-11 ENCOUNTER — TRANSCRIBE ORDER (OUTPATIENT)
Dept: SCHEDULING | Age: 86
End: 2021-11-11

## 2021-11-11 DIAGNOSIS — D32.9 MENINGIOMA (HCC): Primary | ICD-10-CM

## 2022-03-18 PROBLEM — K57.92 DIVERTICULITIS: Status: ACTIVE | Noted: 2018-06-29

## 2022-03-18 PROBLEM — H04.129 DRY EYE SYNDROME: Status: ACTIVE | Noted: 2018-06-29

## 2022-03-18 PROBLEM — M81.0 OSTEOPOROSIS: Status: ACTIVE | Noted: 2018-06-29

## 2022-03-18 PROBLEM — M94.0 COSTOCHONDRITIS: Status: ACTIVE | Noted: 2017-08-25

## 2022-03-18 PROBLEM — J96.01 ACUTE RESPIRATORY FAILURE WITH HYPOXIA (HCC): Status: ACTIVE | Noted: 2018-09-29

## 2022-03-18 PROBLEM — D50.0 BLOOD LOSS ANEMIA: Status: ACTIVE | Noted: 2019-01-29

## 2022-03-18 PROBLEM — D46.9 MDS (MYELODYSPLASTIC SYNDROME) (HCC): Status: ACTIVE | Noted: 2019-11-27

## 2022-03-18 PROBLEM — D69.6 THROMBOCYTOPENIA (HCC): Status: ACTIVE | Noted: 2019-01-29

## 2022-03-18 PROBLEM — Z98.890 S/P LUMPECTOMY, LEFT BREAST: Status: ACTIVE | Noted: 2017-08-25

## 2022-03-18 PROBLEM — K92.2 ACUTE UPPER GI BLEED: Status: ACTIVE | Noted: 2020-07-20

## 2022-03-18 PROBLEM — G62.9 PERIPHERAL NEUROPATHY: Status: ACTIVE | Noted: 2018-06-29

## 2022-03-18 PROBLEM — E79.0 HYPERURICEMIA: Status: ACTIVE | Noted: 2020-10-15

## 2022-03-18 PROBLEM — D46.9 MYELODYSPLASTIC SYNDROME (HCC): Status: ACTIVE | Noted: 2020-06-27

## 2022-03-19 PROBLEM — D61.82 ANEMIA ASSOCIATED WITH BONE MARROW INFILTRATION (HCC): Status: ACTIVE | Noted: 2019-11-01

## 2022-03-19 PROBLEM — I48.0 PAROXYSMAL ATRIAL FIBRILLATION (HCC): Status: ACTIVE | Noted: 2020-04-28

## 2022-03-19 PROBLEM — D50.9 IRON DEFICIENCY ANEMIA: Status: ACTIVE | Noted: 2019-02-15

## 2022-03-19 PROBLEM — M10.9 ACUTE GOUT OF LEFT FOOT: Status: ACTIVE | Noted: 2020-10-15

## 2022-03-19 PROBLEM — R73.03 PREDIABETES: Status: ACTIVE | Noted: 2018-06-29

## 2022-03-19 PROBLEM — D70.9 NEUTROPENIA, FEBRILE (HCC): Status: ACTIVE | Noted: 2021-02-11

## 2022-03-19 PROBLEM — K21.9 GERD WITHOUT ESOPHAGITIS: Status: ACTIVE | Noted: 2018-06-29

## 2022-03-19 PROBLEM — K27.9 PUD (PEPTIC ULCER DISEASE): Status: ACTIVE | Noted: 2020-01-30

## 2022-03-19 PROBLEM — J44.9 COPD (CHRONIC OBSTRUCTIVE PULMONARY DISEASE) (HCC): Status: ACTIVE | Noted: 2019-10-29

## 2022-03-19 PROBLEM — M79.7 FIBROMYALGIA: Status: ACTIVE | Noted: 2018-06-29

## 2022-03-19 PROBLEM — L71.9 ROSACEA: Status: ACTIVE | Noted: 2018-06-29

## 2022-03-19 PROBLEM — R50.81 NEUTROPENIA, FEBRILE (HCC): Status: ACTIVE | Noted: 2021-02-11

## 2022-03-19 PROBLEM — Z90.710 STATUS POST TRACHELECTOMY: Status: ACTIVE | Noted: 2020-06-27

## 2022-03-19 PROBLEM — J38.7 MASS OF EPIGLOTTIS: Status: ACTIVE | Noted: 2020-06-26

## 2022-03-19 PROBLEM — J96.01 ACUTE RESPIRATORY FAILURE WITH HYPOXEMIA (HCC): Status: ACTIVE | Noted: 2021-02-11

## 2022-03-19 PROBLEM — J20.8 ACUTE BRONCHITIS DUE TO OTHER SPECIFIED ORGANISMS: Status: ACTIVE | Noted: 2018-09-30

## 2022-03-20 PROBLEM — R32 URINARY INCONTINENCE: Status: ACTIVE | Noted: 2021-02-02

## 2022-03-20 PROBLEM — I48.91 ATRIAL FIBRILLATION WITH RAPID VENTRICULAR RESPONSE (HCC): Status: ACTIVE | Noted: 2019-11-07

## 2022-03-20 PROBLEM — R65.10 SIRS (SYSTEMIC INFLAMMATORY RESPONSE SYNDROME) (HCC): Status: ACTIVE | Noted: 2019-10-29

## 2022-03-20 PROBLEM — E78.00 HYPERCHOLESTEROLEMIA: Status: ACTIVE | Noted: 2018-06-29

## 2022-03-20 PROBLEM — C93.10 CHRONIC MYELOMONOCYTIC LEUKEMIA (HCC): Status: ACTIVE | Noted: 2019-11-01

## 2023-05-30 RX ORDER — LEVOFLOXACIN 250 MG/1
250 TABLET ORAL DAILY
COMMUNITY

## 2023-05-30 RX ORDER — CYCLOSPORINE 0.5 MG/ML
1 EMULSION OPHTHALMIC EVERY 12 HOURS
COMMUNITY

## 2023-05-30 RX ORDER — DULOXETIN HYDROCHLORIDE 30 MG/1
CAPSULE, DELAYED RELEASE ORAL DAILY
COMMUNITY
Start: 2020-08-03

## 2023-05-30 RX ORDER — ONDANSETRON 4 MG/1
TABLET, FILM COATED ORAL EVERY 8 HOURS PRN
COMMUNITY

## 2023-05-30 RX ORDER — UREA 10 %
3 LOTION (ML) TOPICAL NIGHTLY
COMMUNITY

## 2023-05-30 RX ORDER — ERGOCALCIFEROL 1.25 MG/1
CAPSULE ORAL
COMMUNITY

## 2023-05-30 RX ORDER — DICYCLOMINE HYDROCHLORIDE 10 MG/1
CAPSULE ORAL 2 TIMES DAILY
COMMUNITY
Start: 2020-08-31

## 2023-05-30 RX ORDER — ALLOPURINOL 300 MG/1
300 TABLET ORAL DAILY
COMMUNITY
Start: 2020-08-21

## 2023-05-30 RX ORDER — FOLIC ACID 1 MG/1
TABLET ORAL DAILY
COMMUNITY
Start: 2020-07-08

## 2023-05-30 RX ORDER — LIDOCAINE 5 G/100G
CREAM RECTAL; TOPICAL 2 TIMES DAILY PRN
COMMUNITY

## 2023-05-30 RX ORDER — ACETAMINOPHEN 325 MG/1
TABLET ORAL EVERY 6 HOURS PRN
COMMUNITY
Start: 2021-02-20

## 2024-12-17 NOTE — PROGRESS NOTES
Bedside shift change report given to Henrik House RN (oncoming nurse) by Joseph Combs RN (offgoing nurse). Report included the following information SBAR. Detail Level: Detailed Quality 226: Preventive Care And Screening: Tobacco Use: Screening And Cessation Intervention: Patient screened for tobacco use and is an ex/non-smoker

## 2025-06-11 NOTE — ED NOTES
Pt now having rigors and tachypneic and states she is having hard time breathing. Blood stopped. Hospitalist in room. [FreeTextEntry1] : EXAMINATION: US RENAL AND PELVIS TODAY IN OFFICE     FINDINGS: LEFT GRADE 1 HYDRONEPHROSIS OTHERWISE UNREMARKABLE KIDNEY AND PELVIC STRUCTURES.

## (undated) DEVICE — SYR 10ML LUER LOK 1/5ML GRAD --

## (undated) DEVICE — 1200 GUARD II KIT W/5MM TUBE W/O VAC TUBE: Brand: GUARDIAN

## (undated) DEVICE — BLOCK BITE ENDOSCP AD 21 MM W/ DIL BLU LF DISP

## (undated) DEVICE — Z DISCONTINUED PER MEDLINE LINE GAS SAMPLING O2/CO2 LNG AD 13 FT NSL W/ TBNG FILTERLINE

## (undated) DEVICE — Device

## (undated) DEVICE — NEONATAL-ADULT SPO2 SENSOR: Brand: NELLCOR

## (undated) DEVICE — NEEDLE BX 11GA L101MM BNE MAR ASPIR W/ ADPT JAMSH

## (undated) DEVICE — SET ADMIN 16ML TBNG L100IN 2 Y INJ SITE IV PIGGY BK DISP

## (undated) DEVICE — NEEDLE HYPO 18GA L1.5IN PNK S STL HUB POLYPR SHLD REG BVL

## (undated) DEVICE — SOLUTION IV 1000ML 0.9% SOD CHL

## (undated) DEVICE — YANKAUER,TAPERED BULBOUS TIP,W/O VENT: Brand: MEDLINE

## (undated) DEVICE — KENDALL DL ECG CABLE AND LEAD WIRE SYSTEM, 3-LEAD, SINGLE PATIENT USE: Brand: KENDALL

## (undated) DEVICE — GOWN,SIRUS,FABRNF,XL,20/CS: Brand: MEDLINE

## (undated) DEVICE — CONTINU-FLO SOLUTION SET, 2 INJECTION SITES, MALE LUER LOCK ADAPTER WITH RETRACTABLE COLLAR, LARGE BORE STOPCOCK WITH ROTATING MALE LUER LOCK EXTENSION SET, 2 INJECTION SITES, MALE LUER LOCK ADAPTER WITH RETRACTABLE COLLAR: Brand: INTERLINK/CONTINU-FLO

## (undated) DEVICE — CATH IV AUTOGRD BC PNK 20GA 25 -- INSYTE

## (undated) DEVICE — BAG SPEC BIOHZRD 10 X 10 IN --

## (undated) DEVICE — SYR 10ML CTRL LR LCK NSAF LF --

## (undated) DEVICE — CONTAINER,SPECIMEN,OR STERILE,4OZ: Brand: MEDLINE

## (undated) DEVICE — NEEDLE SPNL 22GA L3.5IN BLK HUB S STL REG WALL FIT STYL W/

## (undated) DEVICE — GAUZE SPONGES,12 PLY: Brand: CURITY

## (undated) DEVICE — SOLIDIFIER MEDC 1200ML -- CONVERT TO 356117

## (undated) DEVICE — SYR 3ML LL TIP 1/10ML GRAD --

## (undated) DEVICE — BIPOLAR FORCEPS CORD: Brand: VALLEYLAB

## (undated) DEVICE — TRAY PREP DRY W/ PREM GLV 2 APPL 6 SPNG 2 UNDPD 1 OVERWRAP

## (undated) DEVICE — APPLICATOR BNDG 1MM ADH PREMIERPRO EXOFIN

## (undated) DEVICE — ROCKER SWITCH PENCIL BLADE ELECTRODE, HOLSTER: Brand: EDGE

## (undated) DEVICE — TOWEL 4 PLY TISS 19X30 SUE WHT

## (undated) DEVICE — 3M™ TEGADERM™ TRANSPARENT FILM DRESSING FRAME STYLE, 1624W, 2-3/8 IN X 2-3/4 IN (6 CM X 7 CM), 100/CT 4CT/CASE: Brand: 3M™ TEGADERM™

## (undated) DEVICE — SYR ASSEMB INFL BLLN 60ML --

## (undated) DEVICE — BASIN EMSIS 16OZ GRAPHITE PLAS KID SHP MOLD GRAD FOR ORAL

## (undated) DEVICE — PACK,EENT,TURBAN DRAPE,PK II: Brand: MEDLINE

## (undated) DEVICE — GARMENT,MEDLINE,DVT,INT,CALF,MED, GEN2: Brand: MEDLINE

## (undated) DEVICE — SPONGE DRAIN NONWOVEN 4X4IN -- 2/PK

## (undated) DEVICE — SPONGE: SPECIALTY PEANUT XR 100/CS: Brand: MEDICAL ACTION INDUSTRIES

## (undated) DEVICE — HANDLE LT SNAP ON ULT DURABLE LENS FOR TRUMPF ALC DISPOSABLE

## (undated) DEVICE — SURGICAL PROCEDURE PACK BASIN MAJ SET CUST NO CAUT

## (undated) DEVICE — CONTAINER SPEC 20 ML LID NEUT BUFF FORMALIN 10 % POLYPR STS

## (undated) DEVICE — SUTURE VCRL SZ 3-0 L27IN ABSRB UD L19MM PS-2 3/8 CIR PRIM J427H

## (undated) DEVICE — STERILE POLYISOPRENE POWDER-FREE SURGICAL GLOVES: Brand: PROTEXIS

## (undated) DEVICE — SKIN MARKER,REGULAR TIP WITH RULER AND LABELS: Brand: DEVON

## (undated) DEVICE — FORCEPS BX L160CM DIA8MM GRSP DISECT CUP TIP NONLOCKING ROT

## (undated) DEVICE — TUBING SUCT 10FR MAL ALUM SHFT FN CAP VENT UNIV CONN W/ OBT

## (undated) DEVICE — INFECTION CONTROL KIT SYS

## (undated) DEVICE — (D)PREP SKN CHLRAPRP APPL 26ML -- CONVERT TO ITEM 371833

## (undated) DEVICE — HOLDER TRACH TB W1IN FIT UPTO 19.5IN NK 2 PC ADJ BLU

## (undated) DEVICE — KIT APPL SPRY HEMSTAT PWDR -- F/HEMADERM FLEXTIP

## (undated) DEVICE — SUT SLK 2-0SH 30IN BLK --

## (undated) DEVICE — REM POLYHESIVE ADULT PATIENT RETURN ELECTRODE: Brand: VALLEYLAB

## (undated) DEVICE — TOWEL SURG W17XL27IN STD BLU COT NONFENESTRATED PREWASHED

## (undated) DEVICE — ICE PK EYE 4 1/2INX10IN (15/BX 2BX/CS

## (undated) DEVICE — ELECTRODE,RADIOTRANSLUCENT,FOAM,5PK: Brand: MEDLINE

## (undated) DEVICE — SOLUTION LACTATED RINGERS INJECTION USP

## (undated) DEVICE — NEEDLE HYPO 25GA L1.5IN BLU POLYPR HUB S STL REG BVL STR

## (undated) DEVICE — SUTURE CHROMIC GUT SZ 2-0 L27IN ABSRB BRN L26MM SH 1/2 CIR G123H

## (undated) DEVICE — TRAY BX W/ 102MM BX NDL INC. ANCIL ITEMS

## (undated) DEVICE — CATH IV AUTOGRD BC BLU 22GA 25 -- INSYTE

## (undated) DEVICE — COVER,TABLE,60X90,STERILE: Brand: MEDLINE

## (undated) DEVICE — DRIVER BX W11.4XH6.4XL16.5CM LI SEAL PWR BNE ACCS ONCONTROL